# Patient Record
Sex: MALE | Race: WHITE | NOT HISPANIC OR LATINO | Employment: OTHER | URBAN - METROPOLITAN AREA
[De-identification: names, ages, dates, MRNs, and addresses within clinical notes are randomized per-mention and may not be internally consistent; named-entity substitution may affect disease eponyms.]

---

## 2017-02-06 ENCOUNTER — GENERIC CONVERSION - ENCOUNTER (OUTPATIENT)
Dept: OTHER | Facility: OTHER | Age: 77
End: 2017-02-06

## 2017-02-06 ENCOUNTER — ALLSCRIPTS OFFICE VISIT (OUTPATIENT)
Dept: OTHER | Facility: OTHER | Age: 77
End: 2017-02-06

## 2017-02-06 DIAGNOSIS — R11.2 NAUSEA WITH VOMITING: ICD-10-CM

## 2017-02-08 ENCOUNTER — APPOINTMENT (OUTPATIENT)
Dept: LAB | Facility: CLINIC | Age: 77
End: 2017-02-08
Payer: MEDICARE

## 2017-02-08 ENCOUNTER — TRANSCRIBE ORDERS (OUTPATIENT)
Dept: LAB | Facility: CLINIC | Age: 77
End: 2017-02-08

## 2017-02-08 DIAGNOSIS — R11.2 NAUSEA WITH VOMITING: ICD-10-CM

## 2017-02-08 PROCEDURE — 87045 FECES CULTURE AEROBIC BACT: CPT

## 2017-02-08 PROCEDURE — 87015 SPECIMEN INFECT AGNT CONCNTJ: CPT

## 2017-02-08 PROCEDURE — 87899 AGENT NOS ASSAY W/OPTIC: CPT

## 2017-02-08 PROCEDURE — 89055 LEUKOCYTE ASSESSMENT FECAL: CPT

## 2017-02-08 PROCEDURE — 87046 STOOL CULTR AEROBIC BACT EA: CPT

## 2017-02-08 PROCEDURE — 87493 C DIFF AMPLIFIED PROBE: CPT

## 2017-02-09 ENCOUNTER — GENERIC CONVERSION - ENCOUNTER (OUTPATIENT)
Dept: OTHER | Facility: OTHER | Age: 77
End: 2017-02-09

## 2017-02-09 LAB — C DIFF TOX GENS STL QL NAA+PROBE: NORMAL

## 2017-02-10 ENCOUNTER — GENERIC CONVERSION - ENCOUNTER (OUTPATIENT)
Dept: OTHER | Facility: OTHER | Age: 77
End: 2017-02-10

## 2017-02-10 LAB
BACTERIA STL CULT: NORMAL
BACTERIA STL CULT: NORMAL

## 2017-02-12 LAB — WBC SPEC QL GRAM STN: NORMAL

## 2017-02-14 ENCOUNTER — GENERIC CONVERSION - ENCOUNTER (OUTPATIENT)
Dept: OTHER | Facility: OTHER | Age: 77
End: 2017-02-14

## 2017-03-07 ENCOUNTER — GENERIC CONVERSION - ENCOUNTER (OUTPATIENT)
Dept: OTHER | Facility: OTHER | Age: 77
End: 2017-03-07

## 2017-03-10 ENCOUNTER — GENERIC CONVERSION - ENCOUNTER (OUTPATIENT)
Dept: OTHER | Facility: OTHER | Age: 77
End: 2017-03-10

## 2017-03-14 ENCOUNTER — ANESTHESIA EVENT (OUTPATIENT)
Dept: GASTROENTEROLOGY | Facility: HOSPITAL | Age: 77
End: 2017-03-14
Payer: MEDICARE

## 2017-03-15 ENCOUNTER — HOSPITAL ENCOUNTER (OUTPATIENT)
Facility: HOSPITAL | Age: 77
Setting detail: OUTPATIENT SURGERY
Discharge: HOME | End: 2017-03-15
Attending: INTERNAL MEDICINE | Admitting: INTERNAL MEDICINE
Payer: MEDICARE

## 2017-03-15 ENCOUNTER — ANESTHESIA (OUTPATIENT)
Dept: GASTROENTEROLOGY | Facility: HOSPITAL | Age: 77
End: 2017-03-15
Payer: MEDICARE

## 2017-03-15 ENCOUNTER — GENERIC CONVERSION - ENCOUNTER (OUTPATIENT)
Dept: OTHER | Facility: OTHER | Age: 77
End: 2017-03-15

## 2017-03-15 VITALS
HEIGHT: 77 IN | OXYGEN SATURATION: 95 % | BODY MASS INDEX: 25.98 KG/M2 | WEIGHT: 220 LBS | SYSTOLIC BLOOD PRESSURE: 137 MMHG | HEART RATE: 71 BPM | RESPIRATION RATE: 16 BRPM | TEMPERATURE: 97 F | DIASTOLIC BLOOD PRESSURE: 76 MMHG

## 2017-03-15 DIAGNOSIS — K21.9 GERD (GASTROESOPHAGEAL REFLUX DISEASE): ICD-10-CM

## 2017-03-15 DIAGNOSIS — R11.2 NAUSEA WITH VOMITING: ICD-10-CM

## 2017-03-15 PROCEDURE — 88305 TISSUE EXAM BY PATHOLOGIST: CPT | Performed by: INTERNAL MEDICINE

## 2017-03-15 PROCEDURE — 88342 IMHCHEM/IMCYTCHM 1ST ANTB: CPT | Performed by: INTERNAL MEDICINE

## 2017-03-15 RX ORDER — PROPOFOL 10 MG/ML
INJECTION, EMULSION INTRAVENOUS AS NEEDED
Status: DISCONTINUED | OUTPATIENT
Start: 2017-03-15 | End: 2017-03-15 | Stop reason: SURG

## 2017-03-15 RX ORDER — LIDOCAINE HYDROCHLORIDE 10 MG/ML
INJECTION, SOLUTION INFILTRATION; PERINEURAL AS NEEDED
Status: DISCONTINUED | OUTPATIENT
Start: 2017-03-15 | End: 2017-03-15 | Stop reason: SURG

## 2017-03-15 RX ORDER — SODIUM CHLORIDE, SODIUM LACTATE, POTASSIUM CHLORIDE, CALCIUM CHLORIDE 600; 310; 30; 20 MG/100ML; MG/100ML; MG/100ML; MG/100ML
100 INJECTION, SOLUTION INTRAVENOUS CONTINUOUS
Status: DISCONTINUED | OUTPATIENT
Start: 2017-03-15 | End: 2017-03-15

## 2017-03-15 RX ORDER — PROPOFOL 10 MG/ML
INJECTION, EMULSION INTRAVENOUS CONTINUOUS PRN
Status: DISCONTINUED | OUTPATIENT
Start: 2017-03-15 | End: 2017-03-15 | Stop reason: SURG

## 2017-03-15 RX ORDER — ALBUTEROL SULFATE 2.5 MG/3ML
2.5 SOLUTION RESPIRATORY (INHALATION) DAILY
COMMUNITY
End: 2017-04-09

## 2017-03-15 RX ADMIN — PROPOFOL 100 MCG/KG/MIN: 10 INJECTION, EMULSION INTRAVENOUS at 10:17

## 2017-03-15 RX ADMIN — SODIUM CHLORIDE, SODIUM LACTATE, POTASSIUM CHLORIDE, AND CALCIUM CHLORIDE 100 ML/HR: .6; .31; .03; .02 INJECTION, SOLUTION INTRAVENOUS at 09:43

## 2017-03-15 RX ADMIN — PROPOFOL 80 MG: 10 INJECTION, EMULSION INTRAVENOUS at 10:17

## 2017-03-15 RX ADMIN — LIDOCAINE HYDROCHLORIDE 100 MG: 10 INJECTION, SOLUTION INFILTRATION; PERINEURAL at 10:17

## 2017-03-19 ENCOUNTER — GENERIC CONVERSION - ENCOUNTER (OUTPATIENT)
Dept: OTHER | Facility: OTHER | Age: 77
End: 2017-03-19

## 2017-04-09 ENCOUNTER — APPOINTMENT (OUTPATIENT)
Dept: CT IMAGING | Facility: HOSPITAL | Age: 77
DRG: 683 | End: 2017-04-09
Payer: MEDICARE

## 2017-04-09 ENCOUNTER — APPOINTMENT (EMERGENCY)
Dept: RADIOLOGY | Facility: HOSPITAL | Age: 77
DRG: 683 | End: 2017-04-09
Payer: MEDICARE

## 2017-04-09 ENCOUNTER — APPOINTMENT (EMERGENCY)
Dept: CT IMAGING | Facility: HOSPITAL | Age: 77
DRG: 683 | End: 2017-04-09
Payer: MEDICARE

## 2017-04-09 ENCOUNTER — HOSPITAL ENCOUNTER (INPATIENT)
Facility: HOSPITAL | Age: 77
LOS: 1 days | Discharge: HOME | DRG: 683 | End: 2017-04-12
Attending: EMERGENCY MEDICINE | Admitting: FAMILY MEDICINE
Payer: MEDICARE

## 2017-04-09 DIAGNOSIS — N18.9 ACUTE-ON-CHRONIC KIDNEY INJURY (HCC): ICD-10-CM

## 2017-04-09 DIAGNOSIS — J40 BRONCHITIS: Primary | ICD-10-CM

## 2017-04-09 DIAGNOSIS — R53.1 LEFT-SIDED WEAKNESS: ICD-10-CM

## 2017-04-09 DIAGNOSIS — R09.02 HYPOXIA: ICD-10-CM

## 2017-04-09 DIAGNOSIS — N17.9 ACUTE-ON-CHRONIC KIDNEY INJURY (HCC): ICD-10-CM

## 2017-04-09 DIAGNOSIS — Z86.73 HISTORY OF CVA (CEREBROVASCULAR ACCIDENT): Chronic | ICD-10-CM

## 2017-04-09 PROBLEM — R06.02 SHORTNESS OF BREATH: Status: ACTIVE | Noted: 2017-04-09

## 2017-04-09 LAB
ALBUMIN SERPL BCP-MCNC: 4.2 G/DL (ref 3.5–5)
ALP SERPL-CCNC: 106 U/L (ref 46–116)
ALT SERPL W P-5'-P-CCNC: 27 U/L (ref 12–78)
ANION GAP SERPL CALCULATED.3IONS-SCNC: 10 MMOL/L (ref 4–13)
ANION GAP SERPL CALCULATED.3IONS-SCNC: 15 MMOL/L (ref 4–13)
APTT PPP: 34 SECONDS (ref 24–36)
AST SERPL W P-5'-P-CCNC: 15 U/L (ref 5–45)
BACTERIA UR QL AUTO: ABNORMAL /HPF
BASE EX.OXY STD BLDV CALC-SCNC: 86.6 % (ref 60–80)
BASE EXCESS BLDV CALC-SCNC: -3.3 MMOL/L
BASOPHILS # BLD AUTO: 0.04 THOUSANDS/ΜL (ref 0–0.1)
BASOPHILS NFR BLD AUTO: 0 % (ref 0–1)
BILIRUB SERPL-MCNC: 0.5 MG/DL (ref 0.2–1)
BILIRUB UR QL STRIP: NEGATIVE
BUN SERPL-MCNC: 23 MG/DL (ref 5–25)
BUN SERPL-MCNC: 23 MG/DL (ref 5–25)
CALCIUM SERPL-MCNC: 9.2 MG/DL (ref 8.3–10.1)
CALCIUM SERPL-MCNC: 9.3 MG/DL (ref 8.3–10.1)
CHLORIDE SERPL-SCNC: 102 MMOL/L (ref 100–108)
CHLORIDE SERPL-SCNC: 103 MMOL/L (ref 100–108)
CLARITY UR: ABNORMAL
CO2 SERPL-SCNC: 22 MMOL/L (ref 21–32)
CO2 SERPL-SCNC: 25 MMOL/L (ref 21–32)
COLOR UR: YELLOW
CREAT SERPL-MCNC: 1.7 MG/DL (ref 0.6–1.3)
CREAT SERPL-MCNC: 1.71 MG/DL (ref 0.6–1.3)
DEPRECATED D DIMER PPP: 302 NG/ML (FEU) (ref 0–424)
EOSINOPHIL # BLD AUTO: 0.36 THOUSAND/ΜL (ref 0–0.61)
EOSINOPHIL NFR BLD AUTO: 4 % (ref 0–6)
ERYTHROCYTE [DISTWIDTH] IN BLOOD BY AUTOMATED COUNT: 14 % (ref 11.6–15.1)
GFR SERPL CREATININE-BSD FRML MDRD: 39 ML/MIN/1.73SQ M
GFR SERPL CREATININE-BSD FRML MDRD: 39.3 ML/MIN/1.73SQ M
GLUCOSE SERPL-MCNC: 112 MG/DL (ref 65–140)
GLUCOSE SERPL-MCNC: 120 MG/DL (ref 65–140)
GLUCOSE UR STRIP-MCNC: NEGATIVE MG/DL
HCO3 BLDV-SCNC: 19 MMOL/L (ref 24–30)
HCT VFR BLD AUTO: 43.7 % (ref 36.5–49.3)
HGB BLD-MCNC: 14.5 G/DL (ref 12–17)
HGB UR QL STRIP.AUTO: ABNORMAL
INR PPP: 1.09 (ref 0.86–1.16)
KETONES UR STRIP-MCNC: NEGATIVE MG/DL
L PNEUMO1 AG UR QL IA.RAPID: NEGATIVE
LACTATE SERPL-SCNC: 1.5 MMOL/L (ref 0.5–2)
LEUKOCYTE ESTERASE UR QL STRIP: ABNORMAL
LYMPHOCYTES # BLD AUTO: 0.7 THOUSANDS/ΜL (ref 0.6–4.47)
LYMPHOCYTES NFR BLD AUTO: 7 % (ref 14–44)
MCH RBC QN AUTO: 29.5 PG (ref 26.8–34.3)
MCHC RBC AUTO-ENTMCNC: 33.2 G/DL (ref 31.4–37.4)
MCV RBC AUTO: 89 FL (ref 82–98)
MONOCYTES # BLD AUTO: 0.83 THOUSAND/ΜL (ref 0.17–1.22)
MONOCYTES NFR BLD AUTO: 8 % (ref 4–12)
NEUTROPHILS # BLD AUTO: 8.04 THOUSANDS/ΜL (ref 1.85–7.62)
NEUTS SEG NFR BLD AUTO: 81 % (ref 43–75)
NITRITE UR QL STRIP: POSITIVE
NON-SQ EPI CELLS URNS QL MICRO: ABNORMAL /HPF
NT-PROBNP SERPL-MCNC: 125 PG/ML
O2 CT BLDV-SCNC: 19.1 ML/DL
PCO2 BLDV: 27.7 MM HG (ref 42–50)
PH BLDV: 7.45 [PH] (ref 7.3–7.4)
PH UR STRIP.AUTO: 6 [PH] (ref 4.5–8)
PLATELET # BLD AUTO: 141 THOUSANDS/UL (ref 149–390)
PLATELET # BLD AUTO: 157 THOUSANDS/UL (ref 149–390)
PMV BLD AUTO: 11.5 FL (ref 8.9–12.7)
PMV BLD AUTO: 11.7 FL (ref 8.9–12.7)
PO2 BLDV: 52.1 MM HG (ref 35–45)
POTASSIUM SERPL-SCNC: 4.1 MMOL/L (ref 3.5–5.3)
POTASSIUM SERPL-SCNC: 4.4 MMOL/L (ref 3.5–5.3)
PROT SERPL-MCNC: 7.7 G/DL (ref 6.4–8.2)
PROT UR STRIP-MCNC: ABNORMAL MG/DL
PROTHROMBIN TIME: 13.9 SECONDS (ref 12–14.3)
RBC # BLD AUTO: 4.92 MILLION/UL (ref 3.88–5.62)
RBC #/AREA URNS AUTO: ABNORMAL /HPF
S PNEUM AG UR QL: NEGATIVE
SODIUM SERPL-SCNC: 138 MMOL/L (ref 136–145)
SODIUM SERPL-SCNC: 139 MMOL/L (ref 136–145)
SP GR UR STRIP.AUTO: 1.02 (ref 1–1.03)
TROPONIN I SERPL-MCNC: <0.02 NG/ML
UROBILINOGEN UR QL STRIP.AUTO: 0.2 E.U./DL
WBC # BLD AUTO: 9.97 THOUSAND/UL (ref 4.31–10.16)
WBC #/AREA URNS AUTO: ABNORMAL /HPF

## 2017-04-09 PROCEDURE — 80053 COMPREHEN METABOLIC PANEL: CPT | Performed by: EMERGENCY MEDICINE

## 2017-04-09 PROCEDURE — 87449 NOS EACH ORGANISM AG IA: CPT | Performed by: PHYSICIAN ASSISTANT

## 2017-04-09 PROCEDURE — 87798 DETECT AGENT NOS DNA AMP: CPT | Performed by: PHYSICIAN ASSISTANT

## 2017-04-09 PROCEDURE — 85610 PROTHROMBIN TIME: CPT | Performed by: EMERGENCY MEDICINE

## 2017-04-09 PROCEDURE — 82805 BLOOD GASES W/O2 SATURATION: CPT | Performed by: EMERGENCY MEDICINE

## 2017-04-09 PROCEDURE — 71250 CT THORAX DX C-: CPT

## 2017-04-09 PROCEDURE — 36415 COLL VENOUS BLD VENIPUNCTURE: CPT | Performed by: EMERGENCY MEDICINE

## 2017-04-09 PROCEDURE — 96365 THER/PROPH/DIAG IV INF INIT: CPT

## 2017-04-09 PROCEDURE — 70490 CT SOFT TISSUE NECK W/O DYE: CPT

## 2017-04-09 PROCEDURE — 93005 ELECTROCARDIOGRAM TRACING: CPT | Performed by: EMERGENCY MEDICINE

## 2017-04-09 PROCEDURE — 84484 ASSAY OF TROPONIN QUANT: CPT | Performed by: PHYSICIAN ASSISTANT

## 2017-04-09 PROCEDURE — 71020 HB CHEST X-RAY 2VW FRONTAL&LATL: CPT

## 2017-04-09 PROCEDURE — 81001 URINALYSIS AUTO W/SCOPE: CPT | Performed by: FAMILY MEDICINE

## 2017-04-09 PROCEDURE — 83605 ASSAY OF LACTIC ACID: CPT | Performed by: EMERGENCY MEDICINE

## 2017-04-09 PROCEDURE — 84484 ASSAY OF TROPONIN QUANT: CPT | Performed by: EMERGENCY MEDICINE

## 2017-04-09 PROCEDURE — 87040 BLOOD CULTURE FOR BACTERIA: CPT | Performed by: EMERGENCY MEDICINE

## 2017-04-09 PROCEDURE — 83880 ASSAY OF NATRIURETIC PEPTIDE: CPT | Performed by: EMERGENCY MEDICINE

## 2017-04-09 PROCEDURE — 70450 CT HEAD/BRAIN W/O DYE: CPT

## 2017-04-09 PROCEDURE — 85049 AUTOMATED PLATELET COUNT: CPT | Performed by: PHYSICIAN ASSISTANT

## 2017-04-09 PROCEDURE — 85379 FIBRIN DEGRADATION QUANT: CPT | Performed by: EMERGENCY MEDICINE

## 2017-04-09 PROCEDURE — 85025 COMPLETE CBC W/AUTO DIFF WBC: CPT | Performed by: EMERGENCY MEDICINE

## 2017-04-09 PROCEDURE — 85730 THROMBOPLASTIN TIME PARTIAL: CPT | Performed by: EMERGENCY MEDICINE

## 2017-04-09 PROCEDURE — 94664 DEMO&/EVAL PT USE INHALER: CPT

## 2017-04-09 PROCEDURE — 87086 URINE CULTURE/COLONY COUNT: CPT | Performed by: FAMILY MEDICINE

## 2017-04-09 PROCEDURE — 84484 ASSAY OF TROPONIN QUANT: CPT | Performed by: FAMILY MEDICINE

## 2017-04-09 PROCEDURE — 99285 EMERGENCY DEPT VISIT HI MDM: CPT

## 2017-04-09 PROCEDURE — 80048 BASIC METABOLIC PNL TOTAL CA: CPT | Performed by: PHYSICIAN ASSISTANT

## 2017-04-09 RX ORDER — PANTOPRAZOLE SODIUM 40 MG/1
40 TABLET, DELAYED RELEASE ORAL DAILY
Status: DISCONTINUED | OUTPATIENT
Start: 2017-04-09 | End: 2017-04-09 | Stop reason: SDUPTHER

## 2017-04-09 RX ORDER — ATORVASTATIN CALCIUM 10 MG/1
10 TABLET, FILM COATED ORAL DAILY
Status: DISCONTINUED | OUTPATIENT
Start: 2017-04-09 | End: 2017-04-12 | Stop reason: HOSPADM

## 2017-04-09 RX ORDER — HEPARIN SODIUM 5000 [USP'U]/ML
5000 INJECTION, SOLUTION INTRAVENOUS; SUBCUTANEOUS EVERY 8 HOURS SCHEDULED
Status: DISCONTINUED | OUTPATIENT
Start: 2017-04-09 | End: 2017-04-12 | Stop reason: HOSPADM

## 2017-04-09 RX ORDER — SUCRALFATE ORAL 1 G/10ML
1 SUSPENSION ORAL 4 TIMES DAILY
COMMUNITY
End: 2017-06-19 | Stop reason: HOSPADM

## 2017-04-09 RX ORDER — MAGNESIUM HYDROXIDE/ALUMINUM HYDROXICE/SIMETHICONE 120; 1200; 1200 MG/30ML; MG/30ML; MG/30ML
30 SUSPENSION ORAL EVERY 6 HOURS PRN
Status: DISCONTINUED | OUTPATIENT
Start: 2017-04-09 | End: 2017-04-12 | Stop reason: HOSPADM

## 2017-04-09 RX ORDER — LEVOCETIRIZINE DIHYDROCHLORIDE 5 MG/1
5 TABLET, FILM COATED ORAL EVERY EVENING
Status: ON HOLD | COMMUNITY
End: 2017-06-19

## 2017-04-09 RX ORDER — ACETAMINOPHEN 325 MG/1
650 TABLET ORAL EVERY 6 HOURS PRN
Status: DISCONTINUED | OUTPATIENT
Start: 2017-04-09 | End: 2017-04-12 | Stop reason: HOSPADM

## 2017-04-09 RX ORDER — VALSARTAN 160 MG/1
320 TABLET ORAL DAILY
Status: DISCONTINUED | OUTPATIENT
Start: 2017-04-09 | End: 2017-04-12 | Stop reason: HOSPADM

## 2017-04-09 RX ORDER — LEVOFLOXACIN 5 MG/ML
750 INJECTION, SOLUTION INTRAVENOUS
Status: DISCONTINUED | OUTPATIENT
Start: 2017-04-11 | End: 2017-04-12 | Stop reason: HOSPADM

## 2017-04-09 RX ORDER — LORAZEPAM 0.5 MG/1
0.25 TABLET ORAL EVERY 8 HOURS PRN
Status: DISCONTINUED | OUTPATIENT
Start: 2017-04-09 | End: 2017-04-12 | Stop reason: HOSPADM

## 2017-04-09 RX ORDER — ALBUTEROL SULFATE 2.5 MG/3ML
2.5 SOLUTION RESPIRATORY (INHALATION) EVERY 6 HOURS PRN
Status: DISCONTINUED | OUTPATIENT
Start: 2017-04-09 | End: 2017-04-12 | Stop reason: HOSPADM

## 2017-04-09 RX ORDER — AMLODIPINE BESYLATE 10 MG/1
10 TABLET ORAL DAILY
Status: DISCONTINUED | OUTPATIENT
Start: 2017-04-09 | End: 2017-04-12 | Stop reason: HOSPADM

## 2017-04-09 RX ORDER — AZELASTINE HYDROCHLORIDE 0.5 MG/ML
1 SOLUTION/ DROPS OPHTHALMIC 2 TIMES DAILY
COMMUNITY
End: 2017-08-21 | Stop reason: HOSPADM

## 2017-04-09 RX ORDER — LEVOFLOXACIN 5 MG/ML
750 INJECTION, SOLUTION INTRAVENOUS ONCE
Status: COMPLETED | OUTPATIENT
Start: 2017-04-09 | End: 2017-04-09

## 2017-04-09 RX ORDER — SODIUM CHLORIDE 9 MG/ML
75 INJECTION, SOLUTION INTRAVENOUS CONTINUOUS
Status: DISCONTINUED | OUTPATIENT
Start: 2017-04-09 | End: 2017-04-12 | Stop reason: HOSPADM

## 2017-04-09 RX ORDER — SODIUM CHLORIDE 9 MG/ML
125 INJECTION, SOLUTION INTRAVENOUS CONTINUOUS
Status: DISCONTINUED | OUTPATIENT
Start: 2017-04-09 | End: 2017-04-09

## 2017-04-09 RX ORDER — VANCOMYCIN HYDROCHLORIDE 1 G/200ML
1000 INJECTION, SOLUTION INTRAVENOUS ONCE
Status: COMPLETED | OUTPATIENT
Start: 2017-04-09 | End: 2017-04-09

## 2017-04-09 RX ORDER — FLUCONAZOLE 100 MG/1
100 TABLET ORAL DAILY
COMMUNITY
End: 2017-04-12 | Stop reason: HOSPADM

## 2017-04-09 RX ORDER — FLUCONAZOLE 100 MG/1
100 TABLET ORAL DAILY
Status: DISCONTINUED | OUTPATIENT
Start: 2017-04-09 | End: 2017-04-09

## 2017-04-09 RX ORDER — ESOMEPRAZOLE MAGNESIUM 40 MG/1
40 CAPSULE, DELAYED RELEASE ORAL
Status: ON HOLD | COMMUNITY
End: 2017-06-19

## 2017-04-09 RX ORDER — SUCRALFATE ORAL 1 G/10ML
1000 SUSPENSION ORAL 4 TIMES DAILY
Status: DISCONTINUED | OUTPATIENT
Start: 2017-04-09 | End: 2017-04-12 | Stop reason: HOSPADM

## 2017-04-09 RX ORDER — CLOPIDOGREL BISULFATE 75 MG/1
75 TABLET ORAL DAILY
Status: DISCONTINUED | OUTPATIENT
Start: 2017-04-09 | End: 2017-04-12 | Stop reason: HOSPADM

## 2017-04-09 RX ORDER — ONDANSETRON 2 MG/ML
4 INJECTION INTRAMUSCULAR; INTRAVENOUS EVERY 6 HOURS PRN
Status: DISCONTINUED | OUTPATIENT
Start: 2017-04-09 | End: 2017-04-12 | Stop reason: HOSPADM

## 2017-04-09 RX ORDER — CIPROFLOXACIN 250 MG/1
250 TABLET, FILM COATED ORAL 2 TIMES DAILY
COMMUNITY
End: 2017-04-12 | Stop reason: HOSPADM

## 2017-04-09 RX ORDER — CARVEDILOL PHOSPHATE 20 MG/1
20 CAPSULE, EXTENDED RELEASE ORAL DAILY
Status: DISCONTINUED | OUTPATIENT
Start: 2017-04-09 | End: 2017-04-09

## 2017-04-09 RX ORDER — PANTOPRAZOLE SODIUM 40 MG/1
40 TABLET, DELAYED RELEASE ORAL DAILY
COMMUNITY
End: 2017-04-12 | Stop reason: HOSPADM

## 2017-04-09 RX ORDER — CARVEDILOL 6.25 MG/1
6.25 TABLET ORAL 2 TIMES DAILY WITH MEALS
Status: DISCONTINUED | OUTPATIENT
Start: 2017-04-09 | End: 2017-04-12 | Stop reason: HOSPADM

## 2017-04-09 RX ORDER — LORATADINE 10 MG/1
10 TABLET ORAL DAILY
Status: DISCONTINUED | OUTPATIENT
Start: 2017-04-09 | End: 2017-04-12 | Stop reason: HOSPADM

## 2017-04-09 RX ORDER — PANTOPRAZOLE SODIUM 40 MG/1
40 TABLET, DELAYED RELEASE ORAL
Status: DISCONTINUED | OUTPATIENT
Start: 2017-04-09 | End: 2017-04-12 | Stop reason: HOSPADM

## 2017-04-09 RX ADMIN — SUCRALFATE 1000 MG: 1 SUSPENSION ORAL at 21:23

## 2017-04-09 RX ADMIN — TIOTROPIUM BROMIDE 18 MCG: 18 CAPSULE ORAL; RESPIRATORY (INHALATION) at 12:18

## 2017-04-09 RX ADMIN — ATORVASTATIN CALCIUM 10 MG: 10 TABLET, FILM COATED ORAL at 12:10

## 2017-04-09 RX ADMIN — VALSARTAN 320 MG: 160 TABLET, FILM COATED ORAL at 12:09

## 2017-04-09 RX ADMIN — CARVEDILOL 6.25 MG: 6.25 TABLET, FILM COATED ORAL at 12:10

## 2017-04-09 RX ADMIN — VANCOMYCIN HYDROCHLORIDE 1000 MG: 1 INJECTION, SOLUTION INTRAVENOUS at 05:07

## 2017-04-09 RX ADMIN — CARVEDILOL 6.25 MG: 6.25 TABLET, FILM COATED ORAL at 16:46

## 2017-04-09 RX ADMIN — SODIUM CHLORIDE 125 ML/HR: 0.9 INJECTION, SOLUTION INTRAVENOUS at 04:50

## 2017-04-09 RX ADMIN — LORATADINE 10 MG: 10 TABLET ORAL at 12:10

## 2017-04-09 RX ADMIN — SUCRALFATE 1000 MG: 1 SUSPENSION ORAL at 12:09

## 2017-04-09 RX ADMIN — SODIUM CHLORIDE 75 ML/HR: 0.9 INJECTION, SOLUTION INTRAVENOUS at 11:48

## 2017-04-09 RX ADMIN — HEPARIN SODIUM 5000 UNITS: 5000 INJECTION, SOLUTION INTRAVENOUS; SUBCUTANEOUS at 21:23

## 2017-04-09 RX ADMIN — HEPARIN SODIUM 5000 UNITS: 5000 INJECTION, SOLUTION INTRAVENOUS; SUBCUTANEOUS at 16:46

## 2017-04-09 RX ADMIN — SUCRALFATE 1000 MG: 1 SUSPENSION ORAL at 18:08

## 2017-04-09 RX ADMIN — AMLODIPINE BESYLATE 10 MG: 10 TABLET ORAL at 12:09

## 2017-04-09 RX ADMIN — HEPARIN SODIUM 5000 UNITS: 5000 INJECTION, SOLUTION INTRAVENOUS; SUBCUTANEOUS at 12:11

## 2017-04-09 RX ADMIN — SODIUM CHLORIDE 250 ML: 0.9 INJECTION, SOLUTION INTRAVENOUS at 04:57

## 2017-04-09 RX ADMIN — PANTOPRAZOLE SODIUM 40 MG: 40 TABLET, DELAYED RELEASE ORAL at 12:09

## 2017-04-09 RX ADMIN — LEVOFLOXACIN 750 MG: 5 INJECTION, SOLUTION INTRAVENOUS at 03:38

## 2017-04-09 RX ADMIN — CLOPIDOGREL BISULFATE 75 MG: 75 TABLET ORAL at 12:09

## 2017-04-10 LAB
ALBUMIN SERPL BCP-MCNC: 3.5 G/DL (ref 3.5–5)
ALP SERPL-CCNC: 81 U/L (ref 46–116)
ALT SERPL W P-5'-P-CCNC: 23 U/L (ref 12–78)
ANION GAP SERPL CALCULATED.3IONS-SCNC: 12 MMOL/L (ref 4–13)
AST SERPL W P-5'-P-CCNC: 20 U/L (ref 5–45)
ATRIAL RATE: 94 BPM
BACTERIA SPT RESP CULT: NORMAL
BASOPHILS # BLD AUTO: 0.02 THOUSANDS/ΜL (ref 0–0.1)
BASOPHILS NFR BLD AUTO: 0 % (ref 0–1)
BILIRUB SERPL-MCNC: 0.6 MG/DL (ref 0.2–1)
BUN SERPL-MCNC: 23 MG/DL (ref 5–25)
CALCIUM SERPL-MCNC: 8.9 MG/DL (ref 8.3–10.1)
CHLORIDE SERPL-SCNC: 106 MMOL/L (ref 100–108)
CHOLEST SERPL-MCNC: 101 MG/DL (ref 50–200)
CO2 SERPL-SCNC: 23 MMOL/L (ref 21–32)
CREAT SERPL-MCNC: 1.63 MG/DL (ref 0.6–1.3)
EOSINOPHIL # BLD AUTO: 0.24 THOUSAND/ΜL (ref 0–0.61)
EOSINOPHIL NFR BLD AUTO: 4 % (ref 0–6)
ERYTHROCYTE [DISTWIDTH] IN BLOOD BY AUTOMATED COUNT: 13.9 % (ref 11.6–15.1)
FLUAV AG SPEC QL: NORMAL
FLUBV AG SPEC QL: NORMAL
GFR SERPL CREATININE-BSD FRML MDRD: 41.2 ML/MIN/1.73SQ M
GLUCOSE P FAST SERPL-MCNC: 93 MG/DL (ref 65–99)
GLUCOSE SERPL-MCNC: 93 MG/DL (ref 65–140)
GRAM STN SPEC: NORMAL
HCT VFR BLD AUTO: 38.6 % (ref 36.5–49.3)
HDLC SERPL-MCNC: 42 MG/DL (ref 40–60)
HGB BLD-MCNC: 12.7 G/DL (ref 12–17)
LDLC SERPL CALC-MCNC: 41 MG/DL (ref 0–100)
LYMPHOCYTES # BLD AUTO: 1.36 THOUSANDS/ΜL (ref 0.6–4.47)
LYMPHOCYTES NFR BLD AUTO: 24 % (ref 14–44)
MCH RBC QN AUTO: 29.5 PG (ref 26.8–34.3)
MCHC RBC AUTO-ENTMCNC: 32.9 G/DL (ref 31.4–37.4)
MCV RBC AUTO: 90 FL (ref 82–98)
MONOCYTES # BLD AUTO: 0.94 THOUSAND/ΜL (ref 0.17–1.22)
MONOCYTES NFR BLD AUTO: 17 % (ref 4–12)
NEUTROPHILS # BLD AUTO: 3.15 THOUSANDS/ΜL (ref 1.85–7.62)
NEUTS SEG NFR BLD AUTO: 55 % (ref 43–75)
P AXIS: 83 DEGREES
PLATELET # BLD AUTO: 122 THOUSANDS/UL (ref 149–390)
PMV BLD AUTO: 11.4 FL (ref 8.9–12.7)
POTASSIUM SERPL-SCNC: 3.9 MMOL/L (ref 3.5–5.3)
PR INTERVAL: 198 MS
PROT SERPL-MCNC: 6.6 G/DL (ref 6.4–8.2)
QRS AXIS: 25 DEGREES
QRSD INTERVAL: 92 MS
QT INTERVAL: 358 MS
QTC INTERVAL: 447 MS
RBC # BLD AUTO: 4.31 MILLION/UL (ref 3.88–5.62)
RSV B RNA SPEC QL NAA+PROBE: NORMAL
SODIUM SERPL-SCNC: 141 MMOL/L (ref 136–145)
T WAVE AXIS: 80 DEGREES
TRIGL SERPL-MCNC: 89 MG/DL
VENTRICULAR RATE: 94 BPM
WBC # BLD AUTO: 5.71 THOUSAND/UL (ref 4.31–10.16)

## 2017-04-10 PROCEDURE — G8978 MOBILITY CURRENT STATUS: HCPCS

## 2017-04-10 PROCEDURE — 97110 THERAPEUTIC EXERCISES: CPT

## 2017-04-10 PROCEDURE — 97163 PT EVAL HIGH COMPLEX 45 MIN: CPT

## 2017-04-10 PROCEDURE — G8979 MOBILITY GOAL STATUS: HCPCS

## 2017-04-10 PROCEDURE — 87205 SMEAR GRAM STAIN: CPT | Performed by: FAMILY MEDICINE

## 2017-04-10 PROCEDURE — 80061 LIPID PANEL: CPT | Performed by: FAMILY MEDICINE

## 2017-04-10 PROCEDURE — 85025 COMPLETE CBC W/AUTO DIFF WBC: CPT | Performed by: FAMILY MEDICINE

## 2017-04-10 PROCEDURE — 80053 COMPREHEN METABOLIC PANEL: CPT | Performed by: FAMILY MEDICINE

## 2017-04-10 RX ADMIN — HEPARIN SODIUM 5000 UNITS: 5000 INJECTION, SOLUTION INTRAVENOUS; SUBCUTANEOUS at 05:25

## 2017-04-10 RX ADMIN — CLOPIDOGREL BISULFATE 75 MG: 75 TABLET ORAL at 08:30

## 2017-04-10 RX ADMIN — VALSARTAN 320 MG: 160 TABLET, FILM COATED ORAL at 08:30

## 2017-04-10 RX ADMIN — SODIUM CHLORIDE 75 ML/HR: 0.9 INJECTION, SOLUTION INTRAVENOUS at 05:27

## 2017-04-10 RX ADMIN — SODIUM CHLORIDE 75 ML/HR: 0.9 INJECTION, SOLUTION INTRAVENOUS at 18:15

## 2017-04-10 RX ADMIN — SUCRALFATE 1000 MG: 1 SUSPENSION ORAL at 22:29

## 2017-04-10 RX ADMIN — CARVEDILOL 6.25 MG: 6.25 TABLET, FILM COATED ORAL at 08:30

## 2017-04-10 RX ADMIN — HEPARIN SODIUM 5000 UNITS: 5000 INJECTION, SOLUTION INTRAVENOUS; SUBCUTANEOUS at 13:59

## 2017-04-10 RX ADMIN — PANTOPRAZOLE SODIUM 40 MG: 40 TABLET, DELAYED RELEASE ORAL at 05:25

## 2017-04-10 RX ADMIN — SUCRALFATE 1000 MG: 1 SUSPENSION ORAL at 08:30

## 2017-04-10 RX ADMIN — LORATADINE 10 MG: 10 TABLET ORAL at 08:30

## 2017-04-10 RX ADMIN — SUCRALFATE 1000 MG: 1 SUSPENSION ORAL at 18:13

## 2017-04-10 RX ADMIN — ATORVASTATIN CALCIUM 10 MG: 10 TABLET, FILM COATED ORAL at 08:30

## 2017-04-10 RX ADMIN — AMLODIPINE BESYLATE 10 MG: 10 TABLET ORAL at 08:30

## 2017-04-10 RX ADMIN — TIOTROPIUM BROMIDE 18 MCG: 18 CAPSULE ORAL; RESPIRATORY (INHALATION) at 08:32

## 2017-04-10 RX ADMIN — CARVEDILOL 6.25 MG: 6.25 TABLET, FILM COATED ORAL at 16:12

## 2017-04-10 RX ADMIN — SUCRALFATE 1000 MG: 1 SUSPENSION ORAL at 12:16

## 2017-04-10 RX ADMIN — HEPARIN SODIUM 5000 UNITS: 5000 INJECTION, SOLUTION INTRAVENOUS; SUBCUTANEOUS at 22:29

## 2017-04-11 ENCOUNTER — APPOINTMENT (OUTPATIENT)
Dept: PHYSICAL THERAPY | Facility: HOSPITAL | Age: 77
DRG: 683 | End: 2017-04-11
Payer: MEDICARE

## 2017-04-11 PROBLEM — R53.1 LEFT-SIDED WEAKNESS: Status: ACTIVE | Noted: 2017-04-11

## 2017-04-11 LAB — BACTERIA UR CULT: NORMAL

## 2017-04-11 PROCEDURE — 97530 THERAPEUTIC ACTIVITIES: CPT

## 2017-04-11 PROCEDURE — 87205 SMEAR GRAM STAIN: CPT | Performed by: FAMILY MEDICINE

## 2017-04-11 PROCEDURE — 87070 CULTURE OTHR SPECIMN AEROBIC: CPT | Performed by: FAMILY MEDICINE

## 2017-04-11 PROCEDURE — 97116 GAIT TRAINING THERAPY: CPT

## 2017-04-11 RX ADMIN — CARVEDILOL 6.25 MG: 6.25 TABLET, FILM COATED ORAL at 17:17

## 2017-04-11 RX ADMIN — ATORVASTATIN CALCIUM 10 MG: 10 TABLET, FILM COATED ORAL at 09:02

## 2017-04-11 RX ADMIN — CARVEDILOL 6.25 MG: 6.25 TABLET, FILM COATED ORAL at 09:02

## 2017-04-11 RX ADMIN — VALSARTAN 320 MG: 160 TABLET, FILM COATED ORAL at 09:02

## 2017-04-11 RX ADMIN — HEPARIN SODIUM 5000 UNITS: 5000 INJECTION, SOLUTION INTRAVENOUS; SUBCUTANEOUS at 13:59

## 2017-04-11 RX ADMIN — CLOPIDOGREL BISULFATE 75 MG: 75 TABLET ORAL at 09:02

## 2017-04-11 RX ADMIN — SUCRALFATE 1000 MG: 1 SUSPENSION ORAL at 12:25

## 2017-04-11 RX ADMIN — TIOTROPIUM BROMIDE 18 MCG: 18 CAPSULE ORAL; RESPIRATORY (INHALATION) at 09:02

## 2017-04-11 RX ADMIN — LEVOFLOXACIN 750 MG: 5 INJECTION, SOLUTION INTRAVENOUS at 03:50

## 2017-04-11 RX ADMIN — PANTOPRAZOLE SODIUM 40 MG: 40 TABLET, DELAYED RELEASE ORAL at 05:31

## 2017-04-11 RX ADMIN — SUCRALFATE 1000 MG: 1 SUSPENSION ORAL at 09:02

## 2017-04-11 RX ADMIN — SUCRALFATE 1000 MG: 1 SUSPENSION ORAL at 22:11

## 2017-04-11 RX ADMIN — SUCRALFATE 1000 MG: 1 SUSPENSION ORAL at 17:17

## 2017-04-11 RX ADMIN — HEPARIN SODIUM 5000 UNITS: 5000 INJECTION, SOLUTION INTRAVENOUS; SUBCUTANEOUS at 05:31

## 2017-04-11 RX ADMIN — SODIUM CHLORIDE 75 ML/HR: 0.9 INJECTION, SOLUTION INTRAVENOUS at 21:16

## 2017-04-11 RX ADMIN — LORATADINE 10 MG: 10 TABLET ORAL at 09:02

## 2017-04-11 RX ADMIN — HEPARIN SODIUM 5000 UNITS: 5000 INJECTION, SOLUTION INTRAVENOUS; SUBCUTANEOUS at 22:11

## 2017-04-11 RX ADMIN — AMLODIPINE BESYLATE 10 MG: 10 TABLET ORAL at 09:02

## 2017-04-12 VITALS
RESPIRATION RATE: 18 BRPM | BODY MASS INDEX: 27.57 KG/M2 | DIASTOLIC BLOOD PRESSURE: 68 MMHG | SYSTOLIC BLOOD PRESSURE: 122 MMHG | WEIGHT: 233.47 LBS | OXYGEN SATURATION: 97 % | HEIGHT: 77 IN | TEMPERATURE: 97.6 F | HEART RATE: 67 BPM

## 2017-04-12 PROBLEM — N18.30 CHRONIC KIDNEY DISEASE, STAGE 3 (HCC): Status: ACTIVE | Noted: 2017-04-12

## 2017-04-12 LAB
ANION GAP SERPL CALCULATED.3IONS-SCNC: 11 MMOL/L (ref 4–13)
BUN SERPL-MCNC: 19 MG/DL (ref 5–25)
CALCIUM SERPL-MCNC: 9.4 MG/DL (ref 8.3–10.1)
CHLORIDE SERPL-SCNC: 105 MMOL/L (ref 100–108)
CO2 SERPL-SCNC: 24 MMOL/L (ref 21–32)
CREAT SERPL-MCNC: 1.29 MG/DL (ref 0.6–1.3)
ERYTHROCYTE [DISTWIDTH] IN BLOOD BY AUTOMATED COUNT: 13.8 % (ref 11.6–15.1)
GFR SERPL CREATININE-BSD FRML MDRD: 54 ML/MIN/1.73SQ M
GLUCOSE SERPL-MCNC: 87 MG/DL (ref 65–140)
HCT VFR BLD AUTO: 40.9 % (ref 36.5–49.3)
HGB BLD-MCNC: 13.4 G/DL (ref 12–17)
MCH RBC QN AUTO: 28.8 PG (ref 26.8–34.3)
MCHC RBC AUTO-ENTMCNC: 32.8 G/DL (ref 31.4–37.4)
MCV RBC AUTO: 88 FL (ref 82–98)
PLATELET # BLD AUTO: 119 THOUSANDS/UL (ref 149–390)
PMV BLD AUTO: 11.5 FL (ref 8.9–12.7)
POTASSIUM SERPL-SCNC: 3.7 MMOL/L (ref 3.5–5.3)
RBC # BLD AUTO: 4.65 MILLION/UL (ref 3.88–5.62)
SODIUM SERPL-SCNC: 140 MMOL/L (ref 136–145)
WBC # BLD AUTO: 5.45 THOUSAND/UL (ref 4.31–10.16)

## 2017-04-12 PROCEDURE — G8978 MOBILITY CURRENT STATUS: HCPCS

## 2017-04-12 PROCEDURE — 97110 THERAPEUTIC EXERCISES: CPT

## 2017-04-12 PROCEDURE — 85027 COMPLETE CBC AUTOMATED: CPT | Performed by: FAMILY MEDICINE

## 2017-04-12 PROCEDURE — 97164 PT RE-EVAL EST PLAN CARE: CPT

## 2017-04-12 PROCEDURE — G8979 MOBILITY GOAL STATUS: HCPCS

## 2017-04-12 PROCEDURE — 80048 BASIC METABOLIC PNL TOTAL CA: CPT | Performed by: FAMILY MEDICINE

## 2017-04-12 RX ORDER — GUAIFENESIN/DEXTROMETHORPHAN 100-10MG/5
10 SYRUP ORAL EVERY 4 HOURS PRN
Qty: 118 ML | Refills: 0 | Status: SHIPPED | OUTPATIENT
Start: 2017-04-12 | End: 2017-05-12

## 2017-04-12 RX ORDER — LEVOFLOXACIN 750 MG/1
750 TABLET ORAL
Qty: 3 TABLET | Refills: 0 | Status: SHIPPED | OUTPATIENT
Start: 2017-04-12 | End: 2017-04-19

## 2017-04-12 RX ORDER — ALBUTEROL SULFATE 2.5 MG/3ML
2.5 SOLUTION RESPIRATORY (INHALATION) EVERY 6 HOURS PRN
Qty: 75 ML | Refills: 0 | Status: SHIPPED | OUTPATIENT
Start: 2017-04-12 | End: 2017-05-12

## 2017-04-12 RX ORDER — GUAIFENESIN/DEXTROMETHORPHAN 100-10MG/5
10 SYRUP ORAL EVERY 4 HOURS PRN
Status: DISCONTINUED | OUTPATIENT
Start: 2017-04-12 | End: 2017-04-12 | Stop reason: HOSPADM

## 2017-04-12 RX ORDER — CARVEDILOL 6.25 MG/1
6.25 TABLET ORAL 2 TIMES DAILY WITH MEALS
Qty: 60 TABLET | Refills: 0 | Status: ON HOLD | OUTPATIENT
Start: 2017-04-12 | End: 2017-06-19

## 2017-04-12 RX ADMIN — SUCRALFATE 1000 MG: 1 SUSPENSION ORAL at 08:18

## 2017-04-12 RX ADMIN — CARVEDILOL 6.25 MG: 6.25 TABLET, FILM COATED ORAL at 08:19

## 2017-04-12 RX ADMIN — LORATADINE 10 MG: 10 TABLET ORAL at 08:19

## 2017-04-12 RX ADMIN — PANTOPRAZOLE SODIUM 40 MG: 40 TABLET, DELAYED RELEASE ORAL at 05:24

## 2017-04-12 RX ADMIN — VALSARTAN 320 MG: 160 TABLET, FILM COATED ORAL at 08:18

## 2017-04-12 RX ADMIN — AMLODIPINE BESYLATE 10 MG: 10 TABLET ORAL at 08:18

## 2017-04-12 RX ADMIN — TIOTROPIUM BROMIDE 18 MCG: 18 CAPSULE ORAL; RESPIRATORY (INHALATION) at 08:19

## 2017-04-12 RX ADMIN — CLOPIDOGREL BISULFATE 75 MG: 75 TABLET ORAL at 08:19

## 2017-04-12 RX ADMIN — GUAIFENESIN AND DEXTROMETHORPHAN 10 ML: 100; 10 SYRUP ORAL at 08:18

## 2017-04-12 RX ADMIN — ATORVASTATIN CALCIUM 10 MG: 10 TABLET, FILM COATED ORAL at 08:19

## 2017-04-12 RX ADMIN — HEPARIN SODIUM 5000 UNITS: 5000 INJECTION, SOLUTION INTRAVENOUS; SUBCUTANEOUS at 05:24

## 2017-04-13 LAB
BACTERIA SPT RESP CULT: NORMAL
GRAM STN SPEC: NORMAL

## 2017-04-14 LAB
BACTERIA BLD CULT: NORMAL
BACTERIA BLD CULT: NORMAL

## 2017-05-31 ENCOUNTER — APPOINTMENT (EMERGENCY)
Dept: RADIOLOGY | Facility: HOSPITAL | Age: 77
DRG: 205 | End: 2017-05-31
Payer: MEDICARE

## 2017-05-31 ENCOUNTER — APPOINTMENT (OUTPATIENT)
Dept: RADIOLOGY | Facility: HOSPITAL | Age: 77
DRG: 205 | End: 2017-05-31
Payer: MEDICARE

## 2017-05-31 ENCOUNTER — HOSPITAL ENCOUNTER (INPATIENT)
Facility: HOSPITAL | Age: 77
LOS: 9 days | DRG: 205 | End: 2017-06-09
Attending: EMERGENCY MEDICINE | Admitting: INTERNAL MEDICINE
Payer: MEDICARE

## 2017-05-31 DIAGNOSIS — R77.8 ELEVATED TROPONIN: ICD-10-CM

## 2017-05-31 DIAGNOSIS — J40 BRONCHITIS: Primary | ICD-10-CM

## 2017-05-31 DIAGNOSIS — R05.9 COUGH: ICD-10-CM

## 2017-05-31 DIAGNOSIS — R06.02 SHORTNESS OF BREATH: ICD-10-CM

## 2017-05-31 DIAGNOSIS — R06.2 WHEEZING: ICD-10-CM

## 2017-05-31 DIAGNOSIS — R07.9 CHEST PAIN: ICD-10-CM

## 2017-05-31 PROBLEM — N17.9 AKI (ACUTE KIDNEY INJURY) (HCC): Status: ACTIVE | Noted: 2017-05-31

## 2017-05-31 LAB
ALBUMIN SERPL BCP-MCNC: 3.7 G/DL (ref 3.5–5)
ALP SERPL-CCNC: 91 U/L (ref 46–116)
ALT SERPL W P-5'-P-CCNC: 31 U/L (ref 12–78)
ANION GAP SERPL CALCULATED.3IONS-SCNC: 11 MMOL/L (ref 4–13)
ANION GAP SERPL CALCULATED.3IONS-SCNC: 8 MMOL/L (ref 4–13)
APTT PPP: 33 SECONDS (ref 23–35)
AST SERPL W P-5'-P-CCNC: 19 U/L (ref 5–45)
ATRIAL RATE: 79 BPM
BASE EX.OXY STD BLDV CALC-SCNC: 85.7 % (ref 60–80)
BASE EXCESS BLDV CALC-SCNC: -0.8 MMOL/L
BASOPHILS # BLD AUTO: 0.02 THOUSANDS/ΜL (ref 0–0.1)
BASOPHILS NFR BLD AUTO: 0 % (ref 0–1)
BILIRUB SERPL-MCNC: 0.4 MG/DL (ref 0.2–1)
BUN SERPL-MCNC: 21 MG/DL (ref 5–25)
BUN SERPL-MCNC: 21 MG/DL (ref 5–25)
CALCIUM SERPL-MCNC: 8.9 MG/DL (ref 8.3–10.1)
CALCIUM SERPL-MCNC: 9.1 MG/DL (ref 8.3–10.1)
CHLORIDE SERPL-SCNC: 104 MMOL/L (ref 100–108)
CHLORIDE SERPL-SCNC: 105 MMOL/L (ref 100–108)
CK MB SERPL-MCNC: 1.5 % (ref 0–2.5)
CK MB SERPL-MCNC: 2.6 NG/ML (ref 0–5)
CK SERPL-CCNC: 169 U/L (ref 39–308)
CO2 SERPL-SCNC: 24 MMOL/L (ref 21–32)
CO2 SERPL-SCNC: 26 MMOL/L (ref 21–32)
CREAT SERPL-MCNC: 1.33 MG/DL (ref 0.6–1.3)
CREAT SERPL-MCNC: 1.48 MG/DL (ref 0.6–1.3)
DEPRECATED D DIMER PPP: <270 NG/ML (FEU) (ref 0–424)
EOSINOPHIL # BLD AUTO: 0.47 THOUSAND/ΜL (ref 0–0.61)
EOSINOPHIL NFR BLD AUTO: 6 % (ref 0–6)
ERYTHROCYTE [DISTWIDTH] IN BLOOD BY AUTOMATED COUNT: 14.6 % (ref 11.6–15.1)
ERYTHROCYTE [DISTWIDTH] IN BLOOD BY AUTOMATED COUNT: 14.6 % (ref 11.6–15.1)
GFR SERPL CREATININE-BSD FRML MDRD: 46.1 ML/MIN/1.73SQ M
GFR SERPL CREATININE-BSD FRML MDRD: 52.1 ML/MIN/1.73SQ M
GLUCOSE P FAST SERPL-MCNC: 93 MG/DL (ref 65–99)
GLUCOSE SERPL-MCNC: 115 MG/DL (ref 65–140)
GLUCOSE SERPL-MCNC: 93 MG/DL (ref 65–140)
HCO3 BLDV-SCNC: 24.2 MMOL/L (ref 24–30)
HCT VFR BLD AUTO: 41.7 % (ref 36.5–49.3)
HCT VFR BLD AUTO: 43.9 % (ref 36.5–49.3)
HGB BLD-MCNC: 13.4 G/DL (ref 12–17)
HGB BLD-MCNC: 14.5 G/DL (ref 12–17)
INR PPP: 1.02 (ref 0.86–1.16)
LACTATE SERPL-SCNC: 1.7 MMOL/L (ref 0.5–2)
LYMPHOCYTES # BLD AUTO: 1.22 THOUSANDS/ΜL (ref 0.6–4.47)
LYMPHOCYTES NFR BLD AUTO: 16 % (ref 14–44)
MCH RBC QN AUTO: 29.6 PG (ref 26.8–34.3)
MCH RBC QN AUTO: 30 PG (ref 26.8–34.3)
MCHC RBC AUTO-ENTMCNC: 32.1 G/DL (ref 31.4–37.4)
MCHC RBC AUTO-ENTMCNC: 33 G/DL (ref 31.4–37.4)
MCV RBC AUTO: 91 FL (ref 82–98)
MCV RBC AUTO: 92 FL (ref 82–98)
MONOCYTES # BLD AUTO: 0.64 THOUSAND/ΜL (ref 0.17–1.22)
MONOCYTES NFR BLD AUTO: 8 % (ref 4–12)
NEUTROPHILS # BLD AUTO: 5.33 THOUSANDS/ΜL (ref 1.85–7.62)
NEUTS SEG NFR BLD AUTO: 70 % (ref 43–75)
NT-PROBNP SERPL-MCNC: 373 PG/ML
O2 CT BLDV-SCNC: 18.6 ML/DL
P AXIS: 95 DEGREES
PCO2 BLDV: 41.5 MM HG (ref 42–50)
PH BLDV: 7.38 [PH] (ref 7.3–7.4)
PLATELET # BLD AUTO: 119 THOUSANDS/UL (ref 149–390)
PLATELET # BLD AUTO: 142 THOUSANDS/UL (ref 149–390)
PMV BLD AUTO: 10.6 FL (ref 8.9–12.7)
PMV BLD AUTO: 11.1 FL (ref 8.9–12.7)
PO2 BLDV: 52 MM HG (ref 35–45)
POTASSIUM SERPL-SCNC: 4 MMOL/L (ref 3.5–5.3)
POTASSIUM SERPL-SCNC: 4.1 MMOL/L (ref 3.5–5.3)
PR INTERVAL: 232 MS
PROT SERPL-MCNC: 6.9 G/DL (ref 6.4–8.2)
PROTHROMBIN TIME: 13.7 SECONDS (ref 12.1–14.4)
QRS AXIS: -5 DEGREES
QRSD INTERVAL: 88 MS
QT INTERVAL: 384 MS
QTC INTERVAL: 440 MS
RBC # BLD AUTO: 4.52 MILLION/UL (ref 3.88–5.62)
RBC # BLD AUTO: 4.83 MILLION/UL (ref 3.88–5.62)
SODIUM SERPL-SCNC: 138 MMOL/L (ref 136–145)
SODIUM SERPL-SCNC: 140 MMOL/L (ref 136–145)
T WAVE AXIS: 69 DEGREES
TROPONIN I SERPL-MCNC: 0.15 NG/ML
TROPONIN I SERPL-MCNC: 1.12 NG/ML
TROPONIN I SERPL-MCNC: 1.15 NG/ML
VENTRICULAR RATE: 79 BPM
WBC # BLD AUTO: 7.62 THOUSAND/UL (ref 4.31–10.16)
WBC # BLD AUTO: 7.68 THOUSAND/UL (ref 4.31–10.16)

## 2017-05-31 PROCEDURE — 80048 BASIC METABOLIC PNL TOTAL CA: CPT | Performed by: INTERNAL MEDICINE

## 2017-05-31 PROCEDURE — 83880 ASSAY OF NATRIURETIC PEPTIDE: CPT | Performed by: EMERGENCY MEDICINE

## 2017-05-31 PROCEDURE — 83605 ASSAY OF LACTIC ACID: CPT | Performed by: EMERGENCY MEDICINE

## 2017-05-31 PROCEDURE — 85610 PROTHROMBIN TIME: CPT | Performed by: EMERGENCY MEDICINE

## 2017-05-31 PROCEDURE — 85730 THROMBOPLASTIN TIME PARTIAL: CPT | Performed by: EMERGENCY MEDICINE

## 2017-05-31 PROCEDURE — 71010 HB CHEST X-RAY 1 VIEW FRONTAL (PORTABLE): CPT

## 2017-05-31 PROCEDURE — 82553 CREATINE MB FRACTION: CPT | Performed by: EMERGENCY MEDICINE

## 2017-05-31 PROCEDURE — 99285 EMERGENCY DEPT VISIT HI MDM: CPT

## 2017-05-31 PROCEDURE — 36415 COLL VENOUS BLD VENIPUNCTURE: CPT | Performed by: EMERGENCY MEDICINE

## 2017-05-31 PROCEDURE — 96365 THER/PROPH/DIAG IV INF INIT: CPT

## 2017-05-31 PROCEDURE — 87040 BLOOD CULTURE FOR BACTERIA: CPT | Performed by: EMERGENCY MEDICINE

## 2017-05-31 PROCEDURE — 84484 ASSAY OF TROPONIN QUANT: CPT | Performed by: EMERGENCY MEDICINE

## 2017-05-31 PROCEDURE — 94640 AIRWAY INHALATION TREATMENT: CPT

## 2017-05-31 PROCEDURE — 94664 DEMO&/EVAL PT USE INHALER: CPT

## 2017-05-31 PROCEDURE — 82805 BLOOD GASES W/O2 SATURATION: CPT | Performed by: EMERGENCY MEDICINE

## 2017-05-31 PROCEDURE — 85027 COMPLETE CBC AUTOMATED: CPT | Performed by: INTERNAL MEDICINE

## 2017-05-31 PROCEDURE — 85379 FIBRIN DEGRADATION QUANT: CPT | Performed by: EMERGENCY MEDICINE

## 2017-05-31 PROCEDURE — 84484 ASSAY OF TROPONIN QUANT: CPT | Performed by: PHYSICIAN ASSISTANT

## 2017-05-31 PROCEDURE — 93005 ELECTROCARDIOGRAM TRACING: CPT | Performed by: EMERGENCY MEDICINE

## 2017-05-31 PROCEDURE — 82550 ASSAY OF CK (CPK): CPT | Performed by: EMERGENCY MEDICINE

## 2017-05-31 PROCEDURE — 80053 COMPREHEN METABOLIC PANEL: CPT | Performed by: EMERGENCY MEDICINE

## 2017-05-31 PROCEDURE — 94760 N-INVAS EAR/PLS OXIMETRY 1: CPT

## 2017-05-31 PROCEDURE — 85025 COMPLETE CBC W/AUTO DIFF WBC: CPT | Performed by: EMERGENCY MEDICINE

## 2017-05-31 RX ORDER — ASPIRIN 81 MG/1
162 TABLET, CHEWABLE ORAL ONCE
Status: COMPLETED | OUTPATIENT
Start: 2017-05-31 | End: 2017-05-31

## 2017-05-31 RX ORDER — SODIUM CHLORIDE 9 MG/ML
100 INJECTION, SOLUTION INTRAVENOUS CONTINUOUS
Status: DISCONTINUED | OUTPATIENT
Start: 2017-05-31 | End: 2017-05-31

## 2017-05-31 RX ORDER — LEVALBUTEROL 1.25 MG/.5ML
1.25 SOLUTION, CONCENTRATE RESPIRATORY (INHALATION)
Status: DISCONTINUED | OUTPATIENT
Start: 2017-05-31 | End: 2017-06-01

## 2017-05-31 RX ORDER — ONDANSETRON 2 MG/ML
4 INJECTION INTRAMUSCULAR; INTRAVENOUS EVERY 6 HOURS PRN
Status: DISCONTINUED | OUTPATIENT
Start: 2017-05-31 | End: 2017-06-09 | Stop reason: HOSPADM

## 2017-05-31 RX ORDER — ACETAMINOPHEN 325 MG/1
650 TABLET ORAL EVERY 6 HOURS PRN
Status: DISCONTINUED | OUTPATIENT
Start: 2017-05-31 | End: 2017-06-09 | Stop reason: HOSPADM

## 2017-05-31 RX ORDER — CARVEDILOL 6.25 MG/1
6.25 TABLET ORAL 2 TIMES DAILY WITH MEALS
Status: DISCONTINUED | OUTPATIENT
Start: 2017-05-31 | End: 2017-06-09 | Stop reason: HOSPADM

## 2017-05-31 RX ORDER — AMLODIPINE BESYLATE 10 MG/1
10 TABLET ORAL DAILY
Status: DISCONTINUED | OUTPATIENT
Start: 2017-05-31 | End: 2017-06-09 | Stop reason: HOSPADM

## 2017-05-31 RX ORDER — PAROXETINE HYDROCHLORIDE 20 MG/1
20 TABLET, FILM COATED ORAL EVERY MORNING
Status: DISCONTINUED | OUTPATIENT
Start: 2017-05-31 | End: 2017-06-09 | Stop reason: HOSPADM

## 2017-05-31 RX ORDER — ALBUTEROL SULFATE 2.5 MG/3ML
2.5 SOLUTION RESPIRATORY (INHALATION) EVERY 4 HOURS PRN
Status: DISCONTINUED | OUTPATIENT
Start: 2017-05-31 | End: 2017-06-09 | Stop reason: HOSPADM

## 2017-05-31 RX ORDER — LEVOFLOXACIN 5 MG/ML
500 INJECTION, SOLUTION INTRAVENOUS EVERY 24 HOURS
Status: DISCONTINUED | OUTPATIENT
Start: 2017-06-01 | End: 2017-06-07

## 2017-05-31 RX ORDER — TAMSULOSIN HYDROCHLORIDE 0.4 MG/1
0.4 CAPSULE ORAL
Status: DISCONTINUED | OUTPATIENT
Start: 2017-05-31 | End: 2017-06-09 | Stop reason: HOSPADM

## 2017-05-31 RX ORDER — ATORVASTATIN CALCIUM 10 MG/1
10 TABLET, FILM COATED ORAL
Status: DISCONTINUED | OUTPATIENT
Start: 2017-05-31 | End: 2017-06-09 | Stop reason: HOSPADM

## 2017-05-31 RX ORDER — LEVOFLOXACIN 5 MG/ML
750 INJECTION, SOLUTION INTRAVENOUS ONCE
Status: COMPLETED | OUTPATIENT
Start: 2017-05-31 | End: 2017-05-31

## 2017-05-31 RX ORDER — ALBUTEROL SULFATE 2.5 MG/3ML
10 SOLUTION RESPIRATORY (INHALATION) ONCE
Status: COMPLETED | OUTPATIENT
Start: 2017-05-31 | End: 2017-05-31

## 2017-05-31 RX ORDER — GUAIFENESIN 600 MG
600 TABLET, EXTENDED RELEASE 12 HR ORAL 2 TIMES DAILY
Status: DISCONTINUED | OUTPATIENT
Start: 2017-05-31 | End: 2017-05-31

## 2017-05-31 RX ORDER — PANTOPRAZOLE SODIUM 40 MG/1
40 TABLET, DELAYED RELEASE ORAL
Status: DISCONTINUED | OUTPATIENT
Start: 2017-05-31 | End: 2017-05-31 | Stop reason: SDUPTHER

## 2017-05-31 RX ORDER — MECLIZINE HYDROCHLORIDE 25 MG/1
25 TABLET ORAL 3 TIMES DAILY PRN
Status: DISCONTINUED | OUTPATIENT
Start: 2017-05-31 | End: 2017-06-09 | Stop reason: HOSPADM

## 2017-05-31 RX ORDER — OXYBUTYNIN CHLORIDE 5 MG/1
5 TABLET, EXTENDED RELEASE ORAL
Status: DISCONTINUED | OUTPATIENT
Start: 2017-05-31 | End: 2017-06-09 | Stop reason: HOSPADM

## 2017-05-31 RX ORDER — SODIUM CHLORIDE FOR INHALATION 0.9 %
3 VIAL, NEBULIZER (ML) INHALATION
Status: DISCONTINUED | OUTPATIENT
Start: 2017-05-31 | End: 2017-06-01

## 2017-05-31 RX ORDER — POTASSIUM CHLORIDE 20 MEQ/1
20 TABLET, EXTENDED RELEASE ORAL DAILY
Status: DISCONTINUED | OUTPATIENT
Start: 2017-05-31 | End: 2017-06-09 | Stop reason: HOSPADM

## 2017-05-31 RX ORDER — ASPIRIN 81 MG/1
81 TABLET, CHEWABLE ORAL DAILY
Status: DISCONTINUED | OUTPATIENT
Start: 2017-06-01 | End: 2017-06-09 | Stop reason: HOSPADM

## 2017-05-31 RX ORDER — MORPHINE SULFATE 2 MG/ML
1 INJECTION, SOLUTION INTRAMUSCULAR; INTRAVENOUS EVERY 4 HOURS PRN
Status: DISCONTINUED | OUTPATIENT
Start: 2017-05-31 | End: 2017-06-09 | Stop reason: HOSPADM

## 2017-05-31 RX ORDER — PANTOPRAZOLE SODIUM 40 MG/1
40 TABLET, DELAYED RELEASE ORAL
Status: DISCONTINUED | OUTPATIENT
Start: 2017-05-31 | End: 2017-06-09 | Stop reason: HOSPADM

## 2017-05-31 RX ORDER — LEVALBUTEROL 1.25 MG/.5ML
1.25 SOLUTION, CONCENTRATE RESPIRATORY (INHALATION)
Status: DISCONTINUED | OUTPATIENT
Start: 2017-05-31 | End: 2017-05-31

## 2017-05-31 RX ORDER — TAMSULOSIN HYDROCHLORIDE 0.4 MG/1
0.4 CAPSULE ORAL
Status: ON HOLD | COMMUNITY
End: 2017-06-19

## 2017-05-31 RX ORDER — GUAIFENESIN 600 MG
1200 TABLET, EXTENDED RELEASE 12 HR ORAL 2 TIMES DAILY
Status: DISCONTINUED | OUTPATIENT
Start: 2017-05-31 | End: 2017-06-09 | Stop reason: HOSPADM

## 2017-05-31 RX ORDER — ALBUTEROL SULFATE 2.5 MG/3ML
5 SOLUTION RESPIRATORY (INHALATION) ONCE
Status: COMPLETED | OUTPATIENT
Start: 2017-05-31 | End: 2017-05-31

## 2017-05-31 RX ORDER — METHYLPREDNISOLONE SODIUM SUCCINATE 125 MG/2ML
125 INJECTION, POWDER, LYOPHILIZED, FOR SOLUTION INTRAMUSCULAR; INTRAVENOUS ONCE
Status: COMPLETED | OUTPATIENT
Start: 2017-05-31 | End: 2017-05-31

## 2017-05-31 RX ORDER — METHYLPREDNISOLONE SODIUM SUCCINATE 40 MG/ML
40 INJECTION, POWDER, LYOPHILIZED, FOR SOLUTION INTRAMUSCULAR; INTRAVENOUS EVERY 12 HOURS SCHEDULED
Status: DISCONTINUED | OUTPATIENT
Start: 2017-06-01 | End: 2017-05-31

## 2017-05-31 RX ORDER — HEPARIN SODIUM 5000 [USP'U]/ML
5000 INJECTION, SOLUTION INTRAVENOUS; SUBCUTANEOUS EVERY 8 HOURS SCHEDULED
Status: DISCONTINUED | OUTPATIENT
Start: 2017-05-31 | End: 2017-06-03 | Stop reason: ALTCHOICE

## 2017-05-31 RX ORDER — NITROGLYCERIN 0.4 MG/1
0.4 TABLET SUBLINGUAL
Status: DISCONTINUED | OUTPATIENT
Start: 2017-05-31 | End: 2017-06-09 | Stop reason: HOSPADM

## 2017-05-31 RX ORDER — PAROXETINE HYDROCHLORIDE 20 MG/1
20 TABLET, FILM COATED ORAL EVERY MORNING
Status: ON HOLD | COMMUNITY
End: 2017-06-19

## 2017-05-31 RX ORDER — CLOPIDOGREL BISULFATE 75 MG/1
75 TABLET ORAL DAILY
Status: DISCONTINUED | OUTPATIENT
Start: 2017-05-31 | End: 2017-06-09 | Stop reason: HOSPADM

## 2017-05-31 RX ORDER — IPRATROPIUM BROMIDE AND ALBUTEROL SULFATE 2.5; .5 MG/3ML; MG/3ML
3 SOLUTION RESPIRATORY (INHALATION) EVERY 4 HOURS PRN
Status: DISCONTINUED | OUTPATIENT
Start: 2017-05-31 | End: 2017-05-31

## 2017-05-31 RX ADMIN — METHYLPREDNISOLONE SODIUM SUCCINATE 125 MG: 125 INJECTION, POWDER, FOR SOLUTION INTRAMUSCULAR; INTRAVENOUS at 14:04

## 2017-05-31 RX ADMIN — ASPIRIN 81 MG 162 MG: 81 TABLET ORAL at 03:17

## 2017-05-31 RX ADMIN — LEVOFLOXACIN 500 MG: 5 INJECTION, SOLUTION INTRAVENOUS at 23:09

## 2017-05-31 RX ADMIN — LEVOFLOXACIN 750 MG: 5 INJECTION, SOLUTION INTRAVENOUS at 02:40

## 2017-05-31 RX ADMIN — GUAIFENESIN 1200 MG: 600 TABLET, EXTENDED RELEASE ORAL at 17:23

## 2017-05-31 RX ADMIN — OXYBUTYNIN CHLORIDE 5 MG: 5 TABLET, EXTENDED RELEASE ORAL at 22:04

## 2017-05-31 RX ADMIN — HEPARIN SODIUM 5000 UNITS: 5000 INJECTION, SOLUTION INTRAVENOUS; SUBCUTANEOUS at 06:30

## 2017-05-31 RX ADMIN — HEPARIN SODIUM 5000 UNITS: 5000 INJECTION, SOLUTION INTRAVENOUS; SUBCUTANEOUS at 14:04

## 2017-05-31 RX ADMIN — LEVALBUTEROL HYDROCHLORIDE 1.25 MG: 1.25 SOLUTION, CONCENTRATE RESPIRATORY (INHALATION) at 11:15

## 2017-05-31 RX ADMIN — IPRATROPIUM BROMIDE 1 MG: 0.5 SOLUTION RESPIRATORY (INHALATION) at 12:39

## 2017-05-31 RX ADMIN — GUAIFENESIN 600 MG: 600 TABLET, EXTENDED RELEASE ORAL at 09:03

## 2017-05-31 RX ADMIN — ATORVASTATIN CALCIUM 10 MG: 10 TABLET, FILM COATED ORAL at 17:23

## 2017-05-31 RX ADMIN — PAROXETINE HYDROCHLORIDE 20 MG: 20 TABLET, FILM COATED ORAL at 09:03

## 2017-05-31 RX ADMIN — CLOPIDOGREL BISULFATE 75 MG: 75 TABLET, FILM COATED ORAL at 09:03

## 2017-05-31 RX ADMIN — AMLODIPINE BESYLATE 10 MG: 10 TABLET ORAL at 09:03

## 2017-05-31 RX ADMIN — ACETAMINOPHEN 650 MG: 325 TABLET, FILM COATED ORAL at 22:04

## 2017-05-31 RX ADMIN — LEVALBUTEROL HYDROCHLORIDE 1.25 MG: 1.25 SOLUTION, CONCENTRATE RESPIRATORY (INHALATION) at 21:25

## 2017-05-31 RX ADMIN — SODIUM CHLORIDE 100 ML/HR: 0.9 INJECTION, SOLUTION INTRAVENOUS at 06:30

## 2017-05-31 RX ADMIN — CARVEDILOL 6.25 MG: 6.25 TABLET, FILM COATED ORAL at 17:23

## 2017-05-31 RX ADMIN — CARVEDILOL 6.25 MG: 6.25 TABLET, FILM COATED ORAL at 09:03

## 2017-05-31 RX ADMIN — ALBUTEROL SULFATE 5 MG: 2.5 SOLUTION RESPIRATORY (INHALATION) at 02:26

## 2017-05-31 RX ADMIN — POTASSIUM CHLORIDE 20 MEQ: 1500 TABLET, EXTENDED RELEASE ORAL at 09:03

## 2017-05-31 RX ADMIN — ALBUTEROL SULFATE 10 MG: 2.5 SOLUTION RESPIRATORY (INHALATION) at 12:38

## 2017-05-31 RX ADMIN — TIOTROPIUM BROMIDE 18 MCG: 18 CAPSULE ORAL; RESPIRATORY (INHALATION) at 09:04

## 2017-05-31 RX ADMIN — TAMSULOSIN HYDROCHLORIDE 0.4 MG: 0.4 CAPSULE ORAL at 17:23

## 2017-05-31 RX ADMIN — ISODIUM CHLORIDE 3 ML: 0.03 SOLUTION RESPIRATORY (INHALATION) at 21:25

## 2017-05-31 RX ADMIN — HEPARIN SODIUM 5000 UNITS: 5000 INJECTION, SOLUTION INTRAVENOUS; SUBCUTANEOUS at 22:04

## 2017-05-31 RX ADMIN — PANTOPRAZOLE SODIUM 40 MG: 40 TABLET, DELAYED RELEASE ORAL at 06:30

## 2017-06-01 ENCOUNTER — APPOINTMENT (INPATIENT)
Dept: NON INVASIVE DIAGNOSTICS | Facility: HOSPITAL | Age: 77
DRG: 205 | End: 2017-06-01
Payer: MEDICARE

## 2017-06-01 ENCOUNTER — APPOINTMENT (INPATIENT)
Dept: RADIOLOGY | Facility: HOSPITAL | Age: 77
DRG: 205 | End: 2017-06-01
Payer: MEDICARE

## 2017-06-01 LAB
ANION GAP SERPL CALCULATED.3IONS-SCNC: 10 MMOL/L (ref 4–13)
ATRIAL RATE: 121 BPM
BUN SERPL-MCNC: 26 MG/DL (ref 5–25)
CALCIUM SERPL-MCNC: 9.1 MG/DL (ref 8.3–10.1)
CHLORIDE SERPL-SCNC: 103 MMOL/L (ref 100–108)
CO2 SERPL-SCNC: 24 MMOL/L (ref 21–32)
CREAT SERPL-MCNC: 1.34 MG/DL (ref 0.6–1.3)
ERYTHROCYTE [DISTWIDTH] IN BLOOD BY AUTOMATED COUNT: 14.6 % (ref 11.6–15.1)
GFR SERPL CREATININE-BSD FRML MDRD: 51.7 ML/MIN/1.73SQ M
GLUCOSE SERPL-MCNC: 122 MG/DL (ref 65–140)
GLUCOSE SERPL-MCNC: 145 MG/DL (ref 65–140)
HCT VFR BLD AUTO: 42.7 % (ref 36.5–49.3)
HGB BLD-MCNC: 14 G/DL (ref 12–17)
MCH RBC QN AUTO: 29.7 PG (ref 26.8–34.3)
MCHC RBC AUTO-ENTMCNC: 32.8 G/DL (ref 31.4–37.4)
MCV RBC AUTO: 91 FL (ref 82–98)
PLATELET # BLD AUTO: 143 THOUSANDS/UL (ref 149–390)
PMV BLD AUTO: 11.3 FL (ref 8.9–12.7)
POTASSIUM SERPL-SCNC: 4.5 MMOL/L (ref 3.5–5.3)
PR INTERVAL: 168 MS
QRS AXIS: 4 DEGREES
QRSD INTERVAL: 94 MS
QT INTERVAL: 320 MS
QTC INTERVAL: 454 MS
RBC # BLD AUTO: 4.71 MILLION/UL (ref 3.88–5.62)
SODIUM SERPL-SCNC: 137 MMOL/L (ref 136–145)
T WAVE AXIS: 84 DEGREES
VENTRICULAR RATE: 121 BPM
WBC # BLD AUTO: 9.37 THOUSAND/UL (ref 4.31–10.16)

## 2017-06-01 PROCEDURE — 87186 SC STD MICRODIL/AGAR DIL: CPT | Performed by: PHYSICIAN ASSISTANT

## 2017-06-01 PROCEDURE — 87070 CULTURE OTHR SPECIMN AEROBIC: CPT | Performed by: PHYSICIAN ASSISTANT

## 2017-06-01 PROCEDURE — 87801 DETECT AGNT MULT DNA AMPLI: CPT | Performed by: PHYSICIAN ASSISTANT

## 2017-06-01 PROCEDURE — 71010 HB CHEST X-RAY 1 VIEW FRONTAL (PORTABLE): CPT

## 2017-06-01 PROCEDURE — 93306 TTE W/DOPPLER COMPLETE: CPT

## 2017-06-01 PROCEDURE — 74230 X-RAY XM SWLNG FUNCJ C+: CPT

## 2017-06-01 PROCEDURE — 82948 REAGENT STRIP/BLOOD GLUCOSE: CPT

## 2017-06-01 PROCEDURE — 87205 SMEAR GRAM STAIN: CPT | Performed by: PHYSICIAN ASSISTANT

## 2017-06-01 PROCEDURE — 92610 EVALUATE SWALLOWING FUNCTION: CPT

## 2017-06-01 PROCEDURE — 92611 MOTION FLUOROSCOPY/SWALLOW: CPT

## 2017-06-01 PROCEDURE — 85027 COMPLETE CBC AUTOMATED: CPT | Performed by: INTERNAL MEDICINE

## 2017-06-01 PROCEDURE — 87077 CULTURE AEROBIC IDENTIFY: CPT | Performed by: PHYSICIAN ASSISTANT

## 2017-06-01 PROCEDURE — 94760 N-INVAS EAR/PLS OXIMETRY 1: CPT

## 2017-06-01 PROCEDURE — 94644 CONT INHLJ TX 1ST HOUR: CPT

## 2017-06-01 PROCEDURE — 87633 RESP VIRUS 12-25 TARGETS: CPT | Performed by: NURSE PRACTITIONER

## 2017-06-01 PROCEDURE — 94640 AIRWAY INHALATION TREATMENT: CPT

## 2017-06-01 PROCEDURE — 94669 MECHANICAL CHEST WALL OSCILL: CPT

## 2017-06-01 PROCEDURE — 80048 BASIC METABOLIC PNL TOTAL CA: CPT | Performed by: INTERNAL MEDICINE

## 2017-06-01 PROCEDURE — 93005 ELECTROCARDIOGRAM TRACING: CPT

## 2017-06-01 RX ORDER — ALBUTEROL SULFATE 2.5 MG/3ML
5 SOLUTION RESPIRATORY (INHALATION) ONCE
Status: COMPLETED | OUTPATIENT
Start: 2017-06-01 | End: 2017-06-01

## 2017-06-01 RX ORDER — GUAIFENESIN/DEXTROMETHORPHAN 100-10MG/5
10 SYRUP ORAL EVERY 4 HOURS PRN
Status: DISCONTINUED | OUTPATIENT
Start: 2017-06-01 | End: 2017-06-09 | Stop reason: HOSPADM

## 2017-06-01 RX ORDER — ALBUTEROL SULFATE 2.5 MG/3ML
2.5 SOLUTION RESPIRATORY (INHALATION) ONCE
Status: COMPLETED | OUTPATIENT
Start: 2017-06-01 | End: 2017-06-01

## 2017-06-01 RX ORDER — METHYLPREDNISOLONE SODIUM SUCCINATE 40 MG/ML
40 INJECTION, POWDER, LYOPHILIZED, FOR SOLUTION INTRAMUSCULAR; INTRAVENOUS EVERY 8 HOURS SCHEDULED
Status: DISCONTINUED | OUTPATIENT
Start: 2017-06-01 | End: 2017-06-02

## 2017-06-01 RX ORDER — ALBUTEROL SULFATE 2.5 MG/3ML
SOLUTION RESPIRATORY (INHALATION)
Status: DISPENSED
Start: 2017-06-01 | End: 2017-06-01

## 2017-06-01 RX ORDER — LEVALBUTEROL 1.25 MG/.5ML
1.25 SOLUTION, CONCENTRATE RESPIRATORY (INHALATION)
Status: DISCONTINUED | OUTPATIENT
Start: 2017-06-01 | End: 2017-06-09 | Stop reason: HOSPADM

## 2017-06-01 RX ADMIN — PANTOPRAZOLE SODIUM 40 MG: 40 TABLET, DELAYED RELEASE ORAL at 06:04

## 2017-06-01 RX ADMIN — METHYLPREDNISOLONE SODIUM SUCCINATE 40 MG: 40 INJECTION, POWDER, FOR SOLUTION INTRAMUSCULAR; INTRAVENOUS at 21:47

## 2017-06-01 RX ADMIN — CARVEDILOL 6.25 MG: 6.25 TABLET, FILM COATED ORAL at 17:38

## 2017-06-01 RX ADMIN — AMLODIPINE BESYLATE 10 MG: 10 TABLET ORAL at 08:36

## 2017-06-01 RX ADMIN — GUAIFENESIN 1200 MG: 600 TABLET, EXTENDED RELEASE ORAL at 17:38

## 2017-06-01 RX ADMIN — CARVEDILOL 6.25 MG: 6.25 TABLET, FILM COATED ORAL at 08:36

## 2017-06-01 RX ADMIN — CLOPIDOGREL BISULFATE 75 MG: 75 TABLET, FILM COATED ORAL at 08:36

## 2017-06-01 RX ADMIN — LEVALBUTEROL HYDROCHLORIDE 1.25 MG: 1.25 SOLUTION, CONCENTRATE RESPIRATORY (INHALATION) at 06:09

## 2017-06-01 RX ADMIN — HEPARIN SODIUM 5000 UNITS: 5000 INJECTION, SOLUTION INTRAVENOUS; SUBCUTANEOUS at 21:47

## 2017-06-01 RX ADMIN — POTASSIUM CHLORIDE 20 MEQ: 1500 TABLET, EXTENDED RELEASE ORAL at 08:36

## 2017-06-01 RX ADMIN — LEVOFLOXACIN 500 MG: 5 INJECTION, SOLUTION INTRAVENOUS at 23:01

## 2017-06-01 RX ADMIN — LEVALBUTEROL HYDROCHLORIDE 1.25 MG: 1.25 SOLUTION, CONCENTRATE RESPIRATORY (INHALATION) at 13:04

## 2017-06-01 RX ADMIN — HEPARIN SODIUM 5000 UNITS: 5000 INJECTION, SOLUTION INTRAVENOUS; SUBCUTANEOUS at 14:08

## 2017-06-01 RX ADMIN — GUAIFENESIN AND DEXTROMETHORPHAN 10 ML: 100; 10 SYRUP ORAL at 10:06

## 2017-06-01 RX ADMIN — ISODIUM CHLORIDE 3 ML: 0.03 SOLUTION RESPIRATORY (INHALATION) at 07:14

## 2017-06-01 RX ADMIN — METHYLPREDNISOLONE SODIUM SUCCINATE 40 MG: 40 INJECTION, POWDER, FOR SOLUTION INTRAMUSCULAR; INTRAVENOUS at 08:36

## 2017-06-01 RX ADMIN — METHYLPREDNISOLONE SODIUM SUCCINATE 40 MG: 40 INJECTION, POWDER, FOR SOLUTION INTRAMUSCULAR; INTRAVENOUS at 14:08

## 2017-06-01 RX ADMIN — PAROXETINE HYDROCHLORIDE 20 MG: 20 TABLET, FILM COATED ORAL at 08:36

## 2017-06-01 RX ADMIN — ASPIRIN 81 MG 81 MG: 81 TABLET ORAL at 08:36

## 2017-06-01 RX ADMIN — IPRATROPIUM BROMIDE 0.5 MG: 0.5 SOLUTION RESPIRATORY (INHALATION) at 13:04

## 2017-06-01 RX ADMIN — OXYBUTYNIN CHLORIDE 5 MG: 5 TABLET, EXTENDED RELEASE ORAL at 21:47

## 2017-06-01 RX ADMIN — HEPARIN SODIUM 5000 UNITS: 5000 INJECTION, SOLUTION INTRAVENOUS; SUBCUTANEOUS at 06:04

## 2017-06-01 RX ADMIN — IPRATROPIUM BROMIDE 0.5 MG: 0.5 SOLUTION RESPIRATORY (INHALATION) at 19:32

## 2017-06-01 RX ADMIN — LEVALBUTEROL HYDROCHLORIDE 1.25 MG: 1.25 SOLUTION, CONCENTRATE RESPIRATORY (INHALATION) at 06:10

## 2017-06-01 RX ADMIN — ALBUTEROL SULFATE 2.5 MG: 2.5 SOLUTION RESPIRATORY (INHALATION) at 09:29

## 2017-06-01 RX ADMIN — ATORVASTATIN CALCIUM 10 MG: 10 TABLET, FILM COATED ORAL at 17:38

## 2017-06-01 RX ADMIN — GUAIFENESIN 1200 MG: 600 TABLET, EXTENDED RELEASE ORAL at 08:36

## 2017-06-01 RX ADMIN — ALBUTEROL SULFATE 5 MG: 2.5 SOLUTION RESPIRATORY (INHALATION) at 07:32

## 2017-06-01 RX ADMIN — LEVALBUTEROL HYDROCHLORIDE 1.25 MG: 1.25 SOLUTION, CONCENTRATE RESPIRATORY (INHALATION) at 07:14

## 2017-06-01 RX ADMIN — LEVALBUTEROL HYDROCHLORIDE 1.25 MG: 1.25 SOLUTION, CONCENTRATE RESPIRATORY (INHALATION) at 19:32

## 2017-06-01 RX ADMIN — TAMSULOSIN HYDROCHLORIDE 0.4 MG: 0.4 CAPSULE ORAL at 17:38

## 2017-06-02 PROCEDURE — 94668 MNPJ CHEST WALL SBSQ: CPT

## 2017-06-02 PROCEDURE — 94669 MECHANICAL CHEST WALL OSCILL: CPT

## 2017-06-02 PROCEDURE — 92526 ORAL FUNCTION THERAPY: CPT

## 2017-06-02 PROCEDURE — 94640 AIRWAY INHALATION TREATMENT: CPT

## 2017-06-02 PROCEDURE — 93005 ELECTROCARDIOGRAM TRACING: CPT

## 2017-06-02 PROCEDURE — 94760 N-INVAS EAR/PLS OXIMETRY 1: CPT

## 2017-06-02 RX ORDER — LORAZEPAM 2 MG/ML
1 INJECTION INTRAMUSCULAR EVERY 6 HOURS PRN
Status: DISCONTINUED | OUTPATIENT
Start: 2017-06-02 | End: 2017-06-09 | Stop reason: HOSPADM

## 2017-06-02 RX ORDER — METHYLPREDNISOLONE SODIUM SUCCINATE 40 MG/ML
40 INJECTION, POWDER, LYOPHILIZED, FOR SOLUTION INTRAMUSCULAR; INTRAVENOUS EVERY 12 HOURS SCHEDULED
Status: DISCONTINUED | OUTPATIENT
Start: 2017-06-02 | End: 2017-06-04

## 2017-06-02 RX ADMIN — CARVEDILOL 6.25 MG: 6.25 TABLET, FILM COATED ORAL at 17:01

## 2017-06-02 RX ADMIN — TAMSULOSIN HYDROCHLORIDE 0.4 MG: 0.4 CAPSULE ORAL at 17:01

## 2017-06-02 RX ADMIN — GUAIFENESIN 1200 MG: 600 TABLET, EXTENDED RELEASE ORAL at 17:01

## 2017-06-02 RX ADMIN — IPRATROPIUM BROMIDE 0.5 MG: 0.5 SOLUTION RESPIRATORY (INHALATION) at 07:49

## 2017-06-02 RX ADMIN — LEVALBUTEROL HYDROCHLORIDE 1.25 MG: 1.25 SOLUTION, CONCENTRATE RESPIRATORY (INHALATION) at 01:15

## 2017-06-02 RX ADMIN — ASPIRIN 81 MG 81 MG: 81 TABLET ORAL at 08:33

## 2017-06-02 RX ADMIN — LORAZEPAM 1 MG: 2 INJECTION INTRAMUSCULAR; INTRAVENOUS at 01:37

## 2017-06-02 RX ADMIN — METHYLPREDNISOLONE SODIUM SUCCINATE 40 MG: 40 INJECTION, POWDER, FOR SOLUTION INTRAMUSCULAR; INTRAVENOUS at 21:45

## 2017-06-02 RX ADMIN — ATORVASTATIN CALCIUM 10 MG: 10 TABLET, FILM COATED ORAL at 17:01

## 2017-06-02 RX ADMIN — IPRATROPIUM BROMIDE 0.5 MG: 0.5 SOLUTION RESPIRATORY (INHALATION) at 01:15

## 2017-06-02 RX ADMIN — PAROXETINE HYDROCHLORIDE 20 MG: 20 TABLET, FILM COATED ORAL at 08:32

## 2017-06-02 RX ADMIN — HEPARIN SODIUM 5000 UNITS: 5000 INJECTION, SOLUTION INTRAVENOUS; SUBCUTANEOUS at 21:45

## 2017-06-02 RX ADMIN — POTASSIUM CHLORIDE 20 MEQ: 1500 TABLET, EXTENDED RELEASE ORAL at 08:32

## 2017-06-02 RX ADMIN — HEPARIN SODIUM 5000 UNITS: 5000 INJECTION, SOLUTION INTRAVENOUS; SUBCUTANEOUS at 05:26

## 2017-06-02 RX ADMIN — LEVOFLOXACIN 500 MG: 5 INJECTION, SOLUTION INTRAVENOUS at 23:43

## 2017-06-02 RX ADMIN — IPRATROPIUM BROMIDE 0.5 MG: 0.5 SOLUTION RESPIRATORY (INHALATION) at 13:43

## 2017-06-02 RX ADMIN — LEVALBUTEROL HYDROCHLORIDE 1.25 MG: 1.25 SOLUTION, CONCENTRATE RESPIRATORY (INHALATION) at 19:51

## 2017-06-02 RX ADMIN — AMLODIPINE BESYLATE 10 MG: 10 TABLET ORAL at 08:33

## 2017-06-02 RX ADMIN — GUAIFENESIN 1200 MG: 600 TABLET, EXTENDED RELEASE ORAL at 08:32

## 2017-06-02 RX ADMIN — PANTOPRAZOLE SODIUM 40 MG: 40 TABLET, DELAYED RELEASE ORAL at 05:26

## 2017-06-02 RX ADMIN — LEVALBUTEROL HYDROCHLORIDE 1.25 MG: 1.25 SOLUTION, CONCENTRATE RESPIRATORY (INHALATION) at 07:49

## 2017-06-02 RX ADMIN — OXYBUTYNIN CHLORIDE 5 MG: 5 TABLET, EXTENDED RELEASE ORAL at 21:45

## 2017-06-02 RX ADMIN — GUAIFENESIN AND DEXTROMETHORPHAN 10 ML: 100; 10 SYRUP ORAL at 21:44

## 2017-06-02 RX ADMIN — METHYLPREDNISOLONE SODIUM SUCCINATE 40 MG: 40 INJECTION, POWDER, FOR SOLUTION INTRAMUSCULAR; INTRAVENOUS at 05:26

## 2017-06-02 RX ADMIN — HEPARIN SODIUM 5000 UNITS: 5000 INJECTION, SOLUTION INTRAVENOUS; SUBCUTANEOUS at 13:46

## 2017-06-02 RX ADMIN — CLOPIDOGREL BISULFATE 75 MG: 75 TABLET, FILM COATED ORAL at 08:32

## 2017-06-02 RX ADMIN — IPRATROPIUM BROMIDE 0.5 MG: 0.5 SOLUTION RESPIRATORY (INHALATION) at 19:51

## 2017-06-02 RX ADMIN — LEVALBUTEROL HYDROCHLORIDE 1.25 MG: 1.25 SOLUTION, CONCENTRATE RESPIRATORY (INHALATION) at 13:43

## 2017-06-02 RX ADMIN — CARVEDILOL 6.25 MG: 6.25 TABLET, FILM COATED ORAL at 08:32

## 2017-06-03 LAB
APTT PPP: 50 SECONDS (ref 23–35)
APTT PPP: >210 SECONDS (ref 23–35)
ATRIAL RATE: 67 BPM
ERYTHROCYTE [DISTWIDTH] IN BLOOD BY AUTOMATED COUNT: 14.9 % (ref 11.6–15.1)
HCT VFR BLD AUTO: 43.5 % (ref 36.5–49.3)
HGB BLD-MCNC: 14.1 G/DL (ref 12–17)
INR PPP: 1.02 (ref 0.86–1.16)
INR PPP: 1.14 (ref 0.86–1.16)
MCH RBC QN AUTO: 29.7 PG (ref 26.8–34.3)
MCHC RBC AUTO-ENTMCNC: 32.4 G/DL (ref 31.4–37.4)
MCV RBC AUTO: 92 FL (ref 82–98)
PLATELET # BLD AUTO: 135 THOUSANDS/UL (ref 149–390)
PMV BLD AUTO: 10.9 FL (ref 8.9–12.7)
PROTHROMBIN TIME: 13.7 SECONDS (ref 12.1–14.4)
PROTHROMBIN TIME: 15 SECONDS (ref 12.1–14.4)
QRS AXIS: 1 DEGREES
QRSD INTERVAL: 94 MS
QT INTERVAL: 422 MS
QTC INTERVAL: 445 MS
RBC # BLD AUTO: 4.75 MILLION/UL (ref 3.88–5.62)
T WAVE AXIS: 83 DEGREES
VENTRICULAR RATE: 67 BPM
WBC # BLD AUTO: 11.58 THOUSAND/UL (ref 4.31–10.16)

## 2017-06-03 PROCEDURE — 85730 THROMBOPLASTIN TIME PARTIAL: CPT | Performed by: PHYSICIAN ASSISTANT

## 2017-06-03 PROCEDURE — 94760 N-INVAS EAR/PLS OXIMETRY 1: CPT

## 2017-06-03 PROCEDURE — 94640 AIRWAY INHALATION TREATMENT: CPT

## 2017-06-03 PROCEDURE — 85610 PROTHROMBIN TIME: CPT | Performed by: PHYSICIAN ASSISTANT

## 2017-06-03 PROCEDURE — 94668 MNPJ CHEST WALL SBSQ: CPT

## 2017-06-03 PROCEDURE — 85027 COMPLETE CBC AUTOMATED: CPT | Performed by: PHYSICIAN ASSISTANT

## 2017-06-03 PROCEDURE — 94669 MECHANICAL CHEST WALL OSCILL: CPT

## 2017-06-03 RX ORDER — HEPARIN SODIUM 1000 [USP'U]/ML
4000 INJECTION, SOLUTION INTRAVENOUS; SUBCUTANEOUS AS NEEDED
Status: DISCONTINUED | OUTPATIENT
Start: 2017-06-03 | End: 2017-06-03

## 2017-06-03 RX ORDER — HEPARIN SODIUM 10000 [USP'U]/100ML
3-20 INJECTION, SOLUTION INTRAVENOUS
Status: DISCONTINUED | OUTPATIENT
Start: 2017-06-03 | End: 2017-06-03

## 2017-06-03 RX ORDER — HEPARIN SODIUM 1000 [USP'U]/ML
4000 INJECTION, SOLUTION INTRAVENOUS; SUBCUTANEOUS ONCE
Status: COMPLETED | OUTPATIENT
Start: 2017-06-03 | End: 2017-06-03

## 2017-06-03 RX ORDER — HEPARIN SODIUM 5000 [USP'U]/ML
5000 INJECTION, SOLUTION INTRAVENOUS; SUBCUTANEOUS EVERY 8 HOURS SCHEDULED
Status: DISCONTINUED | OUTPATIENT
Start: 2017-06-03 | End: 2017-06-09 | Stop reason: HOSPADM

## 2017-06-03 RX ORDER — WARFARIN SODIUM 5 MG/1
5 TABLET ORAL
Status: DISCONTINUED | OUTPATIENT
Start: 2017-06-03 | End: 2017-06-03

## 2017-06-03 RX ORDER — HEPARIN SODIUM 1000 [USP'U]/ML
2000 INJECTION, SOLUTION INTRAVENOUS; SUBCUTANEOUS AS NEEDED
Status: DISCONTINUED | OUTPATIENT
Start: 2017-06-03 | End: 2017-06-03

## 2017-06-03 RX ADMIN — PANTOPRAZOLE SODIUM 40 MG: 40 TABLET, DELAYED RELEASE ORAL at 05:08

## 2017-06-03 RX ADMIN — LEVALBUTEROL HYDROCHLORIDE 1.25 MG: 1.25 SOLUTION, CONCENTRATE RESPIRATORY (INHALATION) at 01:32

## 2017-06-03 RX ADMIN — AMLODIPINE BESYLATE 10 MG: 10 TABLET ORAL at 08:45

## 2017-06-03 RX ADMIN — CARVEDILOL 6.25 MG: 6.25 TABLET, FILM COATED ORAL at 08:44

## 2017-06-03 RX ADMIN — PAROXETINE HYDROCHLORIDE 20 MG: 20 TABLET, FILM COATED ORAL at 08:44

## 2017-06-03 RX ADMIN — GUAIFENESIN 1200 MG: 600 TABLET, EXTENDED RELEASE ORAL at 18:46

## 2017-06-03 RX ADMIN — METHYLPREDNISOLONE SODIUM SUCCINATE 40 MG: 40 INJECTION, POWDER, FOR SOLUTION INTRAMUSCULAR; INTRAVENOUS at 08:44

## 2017-06-03 RX ADMIN — LEVALBUTEROL HYDROCHLORIDE 1.25 MG: 1.25 SOLUTION, CONCENTRATE RESPIRATORY (INHALATION) at 08:08

## 2017-06-03 RX ADMIN — POTASSIUM CHLORIDE 20 MEQ: 1500 TABLET, EXTENDED RELEASE ORAL at 08:44

## 2017-06-03 RX ADMIN — HEPARIN SODIUM AND DEXTROSE 11.1 UNITS/KG/HR: 10000; 5 INJECTION INTRAVENOUS at 00:44

## 2017-06-03 RX ADMIN — GUAIFENESIN AND DEXTROMETHORPHAN 10 ML: 100; 10 SYRUP ORAL at 12:53

## 2017-06-03 RX ADMIN — TAMSULOSIN HYDROCHLORIDE 0.4 MG: 0.4 CAPSULE ORAL at 18:45

## 2017-06-03 RX ADMIN — IPRATROPIUM BROMIDE 0.5 MG: 0.5 SOLUTION RESPIRATORY (INHALATION) at 19:28

## 2017-06-03 RX ADMIN — ASPIRIN 81 MG 81 MG: 81 TABLET ORAL at 08:44

## 2017-06-03 RX ADMIN — METHYLPREDNISOLONE SODIUM SUCCINATE 40 MG: 40 INJECTION, POWDER, FOR SOLUTION INTRAMUSCULAR; INTRAVENOUS at 21:26

## 2017-06-03 RX ADMIN — OXYBUTYNIN CHLORIDE 5 MG: 5 TABLET, EXTENDED RELEASE ORAL at 21:26

## 2017-06-03 RX ADMIN — HEPARIN SODIUM 5000 UNITS: 5000 INJECTION, SOLUTION INTRAVENOUS; SUBCUTANEOUS at 21:26

## 2017-06-03 RX ADMIN — GUAIFENESIN AND DEXTROMETHORPHAN 10 ML: 100; 10 SYRUP ORAL at 21:34

## 2017-06-03 RX ADMIN — HEPARIN SODIUM 4000 UNITS: 1000 INJECTION, SOLUTION INTRAVENOUS; SUBCUTANEOUS at 01:00

## 2017-06-03 RX ADMIN — LEVALBUTEROL HYDROCHLORIDE 1.25 MG: 1.25 SOLUTION, CONCENTRATE RESPIRATORY (INHALATION) at 19:28

## 2017-06-03 RX ADMIN — IPRATROPIUM BROMIDE 0.5 MG: 0.5 SOLUTION RESPIRATORY (INHALATION) at 08:08

## 2017-06-03 RX ADMIN — CLOPIDOGREL BISULFATE 75 MG: 75 TABLET, FILM COATED ORAL at 08:44

## 2017-06-03 RX ADMIN — CARVEDILOL 6.25 MG: 6.25 TABLET, FILM COATED ORAL at 18:46

## 2017-06-03 RX ADMIN — GUAIFENESIN 1200 MG: 600 TABLET, EXTENDED RELEASE ORAL at 08:44

## 2017-06-03 RX ADMIN — LEVALBUTEROL HYDROCHLORIDE 1.25 MG: 1.25 SOLUTION, CONCENTRATE RESPIRATORY (INHALATION) at 13:16

## 2017-06-03 RX ADMIN — IPRATROPIUM BROMIDE 0.5 MG: 0.5 SOLUTION RESPIRATORY (INHALATION) at 01:32

## 2017-06-03 RX ADMIN — GUAIFENESIN AND DEXTROMETHORPHAN 10 ML: 100; 10 SYRUP ORAL at 02:56

## 2017-06-03 RX ADMIN — ATORVASTATIN CALCIUM 10 MG: 10 TABLET, FILM COATED ORAL at 18:46

## 2017-06-03 RX ADMIN — IPRATROPIUM BROMIDE 0.5 MG: 0.5 SOLUTION RESPIRATORY (INHALATION) at 13:16

## 2017-06-04 LAB
ANION GAP SERPL CALCULATED.3IONS-SCNC: 9 MMOL/L (ref 4–13)
BACTERIA SPT RESP CULT: NORMAL
BACTERIA SPT RESP CULT: NORMAL
BUN SERPL-MCNC: 38 MG/DL (ref 5–25)
CALCIUM SERPL-MCNC: 8.9 MG/DL (ref 8.3–10.1)
CHLORIDE SERPL-SCNC: 104 MMOL/L (ref 100–108)
CO2 SERPL-SCNC: 25 MMOL/L (ref 21–32)
CREAT SERPL-MCNC: 1.26 MG/DL (ref 0.6–1.3)
GFR SERPL CREATININE-BSD FRML MDRD: 55.5 ML/MIN/1.73SQ M
GLUCOSE SERPL-MCNC: 120 MG/DL (ref 65–140)
GRAM STN SPEC: NORMAL
GRAM STN SPEC: NORMAL
POTASSIUM SERPL-SCNC: 4.5 MMOL/L (ref 3.5–5.3)
SODIUM SERPL-SCNC: 138 MMOL/L (ref 136–145)

## 2017-06-04 PROCEDURE — 94760 N-INVAS EAR/PLS OXIMETRY 1: CPT

## 2017-06-04 PROCEDURE — 94668 MNPJ CHEST WALL SBSQ: CPT

## 2017-06-04 PROCEDURE — 97163 PT EVAL HIGH COMPLEX 45 MIN: CPT

## 2017-06-04 PROCEDURE — G8979 MOBILITY GOAL STATUS: HCPCS

## 2017-06-04 PROCEDURE — G8978 MOBILITY CURRENT STATUS: HCPCS

## 2017-06-04 PROCEDURE — 94640 AIRWAY INHALATION TREATMENT: CPT

## 2017-06-04 PROCEDURE — 80048 BASIC METABOLIC PNL TOTAL CA: CPT | Performed by: INTERNAL MEDICINE

## 2017-06-04 RX ORDER — METHYLPREDNISOLONE SODIUM SUCCINATE 40 MG/ML
40 INJECTION, POWDER, LYOPHILIZED, FOR SOLUTION INTRAMUSCULAR; INTRAVENOUS DAILY
Status: DISCONTINUED | OUTPATIENT
Start: 2017-06-05 | End: 2017-06-05

## 2017-06-04 RX ADMIN — GUAIFENESIN 1200 MG: 600 TABLET, EXTENDED RELEASE ORAL at 18:35

## 2017-06-04 RX ADMIN — ASPIRIN 81 MG 81 MG: 81 TABLET ORAL at 09:31

## 2017-06-04 RX ADMIN — LEVALBUTEROL HYDROCHLORIDE 1.25 MG: 1.25 SOLUTION, CONCENTRATE RESPIRATORY (INHALATION) at 07:09

## 2017-06-04 RX ADMIN — METHYLPREDNISOLONE SODIUM SUCCINATE 40 MG: 40 INJECTION, POWDER, FOR SOLUTION INTRAMUSCULAR; INTRAVENOUS at 09:32

## 2017-06-04 RX ADMIN — CLOPIDOGREL BISULFATE 75 MG: 75 TABLET, FILM COATED ORAL at 09:31

## 2017-06-04 RX ADMIN — PANTOPRAZOLE SODIUM 40 MG: 40 TABLET, DELAYED RELEASE ORAL at 05:46

## 2017-06-04 RX ADMIN — IPRATROPIUM BROMIDE 0.5 MG: 0.5 SOLUTION RESPIRATORY (INHALATION) at 07:09

## 2017-06-04 RX ADMIN — ATORVASTATIN CALCIUM 10 MG: 10 TABLET, FILM COATED ORAL at 18:34

## 2017-06-04 RX ADMIN — LEVALBUTEROL HYDROCHLORIDE 1.25 MG: 1.25 SOLUTION, CONCENTRATE RESPIRATORY (INHALATION) at 13:57

## 2017-06-04 RX ADMIN — LEVOFLOXACIN 500 MG: 5 INJECTION, SOLUTION INTRAVENOUS at 00:03

## 2017-06-04 RX ADMIN — PAROXETINE HYDROCHLORIDE 20 MG: 20 TABLET, FILM COATED ORAL at 09:31

## 2017-06-04 RX ADMIN — HEPARIN SODIUM 5000 UNITS: 5000 INJECTION, SOLUTION INTRAVENOUS; SUBCUTANEOUS at 22:00

## 2017-06-04 RX ADMIN — IPRATROPIUM BROMIDE 0.5 MG: 0.5 SOLUTION RESPIRATORY (INHALATION) at 19:28

## 2017-06-04 RX ADMIN — CARVEDILOL 6.25 MG: 6.25 TABLET, FILM COATED ORAL at 18:35

## 2017-06-04 RX ADMIN — IPRATROPIUM BROMIDE 0.5 MG: 0.5 SOLUTION RESPIRATORY (INHALATION) at 13:57

## 2017-06-04 RX ADMIN — CARVEDILOL 6.25 MG: 6.25 TABLET, FILM COATED ORAL at 09:31

## 2017-06-04 RX ADMIN — HEPARIN SODIUM 5000 UNITS: 5000 INJECTION, SOLUTION INTRAVENOUS; SUBCUTANEOUS at 05:46

## 2017-06-04 RX ADMIN — GUAIFENESIN 1200 MG: 600 TABLET, EXTENDED RELEASE ORAL at 09:29

## 2017-06-04 RX ADMIN — LEVALBUTEROL HYDROCHLORIDE 1.25 MG: 1.25 SOLUTION, CONCENTRATE RESPIRATORY (INHALATION) at 19:28

## 2017-06-04 RX ADMIN — TAMSULOSIN HYDROCHLORIDE 0.4 MG: 0.4 CAPSULE ORAL at 18:35

## 2017-06-04 RX ADMIN — ALBUTEROL SULFATE 2.5 MG: 2.5 SOLUTION RESPIRATORY (INHALATION) at 23:47

## 2017-06-04 RX ADMIN — LEVALBUTEROL HYDROCHLORIDE 1.25 MG: 1.25 SOLUTION, CONCENTRATE RESPIRATORY (INHALATION) at 01:36

## 2017-06-04 RX ADMIN — IPRATROPIUM BROMIDE 0.5 MG: 0.5 SOLUTION RESPIRATORY (INHALATION) at 01:36

## 2017-06-04 RX ADMIN — POTASSIUM CHLORIDE 20 MEQ: 1500 TABLET, EXTENDED RELEASE ORAL at 09:31

## 2017-06-04 RX ADMIN — AMLODIPINE BESYLATE 10 MG: 10 TABLET ORAL at 09:31

## 2017-06-04 RX ADMIN — OXYBUTYNIN CHLORIDE 5 MG: 5 TABLET, EXTENDED RELEASE ORAL at 22:00

## 2017-06-05 LAB
BACTERIA BLD CULT: NORMAL
BACTERIA BLD CULT: NORMAL

## 2017-06-05 PROCEDURE — 92526 ORAL FUNCTION THERAPY: CPT

## 2017-06-05 PROCEDURE — 97530 THERAPEUTIC ACTIVITIES: CPT

## 2017-06-05 PROCEDURE — 94760 N-INVAS EAR/PLS OXIMETRY 1: CPT

## 2017-06-05 PROCEDURE — 94640 AIRWAY INHALATION TREATMENT: CPT

## 2017-06-05 PROCEDURE — 94668 MNPJ CHEST WALL SBSQ: CPT

## 2017-06-05 PROCEDURE — 97116 GAIT TRAINING THERAPY: CPT

## 2017-06-05 RX ORDER — PREDNISONE 20 MG/1
20 TABLET ORAL DAILY
Status: COMPLETED | OUTPATIENT
Start: 2017-06-08 | End: 2017-06-09

## 2017-06-05 RX ORDER — SODIUM CHLORIDE FOR INHALATION 0.9 %
3 VIAL, NEBULIZER (ML) INHALATION
Status: DISCONTINUED | OUTPATIENT
Start: 2017-06-05 | End: 2017-06-09 | Stop reason: HOSPADM

## 2017-06-05 RX ORDER — PREDNISONE 10 MG/1
10 TABLET ORAL DAILY
Status: DISCONTINUED | OUTPATIENT
Start: 2017-06-10 | End: 2017-06-09 | Stop reason: HOSPADM

## 2017-06-05 RX ADMIN — POTASSIUM CHLORIDE 20 MEQ: 1500 TABLET, EXTENDED RELEASE ORAL at 09:25

## 2017-06-05 RX ADMIN — GUAIFENESIN 1200 MG: 600 TABLET, EXTENDED RELEASE ORAL at 16:19

## 2017-06-05 RX ADMIN — ALBUTEROL SULFATE 2.5 MG: 2.5 SOLUTION RESPIRATORY (INHALATION) at 04:30

## 2017-06-05 RX ADMIN — AMLODIPINE BESYLATE 10 MG: 10 TABLET ORAL at 09:25

## 2017-06-05 RX ADMIN — LORAZEPAM 1 MG: 2 INJECTION INTRAMUSCULAR; INTRAVENOUS at 05:45

## 2017-06-05 RX ADMIN — HEPARIN SODIUM 5000 UNITS: 5000 INJECTION, SOLUTION INTRAVENOUS; SUBCUTANEOUS at 14:46

## 2017-06-05 RX ADMIN — CLOPIDOGREL BISULFATE 75 MG: 75 TABLET, FILM COATED ORAL at 09:25

## 2017-06-05 RX ADMIN — LEVALBUTEROL HYDROCHLORIDE 1.25 MG: 1.25 SOLUTION, CONCENTRATE RESPIRATORY (INHALATION) at 19:25

## 2017-06-05 RX ADMIN — HEPARIN SODIUM 5000 UNITS: 5000 INJECTION, SOLUTION INTRAVENOUS; SUBCUTANEOUS at 05:45

## 2017-06-05 RX ADMIN — CARVEDILOL 6.25 MG: 6.25 TABLET, FILM COATED ORAL at 09:25

## 2017-06-05 RX ADMIN — OXYBUTYNIN CHLORIDE 5 MG: 5 TABLET, EXTENDED RELEASE ORAL at 22:53

## 2017-06-05 RX ADMIN — GUAIFENESIN 1200 MG: 600 TABLET, EXTENDED RELEASE ORAL at 09:25

## 2017-06-05 RX ADMIN — PANTOPRAZOLE SODIUM 40 MG: 40 TABLET, DELAYED RELEASE ORAL at 05:46

## 2017-06-05 RX ADMIN — LEVOFLOXACIN 500 MG: 5 INJECTION, SOLUTION INTRAVENOUS at 00:48

## 2017-06-05 RX ADMIN — LEVALBUTEROL HYDROCHLORIDE 1.25 MG: 1.25 SOLUTION, CONCENTRATE RESPIRATORY (INHALATION) at 14:02

## 2017-06-05 RX ADMIN — ASPIRIN 81 MG 81 MG: 81 TABLET ORAL at 09:25

## 2017-06-05 RX ADMIN — ATORVASTATIN CALCIUM 10 MG: 10 TABLET, FILM COATED ORAL at 16:19

## 2017-06-05 RX ADMIN — CARVEDILOL 6.25 MG: 6.25 TABLET, FILM COATED ORAL at 16:19

## 2017-06-05 RX ADMIN — ISODIUM CHLORIDE 3 ML: 0.03 SOLUTION RESPIRATORY (INHALATION) at 14:02

## 2017-06-05 RX ADMIN — LEVALBUTEROL HYDROCHLORIDE 1.25 MG: 1.25 SOLUTION, CONCENTRATE RESPIRATORY (INHALATION) at 07:11

## 2017-06-05 RX ADMIN — TAMSULOSIN HYDROCHLORIDE 0.4 MG: 0.4 CAPSULE ORAL at 16:19

## 2017-06-05 RX ADMIN — METHYLPREDNISOLONE SODIUM SUCCINATE 40 MG: 40 INJECTION, POWDER, FOR SOLUTION INTRAMUSCULAR; INTRAVENOUS at 09:27

## 2017-06-05 RX ADMIN — PAROXETINE HYDROCHLORIDE 20 MG: 20 TABLET, FILM COATED ORAL at 09:25

## 2017-06-05 RX ADMIN — IPRATROPIUM BROMIDE 0.5 MG: 0.5 SOLUTION RESPIRATORY (INHALATION) at 07:11

## 2017-06-05 RX ADMIN — HEPARIN SODIUM 5000 UNITS: 5000 INJECTION, SOLUTION INTRAVENOUS; SUBCUTANEOUS at 22:53

## 2017-06-05 RX ADMIN — LEVOFLOXACIN 500 MG: 5 INJECTION, SOLUTION INTRAVENOUS at 23:12

## 2017-06-05 RX ADMIN — ISODIUM CHLORIDE 3 ML: 0.03 SOLUTION RESPIRATORY (INHALATION) at 19:25

## 2017-06-06 LAB
ADENOVIRUS: NOT DETECTED
B PARAPERT DNA SPEC QL NAA+PROBE: NOT DETECTED
B PERT DNA SPEC QL NAA+PROBE: NOT DETECTED
C PNEUM DNA SPEC QL NAA+PROBE: NOT DETECTED
FLUAV H1 RNA SPEC QL NAA+PROBE: NOT DETECTED
FLUAV H3 RNA SPEC QL NAA+PROBE: NOT DETECTED
FLUAV RNA SPEC QL NAA+PROBE: NOT DETECTED
FLUBV RNA SPEC QL NAA+PROBE: NOT DETECTED
HCOV 229E RNA SPEC QL NAA+PROBE: NOT DETECTED
HCOV HKU1 RNA SPEC QL NAA+PROBE: NOT DETECTED
HCOV NL63 RNA SPEC QL NAA+PROBE: NOT DETECTED
HCOV OC43 RNA SPEC QL NAA+PROBE: NOT DETECTED
HPIV1 RNA SPEC QL NAA+PROBE: NOT DETECTED
HPIV2 RNA SPEC QL NAA+PROBE: NOT DETECTED
HPIV3 RNA SPEC QL NAA+PROBE: NOT DETECTED
HPIV4 RNA SPEC QL NAA+PROBE: NOT DETECTED
M PNEUMO DNA SPEC QL NAA+PROBE: NOT DETECTED
METAPNEUMOVIRUS: NOT DETECTED
RHINOVIRUS RNA SPEC QL NAA+PROBE: NOT DETECTED
RSV A RNA SPEC QL NAA+PROBE: NOT DETECTED
RSV B RNA SPEC QL NAA+PROBE: NOT DETECTED

## 2017-06-06 PROCEDURE — 97530 THERAPEUTIC ACTIVITIES: CPT

## 2017-06-06 PROCEDURE — 94760 N-INVAS EAR/PLS OXIMETRY 1: CPT

## 2017-06-06 PROCEDURE — 94640 AIRWAY INHALATION TREATMENT: CPT

## 2017-06-06 PROCEDURE — 92526 ORAL FUNCTION THERAPY: CPT

## 2017-06-06 PROCEDURE — 97116 GAIT TRAINING THERAPY: CPT

## 2017-06-06 PROCEDURE — 94668 MNPJ CHEST WALL SBSQ: CPT

## 2017-06-06 RX ORDER — DOCUSATE SODIUM 100 MG/1
100 CAPSULE, LIQUID FILLED ORAL 2 TIMES DAILY
Status: DISCONTINUED | OUTPATIENT
Start: 2017-06-06 | End: 2017-06-09 | Stop reason: HOSPADM

## 2017-06-06 RX ORDER — SENNOSIDES 8.6 MG
1 TABLET ORAL
Status: DISCONTINUED | OUTPATIENT
Start: 2017-06-06 | End: 2017-06-09 | Stop reason: HOSPADM

## 2017-06-06 RX ADMIN — TAMSULOSIN HYDROCHLORIDE 0.4 MG: 0.4 CAPSULE ORAL at 17:17

## 2017-06-06 RX ADMIN — GUAIFENESIN 1200 MG: 600 TABLET, EXTENDED RELEASE ORAL at 09:37

## 2017-06-06 RX ADMIN — ISODIUM CHLORIDE 3 ML: 0.03 SOLUTION RESPIRATORY (INHALATION) at 01:22

## 2017-06-06 RX ADMIN — AMLODIPINE BESYLATE 10 MG: 10 TABLET ORAL at 09:37

## 2017-06-06 RX ADMIN — GUAIFENESIN AND DEXTROMETHORPHAN 10 ML: 100; 10 SYRUP ORAL at 03:37

## 2017-06-06 RX ADMIN — HEPARIN SODIUM 5000 UNITS: 5000 INJECTION, SOLUTION INTRAVENOUS; SUBCUTANEOUS at 21:30

## 2017-06-06 RX ADMIN — OXYBUTYNIN CHLORIDE 5 MG: 5 TABLET, EXTENDED RELEASE ORAL at 21:30

## 2017-06-06 RX ADMIN — PANTOPRAZOLE SODIUM 40 MG: 40 TABLET, DELAYED RELEASE ORAL at 05:21

## 2017-06-06 RX ADMIN — HEPARIN SODIUM 5000 UNITS: 5000 INJECTION, SOLUTION INTRAVENOUS; SUBCUTANEOUS at 14:50

## 2017-06-06 RX ADMIN — PAROXETINE HYDROCHLORIDE 20 MG: 20 TABLET, FILM COATED ORAL at 09:36

## 2017-06-06 RX ADMIN — ASPIRIN 81 MG 81 MG: 81 TABLET ORAL at 09:37

## 2017-06-06 RX ADMIN — LEVALBUTEROL HYDROCHLORIDE 1.25 MG: 1.25 SOLUTION, CONCENTRATE RESPIRATORY (INHALATION) at 07:08

## 2017-06-06 RX ADMIN — LEVALBUTEROL HYDROCHLORIDE 1.25 MG: 1.25 SOLUTION, CONCENTRATE RESPIRATORY (INHALATION) at 13:10

## 2017-06-06 RX ADMIN — POTASSIUM CHLORIDE 20 MEQ: 1500 TABLET, EXTENDED RELEASE ORAL at 09:37

## 2017-06-06 RX ADMIN — PREDNISONE 30 MG: 20 TABLET ORAL at 09:37

## 2017-06-06 RX ADMIN — LEVALBUTEROL HYDROCHLORIDE 1.25 MG: 1.25 SOLUTION, CONCENTRATE RESPIRATORY (INHALATION) at 19:45

## 2017-06-06 RX ADMIN — SENNOSIDES 8.6 MG: 8.6 TABLET, FILM COATED ORAL at 23:32

## 2017-06-06 RX ADMIN — CARVEDILOL 6.25 MG: 6.25 TABLET, FILM COATED ORAL at 17:17

## 2017-06-06 RX ADMIN — CARVEDILOL 6.25 MG: 6.25 TABLET, FILM COATED ORAL at 09:36

## 2017-06-06 RX ADMIN — LEVOFLOXACIN 500 MG: 5 INJECTION, SOLUTION INTRAVENOUS at 23:32

## 2017-06-06 RX ADMIN — ATORVASTATIN CALCIUM 10 MG: 10 TABLET, FILM COATED ORAL at 17:17

## 2017-06-06 RX ADMIN — ISODIUM CHLORIDE 3 ML: 0.03 SOLUTION RESPIRATORY (INHALATION) at 19:45

## 2017-06-06 RX ADMIN — LEVALBUTEROL HYDROCHLORIDE 1.25 MG: 1.25 SOLUTION, CONCENTRATE RESPIRATORY (INHALATION) at 01:22

## 2017-06-06 RX ADMIN — CLOPIDOGREL BISULFATE 75 MG: 75 TABLET, FILM COATED ORAL at 09:36

## 2017-06-06 RX ADMIN — ISODIUM CHLORIDE 3 ML: 0.03 SOLUTION RESPIRATORY (INHALATION) at 13:10

## 2017-06-06 RX ADMIN — DOCUSATE SODIUM 100 MG: 100 CAPSULE, LIQUID FILLED ORAL at 23:32

## 2017-06-06 RX ADMIN — ISODIUM CHLORIDE 3 ML: 0.03 SOLUTION RESPIRATORY (INHALATION) at 07:08

## 2017-06-06 RX ADMIN — GUAIFENESIN 1200 MG: 600 TABLET, EXTENDED RELEASE ORAL at 17:17

## 2017-06-06 RX ADMIN — HEPARIN SODIUM 5000 UNITS: 5000 INJECTION, SOLUTION INTRAVENOUS; SUBCUTANEOUS at 05:20

## 2017-06-07 ENCOUNTER — APPOINTMENT (INPATIENT)
Dept: RADIOLOGY | Facility: HOSPITAL | Age: 77
DRG: 205 | End: 2017-06-07
Payer: MEDICARE

## 2017-06-07 LAB
ANION GAP SERPL CALCULATED.3IONS-SCNC: 7 MMOL/L (ref 4–13)
BASOPHILS # BLD MANUAL: 0 THOUSAND/UL (ref 0–0.1)
BASOPHILS NFR MAR MANUAL: 0 % (ref 0–1)
BUN SERPL-MCNC: 40 MG/DL (ref 5–25)
CALCIUM SERPL-MCNC: 9.3 MG/DL (ref 8.3–10.1)
CHLORIDE SERPL-SCNC: 104 MMOL/L (ref 100–108)
CO2 SERPL-SCNC: 26 MMOL/L (ref 21–32)
CREAT SERPL-MCNC: 1.29 MG/DL (ref 0.6–1.3)
EOSINOPHIL # BLD MANUAL: 0.32 THOUSAND/UL (ref 0–0.4)
EOSINOPHIL NFR BLD MANUAL: 3 % (ref 0–6)
ERYTHROCYTE [DISTWIDTH] IN BLOOD BY AUTOMATED COUNT: 15.1 % (ref 11.6–15.1)
GFR SERPL CREATININE-BSD FRML MDRD: 54 ML/MIN/1.73SQ M
GLUCOSE SERPL-MCNC: 100 MG/DL (ref 65–140)
HCT VFR BLD AUTO: 46.1 % (ref 36.5–49.3)
HGB BLD-MCNC: 15.2 G/DL (ref 12–17)
LYMPHOCYTES # BLD AUTO: 1.4 THOUSAND/UL (ref 0.6–4.47)
LYMPHOCYTES # BLD AUTO: 13 % (ref 14–44)
MCH RBC QN AUTO: 29.9 PG (ref 26.8–34.3)
MCHC RBC AUTO-ENTMCNC: 33 G/DL (ref 31.4–37.4)
MCV RBC AUTO: 91 FL (ref 82–98)
MONOCYTES # BLD AUTO: 0.97 THOUSAND/UL (ref 0–1.22)
MONOCYTES NFR BLD: 9 % (ref 4–12)
NEUTROPHILS # BLD MANUAL: 7.96 THOUSAND/UL (ref 1.85–7.62)
NEUTS SEG NFR BLD AUTO: 74 % (ref 43–75)
PLATELET # BLD AUTO: 113 THOUSANDS/UL (ref 149–390)
PLATELET BLD QL SMEAR: ABNORMAL
PMV BLD AUTO: 12 FL (ref 8.9–12.7)
POTASSIUM SERPL-SCNC: 4.6 MMOL/L (ref 3.5–5.3)
RBC # BLD AUTO: 5.08 MILLION/UL (ref 3.88–5.62)
SODIUM SERPL-SCNC: 137 MMOL/L (ref 136–145)
TOTAL CELLS COUNTED SPEC: 100
VARIANT LYMPHS # BLD AUTO: 1 %
WBC # BLD AUTO: 10.76 THOUSAND/UL (ref 4.31–10.16)

## 2017-06-07 PROCEDURE — 94668 MNPJ CHEST WALL SBSQ: CPT

## 2017-06-07 PROCEDURE — 94760 N-INVAS EAR/PLS OXIMETRY 1: CPT

## 2017-06-07 PROCEDURE — 85027 COMPLETE CBC AUTOMATED: CPT | Performed by: FAMILY MEDICINE

## 2017-06-07 PROCEDURE — 94640 AIRWAY INHALATION TREATMENT: CPT

## 2017-06-07 PROCEDURE — 80048 BASIC METABOLIC PNL TOTAL CA: CPT | Performed by: FAMILY MEDICINE

## 2017-06-07 PROCEDURE — 71020 HB CHEST X-RAY 2VW FRONTAL&LATL: CPT

## 2017-06-07 PROCEDURE — 85007 BL SMEAR W/DIFF WBC COUNT: CPT | Performed by: FAMILY MEDICINE

## 2017-06-07 RX ADMIN — LEVALBUTEROL HYDROCHLORIDE 1.25 MG: 1.25 SOLUTION, CONCENTRATE RESPIRATORY (INHALATION) at 13:14

## 2017-06-07 RX ADMIN — OXYBUTYNIN CHLORIDE 5 MG: 5 TABLET, EXTENDED RELEASE ORAL at 22:30

## 2017-06-07 RX ADMIN — ASPIRIN 81 MG 81 MG: 81 TABLET ORAL at 07:45

## 2017-06-07 RX ADMIN — AMLODIPINE BESYLATE 10 MG: 10 TABLET ORAL at 07:44

## 2017-06-07 RX ADMIN — CARVEDILOL 6.25 MG: 6.25 TABLET, FILM COATED ORAL at 17:43

## 2017-06-07 RX ADMIN — LEVALBUTEROL HYDROCHLORIDE 1.25 MG: 1.25 SOLUTION, CONCENTRATE RESPIRATORY (INHALATION) at 01:16

## 2017-06-07 RX ADMIN — HEPARIN SODIUM 5000 UNITS: 5000 INJECTION, SOLUTION INTRAVENOUS; SUBCUTANEOUS at 05:54

## 2017-06-07 RX ADMIN — CLOPIDOGREL BISULFATE 75 MG: 75 TABLET, FILM COATED ORAL at 07:45

## 2017-06-07 RX ADMIN — LEVALBUTEROL HYDROCHLORIDE 1.25 MG: 1.25 SOLUTION, CONCENTRATE RESPIRATORY (INHALATION) at 07:37

## 2017-06-07 RX ADMIN — PAROXETINE HYDROCHLORIDE 20 MG: 20 TABLET, FILM COATED ORAL at 07:46

## 2017-06-07 RX ADMIN — PANTOPRAZOLE SODIUM 40 MG: 40 TABLET, DELAYED RELEASE ORAL at 05:54

## 2017-06-07 RX ADMIN — POTASSIUM CHLORIDE 20 MEQ: 1500 TABLET, EXTENDED RELEASE ORAL at 07:45

## 2017-06-07 RX ADMIN — HEPARIN SODIUM 5000 UNITS: 5000 INJECTION, SOLUTION INTRAVENOUS; SUBCUTANEOUS at 13:23

## 2017-06-07 RX ADMIN — SENNOSIDES 8.6 MG: 8.6 TABLET, FILM COATED ORAL at 22:29

## 2017-06-07 RX ADMIN — PREDNISONE 30 MG: 20 TABLET ORAL at 07:45

## 2017-06-07 RX ADMIN — ATORVASTATIN CALCIUM 10 MG: 10 TABLET, FILM COATED ORAL at 17:43

## 2017-06-07 RX ADMIN — ISODIUM CHLORIDE 3 ML: 0.03 SOLUTION RESPIRATORY (INHALATION) at 19:39

## 2017-06-07 RX ADMIN — TAMSULOSIN HYDROCHLORIDE 0.4 MG: 0.4 CAPSULE ORAL at 17:43

## 2017-06-07 RX ADMIN — LEVALBUTEROL HYDROCHLORIDE 1.25 MG: 1.25 SOLUTION, CONCENTRATE RESPIRATORY (INHALATION) at 19:39

## 2017-06-07 RX ADMIN — ISODIUM CHLORIDE 3 ML: 0.03 SOLUTION RESPIRATORY (INHALATION) at 13:14

## 2017-06-07 RX ADMIN — ISODIUM CHLORIDE 3 ML: 0.03 SOLUTION RESPIRATORY (INHALATION) at 07:37

## 2017-06-07 RX ADMIN — GUAIFENESIN 1200 MG: 600 TABLET, EXTENDED RELEASE ORAL at 07:46

## 2017-06-07 RX ADMIN — HEPARIN SODIUM 5000 UNITS: 5000 INJECTION, SOLUTION INTRAVENOUS; SUBCUTANEOUS at 22:29

## 2017-06-07 RX ADMIN — DOCUSATE SODIUM 100 MG: 100 CAPSULE, LIQUID FILLED ORAL at 17:43

## 2017-06-07 RX ADMIN — GUAIFENESIN 1200 MG: 600 TABLET, EXTENDED RELEASE ORAL at 17:43

## 2017-06-07 RX ADMIN — ISODIUM CHLORIDE 3 ML: 0.03 SOLUTION RESPIRATORY (INHALATION) at 01:16

## 2017-06-07 RX ADMIN — CARVEDILOL 6.25 MG: 6.25 TABLET, FILM COATED ORAL at 07:46

## 2017-06-08 ENCOUNTER — APPOINTMENT (INPATIENT)
Dept: PHYSICAL THERAPY | Facility: HOSPITAL | Age: 77
DRG: 205 | End: 2017-06-08
Payer: MEDICARE

## 2017-06-08 LAB — LABORATORY COMMENT REPORT: NORMAL

## 2017-06-08 PROCEDURE — 94760 N-INVAS EAR/PLS OXIMETRY 1: CPT

## 2017-06-08 PROCEDURE — 97530 THERAPEUTIC ACTIVITIES: CPT

## 2017-06-08 PROCEDURE — 94640 AIRWAY INHALATION TREATMENT: CPT

## 2017-06-08 PROCEDURE — 97116 GAIT TRAINING THERAPY: CPT

## 2017-06-08 PROCEDURE — 94668 MNPJ CHEST WALL SBSQ: CPT

## 2017-06-08 RX ADMIN — TAMSULOSIN HYDROCHLORIDE 0.4 MG: 0.4 CAPSULE ORAL at 17:14

## 2017-06-08 RX ADMIN — OXYBUTYNIN CHLORIDE 5 MG: 5 TABLET, EXTENDED RELEASE ORAL at 21:58

## 2017-06-08 RX ADMIN — POTASSIUM CHLORIDE 20 MEQ: 1500 TABLET, EXTENDED RELEASE ORAL at 08:29

## 2017-06-08 RX ADMIN — ISODIUM CHLORIDE 3 ML: 0.03 SOLUTION RESPIRATORY (INHALATION) at 03:07

## 2017-06-08 RX ADMIN — LEVALBUTEROL HYDROCHLORIDE 1.25 MG: 1.25 SOLUTION, CONCENTRATE RESPIRATORY (INHALATION) at 07:20

## 2017-06-08 RX ADMIN — AMLODIPINE BESYLATE 10 MG: 10 TABLET ORAL at 08:28

## 2017-06-08 RX ADMIN — ISODIUM CHLORIDE 3 ML: 0.03 SOLUTION RESPIRATORY (INHALATION) at 07:20

## 2017-06-08 RX ADMIN — HEPARIN SODIUM 5000 UNITS: 5000 INJECTION, SOLUTION INTRAVENOUS; SUBCUTANEOUS at 04:58

## 2017-06-08 RX ADMIN — PREDNISONE 20 MG: 20 TABLET ORAL at 08:29

## 2017-06-08 RX ADMIN — PANTOPRAZOLE SODIUM 40 MG: 40 TABLET, DELAYED RELEASE ORAL at 04:58

## 2017-06-08 RX ADMIN — ISODIUM CHLORIDE 3 ML: 0.03 SOLUTION RESPIRATORY (INHALATION) at 19:13

## 2017-06-08 RX ADMIN — ATORVASTATIN CALCIUM 10 MG: 10 TABLET, FILM COATED ORAL at 17:15

## 2017-06-08 RX ADMIN — HEPARIN SODIUM 5000 UNITS: 5000 INJECTION, SOLUTION INTRAVENOUS; SUBCUTANEOUS at 15:00

## 2017-06-08 RX ADMIN — LEVALBUTEROL HYDROCHLORIDE 1.25 MG: 1.25 SOLUTION, CONCENTRATE RESPIRATORY (INHALATION) at 19:13

## 2017-06-08 RX ADMIN — LEVALBUTEROL HYDROCHLORIDE 1.25 MG: 1.25 SOLUTION, CONCENTRATE RESPIRATORY (INHALATION) at 14:10

## 2017-06-08 RX ADMIN — ASPIRIN 81 MG 81 MG: 81 TABLET ORAL at 08:28

## 2017-06-08 RX ADMIN — DOCUSATE SODIUM 100 MG: 100 CAPSULE, LIQUID FILLED ORAL at 17:14

## 2017-06-08 RX ADMIN — GUAIFENESIN 1200 MG: 600 TABLET, EXTENDED RELEASE ORAL at 08:29

## 2017-06-08 RX ADMIN — CARVEDILOL 6.25 MG: 6.25 TABLET, FILM COATED ORAL at 17:14

## 2017-06-08 RX ADMIN — CARVEDILOL 6.25 MG: 6.25 TABLET, FILM COATED ORAL at 08:28

## 2017-06-08 RX ADMIN — PAROXETINE HYDROCHLORIDE 20 MG: 20 TABLET, FILM COATED ORAL at 08:29

## 2017-06-08 RX ADMIN — GUAIFENESIN 1200 MG: 600 TABLET, EXTENDED RELEASE ORAL at 17:14

## 2017-06-08 RX ADMIN — ISODIUM CHLORIDE 3 ML: 0.03 SOLUTION RESPIRATORY (INHALATION) at 14:10

## 2017-06-08 RX ADMIN — DOCUSATE SODIUM 100 MG: 100 CAPSULE, LIQUID FILLED ORAL at 08:29

## 2017-06-08 RX ADMIN — LEVALBUTEROL HYDROCHLORIDE 1.25 MG: 1.25 SOLUTION, CONCENTRATE RESPIRATORY (INHALATION) at 03:07

## 2017-06-08 RX ADMIN — CLOPIDOGREL BISULFATE 75 MG: 75 TABLET, FILM COATED ORAL at 08:28

## 2017-06-08 RX ADMIN — HEPARIN SODIUM 5000 UNITS: 5000 INJECTION, SOLUTION INTRAVENOUS; SUBCUTANEOUS at 21:58

## 2017-06-08 RX ADMIN — SENNOSIDES 8.6 MG: 8.6 TABLET, FILM COATED ORAL at 21:58

## 2017-06-09 ENCOUNTER — HOSPITAL ENCOUNTER (INPATIENT)
Facility: HOSPITAL | Age: 77
LOS: 10 days | Discharge: HOME | DRG: 202 | End: 2017-06-19
Attending: PHYSICAL MEDICINE & REHABILITATION | Admitting: PHYSICAL MEDICINE & REHABILITATION
Payer: MEDICARE

## 2017-06-09 ENCOUNTER — APPOINTMENT (INPATIENT)
Dept: OCCUPATIONAL THERAPY | Facility: HOSPITAL | Age: 77
DRG: 205 | End: 2017-06-09
Payer: MEDICARE

## 2017-06-09 VITALS
WEIGHT: 205.69 LBS | BODY MASS INDEX: 24.29 KG/M2 | TEMPERATURE: 97.3 F | HEIGHT: 77 IN | SYSTOLIC BLOOD PRESSURE: 115 MMHG | OXYGEN SATURATION: 94 % | RESPIRATION RATE: 18 BRPM | DIASTOLIC BLOOD PRESSURE: 73 MMHG | HEART RATE: 71 BPM

## 2017-06-09 DIAGNOSIS — Z86.73 HISTORY OF CVA (CEREBROVASCULAR ACCIDENT): Chronic | ICD-10-CM

## 2017-06-09 DIAGNOSIS — J40 BRONCHITIS: Primary | ICD-10-CM

## 2017-06-09 DIAGNOSIS — N17.9 AKI (ACUTE KIDNEY INJURY) (HCC): ICD-10-CM

## 2017-06-09 DIAGNOSIS — R53.1 LEFT-SIDED WEAKNESS: ICD-10-CM

## 2017-06-09 DIAGNOSIS — D69.6 THROMBOCYTOPENIA (HCC): ICD-10-CM

## 2017-06-09 DIAGNOSIS — R77.8 ELEVATED TROPONIN: ICD-10-CM

## 2017-06-09 PROBLEM — J98.01 BRONCHOSPASM: Status: ACTIVE | Noted: 2017-06-09

## 2017-06-09 PROCEDURE — 97167 OT EVAL HIGH COMPLEX 60 MIN: CPT

## 2017-06-09 PROCEDURE — 97530 THERAPEUTIC ACTIVITIES: CPT

## 2017-06-09 PROCEDURE — 97535 SELF CARE MNGMENT TRAINING: CPT

## 2017-06-09 PROCEDURE — 94640 AIRWAY INHALATION TREATMENT: CPT

## 2017-06-09 PROCEDURE — 94760 N-INVAS EAR/PLS OXIMETRY 1: CPT

## 2017-06-09 PROCEDURE — 97110 THERAPEUTIC EXERCISES: CPT

## 2017-06-09 PROCEDURE — 94668 MNPJ CHEST WALL SBSQ: CPT

## 2017-06-09 PROCEDURE — G8988 SELF CARE GOAL STATUS: HCPCS

## 2017-06-09 PROCEDURE — 97116 GAIT TRAINING THERAPY: CPT

## 2017-06-09 PROCEDURE — G8987 SELF CARE CURRENT STATUS: HCPCS

## 2017-06-09 RX ORDER — ALBUTEROL SULFATE 2.5 MG/3ML
2.5 SOLUTION RESPIRATORY (INHALATION) EVERY 4 HOURS PRN
Status: DISCONTINUED | OUTPATIENT
Start: 2017-06-09 | End: 2017-06-09

## 2017-06-09 RX ORDER — POTASSIUM CHLORIDE 20 MEQ/1
20 TABLET, EXTENDED RELEASE ORAL DAILY
Status: DISCONTINUED | OUTPATIENT
Start: 2017-06-10 | End: 2017-06-14

## 2017-06-09 RX ORDER — OXYBUTYNIN CHLORIDE 5 MG/1
5 TABLET, EXTENDED RELEASE ORAL
Status: DISCONTINUED | OUTPATIENT
Start: 2017-06-09 | End: 2017-06-19 | Stop reason: HOSPADM

## 2017-06-09 RX ORDER — PANTOPRAZOLE SODIUM 40 MG/1
40 TABLET, DELAYED RELEASE ORAL
Status: DISCONTINUED | OUTPATIENT
Start: 2017-06-10 | End: 2017-06-13

## 2017-06-09 RX ORDER — HEPARIN SODIUM 5000 [USP'U]/ML
5000 INJECTION, SOLUTION INTRAVENOUS; SUBCUTANEOUS EVERY 8 HOURS SCHEDULED
Status: CANCELLED | OUTPATIENT
Start: 2017-06-09

## 2017-06-09 RX ORDER — LEVALBUTEROL 1.25 MG/.5ML
1.25 SOLUTION, CONCENTRATE RESPIRATORY (INHALATION)
Status: CANCELLED | OUTPATIENT
Start: 2017-06-09

## 2017-06-09 RX ORDER — DOCUSATE SODIUM 100 MG/1
100 CAPSULE, LIQUID FILLED ORAL 2 TIMES DAILY
Status: DISCONTINUED | OUTPATIENT
Start: 2017-06-09 | End: 2017-06-19 | Stop reason: HOSPADM

## 2017-06-09 RX ORDER — MECLIZINE HYDROCHLORIDE 25 MG/1
25 TABLET ORAL 3 TIMES DAILY PRN
Status: CANCELLED | OUTPATIENT
Start: 2017-06-09

## 2017-06-09 RX ORDER — PANTOPRAZOLE SODIUM 40 MG/1
40 TABLET, DELAYED RELEASE ORAL
Status: CANCELLED | OUTPATIENT
Start: 2017-06-10

## 2017-06-09 RX ORDER — ACETAMINOPHEN 325 MG/1
650 TABLET ORAL EVERY 6 HOURS PRN
Status: DISCONTINUED | OUTPATIENT
Start: 2017-06-09 | End: 2017-06-19 | Stop reason: HOSPADM

## 2017-06-09 RX ORDER — HEPARIN SODIUM 5000 [USP'U]/ML
5000 INJECTION, SOLUTION INTRAVENOUS; SUBCUTANEOUS EVERY 8 HOURS SCHEDULED
Status: DISCONTINUED | OUTPATIENT
Start: 2017-06-09 | End: 2017-06-18

## 2017-06-09 RX ORDER — CLOPIDOGREL BISULFATE 75 MG/1
75 TABLET ORAL DAILY
Status: DISCONTINUED | OUTPATIENT
Start: 2017-06-10 | End: 2017-06-19 | Stop reason: HOSPADM

## 2017-06-09 RX ORDER — PREDNISONE 10 MG/1
10 TABLET ORAL DAILY
Status: CANCELLED | OUTPATIENT
Start: 2017-06-10 | End: 2017-06-12

## 2017-06-09 RX ORDER — CARVEDILOL 6.25 MG/1
6.25 TABLET ORAL 2 TIMES DAILY WITH MEALS
Status: CANCELLED | OUTPATIENT
Start: 2017-06-09

## 2017-06-09 RX ORDER — SENNOSIDES 8.6 MG
1 TABLET ORAL
Status: CANCELLED | OUTPATIENT
Start: 2017-06-09

## 2017-06-09 RX ORDER — GUAIFENESIN/DEXTROMETHORPHAN 100-10MG/5
10 SYRUP ORAL EVERY 4 HOURS PRN
Status: DISCONTINUED | OUTPATIENT
Start: 2017-06-09 | End: 2017-06-19 | Stop reason: HOSPADM

## 2017-06-09 RX ORDER — POTASSIUM CHLORIDE 20 MEQ/1
20 TABLET, EXTENDED RELEASE ORAL DAILY
Status: CANCELLED | OUTPATIENT
Start: 2017-06-10

## 2017-06-09 RX ORDER — CLOPIDOGREL BISULFATE 75 MG/1
75 TABLET ORAL DAILY
Status: CANCELLED | OUTPATIENT
Start: 2017-06-10

## 2017-06-09 RX ORDER — MECLIZINE HYDROCHLORIDE 25 MG/1
25 TABLET ORAL 3 TIMES DAILY PRN
Status: DISCONTINUED | OUTPATIENT
Start: 2017-06-09 | End: 2017-06-19 | Stop reason: HOSPADM

## 2017-06-09 RX ORDER — TAMSULOSIN HYDROCHLORIDE 0.4 MG/1
0.4 CAPSULE ORAL
Status: DISCONTINUED | OUTPATIENT
Start: 2017-06-09 | End: 2017-06-19 | Stop reason: HOSPADM

## 2017-06-09 RX ORDER — AMLODIPINE BESYLATE 10 MG/1
10 TABLET ORAL DAILY
Status: CANCELLED | OUTPATIENT
Start: 2017-06-10

## 2017-06-09 RX ORDER — ASPIRIN 81 MG/1
81 TABLET, CHEWABLE ORAL DAILY
Status: CANCELLED | OUTPATIENT
Start: 2017-06-10

## 2017-06-09 RX ORDER — PAROXETINE HYDROCHLORIDE 20 MG/1
20 TABLET, FILM COATED ORAL EVERY MORNING
Status: DISCONTINUED | OUTPATIENT
Start: 2017-06-10 | End: 2017-06-19 | Stop reason: HOSPADM

## 2017-06-09 RX ORDER — SODIUM CHLORIDE FOR INHALATION 0.9 %
3 VIAL, NEBULIZER (ML) INHALATION EVERY 6 HOURS PRN
Status: DISCONTINUED | OUTPATIENT
Start: 2017-06-09 | End: 2017-06-09

## 2017-06-09 RX ORDER — AMLODIPINE BESYLATE 10 MG/1
10 TABLET ORAL DAILY
Status: DISCONTINUED | OUTPATIENT
Start: 2017-06-10 | End: 2017-06-14

## 2017-06-09 RX ORDER — SENNOSIDES 8.6 MG
1 TABLET ORAL
Status: DISCONTINUED | OUTPATIENT
Start: 2017-06-09 | End: 2017-06-19 | Stop reason: HOSPADM

## 2017-06-09 RX ORDER — DOCUSATE SODIUM 100 MG/1
100 CAPSULE, LIQUID FILLED ORAL 2 TIMES DAILY
Status: DISCONTINUED | OUTPATIENT
Start: 2017-06-09 | End: 2017-06-09

## 2017-06-09 RX ORDER — BISACODYL 10 MG
10 SUPPOSITORY, RECTAL RECTAL DAILY PRN
Status: DISCONTINUED | OUTPATIENT
Start: 2017-06-09 | End: 2017-06-19 | Stop reason: HOSPADM

## 2017-06-09 RX ORDER — GUAIFENESIN 600 MG
1200 TABLET, EXTENDED RELEASE 12 HR ORAL EVERY 12 HOURS SCHEDULED
Status: DISCONTINUED | OUTPATIENT
Start: 2017-06-09 | End: 2017-06-19 | Stop reason: HOSPADM

## 2017-06-09 RX ORDER — GUAIFENESIN/DEXTROMETHORPHAN 100-10MG/5
10 SYRUP ORAL EVERY 4 HOURS PRN
Status: CANCELLED | OUTPATIENT
Start: 2017-06-09

## 2017-06-09 RX ORDER — MAGNESIUM HYDROXIDE/ALUMINUM HYDROXICE/SIMETHICONE 120; 1200; 1200 MG/30ML; MG/30ML; MG/30ML
30 SUSPENSION ORAL EVERY 4 HOURS PRN
Status: DISCONTINUED | OUTPATIENT
Start: 2017-06-09 | End: 2017-06-19 | Stop reason: HOSPADM

## 2017-06-09 RX ORDER — TAMSULOSIN HYDROCHLORIDE 0.4 MG/1
0.4 CAPSULE ORAL
Status: CANCELLED | OUTPATIENT
Start: 2017-06-09

## 2017-06-09 RX ORDER — SODIUM CHLORIDE FOR INHALATION 0.9 %
3 VIAL, NEBULIZER (ML) INHALATION
Status: CANCELLED | OUTPATIENT
Start: 2017-06-09

## 2017-06-09 RX ORDER — SENNOSIDES 8.6 MG
2 TABLET ORAL DAILY
Status: DISCONTINUED | OUTPATIENT
Start: 2017-06-09 | End: 2017-06-09

## 2017-06-09 RX ORDER — ATORVASTATIN CALCIUM 10 MG/1
10 TABLET, FILM COATED ORAL
Status: CANCELLED | OUTPATIENT
Start: 2017-06-09

## 2017-06-09 RX ORDER — ASPIRIN 81 MG/1
81 TABLET, CHEWABLE ORAL DAILY
Status: DISCONTINUED | OUTPATIENT
Start: 2017-06-10 | End: 2017-06-19 | Stop reason: HOSPADM

## 2017-06-09 RX ORDER — ALBUTEROL SULFATE 90 UG/1
2 AEROSOL, METERED RESPIRATORY (INHALATION) EVERY 4 HOURS PRN
Status: DISCONTINUED | OUTPATIENT
Start: 2017-06-09 | End: 2017-06-12

## 2017-06-09 RX ORDER — POLYETHYLENE GLYCOL 3350 17 G/17G
17 POWDER, FOR SOLUTION ORAL DAILY PRN
Status: DISCONTINUED | OUTPATIENT
Start: 2017-06-09 | End: 2017-06-19 | Stop reason: HOSPADM

## 2017-06-09 RX ORDER — ACETAMINOPHEN 325 MG/1
650 TABLET ORAL EVERY 6 HOURS PRN
Status: CANCELLED | OUTPATIENT
Start: 2017-06-09

## 2017-06-09 RX ORDER — ONDANSETRON 4 MG/1
4 TABLET, ORALLY DISINTEGRATING ORAL EVERY 6 HOURS PRN
Status: DISCONTINUED | OUTPATIENT
Start: 2017-06-09 | End: 2017-06-19 | Stop reason: HOSPADM

## 2017-06-09 RX ORDER — LEVALBUTEROL 1.25 MG/.5ML
1.25 SOLUTION, CONCENTRATE RESPIRATORY (INHALATION)
Status: DISCONTINUED | OUTPATIENT
Start: 2017-06-09 | End: 2017-06-09

## 2017-06-09 RX ORDER — PREDNISONE 10 MG/1
10 TABLET ORAL DAILY
Status: COMPLETED | OUTPATIENT
Start: 2017-06-10 | End: 2017-06-11

## 2017-06-09 RX ORDER — ALBUTEROL SULFATE 2.5 MG/3ML
2.5 SOLUTION RESPIRATORY (INHALATION) EVERY 4 HOURS PRN
Status: CANCELLED | OUTPATIENT
Start: 2017-06-09

## 2017-06-09 RX ORDER — GUAIFENESIN 600 MG
1200 TABLET, EXTENDED RELEASE 12 HR ORAL 2 TIMES DAILY
Status: CANCELLED | OUTPATIENT
Start: 2017-06-09

## 2017-06-09 RX ORDER — ATORVASTATIN CALCIUM 10 MG/1
10 TABLET, FILM COATED ORAL
Status: DISCONTINUED | OUTPATIENT
Start: 2017-06-09 | End: 2017-06-19 | Stop reason: HOSPADM

## 2017-06-09 RX ORDER — LEVALBUTEROL 1.25 MG/.5ML
1.25 SOLUTION, CONCENTRATE RESPIRATORY (INHALATION) EVERY 6 HOURS PRN
Status: DISCONTINUED | OUTPATIENT
Start: 2017-06-09 | End: 2017-06-09

## 2017-06-09 RX ORDER — SODIUM CHLORIDE FOR INHALATION 0.9 %
3 VIAL, NEBULIZER (ML) INHALATION
Status: DISCONTINUED | OUTPATIENT
Start: 2017-06-09 | End: 2017-06-09

## 2017-06-09 RX ORDER — DOCUSATE SODIUM 100 MG/1
100 CAPSULE, LIQUID FILLED ORAL 2 TIMES DAILY
Status: CANCELLED | OUTPATIENT
Start: 2017-06-09

## 2017-06-09 RX ORDER — CARVEDILOL 6.25 MG/1
6.25 TABLET ORAL 2 TIMES DAILY WITH MEALS
Status: DISCONTINUED | OUTPATIENT
Start: 2017-06-09 | End: 2017-06-19 | Stop reason: HOSPADM

## 2017-06-09 RX ORDER — OXYBUTYNIN CHLORIDE 5 MG/1
5 TABLET, EXTENDED RELEASE ORAL
Status: CANCELLED | OUTPATIENT
Start: 2017-06-09

## 2017-06-09 RX ORDER — PAROXETINE HYDROCHLORIDE 20 MG/1
20 TABLET, FILM COATED ORAL EVERY MORNING
Status: CANCELLED | OUTPATIENT
Start: 2017-06-10

## 2017-06-09 RX ADMIN — PANTOPRAZOLE SODIUM 40 MG: 40 TABLET, DELAYED RELEASE ORAL at 05:23

## 2017-06-09 RX ADMIN — HEPARIN SODIUM 5000 UNITS: 5000 INJECTION, SOLUTION INTRAVENOUS; SUBCUTANEOUS at 22:20

## 2017-06-09 RX ADMIN — CARVEDILOL 6.25 MG: 6.25 TABLET, FILM COATED ORAL at 08:32

## 2017-06-09 RX ADMIN — CLOPIDOGREL BISULFATE 75 MG: 75 TABLET, FILM COATED ORAL at 08:32

## 2017-06-09 RX ADMIN — PAROXETINE HYDROCHLORIDE 20 MG: 20 TABLET, FILM COATED ORAL at 08:32

## 2017-06-09 RX ADMIN — LEVALBUTEROL HYDROCHLORIDE 1.25 MG: 1.25 SOLUTION, CONCENTRATE RESPIRATORY (INHALATION) at 13:42

## 2017-06-09 RX ADMIN — ONDANSETRON 4 MG: 2 INJECTION INTRAMUSCULAR; INTRAVENOUS at 02:38

## 2017-06-09 RX ADMIN — HEPARIN SODIUM 5000 UNITS: 5000 INJECTION, SOLUTION INTRAVENOUS; SUBCUTANEOUS at 05:23

## 2017-06-09 RX ADMIN — ISODIUM CHLORIDE 3 ML: 0.03 SOLUTION RESPIRATORY (INHALATION) at 01:16

## 2017-06-09 RX ADMIN — ISODIUM CHLORIDE 3 ML: 0.03 SOLUTION RESPIRATORY (INHALATION) at 13:42

## 2017-06-09 RX ADMIN — POTASSIUM CHLORIDE 20 MEQ: 1500 TABLET, EXTENDED RELEASE ORAL at 08:32

## 2017-06-09 RX ADMIN — DOCUSATE SODIUM 100 MG: 100 CAPSULE, LIQUID FILLED ORAL at 08:32

## 2017-06-09 RX ADMIN — DOCUSATE SODIUM 100 MG: 100 CAPSULE, LIQUID FILLED ORAL at 17:10

## 2017-06-09 RX ADMIN — ASPIRIN 81 MG 81 MG: 81 TABLET ORAL at 08:32

## 2017-06-09 RX ADMIN — ISODIUM CHLORIDE 3 ML: 0.03 SOLUTION RESPIRATORY (INHALATION) at 07:38

## 2017-06-09 RX ADMIN — ONDANSETRON 4 MG: 2 INJECTION INTRAMUSCULAR; INTRAVENOUS at 09:50

## 2017-06-09 RX ADMIN — ATORVASTATIN CALCIUM 10 MG: 10 TABLET, FILM COATED ORAL at 16:58

## 2017-06-09 RX ADMIN — SENNOSIDES 8.6 MG: 8.6 TABLET, FILM COATED ORAL at 22:20

## 2017-06-09 RX ADMIN — TAMSULOSIN HYDROCHLORIDE 0.4 MG: 0.4 CAPSULE ORAL at 16:58

## 2017-06-09 RX ADMIN — GUAIFENESIN 1200 MG: 600 TABLET, EXTENDED RELEASE ORAL at 22:20

## 2017-06-09 RX ADMIN — LEVALBUTEROL HYDROCHLORIDE 1.25 MG: 1.25 SOLUTION, CONCENTRATE RESPIRATORY (INHALATION) at 01:16

## 2017-06-09 RX ADMIN — OXYBUTYNIN CHLORIDE 5 MG: 5 TABLET, EXTENDED RELEASE ORAL at 22:20

## 2017-06-09 RX ADMIN — AMLODIPINE BESYLATE 10 MG: 10 TABLET ORAL at 08:32

## 2017-06-09 RX ADMIN — TIOTROPIUM BROMIDE 18 MCG: 18 CAPSULE ORAL; RESPIRATORY (INHALATION) at 22:21

## 2017-06-09 RX ADMIN — LEVALBUTEROL HYDROCHLORIDE 1.25 MG: 1.25 SOLUTION, CONCENTRATE RESPIRATORY (INHALATION) at 07:38

## 2017-06-09 RX ADMIN — PREDNISONE 20 MG: 20 TABLET ORAL at 08:32

## 2017-06-09 RX ADMIN — CARVEDILOL 6.25 MG: 6.25 TABLET, FILM COATED ORAL at 16:58

## 2017-06-09 RX ADMIN — GUAIFENESIN 1200 MG: 600 TABLET, EXTENDED RELEASE ORAL at 08:32

## 2017-06-10 LAB
ANION GAP SERPL CALCULATED.3IONS-SCNC: 7 MMOL/L (ref 4–13)
BASOPHILS # BLD AUTO: 0.01 THOUSANDS/ΜL (ref 0–0.1)
BASOPHILS NFR BLD AUTO: 0 % (ref 0–1)
BUN SERPL-MCNC: 43 MG/DL (ref 5–25)
CALCIUM SERPL-MCNC: 9 MG/DL (ref 8.3–10.1)
CHLORIDE SERPL-SCNC: 103 MMOL/L (ref 100–108)
CO2 SERPL-SCNC: 26 MMOL/L (ref 21–32)
CREAT SERPL-MCNC: 1.21 MG/DL (ref 0.6–1.3)
EOSINOPHIL # BLD AUTO: 0.08 THOUSAND/ΜL (ref 0–0.61)
EOSINOPHIL NFR BLD AUTO: 1 % (ref 0–6)
ERYTHROCYTE [DISTWIDTH] IN BLOOD BY AUTOMATED COUNT: 15 % (ref 11.6–15.1)
GFR SERPL CREATININE-BSD FRML MDRD: 58.1 ML/MIN/1.73SQ M
GLUCOSE SERPL-MCNC: 98 MG/DL (ref 65–140)
HCT VFR BLD AUTO: 46 % (ref 36.5–49.3)
HGB BLD-MCNC: 15.4 G/DL (ref 12–17)
LYMPHOCYTES # BLD AUTO: 1.58 THOUSANDS/ΜL (ref 0.6–4.47)
LYMPHOCYTES NFR BLD AUTO: 12 % (ref 14–44)
MAGNESIUM SERPL-MCNC: 2.3 MG/DL (ref 1.6–2.6)
MCH RBC QN AUTO: 30.7 PG (ref 26.8–34.3)
MCHC RBC AUTO-ENTMCNC: 33.5 G/DL (ref 31.4–37.4)
MCV RBC AUTO: 92 FL (ref 82–98)
MONOCYTES # BLD AUTO: 1.35 THOUSAND/ΜL (ref 0.17–1.22)
MONOCYTES NFR BLD AUTO: 10 % (ref 4–12)
NEUTROPHILS # BLD AUTO: 10.48 THOUSANDS/ΜL (ref 1.85–7.62)
NEUTS SEG NFR BLD AUTO: 77 % (ref 43–75)
NRBC BLD AUTO-RTO: 0 /100 WBCS
PLATELET # BLD AUTO: 139 THOUSANDS/UL (ref 149–390)
PMV BLD AUTO: 12 FL (ref 8.9–12.7)
POTASSIUM SERPL-SCNC: 4.7 MMOL/L (ref 3.5–5.3)
RBC # BLD AUTO: 5.02 MILLION/UL (ref 3.88–5.62)
SODIUM SERPL-SCNC: 136 MMOL/L (ref 136–145)
WBC # BLD AUTO: 13.61 THOUSAND/UL (ref 4.31–10.16)

## 2017-06-10 PROCEDURE — 92610 EVALUATE SWALLOWING FUNCTION: CPT

## 2017-06-10 PROCEDURE — 97535 SELF CARE MNGMENT TRAINING: CPT

## 2017-06-10 PROCEDURE — 83735 ASSAY OF MAGNESIUM: CPT | Performed by: PHYSICAL MEDICINE & REHABILITATION

## 2017-06-10 PROCEDURE — 97163 PT EVAL HIGH COMPLEX 45 MIN: CPT | Performed by: PHYSICAL THERAPIST

## 2017-06-10 PROCEDURE — 97530 THERAPEUTIC ACTIVITIES: CPT

## 2017-06-10 PROCEDURE — 80048 BASIC METABOLIC PNL TOTAL CA: CPT | Performed by: PHYSICAL MEDICINE & REHABILITATION

## 2017-06-10 PROCEDURE — 97530 THERAPEUTIC ACTIVITIES: CPT | Performed by: PHYSICAL THERAPIST

## 2017-06-10 PROCEDURE — 97167 OT EVAL HIGH COMPLEX 60 MIN: CPT

## 2017-06-10 PROCEDURE — 85025 COMPLETE CBC W/AUTO DIFF WBC: CPT | Performed by: PHYSICAL MEDICINE & REHABILITATION

## 2017-06-10 RX ADMIN — TAMSULOSIN HYDROCHLORIDE 0.4 MG: 0.4 CAPSULE ORAL at 17:07

## 2017-06-10 RX ADMIN — GUAIFENESIN 1200 MG: 600 TABLET, EXTENDED RELEASE ORAL at 21:46

## 2017-06-10 RX ADMIN — HEPARIN SODIUM 5000 UNITS: 5000 INJECTION, SOLUTION INTRAVENOUS; SUBCUTANEOUS at 14:20

## 2017-06-10 RX ADMIN — POTASSIUM CHLORIDE 20 MEQ: 1500 TABLET, EXTENDED RELEASE ORAL at 09:38

## 2017-06-10 RX ADMIN — CARVEDILOL 6.25 MG: 6.25 TABLET, FILM COATED ORAL at 17:07

## 2017-06-10 RX ADMIN — PREDNISONE 10 MG: 10 TABLET ORAL at 09:39

## 2017-06-10 RX ADMIN — ASPIRIN 81 MG 81 MG: 81 TABLET ORAL at 09:39

## 2017-06-10 RX ADMIN — PANTOPRAZOLE SODIUM 40 MG: 40 TABLET, DELAYED RELEASE ORAL at 05:41

## 2017-06-10 RX ADMIN — CARVEDILOL 6.25 MG: 6.25 TABLET, FILM COATED ORAL at 07:26

## 2017-06-10 RX ADMIN — OXYBUTYNIN CHLORIDE 5 MG: 5 TABLET, EXTENDED RELEASE ORAL at 23:27

## 2017-06-10 RX ADMIN — ATORVASTATIN CALCIUM 10 MG: 10 TABLET, FILM COATED ORAL at 17:07

## 2017-06-10 RX ADMIN — HEPARIN SODIUM 5000 UNITS: 5000 INJECTION, SOLUTION INTRAVENOUS; SUBCUTANEOUS at 21:45

## 2017-06-10 RX ADMIN — DOCUSATE SODIUM 100 MG: 100 CAPSULE, LIQUID FILLED ORAL at 17:07

## 2017-06-10 RX ADMIN — PAROXETINE HYDROCHLORIDE 20 MG: 20 TABLET, FILM COATED ORAL at 09:39

## 2017-06-10 RX ADMIN — TIOTROPIUM BROMIDE 18 MCG: 18 CAPSULE ORAL; RESPIRATORY (INHALATION) at 10:14

## 2017-06-10 RX ADMIN — HEPARIN SODIUM 5000 UNITS: 5000 INJECTION, SOLUTION INTRAVENOUS; SUBCUTANEOUS at 05:41

## 2017-06-10 RX ADMIN — SENNOSIDES 8.6 MG: 8.6 TABLET, FILM COATED ORAL at 21:46

## 2017-06-10 RX ADMIN — CLOPIDOGREL BISULFATE 75 MG: 75 TABLET ORAL at 09:38

## 2017-06-10 RX ADMIN — DOCUSATE SODIUM 100 MG: 100 CAPSULE, LIQUID FILLED ORAL at 09:38

## 2017-06-10 RX ADMIN — GUAIFENESIN 1200 MG: 600 TABLET, EXTENDED RELEASE ORAL at 09:39

## 2017-06-10 RX ADMIN — AMLODIPINE BESYLATE 10 MG: 10 TABLET ORAL at 09:38

## 2017-06-11 ENCOUNTER — APPOINTMENT (INPATIENT)
Dept: RADIOLOGY | Facility: HOSPITAL | Age: 77
DRG: 202 | End: 2017-06-11
Payer: MEDICARE

## 2017-06-11 PROCEDURE — 97530 THERAPEUTIC ACTIVITIES: CPT

## 2017-06-11 PROCEDURE — 97530 THERAPEUTIC ACTIVITIES: CPT | Performed by: PHYSICAL THERAPIST

## 2017-06-11 PROCEDURE — 97535 SELF CARE MNGMENT TRAINING: CPT

## 2017-06-11 PROCEDURE — 97116 GAIT TRAINING THERAPY: CPT

## 2017-06-11 PROCEDURE — 71010 HB CHEST X-RAY 1 VIEW FRONTAL (PORTABLE): CPT

## 2017-06-11 PROCEDURE — 97116 GAIT TRAINING THERAPY: CPT | Performed by: PHYSICAL THERAPIST

## 2017-06-11 RX ADMIN — ALBUTEROL SULFATE 2 PUFF: 90 AEROSOL, METERED RESPIRATORY (INHALATION) at 09:13

## 2017-06-11 RX ADMIN — GUAIFENESIN 1200 MG: 600 TABLET, EXTENDED RELEASE ORAL at 20:35

## 2017-06-11 RX ADMIN — DOCUSATE SODIUM 100 MG: 100 CAPSULE, LIQUID FILLED ORAL at 16:49

## 2017-06-11 RX ADMIN — HEPARIN SODIUM 5000 UNITS: 5000 INJECTION, SOLUTION INTRAVENOUS; SUBCUTANEOUS at 15:49

## 2017-06-11 RX ADMIN — GUAIFENESIN 1200 MG: 600 TABLET, EXTENDED RELEASE ORAL at 08:12

## 2017-06-11 RX ADMIN — TAMSULOSIN HYDROCHLORIDE 0.4 MG: 0.4 CAPSULE ORAL at 16:49

## 2017-06-11 RX ADMIN — CLOPIDOGREL BISULFATE 75 MG: 75 TABLET ORAL at 08:13

## 2017-06-11 RX ADMIN — CARVEDILOL 6.25 MG: 6.25 TABLET, FILM COATED ORAL at 08:13

## 2017-06-11 RX ADMIN — OXYBUTYNIN CHLORIDE 5 MG: 5 TABLET, EXTENDED RELEASE ORAL at 22:32

## 2017-06-11 RX ADMIN — TIOTROPIUM BROMIDE 18 MCG: 18 CAPSULE ORAL; RESPIRATORY (INHALATION) at 08:16

## 2017-06-11 RX ADMIN — ASPIRIN 81 MG 81 MG: 81 TABLET ORAL at 08:13

## 2017-06-11 RX ADMIN — HEPARIN SODIUM 5000 UNITS: 5000 INJECTION, SOLUTION INTRAVENOUS; SUBCUTANEOUS at 22:31

## 2017-06-11 RX ADMIN — CARVEDILOL 6.25 MG: 6.25 TABLET, FILM COATED ORAL at 16:49

## 2017-06-11 RX ADMIN — PREDNISONE 10 MG: 10 TABLET ORAL at 08:13

## 2017-06-11 RX ADMIN — SENNOSIDES 8.6 MG: 8.6 TABLET, FILM COATED ORAL at 22:32

## 2017-06-11 RX ADMIN — PAROXETINE HYDROCHLORIDE 20 MG: 20 TABLET, FILM COATED ORAL at 08:13

## 2017-06-11 RX ADMIN — POTASSIUM CHLORIDE 20 MEQ: 1500 TABLET, EXTENDED RELEASE ORAL at 08:12

## 2017-06-11 RX ADMIN — AMLODIPINE BESYLATE 10 MG: 10 TABLET ORAL at 08:13

## 2017-06-11 RX ADMIN — POLYETHYLENE GLYCOL 3350 17 G: 17 POWDER, FOR SOLUTION ORAL at 11:09

## 2017-06-11 RX ADMIN — PANTOPRAZOLE SODIUM 40 MG: 40 TABLET, DELAYED RELEASE ORAL at 05:39

## 2017-06-11 RX ADMIN — ATORVASTATIN CALCIUM 10 MG: 10 TABLET, FILM COATED ORAL at 16:49

## 2017-06-11 RX ADMIN — GUAIFENESIN AND DEXTROMETHORPHAN 10 ML: 100; 10 SYRUP ORAL at 11:09

## 2017-06-11 RX ADMIN — HEPARIN SODIUM 5000 UNITS: 5000 INJECTION, SOLUTION INTRAVENOUS; SUBCUTANEOUS at 05:39

## 2017-06-11 RX ADMIN — DOCUSATE SODIUM 100 MG: 100 CAPSULE, LIQUID FILLED ORAL at 08:12

## 2017-06-12 LAB
ANION GAP SERPL CALCULATED.3IONS-SCNC: 5 MMOL/L (ref 4–13)
BASOPHILS # BLD AUTO: 0.01 THOUSANDS/ΜL (ref 0–0.1)
BASOPHILS NFR BLD AUTO: 0 % (ref 0–1)
BUN SERPL-MCNC: 39 MG/DL (ref 5–25)
CALCIUM SERPL-MCNC: 9 MG/DL (ref 8.3–10.1)
CHLORIDE SERPL-SCNC: 105 MMOL/L (ref 100–108)
CO2 SERPL-SCNC: 26 MMOL/L (ref 21–32)
CREAT SERPL-MCNC: 1.14 MG/DL (ref 0.6–1.3)
EOSINOPHIL # BLD AUTO: 0.1 THOUSAND/ΜL (ref 0–0.61)
EOSINOPHIL NFR BLD AUTO: 1 % (ref 0–6)
ERYTHROCYTE [DISTWIDTH] IN BLOOD BY AUTOMATED COUNT: 15.1 % (ref 11.6–15.1)
GFR SERPL CREATININE-BSD FRML MDRD: >60 ML/MIN/1.73SQ M
GLUCOSE SERPL-MCNC: 91 MG/DL (ref 65–140)
HCT VFR BLD AUTO: 42.6 % (ref 36.5–49.3)
HGB BLD-MCNC: 14.2 G/DL (ref 12–17)
LYMPHOCYTES # BLD AUTO: 1.66 THOUSANDS/ΜL (ref 0.6–4.47)
LYMPHOCYTES NFR BLD AUTO: 16 % (ref 14–44)
MCH RBC QN AUTO: 30.4 PG (ref 26.8–34.3)
MCHC RBC AUTO-ENTMCNC: 33.3 G/DL (ref 31.4–37.4)
MCV RBC AUTO: 91 FL (ref 82–98)
MONOCYTES # BLD AUTO: 0.79 THOUSAND/ΜL (ref 0.17–1.22)
MONOCYTES NFR BLD AUTO: 7 % (ref 4–12)
NEUTROPHILS # BLD AUTO: 8.11 THOUSANDS/ΜL (ref 1.85–7.62)
NEUTS SEG NFR BLD AUTO: 76 % (ref 43–75)
NRBC BLD AUTO-RTO: 0 /100 WBCS
PLATELET # BLD AUTO: 115 THOUSANDS/UL (ref 149–390)
PMV BLD AUTO: 12.3 FL (ref 8.9–12.7)
POTASSIUM SERPL-SCNC: 4.1 MMOL/L (ref 3.5–5.3)
RBC # BLD AUTO: 4.67 MILLION/UL (ref 3.88–5.62)
SODIUM SERPL-SCNC: 136 MMOL/L (ref 136–145)
WBC # BLD AUTO: 10.74 THOUSAND/UL (ref 4.31–10.16)

## 2017-06-12 PROCEDURE — 97535 SELF CARE MNGMENT TRAINING: CPT

## 2017-06-12 PROCEDURE — 97110 THERAPEUTIC EXERCISES: CPT

## 2017-06-12 PROCEDURE — 80048 BASIC METABOLIC PNL TOTAL CA: CPT | Performed by: PHYSICIAN ASSISTANT

## 2017-06-12 PROCEDURE — 97530 THERAPEUTIC ACTIVITIES: CPT

## 2017-06-12 PROCEDURE — 92526 ORAL FUNCTION THERAPY: CPT

## 2017-06-12 PROCEDURE — 85025 COMPLETE CBC W/AUTO DIFF WBC: CPT | Performed by: PHYSICIAN ASSISTANT

## 2017-06-12 RX ORDER — ALBUTEROL SULFATE 90 UG/1
2 AEROSOL, METERED RESPIRATORY (INHALATION) 4 TIMES DAILY
Status: DISCONTINUED | OUTPATIENT
Start: 2017-06-12 | End: 2017-06-19 | Stop reason: HOSPADM

## 2017-06-12 RX ORDER — GINSENG 100 MG
1 CAPSULE ORAL 2 TIMES DAILY
Status: DISCONTINUED | OUTPATIENT
Start: 2017-06-12 | End: 2017-06-15

## 2017-06-12 RX ORDER — LORATADINE 10 MG/1
10 TABLET ORAL DAILY
Status: DISCONTINUED | OUTPATIENT
Start: 2017-06-12 | End: 2017-06-19 | Stop reason: HOSPADM

## 2017-06-12 RX ADMIN — OXYBUTYNIN CHLORIDE 5 MG: 5 TABLET, EXTENDED RELEASE ORAL at 21:17

## 2017-06-12 RX ADMIN — CARVEDILOL 6.25 MG: 6.25 TABLET, FILM COATED ORAL at 17:06

## 2017-06-12 RX ADMIN — GUAIFENESIN 1200 MG: 600 TABLET, EXTENDED RELEASE ORAL at 08:38

## 2017-06-12 RX ADMIN — ALBUTEROL SULFATE 2 PUFF: 90 AEROSOL, METERED RESPIRATORY (INHALATION) at 21:16

## 2017-06-12 RX ADMIN — GUAIFENESIN AND DEXTROMETHORPHAN 10 ML: 100; 10 SYRUP ORAL at 21:16

## 2017-06-12 RX ADMIN — ALBUTEROL SULFATE 2 PUFF: 90 AEROSOL, METERED RESPIRATORY (INHALATION) at 11:54

## 2017-06-12 RX ADMIN — SENNOSIDES 8.6 MG: 8.6 TABLET, FILM COATED ORAL at 21:16

## 2017-06-12 RX ADMIN — HEPARIN SODIUM 5000 UNITS: 5000 INJECTION, SOLUTION INTRAVENOUS; SUBCUTANEOUS at 13:39

## 2017-06-12 RX ADMIN — GUAIFENESIN 1200 MG: 600 TABLET, EXTENDED RELEASE ORAL at 21:16

## 2017-06-12 RX ADMIN — ASPIRIN 81 MG 81 MG: 81 TABLET ORAL at 08:37

## 2017-06-12 RX ADMIN — LORATADINE 10 MG: 10 TABLET ORAL at 11:53

## 2017-06-12 RX ADMIN — DOCUSATE SODIUM 100 MG: 100 CAPSULE, LIQUID FILLED ORAL at 17:07

## 2017-06-12 RX ADMIN — BACITRACIN 1 SMALL APPLICATION: 500 OINTMENT TOPICAL at 11:54

## 2017-06-12 RX ADMIN — PAROXETINE HYDROCHLORIDE 20 MG: 20 TABLET, FILM COATED ORAL at 08:38

## 2017-06-12 RX ADMIN — BACITRACIN 1 SMALL APPLICATION: 500 OINTMENT TOPICAL at 17:15

## 2017-06-12 RX ADMIN — ALBUTEROL SULFATE 2 PUFF: 90 AEROSOL, METERED RESPIRATORY (INHALATION) at 17:12

## 2017-06-12 RX ADMIN — PANTOPRAZOLE SODIUM 40 MG: 40 TABLET, DELAYED RELEASE ORAL at 05:36

## 2017-06-12 RX ADMIN — CLOPIDOGREL BISULFATE 75 MG: 75 TABLET ORAL at 08:38

## 2017-06-12 RX ADMIN — TAMSULOSIN HYDROCHLORIDE 0.4 MG: 0.4 CAPSULE ORAL at 17:07

## 2017-06-12 RX ADMIN — ATORVASTATIN CALCIUM 10 MG: 10 TABLET, FILM COATED ORAL at 17:06

## 2017-06-12 RX ADMIN — HEPARIN SODIUM 5000 UNITS: 5000 INJECTION, SOLUTION INTRAVENOUS; SUBCUTANEOUS at 21:16

## 2017-06-12 RX ADMIN — DOCUSATE SODIUM 100 MG: 100 CAPSULE, LIQUID FILLED ORAL at 08:38

## 2017-06-12 RX ADMIN — POTASSIUM CHLORIDE 20 MEQ: 1500 TABLET, EXTENDED RELEASE ORAL at 08:37

## 2017-06-12 RX ADMIN — AMLODIPINE BESYLATE 10 MG: 10 TABLET ORAL at 08:38

## 2017-06-12 RX ADMIN — CARVEDILOL 6.25 MG: 6.25 TABLET, FILM COATED ORAL at 08:37

## 2017-06-12 RX ADMIN — TIOTROPIUM BROMIDE 18 MCG: 18 CAPSULE ORAL; RESPIRATORY (INHALATION) at 08:40

## 2017-06-12 RX ADMIN — HEPARIN SODIUM 5000 UNITS: 5000 INJECTION, SOLUTION INTRAVENOUS; SUBCUTANEOUS at 05:36

## 2017-06-13 PROCEDURE — 97530 THERAPEUTIC ACTIVITIES: CPT

## 2017-06-13 PROCEDURE — 97116 GAIT TRAINING THERAPY: CPT

## 2017-06-13 PROCEDURE — 92526 ORAL FUNCTION THERAPY: CPT

## 2017-06-13 PROCEDURE — 97110 THERAPEUTIC EXERCISES: CPT

## 2017-06-13 RX ORDER — PANTOPRAZOLE SODIUM 40 MG/1
40 TABLET, DELAYED RELEASE ORAL
Status: DISCONTINUED | OUTPATIENT
Start: 2017-06-13 | End: 2017-06-19 | Stop reason: HOSPADM

## 2017-06-13 RX ADMIN — HEPARIN SODIUM 5000 UNITS: 5000 INJECTION, SOLUTION INTRAVENOUS; SUBCUTANEOUS at 05:44

## 2017-06-13 RX ADMIN — ALBUTEROL SULFATE 2 PUFF: 90 AEROSOL, METERED RESPIRATORY (INHALATION) at 09:24

## 2017-06-13 RX ADMIN — DOCUSATE SODIUM 100 MG: 100 CAPSULE, LIQUID FILLED ORAL at 09:18

## 2017-06-13 RX ADMIN — HEPARIN SODIUM 5000 UNITS: 5000 INJECTION, SOLUTION INTRAVENOUS; SUBCUTANEOUS at 22:04

## 2017-06-13 RX ADMIN — ALBUTEROL SULFATE 2 PUFF: 90 AEROSOL, METERED RESPIRATORY (INHALATION) at 22:05

## 2017-06-13 RX ADMIN — DOCUSATE SODIUM 100 MG: 100 CAPSULE, LIQUID FILLED ORAL at 18:11

## 2017-06-13 RX ADMIN — ASPIRIN 81 MG 81 MG: 81 TABLET ORAL at 09:19

## 2017-06-13 RX ADMIN — GUAIFENESIN 1200 MG: 600 TABLET, EXTENDED RELEASE ORAL at 09:19

## 2017-06-13 RX ADMIN — BACITRACIN 1 SMALL APPLICATION: 500 OINTMENT TOPICAL at 18:12

## 2017-06-13 RX ADMIN — SENNOSIDES 8.6 MG: 8.6 TABLET, FILM COATED ORAL at 22:06

## 2017-06-13 RX ADMIN — ALBUTEROL SULFATE 2 PUFF: 90 AEROSOL, METERED RESPIRATORY (INHALATION) at 18:08

## 2017-06-13 RX ADMIN — PANTOPRAZOLE SODIUM 40 MG: 40 TABLET, DELAYED RELEASE ORAL at 05:44

## 2017-06-13 RX ADMIN — TAMSULOSIN HYDROCHLORIDE 0.4 MG: 0.4 CAPSULE ORAL at 18:11

## 2017-06-13 RX ADMIN — GUAIFENESIN AND DEXTROMETHORPHAN 10 ML: 100; 10 SYRUP ORAL at 18:18

## 2017-06-13 RX ADMIN — ALBUTEROL SULFATE 2 PUFF: 90 AEROSOL, METERED RESPIRATORY (INHALATION) at 11:35

## 2017-06-13 RX ADMIN — PAROXETINE HYDROCHLORIDE 20 MG: 20 TABLET, FILM COATED ORAL at 09:19

## 2017-06-13 RX ADMIN — CARVEDILOL 6.25 MG: 6.25 TABLET, FILM COATED ORAL at 18:09

## 2017-06-13 RX ADMIN — CARVEDILOL 6.25 MG: 6.25 TABLET, FILM COATED ORAL at 07:45

## 2017-06-13 RX ADMIN — POTASSIUM CHLORIDE 20 MEQ: 1500 TABLET, EXTENDED RELEASE ORAL at 09:18

## 2017-06-13 RX ADMIN — CLOPIDOGREL BISULFATE 75 MG: 75 TABLET ORAL at 09:18

## 2017-06-13 RX ADMIN — OXYBUTYNIN CHLORIDE 5 MG: 5 TABLET, EXTENDED RELEASE ORAL at 22:06

## 2017-06-13 RX ADMIN — PANTOPRAZOLE SODIUM 40 MG: 40 TABLET, DELAYED RELEASE ORAL at 18:11

## 2017-06-13 RX ADMIN — BACITRACIN 1 SMALL APPLICATION: 500 OINTMENT TOPICAL at 09:24

## 2017-06-13 RX ADMIN — TIOTROPIUM BROMIDE 18 MCG: 18 CAPSULE ORAL; RESPIRATORY (INHALATION) at 09:20

## 2017-06-13 RX ADMIN — GUAIFENESIN 1200 MG: 600 TABLET, EXTENDED RELEASE ORAL at 22:07

## 2017-06-13 RX ADMIN — LORATADINE 10 MG: 10 TABLET ORAL at 09:19

## 2017-06-13 RX ADMIN — HEPARIN SODIUM 5000 UNITS: 5000 INJECTION, SOLUTION INTRAVENOUS; SUBCUTANEOUS at 14:11

## 2017-06-13 RX ADMIN — ATORVASTATIN CALCIUM 10 MG: 10 TABLET, FILM COATED ORAL at 18:11

## 2017-06-14 PROCEDURE — 97530 THERAPEUTIC ACTIVITIES: CPT | Performed by: PHYSICAL THERAPIST

## 2017-06-14 PROCEDURE — 97116 GAIT TRAINING THERAPY: CPT | Performed by: PHYSICAL THERAPIST

## 2017-06-14 PROCEDURE — 97112 NEUROMUSCULAR REEDUCATION: CPT

## 2017-06-14 PROCEDURE — 97530 THERAPEUTIC ACTIVITIES: CPT

## 2017-06-14 PROCEDURE — 97110 THERAPEUTIC EXERCISES: CPT | Performed by: PHYSICAL THERAPIST

## 2017-06-14 PROCEDURE — 97535 SELF CARE MNGMENT TRAINING: CPT

## 2017-06-14 PROCEDURE — 92526 ORAL FUNCTION THERAPY: CPT

## 2017-06-14 RX ORDER — AMLODIPINE BESYLATE 2.5 MG/1
2.5 TABLET ORAL DAILY
Status: DISCONTINUED | OUTPATIENT
Start: 2017-06-15 | End: 2017-06-19 | Stop reason: HOSPADM

## 2017-06-14 RX ORDER — BENZONATATE 100 MG/1
100 CAPSULE ORAL 3 TIMES DAILY
Status: DISCONTINUED | OUTPATIENT
Start: 2017-06-14 | End: 2017-06-19 | Stop reason: HOSPADM

## 2017-06-14 RX ADMIN — ASPIRIN 81 MG 81 MG: 81 TABLET ORAL at 08:56

## 2017-06-14 RX ADMIN — PANTOPRAZOLE SODIUM 40 MG: 40 TABLET, DELAYED RELEASE ORAL at 05:38

## 2017-06-14 RX ADMIN — GUAIFENESIN AND DEXTROMETHORPHAN 10 ML: 100; 10 SYRUP ORAL at 09:03

## 2017-06-14 RX ADMIN — CARVEDILOL 6.25 MG: 6.25 TABLET, FILM COATED ORAL at 09:02

## 2017-06-14 RX ADMIN — ALBUTEROL SULFATE 2 PUFF: 90 AEROSOL, METERED RESPIRATORY (INHALATION) at 09:03

## 2017-06-14 RX ADMIN — BENZONATATE 100 MG: 100 CAPSULE ORAL at 21:28

## 2017-06-14 RX ADMIN — CLOPIDOGREL BISULFATE 75 MG: 75 TABLET ORAL at 08:56

## 2017-06-14 RX ADMIN — GUAIFENESIN 1200 MG: 600 TABLET, EXTENDED RELEASE ORAL at 21:28

## 2017-06-14 RX ADMIN — LORATADINE 10 MG: 10 TABLET ORAL at 08:58

## 2017-06-14 RX ADMIN — OXYBUTYNIN CHLORIDE 5 MG: 5 TABLET, EXTENDED RELEASE ORAL at 21:28

## 2017-06-14 RX ADMIN — HEPARIN SODIUM 5000 UNITS: 5000 INJECTION, SOLUTION INTRAVENOUS; SUBCUTANEOUS at 21:28

## 2017-06-14 RX ADMIN — HEPARIN SODIUM 5000 UNITS: 5000 INJECTION, SOLUTION INTRAVENOUS; SUBCUTANEOUS at 14:25

## 2017-06-14 RX ADMIN — SENNOSIDES 8.6 MG: 8.6 TABLET, FILM COATED ORAL at 21:28

## 2017-06-14 RX ADMIN — PAROXETINE HYDROCHLORIDE 20 MG: 20 TABLET, FILM COATED ORAL at 08:58

## 2017-06-14 RX ADMIN — ALBUTEROL SULFATE 2 PUFF: 90 AEROSOL, METERED RESPIRATORY (INHALATION) at 12:00

## 2017-06-14 RX ADMIN — BENZONATATE 100 MG: 100 CAPSULE ORAL at 16:42

## 2017-06-14 RX ADMIN — BACITRACIN 1 SMALL APPLICATION: 500 OINTMENT TOPICAL at 09:04

## 2017-06-14 RX ADMIN — ATORVASTATIN CALCIUM 10 MG: 10 TABLET, FILM COATED ORAL at 16:41

## 2017-06-14 RX ADMIN — ALBUTEROL SULFATE 2 PUFF: 90 AEROSOL, METERED RESPIRATORY (INHALATION) at 18:00

## 2017-06-14 RX ADMIN — DOCUSATE SODIUM 100 MG: 100 CAPSULE, LIQUID FILLED ORAL at 08:58

## 2017-06-14 RX ADMIN — TIOTROPIUM BROMIDE 18 MCG: 18 CAPSULE ORAL; RESPIRATORY (INHALATION) at 09:07

## 2017-06-14 RX ADMIN — BACITRACIN 1 SMALL APPLICATION: 500 OINTMENT TOPICAL at 17:58

## 2017-06-14 RX ADMIN — HEPARIN SODIUM 5000 UNITS: 5000 INJECTION, SOLUTION INTRAVENOUS; SUBCUTANEOUS at 05:38

## 2017-06-14 RX ADMIN — PANTOPRAZOLE SODIUM 40 MG: 40 TABLET, DELAYED RELEASE ORAL at 16:42

## 2017-06-14 RX ADMIN — DOCUSATE SODIUM 100 MG: 100 CAPSULE, LIQUID FILLED ORAL at 17:57

## 2017-06-14 RX ADMIN — TAMSULOSIN HYDROCHLORIDE 0.4 MG: 0.4 CAPSULE ORAL at 16:42

## 2017-06-14 RX ADMIN — GUAIFENESIN 1200 MG: 600 TABLET, EXTENDED RELEASE ORAL at 08:56

## 2017-06-14 RX ADMIN — POTASSIUM CHLORIDE 20 MEQ: 1500 TABLET, EXTENDED RELEASE ORAL at 08:56

## 2017-06-14 RX ADMIN — ALBUTEROL SULFATE 2 PUFF: 90 AEROSOL, METERED RESPIRATORY (INHALATION) at 21:29

## 2017-06-14 RX ADMIN — AMLODIPINE BESYLATE 10 MG: 10 TABLET ORAL at 08:58

## 2017-06-15 LAB
ANION GAP SERPL CALCULATED.3IONS-SCNC: 5 MMOL/L (ref 4–13)
BASOPHILS # BLD AUTO: 0.01 THOUSANDS/ΜL (ref 0–0.1)
BASOPHILS NFR BLD AUTO: 0 % (ref 0–1)
BUN SERPL-MCNC: 27 MG/DL (ref 5–25)
CALCIUM SERPL-MCNC: 8.7 MG/DL (ref 8.3–10.1)
CHLORIDE SERPL-SCNC: 103 MMOL/L (ref 100–108)
CO2 SERPL-SCNC: 28 MMOL/L (ref 21–32)
CREAT SERPL-MCNC: 1.09 MG/DL (ref 0.6–1.3)
EOSINOPHIL # BLD AUTO: 0.24 THOUSAND/ΜL (ref 0–0.61)
EOSINOPHIL NFR BLD AUTO: 3 % (ref 0–6)
ERYTHROCYTE [DISTWIDTH] IN BLOOD BY AUTOMATED COUNT: 15.1 % (ref 11.6–15.1)
GFR SERPL CREATININE-BSD FRML MDRD: >60 ML/MIN/1.73SQ M
GLUCOSE SERPL-MCNC: 82 MG/DL (ref 65–140)
HCT VFR BLD AUTO: 39.8 % (ref 36.5–49.3)
HGB BLD-MCNC: 13.1 G/DL (ref 12–17)
LYMPHOCYTES # BLD AUTO: 1.43 THOUSANDS/ΜL (ref 0.6–4.47)
LYMPHOCYTES NFR BLD AUTO: 19 % (ref 14–44)
MCH RBC QN AUTO: 30.3 PG (ref 26.8–34.3)
MCHC RBC AUTO-ENTMCNC: 32.9 G/DL (ref 31.4–37.4)
MCV RBC AUTO: 92 FL (ref 82–98)
MONOCYTES # BLD AUTO: 0.83 THOUSAND/ΜL (ref 0.17–1.22)
MONOCYTES NFR BLD AUTO: 11 % (ref 4–12)
NEUTROPHILS # BLD AUTO: 4.89 THOUSANDS/ΜL (ref 1.85–7.62)
NEUTS SEG NFR BLD AUTO: 67 % (ref 43–75)
NRBC BLD AUTO-RTO: 0 /100 WBCS
PLATELET # BLD AUTO: 94 THOUSANDS/UL (ref 149–390)
PMV BLD AUTO: 11.7 FL (ref 8.9–12.7)
POTASSIUM SERPL-SCNC: 4.4 MMOL/L (ref 3.5–5.3)
RBC # BLD AUTO: 4.33 MILLION/UL (ref 3.88–5.62)
SODIUM SERPL-SCNC: 136 MMOL/L (ref 136–145)
WBC # BLD AUTO: 7.44 THOUSAND/UL (ref 4.31–10.16)

## 2017-06-15 PROCEDURE — 97116 GAIT TRAINING THERAPY: CPT

## 2017-06-15 PROCEDURE — 80048 BASIC METABOLIC PNL TOTAL CA: CPT | Performed by: NURSE PRACTITIONER

## 2017-06-15 PROCEDURE — 85025 COMPLETE CBC W/AUTO DIFF WBC: CPT | Performed by: NURSE PRACTITIONER

## 2017-06-15 PROCEDURE — 97530 THERAPEUTIC ACTIVITIES: CPT

## 2017-06-15 PROCEDURE — 97535 SELF CARE MNGMENT TRAINING: CPT

## 2017-06-15 PROCEDURE — 97110 THERAPEUTIC EXERCISES: CPT

## 2017-06-15 PROCEDURE — 92526 ORAL FUNCTION THERAPY: CPT

## 2017-06-15 RX ADMIN — CLOPIDOGREL BISULFATE 75 MG: 75 TABLET ORAL at 09:26

## 2017-06-15 RX ADMIN — BACITRACIN 1 SMALL APPLICATION: 500 OINTMENT TOPICAL at 10:40

## 2017-06-15 RX ADMIN — ALBUTEROL SULFATE 2 PUFF: 90 AEROSOL, METERED RESPIRATORY (INHALATION) at 22:04

## 2017-06-15 RX ADMIN — GUAIFENESIN 1200 MG: 600 TABLET, EXTENDED RELEASE ORAL at 20:16

## 2017-06-15 RX ADMIN — LORATADINE 10 MG: 10 TABLET ORAL at 09:26

## 2017-06-15 RX ADMIN — PANTOPRAZOLE SODIUM 40 MG: 40 TABLET, DELAYED RELEASE ORAL at 05:32

## 2017-06-15 RX ADMIN — SILVER SULFADIAZINE: 10 CREAM TOPICAL at 20:13

## 2017-06-15 RX ADMIN — ALBUTEROL SULFATE 2 PUFF: 90 AEROSOL, METERED RESPIRATORY (INHALATION) at 17:35

## 2017-06-15 RX ADMIN — OXYBUTYNIN CHLORIDE 5 MG: 5 TABLET, EXTENDED RELEASE ORAL at 21:58

## 2017-06-15 RX ADMIN — TIOTROPIUM BROMIDE 18 MCG: 18 CAPSULE ORAL; RESPIRATORY (INHALATION) at 09:30

## 2017-06-15 RX ADMIN — PAROXETINE HYDROCHLORIDE 20 MG: 20 TABLET, FILM COATED ORAL at 09:26

## 2017-06-15 RX ADMIN — ALBUTEROL SULFATE 2 PUFF: 90 AEROSOL, METERED RESPIRATORY (INHALATION) at 10:00

## 2017-06-15 RX ADMIN — SENNOSIDES 8.6 MG: 8.6 TABLET, FILM COATED ORAL at 21:59

## 2017-06-15 RX ADMIN — BENZONATATE 100 MG: 100 CAPSULE ORAL at 09:25

## 2017-06-15 RX ADMIN — ATORVASTATIN CALCIUM 10 MG: 10 TABLET, FILM COATED ORAL at 17:31

## 2017-06-15 RX ADMIN — PANTOPRAZOLE SODIUM 40 MG: 40 TABLET, DELAYED RELEASE ORAL at 17:31

## 2017-06-15 RX ADMIN — BENZONATATE 100 MG: 100 CAPSULE ORAL at 21:58

## 2017-06-15 RX ADMIN — DOCUSATE SODIUM 100 MG: 100 CAPSULE, LIQUID FILLED ORAL at 17:31

## 2017-06-15 RX ADMIN — BIMATOPROST 1 DROP: 0.1 SOLUTION/ DROPS OPHTHALMIC at 22:00

## 2017-06-15 RX ADMIN — TAMSULOSIN HYDROCHLORIDE 0.4 MG: 0.4 CAPSULE ORAL at 17:31

## 2017-06-15 RX ADMIN — DOCUSATE SODIUM 100 MG: 100 CAPSULE, LIQUID FILLED ORAL at 09:25

## 2017-06-15 RX ADMIN — HEPARIN SODIUM 5000 UNITS: 5000 INJECTION, SOLUTION INTRAVENOUS; SUBCUTANEOUS at 05:32

## 2017-06-15 RX ADMIN — GUAIFENESIN 1200 MG: 600 TABLET, EXTENDED RELEASE ORAL at 09:26

## 2017-06-15 RX ADMIN — BENZONATATE 100 MG: 100 CAPSULE ORAL at 17:31

## 2017-06-15 RX ADMIN — ASPIRIN 81 MG 81 MG: 81 TABLET ORAL at 09:25

## 2017-06-15 RX ADMIN — CARVEDILOL 6.25 MG: 6.25 TABLET, FILM COATED ORAL at 17:32

## 2017-06-15 RX ADMIN — HEPARIN SODIUM 5000 UNITS: 5000 INJECTION, SOLUTION INTRAVENOUS; SUBCUTANEOUS at 21:59

## 2017-06-15 RX ADMIN — ALBUTEROL SULFATE 2 PUFF: 90 AEROSOL, METERED RESPIRATORY (INHALATION) at 14:12

## 2017-06-15 RX ADMIN — HEPARIN SODIUM 5000 UNITS: 5000 INJECTION, SOLUTION INTRAVENOUS; SUBCUTANEOUS at 14:13

## 2017-06-16 PROCEDURE — 97530 THERAPEUTIC ACTIVITIES: CPT

## 2017-06-16 PROCEDURE — 97535 SELF CARE MNGMENT TRAINING: CPT | Performed by: OCCUPATIONAL THERAPY ASSISTANT

## 2017-06-16 PROCEDURE — 97112 NEUROMUSCULAR REEDUCATION: CPT | Performed by: OCCUPATIONAL THERAPY ASSISTANT

## 2017-06-16 PROCEDURE — 92526 ORAL FUNCTION THERAPY: CPT

## 2017-06-16 RX ADMIN — ALBUTEROL SULFATE 2 PUFF: 90 AEROSOL, METERED RESPIRATORY (INHALATION) at 17:27

## 2017-06-16 RX ADMIN — AMLODIPINE BESYLATE 2.5 MG: 2.5 TABLET ORAL at 08:35

## 2017-06-16 RX ADMIN — ALBUTEROL SULFATE 2 PUFF: 90 AEROSOL, METERED RESPIRATORY (INHALATION) at 08:37

## 2017-06-16 RX ADMIN — BIMATOPROST 1 DROP: 0.1 SOLUTION/ DROPS OPHTHALMIC at 21:01

## 2017-06-16 RX ADMIN — BENZONATATE 100 MG: 100 CAPSULE ORAL at 08:35

## 2017-06-16 RX ADMIN — GUAIFENESIN 1200 MG: 600 TABLET, EXTENDED RELEASE ORAL at 21:01

## 2017-06-16 RX ADMIN — DOCUSATE SODIUM 100 MG: 100 CAPSULE, LIQUID FILLED ORAL at 08:36

## 2017-06-16 RX ADMIN — LORATADINE 10 MG: 10 TABLET ORAL at 08:35

## 2017-06-16 RX ADMIN — ASPIRIN 81 MG 81 MG: 81 TABLET ORAL at 08:36

## 2017-06-16 RX ADMIN — PANTOPRAZOLE SODIUM 40 MG: 40 TABLET, DELAYED RELEASE ORAL at 17:26

## 2017-06-16 RX ADMIN — CARVEDILOL 6.25 MG: 6.25 TABLET, FILM COATED ORAL at 08:35

## 2017-06-16 RX ADMIN — BENZONATATE 100 MG: 100 CAPSULE ORAL at 21:01

## 2017-06-16 RX ADMIN — SILVER SULFADIAZINE: 10 CREAM TOPICAL at 12:06

## 2017-06-16 RX ADMIN — ATORVASTATIN CALCIUM 10 MG: 10 TABLET, FILM COATED ORAL at 17:26

## 2017-06-16 RX ADMIN — POLYETHYLENE GLYCOL 3350 17 G: 17 POWDER, FOR SOLUTION ORAL at 21:17

## 2017-06-16 RX ADMIN — OXYBUTYNIN CHLORIDE 5 MG: 5 TABLET, EXTENDED RELEASE ORAL at 21:01

## 2017-06-16 RX ADMIN — SENNOSIDES 8.6 MG: 8.6 TABLET, FILM COATED ORAL at 21:01

## 2017-06-16 RX ADMIN — ALBUTEROL SULFATE 2 PUFF: 90 AEROSOL, METERED RESPIRATORY (INHALATION) at 12:06

## 2017-06-16 RX ADMIN — BENZONATATE 100 MG: 100 CAPSULE ORAL at 17:26

## 2017-06-16 RX ADMIN — PAROXETINE HYDROCHLORIDE 20 MG: 20 TABLET, FILM COATED ORAL at 08:35

## 2017-06-16 RX ADMIN — GUAIFENESIN 1200 MG: 600 TABLET, EXTENDED RELEASE ORAL at 08:34

## 2017-06-16 RX ADMIN — HEPARIN SODIUM 5000 UNITS: 5000 INJECTION, SOLUTION INTRAVENOUS; SUBCUTANEOUS at 21:01

## 2017-06-16 RX ADMIN — TIOTROPIUM BROMIDE 18 MCG: 18 CAPSULE ORAL; RESPIRATORY (INHALATION) at 08:37

## 2017-06-16 RX ADMIN — ALBUTEROL SULFATE 2 PUFF: 90 AEROSOL, METERED RESPIRATORY (INHALATION) at 21:02

## 2017-06-16 RX ADMIN — PANTOPRAZOLE SODIUM 40 MG: 40 TABLET, DELAYED RELEASE ORAL at 06:03

## 2017-06-16 RX ADMIN — DOCUSATE SODIUM 100 MG: 100 CAPSULE, LIQUID FILLED ORAL at 17:26

## 2017-06-16 RX ADMIN — HEPARIN SODIUM 5000 UNITS: 5000 INJECTION, SOLUTION INTRAVENOUS; SUBCUTANEOUS at 14:05

## 2017-06-16 RX ADMIN — CLOPIDOGREL BISULFATE 75 MG: 75 TABLET ORAL at 08:34

## 2017-06-16 RX ADMIN — CARVEDILOL 6.25 MG: 6.25 TABLET, FILM COATED ORAL at 17:26

## 2017-06-16 RX ADMIN — HEPARIN SODIUM 5000 UNITS: 5000 INJECTION, SOLUTION INTRAVENOUS; SUBCUTANEOUS at 05:58

## 2017-06-16 RX ADMIN — SILVER SULFADIAZINE: 10 CREAM TOPICAL at 17:32

## 2017-06-16 RX ADMIN — TAMSULOSIN HYDROCHLORIDE 0.4 MG: 0.4 CAPSULE ORAL at 17:26

## 2017-06-17 PROCEDURE — 97530 THERAPEUTIC ACTIVITIES: CPT | Performed by: OCCUPATIONAL THERAPY ASSISTANT

## 2017-06-17 PROCEDURE — 97116 GAIT TRAINING THERAPY: CPT

## 2017-06-17 PROCEDURE — 97112 NEUROMUSCULAR REEDUCATION: CPT

## 2017-06-17 PROCEDURE — 92526 ORAL FUNCTION THERAPY: CPT

## 2017-06-17 PROCEDURE — 97535 SELF CARE MNGMENT TRAINING: CPT | Performed by: OCCUPATIONAL THERAPY ASSISTANT

## 2017-06-17 PROCEDURE — 97530 THERAPEUTIC ACTIVITIES: CPT

## 2017-06-17 PROCEDURE — 97112 NEUROMUSCULAR REEDUCATION: CPT | Performed by: OCCUPATIONAL THERAPY ASSISTANT

## 2017-06-17 PROCEDURE — 97542 WHEELCHAIR MNGMENT TRAINING: CPT

## 2017-06-17 RX ADMIN — DOCUSATE SODIUM 100 MG: 100 CAPSULE, LIQUID FILLED ORAL at 07:54

## 2017-06-17 RX ADMIN — SENNOSIDES 8.6 MG: 8.6 TABLET, FILM COATED ORAL at 21:18

## 2017-06-17 RX ADMIN — ALBUTEROL SULFATE 2 PUFF: 90 AEROSOL, METERED RESPIRATORY (INHALATION) at 18:06

## 2017-06-17 RX ADMIN — OXYBUTYNIN CHLORIDE 5 MG: 5 TABLET, EXTENDED RELEASE ORAL at 21:19

## 2017-06-17 RX ADMIN — BENZONATATE 100 MG: 100 CAPSULE ORAL at 07:53

## 2017-06-17 RX ADMIN — GUAIFENESIN 1200 MG: 600 TABLET, EXTENDED RELEASE ORAL at 21:18

## 2017-06-17 RX ADMIN — ALBUTEROL SULFATE 2 PUFF: 90 AEROSOL, METERED RESPIRATORY (INHALATION) at 12:24

## 2017-06-17 RX ADMIN — SILVER SULFADIAZINE: 10 CREAM TOPICAL at 18:06

## 2017-06-17 RX ADMIN — BENZONATATE 100 MG: 100 CAPSULE ORAL at 16:47

## 2017-06-17 RX ADMIN — PANTOPRAZOLE SODIUM 40 MG: 40 TABLET, DELAYED RELEASE ORAL at 16:47

## 2017-06-17 RX ADMIN — CARVEDILOL 6.25 MG: 6.25 TABLET, FILM COATED ORAL at 16:47

## 2017-06-17 RX ADMIN — HEPARIN SODIUM 5000 UNITS: 5000 INJECTION, SOLUTION INTRAVENOUS; SUBCUTANEOUS at 14:04

## 2017-06-17 RX ADMIN — TIOTROPIUM BROMIDE 18 MCG: 18 CAPSULE ORAL; RESPIRATORY (INHALATION) at 07:56

## 2017-06-17 RX ADMIN — PANTOPRAZOLE SODIUM 40 MG: 40 TABLET, DELAYED RELEASE ORAL at 06:18

## 2017-06-17 RX ADMIN — ATORVASTATIN CALCIUM 10 MG: 10 TABLET, FILM COATED ORAL at 16:47

## 2017-06-17 RX ADMIN — LORATADINE 10 MG: 10 TABLET ORAL at 07:53

## 2017-06-17 RX ADMIN — HEPARIN SODIUM 5000 UNITS: 5000 INJECTION, SOLUTION INTRAVENOUS; SUBCUTANEOUS at 06:18

## 2017-06-17 RX ADMIN — PAROXETINE HYDROCHLORIDE 20 MG: 20 TABLET, FILM COATED ORAL at 07:53

## 2017-06-17 RX ADMIN — SILVER SULFADIAZINE: 10 CREAM TOPICAL at 07:55

## 2017-06-17 RX ADMIN — AMLODIPINE BESYLATE 2.5 MG: 2.5 TABLET ORAL at 07:54

## 2017-06-17 RX ADMIN — GUAIFENESIN 1200 MG: 600 TABLET, EXTENDED RELEASE ORAL at 07:49

## 2017-06-17 RX ADMIN — HEPARIN SODIUM 5000 UNITS: 5000 INJECTION, SOLUTION INTRAVENOUS; SUBCUTANEOUS at 21:18

## 2017-06-17 RX ADMIN — TAMSULOSIN HYDROCHLORIDE 0.4 MG: 0.4 CAPSULE ORAL at 16:47

## 2017-06-17 RX ADMIN — POLYETHYLENE GLYCOL 3350 17 G: 17 POWDER, FOR SOLUTION ORAL at 14:43

## 2017-06-17 RX ADMIN — DOCUSATE SODIUM 100 MG: 100 CAPSULE, LIQUID FILLED ORAL at 18:06

## 2017-06-17 RX ADMIN — BIMATOPROST 1 DROP: 0.1 SOLUTION/ DROPS OPHTHALMIC at 21:18

## 2017-06-17 RX ADMIN — CLOPIDOGREL BISULFATE 75 MG: 75 TABLET ORAL at 07:52

## 2017-06-17 RX ADMIN — ALBUTEROL SULFATE 2 PUFF: 90 AEROSOL, METERED RESPIRATORY (INHALATION) at 07:56

## 2017-06-17 RX ADMIN — CARVEDILOL 6.25 MG: 6.25 TABLET, FILM COATED ORAL at 07:50

## 2017-06-17 RX ADMIN — ASPIRIN 81 MG 81 MG: 81 TABLET ORAL at 07:52

## 2017-06-17 RX ADMIN — BENZONATATE 100 MG: 100 CAPSULE ORAL at 21:18

## 2017-06-18 LAB
ANION GAP SERPL CALCULATED.3IONS-SCNC: 7 MMOL/L (ref 4–13)
BASOPHILS # BLD AUTO: 0.02 THOUSANDS/ΜL (ref 0–0.1)
BASOPHILS NFR BLD AUTO: 0 % (ref 0–1)
BUN SERPL-MCNC: 23 MG/DL (ref 5–25)
CALCIUM SERPL-MCNC: 8.9 MG/DL (ref 8.3–10.1)
CHLORIDE SERPL-SCNC: 103 MMOL/L (ref 100–108)
CO2 SERPL-SCNC: 30 MMOL/L (ref 21–32)
CREAT SERPL-MCNC: 1.13 MG/DL (ref 0.6–1.3)
EOSINOPHIL # BLD AUTO: 0.26 THOUSAND/ΜL (ref 0–0.61)
EOSINOPHIL NFR BLD AUTO: 5 % (ref 0–6)
ERYTHROCYTE [DISTWIDTH] IN BLOOD BY AUTOMATED COUNT: 15.2 % (ref 11.6–15.1)
GFR SERPL CREATININE-BSD FRML MDRD: >60 ML/MIN/1.73SQ M
GLUCOSE SERPL-MCNC: 90 MG/DL (ref 65–140)
HCT VFR BLD AUTO: 38.2 % (ref 36.5–49.3)
HGB BLD-MCNC: 12.4 G/DL (ref 12–17)
LYMPHOCYTES # BLD AUTO: 1.27 THOUSANDS/ΜL (ref 0.6–4.47)
LYMPHOCYTES NFR BLD AUTO: 25 % (ref 14–44)
MCH RBC QN AUTO: 30.2 PG (ref 26.8–34.3)
MCHC RBC AUTO-ENTMCNC: 32.5 G/DL (ref 31.4–37.4)
MCV RBC AUTO: 93 FL (ref 82–98)
MONOCYTES # BLD AUTO: 0.61 THOUSAND/ΜL (ref 0.17–1.22)
MONOCYTES NFR BLD AUTO: 12 % (ref 4–12)
NEUTROPHILS # BLD AUTO: 2.98 THOUSANDS/ΜL (ref 1.85–7.62)
NEUTS SEG NFR BLD AUTO: 58 % (ref 43–75)
NRBC BLD AUTO-RTO: 0 /100 WBCS
PLATELET # BLD AUTO: 90 THOUSANDS/UL (ref 149–390)
PMV BLD AUTO: 11.8 FL (ref 8.9–12.7)
POTASSIUM SERPL-SCNC: 4.3 MMOL/L (ref 3.5–5.3)
RBC # BLD AUTO: 4.1 MILLION/UL (ref 3.88–5.62)
SODIUM SERPL-SCNC: 140 MMOL/L (ref 136–145)
WBC # BLD AUTO: 5.16 THOUSAND/UL (ref 4.31–10.16)

## 2017-06-18 PROCEDURE — 80048 BASIC METABOLIC PNL TOTAL CA: CPT | Performed by: PHYSICIAN ASSISTANT

## 2017-06-18 PROCEDURE — 97112 NEUROMUSCULAR REEDUCATION: CPT

## 2017-06-18 PROCEDURE — 97116 GAIT TRAINING THERAPY: CPT

## 2017-06-18 PROCEDURE — 97112 NEUROMUSCULAR REEDUCATION: CPT | Performed by: OCCUPATIONAL THERAPY ASSISTANT

## 2017-06-18 PROCEDURE — 97110 THERAPEUTIC EXERCISES: CPT

## 2017-06-18 PROCEDURE — 85025 COMPLETE CBC W/AUTO DIFF WBC: CPT | Performed by: PHYSICIAN ASSISTANT

## 2017-06-18 PROCEDURE — 97535 SELF CARE MNGMENT TRAINING: CPT | Performed by: OCCUPATIONAL THERAPY ASSISTANT

## 2017-06-18 PROCEDURE — 97542 WHEELCHAIR MNGMENT TRAINING: CPT

## 2017-06-18 RX ADMIN — LORATADINE 10 MG: 10 TABLET ORAL at 07:49

## 2017-06-18 RX ADMIN — HEPARIN SODIUM 5000 UNITS: 5000 INJECTION, SOLUTION INTRAVENOUS; SUBCUTANEOUS at 06:09

## 2017-06-18 RX ADMIN — CLOPIDOGREL BISULFATE 75 MG: 75 TABLET ORAL at 07:48

## 2017-06-18 RX ADMIN — GUAIFENESIN 1200 MG: 600 TABLET, EXTENDED RELEASE ORAL at 07:49

## 2017-06-18 RX ADMIN — ALBUTEROL SULFATE 2 PUFF: 90 AEROSOL, METERED RESPIRATORY (INHALATION) at 12:22

## 2017-06-18 RX ADMIN — CARVEDILOL 6.25 MG: 6.25 TABLET, FILM COATED ORAL at 16:02

## 2017-06-18 RX ADMIN — OXYBUTYNIN CHLORIDE 5 MG: 5 TABLET, EXTENDED RELEASE ORAL at 22:01

## 2017-06-18 RX ADMIN — ALBUTEROL SULFATE 2 PUFF: 90 AEROSOL, METERED RESPIRATORY (INHALATION) at 19:45

## 2017-06-18 RX ADMIN — DOCUSATE SODIUM 100 MG: 100 CAPSULE, LIQUID FILLED ORAL at 16:02

## 2017-06-18 RX ADMIN — TIOTROPIUM BROMIDE 18 MCG: 18 CAPSULE ORAL; RESPIRATORY (INHALATION) at 07:51

## 2017-06-18 RX ADMIN — BIMATOPROST 1 DROP: 0.1 SOLUTION/ DROPS OPHTHALMIC at 22:01

## 2017-06-18 RX ADMIN — ALBUTEROL SULFATE 2 PUFF: 90 AEROSOL, METERED RESPIRATORY (INHALATION) at 08:00

## 2017-06-18 RX ADMIN — TAMSULOSIN HYDROCHLORIDE 0.4 MG: 0.4 CAPSULE ORAL at 16:02

## 2017-06-18 RX ADMIN — ALBUTEROL SULFATE 2 PUFF: 90 AEROSOL, METERED RESPIRATORY (INHALATION) at 22:01

## 2017-06-18 RX ADMIN — PANTOPRAZOLE SODIUM 40 MG: 40 TABLET, DELAYED RELEASE ORAL at 06:09

## 2017-06-18 RX ADMIN — BENZONATATE 100 MG: 100 CAPSULE ORAL at 16:02

## 2017-06-18 RX ADMIN — GUAIFENESIN 1200 MG: 600 TABLET, EXTENDED RELEASE ORAL at 22:00

## 2017-06-18 RX ADMIN — BENZONATATE 100 MG: 100 CAPSULE ORAL at 22:00

## 2017-06-18 RX ADMIN — BENZONATATE 100 MG: 100 CAPSULE ORAL at 07:58

## 2017-06-18 RX ADMIN — PAROXETINE HYDROCHLORIDE 20 MG: 20 TABLET, FILM COATED ORAL at 07:49

## 2017-06-18 RX ADMIN — AMLODIPINE BESYLATE 2.5 MG: 2.5 TABLET ORAL at 07:49

## 2017-06-18 RX ADMIN — DOCUSATE SODIUM 100 MG: 100 CAPSULE, LIQUID FILLED ORAL at 07:49

## 2017-06-18 RX ADMIN — POLYETHYLENE GLYCOL 3350 17 G: 17 POWDER, FOR SOLUTION ORAL at 07:49

## 2017-06-18 RX ADMIN — CARVEDILOL 6.25 MG: 6.25 TABLET, FILM COATED ORAL at 07:48

## 2017-06-18 RX ADMIN — SENNOSIDES 8.6 MG: 8.6 TABLET, FILM COATED ORAL at 22:00

## 2017-06-18 RX ADMIN — SILVER SULFADIAZINE: 10 CREAM TOPICAL at 07:51

## 2017-06-18 RX ADMIN — ASPIRIN 81 MG 81 MG: 81 TABLET ORAL at 07:48

## 2017-06-18 RX ADMIN — PANTOPRAZOLE SODIUM 40 MG: 40 TABLET, DELAYED RELEASE ORAL at 16:02

## 2017-06-18 RX ADMIN — ATORVASTATIN CALCIUM 10 MG: 10 TABLET, FILM COATED ORAL at 16:02

## 2017-06-18 RX ADMIN — SILVER SULFADIAZINE: 10 CREAM TOPICAL at 19:44

## 2017-06-19 VITALS
DIASTOLIC BLOOD PRESSURE: 85 MMHG | OXYGEN SATURATION: 94 % | WEIGHT: 207.45 LBS | SYSTOLIC BLOOD PRESSURE: 118 MMHG | RESPIRATION RATE: 20 BRPM | TEMPERATURE: 97.6 F | BODY MASS INDEX: 24.5 KG/M2 | HEART RATE: 63 BPM | HEIGHT: 77 IN

## 2017-06-19 PROBLEM — R06.02 SHORTNESS OF BREATH: Status: RESOLVED | Noted: 2017-04-09 | Resolved: 2017-06-19

## 2017-06-19 PROBLEM — R77.8 ELEVATED TROPONIN: Status: RESOLVED | Noted: 2017-05-31 | Resolved: 2017-06-19

## 2017-06-19 LAB
BASOPHILS # BLD AUTO: 0.01 THOUSANDS/ΜL (ref 0–0.1)
BASOPHILS NFR BLD AUTO: 0 % (ref 0–1)
EOSINOPHIL # BLD AUTO: 0.23 THOUSAND/ΜL (ref 0–0.61)
EOSINOPHIL NFR BLD AUTO: 5 % (ref 0–6)
ERYTHROCYTE [DISTWIDTH] IN BLOOD BY AUTOMATED COUNT: 15 % (ref 11.6–15.1)
HCT VFR BLD AUTO: 36.7 % (ref 36.5–49.3)
HGB BLD-MCNC: 12 G/DL (ref 12–17)
LYMPHOCYTES # BLD AUTO: 1.17 THOUSANDS/ΜL (ref 0.6–4.47)
LYMPHOCYTES NFR BLD AUTO: 27 % (ref 14–44)
MCH RBC QN AUTO: 30.3 PG (ref 26.8–34.3)
MCHC RBC AUTO-ENTMCNC: 32.7 G/DL (ref 31.4–37.4)
MCV RBC AUTO: 93 FL (ref 82–98)
MONOCYTES # BLD AUTO: 0.43 THOUSAND/ΜL (ref 0.17–1.22)
MONOCYTES NFR BLD AUTO: 10 % (ref 4–12)
NEUTROPHILS # BLD AUTO: 2.53 THOUSANDS/ΜL (ref 1.85–7.62)
NEUTS SEG NFR BLD AUTO: 58 % (ref 43–75)
NRBC BLD AUTO-RTO: 0 /100 WBCS
PLATELET # BLD AUTO: 84 THOUSANDS/UL (ref 149–390)
PMV BLD AUTO: 11.4 FL (ref 8.9–12.7)
RBC # BLD AUTO: 3.96 MILLION/UL (ref 3.88–5.62)
WBC # BLD AUTO: 4.38 THOUSAND/UL (ref 4.31–10.16)

## 2017-06-19 PROCEDURE — 85025 COMPLETE CBC W/AUTO DIFF WBC: CPT | Performed by: PHYSICIAN ASSISTANT

## 2017-06-19 RX ORDER — CLOPIDOGREL BISULFATE 75 MG/1
75 TABLET ORAL DAILY
Qty: 30 TABLET | Refills: 0 | Status: SHIPPED | OUTPATIENT
Start: 2017-06-19

## 2017-06-19 RX ORDER — ACETAMINOPHEN 325 MG/1
650 TABLET ORAL EVERY 6 HOURS PRN
Qty: 30 TABLET | Refills: 0 | Status: SHIPPED | OUTPATIENT
Start: 2017-06-19

## 2017-06-19 RX ORDER — ALBUTEROL SULFATE 90 UG/1
2 AEROSOL, METERED RESPIRATORY (INHALATION) 4 TIMES DAILY
Qty: 2 INHALER | Refills: 0 | Status: SHIPPED | OUTPATIENT
Start: 2017-06-19

## 2017-06-19 RX ORDER — ASPIRIN 81 MG/1
81 TABLET, CHEWABLE ORAL DAILY
Qty: 30 TABLET | Refills: 0 | Status: SHIPPED | OUTPATIENT
Start: 2017-06-19 | End: 2018-02-02

## 2017-06-19 RX ORDER — TAMSULOSIN HYDROCHLORIDE 0.4 MG/1
0.4 CAPSULE ORAL
Qty: 30 CAPSULE | Refills: 0 | Status: SHIPPED | OUTPATIENT
Start: 2017-06-19 | End: 2018-02-18 | Stop reason: HOSPADM

## 2017-06-19 RX ORDER — LEVOCETIRIZINE DIHYDROCHLORIDE 5 MG/1
5 TABLET, FILM COATED ORAL EVERY EVENING
Qty: 30 TABLET | Refills: 0 | Status: ON HOLD | OUTPATIENT
Start: 2017-06-19 | End: 2019-11-14 | Stop reason: ALTCHOICE

## 2017-06-19 RX ORDER — CARVEDILOL 6.25 MG/1
6.25 TABLET ORAL 2 TIMES DAILY WITH MEALS
Qty: 60 TABLET | Refills: 0 | Status: ON HOLD | OUTPATIENT
Start: 2017-06-19 | End: 2017-08-21

## 2017-06-19 RX ORDER — MECLIZINE HYDROCHLORIDE 25 MG/1
25 TABLET ORAL 3 TIMES DAILY PRN
Qty: 30 TABLET | Refills: 0 | Status: ON HOLD | OUTPATIENT
Start: 2017-06-19 | End: 2019-01-29 | Stop reason: ALTCHOICE

## 2017-06-19 RX ORDER — GUAIFENESIN 1200 MG/1
1200 TABLET, EXTENDED RELEASE ORAL EVERY 12 HOURS SCHEDULED
Qty: 14 TABLET | Refills: 0 | Status: SHIPPED | OUTPATIENT
Start: 2017-06-19 | End: 2017-08-21 | Stop reason: HOSPADM

## 2017-06-19 RX ORDER — ATORVASTATIN CALCIUM 10 MG/1
10 TABLET, FILM COATED ORAL DAILY
Qty: 30 TABLET | Refills: 0 | Status: SHIPPED | OUTPATIENT
Start: 2017-06-19 | End: 2018-02-10

## 2017-06-19 RX ORDER — ESOMEPRAZOLE MAGNESIUM 40 MG/1
40 CAPSULE, DELAYED RELEASE ORAL EVERY MORNING
Qty: 30 CAPSULE | Refills: 0 | Status: SHIPPED | OUTPATIENT
Start: 2017-06-19 | End: 2018-02-01 | Stop reason: CLARIF

## 2017-06-19 RX ORDER — PAROXETINE HYDROCHLORIDE 20 MG/1
20 TABLET, FILM COATED ORAL EVERY MORNING
Qty: 30 TABLET | Refills: 0 | Status: SHIPPED | OUTPATIENT
Start: 2017-06-19 | End: 2018-02-02

## 2017-06-19 RX ORDER — AMLODIPINE BESYLATE 2.5 MG/1
2.5 TABLET ORAL DAILY
Qty: 30 TABLET | Refills: 0 | Status: ON HOLD | OUTPATIENT
Start: 2017-06-19 | End: 2017-08-21

## 2017-06-19 RX ORDER — BENZONATATE 100 MG/1
100 CAPSULE ORAL 3 TIMES DAILY PRN
Qty: 20 CAPSULE | Refills: 0 | Status: ON HOLD | OUTPATIENT
Start: 2017-06-19 | End: 2017-08-21

## 2017-06-19 RX ADMIN — ASPIRIN 81 MG 81 MG: 81 TABLET ORAL at 08:51

## 2017-06-19 RX ADMIN — PAROXETINE HYDROCHLORIDE 20 MG: 20 TABLET, FILM COATED ORAL at 08:51

## 2017-06-19 RX ADMIN — BENZONATATE 100 MG: 100 CAPSULE ORAL at 08:51

## 2017-06-19 RX ADMIN — SILVER SULFADIAZINE: 10 CREAM TOPICAL at 08:50

## 2017-06-19 RX ADMIN — GUAIFENESIN 1200 MG: 600 TABLET, EXTENDED RELEASE ORAL at 08:51

## 2017-06-19 RX ADMIN — CARVEDILOL 6.25 MG: 6.25 TABLET, FILM COATED ORAL at 08:52

## 2017-06-19 RX ADMIN — DOCUSATE SODIUM 100 MG: 100 CAPSULE, LIQUID FILLED ORAL at 08:51

## 2017-06-19 RX ADMIN — TIOTROPIUM BROMIDE 18 MCG: 18 CAPSULE ORAL; RESPIRATORY (INHALATION) at 08:50

## 2017-06-19 RX ADMIN — LORATADINE 10 MG: 10 TABLET ORAL at 08:52

## 2017-06-19 RX ADMIN — AMLODIPINE BESYLATE 2.5 MG: 2.5 TABLET ORAL at 08:51

## 2017-06-19 RX ADMIN — ALBUTEROL SULFATE 2 PUFF: 90 AEROSOL, METERED RESPIRATORY (INHALATION) at 08:50

## 2017-06-19 RX ADMIN — CLOPIDOGREL BISULFATE 75 MG: 75 TABLET ORAL at 08:51

## 2017-06-19 RX ADMIN — PANTOPRAZOLE SODIUM 40 MG: 40 TABLET, DELAYED RELEASE ORAL at 06:27

## 2017-07-22 ENCOUNTER — HOSPITAL ENCOUNTER (EMERGENCY)
Facility: HOSPITAL | Age: 77
Discharge: HOME | End: 2017-07-22
Admitting: EMERGENCY MEDICINE
Payer: MEDICARE

## 2017-07-22 ENCOUNTER — APPOINTMENT (EMERGENCY)
Dept: RADIOLOGY | Facility: HOSPITAL | Age: 77
End: 2017-07-22
Payer: MEDICARE

## 2017-07-22 VITALS
HEART RATE: 71 BPM | SYSTOLIC BLOOD PRESSURE: 150 MMHG | OXYGEN SATURATION: 94 % | RESPIRATION RATE: 22 BRPM | HEIGHT: 77 IN | DIASTOLIC BLOOD PRESSURE: 88 MMHG | BODY MASS INDEX: 26 KG/M2 | WEIGHT: 220.24 LBS | TEMPERATURE: 99.1 F

## 2017-07-22 DIAGNOSIS — R06.02 SOB (SHORTNESS OF BREATH): Primary | ICD-10-CM

## 2017-07-22 DIAGNOSIS — Z87.09 HX OF BRONCHITIS: ICD-10-CM

## 2017-07-22 LAB
ANION GAP SERPL CALCULATED.3IONS-SCNC: 7 MMOL/L (ref 4–13)
BASOPHILS # BLD AUTO: 0.03 THOUSANDS/ΜL (ref 0–0.1)
BASOPHILS NFR BLD AUTO: 0 % (ref 0–1)
BUN SERPL-MCNC: 16 MG/DL (ref 5–25)
CALCIUM SERPL-MCNC: 9.2 MG/DL (ref 8.3–10.1)
CHLORIDE SERPL-SCNC: 105 MMOL/L (ref 100–108)
CO2 SERPL-SCNC: 27 MMOL/L (ref 21–32)
CREAT SERPL-MCNC: 1.2 MG/DL (ref 0.6–1.3)
EOSINOPHIL # BLD AUTO: 0.46 THOUSAND/ΜL (ref 0–0.61)
EOSINOPHIL NFR BLD AUTO: 7 % (ref 0–6)
ERYTHROCYTE [DISTWIDTH] IN BLOOD BY AUTOMATED COUNT: 14 % (ref 11.6–15.1)
GFR SERPL CREATININE-BSD FRML MDRD: 58.7 ML/MIN/1.73SQ M
GLUCOSE SERPL-MCNC: 102 MG/DL (ref 65–140)
HCT VFR BLD AUTO: 43.7 % (ref 36.5–49.3)
HGB BLD-MCNC: 14.4 G/DL (ref 12–17)
LYMPHOCYTES # BLD AUTO: 0.95 THOUSANDS/ΜL (ref 0.6–4.47)
LYMPHOCYTES NFR BLD AUTO: 14 % (ref 14–44)
MCH RBC QN AUTO: 29.5 PG (ref 26.8–34.3)
MCHC RBC AUTO-ENTMCNC: 33 G/DL (ref 31.4–37.4)
MCV RBC AUTO: 90 FL (ref 82–98)
MONOCYTES # BLD AUTO: 0.57 THOUSAND/ΜL (ref 0.17–1.22)
MONOCYTES NFR BLD AUTO: 8 % (ref 4–12)
NEUTROPHILS # BLD AUTO: 5.03 THOUSANDS/ΜL (ref 1.85–7.62)
NEUTS SEG NFR BLD AUTO: 71 % (ref 43–75)
NT-PROBNP SERPL-MCNC: 614 PG/ML
PLATELET # BLD AUTO: 127 THOUSANDS/UL (ref 149–390)
PMV BLD AUTO: 11.6 FL (ref 8.9–12.7)
POTASSIUM SERPL-SCNC: 3.7 MMOL/L (ref 3.5–5.3)
RBC # BLD AUTO: 4.88 MILLION/UL (ref 3.88–5.62)
SODIUM SERPL-SCNC: 139 MMOL/L (ref 136–145)
TROPONIN I SERPL-MCNC: 0.02 NG/ML
WBC # BLD AUTO: 7.04 THOUSAND/UL (ref 4.31–10.16)

## 2017-07-22 PROCEDURE — 83880 ASSAY OF NATRIURETIC PEPTIDE: CPT | Performed by: PHYSICIAN ASSISTANT

## 2017-07-22 PROCEDURE — 71020 HB CHEST X-RAY 2VW FRONTAL&LATL: CPT

## 2017-07-22 PROCEDURE — 99285 EMERGENCY DEPT VISIT HI MDM: CPT

## 2017-07-22 PROCEDURE — 36415 COLL VENOUS BLD VENIPUNCTURE: CPT | Performed by: PHYSICIAN ASSISTANT

## 2017-07-22 PROCEDURE — 93005 ELECTROCARDIOGRAM TRACING: CPT

## 2017-07-22 PROCEDURE — 80048 BASIC METABOLIC PNL TOTAL CA: CPT | Performed by: PHYSICIAN ASSISTANT

## 2017-07-22 PROCEDURE — 85025 COMPLETE CBC W/AUTO DIFF WBC: CPT | Performed by: PHYSICIAN ASSISTANT

## 2017-07-22 PROCEDURE — 84484 ASSAY OF TROPONIN QUANT: CPT | Performed by: PHYSICIAN ASSISTANT

## 2017-07-22 RX ORDER — ALBUTEROL SULFATE 2.5 MG/3ML
SOLUTION RESPIRATORY (INHALATION)
Status: COMPLETED
Start: 2017-07-22 | End: 2017-07-22

## 2017-07-22 RX ORDER — POTASSIUM CHLORIDE 20 MEQ/1
20 TABLET, EXTENDED RELEASE ORAL 2 TIMES DAILY
COMMUNITY
End: 2017-08-21 | Stop reason: HOSPADM

## 2017-07-24 LAB
ATRIAL RATE: 74 BPM
P AXIS: 62 DEGREES
PR INTERVAL: 216 MS
QRS AXIS: 18 DEGREES
QRSD INTERVAL: 96 MS
QT INTERVAL: 396 MS
QTC INTERVAL: 439 MS
T WAVE AXIS: 76 DEGREES
VENTRICULAR RATE: 74 BPM

## 2017-07-26 ENCOUNTER — APPOINTMENT (EMERGENCY)
Dept: RADIOLOGY | Facility: HOSPITAL | Age: 77
End: 2017-07-26
Payer: MEDICARE

## 2017-07-26 ENCOUNTER — HOSPITAL ENCOUNTER (EMERGENCY)
Facility: HOSPITAL | Age: 77
Discharge: HOME | End: 2017-07-26
Attending: EMERGENCY MEDICINE | Admitting: EMERGENCY MEDICINE
Payer: MEDICARE

## 2017-07-26 ENCOUNTER — HOSPITAL ENCOUNTER (INPATIENT)
Facility: HOSPITAL | Age: 77
LOS: 8 days | DRG: 190 | End: 2017-08-03
Attending: EMERGENCY MEDICINE | Admitting: INTERNAL MEDICINE
Payer: MEDICARE

## 2017-07-26 VITALS
HEART RATE: 85 BPM | OXYGEN SATURATION: 94 % | SYSTOLIC BLOOD PRESSURE: 176 MMHG | RESPIRATION RATE: 20 BRPM | DIASTOLIC BLOOD PRESSURE: 85 MMHG | TEMPERATURE: 97.5 F | WEIGHT: 212.74 LBS | BODY MASS INDEX: 25.23 KG/M2

## 2017-07-26 DIAGNOSIS — J40 BRONCHITIS: ICD-10-CM

## 2017-07-26 DIAGNOSIS — R06.00 DYSPNEA: Primary | ICD-10-CM

## 2017-07-26 DIAGNOSIS — J98.01 BRONCHOSPASM: ICD-10-CM

## 2017-07-26 DIAGNOSIS — J44.1 COPD EXACERBATION (HCC): Primary | ICD-10-CM

## 2017-07-26 LAB
ANION GAP SERPL CALCULATED.3IONS-SCNC: 9 MMOL/L (ref 4–13)
ATRIAL RATE: 87 BPM
BASOPHILS # BLD MANUAL: 0 THOUSAND/UL (ref 0–0.1)
BASOPHILS NFR MAR MANUAL: 0 % (ref 0–1)
BUN SERPL-MCNC: 15 MG/DL (ref 5–25)
BURR CELLS BLD QL SMEAR: PRESENT
CALCIUM SERPL-MCNC: 9.4 MG/DL (ref 8.3–10.1)
CHLORIDE SERPL-SCNC: 103 MMOL/L (ref 100–108)
CO2 SERPL-SCNC: 25 MMOL/L (ref 21–32)
CREAT SERPL-MCNC: 1.12 MG/DL (ref 0.6–1.3)
DEPRECATED D DIMER PPP: 419 NG/ML (FEU) (ref 0–424)
EOSINOPHIL # BLD MANUAL: 0 THOUSAND/UL (ref 0–0.4)
EOSINOPHIL NFR BLD MANUAL: 0 % (ref 0–6)
ERYTHROCYTE [DISTWIDTH] IN BLOOD BY AUTOMATED COUNT: 13.9 % (ref 11.6–15.1)
GFR SERPL CREATININE-BSD FRML MDRD: 63 ML/MIN/1.73SQ M
GLUCOSE P FAST SERPL-MCNC: 121 MG/DL (ref 65–99)
GLUCOSE SERPL-MCNC: 121 MG/DL (ref 65–140)
HCT VFR BLD AUTO: 45.5 % (ref 36.5–49.3)
HGB BLD-MCNC: 15.3 G/DL (ref 12–17)
LYMPHOCYTES # BLD AUTO: 0.23 THOUSAND/UL (ref 0.6–4.47)
LYMPHOCYTES # BLD AUTO: 3 % (ref 14–44)
MCH RBC QN AUTO: 30.4 PG (ref 26.8–34.3)
MCHC RBC AUTO-ENTMCNC: 33.6 G/DL (ref 31.4–37.4)
MCV RBC AUTO: 90 FL (ref 82–98)
MONOCYTES # BLD AUTO: 0.08 THOUSAND/UL (ref 0–1.22)
MONOCYTES NFR BLD: 1 % (ref 4–12)
NEUTROPHILS # BLD MANUAL: 7.33 THOUSAND/UL (ref 1.85–7.62)
NEUTS SEG NFR BLD AUTO: 96 % (ref 43–75)
NRBC BLD AUTO-RTO: 0 /100 WBCS
NT-PROBNP SERPL-MCNC: 2386 PG/ML
P AXIS: 62 DEGREES
PLATELET # BLD AUTO: 139 THOUSANDS/UL (ref 149–390)
PLATELET # BLD AUTO: 140 THOUSANDS/UL (ref 149–390)
PLATELET BLD QL SMEAR: ABNORMAL
PMV BLD AUTO: 11.5 FL (ref 8.9–12.7)
PMV BLD AUTO: 11.8 FL (ref 8.9–12.7)
POIKILOCYTOSIS BLD QL SMEAR: PRESENT
POTASSIUM SERPL-SCNC: 3.9 MMOL/L (ref 3.5–5.3)
PR INTERVAL: 216 MS
QRS AXIS: 36 DEGREES
QRSD INTERVAL: 70 MS
QT INTERVAL: 354 MS
QTC INTERVAL: 425 MS
RBC # BLD AUTO: 5.04 MILLION/UL (ref 3.88–5.62)
RBC MORPH BLD: PRESENT
SODIUM SERPL-SCNC: 137 MMOL/L (ref 136–145)
T WAVE AXIS: 86 DEGREES
TROPONIN I SERPL-MCNC: 0.05 NG/ML
TROPONIN I SERPL-MCNC: 0.07 NG/ML
VENTRICULAR RATE: 87 BPM
WBC # BLD AUTO: 7.64 THOUSAND/UL (ref 4.31–10.16)

## 2017-07-26 PROCEDURE — 93005 ELECTROCARDIOGRAM TRACING: CPT | Performed by: EMERGENCY MEDICINE

## 2017-07-26 PROCEDURE — 93005 ELECTROCARDIOGRAM TRACING: CPT

## 2017-07-26 PROCEDURE — 94640 AIRWAY INHALATION TREATMENT: CPT

## 2017-07-26 PROCEDURE — 94760 N-INVAS EAR/PLS OXIMETRY 1: CPT

## 2017-07-26 PROCEDURE — 85379 FIBRIN DEGRADATION QUANT: CPT | Performed by: PHYSICIAN ASSISTANT

## 2017-07-26 PROCEDURE — 94668 MNPJ CHEST WALL SBSQ: CPT

## 2017-07-26 PROCEDURE — 99285 EMERGENCY DEPT VISIT HI MDM: CPT

## 2017-07-26 PROCEDURE — 94762 N-INVAS EAR/PLS OXIMTRY CONT: CPT

## 2017-07-26 PROCEDURE — 84484 ASSAY OF TROPONIN QUANT: CPT | Performed by: PHYSICIAN ASSISTANT

## 2017-07-26 PROCEDURE — 94644 CONT INHLJ TX 1ST HOUR: CPT

## 2017-07-26 PROCEDURE — 85049 AUTOMATED PLATELET COUNT: CPT | Performed by: PHYSICIAN ASSISTANT

## 2017-07-26 PROCEDURE — 36415 COLL VENOUS BLD VENIPUNCTURE: CPT | Performed by: PHYSICIAN ASSISTANT

## 2017-07-26 PROCEDURE — 85007 BL SMEAR W/DIFF WBC COUNT: CPT | Performed by: PHYSICIAN ASSISTANT

## 2017-07-26 PROCEDURE — 83880 ASSAY OF NATRIURETIC PEPTIDE: CPT | Performed by: PHYSICIAN ASSISTANT

## 2017-07-26 PROCEDURE — 71020 HB CHEST X-RAY 2VW FRONTAL&LATL: CPT

## 2017-07-26 PROCEDURE — 85027 COMPLETE CBC AUTOMATED: CPT | Performed by: PHYSICIAN ASSISTANT

## 2017-07-26 PROCEDURE — 80048 BASIC METABOLIC PNL TOTAL CA: CPT | Performed by: PHYSICIAN ASSISTANT

## 2017-07-26 RX ORDER — ONDANSETRON 2 MG/ML
4 INJECTION INTRAMUSCULAR; INTRAVENOUS EVERY 6 HOURS PRN
Status: DISCONTINUED | OUTPATIENT
Start: 2017-07-26 | End: 2017-08-03 | Stop reason: HOSPADM

## 2017-07-26 RX ORDER — DOXYCYCLINE HYCLATE 100 MG/1
100 CAPSULE ORAL 2 TIMES DAILY
Qty: 13 CAPSULE | Refills: 0 | Status: SHIPPED | OUTPATIENT
Start: 2017-07-26 | End: 2017-08-21 | Stop reason: HOSPADM

## 2017-07-26 RX ORDER — LORATADINE 10 MG/1
10 TABLET ORAL DAILY
Status: DISCONTINUED | OUTPATIENT
Start: 2017-07-27 | End: 2017-08-03 | Stop reason: HOSPADM

## 2017-07-26 RX ORDER — ONDANSETRON 2 MG/ML
INJECTION INTRAMUSCULAR; INTRAVENOUS
Status: COMPLETED
Start: 2017-07-26 | End: 2017-07-26

## 2017-07-26 RX ORDER — SODIUM CHLORIDE FOR INHALATION 0.9 %
3 VIAL, NEBULIZER (ML) INHALATION ONCE
Status: COMPLETED | OUTPATIENT
Start: 2017-07-26 | End: 2017-07-26

## 2017-07-26 RX ORDER — MECLIZINE HCL 12.5 MG/1
25 TABLET ORAL 3 TIMES DAILY PRN
Status: DISCONTINUED | OUTPATIENT
Start: 2017-07-26 | End: 2017-08-03 | Stop reason: HOSPADM

## 2017-07-26 RX ORDER — GUAIFENESIN 600 MG
600 TABLET, EXTENDED RELEASE 12 HR ORAL EVERY 12 HOURS SCHEDULED
Status: DISCONTINUED | OUTPATIENT
Start: 2017-07-26 | End: 2017-07-28

## 2017-07-26 RX ORDER — ACETAMINOPHEN 325 MG/1
650 TABLET ORAL EVERY 6 HOURS PRN
Status: DISCONTINUED | OUTPATIENT
Start: 2017-07-26 | End: 2017-08-03 | Stop reason: HOSPADM

## 2017-07-26 RX ORDER — FUROSEMIDE 10 MG/ML
40 INJECTION INTRAMUSCULAR; INTRAVENOUS
Status: DISCONTINUED | OUTPATIENT
Start: 2017-07-27 | End: 2017-07-27

## 2017-07-26 RX ORDER — LEVALBUTEROL 1.25 MG/.5ML
1.25 SOLUTION, CONCENTRATE RESPIRATORY (INHALATION) EVERY 6 HOURS PRN
Status: DISCONTINUED | OUTPATIENT
Start: 2017-07-26 | End: 2017-08-03 | Stop reason: HOSPADM

## 2017-07-26 RX ORDER — DOXYCYCLINE HYCLATE 100 MG/1
100 CAPSULE ORAL ONCE
Status: COMPLETED | OUTPATIENT
Start: 2017-07-26 | End: 2017-07-26

## 2017-07-26 RX ORDER — ATORVASTATIN CALCIUM 10 MG/1
10 TABLET, FILM COATED ORAL DAILY
Status: DISCONTINUED | OUTPATIENT
Start: 2017-07-27 | End: 2017-08-03 | Stop reason: HOSPADM

## 2017-07-26 RX ORDER — ASPIRIN 81 MG/1
81 TABLET, CHEWABLE ORAL DAILY
Status: DISCONTINUED | OUTPATIENT
Start: 2017-07-27 | End: 2017-08-03 | Stop reason: HOSPADM

## 2017-07-26 RX ORDER — SODIUM CHLORIDE FOR INHALATION 0.9 %
3 VIAL, NEBULIZER (ML) INHALATION
Status: DISCONTINUED | OUTPATIENT
Start: 2017-07-27 | End: 2017-08-03 | Stop reason: HOSPADM

## 2017-07-26 RX ORDER — CLOPIDOGREL BISULFATE 75 MG/1
75 TABLET ORAL DAILY
Status: DISCONTINUED | OUTPATIENT
Start: 2017-07-27 | End: 2017-08-03 | Stop reason: HOSPADM

## 2017-07-26 RX ORDER — ALBUTEROL SULFATE 90 UG/1
2 AEROSOL, METERED RESPIRATORY (INHALATION) EVERY 4 HOURS PRN
Status: DISCONTINUED | OUTPATIENT
Start: 2017-07-26 | End: 2017-08-03 | Stop reason: HOSPADM

## 2017-07-26 RX ORDER — LEVALBUTEROL INHALATION SOLUTION 0.63 MG/3ML
0.63 SOLUTION RESPIRATORY (INHALATION) EVERY 8 HOURS PRN
Status: DISCONTINUED | OUTPATIENT
Start: 2017-07-26 | End: 2017-07-26

## 2017-07-26 RX ORDER — PAROXETINE HYDROCHLORIDE 20 MG/1
20 TABLET, FILM COATED ORAL EVERY MORNING
Status: DISCONTINUED | OUTPATIENT
Start: 2017-07-27 | End: 2017-08-03 | Stop reason: HOSPADM

## 2017-07-26 RX ORDER — LEVALBUTEROL 1.25 MG/.5ML
1.25 SOLUTION, CONCENTRATE RESPIRATORY (INHALATION)
Status: DISCONTINUED | OUTPATIENT
Start: 2017-07-27 | End: 2017-08-03 | Stop reason: HOSPADM

## 2017-07-26 RX ORDER — SODIUM CHLORIDE FOR INHALATION 0.9 %
3 VIAL, NEBULIZER (ML) INHALATION EVERY 6 HOURS PRN
Status: DISCONTINUED | OUTPATIENT
Start: 2017-07-26 | End: 2017-08-03 | Stop reason: HOSPADM

## 2017-07-26 RX ORDER — HYDRALAZINE HYDROCHLORIDE 20 MG/ML
5 INJECTION INTRAMUSCULAR; INTRAVENOUS EVERY 4 HOURS PRN
Status: DISCONTINUED | OUTPATIENT
Start: 2017-07-26 | End: 2017-07-26

## 2017-07-26 RX ORDER — OXYBUTYNIN CHLORIDE 5 MG/1
5 TABLET, EXTENDED RELEASE ORAL
Status: DISCONTINUED | OUTPATIENT
Start: 2017-07-26 | End: 2017-08-03 | Stop reason: HOSPADM

## 2017-07-26 RX ORDER — HYDRALAZINE HYDROCHLORIDE 20 MG/ML
10 INJECTION INTRAMUSCULAR; INTRAVENOUS EVERY 4 HOURS PRN
Status: DISCONTINUED | OUTPATIENT
Start: 2017-07-26 | End: 2017-08-03 | Stop reason: HOSPADM

## 2017-07-26 RX ORDER — POTASSIUM CHLORIDE 20 MEQ/1
20 TABLET, EXTENDED RELEASE ORAL 2 TIMES DAILY
Status: DISCONTINUED | OUTPATIENT
Start: 2017-07-26 | End: 2017-08-03 | Stop reason: HOSPADM

## 2017-07-26 RX ORDER — FUROSEMIDE 10 MG/ML
40 INJECTION INTRAMUSCULAR; INTRAVENOUS ONCE
Status: COMPLETED | OUTPATIENT
Start: 2017-07-26 | End: 2017-07-26

## 2017-07-26 RX ORDER — CARVEDILOL 6.25 MG/1
6.25 TABLET ORAL 2 TIMES DAILY WITH MEALS
Status: DISCONTINUED | OUTPATIENT
Start: 2017-07-27 | End: 2017-08-03 | Stop reason: HOSPADM

## 2017-07-26 RX ORDER — METHYLPREDNISOLONE SODIUM SUCCINATE 40 MG/ML
40 INJECTION, POWDER, LYOPHILIZED, FOR SOLUTION INTRAMUSCULAR; INTRAVENOUS EVERY 12 HOURS SCHEDULED
Status: COMPLETED | OUTPATIENT
Start: 2017-07-26 | End: 2017-07-29

## 2017-07-26 RX ORDER — TAMSULOSIN HYDROCHLORIDE 0.4 MG/1
0.4 CAPSULE ORAL
Status: DISCONTINUED | OUTPATIENT
Start: 2017-07-26 | End: 2017-08-03 | Stop reason: HOSPADM

## 2017-07-26 RX ORDER — ALBUTEROL SULFATE 2.5 MG/3ML
10 SOLUTION RESPIRATORY (INHALATION) ONCE
Status: COMPLETED | OUTPATIENT
Start: 2017-07-26 | End: 2017-07-26

## 2017-07-26 RX ORDER — ALBUTEROL SULFATE 2.5 MG/3ML
5 SOLUTION RESPIRATORY (INHALATION) ONCE
Status: COMPLETED | OUTPATIENT
Start: 2017-07-26 | End: 2017-07-26

## 2017-07-26 RX ORDER — AMLODIPINE BESYLATE 2.5 MG/1
2.5 TABLET ORAL DAILY
Status: DISCONTINUED | OUTPATIENT
Start: 2017-07-27 | End: 2017-07-26

## 2017-07-26 RX ORDER — AMLODIPINE BESYLATE 5 MG/1
5 TABLET ORAL DAILY
Status: DISCONTINUED | OUTPATIENT
Start: 2017-07-27 | End: 2017-07-27

## 2017-07-26 RX ORDER — PANTOPRAZOLE SODIUM 40 MG/1
40 TABLET, DELAYED RELEASE ORAL
Status: DISCONTINUED | OUTPATIENT
Start: 2017-07-27 | End: 2017-08-03 | Stop reason: HOSPADM

## 2017-07-26 RX ORDER — DOXYCYCLINE HYCLATE 100 MG/1
100 CAPSULE ORAL 2 TIMES DAILY
Status: COMPLETED | OUTPATIENT
Start: 2017-07-26 | End: 2017-08-02

## 2017-07-26 RX ORDER — PREDNISONE 20 MG/1
40 TABLET ORAL ONCE
Status: COMPLETED | OUTPATIENT
Start: 2017-07-26 | End: 2017-07-26

## 2017-07-26 RX ORDER — HYDRALAZINE HYDROCHLORIDE 20 MG/ML
5 INJECTION INTRAMUSCULAR; INTRAVENOUS ONCE
Status: COMPLETED | OUTPATIENT
Start: 2017-07-26 | End: 2017-07-26

## 2017-07-26 RX ORDER — LORAZEPAM 0.5 MG/1
0.5 TABLET ORAL ONCE
Status: COMPLETED | OUTPATIENT
Start: 2017-07-26 | End: 2017-07-26

## 2017-07-26 RX ORDER — LORATADINE 10 MG/1
5 TABLET ORAL DAILY
Status: DISCONTINUED | OUTPATIENT
Start: 2017-07-27 | End: 2017-07-26

## 2017-07-26 RX ADMIN — PREDNISONE 40 MG: 20 TABLET ORAL at 13:49

## 2017-07-26 RX ADMIN — LEVALBUTEROL HYDROCHLORIDE 1.25 MG: 1.25 SOLUTION, CONCENTRATE RESPIRATORY (INHALATION) at 23:04

## 2017-07-26 RX ADMIN — ALBUTEROL SULFATE 5 MG: 2.5 SOLUTION RESPIRATORY (INHALATION) at 13:49

## 2017-07-26 RX ADMIN — DOXYCYCLINE 100 MG: 100 CAPSULE ORAL at 10:14

## 2017-07-26 RX ADMIN — ALBUTEROL SULFATE 10 MG: 2.5 SOLUTION RESPIRATORY (INHALATION) at 15:09

## 2017-07-26 RX ADMIN — OXYBUTYNIN CHLORIDE 5 MG: 5 TABLET, EXTENDED RELEASE ORAL at 21:23

## 2017-07-26 RX ADMIN — BIMATOPROST 1 DROP: 0.1 SOLUTION/ DROPS OPHTHALMIC at 21:24

## 2017-07-26 RX ADMIN — FUROSEMIDE 40 MG: 10 INJECTION, SOLUTION INTRAMUSCULAR; INTRAVENOUS at 18:17

## 2017-07-26 RX ADMIN — METHYLPREDNISOLONE SODIUM SUCCINATE 40 MG: 40 INJECTION, POWDER, FOR SOLUTION INTRAMUSCULAR; INTRAVENOUS at 21:23

## 2017-07-26 RX ADMIN — ONDANSETRON 4 MG: 2 INJECTION INTRAMUSCULAR; INTRAVENOUS at 23:21

## 2017-07-26 RX ADMIN — LORAZEPAM 0.5 MG: 0.5 TABLET ORAL at 23:13

## 2017-07-26 RX ADMIN — HYDRALAZINE HYDROCHLORIDE 5 MG: 20 INJECTION INTRAMUSCULAR; INTRAVENOUS at 21:56

## 2017-07-26 RX ADMIN — ISODIUM CHLORIDE 3 ML: 0.03 SOLUTION RESPIRATORY (INHALATION) at 22:54

## 2017-07-26 RX ADMIN — LEVALBUTEROL HYDROCHLORIDE 1.25 MG: 1.25 SOLUTION, CONCENTRATE RESPIRATORY (INHALATION) at 22:54

## 2017-07-26 RX ADMIN — DOXYCYCLINE 100 MG: 100 CAPSULE ORAL at 21:24

## 2017-07-26 RX ADMIN — LEVALBUTEROL HYDROCHLORIDE 0.63 MG: 0.63 SOLUTION RESPIRATORY (INHALATION) at 19:42

## 2017-07-26 RX ADMIN — IPRATROPIUM BROMIDE 0.5 MG: 0.5 SOLUTION RESPIRATORY (INHALATION) at 13:49

## 2017-07-26 RX ADMIN — IPRATROPIUM BROMIDE 1 MG: 0.5 SOLUTION RESPIRATORY (INHALATION) at 15:09

## 2017-07-26 RX ADMIN — POTASSIUM CHLORIDE 20 MEQ: 1500 TABLET, EXTENDED RELEASE ORAL at 21:23

## 2017-07-26 RX ADMIN — ISODIUM CHLORIDE 3 ML: 0.03 SOLUTION RESPIRATORY (INHALATION) at 15:09

## 2017-07-26 RX ADMIN — HYDRALAZINE HYDROCHLORIDE 5 MG: 20 INJECTION INTRAMUSCULAR; INTRAVENOUS at 22:54

## 2017-07-26 RX ADMIN — FLUTICASONE PROPIONATE AND SALMETEROL 1 PUFF: 50; 250 POWDER RESPIRATORY (INHALATION) at 21:24

## 2017-07-26 RX ADMIN — TAMSULOSIN HYDROCHLORIDE 0.4 MG: 0.4 CAPSULE ORAL at 21:23

## 2017-07-26 RX ADMIN — DEXAMETHASONE SODIUM PHOSPHATE 10 MG: 10 INJECTION, SOLUTION INTRAMUSCULAR; INTRAVENOUS at 10:14

## 2017-07-27 ENCOUNTER — APPOINTMENT (INPATIENT)
Dept: NON INVASIVE DIAGNOSTICS | Facility: HOSPITAL | Age: 77
DRG: 190 | End: 2017-07-27
Payer: MEDICARE

## 2017-07-27 PROBLEM — N17.9 AKI (ACUTE KIDNEY INJURY) (HCC): Status: ACTIVE | Noted: 2017-07-27

## 2017-07-27 LAB
ANION GAP SERPL CALCULATED.3IONS-SCNC: 11 MMOL/L (ref 4–13)
ATRIAL RATE: 109 BPM
ATRIAL RATE: 83 BPM
BUN SERPL-MCNC: 20 MG/DL (ref 5–25)
CALCIUM SERPL-MCNC: 9.7 MG/DL (ref 8.3–10.1)
CHLORIDE SERPL-SCNC: 102 MMOL/L (ref 100–108)
CO2 SERPL-SCNC: 25 MMOL/L (ref 21–32)
CREAT SERPL-MCNC: 1.37 MG/DL (ref 0.6–1.3)
ERYTHROCYTE [DISTWIDTH] IN BLOOD BY AUTOMATED COUNT: 14.2 % (ref 11.6–15.1)
GFR SERPL CREATININE-BSD FRML MDRD: 49 ML/MIN/1.73SQ M
GLUCOSE SERPL-MCNC: 137 MG/DL (ref 65–140)
HCT VFR BLD AUTO: 43.5 % (ref 36.5–49.3)
HGB BLD-MCNC: 14.7 G/DL (ref 12–17)
MAGNESIUM SERPL-MCNC: 2 MG/DL (ref 1.6–2.6)
MCH RBC QN AUTO: 30.1 PG (ref 26.8–34.3)
MCHC RBC AUTO-ENTMCNC: 33.8 G/DL (ref 31.4–37.4)
MCV RBC AUTO: 89 FL (ref 82–98)
P AXIS: 86 DEGREES
PHOSPHATE SERPL-MCNC: 3.4 MG/DL (ref 2.3–4.1)
PLATELET # BLD AUTO: 154 THOUSANDS/UL (ref 149–390)
PMV BLD AUTO: 11.4 FL (ref 8.9–12.7)
POTASSIUM SERPL-SCNC: 3.9 MMOL/L (ref 3.5–5.3)
PR INTERVAL: 186 MS
QRS AXIS: 12 DEGREES
QRS AXIS: 31 DEGREES
QRSD INTERVAL: 54 MS
QRSD INTERVAL: 98 MS
QT INTERVAL: 306 MS
QT INTERVAL: 342 MS
QTC INTERVAL: 402 MS
QTC INTERVAL: 495 MS
RBC # BLD AUTO: 4.88 MILLION/UL (ref 3.88–5.62)
SODIUM SERPL-SCNC: 138 MMOL/L (ref 136–145)
T WAVE AXIS: 81 DEGREES
T WAVE AXIS: 94 DEGREES
TROPONIN I SERPL-MCNC: 0.05 NG/ML
TROPONIN I SERPL-MCNC: 0.06 NG/ML
VENTRICULAR RATE: 104 BPM
VENTRICULAR RATE: 126 BPM
WBC # BLD AUTO: 9.28 THOUSAND/UL (ref 4.31–10.16)

## 2017-07-27 PROCEDURE — 94668 MNPJ CHEST WALL SBSQ: CPT

## 2017-07-27 PROCEDURE — 84484 ASSAY OF TROPONIN QUANT: CPT | Performed by: PHYSICIAN ASSISTANT

## 2017-07-27 PROCEDURE — 80048 BASIC METABOLIC PNL TOTAL CA: CPT | Performed by: INTERNAL MEDICINE

## 2017-07-27 PROCEDURE — 94760 N-INVAS EAR/PLS OXIMETRY 1: CPT

## 2017-07-27 PROCEDURE — 93306 TTE W/DOPPLER COMPLETE: CPT

## 2017-07-27 PROCEDURE — 94640 AIRWAY INHALATION TREATMENT: CPT

## 2017-07-27 PROCEDURE — 92610 EVALUATE SWALLOWING FUNCTION: CPT

## 2017-07-27 PROCEDURE — 84100 ASSAY OF PHOSPHORUS: CPT | Performed by: INTERNAL MEDICINE

## 2017-07-27 PROCEDURE — 85027 COMPLETE CBC AUTOMATED: CPT | Performed by: PHYSICIAN ASSISTANT

## 2017-07-27 PROCEDURE — 83735 ASSAY OF MAGNESIUM: CPT | Performed by: INTERNAL MEDICINE

## 2017-07-27 RX ORDER — AMLODIPINE BESYLATE 5 MG/1
5 TABLET ORAL ONCE
Status: COMPLETED | OUTPATIENT
Start: 2017-07-27 | End: 2017-07-27

## 2017-07-27 RX ORDER — FUROSEMIDE 10 MG/ML
20 INJECTION INTRAMUSCULAR; INTRAVENOUS DAILY
Status: DISCONTINUED | OUTPATIENT
Start: 2017-07-28 | End: 2017-07-28

## 2017-07-27 RX ORDER — AMLODIPINE BESYLATE 10 MG/1
10 TABLET ORAL DAILY
Status: DISCONTINUED | OUTPATIENT
Start: 2017-07-28 | End: 2017-08-03 | Stop reason: HOSPADM

## 2017-07-27 RX ADMIN — CLOPIDOGREL BISULFATE 75 MG: 75 TABLET ORAL at 08:42

## 2017-07-27 RX ADMIN — PANTOPRAZOLE SODIUM 40 MG: 40 TABLET, DELAYED RELEASE ORAL at 05:45

## 2017-07-27 RX ADMIN — GUAIFENESIN 600 MG: 600 TABLET, EXTENDED RELEASE ORAL at 08:42

## 2017-07-27 RX ADMIN — ISODIUM CHLORIDE 3 ML: 0.03 SOLUTION RESPIRATORY (INHALATION) at 14:16

## 2017-07-27 RX ADMIN — OXYBUTYNIN CHLORIDE 5 MG: 5 TABLET, EXTENDED RELEASE ORAL at 21:16

## 2017-07-27 RX ADMIN — ASPIRIN 81 MG 81 MG: 81 TABLET ORAL at 08:42

## 2017-07-27 RX ADMIN — AMLODIPINE BESYLATE 5 MG: 5 TABLET ORAL at 08:42

## 2017-07-27 RX ADMIN — ISODIUM CHLORIDE 3 ML: 0.03 SOLUTION RESPIRATORY (INHALATION) at 07:41

## 2017-07-27 RX ADMIN — CARVEDILOL 6.25 MG: 6.25 TABLET, FILM COATED ORAL at 17:15

## 2017-07-27 RX ADMIN — LORATADINE 10 MG: 10 TABLET ORAL at 08:42

## 2017-07-27 RX ADMIN — TIOTROPIUM BROMIDE 18 MCG: 18 CAPSULE ORAL; RESPIRATORY (INHALATION) at 08:43

## 2017-07-27 RX ADMIN — ISODIUM CHLORIDE 3 ML: 0.03 SOLUTION RESPIRATORY (INHALATION) at 19:09

## 2017-07-27 RX ADMIN — PERFLUTREN 1.6 ML/MIN: 6.52 INJECTION, SUSPENSION INTRAVENOUS at 17:52

## 2017-07-27 RX ADMIN — TAMSULOSIN HYDROCHLORIDE 0.4 MG: 0.4 CAPSULE ORAL at 17:16

## 2017-07-27 RX ADMIN — LEVALBUTEROL HYDROCHLORIDE 1.25 MG: 1.25 SOLUTION, CONCENTRATE RESPIRATORY (INHALATION) at 14:16

## 2017-07-27 RX ADMIN — FLUTICASONE PROPIONATE AND SALMETEROL 1 PUFF: 50; 250 POWDER RESPIRATORY (INHALATION) at 08:43

## 2017-07-27 RX ADMIN — LEVALBUTEROL HYDROCHLORIDE 1.25 MG: 1.25 SOLUTION, CONCENTRATE RESPIRATORY (INHALATION) at 19:09

## 2017-07-27 RX ADMIN — FLUTICASONE PROPIONATE AND SALMETEROL 1 PUFF: 50; 250 POWDER RESPIRATORY (INHALATION) at 21:15

## 2017-07-27 RX ADMIN — FUROSEMIDE 40 MG: 10 INJECTION, SOLUTION INTRAMUSCULAR; INTRAVENOUS at 08:42

## 2017-07-27 RX ADMIN — CARVEDILOL 6.25 MG: 6.25 TABLET, FILM COATED ORAL at 08:42

## 2017-07-27 RX ADMIN — GUAIFENESIN 600 MG: 600 TABLET, EXTENDED RELEASE ORAL at 21:16

## 2017-07-27 RX ADMIN — POTASSIUM CHLORIDE 20 MEQ: 1500 TABLET, EXTENDED RELEASE ORAL at 08:42

## 2017-07-27 RX ADMIN — POTASSIUM CHLORIDE 20 MEQ: 1500 TABLET, EXTENDED RELEASE ORAL at 17:16

## 2017-07-27 RX ADMIN — DOXYCYCLINE 100 MG: 100 CAPSULE ORAL at 17:16

## 2017-07-27 RX ADMIN — ENOXAPARIN SODIUM 40 MG: 40 INJECTION SUBCUTANEOUS at 08:41

## 2017-07-27 RX ADMIN — METHYLPREDNISOLONE SODIUM SUCCINATE 40 MG: 40 INJECTION, POWDER, FOR SOLUTION INTRAMUSCULAR; INTRAVENOUS at 21:15

## 2017-07-27 RX ADMIN — ATORVASTATIN CALCIUM 10 MG: 10 TABLET, FILM COATED ORAL at 08:42

## 2017-07-27 RX ADMIN — LEVALBUTEROL HYDROCHLORIDE 1.25 MG: 1.25 SOLUTION, CONCENTRATE RESPIRATORY (INHALATION) at 07:41

## 2017-07-27 RX ADMIN — METHYLPREDNISOLONE SODIUM SUCCINATE 40 MG: 40 INJECTION, POWDER, FOR SOLUTION INTRAMUSCULAR; INTRAVENOUS at 08:42

## 2017-07-27 RX ADMIN — BIMATOPROST 1 DROP: 0.1 SOLUTION/ DROPS OPHTHALMIC at 21:15

## 2017-07-27 RX ADMIN — PAROXETINE HYDROCHLORIDE 20 MG: 20 TABLET, FILM COATED ORAL at 08:43

## 2017-07-27 RX ADMIN — AMLODIPINE BESYLATE 5 MG: 5 TABLET ORAL at 17:16

## 2017-07-27 RX ADMIN — DOXYCYCLINE 100 MG: 100 CAPSULE ORAL at 08:42

## 2017-07-28 LAB
ANION GAP SERPL CALCULATED.3IONS-SCNC: 9 MMOL/L (ref 4–13)
BUN SERPL-MCNC: 40 MG/DL (ref 5–25)
CALCIUM SERPL-MCNC: 9.6 MG/DL (ref 8.3–10.1)
CHLORIDE SERPL-SCNC: 103 MMOL/L (ref 100–108)
CO2 SERPL-SCNC: 25 MMOL/L (ref 21–32)
CREAT SERPL-MCNC: 1.4 MG/DL (ref 0.6–1.3)
GFR SERPL CREATININE-BSD FRML MDRD: 48 ML/MIN/1.73SQ M
GLUCOSE SERPL-MCNC: 114 MG/DL (ref 65–140)
POTASSIUM SERPL-SCNC: 4 MMOL/L (ref 3.5–5.3)
SODIUM SERPL-SCNC: 137 MMOL/L (ref 136–145)

## 2017-07-28 PROCEDURE — 94760 N-INVAS EAR/PLS OXIMETRY 1: CPT

## 2017-07-28 PROCEDURE — 94660 CPAP INITIATION&MGMT: CPT

## 2017-07-28 PROCEDURE — 80048 BASIC METABOLIC PNL TOTAL CA: CPT | Performed by: NURSE PRACTITIONER

## 2017-07-28 PROCEDURE — 94668 MNPJ CHEST WALL SBSQ: CPT

## 2017-07-28 PROCEDURE — 94640 AIRWAY INHALATION TREATMENT: CPT

## 2017-07-28 PROCEDURE — 92526 ORAL FUNCTION THERAPY: CPT

## 2017-07-28 PROCEDURE — 94762 N-INVAS EAR/PLS OXIMTRY CONT: CPT

## 2017-07-28 RX ORDER — BENZONATATE 100 MG/1
100 CAPSULE ORAL 3 TIMES DAILY
Status: DISCONTINUED | OUTPATIENT
Start: 2017-07-28 | End: 2017-08-03 | Stop reason: HOSPADM

## 2017-07-28 RX ADMIN — CARVEDILOL 6.25 MG: 6.25 TABLET, FILM COATED ORAL at 11:13

## 2017-07-28 RX ADMIN — LEVALBUTEROL HYDROCHLORIDE 1.25 MG: 1.25 SOLUTION, CONCENTRATE RESPIRATORY (INHALATION) at 07:48

## 2017-07-28 RX ADMIN — PANTOPRAZOLE SODIUM 40 MG: 40 TABLET, DELAYED RELEASE ORAL at 05:12

## 2017-07-28 RX ADMIN — METHYLPREDNISOLONE SODIUM SUCCINATE 40 MG: 40 INJECTION, POWDER, FOR SOLUTION INTRAMUSCULAR; INTRAVENOUS at 11:13

## 2017-07-28 RX ADMIN — LEVALBUTEROL HYDROCHLORIDE 1.25 MG: 1.25 SOLUTION, CONCENTRATE RESPIRATORY (INHALATION) at 19:39

## 2017-07-28 RX ADMIN — POTASSIUM CHLORIDE 20 MEQ: 1500 TABLET, EXTENDED RELEASE ORAL at 17:14

## 2017-07-28 RX ADMIN — POTASSIUM CHLORIDE 20 MEQ: 1500 TABLET, EXTENDED RELEASE ORAL at 11:13

## 2017-07-28 RX ADMIN — TAMSULOSIN HYDROCHLORIDE 0.4 MG: 0.4 CAPSULE ORAL at 17:13

## 2017-07-28 RX ADMIN — METHYLPREDNISOLONE SODIUM SUCCINATE 40 MG: 40 INJECTION, POWDER, FOR SOLUTION INTRAMUSCULAR; INTRAVENOUS at 20:09

## 2017-07-28 RX ADMIN — ISODIUM CHLORIDE 3 ML: 0.03 SOLUTION RESPIRATORY (INHALATION) at 19:40

## 2017-07-28 RX ADMIN — TIOTROPIUM BROMIDE 18 MCG: 18 CAPSULE ORAL; RESPIRATORY (INHALATION) at 11:14

## 2017-07-28 RX ADMIN — FLUTICASONE PROPIONATE AND SALMETEROL 1 PUFF: 50; 250 POWDER RESPIRATORY (INHALATION) at 20:09

## 2017-07-28 RX ADMIN — ISODIUM CHLORIDE 3 ML: 0.03 SOLUTION RESPIRATORY (INHALATION) at 13:14

## 2017-07-28 RX ADMIN — BENZONATATE 100 MG: 100 CAPSULE ORAL at 20:09

## 2017-07-28 RX ADMIN — ATORVASTATIN CALCIUM 10 MG: 10 TABLET, FILM COATED ORAL at 11:13

## 2017-07-28 RX ADMIN — ISODIUM CHLORIDE 3 ML: 0.03 SOLUTION RESPIRATORY (INHALATION) at 07:49

## 2017-07-28 RX ADMIN — ASPIRIN 81 MG 81 MG: 81 TABLET ORAL at 11:13

## 2017-07-28 RX ADMIN — OXYBUTYNIN CHLORIDE 5 MG: 5 TABLET, EXTENDED RELEASE ORAL at 22:20

## 2017-07-28 RX ADMIN — LEVALBUTEROL HYDROCHLORIDE 1.25 MG: 1.25 SOLUTION, CONCENTRATE RESPIRATORY (INHALATION) at 13:13

## 2017-07-28 RX ADMIN — ALBUTEROL SULFATE 2 PUFF: 90 AEROSOL, METERED RESPIRATORY (INHALATION) at 11:20

## 2017-07-28 RX ADMIN — ENOXAPARIN SODIUM 40 MG: 40 INJECTION SUBCUTANEOUS at 11:13

## 2017-07-28 RX ADMIN — FLUTICASONE PROPIONATE AND SALMETEROL 1 PUFF: 50; 250 POWDER RESPIRATORY (INHALATION) at 11:14

## 2017-07-28 RX ADMIN — DOXYCYCLINE 100 MG: 100 CAPSULE ORAL at 17:13

## 2017-07-28 RX ADMIN — BENZONATATE 100 MG: 100 CAPSULE ORAL at 14:24

## 2017-07-28 RX ADMIN — LORATADINE 10 MG: 10 TABLET ORAL at 11:13

## 2017-07-28 RX ADMIN — AMLODIPINE BESYLATE 10 MG: 10 TABLET ORAL at 11:13

## 2017-07-28 RX ADMIN — CLOPIDOGREL BISULFATE 75 MG: 75 TABLET ORAL at 11:13

## 2017-07-28 RX ADMIN — PAROXETINE HYDROCHLORIDE 20 MG: 20 TABLET, FILM COATED ORAL at 11:14

## 2017-07-28 RX ADMIN — CARVEDILOL 6.25 MG: 6.25 TABLET, FILM COATED ORAL at 17:13

## 2017-07-28 RX ADMIN — DOXYCYCLINE 100 MG: 100 CAPSULE ORAL at 11:13

## 2017-07-28 RX ADMIN — BIMATOPROST 1 DROP: 0.1 SOLUTION/ DROPS OPHTHALMIC at 22:20

## 2017-07-29 LAB
ANION GAP SERPL CALCULATED.3IONS-SCNC: 7 MMOL/L (ref 4–13)
BUN SERPL-MCNC: 40 MG/DL (ref 5–25)
CALCIUM SERPL-MCNC: 9.4 MG/DL (ref 8.3–10.1)
CHLORIDE SERPL-SCNC: 106 MMOL/L (ref 100–108)
CO2 SERPL-SCNC: 26 MMOL/L (ref 21–32)
CREAT SERPL-MCNC: 1.23 MG/DL (ref 0.6–1.3)
GFR SERPL CREATININE-BSD FRML MDRD: 56 ML/MIN/1.73SQ M
GLUCOSE SERPL-MCNC: 108 MG/DL (ref 65–140)
POTASSIUM SERPL-SCNC: 4.1 MMOL/L (ref 3.5–5.3)
SODIUM SERPL-SCNC: 139 MMOL/L (ref 136–145)

## 2017-07-29 PROCEDURE — 94640 AIRWAY INHALATION TREATMENT: CPT

## 2017-07-29 PROCEDURE — 94760 N-INVAS EAR/PLS OXIMETRY 1: CPT

## 2017-07-29 PROCEDURE — 94668 MNPJ CHEST WALL SBSQ: CPT

## 2017-07-29 PROCEDURE — 80048 BASIC METABOLIC PNL TOTAL CA: CPT | Performed by: NURSE PRACTITIONER

## 2017-07-29 RX ORDER — GUAIFENESIN/DEXTROMETHORPHAN 100-10MG/5
10 SYRUP ORAL EVERY 4 HOURS PRN
Status: DISCONTINUED | OUTPATIENT
Start: 2017-07-29 | End: 2017-07-30

## 2017-07-29 RX ORDER — METHYLPREDNISOLONE SODIUM SUCCINATE 40 MG/ML
40 INJECTION, POWDER, LYOPHILIZED, FOR SOLUTION INTRAMUSCULAR; INTRAVENOUS DAILY
Status: DISCONTINUED | OUTPATIENT
Start: 2017-07-30 | End: 2017-07-31

## 2017-07-29 RX ADMIN — TIOTROPIUM BROMIDE 18 MCG: 18 CAPSULE ORAL; RESPIRATORY (INHALATION) at 09:23

## 2017-07-29 RX ADMIN — BENZONATATE 100 MG: 100 CAPSULE ORAL at 17:38

## 2017-07-29 RX ADMIN — AMLODIPINE BESYLATE 10 MG: 10 TABLET ORAL at 09:21

## 2017-07-29 RX ADMIN — OXYBUTYNIN CHLORIDE 5 MG: 5 TABLET, EXTENDED RELEASE ORAL at 20:50

## 2017-07-29 RX ADMIN — LEVALBUTEROL HYDROCHLORIDE 1.25 MG: 1.25 SOLUTION, CONCENTRATE RESPIRATORY (INHALATION) at 13:12

## 2017-07-29 RX ADMIN — LEVALBUTEROL HYDROCHLORIDE 1.25 MG: 1.25 SOLUTION, CONCENTRATE RESPIRATORY (INHALATION) at 19:14

## 2017-07-29 RX ADMIN — FLUTICASONE PROPIONATE AND SALMETEROL 1 PUFF: 50; 250 POWDER RESPIRATORY (INHALATION) at 20:50

## 2017-07-29 RX ADMIN — DOXYCYCLINE 100 MG: 100 CAPSULE ORAL at 09:24

## 2017-07-29 RX ADMIN — ISODIUM CHLORIDE 3 ML: 0.03 SOLUTION RESPIRATORY (INHALATION) at 19:13

## 2017-07-29 RX ADMIN — CLOPIDOGREL BISULFATE 75 MG: 75 TABLET ORAL at 09:22

## 2017-07-29 RX ADMIN — PAROXETINE HYDROCHLORIDE 20 MG: 20 TABLET, FILM COATED ORAL at 09:23

## 2017-07-29 RX ADMIN — BIMATOPROST 1 DROP: 0.1 SOLUTION/ DROPS OPHTHALMIC at 20:51

## 2017-07-29 RX ADMIN — POTASSIUM CHLORIDE 20 MEQ: 1500 TABLET, EXTENDED RELEASE ORAL at 09:22

## 2017-07-29 RX ADMIN — ISODIUM CHLORIDE 3 ML: 0.03 SOLUTION RESPIRATORY (INHALATION) at 13:13

## 2017-07-29 RX ADMIN — PANTOPRAZOLE SODIUM 40 MG: 40 TABLET, DELAYED RELEASE ORAL at 06:45

## 2017-07-29 RX ADMIN — CARVEDILOL 6.25 MG: 6.25 TABLET, FILM COATED ORAL at 17:38

## 2017-07-29 RX ADMIN — METHYLPREDNISOLONE SODIUM SUCCINATE 40 MG: 40 INJECTION, POWDER, FOR SOLUTION INTRAMUSCULAR; INTRAVENOUS at 09:22

## 2017-07-29 RX ADMIN — TAMSULOSIN HYDROCHLORIDE 0.4 MG: 0.4 CAPSULE ORAL at 17:38

## 2017-07-29 RX ADMIN — LEVALBUTEROL HYDROCHLORIDE 1.25 MG: 1.25 SOLUTION, CONCENTRATE RESPIRATORY (INHALATION) at 08:00

## 2017-07-29 RX ADMIN — ENOXAPARIN SODIUM 40 MG: 40 INJECTION SUBCUTANEOUS at 09:22

## 2017-07-29 RX ADMIN — BENZONATATE 100 MG: 100 CAPSULE ORAL at 20:51

## 2017-07-29 RX ADMIN — DOXYCYCLINE 100 MG: 100 CAPSULE ORAL at 17:38

## 2017-07-29 RX ADMIN — CARVEDILOL 6.25 MG: 6.25 TABLET, FILM COATED ORAL at 09:22

## 2017-07-29 RX ADMIN — ASPIRIN 81 MG 81 MG: 81 TABLET ORAL at 09:23

## 2017-07-29 RX ADMIN — ATORVASTATIN CALCIUM 10 MG: 10 TABLET, FILM COATED ORAL at 09:22

## 2017-07-29 RX ADMIN — FLUTICASONE PROPIONATE AND SALMETEROL 1 PUFF: 50; 250 POWDER RESPIRATORY (INHALATION) at 09:23

## 2017-07-29 RX ADMIN — POTASSIUM CHLORIDE 20 MEQ: 1500 TABLET, EXTENDED RELEASE ORAL at 17:38

## 2017-07-29 RX ADMIN — ISODIUM CHLORIDE 3 ML: 0.03 SOLUTION RESPIRATORY (INHALATION) at 08:00

## 2017-07-29 RX ADMIN — LORATADINE 10 MG: 10 TABLET ORAL at 09:22

## 2017-07-29 RX ADMIN — METHYLPREDNISOLONE SODIUM SUCCINATE 40 MG: 40 INJECTION, POWDER, FOR SOLUTION INTRAMUSCULAR; INTRAVENOUS at 20:52

## 2017-07-29 RX ADMIN — BENZONATATE 100 MG: 100 CAPSULE ORAL at 09:22

## 2017-07-29 RX ADMIN — GUAIFENESIN AND DEXTROMETHORPHAN 10 ML: 100; 10 SYRUP ORAL at 12:15

## 2017-07-30 ENCOUNTER — APPOINTMENT (INPATIENT)
Dept: RADIOLOGY | Facility: HOSPITAL | Age: 77
DRG: 190 | End: 2017-07-30
Payer: MEDICARE

## 2017-07-30 LAB
ANION GAP SERPL CALCULATED.3IONS-SCNC: 7 MMOL/L (ref 4–13)
ANION GAP SERPL CALCULATED.3IONS-SCNC: 7 MMOL/L (ref 4–13)
ARTERIAL PATENCY WRIST A: YES
BASE EXCESS BLDA CALC-SCNC: -0.8 MMOL/L
BASOPHILS # BLD MANUAL: 0 THOUSAND/UL (ref 0–0.1)
BASOPHILS NFR MAR MANUAL: 0 % (ref 0–1)
BUN SERPL-MCNC: 40 MG/DL (ref 5–25)
BUN SERPL-MCNC: 40 MG/DL (ref 5–25)
CALCIUM SERPL-MCNC: 9.3 MG/DL (ref 8.3–10.1)
CALCIUM SERPL-MCNC: 9.9 MG/DL (ref 8.3–10.1)
CHLORIDE SERPL-SCNC: 105 MMOL/L (ref 100–108)
CHLORIDE SERPL-SCNC: 105 MMOL/L (ref 100–108)
CO2 SERPL-SCNC: 25 MMOL/L (ref 21–32)
CO2 SERPL-SCNC: 26 MMOL/L (ref 21–32)
CREAT SERPL-MCNC: 1.13 MG/DL (ref 0.6–1.3)
CREAT SERPL-MCNC: 1.19 MG/DL (ref 0.6–1.3)
EOSINOPHIL # BLD MANUAL: 0 THOUSAND/UL (ref 0–0.4)
EOSINOPHIL NFR BLD MANUAL: 0 % (ref 0–6)
ERYTHROCYTE [DISTWIDTH] IN BLOOD BY AUTOMATED COUNT: 14.1 % (ref 11.6–15.1)
GFR SERPL CREATININE-BSD FRML MDRD: 59 ML/MIN/1.73SQ M
GFR SERPL CREATININE-BSD FRML MDRD: 62 ML/MIN/1.73SQ M
GLUCOSE SERPL-MCNC: 117 MG/DL (ref 65–140)
GLUCOSE SERPL-MCNC: 118 MG/DL (ref 65–140)
HCO3 BLDA-SCNC: 19.8 MMOL/L (ref 22–28)
HCT VFR BLD AUTO: 44.2 % (ref 36.5–49.3)
HGB BLD-MCNC: 14.9 G/DL (ref 12–17)
LYMPHOCYTES # BLD AUTO: 0.27 THOUSAND/UL (ref 0.6–4.47)
LYMPHOCYTES # BLD AUTO: 2 % (ref 14–44)
MCH RBC QN AUTO: 30.2 PG (ref 26.8–34.3)
MCHC RBC AUTO-ENTMCNC: 33.7 G/DL (ref 31.4–37.4)
MCV RBC AUTO: 90 FL (ref 82–98)
MONOCYTES # BLD AUTO: 1.23 THOUSAND/UL (ref 0–1.22)
MONOCYTES NFR BLD: 9 % (ref 4–12)
NASAL CANNULA: 2
NEUTROPHILS # BLD MANUAL: 12.18 THOUSAND/UL (ref 1.85–7.62)
NEUTS SEG NFR BLD AUTO: 89 % (ref 43–75)
NRBC BLD AUTO-RTO: 0 /100 WBCS
O2 CT BLDA-SCNC: 22.4 ML/DL (ref 16–23)
OXYHGB MFR BLDA: 93.4 % (ref 94–97)
PCO2 BLDA: 24.6 MM HG (ref 36–44)
PH BLDA: 7.52 [PH] (ref 7.35–7.45)
PLATELET # BLD AUTO: 161 THOUSANDS/UL (ref 149–390)
PLATELET BLD QL SMEAR: ADEQUATE
PMV BLD AUTO: 11.3 FL (ref 8.9–12.7)
PO2 BLDA: 65.8 MM HG (ref 75–129)
POTASSIUM SERPL-SCNC: 4.4 MMOL/L (ref 3.5–5.3)
POTASSIUM SERPL-SCNC: 4.5 MMOL/L (ref 3.5–5.3)
RBC # BLD AUTO: 4.93 MILLION/UL (ref 3.88–5.62)
RBC MORPH BLD: NORMAL
SODIUM SERPL-SCNC: 137 MMOL/L (ref 136–145)
SODIUM SERPL-SCNC: 138 MMOL/L (ref 136–145)
SPECIMEN SOURCE: ABNORMAL
WBC # BLD AUTO: 13.68 THOUSAND/UL (ref 4.31–10.16)

## 2017-07-30 PROCEDURE — 85027 COMPLETE CBC AUTOMATED: CPT | Performed by: INTERNAL MEDICINE

## 2017-07-30 PROCEDURE — 94640 AIRWAY INHALATION TREATMENT: CPT | Performed by: SOCIAL WORKER

## 2017-07-30 PROCEDURE — 36600 WITHDRAWAL OF ARTERIAL BLOOD: CPT | Performed by: SOCIAL WORKER

## 2017-07-30 PROCEDURE — 94640 AIRWAY INHALATION TREATMENT: CPT

## 2017-07-30 PROCEDURE — 94760 N-INVAS EAR/PLS OXIMETRY 1: CPT

## 2017-07-30 PROCEDURE — 71010 HB CHEST X-RAY 1 VIEW FRONTAL (PORTABLE): CPT

## 2017-07-30 PROCEDURE — 94762 N-INVAS EAR/PLS OXIMTRY CONT: CPT | Performed by: SOCIAL WORKER

## 2017-07-30 PROCEDURE — 85007 BL SMEAR W/DIFF WBC COUNT: CPT | Performed by: INTERNAL MEDICINE

## 2017-07-30 PROCEDURE — 94669 MECHANICAL CHEST WALL OSCILL: CPT | Performed by: SOCIAL WORKER

## 2017-07-30 PROCEDURE — 94668 MNPJ CHEST WALL SBSQ: CPT | Performed by: SOCIAL WORKER

## 2017-07-30 PROCEDURE — 80048 BASIC METABOLIC PNL TOTAL CA: CPT | Performed by: NURSE PRACTITIONER

## 2017-07-30 PROCEDURE — 82805 BLOOD GASES W/O2 SATURATION: CPT | Performed by: INTERNAL MEDICINE

## 2017-07-30 RX ORDER — GUAIFENESIN/DEXTROMETHORPHAN 100-10MG/5
10 SYRUP ORAL EVERY 6 HOURS
Status: DISCONTINUED | OUTPATIENT
Start: 2017-07-30 | End: 2017-08-03 | Stop reason: HOSPADM

## 2017-07-30 RX ORDER — LORAZEPAM 0.5 MG/1
0.25 TABLET ORAL DAILY
Status: DISCONTINUED | OUTPATIENT
Start: 2017-07-31 | End: 2017-08-03 | Stop reason: HOSPADM

## 2017-07-30 RX ORDER — LORAZEPAM 0.5 MG/1
0.25 TABLET ORAL ONCE
Status: COMPLETED | OUTPATIENT
Start: 2017-07-30 | End: 2017-07-30

## 2017-07-30 RX ADMIN — LEVALBUTEROL HYDROCHLORIDE 1.25 MG: 1.25 SOLUTION, CONCENTRATE RESPIRATORY (INHALATION) at 07:44

## 2017-07-30 RX ADMIN — ISODIUM CHLORIDE 3 ML: 0.03 SOLUTION RESPIRATORY (INHALATION) at 12:36

## 2017-07-30 RX ADMIN — ISODIUM CHLORIDE 3 ML: 0.03 SOLUTION RESPIRATORY (INHALATION) at 19:58

## 2017-07-30 RX ADMIN — CARVEDILOL 6.25 MG: 6.25 TABLET, FILM COATED ORAL at 17:22

## 2017-07-30 RX ADMIN — TAMSULOSIN HYDROCHLORIDE 0.4 MG: 0.4 CAPSULE ORAL at 17:22

## 2017-07-30 RX ADMIN — FLUTICASONE PROPIONATE AND SALMETEROL 1 PUFF: 50; 250 POWDER RESPIRATORY (INHALATION) at 10:12

## 2017-07-30 RX ADMIN — DOXYCYCLINE 100 MG: 100 CAPSULE ORAL at 10:09

## 2017-07-30 RX ADMIN — FLUTICASONE PROPIONATE AND SALMETEROL 1 PUFF: 50; 250 POWDER RESPIRATORY (INHALATION) at 20:59

## 2017-07-30 RX ADMIN — LORATADINE 10 MG: 10 TABLET ORAL at 10:10

## 2017-07-30 RX ADMIN — GUAIFENESIN AND DEXTROMETHORPHAN 10 ML: 100; 10 SYRUP ORAL at 17:21

## 2017-07-30 RX ADMIN — BENZONATATE 100 MG: 100 CAPSULE ORAL at 10:10

## 2017-07-30 RX ADMIN — LEVALBUTEROL HYDROCHLORIDE 1.25 MG: 1.25 SOLUTION, CONCENTRATE RESPIRATORY (INHALATION) at 19:58

## 2017-07-30 RX ADMIN — LEVALBUTEROL HYDROCHLORIDE 1.25 MG: 1.25 SOLUTION, CONCENTRATE RESPIRATORY (INHALATION) at 12:36

## 2017-07-30 RX ADMIN — BIMATOPROST 1 DROP: 0.1 SOLUTION/ DROPS OPHTHALMIC at 22:50

## 2017-07-30 RX ADMIN — POTASSIUM CHLORIDE 20 MEQ: 1500 TABLET, EXTENDED RELEASE ORAL at 17:23

## 2017-07-30 RX ADMIN — CLOPIDOGREL BISULFATE 75 MG: 75 TABLET ORAL at 10:04

## 2017-07-30 RX ADMIN — GUAIFENESIN AND DEXTROMETHORPHAN 10 ML: 100; 10 SYRUP ORAL at 10:10

## 2017-07-30 RX ADMIN — DOXYCYCLINE 100 MG: 100 CAPSULE ORAL at 17:22

## 2017-07-30 RX ADMIN — AMLODIPINE BESYLATE 10 MG: 10 TABLET ORAL at 10:10

## 2017-07-30 RX ADMIN — ASPIRIN 81 MG 81 MG: 81 TABLET ORAL at 10:04

## 2017-07-30 RX ADMIN — PANTOPRAZOLE SODIUM 40 MG: 40 TABLET, DELAYED RELEASE ORAL at 05:58

## 2017-07-30 RX ADMIN — POTASSIUM CHLORIDE 20 MEQ: 1500 TABLET, EXTENDED RELEASE ORAL at 10:04

## 2017-07-30 RX ADMIN — ATORVASTATIN CALCIUM 10 MG: 10 TABLET, FILM COATED ORAL at 10:10

## 2017-07-30 RX ADMIN — BENZONATATE 100 MG: 100 CAPSULE ORAL at 17:22

## 2017-07-30 RX ADMIN — ENOXAPARIN SODIUM 40 MG: 40 INJECTION SUBCUTANEOUS at 10:11

## 2017-07-30 RX ADMIN — LORAZEPAM 0.25 MG: 0.5 TABLET ORAL at 20:57

## 2017-07-30 RX ADMIN — OXYBUTYNIN CHLORIDE 5 MG: 5 TABLET, EXTENDED RELEASE ORAL at 22:59

## 2017-07-30 RX ADMIN — PAROXETINE HYDROCHLORIDE 20 MG: 20 TABLET, FILM COATED ORAL at 10:12

## 2017-07-30 RX ADMIN — HYDRALAZINE HYDROCHLORIDE 10 MG: 20 INJECTION INTRAMUSCULAR; INTRAVENOUS at 10:05

## 2017-07-30 RX ADMIN — BENZONATATE 100 MG: 100 CAPSULE ORAL at 20:57

## 2017-07-30 RX ADMIN — TIOTROPIUM BROMIDE 18 MCG: 18 CAPSULE ORAL; RESPIRATORY (INHALATION) at 10:12

## 2017-07-30 RX ADMIN — ALBUTEROL SULFATE 2 PUFF: 90 AEROSOL, METERED RESPIRATORY (INHALATION) at 14:25

## 2017-07-30 RX ADMIN — METHYLPREDNISOLONE SODIUM SUCCINATE 40 MG: 40 INJECTION, POWDER, FOR SOLUTION INTRAMUSCULAR; INTRAVENOUS at 10:05

## 2017-07-30 RX ADMIN — CARVEDILOL 6.25 MG: 6.25 TABLET, FILM COATED ORAL at 10:10

## 2017-07-30 RX ADMIN — GUAIFENESIN AND DEXTROMETHORPHAN 10 ML: 100; 10 SYRUP ORAL at 22:59

## 2017-07-30 RX ADMIN — ISODIUM CHLORIDE 3 ML: 0.03 SOLUTION RESPIRATORY (INHALATION) at 07:44

## 2017-07-31 LAB
ANION GAP SERPL CALCULATED.3IONS-SCNC: 7 MMOL/L (ref 4–13)
BASOPHILS # BLD AUTO: 0.01 THOUSANDS/ΜL (ref 0–0.1)
BASOPHILS NFR BLD AUTO: 0 % (ref 0–1)
BUN SERPL-MCNC: 41 MG/DL (ref 5–25)
CALCIUM SERPL-MCNC: 9.1 MG/DL (ref 8.3–10.1)
CHLORIDE SERPL-SCNC: 106 MMOL/L (ref 100–108)
CO2 SERPL-SCNC: 26 MMOL/L (ref 21–32)
CREAT SERPL-MCNC: 1.22 MG/DL (ref 0.6–1.3)
EOSINOPHIL # BLD AUTO: 0.03 THOUSAND/ΜL (ref 0–0.61)
EOSINOPHIL NFR BLD AUTO: 0 % (ref 0–6)
ERYTHROCYTE [DISTWIDTH] IN BLOOD BY AUTOMATED COUNT: 14.1 % (ref 11.6–15.1)
GFR SERPL CREATININE-BSD FRML MDRD: 57 ML/MIN/1.73SQ M
GLUCOSE SERPL-MCNC: 90 MG/DL (ref 65–140)
HCT VFR BLD AUTO: 43.5 % (ref 36.5–49.3)
HGB BLD-MCNC: 14.6 G/DL (ref 12–17)
LYMPHOCYTES # BLD AUTO: 0.8 THOUSANDS/ΜL (ref 0.6–4.47)
LYMPHOCYTES NFR BLD AUTO: 5 % (ref 14–44)
MCH RBC QN AUTO: 30.5 PG (ref 26.8–34.3)
MCHC RBC AUTO-ENTMCNC: 33.6 G/DL (ref 31.4–37.4)
MCV RBC AUTO: 91 FL (ref 82–98)
MONOCYTES # BLD AUTO: 1.84 THOUSAND/ΜL (ref 0.17–1.22)
MONOCYTES NFR BLD AUTO: 11 % (ref 4–12)
NEUTROPHILS # BLD AUTO: 13.47 THOUSANDS/ΜL (ref 1.85–7.62)
NEUTS SEG NFR BLD AUTO: 84 % (ref 43–75)
NRBC BLD AUTO-RTO: 0 /100 WBCS
PLATELET # BLD AUTO: 116 THOUSANDS/UL (ref 149–390)
PMV BLD AUTO: 11.3 FL (ref 8.9–12.7)
POTASSIUM SERPL-SCNC: 4 MMOL/L (ref 3.5–5.3)
RBC # BLD AUTO: 4.78 MILLION/UL (ref 3.88–5.62)
SODIUM SERPL-SCNC: 139 MMOL/L (ref 136–145)
WBC # BLD AUTO: 16.2 THOUSAND/UL (ref 4.31–10.16)

## 2017-07-31 PROCEDURE — G8978 MOBILITY CURRENT STATUS: HCPCS

## 2017-07-31 PROCEDURE — 94640 AIRWAY INHALATION TREATMENT: CPT

## 2017-07-31 PROCEDURE — 94669 MECHANICAL CHEST WALL OSCILL: CPT

## 2017-07-31 PROCEDURE — 97163 PT EVAL HIGH COMPLEX 45 MIN: CPT

## 2017-07-31 PROCEDURE — 80048 BASIC METABOLIC PNL TOTAL CA: CPT | Performed by: INTERNAL MEDICINE

## 2017-07-31 PROCEDURE — 94760 N-INVAS EAR/PLS OXIMETRY 1: CPT

## 2017-07-31 PROCEDURE — G8979 MOBILITY GOAL STATUS: HCPCS

## 2017-07-31 PROCEDURE — 94668 MNPJ CHEST WALL SBSQ: CPT

## 2017-07-31 PROCEDURE — 85025 COMPLETE CBC W/AUTO DIFF WBC: CPT | Performed by: INTERNAL MEDICINE

## 2017-07-31 RX ORDER — PREDNISONE 20 MG/1
40 TABLET ORAL DAILY
Status: DISCONTINUED | OUTPATIENT
Start: 2017-08-01 | End: 2017-08-01

## 2017-07-31 RX ADMIN — ISODIUM CHLORIDE 3 ML: 0.03 SOLUTION RESPIRATORY (INHALATION) at 13:41

## 2017-07-31 RX ADMIN — BENZONATATE 100 MG: 100 CAPSULE ORAL at 21:49

## 2017-07-31 RX ADMIN — PAROXETINE HYDROCHLORIDE 20 MG: 20 TABLET, FILM COATED ORAL at 09:39

## 2017-07-31 RX ADMIN — POTASSIUM CHLORIDE 20 MEQ: 1500 TABLET, EXTENDED RELEASE ORAL at 09:39

## 2017-07-31 RX ADMIN — FLUTICASONE PROPIONATE AND SALMETEROL 1 PUFF: 50; 250 POWDER RESPIRATORY (INHALATION) at 21:44

## 2017-07-31 RX ADMIN — GUAIFENESIN AND DEXTROMETHORPHAN 10 ML: 100; 10 SYRUP ORAL at 21:49

## 2017-07-31 RX ADMIN — LORATADINE 10 MG: 10 TABLET ORAL at 09:39

## 2017-07-31 RX ADMIN — CLOPIDOGREL BISULFATE 75 MG: 75 TABLET ORAL at 09:35

## 2017-07-31 RX ADMIN — GUAIFENESIN AND DEXTROMETHORPHAN 10 ML: 100; 10 SYRUP ORAL at 05:42

## 2017-07-31 RX ADMIN — CARVEDILOL 6.25 MG: 6.25 TABLET, FILM COATED ORAL at 18:32

## 2017-07-31 RX ADMIN — LORAZEPAM 0.25 MG: 0.5 TABLET ORAL at 09:39

## 2017-07-31 RX ADMIN — TAMSULOSIN HYDROCHLORIDE 0.4 MG: 0.4 CAPSULE ORAL at 18:31

## 2017-07-31 RX ADMIN — ATORVASTATIN CALCIUM 10 MG: 10 TABLET, FILM COATED ORAL at 09:38

## 2017-07-31 RX ADMIN — BENZONATATE 100 MG: 100 CAPSULE ORAL at 09:35

## 2017-07-31 RX ADMIN — DOXYCYCLINE 100 MG: 100 CAPSULE ORAL at 09:35

## 2017-07-31 RX ADMIN — TIOTROPIUM BROMIDE 18 MCG: 18 CAPSULE ORAL; RESPIRATORY (INHALATION) at 09:37

## 2017-07-31 RX ADMIN — BENZONATATE 100 MG: 100 CAPSULE ORAL at 18:31

## 2017-07-31 RX ADMIN — LEVALBUTEROL HYDROCHLORIDE 1.25 MG: 1.25 SOLUTION, CONCENTRATE RESPIRATORY (INHALATION) at 13:40

## 2017-07-31 RX ADMIN — GUAIFENESIN AND DEXTROMETHORPHAN 10 ML: 100; 10 SYRUP ORAL at 18:32

## 2017-07-31 RX ADMIN — ISODIUM CHLORIDE 3 ML: 0.03 SOLUTION RESPIRATORY (INHALATION) at 07:30

## 2017-07-31 RX ADMIN — CARVEDILOL 6.25 MG: 6.25 TABLET, FILM COATED ORAL at 09:35

## 2017-07-31 RX ADMIN — GUAIFENESIN AND DEXTROMETHORPHAN 10 ML: 100; 10 SYRUP ORAL at 09:35

## 2017-07-31 RX ADMIN — POTASSIUM CHLORIDE 20 MEQ: 1500 TABLET, EXTENDED RELEASE ORAL at 18:30

## 2017-07-31 RX ADMIN — OXYBUTYNIN CHLORIDE 5 MG: 5 TABLET, EXTENDED RELEASE ORAL at 21:49

## 2017-07-31 RX ADMIN — AMLODIPINE BESYLATE 10 MG: 10 TABLET ORAL at 09:38

## 2017-07-31 RX ADMIN — ISODIUM CHLORIDE 3 ML: 0.03 SOLUTION RESPIRATORY (INHALATION) at 19:48

## 2017-07-31 RX ADMIN — ENOXAPARIN SODIUM 40 MG: 40 INJECTION SUBCUTANEOUS at 09:36

## 2017-07-31 RX ADMIN — METHYLPREDNISOLONE SODIUM SUCCINATE 40 MG: 40 INJECTION, POWDER, FOR SOLUTION INTRAMUSCULAR; INTRAVENOUS at 09:35

## 2017-07-31 RX ADMIN — LEVALBUTEROL HYDROCHLORIDE 1.25 MG: 1.25 SOLUTION, CONCENTRATE RESPIRATORY (INHALATION) at 07:30

## 2017-07-31 RX ADMIN — BIMATOPROST 1 DROP: 0.1 SOLUTION/ DROPS OPHTHALMIC at 21:44

## 2017-07-31 RX ADMIN — DOXYCYCLINE 100 MG: 100 CAPSULE ORAL at 18:30

## 2017-07-31 RX ADMIN — LEVALBUTEROL HYDROCHLORIDE 1.25 MG: 1.25 SOLUTION, CONCENTRATE RESPIRATORY (INHALATION) at 19:48

## 2017-07-31 RX ADMIN — ASPIRIN 81 MG 81 MG: 81 TABLET ORAL at 09:38

## 2017-07-31 RX ADMIN — FLUTICASONE PROPIONATE AND SALMETEROL 1 PUFF: 50; 250 POWDER RESPIRATORY (INHALATION) at 09:37

## 2017-07-31 RX ADMIN — PANTOPRAZOLE SODIUM 40 MG: 40 TABLET, DELAYED RELEASE ORAL at 05:42

## 2017-08-01 LAB
ANION GAP SERPL CALCULATED.3IONS-SCNC: 7 MMOL/L (ref 4–13)
BUN SERPL-MCNC: 39 MG/DL (ref 5–25)
CALCIUM SERPL-MCNC: 9.3 MG/DL (ref 8.3–10.1)
CHLORIDE SERPL-SCNC: 106 MMOL/L (ref 100–108)
CO2 SERPL-SCNC: 26 MMOL/L (ref 21–32)
CREAT SERPL-MCNC: 1.09 MG/DL (ref 0.6–1.3)
GFR SERPL CREATININE-BSD FRML MDRD: 65 ML/MIN/1.73SQ M
GLUCOSE SERPL-MCNC: 103 MG/DL (ref 65–140)
POTASSIUM SERPL-SCNC: 4.2 MMOL/L (ref 3.5–5.3)
SODIUM SERPL-SCNC: 139 MMOL/L (ref 136–145)

## 2017-08-01 PROCEDURE — 92526 ORAL FUNCTION THERAPY: CPT

## 2017-08-01 PROCEDURE — 94669 MECHANICAL CHEST WALL OSCILL: CPT

## 2017-08-01 PROCEDURE — G8987 SELF CARE CURRENT STATUS: HCPCS

## 2017-08-01 PROCEDURE — 94760 N-INVAS EAR/PLS OXIMETRY 1: CPT

## 2017-08-01 PROCEDURE — 80048 BASIC METABOLIC PNL TOTAL CA: CPT | Performed by: INTERNAL MEDICINE

## 2017-08-01 PROCEDURE — G8988 SELF CARE GOAL STATUS: HCPCS

## 2017-08-01 PROCEDURE — 97167 OT EVAL HIGH COMPLEX 60 MIN: CPT

## 2017-08-01 PROCEDURE — 94640 AIRWAY INHALATION TREATMENT: CPT

## 2017-08-01 RX ADMIN — AMLODIPINE BESYLATE 10 MG: 10 TABLET ORAL at 08:11

## 2017-08-01 RX ADMIN — LEVALBUTEROL HYDROCHLORIDE 1.25 MG: 1.25 SOLUTION, CONCENTRATE RESPIRATORY (INHALATION) at 20:14

## 2017-08-01 RX ADMIN — ENOXAPARIN SODIUM 40 MG: 40 INJECTION SUBCUTANEOUS at 08:11

## 2017-08-01 RX ADMIN — POTASSIUM CHLORIDE 20 MEQ: 1500 TABLET, EXTENDED RELEASE ORAL at 17:28

## 2017-08-01 RX ADMIN — GUAIFENESIN AND DEXTROMETHORPHAN 10 ML: 100; 10 SYRUP ORAL at 23:28

## 2017-08-01 RX ADMIN — LEVALBUTEROL HYDROCHLORIDE 1.25 MG: 1.25 SOLUTION, CONCENTRATE RESPIRATORY (INHALATION) at 13:43

## 2017-08-01 RX ADMIN — PREDNISONE 40 MG: 20 TABLET ORAL at 08:12

## 2017-08-01 RX ADMIN — ASPIRIN 81 MG 81 MG: 81 TABLET ORAL at 08:10

## 2017-08-01 RX ADMIN — GUAIFENESIN AND DEXTROMETHORPHAN 10 ML: 100; 10 SYRUP ORAL at 17:31

## 2017-08-01 RX ADMIN — FLUTICASONE PROPIONATE AND SALMETEROL 1 PUFF: 50; 250 POWDER RESPIRATORY (INHALATION) at 23:27

## 2017-08-01 RX ADMIN — LORAZEPAM 0.25 MG: 0.5 TABLET ORAL at 08:12

## 2017-08-01 RX ADMIN — CARVEDILOL 6.25 MG: 6.25 TABLET, FILM COATED ORAL at 08:11

## 2017-08-01 RX ADMIN — TAMSULOSIN HYDROCHLORIDE 0.4 MG: 0.4 CAPSULE ORAL at 17:28

## 2017-08-01 RX ADMIN — CLOPIDOGREL BISULFATE 75 MG: 75 TABLET ORAL at 08:11

## 2017-08-01 RX ADMIN — GUAIFENESIN AND DEXTROMETHORPHAN 10 ML: 100; 10 SYRUP ORAL at 10:41

## 2017-08-01 RX ADMIN — BIMATOPROST 1 DROP: 0.1 SOLUTION/ DROPS OPHTHALMIC at 23:27

## 2017-08-01 RX ADMIN — BENZONATATE 100 MG: 100 CAPSULE ORAL at 08:11

## 2017-08-01 RX ADMIN — ISODIUM CHLORIDE 3 ML: 0.03 SOLUTION RESPIRATORY (INHALATION) at 07:34

## 2017-08-01 RX ADMIN — DOXYCYCLINE 100 MG: 100 CAPSULE ORAL at 08:11

## 2017-08-01 RX ADMIN — CARVEDILOL 6.25 MG: 6.25 TABLET, FILM COATED ORAL at 17:28

## 2017-08-01 RX ADMIN — TIOTROPIUM BROMIDE 18 MCG: 18 CAPSULE ORAL; RESPIRATORY (INHALATION) at 10:40

## 2017-08-01 RX ADMIN — DOXYCYCLINE 100 MG: 100 CAPSULE ORAL at 17:28

## 2017-08-01 RX ADMIN — ATORVASTATIN CALCIUM 10 MG: 10 TABLET, FILM COATED ORAL at 08:10

## 2017-08-01 RX ADMIN — PAROXETINE HYDROCHLORIDE 20 MG: 20 TABLET, FILM COATED ORAL at 08:14

## 2017-08-01 RX ADMIN — POTASSIUM CHLORIDE 20 MEQ: 1500 TABLET, EXTENDED RELEASE ORAL at 08:10

## 2017-08-01 RX ADMIN — LEVALBUTEROL HYDROCHLORIDE 1.25 MG: 1.25 SOLUTION, CONCENTRATE RESPIRATORY (INHALATION) at 07:34

## 2017-08-01 RX ADMIN — PANTOPRAZOLE SODIUM 40 MG: 40 TABLET, DELAYED RELEASE ORAL at 05:02

## 2017-08-01 RX ADMIN — ISODIUM CHLORIDE 3 ML: 0.03 SOLUTION RESPIRATORY (INHALATION) at 13:43

## 2017-08-01 RX ADMIN — ISODIUM CHLORIDE 3 ML: 0.03 SOLUTION RESPIRATORY (INHALATION) at 20:14

## 2017-08-01 RX ADMIN — LORATADINE 10 MG: 10 TABLET ORAL at 08:10

## 2017-08-01 RX ADMIN — BENZONATATE 100 MG: 100 CAPSULE ORAL at 17:28

## 2017-08-01 RX ADMIN — GUAIFENESIN AND DEXTROMETHORPHAN 10 ML: 100; 10 SYRUP ORAL at 05:02

## 2017-08-01 RX ADMIN — FLUTICASONE PROPIONATE AND SALMETEROL 1 PUFF: 50; 250 POWDER RESPIRATORY (INHALATION) at 08:13

## 2017-08-01 RX ADMIN — BENZONATATE 100 MG: 100 CAPSULE ORAL at 23:27

## 2017-08-01 RX ADMIN — OXYBUTYNIN CHLORIDE 5 MG: 5 TABLET, EXTENDED RELEASE ORAL at 23:27

## 2017-08-02 LAB
ANION GAP SERPL CALCULATED.3IONS-SCNC: 7 MMOL/L (ref 4–13)
BUN SERPL-MCNC: 39 MG/DL (ref 5–25)
CALCIUM SERPL-MCNC: 9.3 MG/DL (ref 8.3–10.1)
CHLORIDE SERPL-SCNC: 107 MMOL/L (ref 100–108)
CO2 SERPL-SCNC: 26 MMOL/L (ref 21–32)
CREAT SERPL-MCNC: 1.02 MG/DL (ref 0.6–1.3)
GFR SERPL CREATININE-BSD FRML MDRD: 71 ML/MIN/1.73SQ M
GLUCOSE SERPL-MCNC: 94 MG/DL (ref 65–140)
POTASSIUM SERPL-SCNC: 3.9 MMOL/L (ref 3.5–5.3)
SODIUM SERPL-SCNC: 140 MMOL/L (ref 136–145)

## 2017-08-02 PROCEDURE — 94640 AIRWAY INHALATION TREATMENT: CPT

## 2017-08-02 PROCEDURE — 80048 BASIC METABOLIC PNL TOTAL CA: CPT | Performed by: HOSPITALIST

## 2017-08-02 PROCEDURE — 94760 N-INVAS EAR/PLS OXIMETRY 1: CPT

## 2017-08-02 PROCEDURE — 97116 GAIT TRAINING THERAPY: CPT

## 2017-08-02 PROCEDURE — 97110 THERAPEUTIC EXERCISES: CPT

## 2017-08-02 PROCEDURE — 97535 SELF CARE MNGMENT TRAINING: CPT

## 2017-08-02 PROCEDURE — 94668 MNPJ CHEST WALL SBSQ: CPT

## 2017-08-02 RX ORDER — PREDNISONE 20 MG/1
20 TABLET ORAL DAILY
Status: DISCONTINUED | OUTPATIENT
Start: 2017-08-03 | End: 2017-08-03 | Stop reason: HOSPADM

## 2017-08-02 RX ORDER — POLYETHYLENE GLYCOL 3350 17 G/17G
17 POWDER, FOR SOLUTION ORAL DAILY
Status: DISCONTINUED | OUTPATIENT
Start: 2017-08-02 | End: 2017-08-03 | Stop reason: HOSPADM

## 2017-08-02 RX ORDER — BISACODYL 10 MG
10 SUPPOSITORY, RECTAL RECTAL DAILY PRN
Status: DISCONTINUED | OUTPATIENT
Start: 2017-08-02 | End: 2017-08-03 | Stop reason: HOSPADM

## 2017-08-02 RX ADMIN — LORAZEPAM 0.25 MG: 0.5 TABLET ORAL at 09:33

## 2017-08-02 RX ADMIN — BENZONATATE 100 MG: 100 CAPSULE ORAL at 21:03

## 2017-08-02 RX ADMIN — GUAIFENESIN AND DEXTROMETHORPHAN 10 ML: 100; 10 SYRUP ORAL at 21:02

## 2017-08-02 RX ADMIN — POLYETHYLENE GLYCOL 3350 17 G: 17 POWDER, FOR SOLUTION ORAL at 16:21

## 2017-08-02 RX ADMIN — FLUTICASONE PROPIONATE AND SALMETEROL 1 PUFF: 50; 250 POWDER RESPIRATORY (INHALATION) at 09:33

## 2017-08-02 RX ADMIN — ISODIUM CHLORIDE 3 ML: 0.03 SOLUTION RESPIRATORY (INHALATION) at 19:28

## 2017-08-02 RX ADMIN — GUAIFENESIN AND DEXTROMETHORPHAN 10 ML: 100; 10 SYRUP ORAL at 16:24

## 2017-08-02 RX ADMIN — ISODIUM CHLORIDE 3 ML: 0.03 SOLUTION RESPIRATORY (INHALATION) at 13:49

## 2017-08-02 RX ADMIN — LORATADINE 10 MG: 10 TABLET ORAL at 09:32

## 2017-08-02 RX ADMIN — ATORVASTATIN CALCIUM 10 MG: 10 TABLET, FILM COATED ORAL at 09:32

## 2017-08-02 RX ADMIN — TIOTROPIUM BROMIDE 18 MCG: 18 CAPSULE ORAL; RESPIRATORY (INHALATION) at 09:33

## 2017-08-02 RX ADMIN — POTASSIUM CHLORIDE 20 MEQ: 1500 TABLET, EXTENDED RELEASE ORAL at 09:32

## 2017-08-02 RX ADMIN — GUAIFENESIN AND DEXTROMETHORPHAN 10 ML: 100; 10 SYRUP ORAL at 05:22

## 2017-08-02 RX ADMIN — AMLODIPINE BESYLATE 10 MG: 10 TABLET ORAL at 09:32

## 2017-08-02 RX ADMIN — OXYBUTYNIN CHLORIDE 5 MG: 5 TABLET, EXTENDED RELEASE ORAL at 21:02

## 2017-08-02 RX ADMIN — BIMATOPROST 1 DROP: 0.1 SOLUTION/ DROPS OPHTHALMIC at 21:03

## 2017-08-02 RX ADMIN — ENOXAPARIN SODIUM 40 MG: 40 INJECTION SUBCUTANEOUS at 09:33

## 2017-08-02 RX ADMIN — LEVALBUTEROL HYDROCHLORIDE 1.25 MG: 1.25 SOLUTION, CONCENTRATE RESPIRATORY (INHALATION) at 19:28

## 2017-08-02 RX ADMIN — ASPIRIN 81 MG 81 MG: 81 TABLET ORAL at 09:34

## 2017-08-02 RX ADMIN — DOXYCYCLINE 100 MG: 100 CAPSULE ORAL at 09:35

## 2017-08-02 RX ADMIN — ISODIUM CHLORIDE 3 ML: 0.03 SOLUTION RESPIRATORY (INHALATION) at 07:29

## 2017-08-02 RX ADMIN — PAROXETINE HYDROCHLORIDE 20 MG: 20 TABLET, FILM COATED ORAL at 09:32

## 2017-08-02 RX ADMIN — BENZONATATE 100 MG: 100 CAPSULE ORAL at 09:32

## 2017-08-02 RX ADMIN — POTASSIUM CHLORIDE 20 MEQ: 1500 TABLET, EXTENDED RELEASE ORAL at 17:04

## 2017-08-02 RX ADMIN — PANTOPRAZOLE SODIUM 40 MG: 40 TABLET, DELAYED RELEASE ORAL at 05:22

## 2017-08-02 RX ADMIN — LEVALBUTEROL HYDROCHLORIDE 1.25 MG: 1.25 SOLUTION, CONCENTRATE RESPIRATORY (INHALATION) at 13:49

## 2017-08-02 RX ADMIN — PREDNISONE 30 MG: 20 TABLET ORAL at 09:32

## 2017-08-02 RX ADMIN — GUAIFENESIN AND DEXTROMETHORPHAN 10 ML: 100; 10 SYRUP ORAL at 09:32

## 2017-08-02 RX ADMIN — FLUTICASONE PROPIONATE AND SALMETEROL 1 PUFF: 50; 250 POWDER RESPIRATORY (INHALATION) at 21:03

## 2017-08-02 RX ADMIN — TAMSULOSIN HYDROCHLORIDE 0.4 MG: 0.4 CAPSULE ORAL at 16:24

## 2017-08-02 RX ADMIN — LEVALBUTEROL HYDROCHLORIDE 1.25 MG: 1.25 SOLUTION, CONCENTRATE RESPIRATORY (INHALATION) at 07:29

## 2017-08-02 RX ADMIN — CLOPIDOGREL BISULFATE 75 MG: 75 TABLET ORAL at 09:32

## 2017-08-02 RX ADMIN — BENZONATATE 100 MG: 100 CAPSULE ORAL at 16:24

## 2017-08-03 ENCOUNTER — HOSPITAL ENCOUNTER (INPATIENT)
Facility: HOSPITAL | Age: 77
LOS: 18 days | Discharge: HOME | DRG: 177 | End: 2017-08-21
Attending: PHYSICAL MEDICINE & REHABILITATION | Admitting: PHYSICAL MEDICINE & REHABILITATION
Payer: MEDICARE

## 2017-08-03 VITALS
HEART RATE: 72 BPM | SYSTOLIC BLOOD PRESSURE: 118 MMHG | DIASTOLIC BLOOD PRESSURE: 62 MMHG | WEIGHT: 208 LBS | RESPIRATION RATE: 20 BRPM | HEIGHT: 77 IN | BODY MASS INDEX: 24.56 KG/M2 | TEMPERATURE: 97.7 F | OXYGEN SATURATION: 96 %

## 2017-08-03 DIAGNOSIS — R53.81 DEBILITY: ICD-10-CM

## 2017-08-03 DIAGNOSIS — L53.9 FOOT ERYTHEMA: ICD-10-CM

## 2017-08-03 DIAGNOSIS — I10 HYPERTENSION: Primary | ICD-10-CM

## 2017-08-03 DIAGNOSIS — R06.02 SOB (SHORTNESS OF BREATH): ICD-10-CM

## 2017-08-03 LAB
ANION GAP SERPL CALCULATED.3IONS-SCNC: 6 MMOL/L (ref 4–13)
BUN SERPL-MCNC: 34 MG/DL (ref 5–25)
CALCIUM SERPL-MCNC: 9.2 MG/DL (ref 8.3–10.1)
CHLORIDE SERPL-SCNC: 107 MMOL/L (ref 100–108)
CO2 SERPL-SCNC: 27 MMOL/L (ref 21–32)
CREAT SERPL-MCNC: 1.04 MG/DL (ref 0.6–1.3)
GFR SERPL CREATININE-BSD FRML MDRD: 69 ML/MIN/1.73SQ M
GLUCOSE SERPL-MCNC: 87 MG/DL (ref 65–140)
POTASSIUM SERPL-SCNC: 4.3 MMOL/L (ref 3.5–5.3)
SODIUM SERPL-SCNC: 140 MMOL/L (ref 136–145)

## 2017-08-03 PROCEDURE — 94760 N-INVAS EAR/PLS OXIMETRY 1: CPT

## 2017-08-03 PROCEDURE — 80048 BASIC METABOLIC PNL TOTAL CA: CPT | Performed by: HOSPITALIST

## 2017-08-03 PROCEDURE — 97535 SELF CARE MNGMENT TRAINING: CPT

## 2017-08-03 PROCEDURE — 94640 AIRWAY INHALATION TREATMENT: CPT

## 2017-08-03 PROCEDURE — 94668 MNPJ CHEST WALL SBSQ: CPT

## 2017-08-03 RX ORDER — ALBUTEROL SULFATE 90 UG/1
2 AEROSOL, METERED RESPIRATORY (INHALATION) EVERY 4 HOURS PRN
Status: CANCELLED | OUTPATIENT
Start: 2017-08-03

## 2017-08-03 RX ORDER — GUAIFENESIN/DEXTROMETHORPHAN 100-10MG/5
10 SYRUP ORAL EVERY 6 HOURS
Status: CANCELLED | OUTPATIENT
Start: 2017-08-03

## 2017-08-03 RX ORDER — PREDNISONE 10 MG/1
10 TABLET ORAL DAILY
Status: CANCELLED | OUTPATIENT
Start: 2017-08-04 | End: 2017-08-05

## 2017-08-03 RX ORDER — ONDANSETRON 4 MG/1
4 TABLET, ORALLY DISINTEGRATING ORAL EVERY 6 HOURS PRN
Status: DISCONTINUED | OUTPATIENT
Start: 2017-08-03 | End: 2017-08-21 | Stop reason: HOSPADM

## 2017-08-03 RX ORDER — LORAZEPAM 0.5 MG/1
0.25 TABLET ORAL DAILY
Status: CANCELLED | OUTPATIENT
Start: 2017-08-04

## 2017-08-03 RX ORDER — ATORVASTATIN CALCIUM 10 MG/1
10 TABLET, FILM COATED ORAL DAILY
Status: CANCELLED | OUTPATIENT
Start: 2017-08-04

## 2017-08-03 RX ORDER — ASPIRIN 81 MG/1
81 TABLET, CHEWABLE ORAL DAILY
Status: CANCELLED | OUTPATIENT
Start: 2017-08-04

## 2017-08-03 RX ORDER — LEVALBUTEROL 1.25 MG/.5ML
1.25 SOLUTION, CONCENTRATE RESPIRATORY (INHALATION)
Status: DISCONTINUED | OUTPATIENT
Start: 2017-08-03 | End: 2017-08-09

## 2017-08-03 RX ORDER — GUAIFENESIN/DEXTROMETHORPHAN 100-10MG/5
10 SYRUP ORAL EVERY 6 HOURS
Status: DISCONTINUED | OUTPATIENT
Start: 2017-08-03 | End: 2017-08-21 | Stop reason: HOSPADM

## 2017-08-03 RX ORDER — LORAZEPAM 0.5 MG/1
0.25 TABLET ORAL DAILY
Status: DISCONTINUED | OUTPATIENT
Start: 2017-08-04 | End: 2017-08-21 | Stop reason: HOSPADM

## 2017-08-03 RX ORDER — AMLODIPINE BESYLATE 10 MG/1
10 TABLET ORAL DAILY
Status: CANCELLED | OUTPATIENT
Start: 2017-08-04

## 2017-08-03 RX ORDER — SODIUM CHLORIDE FOR INHALATION 0.9 %
3 VIAL, NEBULIZER (ML) INHALATION EVERY 6 HOURS PRN
Status: DISCONTINUED | OUTPATIENT
Start: 2017-08-03 | End: 2017-08-09

## 2017-08-03 RX ORDER — ACETAMINOPHEN 325 MG/1
650 TABLET ORAL EVERY 6 HOURS PRN
Status: DISCONTINUED | OUTPATIENT
Start: 2017-08-03 | End: 2017-08-21 | Stop reason: HOSPADM

## 2017-08-03 RX ORDER — POTASSIUM CHLORIDE 20 MEQ/1
20 TABLET, EXTENDED RELEASE ORAL 2 TIMES DAILY
Status: DISCONTINUED | OUTPATIENT
Start: 2017-08-04 | End: 2017-08-07

## 2017-08-03 RX ORDER — MECLIZINE HCL 12.5 MG/1
25 TABLET ORAL 3 TIMES DAILY PRN
Status: CANCELLED | OUTPATIENT
Start: 2017-08-03

## 2017-08-03 RX ORDER — ATORVASTATIN CALCIUM 10 MG/1
10 TABLET, FILM COATED ORAL DAILY
Status: DISCONTINUED | OUTPATIENT
Start: 2017-08-04 | End: 2017-08-21 | Stop reason: HOSPADM

## 2017-08-03 RX ORDER — CARVEDILOL 6.25 MG/1
6.25 TABLET ORAL 2 TIMES DAILY WITH MEALS
Status: CANCELLED | OUTPATIENT
Start: 2017-08-03

## 2017-08-03 RX ORDER — BISACODYL 10 MG
10 SUPPOSITORY, RECTAL RECTAL DAILY PRN
Status: DISCONTINUED | OUTPATIENT
Start: 2017-08-03 | End: 2017-08-21 | Stop reason: HOSPADM

## 2017-08-03 RX ORDER — LEVALBUTEROL 1.25 MG/.5ML
1.25 SOLUTION, CONCENTRATE RESPIRATORY (INHALATION) EVERY 6 HOURS PRN
Status: DISCONTINUED | OUTPATIENT
Start: 2017-08-03 | End: 2017-08-09

## 2017-08-03 RX ORDER — SODIUM CHLORIDE FOR INHALATION 0.9 %
3 VIAL, NEBULIZER (ML) INHALATION
Status: DISCONTINUED | OUTPATIENT
Start: 2017-08-03 | End: 2017-08-09

## 2017-08-03 RX ORDER — BENZONATATE 100 MG/1
100 CAPSULE ORAL 3 TIMES DAILY
Status: CANCELLED | OUTPATIENT
Start: 2017-08-03

## 2017-08-03 RX ORDER — PANTOPRAZOLE SODIUM 40 MG/1
40 TABLET, DELAYED RELEASE ORAL
Status: DISCONTINUED | OUTPATIENT
Start: 2017-08-04 | End: 2017-08-21 | Stop reason: HOSPADM

## 2017-08-03 RX ORDER — BENZONATATE 100 MG/1
100 CAPSULE ORAL 3 TIMES DAILY
Status: DISCONTINUED | OUTPATIENT
Start: 2017-08-03 | End: 2017-08-07

## 2017-08-03 RX ORDER — TAMSULOSIN HYDROCHLORIDE 0.4 MG/1
0.4 CAPSULE ORAL
Status: CANCELLED | OUTPATIENT
Start: 2017-08-03

## 2017-08-03 RX ORDER — CARVEDILOL 6.25 MG/1
6.25 TABLET ORAL 2 TIMES DAILY WITH MEALS
Status: DISCONTINUED | OUTPATIENT
Start: 2017-08-04 | End: 2017-08-16

## 2017-08-03 RX ORDER — PAROXETINE HYDROCHLORIDE 20 MG/1
20 TABLET, FILM COATED ORAL EVERY MORNING
Status: DISCONTINUED | OUTPATIENT
Start: 2017-08-04 | End: 2017-08-21 | Stop reason: HOSPADM

## 2017-08-03 RX ORDER — POLYETHYLENE GLYCOL 3350 17 G/17G
17 POWDER, FOR SOLUTION ORAL DAILY
Status: DISCONTINUED | OUTPATIENT
Start: 2017-08-04 | End: 2017-08-10

## 2017-08-03 RX ORDER — PREDNISONE 10 MG/1
10 TABLET ORAL DAILY
Status: COMPLETED | OUTPATIENT
Start: 2017-08-04 | End: 2017-08-04

## 2017-08-03 RX ORDER — PANTOPRAZOLE SODIUM 40 MG/1
40 TABLET, DELAYED RELEASE ORAL
Status: CANCELLED | OUTPATIENT
Start: 2017-08-04

## 2017-08-03 RX ORDER — POTASSIUM CHLORIDE 20 MEQ/1
20 TABLET, EXTENDED RELEASE ORAL 2 TIMES DAILY
Status: CANCELLED | OUTPATIENT
Start: 2017-08-03

## 2017-08-03 RX ORDER — HYDRALAZINE HYDROCHLORIDE 20 MG/ML
10 INJECTION INTRAMUSCULAR; INTRAVENOUS EVERY 4 HOURS PRN
Status: CANCELLED | OUTPATIENT
Start: 2017-08-03

## 2017-08-03 RX ORDER — CLOPIDOGREL BISULFATE 75 MG/1
75 TABLET ORAL DAILY
Status: CANCELLED | OUTPATIENT
Start: 2017-08-04

## 2017-08-03 RX ORDER — ASPIRIN 81 MG/1
81 TABLET, CHEWABLE ORAL DAILY
Status: DISCONTINUED | OUTPATIENT
Start: 2017-08-04 | End: 2017-08-21 | Stop reason: HOSPADM

## 2017-08-03 RX ORDER — POLYETHYLENE GLYCOL 3350 17 G/17G
17 POWDER, FOR SOLUTION ORAL DAILY
Status: CANCELLED | OUTPATIENT
Start: 2017-08-04

## 2017-08-03 RX ORDER — LORATADINE 10 MG/1
10 TABLET ORAL DAILY
Status: CANCELLED | OUTPATIENT
Start: 2017-08-04

## 2017-08-03 RX ORDER — ACETAMINOPHEN 325 MG/1
650 TABLET ORAL EVERY 6 HOURS PRN
Status: CANCELLED | OUTPATIENT
Start: 2017-08-03

## 2017-08-03 RX ORDER — SODIUM CHLORIDE FOR INHALATION 0.9 %
3 VIAL, NEBULIZER (ML) INHALATION
Status: CANCELLED | OUTPATIENT
Start: 2017-08-03

## 2017-08-03 RX ORDER — BISACODYL 10 MG
10 SUPPOSITORY, RECTAL RECTAL DAILY PRN
Status: CANCELLED | OUTPATIENT
Start: 2017-08-03

## 2017-08-03 RX ORDER — OXYBUTYNIN CHLORIDE 5 MG/1
5 TABLET, EXTENDED RELEASE ORAL
Status: DISCONTINUED | OUTPATIENT
Start: 2017-08-03 | End: 2017-08-21 | Stop reason: HOSPADM

## 2017-08-03 RX ORDER — MECLIZINE HYDROCHLORIDE 25 MG/1
25 TABLET ORAL 3 TIMES DAILY PRN
Status: DISCONTINUED | OUTPATIENT
Start: 2017-08-03 | End: 2017-08-21 | Stop reason: HOSPADM

## 2017-08-03 RX ORDER — TAMSULOSIN HYDROCHLORIDE 0.4 MG/1
0.4 CAPSULE ORAL
Status: DISCONTINUED | OUTPATIENT
Start: 2017-08-04 | End: 2017-08-21 | Stop reason: HOSPADM

## 2017-08-03 RX ORDER — ONDANSETRON 2 MG/ML
4 INJECTION INTRAMUSCULAR; INTRAVENOUS EVERY 6 HOURS PRN
Status: CANCELLED | OUTPATIENT
Start: 2017-08-03

## 2017-08-03 RX ORDER — CLOPIDOGREL BISULFATE 75 MG/1
75 TABLET ORAL DAILY
Status: DISCONTINUED | OUTPATIENT
Start: 2017-08-04 | End: 2017-08-21 | Stop reason: HOSPADM

## 2017-08-03 RX ORDER — AMLODIPINE BESYLATE 10 MG/1
10 TABLET ORAL DAILY
Status: DISCONTINUED | OUTPATIENT
Start: 2017-08-04 | End: 2017-08-21 | Stop reason: HOSPADM

## 2017-08-03 RX ORDER — PAROXETINE HYDROCHLORIDE 20 MG/1
20 TABLET, FILM COATED ORAL EVERY MORNING
Status: CANCELLED | OUTPATIENT
Start: 2017-08-04

## 2017-08-03 RX ORDER — LORATADINE 10 MG/1
10 TABLET ORAL DAILY
Status: DISCONTINUED | OUTPATIENT
Start: 2017-08-04 | End: 2017-08-21 | Stop reason: HOSPADM

## 2017-08-03 RX ORDER — SODIUM CHLORIDE FOR INHALATION 0.9 %
3 VIAL, NEBULIZER (ML) INHALATION EVERY 6 HOURS PRN
Status: CANCELLED | OUTPATIENT
Start: 2017-08-03

## 2017-08-03 RX ORDER — ALBUTEROL SULFATE 90 UG/1
2 AEROSOL, METERED RESPIRATORY (INHALATION) EVERY 4 HOURS PRN
Status: DISCONTINUED | OUTPATIENT
Start: 2017-08-03 | End: 2017-08-21 | Stop reason: HOSPADM

## 2017-08-03 RX ORDER — LEVALBUTEROL 1.25 MG/.5ML
1.25 SOLUTION, CONCENTRATE RESPIRATORY (INHALATION) EVERY 6 HOURS PRN
Status: CANCELLED | OUTPATIENT
Start: 2017-08-03

## 2017-08-03 RX ORDER — OXYBUTYNIN CHLORIDE 5 MG/1
5 TABLET, EXTENDED RELEASE ORAL
Status: CANCELLED | OUTPATIENT
Start: 2017-08-03

## 2017-08-03 RX ORDER — LEVALBUTEROL 1.25 MG/.5ML
1.25 SOLUTION, CONCENTRATE RESPIRATORY (INHALATION)
Status: CANCELLED | OUTPATIENT
Start: 2017-08-03

## 2017-08-03 RX ADMIN — GUAIFENESIN AND DEXTROMETHORPHAN 10 ML: 100; 10 SYRUP ORAL at 09:24

## 2017-08-03 RX ADMIN — ATORVASTATIN CALCIUM 10 MG: 10 TABLET, FILM COATED ORAL at 09:24

## 2017-08-03 RX ADMIN — LEVALBUTEROL HYDROCHLORIDE 1.25 MG: 1.25 SOLUTION, CONCENTRATE RESPIRATORY (INHALATION) at 07:57

## 2017-08-03 RX ADMIN — TIOTROPIUM BROMIDE 18 MCG: 18 CAPSULE ORAL; RESPIRATORY (INHALATION) at 09:27

## 2017-08-03 RX ADMIN — LORATADINE 10 MG: 10 TABLET ORAL at 09:25

## 2017-08-03 RX ADMIN — GUAIFENESIN AND DEXTROMETHORPHAN 10 ML: 100; 10 SYRUP ORAL at 15:35

## 2017-08-03 RX ADMIN — AMLODIPINE BESYLATE 10 MG: 10 TABLET ORAL at 09:24

## 2017-08-03 RX ADMIN — GUAIFENESIN AND DEXTROMETHORPHAN 10 ML: 100; 10 SYRUP ORAL at 05:00

## 2017-08-03 RX ADMIN — OXYBUTYNIN CHLORIDE 5 MG: 5 TABLET, EXTENDED RELEASE ORAL at 21:40

## 2017-08-03 RX ADMIN — GUAIFENESIN AND DEXTROMETHORPHAN 10 ML: 100; 10 SYRUP ORAL at 21:46

## 2017-08-03 RX ADMIN — ISODIUM CHLORIDE 3 ML: 0.03 SOLUTION RESPIRATORY (INHALATION) at 22:34

## 2017-08-03 RX ADMIN — ISODIUM CHLORIDE 3 ML: 0.03 SOLUTION RESPIRATORY (INHALATION) at 13:54

## 2017-08-03 RX ADMIN — POLYETHYLENE GLYCOL 3350 17 G: 17 POWDER, FOR SOLUTION ORAL at 09:23

## 2017-08-03 RX ADMIN — LEVALBUTEROL HYDROCHLORIDE 1.25 MG: 1.25 SOLUTION, CONCENTRATE RESPIRATORY (INHALATION) at 13:54

## 2017-08-03 RX ADMIN — BENZONATATE 100 MG: 100 CAPSULE ORAL at 15:35

## 2017-08-03 RX ADMIN — ENOXAPARIN SODIUM 40 MG: 40 INJECTION SUBCUTANEOUS at 09:25

## 2017-08-03 RX ADMIN — ISODIUM CHLORIDE 3 ML: 0.03 SOLUTION RESPIRATORY (INHALATION) at 07:57

## 2017-08-03 RX ADMIN — PAROXETINE HYDROCHLORIDE 20 MG: 20 TABLET, FILM COATED ORAL at 09:27

## 2017-08-03 RX ADMIN — PREDNISONE 20 MG: 20 TABLET ORAL at 09:25

## 2017-08-03 RX ADMIN — CARVEDILOL 6.25 MG: 6.25 TABLET, FILM COATED ORAL at 15:35

## 2017-08-03 RX ADMIN — BENZONATATE 100 MG: 100 CAPSULE ORAL at 21:38

## 2017-08-03 RX ADMIN — ASPIRIN 81 MG 81 MG: 81 TABLET ORAL at 09:24

## 2017-08-03 RX ADMIN — FLUTICASONE PROPIONATE AND SALMETEROL 1 PUFF: 50; 250 POWDER RESPIRATORY (INHALATION) at 09:27

## 2017-08-03 RX ADMIN — FLUTICASONE PROPIONATE AND SALMETEROL 1 PUFF: 50; 250 POWDER RESPIRATORY (INHALATION) at 21:40

## 2017-08-03 RX ADMIN — TAMSULOSIN HYDROCHLORIDE 0.4 MG: 0.4 CAPSULE ORAL at 15:35

## 2017-08-03 RX ADMIN — LORAZEPAM 0.25 MG: 0.5 TABLET ORAL at 09:26

## 2017-08-03 RX ADMIN — BENZONATATE 100 MG: 100 CAPSULE ORAL at 09:24

## 2017-08-03 RX ADMIN — POTASSIUM CHLORIDE 20 MEQ: 1500 TABLET, EXTENDED RELEASE ORAL at 09:24

## 2017-08-03 RX ADMIN — PANTOPRAZOLE SODIUM 40 MG: 40 TABLET, DELAYED RELEASE ORAL at 05:38

## 2017-08-03 RX ADMIN — BIMATOPROST 1 DROP: 0.1 SOLUTION/ DROPS OPHTHALMIC at 21:40

## 2017-08-03 RX ADMIN — LEVALBUTEROL HYDROCHLORIDE 1.25 MG: 1.25 SOLUTION, CONCENTRATE RESPIRATORY (INHALATION) at 22:33

## 2017-08-03 RX ADMIN — CARVEDILOL 6.25 MG: 6.25 TABLET, FILM COATED ORAL at 09:24

## 2017-08-03 RX ADMIN — CLOPIDOGREL BISULFATE 75 MG: 75 TABLET ORAL at 09:24

## 2017-08-04 PROBLEM — T17.908A ASPIRATION INTO RESPIRATORY TRACT: Status: ACTIVE | Noted: 2017-08-04

## 2017-08-04 PROBLEM — R53.81 DEBILITY: Status: ACTIVE | Noted: 2017-08-04

## 2017-08-04 LAB
ANION GAP SERPL CALCULATED.3IONS-SCNC: 5 MMOL/L (ref 4–13)
BASOPHILS # BLD AUTO: 0 THOUSANDS/ΜL (ref 0–0.1)
BASOPHILS NFR BLD AUTO: 0 % (ref 0–1)
BUN SERPL-MCNC: 37 MG/DL (ref 5–25)
CALCIUM SERPL-MCNC: 9.1 MG/DL (ref 8.3–10.1)
CHLORIDE SERPL-SCNC: 106 MMOL/L (ref 100–108)
CO2 SERPL-SCNC: 28 MMOL/L (ref 21–32)
CREAT SERPL-MCNC: 1.06 MG/DL (ref 0.6–1.3)
EOSINOPHIL # BLD AUTO: 0.18 THOUSAND/ΜL (ref 0–0.61)
EOSINOPHIL NFR BLD AUTO: 2 % (ref 0–6)
ERYTHROCYTE [DISTWIDTH] IN BLOOD BY AUTOMATED COUNT: 13.6 % (ref 11.6–15.1)
GFR SERPL CREATININE-BSD FRML MDRD: 67 ML/MIN/1.73SQ M
GLUCOSE SERPL-MCNC: 98 MG/DL (ref 65–140)
HCT VFR BLD AUTO: 43.1 % (ref 36.5–49.3)
HGB BLD-MCNC: 14.2 G/DL (ref 12–17)
LYMPHOCYTES # BLD AUTO: 1.09 THOUSANDS/ΜL (ref 0.6–4.47)
LYMPHOCYTES NFR BLD AUTO: 11 % (ref 14–44)
MAGNESIUM SERPL-MCNC: 2.2 MG/DL (ref 1.6–2.6)
MCH RBC QN AUTO: 30 PG (ref 26.8–34.3)
MCHC RBC AUTO-ENTMCNC: 32.9 G/DL (ref 31.4–37.4)
MCV RBC AUTO: 91 FL (ref 82–98)
MONOCYTES # BLD AUTO: 1.04 THOUSAND/ΜL (ref 0.17–1.22)
MONOCYTES NFR BLD AUTO: 11 % (ref 4–12)
NEUTROPHILS # BLD AUTO: 7.51 THOUSANDS/ΜL (ref 1.85–7.62)
NEUTS SEG NFR BLD AUTO: 76 % (ref 43–75)
NRBC BLD AUTO-RTO: 0 /100 WBCS
PLATELET # BLD AUTO: 157 THOUSANDS/UL (ref 149–390)
PMV BLD AUTO: 11.5 FL (ref 8.9–12.7)
POTASSIUM SERPL-SCNC: 3.9 MMOL/L (ref 3.5–5.3)
RBC # BLD AUTO: 4.73 MILLION/UL (ref 3.88–5.62)
SODIUM SERPL-SCNC: 139 MMOL/L (ref 136–145)
WBC # BLD AUTO: 9.85 THOUSAND/UL (ref 4.31–10.16)

## 2017-08-04 PROCEDURE — 83735 ASSAY OF MAGNESIUM: CPT | Performed by: PHYSICAL MEDICINE & REHABILITATION

## 2017-08-04 PROCEDURE — 94640 AIRWAY INHALATION TREATMENT: CPT

## 2017-08-04 PROCEDURE — 85025 COMPLETE CBC W/AUTO DIFF WBC: CPT | Performed by: PHYSICAL MEDICINE & REHABILITATION

## 2017-08-04 PROCEDURE — 97163 PT EVAL HIGH COMPLEX 45 MIN: CPT | Performed by: PHYSICAL THERAPIST

## 2017-08-04 PROCEDURE — 97110 THERAPEUTIC EXERCISES: CPT | Performed by: PHYSICAL THERAPIST

## 2017-08-04 PROCEDURE — 97110 THERAPEUTIC EXERCISES: CPT

## 2017-08-04 PROCEDURE — 97535 SELF CARE MNGMENT TRAINING: CPT

## 2017-08-04 PROCEDURE — 94760 N-INVAS EAR/PLS OXIMETRY 1: CPT

## 2017-08-04 PROCEDURE — 80048 BASIC METABOLIC PNL TOTAL CA: CPT | Performed by: PHYSICAL MEDICINE & REHABILITATION

## 2017-08-04 PROCEDURE — 97166 OT EVAL MOD COMPLEX 45 MIN: CPT

## 2017-08-04 PROCEDURE — 92610 EVALUATE SWALLOWING FUNCTION: CPT

## 2017-08-04 PROCEDURE — 97530 THERAPEUTIC ACTIVITIES: CPT | Performed by: PHYSICAL THERAPIST

## 2017-08-04 RX ADMIN — BIMATOPROST 1 DROP: 0.1 SOLUTION/ DROPS OPHTHALMIC at 21:25

## 2017-08-04 RX ADMIN — TAMSULOSIN HYDROCHLORIDE 0.4 MG: 0.4 CAPSULE ORAL at 17:22

## 2017-08-04 RX ADMIN — CARVEDILOL 6.25 MG: 6.25 TABLET, FILM COATED ORAL at 08:45

## 2017-08-04 RX ADMIN — BENZONATATE 100 MG: 100 CAPSULE ORAL at 08:44

## 2017-08-04 RX ADMIN — PANTOPRAZOLE SODIUM 40 MG: 40 TABLET, DELAYED RELEASE ORAL at 05:26

## 2017-08-04 RX ADMIN — GUAIFENESIN AND DEXTROMETHORPHAN 10 ML: 100; 10 SYRUP ORAL at 09:59

## 2017-08-04 RX ADMIN — ISODIUM CHLORIDE 3 ML: 0.03 SOLUTION RESPIRATORY (INHALATION) at 19:08

## 2017-08-04 RX ADMIN — AMLODIPINE BESYLATE 10 MG: 10 TABLET ORAL at 08:44

## 2017-08-04 RX ADMIN — TIOTROPIUM BROMIDE 18 MCG: 18 CAPSULE ORAL; RESPIRATORY (INHALATION) at 08:52

## 2017-08-04 RX ADMIN — LEVALBUTEROL HYDROCHLORIDE 1.25 MG: 1.25 SOLUTION, CONCENTRATE RESPIRATORY (INHALATION) at 06:50

## 2017-08-04 RX ADMIN — CARVEDILOL 6.25 MG: 6.25 TABLET, FILM COATED ORAL at 17:22

## 2017-08-04 RX ADMIN — BENZONATATE 100 MG: 100 CAPSULE ORAL at 17:22

## 2017-08-04 RX ADMIN — POLYETHYLENE GLYCOL 3350 17 G: 17 POWDER, FOR SOLUTION ORAL at 08:44

## 2017-08-04 RX ADMIN — CLOPIDOGREL BISULFATE 75 MG: 75 TABLET ORAL at 08:45

## 2017-08-04 RX ADMIN — BENZONATATE 100 MG: 100 CAPSULE ORAL at 21:24

## 2017-08-04 RX ADMIN — FLUTICASONE PROPIONATE AND SALMETEROL 1 PUFF: 50; 250 POWDER RESPIRATORY (INHALATION) at 08:52

## 2017-08-04 RX ADMIN — LEVALBUTEROL HYDROCHLORIDE 1.25 MG: 1.25 SOLUTION, CONCENTRATE RESPIRATORY (INHALATION) at 13:37

## 2017-08-04 RX ADMIN — ATORVASTATIN CALCIUM 10 MG: 10 TABLET, FILM COATED ORAL at 08:45

## 2017-08-04 RX ADMIN — POTASSIUM CHLORIDE 20 MEQ: 1500 TABLET, EXTENDED RELEASE ORAL at 17:23

## 2017-08-04 RX ADMIN — LORATADINE 10 MG: 10 TABLET ORAL at 08:45

## 2017-08-04 RX ADMIN — ENOXAPARIN SODIUM 40 MG: 40 INJECTION SUBCUTANEOUS at 08:44

## 2017-08-04 RX ADMIN — LORAZEPAM 0.25 MG: 0.5 TABLET ORAL at 08:45

## 2017-08-04 RX ADMIN — POTASSIUM CHLORIDE 20 MEQ: 1500 TABLET, EXTENDED RELEASE ORAL at 08:45

## 2017-08-04 RX ADMIN — LEVALBUTEROL HYDROCHLORIDE 1.25 MG: 1.25 SOLUTION, CONCENTRATE RESPIRATORY (INHALATION) at 19:08

## 2017-08-04 RX ADMIN — PAROXETINE HYDROCHLORIDE 20 MG: 20 TABLET, FILM COATED ORAL at 08:44

## 2017-08-04 RX ADMIN — OXYBUTYNIN CHLORIDE 5 MG: 5 TABLET, EXTENDED RELEASE ORAL at 21:26

## 2017-08-04 RX ADMIN — ISODIUM CHLORIDE 3 ML: 0.03 SOLUTION RESPIRATORY (INHALATION) at 13:37

## 2017-08-04 RX ADMIN — ASPIRIN 81 MG 81 MG: 81 TABLET ORAL at 08:45

## 2017-08-04 RX ADMIN — PREDNISONE 10 MG: 10 TABLET ORAL at 08:44

## 2017-08-04 RX ADMIN — FLUTICASONE PROPIONATE AND SALMETEROL 1 PUFF: 50; 250 POWDER RESPIRATORY (INHALATION) at 21:25

## 2017-08-04 RX ADMIN — ISODIUM CHLORIDE 3 ML: 0.03 SOLUTION RESPIRATORY (INHALATION) at 06:51

## 2017-08-04 RX ADMIN — GUAIFENESIN AND DEXTROMETHORPHAN 10 ML: 100; 10 SYRUP ORAL at 04:44

## 2017-08-04 RX ADMIN — GUAIFENESIN AND DEXTROMETHORPHAN 10 ML: 100; 10 SYRUP ORAL at 21:24

## 2017-08-04 RX ADMIN — GUAIFENESIN AND DEXTROMETHORPHAN 10 ML: 100; 10 SYRUP ORAL at 17:23

## 2017-08-05 PROCEDURE — 97530 THERAPEUTIC ACTIVITIES: CPT

## 2017-08-05 PROCEDURE — 97110 THERAPEUTIC EXERCISES: CPT

## 2017-08-05 PROCEDURE — 94640 AIRWAY INHALATION TREATMENT: CPT

## 2017-08-05 PROCEDURE — 94760 N-INVAS EAR/PLS OXIMETRY 1: CPT

## 2017-08-05 PROCEDURE — 94644 CONT INHLJ TX 1ST HOUR: CPT

## 2017-08-05 PROCEDURE — 97530 THERAPEUTIC ACTIVITIES: CPT | Performed by: STUDENT IN AN ORGANIZED HEALTH CARE EDUCATION/TRAINING PROGRAM

## 2017-08-05 PROCEDURE — 97542 WHEELCHAIR MNGMENT TRAINING: CPT

## 2017-08-05 PROCEDURE — 97116 GAIT TRAINING THERAPY: CPT

## 2017-08-05 PROCEDURE — 94668 MNPJ CHEST WALL SBSQ: CPT

## 2017-08-05 RX ADMIN — PAROXETINE HYDROCHLORIDE 20 MG: 20 TABLET, FILM COATED ORAL at 09:40

## 2017-08-05 RX ADMIN — BENZONATATE 100 MG: 100 CAPSULE ORAL at 09:41

## 2017-08-05 RX ADMIN — POTASSIUM CHLORIDE 20 MEQ: 1500 TABLET, EXTENDED RELEASE ORAL at 09:40

## 2017-08-05 RX ADMIN — ENOXAPARIN SODIUM 40 MG: 40 INJECTION SUBCUTANEOUS at 09:44

## 2017-08-05 RX ADMIN — ASPIRIN 81 MG 81 MG: 81 TABLET ORAL at 09:40

## 2017-08-05 RX ADMIN — LEVALBUTEROL HYDROCHLORIDE 1.25 MG: 1.25 SOLUTION, CONCENTRATE RESPIRATORY (INHALATION) at 07:15

## 2017-08-05 RX ADMIN — ISODIUM CHLORIDE 3 ML: 0.03 SOLUTION RESPIRATORY (INHALATION) at 13:31

## 2017-08-05 RX ADMIN — ISODIUM CHLORIDE 3 ML: 0.03 SOLUTION RESPIRATORY (INHALATION) at 07:15

## 2017-08-05 RX ADMIN — LORATADINE 10 MG: 10 TABLET ORAL at 09:41

## 2017-08-05 RX ADMIN — GUAIFENESIN AND DEXTROMETHORPHAN 10 ML: 100; 10 SYRUP ORAL at 23:47

## 2017-08-05 RX ADMIN — AMLODIPINE BESYLATE 10 MG: 10 TABLET ORAL at 09:43

## 2017-08-05 RX ADMIN — BENZONATATE 100 MG: 100 CAPSULE ORAL at 21:46

## 2017-08-05 RX ADMIN — POTASSIUM CHLORIDE 20 MEQ: 1500 TABLET, EXTENDED RELEASE ORAL at 17:43

## 2017-08-05 RX ADMIN — ISODIUM CHLORIDE 3 ML: 0.03 SOLUTION RESPIRATORY (INHALATION) at 19:30

## 2017-08-05 RX ADMIN — FLUTICASONE PROPIONATE AND SALMETEROL 1 PUFF: 50; 250 POWDER RESPIRATORY (INHALATION) at 21:46

## 2017-08-05 RX ADMIN — GUAIFENESIN AND DEXTROMETHORPHAN 10 ML: 100; 10 SYRUP ORAL at 12:01

## 2017-08-05 RX ADMIN — TAMSULOSIN HYDROCHLORIDE 0.4 MG: 0.4 CAPSULE ORAL at 17:43

## 2017-08-05 RX ADMIN — BENZONATATE 100 MG: 100 CAPSULE ORAL at 17:43

## 2017-08-05 RX ADMIN — POLYETHYLENE GLYCOL 3350 17 G: 17 POWDER, FOR SOLUTION ORAL at 09:45

## 2017-08-05 RX ADMIN — TIOTROPIUM BROMIDE 18 MCG: 18 CAPSULE ORAL; RESPIRATORY (INHALATION) at 09:46

## 2017-08-05 RX ADMIN — PANTOPRAZOLE SODIUM 40 MG: 40 TABLET, DELAYED RELEASE ORAL at 05:29

## 2017-08-05 RX ADMIN — LORAZEPAM 0.25 MG: 0.5 TABLET ORAL at 09:42

## 2017-08-05 RX ADMIN — FLUTICASONE PROPIONATE AND SALMETEROL 1 PUFF: 50; 250 POWDER RESPIRATORY (INHALATION) at 09:45

## 2017-08-05 RX ADMIN — OXYBUTYNIN CHLORIDE 5 MG: 5 TABLET, EXTENDED RELEASE ORAL at 21:47

## 2017-08-05 RX ADMIN — CLOPIDOGREL BISULFATE 75 MG: 75 TABLET ORAL at 09:41

## 2017-08-05 RX ADMIN — BIMATOPROST 1 DROP: 0.1 SOLUTION/ DROPS OPHTHALMIC at 21:46

## 2017-08-05 RX ADMIN — LEVALBUTEROL HYDROCHLORIDE 1.25 MG: 1.25 SOLUTION, CONCENTRATE RESPIRATORY (INHALATION) at 13:31

## 2017-08-05 RX ADMIN — ATORVASTATIN CALCIUM 10 MG: 10 TABLET, FILM COATED ORAL at 09:40

## 2017-08-05 RX ADMIN — CARVEDILOL 6.25 MG: 6.25 TABLET, FILM COATED ORAL at 09:40

## 2017-08-05 RX ADMIN — CARVEDILOL 6.25 MG: 6.25 TABLET, FILM COATED ORAL at 17:43

## 2017-08-05 RX ADMIN — LEVALBUTEROL HYDROCHLORIDE 1.25 MG: 1.25 SOLUTION, CONCENTRATE RESPIRATORY (INHALATION) at 19:30

## 2017-08-05 RX ADMIN — GUAIFENESIN AND DEXTROMETHORPHAN 10 ML: 100; 10 SYRUP ORAL at 05:29

## 2017-08-05 RX ADMIN — GUAIFENESIN AND DEXTROMETHORPHAN 10 ML: 100; 10 SYRUP ORAL at 17:43

## 2017-08-06 PROCEDURE — 94640 AIRWAY INHALATION TREATMENT: CPT

## 2017-08-06 PROCEDURE — 97530 THERAPEUTIC ACTIVITIES: CPT | Performed by: STUDENT IN AN ORGANIZED HEALTH CARE EDUCATION/TRAINING PROGRAM

## 2017-08-06 PROCEDURE — 94760 N-INVAS EAR/PLS OXIMETRY 1: CPT

## 2017-08-06 PROCEDURE — 97116 GAIT TRAINING THERAPY: CPT

## 2017-08-06 PROCEDURE — 94668 MNPJ CHEST WALL SBSQ: CPT

## 2017-08-06 PROCEDURE — 97110 THERAPEUTIC EXERCISES: CPT

## 2017-08-06 RX ORDER — SODIUM PHOSPHATE, DIBASIC AND SODIUM PHOSPHATE, MONOBASIC 7; 19 G/133ML; G/133ML
1 ENEMA RECTAL DAILY PRN
Status: DISCONTINUED | OUTPATIENT
Start: 2017-08-06 | End: 2017-08-21 | Stop reason: HOSPADM

## 2017-08-06 RX ADMIN — BENZONATATE 100 MG: 100 CAPSULE ORAL at 17:04

## 2017-08-06 RX ADMIN — ATORVASTATIN CALCIUM 10 MG: 10 TABLET, FILM COATED ORAL at 08:10

## 2017-08-06 RX ADMIN — TIOTROPIUM BROMIDE 18 MCG: 18 CAPSULE ORAL; RESPIRATORY (INHALATION) at 11:00

## 2017-08-06 RX ADMIN — POLYETHYLENE GLYCOL 3350 17 G: 17 POWDER, FOR SOLUTION ORAL at 08:15

## 2017-08-06 RX ADMIN — ISODIUM CHLORIDE 3 ML: 0.03 SOLUTION RESPIRATORY (INHALATION) at 19:23

## 2017-08-06 RX ADMIN — LEVALBUTEROL HYDROCHLORIDE 1.25 MG: 1.25 SOLUTION, CONCENTRATE RESPIRATORY (INHALATION) at 19:23

## 2017-08-06 RX ADMIN — CARVEDILOL 6.25 MG: 6.25 TABLET, FILM COATED ORAL at 17:04

## 2017-08-06 RX ADMIN — LEVALBUTEROL HYDROCHLORIDE 1.25 MG: 1.25 SOLUTION, CONCENTRATE RESPIRATORY (INHALATION) at 07:14

## 2017-08-06 RX ADMIN — ASPIRIN 81 MG 81 MG: 81 TABLET ORAL at 08:11

## 2017-08-06 RX ADMIN — ENOXAPARIN SODIUM 40 MG: 40 INJECTION SUBCUTANEOUS at 08:21

## 2017-08-06 RX ADMIN — GUAIFENESIN AND DEXTROMETHORPHAN 10 ML: 100; 10 SYRUP ORAL at 17:07

## 2017-08-06 RX ADMIN — BISACODYL 10 MG: 10 SUPPOSITORY RECTAL at 10:30

## 2017-08-06 RX ADMIN — LEVALBUTEROL HYDROCHLORIDE 1.25 MG: 1.25 SOLUTION, CONCENTRATE RESPIRATORY (INHALATION) at 14:30

## 2017-08-06 RX ADMIN — ISODIUM CHLORIDE 3 ML: 0.03 SOLUTION RESPIRATORY (INHALATION) at 14:31

## 2017-08-06 RX ADMIN — OXYBUTYNIN CHLORIDE 5 MG: 5 TABLET, EXTENDED RELEASE ORAL at 21:50

## 2017-08-06 RX ADMIN — FLUTICASONE PROPIONATE AND SALMETEROL 1 PUFF: 50; 250 POWDER RESPIRATORY (INHALATION) at 11:00

## 2017-08-06 RX ADMIN — POTASSIUM CHLORIDE 20 MEQ: 1500 TABLET, EXTENDED RELEASE ORAL at 08:11

## 2017-08-06 RX ADMIN — BENZONATATE 100 MG: 100 CAPSULE ORAL at 08:11

## 2017-08-06 RX ADMIN — BENZONATATE 100 MG: 100 CAPSULE ORAL at 20:39

## 2017-08-06 RX ADMIN — GUAIFENESIN AND DEXTROMETHORPHAN 10 ML: 100; 10 SYRUP ORAL at 05:34

## 2017-08-06 RX ADMIN — TAMSULOSIN HYDROCHLORIDE 0.4 MG: 0.4 CAPSULE ORAL at 17:04

## 2017-08-06 RX ADMIN — AMLODIPINE BESYLATE 10 MG: 10 TABLET ORAL at 08:11

## 2017-08-06 RX ADMIN — BIMATOPROST 1 DROP: 0.1 SOLUTION/ DROPS OPHTHALMIC at 21:51

## 2017-08-06 RX ADMIN — PANTOPRAZOLE SODIUM 40 MG: 40 TABLET, DELAYED RELEASE ORAL at 05:34

## 2017-08-06 RX ADMIN — LORATADINE 10 MG: 10 TABLET ORAL at 08:10

## 2017-08-06 RX ADMIN — GUAIFENESIN AND DEXTROMETHORPHAN 10 ML: 100; 10 SYRUP ORAL at 23:46

## 2017-08-06 RX ADMIN — GUAIFENESIN AND DEXTROMETHORPHAN 10 ML: 100; 10 SYRUP ORAL at 11:56

## 2017-08-06 RX ADMIN — LORAZEPAM 0.25 MG: 0.5 TABLET ORAL at 08:11

## 2017-08-06 RX ADMIN — POTASSIUM CHLORIDE 20 MEQ: 1500 TABLET, EXTENDED RELEASE ORAL at 17:04

## 2017-08-06 RX ADMIN — FLUTICASONE PROPIONATE AND SALMETEROL 1 PUFF: 50; 250 POWDER RESPIRATORY (INHALATION) at 20:39

## 2017-08-06 RX ADMIN — PAROXETINE HYDROCHLORIDE 20 MG: 20 TABLET, FILM COATED ORAL at 08:10

## 2017-08-06 RX ADMIN — ISODIUM CHLORIDE 3 ML: 0.03 SOLUTION RESPIRATORY (INHALATION) at 07:14

## 2017-08-06 RX ADMIN — CARVEDILOL 6.25 MG: 6.25 TABLET, FILM COATED ORAL at 08:10

## 2017-08-06 RX ADMIN — CLOPIDOGREL BISULFATE 75 MG: 75 TABLET ORAL at 08:11

## 2017-08-07 LAB
ANION GAP SERPL CALCULATED.3IONS-SCNC: 6 MMOL/L (ref 4–13)
BASOPHILS # BLD AUTO: 0.01 THOUSANDS/ΜL (ref 0–0.1)
BASOPHILS NFR BLD AUTO: 0 % (ref 0–1)
BUN SERPL-MCNC: 32 MG/DL (ref 5–25)
CALCIUM SERPL-MCNC: 8.9 MG/DL (ref 8.3–10.1)
CHLORIDE SERPL-SCNC: 105 MMOL/L (ref 100–108)
CO2 SERPL-SCNC: 28 MMOL/L (ref 21–32)
CREAT SERPL-MCNC: 1.1 MG/DL (ref 0.6–1.3)
EOSINOPHIL # BLD AUTO: 0.45 THOUSAND/ΜL (ref 0–0.61)
EOSINOPHIL NFR BLD AUTO: 4 % (ref 0–6)
ERYTHROCYTE [DISTWIDTH] IN BLOOD BY AUTOMATED COUNT: 13.9 % (ref 11.6–15.1)
GFR SERPL CREATININE-BSD FRML MDRD: 64 ML/MIN/1.73SQ M
GLUCOSE P FAST SERPL-MCNC: 81 MG/DL (ref 65–99)
GLUCOSE SERPL-MCNC: 81 MG/DL (ref 65–140)
HCT VFR BLD AUTO: 41.4 % (ref 36.5–49.3)
HGB BLD-MCNC: 13.4 G/DL (ref 12–17)
LYMPHOCYTES # BLD AUTO: 1.4 THOUSANDS/ΜL (ref 0.6–4.47)
LYMPHOCYTES NFR BLD AUTO: 13 % (ref 14–44)
MCH RBC QN AUTO: 29.6 PG (ref 26.8–34.3)
MCHC RBC AUTO-ENTMCNC: 32.4 G/DL (ref 31.4–37.4)
MCV RBC AUTO: 91 FL (ref 82–98)
MONOCYTES # BLD AUTO: 0.9 THOUSAND/ΜL (ref 0.17–1.22)
MONOCYTES NFR BLD AUTO: 8 % (ref 4–12)
NEUTROPHILS # BLD AUTO: 7.87 THOUSANDS/ΜL (ref 1.85–7.62)
NEUTS SEG NFR BLD AUTO: 75 % (ref 43–75)
NRBC BLD AUTO-RTO: 0 /100 WBCS
PLATELET # BLD AUTO: 155 THOUSANDS/UL (ref 149–390)
PMV BLD AUTO: 12 FL (ref 8.9–12.7)
POTASSIUM SERPL-SCNC: 4.2 MMOL/L (ref 3.5–5.3)
RBC # BLD AUTO: 4.53 MILLION/UL (ref 3.88–5.62)
SODIUM SERPL-SCNC: 139 MMOL/L (ref 136–145)
WBC # BLD AUTO: 10.67 THOUSAND/UL (ref 4.31–10.16)

## 2017-08-07 PROCEDURE — 94668 MNPJ CHEST WALL SBSQ: CPT

## 2017-08-07 PROCEDURE — 85025 COMPLETE CBC W/AUTO DIFF WBC: CPT | Performed by: PHYSICIAN ASSISTANT

## 2017-08-07 PROCEDURE — 97110 THERAPEUTIC EXERCISES: CPT | Performed by: PHYSICAL THERAPIST

## 2017-08-07 PROCEDURE — 97530 THERAPEUTIC ACTIVITIES: CPT | Performed by: PHYSICAL THERAPIST

## 2017-08-07 PROCEDURE — 97110 THERAPEUTIC EXERCISES: CPT

## 2017-08-07 PROCEDURE — 94760 N-INVAS EAR/PLS OXIMETRY 1: CPT

## 2017-08-07 PROCEDURE — 80048 BASIC METABOLIC PNL TOTAL CA: CPT | Performed by: PHYSICIAN ASSISTANT

## 2017-08-07 PROCEDURE — 97535 SELF CARE MNGMENT TRAINING: CPT

## 2017-08-07 PROCEDURE — 94640 AIRWAY INHALATION TREATMENT: CPT

## 2017-08-07 PROCEDURE — 97116 GAIT TRAINING THERAPY: CPT | Performed by: PHYSICAL THERAPIST

## 2017-08-07 RX ORDER — BENZONATATE 100 MG/1
100 CAPSULE ORAL 3 TIMES DAILY PRN
Status: DISCONTINUED | OUTPATIENT
Start: 2017-08-07 | End: 2017-08-09

## 2017-08-07 RX ORDER — POTASSIUM CHLORIDE 750 MG/1
10 TABLET, EXTENDED RELEASE ORAL DAILY
Status: DISCONTINUED | OUTPATIENT
Start: 2017-08-08 | End: 2017-08-18

## 2017-08-07 RX ADMIN — GUAIFENESIN AND DEXTROMETHORPHAN 10 ML: 100; 10 SYRUP ORAL at 17:00

## 2017-08-07 RX ADMIN — ATORVASTATIN CALCIUM 10 MG: 10 TABLET, FILM COATED ORAL at 08:48

## 2017-08-07 RX ADMIN — GUAIFENESIN AND DEXTROMETHORPHAN 10 ML: 100; 10 SYRUP ORAL at 23:38

## 2017-08-07 RX ADMIN — ISODIUM CHLORIDE 3 ML: 0.03 SOLUTION RESPIRATORY (INHALATION) at 07:10

## 2017-08-07 RX ADMIN — TIOTROPIUM BROMIDE 18 MCG: 18 CAPSULE ORAL; RESPIRATORY (INHALATION) at 09:00

## 2017-08-07 RX ADMIN — CARVEDILOL 6.25 MG: 6.25 TABLET, FILM COATED ORAL at 08:30

## 2017-08-07 RX ADMIN — ISODIUM CHLORIDE 3 ML: 0.03 SOLUTION RESPIRATORY (INHALATION) at 13:34

## 2017-08-07 RX ADMIN — CLOPIDOGREL BISULFATE 75 MG: 75 TABLET ORAL at 08:49

## 2017-08-07 RX ADMIN — TAMSULOSIN HYDROCHLORIDE 0.4 MG: 0.4 CAPSULE ORAL at 16:58

## 2017-08-07 RX ADMIN — LORATADINE 10 MG: 10 TABLET ORAL at 08:49

## 2017-08-07 RX ADMIN — LEVALBUTEROL HYDROCHLORIDE 1.25 MG: 1.25 SOLUTION, CONCENTRATE RESPIRATORY (INHALATION) at 07:09

## 2017-08-07 RX ADMIN — PAROXETINE HYDROCHLORIDE 20 MG: 20 TABLET, FILM COATED ORAL at 08:48

## 2017-08-07 RX ADMIN — BIMATOPROST 1 DROP: 0.1 SOLUTION/ DROPS OPHTHALMIC at 21:26

## 2017-08-07 RX ADMIN — GUAIFENESIN AND DEXTROMETHORPHAN 10 ML: 100; 10 SYRUP ORAL at 05:22

## 2017-08-07 RX ADMIN — BENZONATATE 100 MG: 100 CAPSULE ORAL at 08:49

## 2017-08-07 RX ADMIN — ASPIRIN 81 MG 81 MG: 81 TABLET ORAL at 08:49

## 2017-08-07 RX ADMIN — CARVEDILOL 6.25 MG: 6.25 TABLET, FILM COATED ORAL at 16:58

## 2017-08-07 RX ADMIN — FLUTICASONE PROPIONATE AND SALMETEROL 1 PUFF: 50; 250 POWDER RESPIRATORY (INHALATION) at 21:26

## 2017-08-07 RX ADMIN — AMLODIPINE BESYLATE 10 MG: 10 TABLET ORAL at 08:49

## 2017-08-07 RX ADMIN — POTASSIUM CHLORIDE 20 MEQ: 1500 TABLET, EXTENDED RELEASE ORAL at 08:48

## 2017-08-07 RX ADMIN — LORAZEPAM 0.25 MG: 0.5 TABLET ORAL at 08:49

## 2017-08-07 RX ADMIN — OXYBUTYNIN CHLORIDE 5 MG: 5 TABLET, EXTENDED RELEASE ORAL at 21:25

## 2017-08-07 RX ADMIN — FLUTICASONE PROPIONATE AND SALMETEROL 1 PUFF: 50; 250 POWDER RESPIRATORY (INHALATION) at 09:00

## 2017-08-07 RX ADMIN — LEVALBUTEROL HYDROCHLORIDE 1.25 MG: 1.25 SOLUTION, CONCENTRATE RESPIRATORY (INHALATION) at 13:34

## 2017-08-07 RX ADMIN — LEVALBUTEROL HYDROCHLORIDE 1.25 MG: 1.25 SOLUTION, CONCENTRATE RESPIRATORY (INHALATION) at 19:20

## 2017-08-07 RX ADMIN — GUAIFENESIN AND DEXTROMETHORPHAN 10 ML: 100; 10 SYRUP ORAL at 11:35

## 2017-08-07 RX ADMIN — PANTOPRAZOLE SODIUM 40 MG: 40 TABLET, DELAYED RELEASE ORAL at 05:22

## 2017-08-07 RX ADMIN — ISODIUM CHLORIDE 3 ML: 0.03 SOLUTION RESPIRATORY (INHALATION) at 19:20

## 2017-08-07 RX ADMIN — POLYETHYLENE GLYCOL 3350 17 G: 17 POWDER, FOR SOLUTION ORAL at 08:49

## 2017-08-07 RX ADMIN — ENOXAPARIN SODIUM 40 MG: 40 INJECTION SUBCUTANEOUS at 08:49

## 2017-08-08 PROCEDURE — 97110 THERAPEUTIC EXERCISES: CPT

## 2017-08-08 PROCEDURE — 97530 THERAPEUTIC ACTIVITIES: CPT

## 2017-08-08 PROCEDURE — 94640 AIRWAY INHALATION TREATMENT: CPT

## 2017-08-08 PROCEDURE — 94760 N-INVAS EAR/PLS OXIMETRY 1: CPT

## 2017-08-08 PROCEDURE — 94668 MNPJ CHEST WALL SBSQ: CPT

## 2017-08-08 PROCEDURE — 97116 GAIT TRAINING THERAPY: CPT

## 2017-08-08 RX ADMIN — TAMSULOSIN HYDROCHLORIDE 0.4 MG: 0.4 CAPSULE ORAL at 16:42

## 2017-08-08 RX ADMIN — LORAZEPAM 0.25 MG: 0.5 TABLET ORAL at 08:15

## 2017-08-08 RX ADMIN — TIOTROPIUM BROMIDE 18 MCG: 18 CAPSULE ORAL; RESPIRATORY (INHALATION) at 08:15

## 2017-08-08 RX ADMIN — ASPIRIN 81 MG 81 MG: 81 TABLET ORAL at 08:14

## 2017-08-08 RX ADMIN — LEVALBUTEROL HYDROCHLORIDE 1.25 MG: 1.25 SOLUTION, CONCENTRATE RESPIRATORY (INHALATION) at 20:08

## 2017-08-08 RX ADMIN — AMLODIPINE BESYLATE 10 MG: 10 TABLET ORAL at 08:14

## 2017-08-08 RX ADMIN — LEVALBUTEROL HYDROCHLORIDE 1.25 MG: 1.25 SOLUTION, CONCENTRATE RESPIRATORY (INHALATION) at 07:10

## 2017-08-08 RX ADMIN — FLUTICASONE PROPIONATE AND SALMETEROL 1 PUFF: 50; 250 POWDER RESPIRATORY (INHALATION) at 08:15

## 2017-08-08 RX ADMIN — POLYETHYLENE GLYCOL 3350 17 G: 17 POWDER, FOR SOLUTION ORAL at 08:14

## 2017-08-08 RX ADMIN — GUAIFENESIN AND DEXTROMETHORPHAN 10 ML: 100; 10 SYRUP ORAL at 06:16

## 2017-08-08 RX ADMIN — POTASSIUM CHLORIDE 10 MEQ: 750 TABLET, EXTENDED RELEASE ORAL at 08:14

## 2017-08-08 RX ADMIN — LEVALBUTEROL HYDROCHLORIDE 1.25 MG: 1.25 SOLUTION, CONCENTRATE RESPIRATORY (INHALATION) at 13:48

## 2017-08-08 RX ADMIN — PANTOPRAZOLE SODIUM 40 MG: 40 TABLET, DELAYED RELEASE ORAL at 06:16

## 2017-08-08 RX ADMIN — GUAIFENESIN AND DEXTROMETHORPHAN 10 ML: 100; 10 SYRUP ORAL at 11:37

## 2017-08-08 RX ADMIN — CARVEDILOL 6.25 MG: 6.25 TABLET, FILM COATED ORAL at 08:14

## 2017-08-08 RX ADMIN — LORATADINE 10 MG: 10 TABLET ORAL at 08:14

## 2017-08-08 RX ADMIN — PAROXETINE HYDROCHLORIDE 20 MG: 20 TABLET, FILM COATED ORAL at 08:14

## 2017-08-08 RX ADMIN — ENOXAPARIN SODIUM 40 MG: 40 INJECTION SUBCUTANEOUS at 08:14

## 2017-08-08 RX ADMIN — CLOPIDOGREL BISULFATE 75 MG: 75 TABLET ORAL at 08:14

## 2017-08-08 RX ADMIN — FLUTICASONE PROPIONATE AND SALMETEROL 1 PUFF: 50; 250 POWDER RESPIRATORY (INHALATION) at 21:29

## 2017-08-08 RX ADMIN — ISODIUM CHLORIDE 3 ML: 0.03 SOLUTION RESPIRATORY (INHALATION) at 20:08

## 2017-08-08 RX ADMIN — OXYBUTYNIN CHLORIDE 5 MG: 5 TABLET, EXTENDED RELEASE ORAL at 21:29

## 2017-08-08 RX ADMIN — BIMATOPROST 1 DROP: 0.1 SOLUTION/ DROPS OPHTHALMIC at 21:29

## 2017-08-08 RX ADMIN — GUAIFENESIN AND DEXTROMETHORPHAN 10 ML: 100; 10 SYRUP ORAL at 19:11

## 2017-08-08 RX ADMIN — SODIUM PHOSPHATE 1 ENEMA: 7; 19 ENEMA RECTAL at 21:31

## 2017-08-08 RX ADMIN — CARVEDILOL 6.25 MG: 6.25 TABLET, FILM COATED ORAL at 16:42

## 2017-08-08 RX ADMIN — ATORVASTATIN CALCIUM 10 MG: 10 TABLET, FILM COATED ORAL at 08:14

## 2017-08-08 RX ADMIN — ISODIUM CHLORIDE 3 ML: 0.03 SOLUTION RESPIRATORY (INHALATION) at 13:48

## 2017-08-08 RX ADMIN — ISODIUM CHLORIDE 3 ML: 0.03 SOLUTION RESPIRATORY (INHALATION) at 07:10

## 2017-08-09 PROCEDURE — 94668 MNPJ CHEST WALL SBSQ: CPT

## 2017-08-09 PROCEDURE — 97535 SELF CARE MNGMENT TRAINING: CPT

## 2017-08-09 PROCEDURE — 97530 THERAPEUTIC ACTIVITIES: CPT | Performed by: STUDENT IN AN ORGANIZED HEALTH CARE EDUCATION/TRAINING PROGRAM

## 2017-08-09 PROCEDURE — 97116 GAIT TRAINING THERAPY: CPT

## 2017-08-09 PROCEDURE — 97110 THERAPEUTIC EXERCISES: CPT

## 2017-08-09 PROCEDURE — 97112 NEUROMUSCULAR REEDUCATION: CPT

## 2017-08-09 RX ORDER — BENZONATATE 100 MG/1
100 CAPSULE ORAL 3 TIMES DAILY
Status: DISCONTINUED | OUTPATIENT
Start: 2017-08-09 | End: 2017-08-21 | Stop reason: HOSPADM

## 2017-08-09 RX ADMIN — TAMSULOSIN HYDROCHLORIDE 0.4 MG: 0.4 CAPSULE ORAL at 16:27

## 2017-08-09 RX ADMIN — OXYBUTYNIN CHLORIDE 5 MG: 5 TABLET, EXTENDED RELEASE ORAL at 21:47

## 2017-08-09 RX ADMIN — FLUTICASONE PROPIONATE AND SALMETEROL 1 PUFF: 50; 250 POWDER RESPIRATORY (INHALATION) at 09:33

## 2017-08-09 RX ADMIN — CARVEDILOL 6.25 MG: 6.25 TABLET, FILM COATED ORAL at 16:28

## 2017-08-09 RX ADMIN — PAROXETINE HYDROCHLORIDE 20 MG: 20 TABLET, FILM COATED ORAL at 09:40

## 2017-08-09 RX ADMIN — FLUTICASONE PROPIONATE AND SALMETEROL 1 PUFF: 50; 250 POWDER RESPIRATORY (INHALATION) at 21:47

## 2017-08-09 RX ADMIN — AMLODIPINE BESYLATE 10 MG: 10 TABLET ORAL at 09:35

## 2017-08-09 RX ADMIN — CLOPIDOGREL BISULFATE 75 MG: 75 TABLET ORAL at 09:35

## 2017-08-09 RX ADMIN — BENZONATATE 100 MG: 100 CAPSULE ORAL at 21:46

## 2017-08-09 RX ADMIN — CARVEDILOL 6.25 MG: 6.25 TABLET, FILM COATED ORAL at 07:45

## 2017-08-09 RX ADMIN — BENZONATATE 100 MG: 100 CAPSULE ORAL at 16:27

## 2017-08-09 RX ADMIN — GUAIFENESIN AND DEXTROMETHORPHAN 10 ML: 100; 10 SYRUP ORAL at 05:25

## 2017-08-09 RX ADMIN — TIOTROPIUM BROMIDE 18 MCG: 18 CAPSULE ORAL; RESPIRATORY (INHALATION) at 09:33

## 2017-08-09 RX ADMIN — GUAIFENESIN AND DEXTROMETHORPHAN 10 ML: 100; 10 SYRUP ORAL at 23:41

## 2017-08-09 RX ADMIN — GUAIFENESIN AND DEXTROMETHORPHAN 10 ML: 100; 10 SYRUP ORAL at 11:33

## 2017-08-09 RX ADMIN — BENZONATATE 100 MG: 100 CAPSULE ORAL at 11:33

## 2017-08-09 RX ADMIN — GUAIFENESIN AND DEXTROMETHORPHAN 10 ML: 100; 10 SYRUP ORAL at 00:30

## 2017-08-09 RX ADMIN — POTASSIUM CHLORIDE 10 MEQ: 750 TABLET, EXTENDED RELEASE ORAL at 09:35

## 2017-08-09 RX ADMIN — LORATADINE 10 MG: 10 TABLET ORAL at 09:35

## 2017-08-09 RX ADMIN — ASPIRIN 81 MG 81 MG: 81 TABLET ORAL at 09:36

## 2017-08-09 RX ADMIN — GUAIFENESIN AND DEXTROMETHORPHAN 10 ML: 100; 10 SYRUP ORAL at 18:15

## 2017-08-09 RX ADMIN — ATORVASTATIN CALCIUM 10 MG: 10 TABLET, FILM COATED ORAL at 09:35

## 2017-08-09 RX ADMIN — BIMATOPROST 1 DROP: 0.1 SOLUTION/ DROPS OPHTHALMIC at 21:47

## 2017-08-09 RX ADMIN — ENOXAPARIN SODIUM 40 MG: 40 INJECTION SUBCUTANEOUS at 09:35

## 2017-08-09 RX ADMIN — LORAZEPAM 0.25 MG: 0.5 TABLET ORAL at 09:35

## 2017-08-09 RX ADMIN — PANTOPRAZOLE SODIUM 40 MG: 40 TABLET, DELAYED RELEASE ORAL at 05:25

## 2017-08-10 LAB
ANION GAP SERPL CALCULATED.3IONS-SCNC: 6 MMOL/L (ref 4–13)
BASOPHILS # BLD AUTO: 0.01 THOUSANDS/ΜL (ref 0–0.1)
BASOPHILS NFR BLD AUTO: 0 % (ref 0–1)
BUN SERPL-MCNC: 26 MG/DL (ref 5–25)
CALCIUM SERPL-MCNC: 8.8 MG/DL (ref 8.3–10.1)
CHLORIDE SERPL-SCNC: 104 MMOL/L (ref 100–108)
CO2 SERPL-SCNC: 28 MMOL/L (ref 21–32)
CREAT SERPL-MCNC: 1.08 MG/DL (ref 0.6–1.3)
EOSINOPHIL # BLD AUTO: 0.25 THOUSAND/ΜL (ref 0–0.61)
EOSINOPHIL NFR BLD AUTO: 4 % (ref 0–6)
ERYTHROCYTE [DISTWIDTH] IN BLOOD BY AUTOMATED COUNT: 13.8 % (ref 11.6–15.1)
GFR SERPL CREATININE-BSD FRML MDRD: 66 ML/MIN/1.73SQ M
GLUCOSE SERPL-MCNC: 94 MG/DL (ref 65–140)
HCT VFR BLD AUTO: 37.7 % (ref 36.5–49.3)
HGB BLD-MCNC: 12.3 G/DL (ref 12–17)
LYMPHOCYTES # BLD AUTO: 1.33 THOUSANDS/ΜL (ref 0.6–4.47)
LYMPHOCYTES NFR BLD AUTO: 18 % (ref 14–44)
MCH RBC QN AUTO: 29.6 PG (ref 26.8–34.3)
MCHC RBC AUTO-ENTMCNC: 32.6 G/DL (ref 31.4–37.4)
MCV RBC AUTO: 91 FL (ref 82–98)
MONOCYTES # BLD AUTO: 0.6 THOUSAND/ΜL (ref 0.17–1.22)
MONOCYTES NFR BLD AUTO: 8 % (ref 4–12)
NEUTROPHILS # BLD AUTO: 5.02 THOUSANDS/ΜL (ref 1.85–7.62)
NEUTS SEG NFR BLD AUTO: 70 % (ref 43–75)
NRBC BLD AUTO-RTO: 0 /100 WBCS
PLATELET # BLD AUTO: 146 THOUSANDS/UL (ref 149–390)
PMV BLD AUTO: 11.7 FL (ref 8.9–12.7)
POTASSIUM SERPL-SCNC: 3.9 MMOL/L (ref 3.5–5.3)
RBC # BLD AUTO: 4.16 MILLION/UL (ref 3.88–5.62)
SODIUM SERPL-SCNC: 138 MMOL/L (ref 136–145)
WBC # BLD AUTO: 7.24 THOUSAND/UL (ref 4.31–10.16)

## 2017-08-10 PROCEDURE — 97110 THERAPEUTIC EXERCISES: CPT

## 2017-08-10 PROCEDURE — 97530 THERAPEUTIC ACTIVITIES: CPT | Performed by: PHYSICAL THERAPIST

## 2017-08-10 PROCEDURE — 97110 THERAPEUTIC EXERCISES: CPT | Performed by: PHYSICAL THERAPIST

## 2017-08-10 PROCEDURE — 94668 MNPJ CHEST WALL SBSQ: CPT

## 2017-08-10 PROCEDURE — 97535 SELF CARE MNGMENT TRAINING: CPT

## 2017-08-10 PROCEDURE — 85025 COMPLETE CBC W/AUTO DIFF WBC: CPT | Performed by: NURSE PRACTITIONER

## 2017-08-10 PROCEDURE — 80048 BASIC METABOLIC PNL TOTAL CA: CPT | Performed by: NURSE PRACTITIONER

## 2017-08-10 RX ORDER — SENNOSIDES 8.6 MG
1 TABLET ORAL
Status: DISCONTINUED | OUTPATIENT
Start: 2017-08-10 | End: 2017-08-21 | Stop reason: HOSPADM

## 2017-08-10 RX ORDER — DOCUSATE SODIUM 100 MG/1
100 CAPSULE, LIQUID FILLED ORAL 2 TIMES DAILY
Status: DISCONTINUED | OUTPATIENT
Start: 2017-08-10 | End: 2017-08-21 | Stop reason: HOSPADM

## 2017-08-10 RX ADMIN — OXYBUTYNIN CHLORIDE 5 MG: 5 TABLET, EXTENDED RELEASE ORAL at 21:47

## 2017-08-10 RX ADMIN — ACETAMINOPHEN 650 MG: 325 TABLET, FILM COATED ORAL at 01:24

## 2017-08-10 RX ADMIN — BIMATOPROST 1 DROP: 0.1 SOLUTION/ DROPS OPHTHALMIC at 21:47

## 2017-08-10 RX ADMIN — CARVEDILOL 6.25 MG: 6.25 TABLET, FILM COATED ORAL at 16:33

## 2017-08-10 RX ADMIN — GUAIFENESIN AND DEXTROMETHORPHAN 10 ML: 100; 10 SYRUP ORAL at 05:17

## 2017-08-10 RX ADMIN — ENOXAPARIN SODIUM 40 MG: 40 INJECTION SUBCUTANEOUS at 09:15

## 2017-08-10 RX ADMIN — BENZONATATE 100 MG: 100 CAPSULE ORAL at 21:47

## 2017-08-10 RX ADMIN — PANTOPRAZOLE SODIUM 40 MG: 40 TABLET, DELAYED RELEASE ORAL at 05:17

## 2017-08-10 RX ADMIN — FLUTICASONE PROPIONATE AND SALMETEROL 1 PUFF: 50; 250 POWDER RESPIRATORY (INHALATION) at 09:12

## 2017-08-10 RX ADMIN — TAMSULOSIN HYDROCHLORIDE 0.4 MG: 0.4 CAPSULE ORAL at 16:33

## 2017-08-10 RX ADMIN — TIOTROPIUM BROMIDE 18 MCG: 18 CAPSULE ORAL; RESPIRATORY (INHALATION) at 09:12

## 2017-08-10 RX ADMIN — FLUTICASONE PROPIONATE AND SALMETEROL 1 PUFF: 50; 250 POWDER RESPIRATORY (INHALATION) at 21:47

## 2017-08-10 RX ADMIN — LORATADINE 10 MG: 10 TABLET ORAL at 09:14

## 2017-08-10 RX ADMIN — GUAIFENESIN AND DEXTROMETHORPHAN 10 ML: 100; 10 SYRUP ORAL at 18:55

## 2017-08-10 RX ADMIN — GUAIFENESIN AND DEXTROMETHORPHAN 10 ML: 100; 10 SYRUP ORAL at 23:52

## 2017-08-10 RX ADMIN — CLOPIDOGREL BISULFATE 75 MG: 75 TABLET ORAL at 09:13

## 2017-08-10 RX ADMIN — BENZONATATE 100 MG: 100 CAPSULE ORAL at 16:33

## 2017-08-10 RX ADMIN — ASPIRIN 81 MG 81 MG: 81 TABLET ORAL at 09:14

## 2017-08-10 RX ADMIN — LORAZEPAM 0.25 MG: 0.5 TABLET ORAL at 09:14

## 2017-08-10 RX ADMIN — DOCUSATE SODIUM 100 MG: 100 CAPSULE, LIQUID FILLED ORAL at 18:55

## 2017-08-10 RX ADMIN — AMLODIPINE BESYLATE 10 MG: 10 TABLET ORAL at 09:14

## 2017-08-10 RX ADMIN — PAROXETINE HYDROCHLORIDE 20 MG: 20 TABLET, FILM COATED ORAL at 09:14

## 2017-08-10 RX ADMIN — DOCUSATE SODIUM 100 MG: 100 CAPSULE, LIQUID FILLED ORAL at 09:48

## 2017-08-10 RX ADMIN — ATORVASTATIN CALCIUM 10 MG: 10 TABLET, FILM COATED ORAL at 09:19

## 2017-08-10 RX ADMIN — GUAIFENESIN AND DEXTROMETHORPHAN 10 ML: 100; 10 SYRUP ORAL at 12:38

## 2017-08-10 RX ADMIN — CARVEDILOL 6.25 MG: 6.25 TABLET, FILM COATED ORAL at 09:14

## 2017-08-10 RX ADMIN — POTASSIUM CHLORIDE 10 MEQ: 750 TABLET, EXTENDED RELEASE ORAL at 09:15

## 2017-08-10 RX ADMIN — BENZONATATE 100 MG: 100 CAPSULE ORAL at 09:14

## 2017-08-11 PROCEDURE — 97530 THERAPEUTIC ACTIVITIES: CPT | Performed by: PHYSICAL THERAPIST

## 2017-08-11 PROCEDURE — 97530 THERAPEUTIC ACTIVITIES: CPT

## 2017-08-11 PROCEDURE — 97110 THERAPEUTIC EXERCISES: CPT

## 2017-08-11 PROCEDURE — 94760 N-INVAS EAR/PLS OXIMETRY 1: CPT

## 2017-08-11 PROCEDURE — 94668 MNPJ CHEST WALL SBSQ: CPT

## 2017-08-11 PROCEDURE — 97116 GAIT TRAINING THERAPY: CPT

## 2017-08-11 RX ADMIN — TIOTROPIUM BROMIDE 18 MCG: 18 CAPSULE ORAL; RESPIRATORY (INHALATION) at 09:02

## 2017-08-11 RX ADMIN — CARVEDILOL 6.25 MG: 6.25 TABLET, FILM COATED ORAL at 17:56

## 2017-08-11 RX ADMIN — BENZONATATE 100 MG: 100 CAPSULE ORAL at 21:12

## 2017-08-11 RX ADMIN — DOCUSATE SODIUM 100 MG: 100 CAPSULE, LIQUID FILLED ORAL at 17:56

## 2017-08-11 RX ADMIN — AMLODIPINE BESYLATE 10 MG: 10 TABLET ORAL at 09:06

## 2017-08-11 RX ADMIN — DOCUSATE SODIUM 100 MG: 100 CAPSULE, LIQUID FILLED ORAL at 09:05

## 2017-08-11 RX ADMIN — FLUTICASONE PROPIONATE AND SALMETEROL 1 PUFF: 50; 250 POWDER RESPIRATORY (INHALATION) at 21:11

## 2017-08-11 RX ADMIN — GUAIFENESIN AND DEXTROMETHORPHAN 10 ML: 100; 10 SYRUP ORAL at 17:55

## 2017-08-11 RX ADMIN — LORAZEPAM 0.25 MG: 0.5 TABLET ORAL at 09:06

## 2017-08-11 RX ADMIN — CLOPIDOGREL BISULFATE 75 MG: 75 TABLET ORAL at 09:05

## 2017-08-11 RX ADMIN — GUAIFENESIN AND DEXTROMETHORPHAN 10 ML: 100; 10 SYRUP ORAL at 06:07

## 2017-08-11 RX ADMIN — GUAIFENESIN AND DEXTROMETHORPHAN 10 ML: 100; 10 SYRUP ORAL at 23:29

## 2017-08-11 RX ADMIN — FLUTICASONE PROPIONATE AND SALMETEROL 1 PUFF: 50; 250 POWDER RESPIRATORY (INHALATION) at 09:02

## 2017-08-11 RX ADMIN — ASPIRIN 81 MG 81 MG: 81 TABLET ORAL at 09:05

## 2017-08-11 RX ADMIN — ATORVASTATIN CALCIUM 10 MG: 10 TABLET, FILM COATED ORAL at 09:05

## 2017-08-11 RX ADMIN — GUAIFENESIN AND DEXTROMETHORPHAN 10 ML: 100; 10 SYRUP ORAL at 11:27

## 2017-08-11 RX ADMIN — BIMATOPROST 1 DROP: 0.1 SOLUTION/ DROPS OPHTHALMIC at 21:12

## 2017-08-11 RX ADMIN — PAROXETINE HYDROCHLORIDE 20 MG: 20 TABLET, FILM COATED ORAL at 09:05

## 2017-08-11 RX ADMIN — ENOXAPARIN SODIUM 40 MG: 40 INJECTION SUBCUTANEOUS at 09:01

## 2017-08-11 RX ADMIN — PANTOPRAZOLE SODIUM 40 MG: 40 TABLET, DELAYED RELEASE ORAL at 06:07

## 2017-08-11 RX ADMIN — POTASSIUM CHLORIDE 10 MEQ: 750 TABLET, EXTENDED RELEASE ORAL at 09:06

## 2017-08-11 RX ADMIN — BENZONATATE 100 MG: 100 CAPSULE ORAL at 09:05

## 2017-08-11 RX ADMIN — OXYBUTYNIN CHLORIDE 5 MG: 5 TABLET, EXTENDED RELEASE ORAL at 21:12

## 2017-08-11 RX ADMIN — TAMSULOSIN HYDROCHLORIDE 0.4 MG: 0.4 CAPSULE ORAL at 17:56

## 2017-08-11 RX ADMIN — CARVEDILOL 6.25 MG: 6.25 TABLET, FILM COATED ORAL at 09:05

## 2017-08-11 RX ADMIN — BENZONATATE 100 MG: 100 CAPSULE ORAL at 17:56

## 2017-08-11 RX ADMIN — LORATADINE 10 MG: 10 TABLET ORAL at 09:05

## 2017-08-12 PROCEDURE — 97530 THERAPEUTIC ACTIVITIES: CPT

## 2017-08-12 RX ADMIN — TIOTROPIUM BROMIDE 18 MCG: 18 CAPSULE ORAL; RESPIRATORY (INHALATION) at 09:21

## 2017-08-12 RX ADMIN — BIMATOPROST 1 DROP: 0.1 SOLUTION/ DROPS OPHTHALMIC at 22:09

## 2017-08-12 RX ADMIN — CARVEDILOL 6.25 MG: 6.25 TABLET, FILM COATED ORAL at 16:01

## 2017-08-12 RX ADMIN — CLOPIDOGREL BISULFATE 75 MG: 75 TABLET ORAL at 09:32

## 2017-08-12 RX ADMIN — BENZONATATE 100 MG: 100 CAPSULE ORAL at 09:32

## 2017-08-12 RX ADMIN — PANTOPRAZOLE SODIUM 40 MG: 40 TABLET, DELAYED RELEASE ORAL at 05:09

## 2017-08-12 RX ADMIN — BENZONATATE 100 MG: 100 CAPSULE ORAL at 22:09

## 2017-08-12 RX ADMIN — BENZONATATE 100 MG: 100 CAPSULE ORAL at 16:01

## 2017-08-12 RX ADMIN — TAMSULOSIN HYDROCHLORIDE 0.4 MG: 0.4 CAPSULE ORAL at 16:01

## 2017-08-12 RX ADMIN — OXYBUTYNIN CHLORIDE 5 MG: 5 TABLET, EXTENDED RELEASE ORAL at 22:09

## 2017-08-12 RX ADMIN — PAROXETINE HYDROCHLORIDE 20 MG: 20 TABLET, FILM COATED ORAL at 09:24

## 2017-08-12 RX ADMIN — GUAIFENESIN AND DEXTROMETHORPHAN 10 ML: 100; 10 SYRUP ORAL at 05:09

## 2017-08-12 RX ADMIN — LORAZEPAM 0.25 MG: 0.5 TABLET ORAL at 09:15

## 2017-08-12 RX ADMIN — FLUTICASONE PROPIONATE AND SALMETEROL 1 PUFF: 50; 250 POWDER RESPIRATORY (INHALATION) at 22:09

## 2017-08-12 RX ADMIN — ASPIRIN 81 MG 81 MG: 81 TABLET ORAL at 09:25

## 2017-08-12 RX ADMIN — GUAIFENESIN AND DEXTROMETHORPHAN 10 ML: 100; 10 SYRUP ORAL at 18:06

## 2017-08-12 RX ADMIN — POTASSIUM CHLORIDE 10 MEQ: 750 TABLET, EXTENDED RELEASE ORAL at 09:23

## 2017-08-12 RX ADMIN — GUAIFENESIN AND DEXTROMETHORPHAN 10 ML: 100; 10 SYRUP ORAL at 11:44

## 2017-08-12 RX ADMIN — ATORVASTATIN CALCIUM 10 MG: 10 TABLET, FILM COATED ORAL at 09:28

## 2017-08-12 RX ADMIN — CARVEDILOL 6.25 MG: 6.25 TABLET, FILM COATED ORAL at 09:26

## 2017-08-12 RX ADMIN — LORATADINE 10 MG: 10 TABLET ORAL at 09:26

## 2017-08-12 RX ADMIN — ENOXAPARIN SODIUM 40 MG: 40 INJECTION SUBCUTANEOUS at 09:29

## 2017-08-12 RX ADMIN — DOCUSATE SODIUM 100 MG: 100 CAPSULE, LIQUID FILLED ORAL at 18:06

## 2017-08-12 RX ADMIN — FLUTICASONE PROPIONATE AND SALMETEROL 1 PUFF: 50; 250 POWDER RESPIRATORY (INHALATION) at 09:21

## 2017-08-12 RX ADMIN — AMLODIPINE BESYLATE 10 MG: 10 TABLET ORAL at 09:30

## 2017-08-12 RX ADMIN — DOCUSATE SODIUM 100 MG: 100 CAPSULE, LIQUID FILLED ORAL at 09:27

## 2017-08-13 PROCEDURE — 97530 THERAPEUTIC ACTIVITIES: CPT

## 2017-08-13 PROCEDURE — 97116 GAIT TRAINING THERAPY: CPT

## 2017-08-13 RX ADMIN — PANTOPRAZOLE SODIUM 40 MG: 40 TABLET, DELAYED RELEASE ORAL at 06:53

## 2017-08-13 RX ADMIN — ENOXAPARIN SODIUM 40 MG: 40 INJECTION SUBCUTANEOUS at 09:00

## 2017-08-13 RX ADMIN — LORATADINE 10 MG: 10 TABLET ORAL at 08:59

## 2017-08-13 RX ADMIN — CARVEDILOL 6.25 MG: 6.25 TABLET, FILM COATED ORAL at 17:59

## 2017-08-13 RX ADMIN — OXYBUTYNIN CHLORIDE 5 MG: 5 TABLET, EXTENDED RELEASE ORAL at 21:07

## 2017-08-13 RX ADMIN — CARVEDILOL 6.25 MG: 6.25 TABLET, FILM COATED ORAL at 08:59

## 2017-08-13 RX ADMIN — FLUTICASONE PROPIONATE AND SALMETEROL 1 PUFF: 50; 250 POWDER RESPIRATORY (INHALATION) at 09:11

## 2017-08-13 RX ADMIN — DOCUSATE SODIUM 100 MG: 100 CAPSULE, LIQUID FILLED ORAL at 09:00

## 2017-08-13 RX ADMIN — POTASSIUM CHLORIDE 10 MEQ: 750 TABLET, EXTENDED RELEASE ORAL at 08:58

## 2017-08-13 RX ADMIN — FLUTICASONE PROPIONATE AND SALMETEROL 1 PUFF: 50; 250 POWDER RESPIRATORY (INHALATION) at 21:07

## 2017-08-13 RX ADMIN — GUAIFENESIN AND DEXTROMETHORPHAN 10 ML: 100; 10 SYRUP ORAL at 00:43

## 2017-08-13 RX ADMIN — BENZONATATE 100 MG: 100 CAPSULE ORAL at 21:06

## 2017-08-13 RX ADMIN — GUAIFENESIN AND DEXTROMETHORPHAN 10 ML: 100; 10 SYRUP ORAL at 18:00

## 2017-08-13 RX ADMIN — BIMATOPROST 1 DROP: 0.1 SOLUTION/ DROPS OPHTHALMIC at 21:07

## 2017-08-13 RX ADMIN — PAROXETINE HYDROCHLORIDE 20 MG: 20 TABLET, FILM COATED ORAL at 08:58

## 2017-08-13 RX ADMIN — BENZONATATE 100 MG: 100 CAPSULE ORAL at 08:59

## 2017-08-13 RX ADMIN — TIOTROPIUM BROMIDE 18 MCG: 18 CAPSULE ORAL; RESPIRATORY (INHALATION) at 12:51

## 2017-08-13 RX ADMIN — ASPIRIN 81 MG 81 MG: 81 TABLET ORAL at 08:59

## 2017-08-13 RX ADMIN — TAMSULOSIN HYDROCHLORIDE 0.4 MG: 0.4 CAPSULE ORAL at 18:00

## 2017-08-13 RX ADMIN — AMLODIPINE BESYLATE 10 MG: 10 TABLET ORAL at 08:59

## 2017-08-13 RX ADMIN — BENZONATATE 100 MG: 100 CAPSULE ORAL at 17:59

## 2017-08-13 RX ADMIN — GUAIFENESIN AND DEXTROMETHORPHAN 10 ML: 100; 10 SYRUP ORAL at 06:53

## 2017-08-13 RX ADMIN — LORAZEPAM 0.25 MG: 0.5 TABLET ORAL at 09:10

## 2017-08-13 RX ADMIN — DOCUSATE SODIUM 100 MG: 100 CAPSULE, LIQUID FILLED ORAL at 17:59

## 2017-08-13 RX ADMIN — GUAIFENESIN AND DEXTROMETHORPHAN 10 ML: 100; 10 SYRUP ORAL at 23:42

## 2017-08-13 RX ADMIN — ATORVASTATIN CALCIUM 10 MG: 10 TABLET, FILM COATED ORAL at 08:58

## 2017-08-13 RX ADMIN — CLOPIDOGREL BISULFATE 75 MG: 75 TABLET ORAL at 08:58

## 2017-08-13 RX ADMIN — GUAIFENESIN AND DEXTROMETHORPHAN 10 ML: 100; 10 SYRUP ORAL at 12:52

## 2017-08-14 LAB
ANION GAP SERPL CALCULATED.3IONS-SCNC: 7 MMOL/L (ref 4–13)
BASOPHILS # BLD AUTO: 0.02 THOUSANDS/ΜL (ref 0–0.1)
BASOPHILS NFR BLD AUTO: 0 % (ref 0–1)
BUN SERPL-MCNC: 25 MG/DL (ref 5–25)
CALCIUM SERPL-MCNC: 8.5 MG/DL (ref 8.3–10.1)
CHLORIDE SERPL-SCNC: 107 MMOL/L (ref 100–108)
CO2 SERPL-SCNC: 26 MMOL/L (ref 21–32)
CREAT SERPL-MCNC: 1.11 MG/DL (ref 0.6–1.3)
EOSINOPHIL # BLD AUTO: 0.29 THOUSAND/ΜL (ref 0–0.61)
EOSINOPHIL NFR BLD AUTO: 5 % (ref 0–6)
ERYTHROCYTE [DISTWIDTH] IN BLOOD BY AUTOMATED COUNT: 13.8 % (ref 11.6–15.1)
GFR SERPL CREATININE-BSD FRML MDRD: 64 ML/MIN/1.73SQ M
GLUCOSE P FAST SERPL-MCNC: 88 MG/DL (ref 65–99)
GLUCOSE SERPL-MCNC: 88 MG/DL (ref 65–140)
HCT VFR BLD AUTO: 36 % (ref 36.5–49.3)
HGB BLD-MCNC: 11.9 G/DL (ref 12–17)
LYMPHOCYTES # BLD AUTO: 1.1 THOUSANDS/ΜL (ref 0.6–4.47)
LYMPHOCYTES NFR BLD AUTO: 19 % (ref 14–44)
MCH RBC QN AUTO: 30.3 PG (ref 26.8–34.3)
MCHC RBC AUTO-ENTMCNC: 33.1 G/DL (ref 31.4–37.4)
MCV RBC AUTO: 92 FL (ref 82–98)
MONOCYTES # BLD AUTO: 0.48 THOUSAND/ΜL (ref 0.17–1.22)
MONOCYTES NFR BLD AUTO: 8 % (ref 4–12)
NEUTROPHILS # BLD AUTO: 3.96 THOUSANDS/ΜL (ref 1.85–7.62)
NEUTS SEG NFR BLD AUTO: 68 % (ref 43–75)
NRBC BLD AUTO-RTO: 0 /100 WBCS
PLATELET # BLD AUTO: 105 THOUSANDS/UL (ref 149–390)
PMV BLD AUTO: 11.3 FL (ref 8.9–12.7)
POTASSIUM SERPL-SCNC: 3.9 MMOL/L (ref 3.5–5.3)
RBC # BLD AUTO: 3.93 MILLION/UL (ref 3.88–5.62)
SODIUM SERPL-SCNC: 140 MMOL/L (ref 136–145)
WBC # BLD AUTO: 5.86 THOUSAND/UL (ref 4.31–10.16)

## 2017-08-14 PROCEDURE — 80048 BASIC METABOLIC PNL TOTAL CA: CPT | Performed by: PHYSICIAN ASSISTANT

## 2017-08-14 PROCEDURE — 97530 THERAPEUTIC ACTIVITIES: CPT | Performed by: PHYSICAL THERAPIST

## 2017-08-14 PROCEDURE — 97110 THERAPEUTIC EXERCISES: CPT | Performed by: PHYSICAL THERAPIST

## 2017-08-14 PROCEDURE — 85025 COMPLETE CBC W/AUTO DIFF WBC: CPT | Performed by: PHYSICIAN ASSISTANT

## 2017-08-14 PROCEDURE — 97535 SELF CARE MNGMENT TRAINING: CPT

## 2017-08-14 RX ADMIN — GUAIFENESIN AND DEXTROMETHORPHAN 10 ML: 100; 10 SYRUP ORAL at 11:35

## 2017-08-14 RX ADMIN — BENZONATATE 100 MG: 100 CAPSULE ORAL at 21:15

## 2017-08-14 RX ADMIN — LORAZEPAM 0.25 MG: 0.5 TABLET ORAL at 09:02

## 2017-08-14 RX ADMIN — CLOPIDOGREL BISULFATE 75 MG: 75 TABLET ORAL at 09:02

## 2017-08-14 RX ADMIN — GUAIFENESIN AND DEXTROMETHORPHAN 10 ML: 100; 10 SYRUP ORAL at 06:08

## 2017-08-14 RX ADMIN — GUAIFENESIN AND DEXTROMETHORPHAN 10 ML: 100; 10 SYRUP ORAL at 18:15

## 2017-08-14 RX ADMIN — PANTOPRAZOLE SODIUM 40 MG: 40 TABLET, DELAYED RELEASE ORAL at 06:08

## 2017-08-14 RX ADMIN — ASPIRIN 81 MG 81 MG: 81 TABLET ORAL at 09:02

## 2017-08-14 RX ADMIN — DOCUSATE SODIUM 100 MG: 100 CAPSULE, LIQUID FILLED ORAL at 09:02

## 2017-08-14 RX ADMIN — FLUTICASONE PROPIONATE AND SALMETEROL 1 PUFF: 50; 250 POWDER RESPIRATORY (INHALATION) at 21:16

## 2017-08-14 RX ADMIN — TIOTROPIUM BROMIDE 18 MCG: 18 CAPSULE ORAL; RESPIRATORY (INHALATION) at 09:03

## 2017-08-14 RX ADMIN — FLUTICASONE PROPIONATE AND SALMETEROL 1 PUFF: 50; 250 POWDER RESPIRATORY (INHALATION) at 09:03

## 2017-08-14 RX ADMIN — CARVEDILOL 6.25 MG: 6.25 TABLET, FILM COATED ORAL at 18:15

## 2017-08-14 RX ADMIN — OXYBUTYNIN CHLORIDE 5 MG: 5 TABLET, EXTENDED RELEASE ORAL at 21:16

## 2017-08-14 RX ADMIN — TAMSULOSIN HYDROCHLORIDE 0.4 MG: 0.4 CAPSULE ORAL at 19:49

## 2017-08-14 RX ADMIN — ATORVASTATIN CALCIUM 10 MG: 10 TABLET, FILM COATED ORAL at 09:02

## 2017-08-14 RX ADMIN — BIMATOPROST 1 DROP: 0.1 SOLUTION/ DROPS OPHTHALMIC at 21:16

## 2017-08-14 RX ADMIN — LORATADINE 10 MG: 10 TABLET ORAL at 09:02

## 2017-08-14 RX ADMIN — BENZONATATE 100 MG: 100 CAPSULE ORAL at 18:15

## 2017-08-14 RX ADMIN — ENOXAPARIN SODIUM 40 MG: 40 INJECTION SUBCUTANEOUS at 09:01

## 2017-08-14 RX ADMIN — POTASSIUM CHLORIDE 10 MEQ: 750 TABLET, EXTENDED RELEASE ORAL at 09:02

## 2017-08-14 RX ADMIN — GUAIFENESIN AND DEXTROMETHORPHAN 10 ML: 100; 10 SYRUP ORAL at 23:55

## 2017-08-14 RX ADMIN — PAROXETINE HYDROCHLORIDE 20 MG: 20 TABLET, FILM COATED ORAL at 09:02

## 2017-08-14 RX ADMIN — CARVEDILOL 6.25 MG: 6.25 TABLET, FILM COATED ORAL at 09:02

## 2017-08-14 RX ADMIN — AMLODIPINE BESYLATE 10 MG: 10 TABLET ORAL at 09:02

## 2017-08-14 RX ADMIN — BENZONATATE 100 MG: 100 CAPSULE ORAL at 09:01

## 2017-08-14 RX ADMIN — DOCUSATE SODIUM 100 MG: 100 CAPSULE, LIQUID FILLED ORAL at 18:15

## 2017-08-15 PROCEDURE — 97116 GAIT TRAINING THERAPY: CPT

## 2017-08-15 PROCEDURE — 97530 THERAPEUTIC ACTIVITIES: CPT | Performed by: STUDENT IN AN ORGANIZED HEALTH CARE EDUCATION/TRAINING PROGRAM

## 2017-08-15 PROCEDURE — 97116 GAIT TRAINING THERAPY: CPT | Performed by: PHYSICAL THERAPIST

## 2017-08-15 PROCEDURE — 94760 N-INVAS EAR/PLS OXIMETRY 1: CPT

## 2017-08-15 PROCEDURE — 97110 THERAPEUTIC EXERCISES: CPT

## 2017-08-15 PROCEDURE — 97110 THERAPEUTIC EXERCISES: CPT | Performed by: PHYSICAL THERAPIST

## 2017-08-15 PROCEDURE — 97530 THERAPEUTIC ACTIVITIES: CPT | Performed by: PHYSICAL THERAPIST

## 2017-08-15 PROCEDURE — 97110 THERAPEUTIC EXERCISES: CPT | Performed by: STUDENT IN AN ORGANIZED HEALTH CARE EDUCATION/TRAINING PROGRAM

## 2017-08-15 RX ADMIN — BIMATOPROST 1 DROP: 0.1 SOLUTION/ DROPS OPHTHALMIC at 21:04

## 2017-08-15 RX ADMIN — LORATADINE 10 MG: 10 TABLET ORAL at 08:03

## 2017-08-15 RX ADMIN — CARVEDILOL 6.25 MG: 6.25 TABLET, FILM COATED ORAL at 08:03

## 2017-08-15 RX ADMIN — FLUTICASONE PROPIONATE AND SALMETEROL 1 PUFF: 50; 250 POWDER RESPIRATORY (INHALATION) at 08:09

## 2017-08-15 RX ADMIN — ATORVASTATIN CALCIUM 10 MG: 10 TABLET, FILM COATED ORAL at 08:02

## 2017-08-15 RX ADMIN — FLUTICASONE PROPIONATE AND SALMETEROL 1 PUFF: 50; 250 POWDER RESPIRATORY (INHALATION) at 20:54

## 2017-08-15 RX ADMIN — AMLODIPINE BESYLATE 10 MG: 10 TABLET ORAL at 08:03

## 2017-08-15 RX ADMIN — PANTOPRAZOLE SODIUM 40 MG: 40 TABLET, DELAYED RELEASE ORAL at 05:48

## 2017-08-15 RX ADMIN — TIOTROPIUM BROMIDE 18 MCG: 18 CAPSULE ORAL; RESPIRATORY (INHALATION) at 08:09

## 2017-08-15 RX ADMIN — LORAZEPAM 0.25 MG: 0.5 TABLET ORAL at 08:02

## 2017-08-15 RX ADMIN — ASPIRIN 81 MG 81 MG: 81 TABLET ORAL at 08:03

## 2017-08-15 RX ADMIN — BENZONATATE 100 MG: 100 CAPSULE ORAL at 20:54

## 2017-08-15 RX ADMIN — ENOXAPARIN SODIUM 40 MG: 40 INJECTION SUBCUTANEOUS at 08:03

## 2017-08-15 RX ADMIN — DOCUSATE SODIUM 100 MG: 100 CAPSULE, LIQUID FILLED ORAL at 08:02

## 2017-08-15 RX ADMIN — OXYBUTYNIN CHLORIDE 5 MG: 5 TABLET, EXTENDED RELEASE ORAL at 21:04

## 2017-08-15 RX ADMIN — BENZONATATE 100 MG: 100 CAPSULE ORAL at 08:02

## 2017-08-15 RX ADMIN — CARVEDILOL 6.25 MG: 6.25 TABLET, FILM COATED ORAL at 17:24

## 2017-08-15 RX ADMIN — POTASSIUM CHLORIDE 10 MEQ: 750 TABLET, EXTENDED RELEASE ORAL at 08:02

## 2017-08-15 RX ADMIN — CLOPIDOGREL BISULFATE 75 MG: 75 TABLET ORAL at 08:03

## 2017-08-15 RX ADMIN — GUAIFENESIN AND DEXTROMETHORPHAN 10 ML: 100; 10 SYRUP ORAL at 05:48

## 2017-08-15 RX ADMIN — GUAIFENESIN AND DEXTROMETHORPHAN 10 ML: 100; 10 SYRUP ORAL at 17:25

## 2017-08-15 RX ADMIN — TAMSULOSIN HYDROCHLORIDE 0.4 MG: 0.4 CAPSULE ORAL at 17:24

## 2017-08-15 RX ADMIN — DOCUSATE SODIUM 100 MG: 100 CAPSULE, LIQUID FILLED ORAL at 17:24

## 2017-08-15 RX ADMIN — PAROXETINE HYDROCHLORIDE 20 MG: 20 TABLET, FILM COATED ORAL at 08:02

## 2017-08-15 RX ADMIN — BENZONATATE 100 MG: 100 CAPSULE ORAL at 17:24

## 2017-08-15 RX ADMIN — GUAIFENESIN AND DEXTROMETHORPHAN 10 ML: 100; 10 SYRUP ORAL at 11:46

## 2017-08-16 PROCEDURE — 97116 GAIT TRAINING THERAPY: CPT | Performed by: PHYSICAL THERAPIST

## 2017-08-16 PROCEDURE — 97530 THERAPEUTIC ACTIVITIES: CPT | Performed by: STUDENT IN AN ORGANIZED HEALTH CARE EDUCATION/TRAINING PROGRAM

## 2017-08-16 PROCEDURE — 97530 THERAPEUTIC ACTIVITIES: CPT | Performed by: PHYSICAL THERAPIST

## 2017-08-16 PROCEDURE — 97110 THERAPEUTIC EXERCISES: CPT

## 2017-08-16 PROCEDURE — 97116 GAIT TRAINING THERAPY: CPT

## 2017-08-16 PROCEDURE — 97110 THERAPEUTIC EXERCISES: CPT | Performed by: PHYSICAL THERAPIST

## 2017-08-16 RX ORDER — CARVEDILOL 12.5 MG/1
12.5 TABLET ORAL 2 TIMES DAILY WITH MEALS
Status: DISCONTINUED | OUTPATIENT
Start: 2017-08-16 | End: 2017-08-21 | Stop reason: HOSPADM

## 2017-08-16 RX ADMIN — CLOPIDOGREL BISULFATE 75 MG: 75 TABLET ORAL at 09:29

## 2017-08-16 RX ADMIN — POTASSIUM CHLORIDE 10 MEQ: 750 TABLET, EXTENDED RELEASE ORAL at 09:30

## 2017-08-16 RX ADMIN — AMLODIPINE BESYLATE 10 MG: 10 TABLET ORAL at 09:29

## 2017-08-16 RX ADMIN — TAMSULOSIN HYDROCHLORIDE 0.4 MG: 0.4 CAPSULE ORAL at 16:40

## 2017-08-16 RX ADMIN — TIOTROPIUM BROMIDE 18 MCG: 18 CAPSULE ORAL; RESPIRATORY (INHALATION) at 09:28

## 2017-08-16 RX ADMIN — GUAIFENESIN AND DEXTROMETHORPHAN 10 ML: 100; 10 SYRUP ORAL at 11:40

## 2017-08-16 RX ADMIN — BIMATOPROST 1 DROP: 0.1 SOLUTION/ DROPS OPHTHALMIC at 21:20

## 2017-08-16 RX ADMIN — CARVEDILOL 12.5 MG: 12.5 TABLET, FILM COATED ORAL at 16:42

## 2017-08-16 RX ADMIN — BENZONATATE 100 MG: 100 CAPSULE ORAL at 09:29

## 2017-08-16 RX ADMIN — GUAIFENESIN AND DEXTROMETHORPHAN 10 ML: 100; 10 SYRUP ORAL at 17:49

## 2017-08-16 RX ADMIN — GUAIFENESIN AND DEXTROMETHORPHAN 10 ML: 100; 10 SYRUP ORAL at 05:17

## 2017-08-16 RX ADMIN — PANTOPRAZOLE SODIUM 40 MG: 40 TABLET, DELAYED RELEASE ORAL at 05:18

## 2017-08-16 RX ADMIN — ATORVASTATIN CALCIUM 10 MG: 10 TABLET, FILM COATED ORAL at 09:29

## 2017-08-16 RX ADMIN — GUAIFENESIN AND DEXTROMETHORPHAN 10 ML: 100; 10 SYRUP ORAL at 00:19

## 2017-08-16 RX ADMIN — GUAIFENESIN AND DEXTROMETHORPHAN 10 ML: 100; 10 SYRUP ORAL at 23:44

## 2017-08-16 RX ADMIN — LORATADINE 10 MG: 10 TABLET ORAL at 09:30

## 2017-08-16 RX ADMIN — OXYBUTYNIN CHLORIDE 5 MG: 5 TABLET, EXTENDED RELEASE ORAL at 21:20

## 2017-08-16 RX ADMIN — BENZONATATE 100 MG: 100 CAPSULE ORAL at 16:40

## 2017-08-16 RX ADMIN — LORAZEPAM 0.25 MG: 0.5 TABLET ORAL at 09:30

## 2017-08-16 RX ADMIN — DOCUSATE SODIUM 100 MG: 100 CAPSULE, LIQUID FILLED ORAL at 17:49

## 2017-08-16 RX ADMIN — ENOXAPARIN SODIUM 40 MG: 40 INJECTION SUBCUTANEOUS at 09:29

## 2017-08-16 RX ADMIN — FLUTICASONE PROPIONATE AND SALMETEROL 1 PUFF: 50; 250 POWDER RESPIRATORY (INHALATION) at 21:20

## 2017-08-16 RX ADMIN — FLUTICASONE PROPIONATE AND SALMETEROL 1 PUFF: 50; 250 POWDER RESPIRATORY (INHALATION) at 09:28

## 2017-08-16 RX ADMIN — BENZONATATE 100 MG: 100 CAPSULE ORAL at 21:20

## 2017-08-16 RX ADMIN — ASPIRIN 81 MG 81 MG: 81 TABLET ORAL at 09:30

## 2017-08-16 RX ADMIN — PAROXETINE HYDROCHLORIDE 20 MG: 20 TABLET, FILM COATED ORAL at 09:29

## 2017-08-17 LAB
ANION GAP SERPL CALCULATED.3IONS-SCNC: 6 MMOL/L (ref 4–13)
BASOPHILS # BLD AUTO: 0.01 THOUSANDS/ΜL (ref 0–0.1)
BASOPHILS NFR BLD AUTO: 0 % (ref 0–1)
BUN SERPL-MCNC: 25 MG/DL (ref 5–25)
CALCIUM SERPL-MCNC: 8.4 MG/DL (ref 8.3–10.1)
CHLORIDE SERPL-SCNC: 108 MMOL/L (ref 100–108)
CO2 SERPL-SCNC: 28 MMOL/L (ref 21–32)
CREAT SERPL-MCNC: 1.01 MG/DL (ref 0.6–1.3)
EOSINOPHIL # BLD AUTO: 0.22 THOUSAND/ΜL (ref 0–0.61)
EOSINOPHIL NFR BLD AUTO: 5 % (ref 0–6)
ERYTHROCYTE [DISTWIDTH] IN BLOOD BY AUTOMATED COUNT: 14.3 % (ref 11.6–15.1)
GFR SERPL CREATININE-BSD FRML MDRD: 71 ML/MIN/1.73SQ M
GLUCOSE SERPL-MCNC: 80 MG/DL (ref 65–140)
HCT VFR BLD AUTO: 34.9 % (ref 36.5–49.3)
HGB BLD-MCNC: 11.2 G/DL (ref 12–17)
LYMPHOCYTES # BLD AUTO: 1.05 THOUSANDS/ΜL (ref 0.6–4.47)
LYMPHOCYTES NFR BLD AUTO: 21 % (ref 14–44)
MCH RBC QN AUTO: 29.5 PG (ref 26.8–34.3)
MCHC RBC AUTO-ENTMCNC: 32.1 G/DL (ref 31.4–37.4)
MCV RBC AUTO: 92 FL (ref 82–98)
MONOCYTES # BLD AUTO: 0.51 THOUSAND/ΜL (ref 0.17–1.22)
MONOCYTES NFR BLD AUTO: 10 % (ref 4–12)
NEUTROPHILS # BLD AUTO: 3.11 THOUSANDS/ΜL (ref 1.85–7.62)
NEUTS SEG NFR BLD AUTO: 64 % (ref 43–75)
NRBC BLD AUTO-RTO: 0 /100 WBCS
PLATELET # BLD AUTO: 105 THOUSANDS/UL (ref 149–390)
PMV BLD AUTO: 11.4 FL (ref 8.9–12.7)
POTASSIUM SERPL-SCNC: 3.8 MMOL/L (ref 3.5–5.3)
RBC # BLD AUTO: 3.8 MILLION/UL (ref 3.88–5.62)
SODIUM SERPL-SCNC: 142 MMOL/L (ref 136–145)
WBC # BLD AUTO: 4.91 THOUSAND/UL (ref 4.31–10.16)

## 2017-08-17 PROCEDURE — 97110 THERAPEUTIC EXERCISES: CPT

## 2017-08-17 PROCEDURE — 97530 THERAPEUTIC ACTIVITIES: CPT | Performed by: STUDENT IN AN ORGANIZED HEALTH CARE EDUCATION/TRAINING PROGRAM

## 2017-08-17 PROCEDURE — 97530 THERAPEUTIC ACTIVITIES: CPT

## 2017-08-17 PROCEDURE — 97535 SELF CARE MNGMENT TRAINING: CPT | Performed by: STUDENT IN AN ORGANIZED HEALTH CARE EDUCATION/TRAINING PROGRAM

## 2017-08-17 PROCEDURE — 80048 BASIC METABOLIC PNL TOTAL CA: CPT | Performed by: PHYSICIAN ASSISTANT

## 2017-08-17 PROCEDURE — 85025 COMPLETE CBC W/AUTO DIFF WBC: CPT | Performed by: PHYSICIAN ASSISTANT

## 2017-08-17 PROCEDURE — 97116 GAIT TRAINING THERAPY: CPT

## 2017-08-17 RX ADMIN — GUAIFENESIN AND DEXTROMETHORPHAN 10 ML: 100; 10 SYRUP ORAL at 17:36

## 2017-08-17 RX ADMIN — BENZONATATE 100 MG: 100 CAPSULE ORAL at 21:31

## 2017-08-17 RX ADMIN — CARVEDILOL 12.5 MG: 12.5 TABLET, FILM COATED ORAL at 09:55

## 2017-08-17 RX ADMIN — GUAIFENESIN AND DEXTROMETHORPHAN 10 ML: 100; 10 SYRUP ORAL at 12:13

## 2017-08-17 RX ADMIN — AMLODIPINE BESYLATE 10 MG: 10 TABLET ORAL at 09:56

## 2017-08-17 RX ADMIN — CARVEDILOL 12.5 MG: 12.5 TABLET, FILM COATED ORAL at 16:11

## 2017-08-17 RX ADMIN — PANTOPRAZOLE SODIUM 40 MG: 40 TABLET, DELAYED RELEASE ORAL at 05:02

## 2017-08-17 RX ADMIN — GUAIFENESIN AND DEXTROMETHORPHAN 10 ML: 100; 10 SYRUP ORAL at 05:02

## 2017-08-17 RX ADMIN — TIOTROPIUM BROMIDE 18 MCG: 18 CAPSULE ORAL; RESPIRATORY (INHALATION) at 09:55

## 2017-08-17 RX ADMIN — BENZONATATE 100 MG: 100 CAPSULE ORAL at 09:55

## 2017-08-17 RX ADMIN — BENZONATATE 100 MG: 100 CAPSULE ORAL at 16:11

## 2017-08-17 RX ADMIN — ATORVASTATIN CALCIUM 10 MG: 10 TABLET, FILM COATED ORAL at 09:55

## 2017-08-17 RX ADMIN — DOCUSATE SODIUM 100 MG: 100 CAPSULE, LIQUID FILLED ORAL at 09:55

## 2017-08-17 RX ADMIN — DOCUSATE SODIUM 100 MG: 100 CAPSULE, LIQUID FILLED ORAL at 17:36

## 2017-08-17 RX ADMIN — OXYBUTYNIN CHLORIDE 5 MG: 5 TABLET, EXTENDED RELEASE ORAL at 21:31

## 2017-08-17 RX ADMIN — POTASSIUM CHLORIDE 10 MEQ: 750 TABLET, EXTENDED RELEASE ORAL at 09:55

## 2017-08-17 RX ADMIN — PAROXETINE HYDROCHLORIDE 20 MG: 20 TABLET, FILM COATED ORAL at 09:55

## 2017-08-17 RX ADMIN — GUAIFENESIN AND DEXTROMETHORPHAN 10 ML: 100; 10 SYRUP ORAL at 23:30

## 2017-08-17 RX ADMIN — ASPIRIN 81 MG 81 MG: 81 TABLET ORAL at 09:54

## 2017-08-17 RX ADMIN — BIMATOPROST 1 DROP: 0.1 SOLUTION/ DROPS OPHTHALMIC at 21:31

## 2017-08-17 RX ADMIN — LORAZEPAM 0.25 MG: 0.5 TABLET ORAL at 09:55

## 2017-08-17 RX ADMIN — ENOXAPARIN SODIUM 40 MG: 40 INJECTION SUBCUTANEOUS at 09:55

## 2017-08-17 RX ADMIN — TAMSULOSIN HYDROCHLORIDE 0.4 MG: 0.4 CAPSULE ORAL at 16:11

## 2017-08-17 RX ADMIN — LORATADINE 10 MG: 10 TABLET ORAL at 09:55

## 2017-08-17 RX ADMIN — FLUTICASONE PROPIONATE AND SALMETEROL 1 PUFF: 50; 250 POWDER RESPIRATORY (INHALATION) at 09:55

## 2017-08-17 RX ADMIN — FLUTICASONE PROPIONATE AND SALMETEROL 1 PUFF: 50; 250 POWDER RESPIRATORY (INHALATION) at 22:15

## 2017-08-17 RX ADMIN — CLOPIDOGREL BISULFATE 75 MG: 75 TABLET ORAL at 09:55

## 2017-08-18 LAB — PREALB SERPL-MCNC: 33.2 MG/DL (ref 18–40)

## 2017-08-18 PROCEDURE — 97530 THERAPEUTIC ACTIVITIES: CPT

## 2017-08-18 PROCEDURE — 97542 WHEELCHAIR MNGMENT TRAINING: CPT

## 2017-08-18 PROCEDURE — 84134 ASSAY OF PREALBUMIN: CPT | Performed by: PODIATRIST

## 2017-08-18 PROCEDURE — 97535 SELF CARE MNGMENT TRAINING: CPT

## 2017-08-18 PROCEDURE — 97110 THERAPEUTIC EXERCISES: CPT

## 2017-08-18 PROCEDURE — 97116 GAIT TRAINING THERAPY: CPT

## 2017-08-18 PROCEDURE — 97530 THERAPEUTIC ACTIVITIES: CPT | Performed by: PHYSICAL THERAPIST

## 2017-08-18 PROCEDURE — 97116 GAIT TRAINING THERAPY: CPT | Performed by: PHYSICAL THERAPIST

## 2017-08-18 RX ORDER — SPIRONOLACTONE 25 MG/1
25 TABLET ORAL DAILY
Status: DISCONTINUED | OUTPATIENT
Start: 2017-08-18 | End: 2017-08-21 | Stop reason: HOSPADM

## 2017-08-18 RX ADMIN — DOCUSATE SODIUM 100 MG: 100 CAPSULE, LIQUID FILLED ORAL at 17:08

## 2017-08-18 RX ADMIN — FLUTICASONE PROPIONATE AND SALMETEROL 1 PUFF: 50; 250 POWDER RESPIRATORY (INHALATION) at 09:31

## 2017-08-18 RX ADMIN — LORAZEPAM 0.25 MG: 0.5 TABLET ORAL at 09:29

## 2017-08-18 RX ADMIN — CARVEDILOL 12.5 MG: 12.5 TABLET, FILM COATED ORAL at 09:28

## 2017-08-18 RX ADMIN — TIOTROPIUM BROMIDE 18 MCG: 18 CAPSULE ORAL; RESPIRATORY (INHALATION) at 09:31

## 2017-08-18 RX ADMIN — TAMSULOSIN HYDROCHLORIDE 0.4 MG: 0.4 CAPSULE ORAL at 17:08

## 2017-08-18 RX ADMIN — BENZONATATE 100 MG: 100 CAPSULE ORAL at 22:06

## 2017-08-18 RX ADMIN — GUAIFENESIN AND DEXTROMETHORPHAN 10 ML: 100; 10 SYRUP ORAL at 17:08

## 2017-08-18 RX ADMIN — PAROXETINE HYDROCHLORIDE 20 MG: 20 TABLET, FILM COATED ORAL at 09:29

## 2017-08-18 RX ADMIN — LORATADINE 10 MG: 10 TABLET ORAL at 09:29

## 2017-08-18 RX ADMIN — SPIRONOLACTONE 25 MG: 25 TABLET, FILM COATED ORAL at 11:51

## 2017-08-18 RX ADMIN — ASPIRIN 81 MG 81 MG: 81 TABLET ORAL at 09:28

## 2017-08-18 RX ADMIN — ENOXAPARIN SODIUM 40 MG: 40 INJECTION SUBCUTANEOUS at 09:28

## 2017-08-18 RX ADMIN — GUAIFENESIN AND DEXTROMETHORPHAN 10 ML: 100; 10 SYRUP ORAL at 11:51

## 2017-08-18 RX ADMIN — CARVEDILOL 12.5 MG: 12.5 TABLET, FILM COATED ORAL at 17:08

## 2017-08-18 RX ADMIN — CLOPIDOGREL BISULFATE 75 MG: 75 TABLET ORAL at 09:29

## 2017-08-18 RX ADMIN — ATORVASTATIN CALCIUM 10 MG: 10 TABLET, FILM COATED ORAL at 09:29

## 2017-08-18 RX ADMIN — GUAIFENESIN AND DEXTROMETHORPHAN 10 ML: 100; 10 SYRUP ORAL at 05:50

## 2017-08-18 RX ADMIN — PANTOPRAZOLE SODIUM 40 MG: 40 TABLET, DELAYED RELEASE ORAL at 05:50

## 2017-08-18 RX ADMIN — BIMATOPROST 1 DROP: 0.1 SOLUTION/ DROPS OPHTHALMIC at 22:06

## 2017-08-18 RX ADMIN — AMLODIPINE BESYLATE 10 MG: 10 TABLET ORAL at 09:29

## 2017-08-18 RX ADMIN — FLUTICASONE PROPIONATE AND SALMETEROL 1 PUFF: 50; 250 POWDER RESPIRATORY (INHALATION) at 22:06

## 2017-08-18 RX ADMIN — BENZONATATE 100 MG: 100 CAPSULE ORAL at 17:10

## 2017-08-18 RX ADMIN — OXYBUTYNIN CHLORIDE 5 MG: 5 TABLET, EXTENDED RELEASE ORAL at 22:06

## 2017-08-18 RX ADMIN — BENZONATATE 100 MG: 100 CAPSULE ORAL at 09:28

## 2017-08-19 PROCEDURE — 97530 THERAPEUTIC ACTIVITIES: CPT

## 2017-08-19 PROCEDURE — 97110 THERAPEUTIC EXERCISES: CPT

## 2017-08-19 PROCEDURE — 97116 GAIT TRAINING THERAPY: CPT

## 2017-08-19 RX ADMIN — CARVEDILOL 12.5 MG: 12.5 TABLET, FILM COATED ORAL at 09:36

## 2017-08-19 RX ADMIN — GUAIFENESIN AND DEXTROMETHORPHAN 10 ML: 100; 10 SYRUP ORAL at 00:01

## 2017-08-19 RX ADMIN — FLUTICASONE PROPIONATE AND SALMETEROL 1 PUFF: 50; 250 POWDER RESPIRATORY (INHALATION) at 20:53

## 2017-08-19 RX ADMIN — AMLODIPINE BESYLATE 10 MG: 10 TABLET ORAL at 09:36

## 2017-08-19 RX ADMIN — SPIRONOLACTONE 25 MG: 25 TABLET, FILM COATED ORAL at 09:36

## 2017-08-19 RX ADMIN — GUAIFENESIN AND DEXTROMETHORPHAN 10 ML: 100; 10 SYRUP ORAL at 06:06

## 2017-08-19 RX ADMIN — ATORVASTATIN CALCIUM 10 MG: 10 TABLET, FILM COATED ORAL at 09:37

## 2017-08-19 RX ADMIN — CLOPIDOGREL BISULFATE 75 MG: 75 TABLET ORAL at 09:37

## 2017-08-19 RX ADMIN — FLUTICASONE PROPIONATE AND SALMETEROL 1 PUFF: 50; 250 POWDER RESPIRATORY (INHALATION) at 09:33

## 2017-08-19 RX ADMIN — BENZONATATE 100 MG: 100 CAPSULE ORAL at 20:55

## 2017-08-19 RX ADMIN — OXYBUTYNIN CHLORIDE 5 MG: 5 TABLET, EXTENDED RELEASE ORAL at 21:11

## 2017-08-19 RX ADMIN — BENZONATATE 100 MG: 100 CAPSULE ORAL at 17:45

## 2017-08-19 RX ADMIN — PAROXETINE HYDROCHLORIDE 20 MG: 20 TABLET, FILM COATED ORAL at 09:37

## 2017-08-19 RX ADMIN — BIMATOPROST 1 DROP: 0.1 SOLUTION/ DROPS OPHTHALMIC at 21:11

## 2017-08-19 RX ADMIN — CARVEDILOL 12.5 MG: 12.5 TABLET, FILM COATED ORAL at 17:44

## 2017-08-19 RX ADMIN — GUAIFENESIN AND DEXTROMETHORPHAN 10 ML: 100; 10 SYRUP ORAL at 17:45

## 2017-08-19 RX ADMIN — LORAZEPAM 0.25 MG: 0.5 TABLET ORAL at 09:37

## 2017-08-19 RX ADMIN — LORATADINE 10 MG: 10 TABLET ORAL at 09:38

## 2017-08-19 RX ADMIN — TAMSULOSIN HYDROCHLORIDE 0.4 MG: 0.4 CAPSULE ORAL at 17:44

## 2017-08-19 RX ADMIN — BENZONATATE 100 MG: 100 CAPSULE ORAL at 09:37

## 2017-08-19 RX ADMIN — GUAIFENESIN AND DEXTROMETHORPHAN 10 ML: 100; 10 SYRUP ORAL at 11:49

## 2017-08-19 RX ADMIN — ENOXAPARIN SODIUM 40 MG: 40 INJECTION SUBCUTANEOUS at 09:38

## 2017-08-19 RX ADMIN — PANTOPRAZOLE SODIUM 40 MG: 40 TABLET, DELAYED RELEASE ORAL at 06:06

## 2017-08-19 RX ADMIN — ASPIRIN 81 MG 81 MG: 81 TABLET ORAL at 09:37

## 2017-08-19 RX ADMIN — TIOTROPIUM BROMIDE 18 MCG: 18 CAPSULE ORAL; RESPIRATORY (INHALATION) at 09:33

## 2017-08-20 PROCEDURE — 97110 THERAPEUTIC EXERCISES: CPT

## 2017-08-20 PROCEDURE — 97116 GAIT TRAINING THERAPY: CPT

## 2017-08-20 PROCEDURE — 97530 THERAPEUTIC ACTIVITIES: CPT

## 2017-08-20 PROCEDURE — 97535 SELF CARE MNGMENT TRAINING: CPT

## 2017-08-20 RX ADMIN — ENOXAPARIN SODIUM 40 MG: 40 INJECTION SUBCUTANEOUS at 10:11

## 2017-08-20 RX ADMIN — BENZONATATE 100 MG: 100 CAPSULE ORAL at 22:25

## 2017-08-20 RX ADMIN — GUAIFENESIN AND DEXTROMETHORPHAN 10 ML: 100; 10 SYRUP ORAL at 19:34

## 2017-08-20 RX ADMIN — FLUTICASONE PROPIONATE AND SALMETEROL 1 PUFF: 50; 250 POWDER RESPIRATORY (INHALATION) at 10:18

## 2017-08-20 RX ADMIN — CARVEDILOL 12.5 MG: 12.5 TABLET, FILM COATED ORAL at 10:08

## 2017-08-20 RX ADMIN — BIMATOPROST 1 DROP: 0.1 SOLUTION/ DROPS OPHTHALMIC at 22:24

## 2017-08-20 RX ADMIN — BENZONATATE 100 MG: 100 CAPSULE ORAL at 10:08

## 2017-08-20 RX ADMIN — LORAZEPAM 0.25 MG: 0.5 TABLET ORAL at 10:22

## 2017-08-20 RX ADMIN — OXYBUTYNIN CHLORIDE 5 MG: 5 TABLET, EXTENDED RELEASE ORAL at 22:25

## 2017-08-20 RX ADMIN — BENZONATATE 100 MG: 100 CAPSULE ORAL at 16:45

## 2017-08-20 RX ADMIN — CLOPIDOGREL BISULFATE 75 MG: 75 TABLET ORAL at 10:09

## 2017-08-20 RX ADMIN — GUAIFENESIN AND DEXTROMETHORPHAN 10 ML: 100; 10 SYRUP ORAL at 00:14

## 2017-08-20 RX ADMIN — CARVEDILOL 12.5 MG: 12.5 TABLET, FILM COATED ORAL at 16:45

## 2017-08-20 RX ADMIN — LORATADINE 10 MG: 10 TABLET ORAL at 10:10

## 2017-08-20 RX ADMIN — DOCUSATE SODIUM 100 MG: 100 CAPSULE, LIQUID FILLED ORAL at 16:45

## 2017-08-20 RX ADMIN — PAROXETINE HYDROCHLORIDE 20 MG: 20 TABLET, FILM COATED ORAL at 10:08

## 2017-08-20 RX ADMIN — ATORVASTATIN CALCIUM 10 MG: 10 TABLET, FILM COATED ORAL at 10:07

## 2017-08-20 RX ADMIN — GUAIFENESIN AND DEXTROMETHORPHAN 10 ML: 100; 10 SYRUP ORAL at 05:32

## 2017-08-20 RX ADMIN — TIOTROPIUM BROMIDE 18 MCG: 18 CAPSULE ORAL; RESPIRATORY (INHALATION) at 10:12

## 2017-08-20 RX ADMIN — DOCUSATE SODIUM 100 MG: 100 CAPSULE, LIQUID FILLED ORAL at 10:08

## 2017-08-20 RX ADMIN — SPIRONOLACTONE 25 MG: 25 TABLET, FILM COATED ORAL at 10:11

## 2017-08-20 RX ADMIN — FLUTICASONE PROPIONATE AND SALMETEROL 1 PUFF: 50; 250 POWDER RESPIRATORY (INHALATION) at 22:23

## 2017-08-20 RX ADMIN — AMLODIPINE BESYLATE 10 MG: 10 TABLET ORAL at 10:08

## 2017-08-20 RX ADMIN — GUAIFENESIN AND DEXTROMETHORPHAN 10 ML: 100; 10 SYRUP ORAL at 23:46

## 2017-08-20 RX ADMIN — TAMSULOSIN HYDROCHLORIDE 0.4 MG: 0.4 CAPSULE ORAL at 16:45

## 2017-08-20 RX ADMIN — GUAIFENESIN AND DEXTROMETHORPHAN 10 ML: 100; 10 SYRUP ORAL at 14:43

## 2017-08-20 RX ADMIN — ASPIRIN 81 MG 81 MG: 81 TABLET ORAL at 10:08

## 2017-08-20 RX ADMIN — PANTOPRAZOLE SODIUM 40 MG: 40 TABLET, DELAYED RELEASE ORAL at 05:32

## 2017-08-21 VITALS
SYSTOLIC BLOOD PRESSURE: 125 MMHG | BODY MASS INDEX: 25.56 KG/M2 | OXYGEN SATURATION: 99 % | TEMPERATURE: 97.6 F | WEIGHT: 216.49 LBS | RESPIRATION RATE: 20 BRPM | DIASTOLIC BLOOD PRESSURE: 67 MMHG | HEART RATE: 67 BPM | HEIGHT: 77 IN

## 2017-08-21 LAB
ANION GAP SERPL CALCULATED.3IONS-SCNC: 7 MMOL/L (ref 4–13)
BASOPHILS # BLD AUTO: 0.01 THOUSANDS/ΜL (ref 0–0.1)
BASOPHILS NFR BLD AUTO: 0 % (ref 0–1)
BUN SERPL-MCNC: 18 MG/DL (ref 5–25)
CALCIUM SERPL-MCNC: 8.9 MG/DL (ref 8.3–10.1)
CHLORIDE SERPL-SCNC: 105 MMOL/L (ref 100–108)
CO2 SERPL-SCNC: 28 MMOL/L (ref 21–32)
CREAT SERPL-MCNC: 1.1 MG/DL (ref 0.6–1.3)
EOSINOPHIL # BLD AUTO: 0.3 THOUSAND/ΜL (ref 0–0.61)
EOSINOPHIL NFR BLD AUTO: 6 % (ref 0–6)
ERYTHROCYTE [DISTWIDTH] IN BLOOD BY AUTOMATED COUNT: 14.6 % (ref 11.6–15.1)
GFR SERPL CREATININE-BSD FRML MDRD: 64 ML/MIN/1.73SQ M
GLUCOSE P FAST SERPL-MCNC: 81 MG/DL (ref 65–99)
GLUCOSE SERPL-MCNC: 81 MG/DL (ref 65–140)
HCT VFR BLD AUTO: 35.5 % (ref 36.5–49.3)
HGB BLD-MCNC: 11.6 G/DL (ref 12–17)
LYMPHOCYTES # BLD AUTO: 1.27 THOUSANDS/ΜL (ref 0.6–4.47)
LYMPHOCYTES NFR BLD AUTO: 27 % (ref 14–44)
MCH RBC QN AUTO: 29.9 PG (ref 26.8–34.3)
MCHC RBC AUTO-ENTMCNC: 32.7 G/DL (ref 31.4–37.4)
MCV RBC AUTO: 92 FL (ref 82–98)
MONOCYTES # BLD AUTO: 0.49 THOUSAND/ΜL (ref 0.17–1.22)
MONOCYTES NFR BLD AUTO: 10 % (ref 4–12)
NEUTROPHILS # BLD AUTO: 2.71 THOUSANDS/ΜL (ref 1.85–7.62)
NEUTS SEG NFR BLD AUTO: 57 % (ref 43–75)
NRBC BLD AUTO-RTO: 0 /100 WBCS
PLATELET # BLD AUTO: 112 THOUSANDS/UL (ref 149–390)
PMV BLD AUTO: 11.2 FL (ref 8.9–12.7)
POTASSIUM SERPL-SCNC: 3.9 MMOL/L (ref 3.5–5.3)
RBC # BLD AUTO: 3.88 MILLION/UL (ref 3.88–5.62)
SODIUM SERPL-SCNC: 140 MMOL/L (ref 136–145)
WBC # BLD AUTO: 4.79 THOUSAND/UL (ref 4.31–10.16)

## 2017-08-21 PROCEDURE — 85025 COMPLETE CBC W/AUTO DIFF WBC: CPT | Performed by: PHYSICIAN ASSISTANT

## 2017-08-21 PROCEDURE — 80048 BASIC METABOLIC PNL TOTAL CA: CPT | Performed by: PHYSICIAN ASSISTANT

## 2017-08-21 RX ORDER — GUAIFENESIN/DEXTROMETHORPHAN 100-10MG/5
10 SYRUP ORAL EVERY 6 HOURS
Qty: 236 ML | Refills: 0 | Status: SHIPPED | OUTPATIENT
Start: 2017-08-21 | End: 2018-02-01 | Stop reason: ALTCHOICE

## 2017-08-21 RX ORDER — CARVEDILOL 12.5 MG/1
12.5 TABLET ORAL 2 TIMES DAILY WITH MEALS
Qty: 60 TABLET | Refills: 0 | Status: SHIPPED | OUTPATIENT
Start: 2017-08-21

## 2017-08-21 RX ORDER — BENZONATATE 100 MG/1
100 CAPSULE ORAL 3 TIMES DAILY PRN
Qty: 90 CAPSULE | Refills: 0 | Status: SHIPPED | OUTPATIENT
Start: 2017-08-21 | End: 2018-10-29

## 2017-08-21 RX ORDER — AMLODIPINE BESYLATE 10 MG/1
10 TABLET ORAL DAILY
Qty: 30 TABLET | Refills: 0 | Status: SHIPPED | OUTPATIENT
Start: 2017-08-21 | End: 2018-02-10

## 2017-08-21 RX ORDER — LORAZEPAM 0.5 MG/1
0.25 TABLET ORAL DAILY
Qty: 30 TABLET | Refills: 0 | Status: SHIPPED | OUTPATIENT
Start: 2017-08-21 | End: 2018-02-18 | Stop reason: HOSPADM

## 2017-08-21 RX ORDER — SPIRONOLACTONE 25 MG/1
25 TABLET ORAL DAILY
Qty: 30 TABLET | Refills: 0 | Status: SHIPPED | OUTPATIENT
Start: 2017-08-21 | End: 2018-02-02

## 2017-08-21 RX ADMIN — ENOXAPARIN SODIUM 40 MG: 40 INJECTION SUBCUTANEOUS at 09:13

## 2017-08-21 RX ADMIN — CLOPIDOGREL BISULFATE 75 MG: 75 TABLET ORAL at 09:13

## 2017-08-21 RX ADMIN — GUAIFENESIN AND DEXTROMETHORPHAN 10 ML: 100; 10 SYRUP ORAL at 05:15

## 2017-08-21 RX ADMIN — TIOTROPIUM BROMIDE 18 MCG: 18 CAPSULE ORAL; RESPIRATORY (INHALATION) at 09:12

## 2017-08-21 RX ADMIN — LORAZEPAM 0.25 MG: 0.5 TABLET ORAL at 09:13

## 2017-08-21 RX ADMIN — ASPIRIN 81 MG 81 MG: 81 TABLET ORAL at 09:13

## 2017-08-21 RX ADMIN — AMLODIPINE BESYLATE 10 MG: 10 TABLET ORAL at 09:13

## 2017-08-21 RX ADMIN — PANTOPRAZOLE SODIUM 40 MG: 40 TABLET, DELAYED RELEASE ORAL at 05:15

## 2017-08-21 RX ADMIN — ATORVASTATIN CALCIUM 10 MG: 10 TABLET, FILM COATED ORAL at 09:15

## 2017-08-21 RX ADMIN — SPIRONOLACTONE 25 MG: 25 TABLET, FILM COATED ORAL at 09:15

## 2017-08-21 RX ADMIN — GUAIFENESIN AND DEXTROMETHORPHAN 10 ML: 100; 10 SYRUP ORAL at 17:45

## 2017-08-21 RX ADMIN — FLUTICASONE PROPIONATE AND SALMETEROL 1 PUFF: 50; 250 POWDER RESPIRATORY (INHALATION) at 09:12

## 2017-08-21 RX ADMIN — PAROXETINE HYDROCHLORIDE 20 MG: 20 TABLET, FILM COATED ORAL at 09:13

## 2017-08-21 RX ADMIN — DOCUSATE SODIUM 100 MG: 100 CAPSULE, LIQUID FILLED ORAL at 17:45

## 2017-08-21 RX ADMIN — LORATADINE 10 MG: 10 TABLET ORAL at 09:13

## 2017-08-21 RX ADMIN — CARVEDILOL 12.5 MG: 12.5 TABLET, FILM COATED ORAL at 16:00

## 2017-08-21 RX ADMIN — GUAIFENESIN AND DEXTROMETHORPHAN 10 ML: 100; 10 SYRUP ORAL at 11:25

## 2017-08-21 RX ADMIN — TAMSULOSIN HYDROCHLORIDE 0.4 MG: 0.4 CAPSULE ORAL at 15:57

## 2017-08-21 RX ADMIN — BENZONATATE 100 MG: 100 CAPSULE ORAL at 15:57

## 2017-08-21 RX ADMIN — BENZONATATE 100 MG: 100 CAPSULE ORAL at 09:13

## 2017-08-21 RX ADMIN — CARVEDILOL 12.5 MG: 12.5 TABLET, FILM COATED ORAL at 09:13

## 2017-08-23 ENCOUNTER — APPOINTMENT (EMERGENCY)
Dept: RADIOLOGY | Facility: HOSPITAL | Age: 77
End: 2017-08-23
Payer: MEDICARE

## 2017-08-23 ENCOUNTER — HOSPITAL ENCOUNTER (EMERGENCY)
Facility: HOSPITAL | Age: 77
Discharge: HOME | End: 2017-08-23
Attending: EMERGENCY MEDICINE | Admitting: EMERGENCY MEDICINE
Payer: MEDICARE

## 2017-08-23 VITALS
DIASTOLIC BLOOD PRESSURE: 70 MMHG | TEMPERATURE: 97.6 F | HEART RATE: 84 BPM | OXYGEN SATURATION: 96 % | RESPIRATION RATE: 18 BRPM | SYSTOLIC BLOOD PRESSURE: 130 MMHG | WEIGHT: 200 LBS | HEIGHT: 77 IN | BODY MASS INDEX: 23.62 KG/M2
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
## 2017-08-23 DIAGNOSIS — S00.03XA CONTUSION OF OCCIPITAL REGION OF SCALP, INITIAL ENCOUNTER: Primary | ICD-10-CM

## 2017-08-23 LAB
ATRIAL RATE: 416 BPM
QRS AXIS: 40 DEGREES
QRSD INTERVAL: 94 MS
QT INTERVAL: 360 MS
QTC INTERVAL: 405 MS
T WAVE AXIS: 77 DEGREES
VENTRICULAR RATE: 76 BPM

## 2017-08-23 PROCEDURE — 99284 EMERGENCY DEPT VISIT MOD MDM: CPT

## 2017-08-23 PROCEDURE — 71020 HB CHEST X-RAY 2VW FRONTAL&LATL: CPT

## 2017-08-23 PROCEDURE — 93005 ELECTROCARDIOGRAM TRACING: CPT | Performed by: EMERGENCY MEDICINE

## 2017-08-23 PROCEDURE — 72125 CT NECK SPINE W/O DYE: CPT

## 2017-08-23 PROCEDURE — 70450 CT HEAD/BRAIN W/O DYE: CPT

## 2017-08-30 ENCOUNTER — ALLSCRIPTS OFFICE VISIT (OUTPATIENT)
Dept: OTHER | Facility: OTHER | Age: 77
End: 2017-08-30

## 2017-10-10 ENCOUNTER — ALLSCRIPTS OFFICE VISIT (OUTPATIENT)
Dept: OTHER | Facility: OTHER | Age: 77
End: 2017-10-10

## 2017-10-25 NOTE — PROGRESS NOTES
Assessment  1  Dysphagia (787 20) (R13 10)   2  History of lung cancer (V10 11) (Z85 118)   3  Cough (786 2) (R05)    Plan  Cough    · Flonase Allergy Relief 50 MCG/ACT Nasal Suspension (Fluticasone Propionate);  USE 1 TO 2 SPRAYS IN EACH NOSTRIL ONCE DAILY   Rx By: Mikel Worthington; Dispense: 30 Days ; #:1 X 15 8 ML Bottle (3 Bottles); Refill: 6;For: Cough; LIZZY = N; Verified Transmission to [address] [place] [address]; Last Updated By: System, SureScripts; 8/30/2017 2:41:03 PM   · Omeprazole 40 MG Oral Capsule Delayed Release; take one capsule once daily   Rx By: Mikel Worthington; Dispense: 30 Days ; #:30 Capsule Delayed Release; Refill: 3;For: Cough; LIZZY = N; Verified Transmission to [address] [place] [address]; Last Updated By: System, SureScripts; 8/30/2017 2:41:03 PM    Discussion/Summary  Discussion Summary:   Mr Petra Aragon presents to the office today for hospital follow up  He is doing quite well in regards to his pulmonary status  He is on RA and tolerating it well  He is using Advair BID and Spiriva QD at [facility] the assisted living community he resides  He alters his diet per speech therapies recommendations and practices their swallowing maneuvers / exercises  He has not had any wheezing or SOB since he left the hospital  His main complaint today is his cough which has been ongoing for a few weeks now  I will start him on Flonase and have him stop his zantac and start Omeprazole 40 mg PO QD 30 minutes prior to breakfast  Mr Petra Aragon does also carry a diagnosis of COPD however had to PFTS performed, I did consider having him obtain PFTS however given his stroke history and left sided weakness I am unsure if he will be able to achieve a good seal with his mouth in order for the test to be accurate  He does have follow up with Dr Nick Goff in the office in october at which time PFTS can be discussed  The patient is in the office today with his brother  All their questions were answered   They were updated on the plan and both were in agreement  He knows to call with any questions or concerns and present to the hospital should his symptoms worsen or change  Counseling Documentation With Imm: The patient, patient's family was counseled regarding diagnostic results,-- instructions for management  Patient's Capacity to Self-Care: Patient is able to Self-Care  Medication SE Review and Pt Understands Tx: Possible side effects of new medications were reviewed with the patient/guardian today  Active Problems  1  Depression (311) (F32 9)   2  Diarrhea (787 91) (R19 7)   3  Dysphagia (787 20) (R13 10)   4  Essential hypertension (401 9) (I10)   5  Fever (780 60) (R50 9)   6  GERD (gastroesophageal reflux disease) (530 81) (K21 9)   7  High cholesterol (272 0) (E78 00)   8  Nausea and/or vomiting (787 01) (R11 2)   9  Stomach ulcer (531 90) (K25 9)   10  Stroke (434 91) (I63 9)    Chief Complaint  Chief Complaint Free Text Note Form: Hospital Follow up      History of Present Illness  HPI: Mr Lisa Park presents to the office today for hospital follow up after his recent hospital admission to [facility] from 5/31/17 - 6/9/17 for an aspiration tracheobronchitis and Acute Hypoxic Respiratory failure  He was treated with Oxygen, Doxycycline, and nebulizers  He does have a PMH of Lung cancer and underwent a RLL Resection in 2005  He also has a history of smoking and possible COPD  He is doing quite well since hospital discharge  He is on RA and tolerating it well  He is using advair BID and Spiriva QD as an outpatient  He offers no Complaints today in regards to his Pulmonary Status  He does occasionally have a cough which is non-productive and has been ongoing for the last few weeks  He does admit to occasional post nasal drip and reflux symptoms for which he takes Zantac for  He also has a 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   He has previously had issues with aspiration in the past and states on one admission he had to have a bronchoscopy and three pieces of food were removed  During his hospital stay he was seen by Speech therapy and was placed on a mechanical soft diet with thin liquids  He denies any fevers, chills, CP, wheezing, SOB, chest tightness, pleurisy, diaphoresis, N/V/D/C, or lower extremity edema  Review of Systems  Complete-Male Pulm:   Constitutional: No fever or chills, feels well, no tiredness, no recent weight gain or weight loss  Eyes: no complaints of vision problems  ENT: no rhinitis, no PND, no epistaxis  Cardiovascular: no palpitations, no chest pain  Respiratory: as noted in HPI  Gastrointestinal: no complaints of esophageal reflux, no abdominal pain  Genitourinary: no urinary retention  Musculoskeletal: no arthralgias, no joint swelling, no myalgias  Integumentary: no rash, no lesions  Neurological: no headache, no fainting, no weakness  Endocrine: Negative  Hematologic/Lymphatic: no complaints of swollen glands  Surgical History  1  History of Complete Colonoscopy   2  History of Diagnostic Esophagogastroduodenoscopy   3  History of Lung Surgery    Family History  Mother    1  Denied: Family history of Colon cancer   2  Denied: Family history of colonic polyps   3  Denied: Family history of Crohn's disease   4  Denied: Family history of liver disease  Father    11  Denied: Family history of Colon cancer   6  Denied: Family history of colonic polyps   7  Denied: Family history of Crohn's disease   8  Denied: Family history of liver disease    Social History   · Never a smoker   · No alcohol use    Current Meds   1  Carafate 1 GM/10ML Oral Suspension; TAKE 10 ML 4 TIMES DAILY; Therapy: 54HGC7981 to (Evaluate:28Qwz6263)  Requested for: 28Mar2017; Last   Rx:28Mar2017 Ordered   2  Coreg CR 20 MG Oral Capsule Extended Release 24 Hour; TAKE 1 CAPSULE EVERY   MORNING WITH FOOD; Therapy: (Recorded:67Let7658) to Recorded   3   Exforge HCT 5-160-12 5 MG Oral Tablet; TAKE 1 TABLET DAILY AS DIRECTED; Therapy: (Recorded:06Feb2017) to Recorded   4  Flomax 0 4 MG Oral Capsule; take 1 capsule daily; Therapy: (Recorded:06Feb2017) to Recorded   5  Levocetirizine Dihydrochloride 5 MG Oral Tablet; TAKE 1 TABLET DAILY; Therapy: (Recorded:06Feb2017) to Recorded   6  Lipitor 10 MG Oral Tablet; TAKE 1 TABLET AT BEDTIME; Therapy: (Recorded:06Feb2017) to Recorded   7  Lumigan SOLN; APPLY 1 DROP  IN THE EVENING; Therapy: (Recorded:06Feb2017) to Recorded   8  Pantoprazole Sodium 40 MG Oral Tablet Delayed Release; TAKE 1 TABLET EVERY   MORNING; Therapy: 91MTW5912 to (Last Rx:06Feb2017)  Requested for: 12HXH5777 Ordered   9  PARoxetine HCl - 20 MG Oral Tablet; TAKE 1 TABLET DAILY; Therapy: (Recorded:06Feb2017) to Recorded   10  Plavix 75 MG Oral Tablet; TAKE 1 TABLET DAILY; Therapy: (Recorded:06Feb2017) to Recorded   11  Potassium TABS; TAKE 1 TABLET DAILY; Therapy: (Recorded:06Feb2017) to Recorded   12  Spiriva HandiHaler 18 MCG Inhalation Capsule; INHALE CONTENTS OF CAPSULE    ONCE DAY; Therapy: (Recorded:06Feb2017) to Recorded   13  Spironolactone 25 MG Oral Tablet; TAKE 1 TABLET DAILY; Therapy: (Recorded:06Feb2017) to Recorded   14  Toviaz 8 MG Oral Tablet Extended Release 24 Hour; Take 1 tablet daily; Therapy: (Recorded:06Feb2017) to Recorded    Allergies  1  Penicillins    Vitals  Vital Signs    Recorded: 30Aug2017 01:36PM   Temperature 97 6 F   Heart Rate 68   Respiration 16   Systolic 376   Diastolic 72   Height 6 ft 5 in   Weight 210 lb    BMI Calculated 24 9   BSA Calculated 2 28   O2 Saturation 96, RA     Physical Exam    Constitutional   General appearance: Abnormal  -- Elderly appearing male with slight left sided facial droop in a wheel chair, appears older then stated age  Eyes   Examination of pupil and irises: Anicteric, pupils reactive     Ears, Nose, Mouth, and Throat   External inspection of ears and nose: Normal     Nasal mucosa, septum, and turbinates: Normal without edema or erythema  Lips, teeth, and gums: Normal, good dentition  Oropharynx: Normal with no erythema, edema, exudate or lesions  Neck   Neck: Supple, symmetric, trachea midline, no masses  Jugular veins: Normal     Pulmonary   Chest: Normal     Respiratory effort: No increased work of breathing or signs of respiratory distress  Auscultation of lungs: Clear to auscultation, no rales, no crackles, no wheezing  Cardiovascular   Auscultation of heart: Normal rate and rhythm, normal S1 and S2, no murmurs  Examination of extremities for edema and/or varicosities: Normal     Abdomen   Abdomen: Soft, non-tender  Lymphatic   Palpation of lymph nodes in neck: No lymphadenopathy  Musculoskeletal   Gait and station: Normal     Neurologic   Mental Status: Normal  Not confused, no evidence of dementia, good comprehension, good concentration  Skin   Skin and subcutaneous tissue: Limited exam shows no rash  Psychiatric   Orientation to person, place and time: Normal        Health Management  GERD (gastroesophageal reflux disease)   EGD; every 1 year; Last 62SVG5638; Next Due: 69SZT6215; Active  Nausea and/or vomiting   EGD; every 1 year; Last 07WPW7473; Next Due: 65BMM7466; Active    Future Appointments    Date/Time Provider Specialty Site   10/10/2017 02:00 PM Delaney Velásquez DO Pulmonary Medicine [facility] Jessica Barnett   Electronically signed by : Pastora Norman, [facility];  Aug 30 2017  4:55PM EST                       (Author)    Electronically signed by : Nesha Russell DO; Aug 31 2017 10:30AM EST                       (Author)    Electronically signed by : Nesha Russell DO; Aug 31 2017 10:30AM EST                       (Author)

## 2017-10-29 NOTE — PROGRESS NOTES
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)   5  Dysphagia (787 20) (R13 10)    Plan  COPD (chronic obstructive pulmonary disease)    · Fluzone High-Dose 0 5 ML Intramuscular Suspension Prefilled Syringe;administer 1 standard dose per protocol; To Be Done: 92JQD5507   For: COPD (chronic obstructive pulmonary disease); Ordered By:Berna Allred; Effective Date:10Oct2017; Administered by: Sharon Coombs: 10/10/2017 3:25:00 PM; Last Updated By: Sharon Coombs; 10/10/2017 3:26:18 PM  Cough    · Benzonatate 100 MG Oral Capsule; TAKE 1 CAPSULE 3 TIMES DAILY AS NEEDED   Rx By: Lambert Zaragoza; Dispense: 30 Days ; #:90 Capsule; Refill: 1;Cough; LIZZY = N; Verified Transmission to [address] [place] [address]; Last Updated By: System, BABADU; 10/10/2017 2:22:45 PM   · Follow-up visit in 4 Months Evaluation and Treatment  Follow-up  Status: Hold For -Scheduling  Requested for: 32LEX2666   Ordered;Cough; Ordered By: Lambert Zaragoza Performed:  Due: 32YOO5771    Results/Data  Results Free Text Form Thomas Menchaca:   Results  Other 8/21/17 Hgb 11 6, Bicarb 28  SCr 1 10  Chest X-ray 8/23/17 - CXR - images personally reviewed - noted right sided post-operative changes and mild volume loss, no acute infiltrates, no masses, no lesions appreciated  CT Scan 4/2017 - CT chest images reviewed with 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  Cardiac Testing 7/2017 TTE EF 55%, normal RV  Discussion/Summary  Discussion Summary: It is a pleasure to see Mr Byrd again today  I suspect his cough is related to recurrent microaspiration events with possible component of COPD  This is exacerbated by his tracheomalacia and sensation of dyspnea during coughing events  He has overall improved   I will plan to continue his advair and spiriva with as needed nebulizer or HFA bronchodilators  He will stop his cough syrup now and start trial of as needed benzonatate pearles  I stressed the importance of following dysphagia and aspiration precautions  He demonstrated understanding  We will not pursue PFT testing at this time given difficulty with mouth closure for maneuvers  I discussed not pursuing invasive treatment options for his tracheomalacia given current clinical improvement and his comorbid conditions  Both he and his daughter agreed  High dose flu vaccine given today  I will see him back in 4 months or sooner as needed  Counseling Documentation With Imm: The patient, patient's family was counseled regarding diagnostic results,-- instructions for management,-- risk factor reductions,-- patient and family education,-- impressions,-- risks and benefits of treatment options,-- importance of compliance with treatment  Goals and Barriers: The patient has the current Goals: Maintain health and improve cough  The patent has the current Barriers: Dysphagia and prior stroke  Patient's Capacity to Self-Care: Patient is unable to Self-Care: In assisted living center  Medication SE Review and Pt Understands Tx: Possible side effects of new medications were reviewed with the patient/guardian today  The treatment plan was reviewed with the patient/guardian  The patient/guardian understands and agrees with the treatment plan      Active Problems    1  COPD (chronic obstructive pulmonary disease) (496) (J44 9)   2  Cough (786 2) (R05)   3  Depression (311) (F32 9)   4  Dysphagia (787 20) (R13 10)   5  Essential hypertension (401 9) (I10)   6  Fever (780 60) (R50 9)   7  GERD (gastroesophageal reflux disease) (530 81) (K21 9)   8  High cholesterol (272 0) (E78 00)   9  History of lung cancer (V10 11) (Z85 118)   10  Nausea and/or vomiting (787 01) (R11 2)   11  Stomach ulcer (531 90) (K25 9)   12   Stroke (434 91) (I63 9)    Chief Complaint  Chief Complaint Free Text Note Form: COPD and cough follow up      History of Present Illness  HPI: Mr Dedra Erickson is a 67year old man with history of prior CVA with resultant left hemiparesis, lung cancer post RLL lobectomy, possible COPD, likely tracheomalacia, and chronic cough  He has had multiple admissions for hypoxic respiratory failure an bronchospasm felt secondary to aspiration tracheobronchitis  He was last admitted 7/26-8/3 2017 then completed [facility] rehab until 8/21/17  He has been maintained on Spiriva Handihaler and Advair  he presents today for follow up with his daughter  reports overall feeling improved since hospitalization  He does report continued intermittent coughing spells  He denied purulent sputum production  He does report to having some coughing with drinking water and does not use thickener  He will use thickener with other liquids and denied coughing with solid foods  He reports currently no rescue inhaler use or nebulizers  He has been using Robitussin DM without changes in his cough  When asked about Koko Abad - his daughter reported this has improved cough in the past  He denied weight changes, no hemoptysis, no fevers, no abdominal pain, no GERD symptoms  PFTs have not been obtained due to difficulty with mouth seal given prior CVA  Review of Systems  Complete-Male Pulm:  Constitutional: no fever,-- not feeling poorly,-- no recent weight gain,-- no chills,-- not feeling tired-- and-- no recent weight loss  Eyes: no eye pain  ENT: no nosebleeds,-- no sore throat,-- no nasal discharge-- and-- no hoarseness  Cardiovascular: no chest pain,-- no palpitations-- and-- no extremity edema  Respiratory: as noted in HPI  Gastrointestinal: no abdominal pain,-- no nausea-- and-- no vomiting  Musculoskeletal: arthralgias, but-- no myalgias  Integumentary: no rashes  Neurological: difficulty walking, but-- no headache,-- no dizziness-- and-- no fainting  Psychiatric: no sleep disturbances    Hematologic/Lymphatic: no swollen glands-- and-- no swollen glands in the neck  Surgical History  1  History of Complete Colonoscopy   2  History of Diagnostic Esophagogastroduodenoscopy   3  History of Lung Surgery    Family History  Mother    1  Denied: Family history of Colon cancer   2  Denied: Family history of colonic polyps   3  Denied: Family history of Crohn's disease   4  Denied: Family history of liver disease  Father    11  Denied: Family history of Colon cancer   6  Denied: Family history of colonic polyps   7  Denied: Family history of Crohn's disease   8  Denied: Family history of liver disease    Social History     · Former smoker (V15 82) (Y43 440)   · 1ppd x 22 years, quit 1977   · No alcohol use  Social History Reviewed: The social history was reviewed and updated today  Current Meds   1  Carafate 1 GM/10ML Oral Suspension; TAKE 10 ML 4 TIMES DAILY; Therapy: 64EGW1422 to (Evaluate:91Rxu1451)  Requested for: 28Mar2017; Last Rx:28Mar2017 Ordered   2  Coreg CR 20 MG Oral Capsule Extended Release 24 Hour; TAKE 1 CAPSULE EVERY MORNING WITH FOOD; Therapy: (Recorded:06Feb2017) to Recorded   3  Exforge HCT 5-160-12 5 MG Oral Tablet; TAKE 1 TABLET DAILY AS DIRECTED; Therapy: (Recorded:06Feb2017) to Recorded   4  Flomax 0 4 MG Oral Capsule; take 1 capsule daily; Therapy: (Recorded:06Feb2017) to Recorded   5  Flonase Allergy Relief 50 MCG/ACT Nasal Suspension; USE 1 TO 2 SPRAYS IN EACH NOSTRIL ONCE DAILY; Therapy: 66Beb0404 to (Evaluate:28Mar2018)  Requested for: 30Aug2017; Last Rx:30Aug2017 Ordered   6  Levocetirizine Dihydrochloride 5 MG Oral Tablet; TAKE 1 TABLET DAILY; Therapy: (Recorded:06Feb2017) to Recorded   7  Lipitor 10 MG Oral Tablet; TAKE 1 TABLET AT BEDTIME; Therapy: (Recorded:06Feb2017) to Recorded   8  Lumigan SOLN; APPLY 1 DROP  IN THE EVENING; Therapy: (Recorded:06Feb2017) to Recorded   9  Omeprazole 40 MG Oral Capsule Delayed Release; take one capsule once daily;  Therapy: 30Aug2017 to (Evaluate:28Dec2017)  Requested for: 02RAV7454; Last Rx:69Dws3695 Ordered   10  Pantoprazole Sodium 40 MG Oral Tablet Delayed Release; TAKE 1 TABLET EVERY  MORNING; Therapy: 42DEZ5957 to (Last Rx:84Vap9941)  Requested for: 54QRJ1751 Ordered   11  PARoxetine HCl - 20 MG Oral Tablet; TAKE 1 TABLET DAILY; Therapy: (Recorded:06Feb2017) to Recorded   12  Plavix 75 MG Oral Tablet; TAKE 1 TABLET DAILY; Therapy: (Recorded:06Feb2017) to Recorded   13  Potassium TABS; TAKE 1 TABLET DAILY; Therapy: (Recorded:06Feb2017) to Recorded   14  Spiriva HandiHaler 18 MCG Inhalation Capsule; INHALE CONTENTS OF CAPSULE  ONCE DAY; Therapy: (Recorded:06Feb2017) to Recorded   15  Spironolactone 25 MG Oral Tablet; TAKE 1 TABLET DAILY; Therapy: (Recorded:06Feb2017) to Recorded   16  Toviaz 8 MG Oral Tablet Extended Release 24 Hour; Take 1 tablet daily; Therapy: (Recorded:06Feb2017) to Recorded  Medication List Reviewed: The medication list was reviewed and updated today  Allergies  1  Penicillins    Vitals  Vital Signs    Recorded: 16PZS4302 01:53PM   Temperature 97 8 F   Heart Rate 78   Respiration 14   Systolic 522   Diastolic 60   Height 6 ft 5 in   Weight 208 lb    BMI Calculated 24 67   BSA Calculated 2 27   O2 Saturation 98, RA       Physical Exam   Constitutional  General appearance: No acute distress, well appearing and well nourished  -- elderly male in wheelchair, NAD  Eyes  Examination of pupil and irises: Anicteric, pupils reactive  Ears, Nose, Mouth, and Throat  Nasal mucosa, septum, and turbinates: Normal without edema or erythema  Lips, teeth, and gums: Abnormal  -- no thrush, mildly poor dentition, noted mild dysarthria and facial asymmetry - chronic  Oropharynx: Normal with no erythema, edema, exudate or lesions  Neck  Neck: Supple, symmetric, trachea midline, no masses  Pulmonary  Chest: Normal    Respiratory effort: No increased work of breathing or signs of respiratory distress     Auscultation of lungs: Abnormal  -- mildly diminished BS diffusely, no wheeze, no rales  Cardiovascular  Auscultation of heart: Normal rate and rhythm, normal S1 and S2, no murmurs  Examination of extremities for edema and/or varicosities: Normal    Lymphatic  Palpation of lymph nodes in neck: No lymphadenopathy  Musculoskeletal  Gait and station: Abnormal  -- in wheelchair, left leg brace in place  Digits and nails: Normal without clubbing or cyanosis  Neurologic  Mental Status: Normal  Not confused, no evidence of dementia, good comprehension, good concentration  Skin  Skin and subcutaneous tissue: Limited exam shows no rash  Psychiatric  Orientation to person, place and time: Normal    Mood and affect: Normal        Health Management  GERD (gastroesophageal reflux disease)   EGD; every 1 year; Last 90QHI6793; Next Due: 92PKT9656; Active  Nausea and/or vomiting   EGD; every 1 year; Last 49EOL7457; Next Due: 46SGU6331;  Active    Signatures   Electronically signed by : Nicholas Zapata DO; Oct 10 2017  4:18PM EST                       (Author)

## 2018-01-12 NOTE — RESULT NOTES
Message   Selma Hoskins to call patient's daughter      Gastric biopsies and discuss further biopsies are benign  Patient to continue Protonix twice daily and schedule for repeat EGD in one year  Please talk to patient's daughter Ms  Rita Woods RN in GI lab     Verified Results  (1) TISSUE EXAM [id number] 10:23AM Michelle Wesley     Test Name Result Flag Reference   LAB AP CASE REPORT (Report)     Surgical Pathology Report             Case: [id number]                   Authorizing Provider: Edwardo Ledezma MD     Collected:      03/15/2017 1023        Ordering Location:   [facility]    Received:      03/15/2017 [place] [id number] Endoscopy                               Pathologist:      Jama Dorsey MD                              Specimens:  A) - Stomach, Cold biopsy gastric body, r/o H pylori                          B) - Esophagus, Cold biopsy distal esophagus, r/o Olivares's   LAB AP FINAL DIAGNOSIS (Report)     A  Stomach, body, biopsy:        - Oxyntic mucosa with mild chronic inactive gastritis  - No Helicobacter pylori organisms are identified on the   immunohistochemical stain, performed with an appropriate positive control         - No intestinal metaplasia, dysplasia or malignancy is   identified  B  Esophagus, distal, biopsy:        - Squamous mucosa with reactive features and fewer than 2   eosinophils per high power field  - Glandular mucosa with columnar cell metaplasia  - No intestinal metaplasia, dysplasia or malignancy is   identified  Interpretation performed at , [address]   Electronically signed by Jama Dorsey MD on 3/17/2017 at 5:32 PM   LAB AP SURGICAL ADDITIONAL INFORMATION (Report)     These tests were developed and their performance characteristics   determined by [facility] or [place]  They may not be cleared or approved by the [place]   Food and   Drug Administration  The FDA has determined that such clearance or   approval is not necessary  These tests are used for clinical purposes  They should not be regarded as investigational or for research  This   laboratory has been approved by CLIA 88, designated as a high-complexity   laboratory and is qualified to perform these tests  LAB AP GROSS DESCRIPTION (Report)     A  The specimen is received in formalin, labeled with the patient's name   and hospital number, and is designated biopsy gastric body rule out H    pylori  The specimen consists of 3 tan soft tissue fragments measuring   0 2, 0 2 and 0 5 cm  Entirely submitted  One cassette  B  The specimen is received in formalin, labeled with the patient's name   and hospital number, and is designated biopsy distal esophagus  The   specimen consists of multiple tan red soft tissue fragments measuring in   aggregate 0 5 x 0 5 x 0 1 cm  Entirely submitted  One cassette  Note: The estimated total formalin fixation time based upon information   provided by the submitting clinician and the standard processing schedule   is 18 0 hours      [facility]   LAB AP CLINICAL INFORMATION R/o h pylori     R/o h pylori

## 2018-01-13 VITALS
RESPIRATION RATE: 16 BRPM | HEIGHT: 77 IN | DIASTOLIC BLOOD PRESSURE: 72 MMHG | WEIGHT: 210 LBS | BODY MASS INDEX: 24.79 KG/M2 | TEMPERATURE: 97.6 F | SYSTOLIC BLOOD PRESSURE: 120 MMHG | OXYGEN SATURATION: 96 % | HEART RATE: 68 BPM

## 2018-01-13 NOTE — MISCELLANEOUS
Message  called patient to get cardo and pcp for blood thinner clearance      Active Problems    1  Depression (311) (F32 9)   2  Diarrhea (787 91) (R19 7)   3  Dysphagia (787 20) (R13 10)   4  Essential hypertension (401 9) (I10)   5  Fever (780 60) (R50 9)   6  GERD (gastroesophageal reflux disease) (530 81) (K21 9)   7  High cholesterol (272 0) (E78 00)   8  Nausea and/or vomiting (787 01) (R11 2)   9  Stomach ulcer (531 90) (K25 9)   10  Stroke (434 91) (I63 9)    Current Meds   1  Carafate 1 GM/10ML Oral Suspension; TAKE 10 ML 4 TIMES DAILY; Therapy: 75BCS6879 to (Evaluate:92Euo0225)  Requested for: 69QVC8870; Last   Rx:88Zta6081 Ordered   2  Coreg CR 20 MG Oral Capsule Extended Release 24 Hour; TAKE 1 CAPSULE EVERY   MORNING WITH FOOD; Therapy: (Recorded:06Feb2017) to Recorded   3  Exforge HCT 5-160-12 5 MG Oral Tablet (Amlodipine-Valsartan-HCTZ); TAKE 1 TABLET   DAILY AS DIRECTED; Therapy: (Recorded:06Feb2017) to Recorded   4  Flomax 0 4 MG Oral Capsule (Tamsulosin HCl); take 1 capsule daily; Therapy: (Recorded:06Feb2017) to Recorded   5  Levocetirizine Dihydrochloride 5 MG Oral Tablet; TAKE 1 TABLET DAILY; Therapy: (Recorded:06Feb2017) to Recorded   6  Lipitor 10 MG Oral Tablet (Atorvastatin Calcium); TAKE 1 TABLET AT BEDTIME; Therapy: (Recorded:06Feb2017) to Recorded   7  Lumigan SOLN; APPLY 1 DROP  IN THE EVENING; Therapy: (Recorded:06Feb2017) to Recorded   8  Pantoprazole Sodium 40 MG Oral Tablet Delayed Release (Protonix); TAKE 1 TABLET   EVERY MORNING; Therapy: 63HOI3265 to (Last Rx:40Pic4773)  Requested for: 91CGA1571 Ordered   9  PARoxetine HCl - 20 MG Oral Tablet; TAKE 1 TABLET DAILY; Therapy: (Recorded:06Feb2017) to Recorded   10  Plavix 75 MG Oral Tablet (Clopidogrel Bisulfate); TAKE 1 TABLET DAILY; Therapy: (Recorded:14Iuw6821) to Recorded   11  Potassium TABS; TAKE 1 TABLET DAILY; Therapy: (Recorded:23Sed6915) to Recorded   12   Spiriva HandiHaler 18 MCG Inhalation Capsule; INHALE CONTENTS OF CAPSULE    ONCE DAY; Therapy: (Recorded:65Ncm9266) to Recorded   13  Spironolactone 25 MG Oral Tablet; TAKE 1 TABLET DAILY; Therapy: (Recorded:06Feb2017) to Recorded   14  Toviaz 8 MG Oral Tablet Extended Release 24 Hour; Take 1 tablet daily; Therapy: (Recorded:06Feb2017) to Recorded    Allergies    1   Penicillins    Signatures   Electronically signed by : Sravanthi Pride, ; Mar  7 2017  9:36AM EST                       (Author)

## 2018-01-14 VITALS
BODY MASS INDEX: 24.79 KG/M2 | OXYGEN SATURATION: 98 % | SYSTOLIC BLOOD PRESSURE: 114 MMHG | WEIGHT: 210 LBS | DIASTOLIC BLOOD PRESSURE: 62 MMHG | TEMPERATURE: 97.9 F | HEIGHT: 77 IN | RESPIRATION RATE: 16 BRPM | HEART RATE: 94 BPM

## 2018-01-14 NOTE — RESULT NOTES
Message   left message for Josse Bey (daughter and POA) to inform her that her father's stool culture was negative  Verified Results  (1) STOOL CULTURE [id number] 02:22PM Jaret Rosado Order Number: [id number]     Test Name Result Flag Reference   CLINICAL REPORT (Report)     Test:        Stool culture  Specimen Type:   Stool  Specimen Date:   2/8/2017 2:22 PM  Result Date:    2/10/2017 10:20 AM  Result Status:   Final result  Resulting Lab:   [facility] [address] [address]            Tel: [phone]      CULTURE                                       ------------------                                   No Salmonella, Shigella or Campylobacter isolated      Shiga Toxin 1 NOT detected, Shiga Toxin 2 NOT detected

## 2018-01-15 VITALS
SYSTOLIC BLOOD PRESSURE: 110 MMHG | TEMPERATURE: 97.8 F | RESPIRATION RATE: 14 BRPM | BODY MASS INDEX: 24.56 KG/M2 | HEIGHT: 77 IN | DIASTOLIC BLOOD PRESSURE: 60 MMHG | OXYGEN SATURATION: 98 % | HEART RATE: 78 BPM | WEIGHT: 208 LBS

## 2018-01-15 NOTE — RESULT NOTES
Verified Results  (1) STOOL WBCS [id number] 02:22PM Jasper Lui Order Number: [id number]     Test Name Result Flag Reference   STOOL WBC   None Seen   No white blood cells seen     Performed at:  [address] [place] [address]  [id number]  : Martita Pappas MD, Phone:  [phone]

## 2018-01-16 NOTE — RESULT NOTES
Message   Patient's number on file is out of service  Called daughter Agus Allen [facility]) and left message that c diff test was negative  Verified Results  (1) C  DIFFICILE TOXIN BY PCR 02HRI1527 02:22PM [facility] Order Number: [id number]     Test Name Result Flag Reference   C  DIFFICILE TOXIN BY PCR   NEGATIVE for C difficle toxin by PCR  NEGATIVE for C difficle toxin by PCR

## 2018-01-17 NOTE — MISCELLANEOUS
Michael Blair from Dr Augusta Murphy office stated patient can stop blood thinner for 5 days      Active Problems    1  Depression (311) (F32 9)   2  Diarrhea (787 91) (R19 7)   3  Dysphagia (787 20) (R13 10)   4  Essential hypertension (401 9) (I10)   5  Fever (780 60) (R50 9)   6  GERD (gastroesophageal reflux disease) (530 81) (K21 9)   7  High cholesterol (272 0) (E78 00)   8  Nausea and/or vomiting (787 01) (R11 2)   9  Stomach ulcer (531 90) (K25 9)   10  Stroke (434 91) (I63 9)    Current Meds   1  Carafate 1 GM/10ML Oral Suspension; TAKE 10 ML 4 TIMES DAILY; Therapy: 63UVM9270 to (Evaluate:95Fzl1907)  Requested for: 43QBZ0687; Last   Rx:47Rqw7516 Ordered   2  Coreg CR 20 MG Oral Capsule Extended Release 24 Hour; TAKE 1 CAPSULE EVERY   MORNING WITH FOOD; Therapy: (Recorded:06Feb2017) to Recorded   3  Exforge HCT 5-160-12 5 MG Oral Tablet (Amlodipine-Valsartan-HCTZ); TAKE 1 TABLET   DAILY AS DIRECTED; Therapy: (Recorded:06Feb2017) to Recorded   4  Flomax 0 4 MG Oral Capsule (Tamsulosin HCl); take 1 capsule daily; Therapy: (Recorded:06Feb2017) to Recorded   5  Levocetirizine Dihydrochloride 5 MG Oral Tablet; TAKE 1 TABLET DAILY; Therapy: (Recorded:06Feb2017) to Recorded   6  Lipitor 10 MG Oral Tablet (Atorvastatin Calcium); TAKE 1 TABLET AT BEDTIME; Therapy: (Recorded:06Feb2017) to Recorded   7  Lumigan SOLN; APPLY 1 DROP  IN THE EVENING; Therapy: (Recorded:06Feb2017) to Recorded   8  Pantoprazole Sodium 40 MG Oral Tablet Delayed Release (Protonix); TAKE 1 TABLET   EVERY MORNING; Therapy: 11BJL4282 to (Last Rx:11Uff9559)  Requested for: 70QUM3677 Ordered   9  PARoxetine HCl - 20 MG Oral Tablet; TAKE 1 TABLET DAILY; Therapy: (Recorded:06Feb2017) to Recorded   10  Plavix 75 MG Oral Tablet (Clopidogrel Bisulfate); TAKE 1 TABLET DAILY; Therapy: (Recorded:06Feb2017) to Recorded   11  Potassium TABS; TAKE 1 TABLET DAILY; Therapy: (Recorded:05Czu5169) to Recorded   12   Spiriva HandiHaler 18 MCG Inhalation Capsule; INHALE CONTENTS OF CAPSULE    ONCE DAY; Therapy: (Recorded:85Cdh3769) to Recorded   13  Spironolactone 25 MG Oral Tablet; TAKE 1 TABLET DAILY; Therapy: (Recorded:20Mhk5716) to Recorded   14  Toviaz 8 MG Oral Tablet Extended Release 24 Hour; Take 1 tablet daily; Therapy: (Recorded:95Dcy9996) to Recorded    Allergies    1   Penicillins    Signatures   Electronically signed by : Edilma Collazo, ; Mar 10 2017  2:36PM EST                       (Author)

## 2018-02-01 ENCOUNTER — OFFICE VISIT (OUTPATIENT)
Dept: PULMONOLOGY | Facility: CLINIC | Age: 78
End: 2018-02-01
Payer: MEDICARE

## 2018-02-01 VITALS
DIASTOLIC BLOOD PRESSURE: 70 MMHG | SYSTOLIC BLOOD PRESSURE: 130 MMHG | TEMPERATURE: 97.4 F | HEART RATE: 70 BPM | OXYGEN SATURATION: 97 %

## 2018-02-01 DIAGNOSIS — J39.8 TRACHEOMALACIA: ICD-10-CM

## 2018-02-01 DIAGNOSIS — Z86.73 HISTORY OF CVA (CEREBROVASCULAR ACCIDENT): Chronic | ICD-10-CM

## 2018-02-01 DIAGNOSIS — R13.10 DYSPHAGIA, UNSPECIFIED TYPE: ICD-10-CM

## 2018-02-01 DIAGNOSIS — K21.9 GASTROESOPHAGEAL REFLUX DISEASE WITHOUT ESOPHAGITIS: ICD-10-CM

## 2018-02-01 DIAGNOSIS — J44.9 CHRONIC OBSTRUCTIVE PULMONARY DISEASE, UNSPECIFIED COPD TYPE (HCC): ICD-10-CM

## 2018-02-01 DIAGNOSIS — R05.3 CHRONIC COUGH: Primary | ICD-10-CM

## 2018-02-01 PROBLEM — J40 BRONCHITIS: Status: RESOLVED | Noted: 2017-05-31 | Resolved: 2018-02-01

## 2018-02-01 PROBLEM — R06.02 SOB (SHORTNESS OF BREATH): Status: RESOLVED | Noted: 2017-04-09 | Resolved: 2018-02-01

## 2018-02-01 PROBLEM — J98.01 BRONCHOSPASM: Status: RESOLVED | Noted: 2017-06-09 | Resolved: 2018-02-01

## 2018-02-01 PROCEDURE — 99213 OFFICE O/P EST LOW 20 MIN: CPT | Performed by: INTERNAL MEDICINE

## 2018-02-01 RX ORDER — FLUOROURACIL 50 MG/G
CREAM TOPICAL
Refills: 1 | COMMUNITY
Start: 2018-01-29 | End: 2018-06-14 | Stop reason: ALTCHOICE

## 2018-02-01 RX ORDER — ACETAMINOPHEN 325 MG/1
1-2 TABLET ORAL EVERY 6 HOURS PRN
COMMUNITY
Start: 2017-10-10 | End: 2018-02-02

## 2018-02-01 RX ORDER — CLOPIDOGREL BISULFATE 75 MG/1
1 TABLET ORAL DAILY
COMMUNITY
End: 2018-02-10

## 2018-02-01 RX ORDER — ATORVASTATIN CALCIUM 10 MG/1
1 TABLET, FILM COATED ORAL
COMMUNITY
End: 2019-11-18 | Stop reason: HOSPADM

## 2018-02-01 RX ORDER — OMEPRAZOLE 40 MG/1
1 CAPSULE, DELAYED RELEASE ORAL DAILY
COMMUNITY
Start: 2017-08-30 | End: 2019-11-19 | Stop reason: ALTCHOICE

## 2018-02-01 RX ORDER — LEVOCETIRIZINE DIHYDROCHLORIDE 5 MG/1
1 TABLET, FILM COATED ORAL DAILY
COMMUNITY
End: 2018-02-10

## 2018-02-01 RX ORDER — TAMSULOSIN HYDROCHLORIDE 0.4 MG/1
1 CAPSULE ORAL DAILY
COMMUNITY

## 2018-02-01 RX ORDER — PAROXETINE HYDROCHLORIDE 20 MG/1
1 TABLET, FILM COATED ORAL DAILY
COMMUNITY
End: 2020-03-11 | Stop reason: ALTCHOICE

## 2018-02-01 RX ORDER — FLUTICASONE PROPIONATE 50 MCG
1-2 SPRAY, SUSPENSION (ML) NASAL DAILY
COMMUNITY
Start: 2017-08-30 | End: 2019-08-26 | Stop reason: ALTCHOICE

## 2018-02-01 RX ORDER — ALBUTEROL SULFATE 90 UG/1
AEROSOL, METERED RESPIRATORY (INHALATION)
COMMUNITY
Start: 2017-10-10 | End: 2018-02-18 | Stop reason: HOSPADM

## 2018-02-01 RX ORDER — LORAZEPAM 0.5 MG/1
1 TABLET ORAL DAILY
COMMUNITY
Start: 2017-10-10 | End: 2018-02-18 | Stop reason: HOSPADM

## 2018-02-01 RX ORDER — ALBUTEROL SULFATE 2.5 MG/3ML
SOLUTION RESPIRATORY (INHALATION)
COMMUNITY
Start: 2017-10-10 | End: 2018-02-18 | Stop reason: HOSPADM

## 2018-02-01 RX ORDER — SPIRONOLACTONE 25 MG/1
1 TABLET ORAL DAILY
COMMUNITY
End: 2018-02-10

## 2018-02-01 RX ORDER — FESOTERODINE FUMARATE 4 MG/1
TABLET, FILM COATED, EXTENDED RELEASE ORAL
Refills: 3 | COMMUNITY
Start: 2018-01-22 | End: 2018-02-10

## 2018-02-01 RX ORDER — BENZONATATE 100 MG/1
1 CAPSULE ORAL 3 TIMES DAILY PRN
COMMUNITY
Start: 2017-10-10 | End: 2018-02-01

## 2018-02-01 RX ORDER — AMLODIPINE BESYLATE 10 MG/1
1 TABLET ORAL DAILY
COMMUNITY
Start: 2017-10-10

## 2018-02-01 NOTE — PATIENT INSTRUCTIONS
· Use tessalon twice daily and can add third dose per day for periods of increased coughing  · Focus on aspiration precautions and proper swallowing      Aspiration Precautions   AMBULATORY CARE:   What you need to know about aspiration precautions:  Aspiration means that foods or fluids get into your airway  This can lead to trouble breathing or lung infections such as pneumonia  Aspiration precautions are practices that help prevent these problems  Seek care immediately if:   You have chest pain  You have shortness of breath  You have signs or symptoms of dehydration, such as increased thirst, dark urine, or little or no urine  Contact your healthcare provider if:   You have a cough, chills, or a fever  You cough or choke before, during, or after you eat or drink  You feel like you have to clear your throat after you eat or drink  You lose weight without trying  You have questions or concerns about your condition or care  Prevent aspiration:   Eat in a chair or sit upright while you eat  This will help prevent choking  Stay upright for 45 minutes to 1 hour after you eat or drink  Eat small amounts slowly  Do not eat or drink with a straw  Take small bites and chew well before you swallow  Avoid distractions while you eat  Keep the radio and TV turned off during meals  Do not try to talk to others while you eat  Make sure your dentures fit correctly  This will help you chew food into pieces that are easier to swallow  Limit spicy foods and caffeine  These may cause reflux  Reflux is the movement of foods and fluids from your stomach into your esophagus  This could increase the risk that foods or fluids will also move into your airway  Drink water with your meals  Water will help rinse food out of your mouth  This will decrease the risk that food will move into your airway  Do not smoke    Nicotine and other chemicals in cigarettes and cigars can damage your esophagus and cause trouble swallowing  Ask your healthcare provider for information if you currently smoke and need help to quit  E-cigarettes or smokeless tobacco still contain nicotine  Talk to your healthcare provider before you use these products  What you need to know about nutrition and aspiration:  Your healthcare provider may show you how to thicken liquids or soften foods  Thickened liquids and soft foods are easier to swallow  A registered dietitian can help you plan your meals:  Puree your foods as directed  This will help remove chunks or lumps  You can add gravy, sauce, vegetable juice, milk, or half and half to foods before you blend them  Your food should be the same consistency as pudding after you puree it  If your food is too thin after you puree it, thicken it as directed  The following are examples of foods that puree well into a pudding consistency:     Cream of wheat with small amounts of milk    Moistened breads, pancakes, Welsh pastries, or muffins    Well-cooked pasta, noodles, or rice    Cooked vegetables, tomato sauce, or cooked potatoes without skin    Casseroles, eggs, or cooked pureed meats    Margarine, sour cream, smooth cheese sauces, or strained gravy    Thicken your foods and drinks as directed  Your food and drinks should be thickened to the consistency of pudding  You can add flour, cornstarch, or potato flakes, or thickening products to thicken your foods or drinks  Follow directions on the package when you add thickening products to your food or drinks  Your healthcare provider will give you a complete list of foods and drinks that need to be thickened  The following are examples of foods and drinks that should be thickened:     Milk, milkshakes, nutritional shakes, or sherbet    Juices without pulp or gelatin    Coffee, tea, or soda    Alcoholic beverages    Keep a food diary  Write down everything you eat  Take the diary to your follow-up visits   This information will help your healthcare provider decide if you are getting enough nutrition  Follow up with your healthcare provider as directed:  Write down your questions so you remember to ask them during your visits  © 2017 [address] [employer] Information is for End User's use only and may not be sold, redistributed or otherwise used for commercial purposes  All illustrations and images included in CareNotes® are the copyrighted property of [employer]  or [address]  The above information is an  only  It is not intended as medical advice for individual conditions or treatments  Talk to your doctor, nurse or pharmacist before following any medical regimen to see if it is safe and effective for you    ·

## 2018-02-01 NOTE — ASSESSMENT & PLAN NOTE
· Continue spiriva and advair  · If he remains stable at next visit, I would consider trial off advair therapy

## 2018-02-01 NOTE — ASSESSMENT & PLAN NOTE
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

## 2018-02-01 NOTE — PROGRESS NOTES
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  · Improved with tessalon 100mg twice daily - will continue as well as low dose ativan to improve anxiety with coughing episodes  · Can add 3rd dose of tessalon as needed for periods of coughing  · Stressed need for strict aspiration precautions  · Follow up in 6 months or sooner as needed    COPD (chronic obstructive pulmonary disease) ([facility] )  · Continue spiriva and advair  · If he remains stable at next visit, I would consider trial off advair therapy    GERD (gastroesophageal reflux disease)  · Denied GERD symptoms  · Continue with PPI and GI follow up    Dysphagia  · Stressed need to focus on proper swallowing techniques and aspiration precautions      Plan:    Diagnoses and all orders for this visit:    Chronic cough    Tracheomalacia    Dysphagia, unspecified type    History of CVA (cerebrovascular accident)    Chronic obstructive pulmonary disease, unspecified COPD type ([facility] )    Gastroesophageal reflux disease without esophagitis    Other orders  -     ASPIRIN 81 PO; Take 1 tablet by mouth daily  -     acetaminophen (TYLENOL) 325 mg tablet; Take 1-2 tablets by mouth every 6 (six) hours as needed  -     TOVIAZ 4 MG TB24; TK 1 T PO QD  -     fluticasone (FLONASE) 50 mcg/act nasal spray; 1-2 sprays into each nostril daily  -     fluorouracil (EFUDEX) 5 % cream; APPLY TO ENTIRE LESIONS Q 12 H FOR 28 DAYS FROM 2/3/18 TO 3/2/18  -     mupirocin (BACTROBAN) 2 % ointment; ELA AA TID FOR 7 DAYS FROM 1/27/18 THROUGH 2/2/18  -     omeprazole (PriLOSEC) 40 MG capsule; Take 1 capsule by mouth daily  -     fluticasone-salmeterol (ADVAIR DISKUS) 250-50 mcg/dose inhaler; Inhale  -     albuterol (2 5 mg/3 mL) 0 083 % nebulizer solution; Inhale  -     amLODIPine (NORVASC) 10 mg tablet;  Take 1 tablet by mouth daily  -     Discontinue: benzonatate (TESSALON PERLES) 100 mg capsule; Take 1 capsule by mouth 3 (three) times a day as needed  -     tamsulosin (FLOMAX) 0 4 mg; Take 1 capsule by mouth daily  -     LORazepam (ATIVAN) 0 5 mg tablet; Take 1 tablet by mouth daily  -     bimatoprost (LUMIGAN) 0 01 % ophthalmic drops; Apply to eye  -     levocetirizine (XYZAL) 5 MG tablet; Take 1 tablet by mouth daily  -     spironolactone (ALDACTONE) 25 mg tablet; Take 1 tablet by mouth daily  -     albuterol (PROVENTIL HFA) 90 mcg/act inhaler; Inhale  -     clopidogrel (PLAVIX) 75 mg tablet; Take 1 tablet by mouth daily  -     atorvastatin (LIPITOR) 10 mg tablet; Take 1 tablet by mouth  -     PARoxetine (PAXIL) 20 mg tablet; Take 1 tablet by mouth daily  -     tiotropium (SPIRIVA HANDIHALER) 18 mcg inhalation capsule; Place into inhaler and inhale daily        Return in about 6 months (around 8/1/2018) for Recheck  History of Present Illness   HPI:  Lehman Fothergill  is a 65 y o  male who Mr Lori Platt is a 67year old man with history of prior CVA with resultant left hemiparesis, lung cancer post RLL lobectomy, possible COPD, likely tracheomalacia, and chronic cough  He has had multiple admissions for hypoxic respiratory failure an bronchospasm felt secondary to aspiration tracheobronchitis  He was last admitted 7/26-8/3 2017 then completed [facility] rehab until 8/21/17  He has been maintained on Spiriva Handihaler and Advair 250/50  He was last seen in October 2017 and plans made for trial of tessalon and focus on swallowing mechanisms  He presents today for follow up with his daughter  He reported overall feeling stable but does have intermittent periods of barking cough  He admitted to not focusing on dysphagia precautions and will cough with eating  He denied gross aspiration events  He is not using nebulizer therapies  He denied sputum production, no hemoptysis, no fevers  He did report increased cough 2 weeks ago that has improved    He was using tessalon twice daily  He denied chest pain, no worsened edema, no oral lesions, no dysphagia  He reports his GERD symptoms are under good control  Review of Systems   Constitutional: Negative for activity change, chills, fever and unexpected weight change  HENT: Positive for trouble swallowing  Negative for congestion, postnasal drip, rhinorrhea and voice change  Eyes: Negative for pain, redness and visual disturbance  Respiratory: Positive for cough  Negative for chest tightness, shortness of breath and wheezing  Cardiovascular: Positive for leg swelling  Negative for chest pain and palpitations  Gastrointestinal: Negative for abdominal distention, abdominal pain, diarrhea, nausea and vomiting  Endocrine: Negative for cold intolerance and heat intolerance  Genitourinary: Negative for dysuria, frequency and urgency  Musculoskeletal: Negative for arthralgias and myalgias  Skin: Negative for color change, rash and wound  Neurological: Positive for weakness  Negative for dizziness, syncope and light-headedness  Hematological: Negative for adenopathy  Psychiatric/Behavioral: Negative for confusion and sleep disturbance         Historical Information   Past Medical History:   Diagnosis Date    RONAL (acute kidney injury) ([facility] ) 5/31/2017    Aspiration into respiratory tract     Chest pain 5/31/2017    [facility]     Depression     Elevated troponin 5/31/2017    GERD (gastroesophageal reflux disease)     History of CVA (cerebrovascular accident) 4/13/2016    Hyperlipidemia     Hypertension     Lung cancer ([place] [address] ) 2005    Right, status post lobectomy    Pneumonia     Prostate cancer ([facility] )     Sleep apnea     awaiting sleep study results    Stroke ([place] [address] )     Weakness due to cerebrovascular accident ([facility]A) [facility])      Past Surgical History:   Procedure Laterality Date    COLONOSCOPY      ESOPHAGOGASTRODUODENOSCOPY N/A 4/13/2016    Procedure: ESOPHAGOGASTRODUODENOSCOPY (EGD); Surgeon: Ginette Root MD;  [facility]; Service:     JOINT REPLACEMENT      knee replacement    LUNG LOBECTOMY      CT ESOPHAGOGASTRODUODENOSCOPY TRANSORAL DIAGNOSTIC N/A 3/15/2017    Procedure: ESOPHAGOGASTRODUODENOSCOPY (EGD); Surgeon: Ginette Root MD;  [facility];   Service: Gastroenterology    TRIGEMINAL NERVE DECOMPRESSION       Family History   Problem Relation Age of Onset    Family history unknown: Yes         Meds/Allergies     Current Outpatient Prescriptions:     acetaminophen (TYLENOL) 325 mg tablet, Take 1-2 tablets by mouth every 6 (six) hours as needed, Disp: , Rfl:     albuterol (2 5 mg/3 mL) 0 083 % nebulizer solution, Inhale, Disp: , Rfl:     albuterol (PROVENTIL HFA) 90 mcg/act inhaler, Inhale, Disp: , Rfl:     amLODIPine (NORVASC) 10 mg tablet, Take 1 tablet by mouth daily, Disp: , Rfl:     ASPIRIN 81 PO, Take 1 tablet by mouth daily, Disp: , Rfl:     atorvastatin (LIPITOR) 10 mg tablet, Take 1 tablet by mouth, Disp: , Rfl:     bimatoprost (LUMIGAN) 0 01 % ophthalmic drops, Apply to eye, Disp: , Rfl:     clopidogrel (PLAVIX) 75 mg tablet, Take 1 tablet by mouth daily, Disp: , Rfl:     fluorouracil (EFUDEX) 5 % cream, APPLY TO ENTIRE LESIONS Q 12 H FOR 28 DAYS FROM 2/3/18 TO 3/2/18, Disp: , Rfl: 1    fluticasone (FLONASE) 50 mcg/act nasal spray, 1-2 sprays into each nostril daily, Disp: , Rfl:     fluticasone-salmeterol (ADVAIR DISKUS) 250-50 mcg/dose inhaler, Inhale, Disp: , Rfl:     levocetirizine (XYZAL) 5 MG tablet, Take 1 tablet by mouth daily, Disp: , Rfl:     LORazepam (ATIVAN) 0 5 mg tablet, Take 1 tablet by mouth daily, Disp: , Rfl:     mupirocin (BACTROBAN) 2 % ointment, ELA AA TID FOR 7 DAYS FROM 1/27/18 THROUGH 2/2/18, Disp: , Rfl: 0    omeprazole (PriLOSEC) 40 MG capsule, Take 1 capsule by mouth daily, Disp: , Rfl:     PARoxetine (PAXIL) 20 mg tablet, Take 1 tablet by mouth daily, Disp: , Rfl:    spironolactone (ALDACTONE) 25 mg tablet, Take 1 tablet by mouth daily, Disp: , Rfl:     tamsulosin (FLOMAX) 0 4 mg, Take 1 capsule by mouth daily, Disp: , Rfl:     tiotropium (SPIRIVA HANDIHALER) 18 mcg inhalation capsule, Place into inhaler and inhale daily, Disp: , Rfl:     TOVIAZ 4 MG TB24, TK 1 T PO QD, Disp: , Rfl: 3    acetaminophen (TYLENOL) 325 mg tablet, Take 2 tablets by mouth every 6 (six) hours as needed for fever, Disp: 30 tablet, Rfl: 0    albuterol (PROVENTIL HFA,VENTOLIN HFA) 90 mcg/act inhaler, Inhale 2 puffs 4 (four) times a day, Disp: 2 Inhaler, Rfl: 0    amLODIPine (NORVASC) 10 mg tablet, Take 1 tablet by mouth daily, Disp: 30 tablet, Rfl: 0    aspirin 81 mg chewable tablet, Chew 1 tablet daily, Disp: 30 tablet, Rfl: 0    atorvastatin (LIPITOR) 10 mg tablet, Take 1 tablet by mouth daily, Disp: 30 tablet, Rfl: 0    benzonatate (TESSALON PERLES) 100 mg capsule, Take 1 capsule by mouth 3 (three) times a day as needed for cough, Disp: 90 capsule, Rfl: 0    bimatoprost (LUMIGAN) 0 01 % ophthalmic drops, Administer 1 drop to both eyes daily at bedtime, Disp: 2 5 mL, Rfl: 0    carvedilol (COREG) 12 5 mg tablet, Take 1 tablet by mouth 2 (two) times a day with meals, Disp: 60 tablet, Rfl: 0    clopidogrel (PLAVIX) 75 mg tablet, Take 1 tablet by mouth daily, Disp: 30 tablet, Rfl: 0    Fesoterodine Fumarate ER (TOVIAZ) 8 MG TB24, Take 1 tablet by mouth every evening , Disp: , Rfl:     fluticasone-salmeterol (ADVAIR) 250-50 mcg/dose inhaler, Inhale 1 puff every 12 (twelve) hours, Disp: , Rfl:     levocetirizine (XYZAL) 5 MG tablet, Take 1 tablet by mouth every evening, Disp: 30 tablet, Rfl: 0    LORazepam (ATIVAN) 0 5 mg tablet, Take 0 5 tablets by mouth daily, Disp: 30 tablet, Rfl: 0    meclizine (ANTIVERT) 25 mg tablet, Take 1 tablet by mouth 3 (three) times a day as needed for dizziness, Disp: 30 tablet, Rfl: 0    PARoxetine (PAXIL) 20 mg tablet, Take 1 tablet by mouth every morning, Disp: 30 tablet, Rfl: 0    spironolactone (ALDACTONE) 25 mg tablet, Take 1 tablet by mouth daily, Disp: 30 tablet, Rfl: 0    tamsulosin (FLOMAX) 0 4 mg, Take 1 capsule by mouth daily with dinner, Disp: 30 capsule, Rfl: 0  Allergies   Allergen Reactions    Penicillins Rash       Vitals: Blood pressure 130/70, pulse 70, temperature (!) 97 4 °F (36 3 °C), temperature source Tympanic, SpO2 97 %  There is no height or weight on file to calculate BMI  Oxygen Therapy  SpO2: 97 %  Oxygen Therapy: None (Room air)      Physical Exam  Physical Exam   Constitutional: He is oriented to person, place, and time  He appears well-developed and well-nourished  Elderly male in wheelchair, mild chronic dysarthria   HENT:   Head: Normocephalic and atraumatic  Nose: Nose normal    Mouth/Throat: Oropharynx is clear and moist  No oropharyngeal exudate  Tongue midline   Eyes: Conjunctivae are normal  Pupils are equal, round, and reactive to light  Right eye exhibits no discharge  Left eye exhibits no discharge  No scleral icterus  Neck: Neck supple  No JVD present  No tracheal deviation present  Cardiovascular: Normal rate, regular rhythm and normal heart sounds  No murmur heard  Pulmonary/Chest: Effort normal and breath sounds normal  No stridor  No respiratory distress  He has no wheezes  He has no rales  Normal BS with tidal breathing, forced cough with barking sound secondary to tracheal collapse, no stridor   Abdominal: Soft  Bowel sounds are normal  He exhibits no distension  There is no tenderness  Musculoskeletal: He exhibits edema  He exhibits no deformity  1+ LE on left with brace in place, trace RLE edema   Lymphadenopathy:     He has no cervical adenopathy  Neurological: He is alert and oriented to person, place, and time  Chronic left sided weakness and mild contractures left upper extremity   Skin: Skin is warm and dry  Capillary refill takes less than 2 seconds  No rash noted     Psychiatric: He has a normal mood and affect  His behavior is normal        Labs: I have personally reviewed pertinent lab results  Lab Results   Component Value Date    WBC 4 79 08/21/2017    HGB 11 6 (L) 08/21/2017    HCT 35 5 (L) 08/21/2017    MCV 92 08/21/2017     (L) 08/21/2017     Lab Results   Component Value Date    GLUCOSE 81 08/21/2017    CALCIUM 8 9 08/21/2017     08/21/2017    K 3 9 08/21/2017    CO2 28 08/21/2017     08/21/2017    BUN 18 08/21/2017    CREATININE 1 10 08/21/2017     No results found for: IGE  Lab Results   Component Value Date    ALT 31 05/31/2017    AST 19 05/31/2017    ALKPHOS 91 05/31/2017    BILITOT 0 40 05/31/2017       Prior Studies  Chest X-ray 8/23/17 - CXR - images personally reviewed - noted right sided post-operative changes and mild volume loss, no acute infiltrates, no masses, no lesions appreciated  CT Scan 4/2017 - CT chest images reviewed with 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  Cardiac Testing 7/2017 TTE EF 55%, normal RV  Luciana Allred DO, [facility]

## 2018-02-02 ENCOUNTER — APPOINTMENT (EMERGENCY)
Dept: RADIOLOGY | Facility: HOSPITAL | Age: 78
End: 2018-02-02
Payer: MEDICARE

## 2018-02-02 ENCOUNTER — HOSPITAL ENCOUNTER (OUTPATIENT)
Facility: HOSPITAL | Age: 78
Setting detail: OBSERVATION
Discharge: HOME | End: 2018-02-02
Attending: EMERGENCY MEDICINE | Admitting: HOSPITALIST
Payer: MEDICARE

## 2018-02-02 VITALS
HEART RATE: 62 BPM | BODY MASS INDEX: 24.79 KG/M2 | SYSTOLIC BLOOD PRESSURE: 125 MMHG | DIASTOLIC BLOOD PRESSURE: 73 MMHG | OXYGEN SATURATION: 95 % | HEIGHT: 77 IN | TEMPERATURE: 97.6 F | WEIGHT: 210 LBS | RESPIRATION RATE: 16 BRPM

## 2018-02-02 DIAGNOSIS — R05.3 CHRONIC COUGH: ICD-10-CM

## 2018-02-02 DIAGNOSIS — R04.2 HEMOPTYSIS: Primary | ICD-10-CM

## 2018-02-02 DIAGNOSIS — R06.00 EXERTIONAL DYSPNEA: ICD-10-CM

## 2018-02-02 DIAGNOSIS — N18.30 CHRONIC KIDNEY DISEASE, STAGE 3 (HCC): ICD-10-CM

## 2018-02-02 PROBLEM — N17.9 AKI (ACUTE KIDNEY INJURY) (HCC): Status: ACTIVE | Noted: 2018-02-02

## 2018-02-02 PROBLEM — J44.9 COPD (CHRONIC OBSTRUCTIVE PULMONARY DISEASE) (HCC): Chronic | Status: ACTIVE | Noted: 2018-02-01

## 2018-02-02 PROBLEM — J39.8 TRACHEOMALACIA: Chronic | Status: ACTIVE | Noted: 2017-10-10

## 2018-02-02 PROBLEM — N17.9 AKI (ACUTE KIDNEY INJURY) (HCC): Status: RESOLVED | Noted: 2018-02-02 | Resolved: 2018-02-02

## 2018-02-02 LAB
ALBUMIN SERPL BCP-MCNC: 3.7 G/DL (ref 3.5–5)
ALP SERPL-CCNC: 116 U/L (ref 46–116)
ALT SERPL W P-5'-P-CCNC: 25 U/L (ref 12–78)
ANION GAP SERPL CALCULATED.3IONS-SCNC: 4 MMOL/L (ref 4–13)
ANION GAP SERPL CALCULATED.3IONS-SCNC: 8 MMOL/L (ref 4–13)
APTT PPP: 32 SECONDS (ref 23–35)
AST SERPL W P-5'-P-CCNC: 15 U/L (ref 5–45)
ATRIAL RATE: 64 BPM
BASOPHILS # BLD AUTO: 0.04 THOUSANDS/ΜL (ref 0–0.1)
BASOPHILS NFR BLD AUTO: 1 % (ref 0–1)
BILIRUB SERPL-MCNC: 0.36 MG/DL (ref 0.2–1)
BUN SERPL-MCNC: 26 MG/DL (ref 5–25)
BUN SERPL-MCNC: 26 MG/DL (ref 5–25)
CALCIUM SERPL-MCNC: 8.3 MG/DL (ref 8.3–10.1)
CALCIUM SERPL-MCNC: 9 MG/DL (ref 8.3–10.1)
CHLORIDE SERPL-SCNC: 108 MMOL/L (ref 100–108)
CHLORIDE SERPL-SCNC: 108 MMOL/L (ref 100–108)
CO2 SERPL-SCNC: 25 MMOL/L (ref 21–32)
CO2 SERPL-SCNC: 29 MMOL/L (ref 21–32)
CREAT SERPL-MCNC: 1.24 MG/DL (ref 0.6–1.3)
CREAT SERPL-MCNC: 1.38 MG/DL (ref 0.6–1.3)
EOSINOPHIL # BLD AUTO: 0.23 THOUSAND/ΜL (ref 0–0.61)
EOSINOPHIL NFR BLD AUTO: 4 % (ref 0–6)
ERYTHROCYTE [DISTWIDTH] IN BLOOD BY AUTOMATED COUNT: 14.6 % (ref 11.6–15.1)
ERYTHROCYTE [DISTWIDTH] IN BLOOD BY AUTOMATED COUNT: 14.6 % (ref 11.6–15.1)
GFR SERPL CREATININE-BSD FRML MDRD: 49 ML/MIN/1.73SQ M
GFR SERPL CREATININE-BSD FRML MDRD: 55 ML/MIN/1.73SQ M
GLUCOSE SERPL-MCNC: 113 MG/DL (ref 65–140)
GLUCOSE SERPL-MCNC: 90 MG/DL (ref 65–140)
HCT VFR BLD AUTO: 39.4 % (ref 36.5–49.3)
HCT VFR BLD AUTO: 41.6 % (ref 36.5–49.3)
HGB BLD-MCNC: 13 G/DL (ref 12–17)
HGB BLD-MCNC: 13.7 G/DL (ref 12–17)
INR PPP: 1.05 (ref 0.86–1.16)
LYMPHOCYTES # BLD AUTO: 1.34 THOUSANDS/ΜL (ref 0.6–4.47)
LYMPHOCYTES NFR BLD AUTO: 22 % (ref 14–44)
MCH RBC QN AUTO: 29.5 PG (ref 26.8–34.3)
MCH RBC QN AUTO: 29.6 PG (ref 26.8–34.3)
MCHC RBC AUTO-ENTMCNC: 32.9 G/DL (ref 31.4–37.4)
MCHC RBC AUTO-ENTMCNC: 33 G/DL (ref 31.4–37.4)
MCV RBC AUTO: 89 FL (ref 82–98)
MCV RBC AUTO: 90 FL (ref 82–98)
MONOCYTES # BLD AUTO: 0.63 THOUSAND/ΜL (ref 0.17–1.22)
MONOCYTES NFR BLD AUTO: 10 % (ref 4–12)
NEUTROPHILS # BLD AUTO: 3.85 THOUSANDS/ΜL (ref 1.85–7.62)
NEUTS SEG NFR BLD AUTO: 63 % (ref 43–75)
NRBC BLD AUTO-RTO: 0 /100 WBCS
P AXIS: 17 DEGREES
PLATELET # BLD AUTO: 117 THOUSANDS/UL (ref 149–390)
PLATELET # BLD AUTO: 120 THOUSANDS/UL (ref 149–390)
PMV BLD AUTO: 10.4 FL (ref 8.9–12.7)
PMV BLD AUTO: 10.4 FL (ref 8.9–12.7)
POTASSIUM SERPL-SCNC: 3.8 MMOL/L (ref 3.5–5.3)
POTASSIUM SERPL-SCNC: 4 MMOL/L (ref 3.5–5.3)
PR INTERVAL: 216 MS
PROT SERPL-MCNC: 7.1 G/DL (ref 6.4–8.2)
PROTHROMBIN TIME: 13.7 SECONDS (ref 12.1–14.4)
QRS AXIS: -9 DEGREES
QRSD INTERVAL: 98 MS
QT INTERVAL: 396 MS
QTC INTERVAL: 408 MS
RBC # BLD AUTO: 4.41 MILLION/UL (ref 3.88–5.62)
RBC # BLD AUTO: 4.63 MILLION/UL (ref 3.88–5.62)
SODIUM SERPL-SCNC: 141 MMOL/L (ref 136–145)
SODIUM SERPL-SCNC: 141 MMOL/L (ref 136–145)
T WAVE AXIS: 37 DEGREES
TROPONIN I SERPL-MCNC: <0.02 NG/ML
VENTRICULAR RATE: 64 BPM
WBC # BLD AUTO: 6.11 THOUSAND/UL (ref 4.31–10.16)
WBC # BLD AUTO: 6.32 THOUSAND/UL (ref 4.31–10.16)

## 2018-02-02 PROCEDURE — 93010 ELECTROCARDIOGRAM REPORT: CPT

## 2018-02-02 PROCEDURE — 36415 COLL VENOUS BLD VENIPUNCTURE: CPT | Performed by: EMERGENCY MEDICINE

## 2018-02-02 PROCEDURE — 99217 PR OBSERVATION CARE DISCHARGE MANAGEMENT: CPT | Performed by: NURSE PRACTITIONER

## 2018-02-02 PROCEDURE — 99213 OFFICE O/P EST LOW 20 MIN: CPT | Performed by: INTERNAL MEDICINE

## 2018-02-02 PROCEDURE — 99220 PR INITIAL OBSERVATION CARE/DAY 70 MINUTES: CPT | Performed by: HOSPITALIST

## 2018-02-02 PROCEDURE — 80053 COMPREHEN METABOLIC PANEL: CPT | Performed by: EMERGENCY MEDICINE

## 2018-02-02 PROCEDURE — 71046 X-RAY EXAM CHEST 2 VIEWS: CPT

## 2018-02-02 PROCEDURE — 80048 BASIC METABOLIC PNL TOTAL CA: CPT | Performed by: HOSPITALIST

## 2018-02-02 PROCEDURE — 84484 ASSAY OF TROPONIN QUANT: CPT | Performed by: EMERGENCY MEDICINE

## 2018-02-02 PROCEDURE — 93005 ELECTROCARDIOGRAM TRACING: CPT

## 2018-02-02 PROCEDURE — 87081 CULTURE SCREEN ONLY: CPT | Performed by: HOSPITALIST

## 2018-02-02 PROCEDURE — 85025 COMPLETE CBC W/AUTO DIFF WBC: CPT | Performed by: EMERGENCY MEDICINE

## 2018-02-02 PROCEDURE — 71260 CT THORAX DX C+: CPT

## 2018-02-02 PROCEDURE — 99285 EMERGENCY DEPT VISIT HI MDM: CPT

## 2018-02-02 PROCEDURE — 85027 COMPLETE CBC AUTOMATED: CPT | Performed by: HOSPITALIST

## 2018-02-02 PROCEDURE — 85610 PROTHROMBIN TIME: CPT | Performed by: EMERGENCY MEDICINE

## 2018-02-02 PROCEDURE — 85730 THROMBOPLASTIN TIME PARTIAL: CPT | Performed by: EMERGENCY MEDICINE

## 2018-02-02 RX ORDER — LEVOCETIRIZINE DIHYDROCHLORIDE 5 MG/1
5 TABLET, FILM COATED ORAL DAILY
Status: DISCONTINUED | OUTPATIENT
Start: 2018-02-02 | End: 2018-02-02 | Stop reason: RX

## 2018-02-02 RX ORDER — LORATADINE 10 MG/1
5 TABLET ORAL DAILY
Status: DISCONTINUED | OUTPATIENT
Start: 2018-02-02 | End: 2018-02-02 | Stop reason: HOSPADM

## 2018-02-02 RX ORDER — ACETAMINOPHEN 325 MG/1
650 TABLET ORAL EVERY 6 HOURS PRN
Status: DISCONTINUED | OUTPATIENT
Start: 2018-02-02 | End: 2018-02-02 | Stop reason: HOSPADM

## 2018-02-02 RX ORDER — POLYETHYLENE GLYCOL 3350 17 G/17G
17 POWDER, FOR SOLUTION ORAL DAILY
Status: DISCONTINUED | OUTPATIENT
Start: 2018-02-02 | End: 2018-02-02 | Stop reason: HOSPADM

## 2018-02-02 RX ORDER — ASPIRIN 81 MG/1
81 TABLET, CHEWABLE ORAL EVERY OTHER DAY
Status: DISCONTINUED | OUTPATIENT
Start: 2018-02-02 | End: 2018-02-02 | Stop reason: HOSPADM

## 2018-02-02 RX ORDER — DOCUSATE SODIUM 100 MG/1
100 CAPSULE, LIQUID FILLED ORAL 2 TIMES DAILY
Status: DISCONTINUED | OUTPATIENT
Start: 2018-02-02 | End: 2018-02-02 | Stop reason: HOSPADM

## 2018-02-02 RX ORDER — TAMSULOSIN HYDROCHLORIDE 0.4 MG/1
0.4 CAPSULE ORAL
Status: DISCONTINUED | OUTPATIENT
Start: 2018-02-02 | End: 2018-02-02 | Stop reason: HOSPADM

## 2018-02-02 RX ORDER — OXYBUTYNIN CHLORIDE 5 MG/1
5 TABLET, EXTENDED RELEASE ORAL DAILY
Status: DISCONTINUED | OUTPATIENT
Start: 2018-02-02 | End: 2018-02-02 | Stop reason: HOSPADM

## 2018-02-02 RX ORDER — ALBUTEROL SULFATE 2.5 MG/3ML
2.5 SOLUTION RESPIRATORY (INHALATION)
Status: DISCONTINUED | OUTPATIENT
Start: 2018-02-02 | End: 2018-02-02 | Stop reason: HOSPADM

## 2018-02-02 RX ORDER — AMLODIPINE BESYLATE 10 MG/1
10 TABLET ORAL DAILY
Status: DISCONTINUED | OUTPATIENT
Start: 2018-02-02 | End: 2018-02-02 | Stop reason: HOSPADM

## 2018-02-02 RX ORDER — ATORVASTATIN CALCIUM 10 MG/1
10 TABLET, FILM COATED ORAL
Status: DISCONTINUED | OUTPATIENT
Start: 2018-02-02 | End: 2018-02-02 | Stop reason: HOSPADM

## 2018-02-02 RX ORDER — SENNOSIDES 8.6 MG
1 TABLET ORAL DAILY
Status: DISCONTINUED | OUTPATIENT
Start: 2018-02-02 | End: 2018-02-02 | Stop reason: HOSPADM

## 2018-02-02 RX ORDER — SPIRONOLACTONE 25 MG/1
25 TABLET ORAL DAILY
Status: DISCONTINUED | OUTPATIENT
Start: 2018-02-02 | End: 2018-02-02 | Stop reason: HOSPADM

## 2018-02-02 RX ORDER — MECLIZINE HYDROCHLORIDE 25 MG/1
25 TABLET ORAL 3 TIMES DAILY PRN
Status: DISCONTINUED | OUTPATIENT
Start: 2018-02-02 | End: 2018-02-02 | Stop reason: HOSPADM

## 2018-02-02 RX ORDER — LORAZEPAM 0.5 MG/1
0.25 TABLET ORAL DAILY
Status: DISCONTINUED | OUTPATIENT
Start: 2018-02-02 | End: 2018-02-02 | Stop reason: HOSPADM

## 2018-02-02 RX ORDER — CLOPIDOGREL BISULFATE 75 MG/1
75 TABLET ORAL DAILY
Status: DISCONTINUED | OUTPATIENT
Start: 2018-02-02 | End: 2018-02-02 | Stop reason: HOSPADM

## 2018-02-02 RX ORDER — CARVEDILOL 12.5 MG/1
12.5 TABLET ORAL 2 TIMES DAILY WITH MEALS
Status: DISCONTINUED | OUTPATIENT
Start: 2018-02-02 | End: 2018-02-02 | Stop reason: HOSPADM

## 2018-02-02 RX ORDER — SODIUM CHLORIDE 9 MG/ML
75 INJECTION, SOLUTION INTRAVENOUS CONTINUOUS
Status: DISCONTINUED | OUTPATIENT
Start: 2018-02-02 | End: 2018-02-02 | Stop reason: HOSPADM

## 2018-02-02 RX ORDER — FLUTICASONE PROPIONATE 50 MCG
1 SPRAY, SUSPENSION (ML) NASAL DAILY
Status: DISCONTINUED | OUTPATIENT
Start: 2018-02-02 | End: 2018-02-02 | Stop reason: HOSPADM

## 2018-02-02 RX ORDER — PANTOPRAZOLE SODIUM 40 MG/1
40 TABLET, DELAYED RELEASE ORAL
Status: DISCONTINUED | OUTPATIENT
Start: 2018-02-02 | End: 2018-02-02 | Stop reason: HOSPADM

## 2018-02-02 RX ORDER — BENZONATATE 100 MG/1
100 CAPSULE ORAL 2 TIMES DAILY
Status: DISCONTINUED | OUTPATIENT
Start: 2018-02-02 | End: 2018-02-02 | Stop reason: HOSPADM

## 2018-02-02 RX ORDER — PAROXETINE HYDROCHLORIDE 20 MG/1
20 TABLET, FILM COATED ORAL DAILY
Status: DISCONTINUED | OUTPATIENT
Start: 2018-02-02 | End: 2018-02-02 | Stop reason: HOSPADM

## 2018-02-02 RX ORDER — ALBUTEROL SULFATE 90 UG/1
1 AEROSOL, METERED RESPIRATORY (INHALATION) EVERY 4 HOURS PRN
Status: DISCONTINUED | OUTPATIENT
Start: 2018-02-02 | End: 2018-02-02 | Stop reason: HOSPADM

## 2018-02-02 RX ORDER — SPIRONOLACTONE 25 MG/1
25 TABLET ORAL DAILY
Qty: 30 TABLET | Refills: 0
Start: 2018-02-04

## 2018-02-02 RX ADMIN — SPIRONOLACTONE 25 MG: 25 TABLET, FILM COATED ORAL at 08:30

## 2018-02-02 RX ADMIN — FLUTICASONE PROPIONATE 1 SPRAY: 50 SPRAY, METERED NASAL at 08:41

## 2018-02-02 RX ADMIN — DOCUSATE SODIUM 100 MG: 100 CAPSULE, LIQUID FILLED ORAL at 08:40

## 2018-02-02 RX ADMIN — CLOPIDOGREL BISULFATE 75 MG: 75 TABLET ORAL at 08:40

## 2018-02-02 RX ADMIN — IOHEXOL 85 ML: 350 INJECTION, SOLUTION INTRAVENOUS at 04:30

## 2018-02-02 RX ADMIN — CARVEDILOL 12.5 MG: 12.5 TABLET, FILM COATED ORAL at 08:29

## 2018-02-02 RX ADMIN — AMLODIPINE BESYLATE 10 MG: 10 TABLET ORAL at 08:30

## 2018-02-02 RX ADMIN — LORATADINE 5 MG: 10 TABLET ORAL at 08:30

## 2018-02-02 RX ADMIN — SODIUM CHLORIDE 75 ML/HR: 0.9 INJECTION, SOLUTION INTRAVENOUS at 07:21

## 2018-02-02 RX ADMIN — ASPIRIN 81 MG 81 MG: 81 TABLET ORAL at 08:40

## 2018-02-02 RX ADMIN — TIOTROPIUM BROMIDE 18 MCG: 18 CAPSULE ORAL; RESPIRATORY (INHALATION) at 08:54

## 2018-02-02 RX ADMIN — SODIUM CHLORIDE 500 ML: 0.9 INJECTION, SOLUTION INTRAVENOUS at 06:09

## 2018-02-02 RX ADMIN — BENZONATATE 100 MG: 100 CAPSULE ORAL at 08:30

## 2018-02-02 RX ADMIN — ENOXAPARIN SODIUM 40 MG: 40 INJECTION SUBCUTANEOUS at 08:41

## 2018-02-02 RX ADMIN — SODIUM CHLORIDE 500 ML: 0.9 INJECTION, SOLUTION INTRAVENOUS at 06:45

## 2018-02-02 RX ADMIN — PAROXETINE HYDROCHLORIDE 20 MG: 20 TABLET, FILM COATED ORAL at 08:30

## 2018-02-02 RX ADMIN — ALBUTEROL SULFATE 2.5 MG: 2.5 SOLUTION RESPIRATORY (INHALATION) at 08:41

## 2018-02-02 RX ADMIN — SENNOSIDES 8.6 MG: 8.6 TABLET, FILM COATED ORAL at 08:41

## 2018-02-02 RX ADMIN — MUPIROCIN: 20 OINTMENT TOPICAL at 08:53

## 2018-02-02 RX ADMIN — PANTOPRAZOLE SODIUM 40 MG: 40 TABLET, DELAYED RELEASE ORAL at 06:09

## 2018-02-02 RX ADMIN — OXYBUTYNIN CHLORIDE 5 MG: 5 TABLET, FILM COATED, EXTENDED RELEASE ORAL at 08:30

## 2018-02-02 RX ADMIN — LORAZEPAM 0.25 MG: 0.5 TABLET ORAL at 08:40

## 2018-02-02 NOTE — CONSULTS
Consultation - Pulmonary Medicine   Maddi Begum  65 y o  male MRN: [id number]  Unit/Bed#: ED 09 Encounter: [id number]      Assessment and Plan;    Trace hemoptysis  No acute pathology on imaging studies   Significant tracheomalacia and COPD and prior lobectomy for cancer  Needs ongoing antitussives ( tessalon), bronchodilators ( spiriva and advair)  and aspiration precautions  Does not need to be admitted to the hospital  Discussed with ED and SLIM        History of Present Illness   Physician Requesting Consult: Nilam Hodges DO  Reason for Consult / Principal Problem: Hemoptysis   Hx and PE limited by:  Not limited   HPI: Maddi Begum  is a 65y o  year old male who presents with trace hemoptysis ( he describes it as half of his pinky nail) yesterday  He was anxious and so came in  He has had no hemoptysis today  Baseline symptoms of cough and dyspnea not worse than baseline  No fevers chills, swelling of feet  Seen by Dr Lilian Mustafa at 3 20 pm in the office yesterday        In the ED no concerning vital signs    Consults    Review of Systems     No fever weight loss chills change in appetite constitutional symptoms  No headache blurry vision nausea vomiting diarrhea or weakness  No joint pains swelling or redness of an extremity  No hematuria dysuria urgency or frequency  No hematochezia melena or hematemesis  Rest of review of systems is normal/ HPI      Historical Information   Past Medical History:   Diagnosis Date    RONAL (acute kidney injury) ([address] ) 5/31/2017    Aspiration into respiratory tract     Chest pain 5/31/2017    COPD (chronic obstructive pulmonary disease) (HCC)     Depression     Elevated troponin 5/31/2017    GERD (gastroesophageal reflux disease)     History of CVA (cerebrovascular accident) 4/13/2016    Hyperlipidemia     Hypertension     Lung cancer ([address] ) 2005    Right, status post lobectomy    Pneumonia     [facility]     Sleep apnea     awaiting sleep study results    Stroke [facility])     Weakness due to cerebrovascular accident (CVA) [facility])      Past Surgical History:   Procedure Laterality Date    COLONOSCOPY      ESOPHAGOGASTRODUODENOSCOPY N/A 4/13/2016    Procedure: ESOPHAGOGASTRODUODENOSCOPY (EGD); Surgeon: Roselia Blanco MD;  [facility]; Service:     JOINT REPLACEMENT      knee replacement    LUNG LOBECTOMY      [place] ESOPHAGOGASTRODUODENOSCOPY TRANSORAL DIAGNOSTIC N/A 3/15/2017    Procedure: ESOPHAGOGASTRODUODENOSCOPY (EGD); Surgeon: Roselia Blanco MD;  [facility]; Service: Gastroenterology    TRIGEMINAL NERVE DECOMPRESSION       Social History   History   Alcohol Use No     History   Drug Use No     History   Smoking Status    Former Smoker    Packs/day: 1 00    Years: 22 00    Types: Cigarettes    Start date: 1955    Quit date: 1977   Smokeless Tobacco    Never Used         Family History: non-contributory    Meds/Allergies   all current active meds have been reviewed    Allergies   Allergen Reactions    Penicillins Rash       Objective   Vitals: Blood pressure 167/88, pulse 77, temperature 97 6 °F (36 4 °C), temperature source Oral, resp  rate 18, height 6' 5" (1 956 m), weight 95 3 kg (210 lb), SpO2 95 %  ,Body mass index is 24 9 kg/m²  Intake/Output Summary (Last 24 hours) at 02/02/18 1012  Last data filed at 02/02/18 0845   Gross per 24 hour   Intake             1000 ml   Output              450 ml   Net              550 ml     Invasive Devices     Peripheral Intravenous Line            Peripheral IV 02/02/18 Right Antecubital less than 1 day                Physical Exam     Elderly male  No respiratory distress  No JVD TM or LNE  Lungs diminished bilaterally but symmetrical  RRR no MRG  Abdomen soft ND NT  LE no edema   He has a brace in place on the LLE  Lab Results: I have personally reviewed pertinent lab results  No significant lab changes  Imaging Studies: I have personally reviewed pertinent reports     No significant imaging changes

## 2018-02-02 NOTE — ASSESSMENT & PLAN NOTE
Patient was seen by his pulmonologist Dr Parviz Heller yesterday and there was  no complain on hemoptysis according to outpatient pulmonology notes    ER asked to admit for pulmonary evaluation  Will monitor hemoglobin hematocrit  Given history of lung cancer patient admitted on an observation basis for pulmonary consult

## 2018-02-02 NOTE — ED NOTES
Per [employer] with case management, pt to be discharged home   Will set up transport via wheelchair Prabha Woo RN  02/02/18 [id number]

## 2018-02-02 NOTE — ED PROVIDER NOTES
History  Chief Complaint   Patient presents with    Cough     Patient reports a cough beginning yesterday  Patient went to PCP and was prescribed tessalon perles without relief     65year old male presents for evaluation of cough productive of grey sputum with small flecks of blood this evening as well as worsening exertional dyspnea  Cough awoke patient from sleep at around midnight tonight  He states he was given a 3rd pill for his cough (tessalon?) with no improvement  Patient states that he has had a chronic cough over the past month  Exertional dyspnea for the past year which has been worsening  Patient denies fevers, chills, nausea, vomiting, chest pain, abdominal pain or back pain  He currently resides at [facility] and has been going to physical therapy regularly  According to records, patient has history of aspiration and is poorly compliant with dysphagia diet  Dr Peyton Lundy is patient's pulmonologist  Last seen yesterday for this cough  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  History provided by:  Patient  Cough   Cough characteristics:  Productive  Sputum characteristics:  [place]ur[employer] and bloody  Severity:  Severe  Onset quality:  Gradual  Duration: 1 month  Timing:  Constant  Progression:  Worsening  Chronicity:  Chronic  Smoker: no (former)    Relieved by:  Nothing  Worsened by:  Nothing  Ineffective treatments: tessalon  Associated symptoms: shortness of breath    Associated symptoms: no chest pain, no diaphoresis, no fever, no headaches, no myalgias, no rhinorrhea and no sore throat    Shortness of breath:     Severity:  Moderate    Onset quality:  Unable to specify    Timing:  Constant (exertional)    Progression:  Worsening      Prior to Admission Medications   Prescriptions Last Dose Informant Patient Reported? Taking?    ASPIRIN 81 PO 2/1/2018 at 9am  Yes Yes   Sig: Take 1 tablet by mouth every other day     Fesoterodine Fumarate ER (TOVIAZ) 8 MG TB24   Yes No Sig: Take 1 tablet by mouth every evening     LORazepam (ATIVAN) 0 5 mg tablet 2/1/2018 at 9am  No Yes   Sig: Take 0 5 tablets by mouth daily   LORazepam (ATIVAN) 0 5 mg tablet   Yes No   Sig: Take 1 tablet by mouth daily   PARoxetine (PAXIL) 20 mg tablet 2/1/2018 at 9am  Yes Yes   Sig: Take 1 tablet by mouth daily   TOVIAZ 4 MG TB24 2/1/2018 at 5pm  Yes Yes   Sig: TK 1 T PO QD   acetaminophen (TYLENOL) 325 mg tablet   No Yes   Sig: Take 2 tablets by mouth every 6 (six) hours as needed for fever   albuterol (2 5 mg/3 mL) 0 083 % nebulizer solution 2/1/2018 at 9pm  Yes Yes   Sig: Inhale 4 (four) times a day     albuterol (PROVENTIL HFA) 90 mcg/act inhaler   Yes No   Sig: Inhale   albuterol (PROVENTIL HFA,VENTOLIN HFA) 90 mcg/act inhaler   No No   Sig: Inhale 2 puffs 4 (four) times a day   amLODIPine (NORVASC) 10 mg tablet   No No   Sig: Take 1 tablet by mouth daily   amLODIPine (NORVASC) 10 mg tablet 2/1/2018 at 9am  Yes Yes   Sig: Take 1 tablet by mouth daily   atorvastatin (LIPITOR) 10 mg tablet 2/1/2018 at 9am  No Yes   Sig: Take 1 tablet by mouth daily   atorvastatin (LIPITOR) 10 mg tablet   Yes No   Sig: Take 1 tablet by mouth   benzonatate (TESSALON PERLES) 100 mg capsule 2/1/2018 at 5pm  No Yes   Sig: Take 1 capsule by mouth 3 (three) times a day as needed for cough   Patient taking differently: Take 100 mg by mouth 2 (two) times a day     bimatoprost (LUMIGAN) 0 01 % ophthalmic drops 2/1/2018 at 9pm  No Yes   Sig: Administer 1 drop to both eyes daily at bedtime   bimatoprost (LUMIGAN) 0 01 % ophthalmic drops   Yes No   Sig: Apply to eye   carvedilol (COREG) 12 5 mg tablet 2/1/2018 at 9am  No Yes   Sig: Take 1 tablet by mouth 2 (two) times a day with meals   clopidogrel (PLAVIX) 75 mg tablet   No No   Sig: Take 1 tablet by mouth daily   clopidogrel (PLAVIX) 75 mg tablet 2/1/2018 at 9am  Yes Yes   Sig: Take 1 tablet by mouth daily   fluorouracil (EFUDEX) 5 % cream   Yes No   Sig: APPLY TO ENTIRE LESIONS Q 12 H FOR 28 DAYS FROM 2/3/18 TO 3/2/18   fluticasone (FLONASE) 50 mcg/act nasal spray   Yes No   Si-2 sprays into each nostril daily   fluticasone-salmeterol (ADVAIR DISKUS) 250-50 mcg/dose inhaler 2018 at 5pm  Yes Yes   Sig: Inhale 2 (two) times a day     fluticasone-salmeterol (ADVAIR) 250-50 mcg/dose inhaler 2018 at 9 amtime  Yes Yes   Sig: Inhale 1 puff daily     levocetirizine (XYZAL) 5 MG tablet   No No   Sig: Take 1 tablet by mouth every evening   levocetirizine (XYZAL) 5 MG tablet 2018 at 5pm  Yes Yes   Sig: Take 1 tablet by mouth daily   meclizine (ANTIVERT) 25 mg tablet   No No   Sig: Take 1 tablet by mouth 3 (three) times a day as needed for dizziness   mupirocin (BACTROBAN) 2 % ointment   Yes Yes   Sig: ELA AA TID FOR 7 DAYS FROM 18 THROUGH 18   omeprazole (PriLOSEC) 40 MG capsule 2018 at 830am  Yes Yes   Sig: Take 1 capsule by mouth daily   psyllium (METAMUCIL) 0 52 g capsule 2018 at 9pm  Yes Yes   Sig: Take 0 52 g by mouth daily   spironolactone (ALDACTONE) 25 mg tablet   No No   Sig: Take 1 tablet by mouth daily   spironolactone (ALDACTONE) 25 mg tablet 2018 at 9am  Yes Yes   Sig: Take 1 tablet by mouth daily   tamsulosin (FLOMAX) 0 4 mg   No No   Sig: Take 1 capsule by mouth daily with dinner   tamsulosin (FLOMAX) 0 4 mg 2018 at 5pm  Yes Yes   Sig: Take 1 capsule by mouth daily   tiotropium (SPIRIVA HANDIHALER) 18 mcg inhalation capsule 2018 at 9am  Yes Yes   Sig: Place into inhaler and inhale daily      Facility-Administered Medications: None       Past Medical History:   Diagnosis Date    [facility] 2017    Aspiration into respiratory tract     Chest pain 2017    COPD (chronic obstructive pulmonary disease) ([facility] )     Depression     Elevated troponin 2017    GERD (gastroesophageal reflux disease)     History of CVA (cerebrovascular accident) 2016    Hyperlipidemia     Hypertension     Lung cancer ([facility] )  Right, status post lobectomy    Pneumonia     [facility]     Sleep apnea     awaiting sleep study results    Stroke ([facility] )     Weakness due to cerebrovascular accident ([facility])        Past Surgical History:   Procedure Laterality Date    COLONOSCOPY      ESOPHAGOGASTRODUODENOSCOPY N/A 4/13/2016    Procedure: ESOPHAGOGASTRODUODENOSCOPY (EGD); Surgeon: Kenn Simental MD;  [facility]; Service:     JOINT REPLACEMENT      knee replacement    LUNG LOBECTOMY      TN ESOPHAGOGASTRODUODENOSCOPY TRANSORAL DIAGNOSTIC N/A 3/15/2017    Procedure: ESOPHAGOGASTRODUODENOSCOPY (EGD); Surgeon: Kenn Simental MD;  [facility]; Service: Gastroenterology    TRIGEMINAL NERVE DECOMPRESSION         Family History   Problem Relation Age of Onset    Family history unknown: Yes     I have reviewed and agree with the history as documented  Social History   Substance Use Topics    Smoking status: Former Smoker     Packs/day: 1 00     Years: 22 00     Types: Cigarettes     Start date: 1955     Quit date: 1977    Smokeless tobacco: Never Used    Alcohol use No        Review of Systems   Constitutional: Negative for appetite change, diaphoresis, fatigue and fever  HENT: Negative for congestion, rhinorrhea and sore throat  Respiratory: Positive for cough and shortness of breath  Negative for chest tightness  Cardiovascular: Positive for leg swelling  Negative for chest pain and palpitations  Gastrointestinal: Negative for abdominal pain, constipation, diarrhea, nausea and vomiting  Genitourinary: Negative for difficulty urinating and dysuria  Musculoskeletal: Negative for myalgias, neck pain and neck stiffness  Skin: Negative for pallor  Neurological: Negative for syncope and headaches  All other systems reviewed and are negative        Physical Exam  ED Triage Vitals [02/02/18 0135]   Temperature Pulse Respirations Blood Pressure SpO2   97 6 °F (36 4 °C) 67 20 141/67 95 % Temp Source Heart Rate Source Patient Position - Orthostatic VS BP Location FiO2 (%)   Oral Monitor Lying Right arm --      Pain Score       No Pain           Orthostatic Vital Signs  Vitals:    02/02/18 0415 02/02/18 0545 02/02/18 0645 02/02/18 0829   BP: 122/67 146/82 156/79 167/88   Pulse: 74 64 64 77   Patient Position - Orthostatic VS: Sitting Sitting Sitting        Physical Exam   Constitutional: He appears well-developed and well-nourished  Non-toxic appearance  No distress  HENT:   Head: Normocephalic and atraumatic  Eyes: EOM are normal  Pupils are equal, round, and reactive to light  Neck: Normal range of motion  No tracheal deviation present  No thyromegaly present  Cardiovascular: Normal rate, regular rhythm, normal heart sounds and intact distal pulses  Pulmonary/Chest: Effort normal  He has decreased breath sounds in the right lower field  He has rales in the left lower field  Abdominal: Soft  Bowel sounds are normal  He exhibits no distension  There is no tenderness  Musculoskeletal: He exhibits edema (1+ left lower extremity, trace right lower extremity)  Lymphadenopathy:     He has no cervical adenopathy  Skin: Skin is warm and dry  He is not diaphoretic  Nursing note and vitals reviewed        ED Medications  Medications   albuterol inhalation solution 2 5 mg (2 5 mg Nebulization Given 2/2/18 0841)   albuterol (PROVENTIL HFA,VENTOLIN HFA) inhaler 1 puff (not administered)   amLODIPine (NORVASC) tablet 10 mg (10 mg Oral Given 2/2/18 0830)   aspirin chewable tablet 81 mg (81 mg Oral Given 2/2/18 0840)   atorvastatin (LIPITOR) tablet 10 mg (not administered)   benzonatate (TESSALON PERLES) capsule 100 mg (100 mg Oral Given 2/2/18 0830)   bimatoprost (LUMIGAN) 0 01 % ophthalmic solution 1 drop (not administered)   carvedilol (COREG) tablet 12 5 mg (12 5 mg Oral Given 2/2/18 0829)   clopidogrel (PLAVIX) tablet 75 mg (75 mg Oral Given 2/2/18 0840)   oxybutynin (DITROPAN-XL) 24 hr tablet 5 mg (5 mg Oral Given 2/2/18 0830)   loratadine (CLARITIN) tablet 5 mg (5 mg Oral Given 2/2/18 0830)   LORazepam (ATIVAN) tablet 0 25 mg (0 25 mg Oral Given 2/2/18 0840)   meclizine (ANTIVERT) tablet 25 mg (not administered)   mupirocin (BACTROBAN) 2 % ointment ( Topical Given 2/2/18 0853)   pantoprazole (PROTONIX) EC tablet 40 mg (40 mg Oral Given 2/2/18 0609)   PARoxetine (PAXIL) tablet 20 mg (20 mg Oral Given 2/2/18 0830)   spironolactone (ALDACTONE) tablet 25 mg (25 mg Oral Given 2/2/18 0830)   tamsulosin (FLOMAX) capsule 0 4 mg (not administered)   tiotropium (SPIRIVA) capsule for inhaler 18 mcg (18 mcg Inhalation Given 2/2/18 0854)   docusate sodium (COLACE) capsule 100 mg (100 mg Oral Given 2/2/18 0840)   senna (SENOKOT) tablet 8 6 mg (8 6 mg Oral Given 2/2/18 0841)   polyethylene glycol (MIRALAX) packet 17 g (17 g Oral Not Given 2/2/18 0841)   enoxaparin (LOVENOX) subcutaneous injection 40 mg (40 mg Subcutaneous Given 2/2/18 0841)   acetaminophen (TYLENOL) tablet 650 mg (not administered)   fluticasone (FLONASE) 50 mcg/act nasal spray 1 spray (1 spray Each Nare Given 2/2/18 0841)   sodium chloride 0 9 % infusion (75 mL/hr Intravenous New Bag 2/2/18 0721)   iohexol (OMNIPAQUE) 350 MG/ML injection (MULTI-DOSE) 85 mL (85 mL Intravenous Given 2/2/18 0430)   sodium chloride 0 9 % bolus 500 mL (0 mL Intravenous Stopped 2/2/18 0645)   sodium chloride 0 9 % bolus 500 mL (0 mL Intravenous Stopped 2/2/18 0845)       Diagnostic Studies  Results Reviewed     Procedure Component Value Units Date/Time    Basic metabolic panel [[id number]]  (Abnormal) Collected:  02/02/18 0633    Lab Status:  Final result Specimen:  Blood from Arm, Right Updated:  02/02/18 0653     Sodium 141 mmol/L      Potassium 3 8 mmol/L      Chloride 108 mmol/L      CO2 25 mmol/L      Anion Gap 8 mmol/L      BUN 26 (H) mg/dL      Creatinine 1 24 mg/dL      Glucose 90 mg/dL      Calcium 8 3 mg/dL      eGFR 55 ml/min/1 73sq m     Narrative: National Kidney Disease Education Program recommendations are as follows:  GFR calculation is accurate only with a steady state creatinine  Chronic Kidney disease less than 60 ml/min/1 73 sq  meters  Kidney failure less than 15 ml/min/1 73 sq  meters  MRSA culture [[id number]] Collected:  02/02/18 0641    Lab Status: In process Specimen:  Nares from Nose Updated:  02/02/18 0649    CBC (With Platelets) [[id number]]  (Abnormal) Collected:  02/02/18 0092    Lab Status:  Final result Specimen:  Blood from Arm, Right Updated:  02/02/18 0640     WBC 6 32 Thousand/uL      RBC 4 41 Million/uL      Hemoglobin 13 0 g/dL      Hematocrit 39 4 %      MCV 89 fL      MCH 29 5 pg      MCHC 33 0 g/dL      RDW 14 6 %      Platelets 194 (L) Thousands/uL      MPV 10 4 fL     Troponin I [[id number]]  (Normal) Collected:  02/02/18 0150    Lab Status:  Final result Specimen:  Blood from Arm, Right Updated:  02/02/18 0216     Troponin I <0 02 ng/mL     Narrative:         [employer] Chemistry analyzer 99% cutoff is > 0 04 ng/mL in network labs    o cTnI 99% cutoff is useful only when applied to patients in the clinical setting of myocardial ischemia  o cTnI 99% cutoff should be interpreted in the context of clinical history, ECG findings and possibly cardiac imaging to establish correct diagnosis  o cTnI 99% cutoff may be suggestive but clearly not indicative of a coronary event without the clinical setting of myocardial ischemia      Comprehensive metabolic panel [[id number]]  (Abnormal) Collected:  02/02/18 0150    Lab Status:  Final result Specimen:  Blood from Arm, Right Updated:  02/02/18 0214     Sodium 141 mmol/L      Potassium 4 0 mmol/L      Chloride 108 mmol/L      CO2 29 mmol/L      Anion Gap 4 mmol/L      BUN 26 (H) mg/dL      Creatinine 1 38 (H) mg/dL      Glucose 113 mg/dL      Calcium 9 0 mg/dL      AST 15 U/L      ALT 25 U/L      Alkaline Phosphatase 116 U/L      Total Protein 7 1 g/dL      Albumin 3 7 g/dL      Total Bilirubin 0 36 mg/dL      eGFR 49 ml/min/1 73sq m     Narrative:         National Kidney Disease Education Program recommendations are as follows:  GFR calculation is accurate only with a steady state creatinine  Chronic Kidney disease less than 60 ml/min/1 73 sq  meters  Kidney failure less than 15 ml/min/1 73 sq  meters  Protime-INR [[id number]]  (Normal) Collected:  02/02/18 0151    Lab Status:  Final result Specimen:  Blood from Arm, Right Updated:  02/02/18 0208     Protime 13 7 seconds      INR 1 05    APTT [[id number]]  (Normal) Collected:  02/02/18 0151    Lab Status:  Final result Specimen:  Blood from Arm, Right Updated:  02/02/18 0208     PTT 32 seconds     Narrative: Therapeutic Heparin Range = 60-90 seconds    CBC and differential [[id number]]  (Abnormal) Collected:  02/02/18 0150    Lab Status:  Final result Specimen:  Blood from Arm, Right Updated:  02/02/18 0159     WBC 6 11 Thousand/uL      RBC 4 63 Million/uL      Hemoglobin 13 7 g/dL      Hematocrit 41 6 %      MCV 90 fL      MCH 29 6 pg      MCHC 32 9 g/dL      RDW 14 6 %      MPV 10 4 fL      Platelets 548 (L) Thousands/uL      nRBC 0 /100 WBCs      Neutrophils Relative 63 %      Lymphocytes Relative 22 %      Monocytes Relative 10 %      Eosinophils Relative 4 %      Basophils Relative 1 %      Neutrophils Absolute 3 85 Thousands/µL      Lymphocytes Absolute 1 34 Thousands/µL      Monocytes Absolute 0 63 Thousand/µL      Eosinophils Absolute 0 23 Thousand/µL      Basophils Absolute 0 04 Thousands/µL                  XR chest 2 views   ED Interpretation by Kateryna Lawton MD (02/02 0215)   No acute pulmonary pathology      Final Result by Savana Zuniga MD (02/02 9482)      No active pulmonary disease  Workstation performed: [id number]         CT chest with contrast   ED Interpretation by Kateryna Lawton MD (07/90 8824)   Reviewed interpretation by [facility]  Per their impression: Prior right thoracotomy   There is scarring and volume loss in the right lung  No focal consolidation  There may be gallstones      Final Result by Waldo Kilpatrick MD (78/06 7838)      No acute pathology  Findings are consistent with the preliminary report from [facility] which was provided shortly after completion of the exam                       Workstation performed: [id number]               Procedures  ECG 12 Lead Documentation  Date/Time: 2/2/2018 2:13 AM  Performed by: Sadia Martins by: Mere Hodge     Indications / Diagnosis:  Dyspnea  ECG reviewed by me, the ED Provider: yes    Patient location:  ED  Previous ECG:     Previous ECG:  Compared to current    Comparison ECG info:  8/23/2017 normal sinus rhythm with infrequent PVCs    Similarity:  No change  Interpretation:     Interpretation: abnormal    Rate:     ECG rate:  64    ECG rate assessment: normal    Rhythm:     Rhythm: sinus rhythm    Ectopy:     Ectopy: none    QRS:     QRS axis:  Normal    QRS intervals:  Normal  Conduction:     Conduction: abnormal      Abnormal conduction: 1st degree    ST segments:     ST segments:  Normal  T waves:     T waves: normal            Phone Consults  ED Phone Contact    ED Course  ED Course            Identification of Seniors at [address] [place] [address] Most Recent Value   (ISAR) Identification of Seniors at Risk   Before the illness or injury that brought you to the Emergency, did you need someone to help you on a regular basis? 1 Filed at: 02/02/2018 0137   In the last 24 hours, have you needed more help than usual?  0 Filed at: 02/02/2018 4538   Have you been hospitalized for one or more nights during the past 6 months? 1 Filed at: 02/02/2018 1049   In general, do you see well?  0 Filed at: 02/02/2018 6342   In general, do you have serious problems with your memory? 0 Filed at: 02/02/2018 0137   Do you take more than three different medications every day?   1 Filed at: 02/02/2018 6430   ISAR Score  3 Filed at: 02/02/2018 0316                          [facility]  Number of Diagnoses or Management Options  Chronic cough: established and worsening  Exertional dyspnea: established and worsening  Hemoptysis: new and requires workup  Diagnosis management comments: 65year old male presents for evaluation of worsening cough and exertional dyspnea with onset of small amount of hemoptysis this evening  Patient has history of right lobectomy from lung cancer  CXR unchanged  CT chest does not show pathology which would explain patient's hemoptysis  Hemoglobin stable  Patient admitted for further evaluation and management  Amount and/or Complexity of Data Reviewed  Clinical lab tests: ordered and reviewed  Tests in the radiology section of CPT®: ordered and reviewed  Independent visualization of images, tracings, or specimens: yes    Patient Progress  Patient progress: stable    CritCare Time    Disposition  Final diagnoses:   Hemoptysis   Chronic cough   Exertional dyspnea     Time reflects when diagnosis was documented in both MDM as applicable and the Disposition within this note     Time User Action Codes Description Comment    2/2/2018  5:36 AM Calvin Liprobinson Add [R04 2] Hemoptysis     2/2/2018  5:36 AM Gregory Quick Add [R05] Chronic cough     2/2/2018  5:36 AM Gregory Pena Add [R06 09] Exertional dyspnea       ED Disposition     ED Disposition Condition Comment    Admit  Case was discussed with NAN and the patient's admission status was agreed to be Admission Status: observation status to the service of Dr Mehreen Lowery   Follow-up Information    None       Patient's Medications   Discharge Prescriptions    No medications on file     No discharge procedures on file  ED Provider  Attending physically available and evaluated Wil Connelly I managed the patient along with the ED Attending      Electronically Signed by         Jacqui Schuler MD  02/02/18 0420

## 2018-02-02 NOTE — ASSESSMENT & PLAN NOTE
Secondary to microaspiration and tracheomalacia according to Pulmonary notes  Extra dose of Tessalon  recommended by pulmonary specialist yesterday  Continue to monitor

## 2018-02-02 NOTE — ED NOTES
Dr Resendez Friends at the bedside for patient evaluation at this time     Félix Ruiz RN  02/02/18 9081

## 2018-02-02 NOTE — DISCHARGE INSTRUCTIONS
Please call to schedule follow up appointment within the next week with your primary care doctor     Acute Kidney Injury   WHAT YOU NEED TO KNOW:   Acute kidney injury (RONAL) is also called acute kidney failure, or acute renal failure  RONAL happens when your kidneys suddenly stop working correctly  Normally, the kidneys remove fluid, chemicals, and waste from your blood  These wastes are turned into urine by your kidneys  RONAL usually happens over hours or days  When you have RONAL, your kidneys do not remove the waste, chemicals, or extra fluid from your body  A normal amount of urine is not produced  RONAL is usually temporary, it can take days to months to recover  RONAL can also become a chronic kidney condition  DISCHARGE INSTRUCTIONS:   Call 911 if:   · You have sudden chest pain or trouble breathing  Seek care immediately if:   · Your symptoms get worse  Contact your healthcare provider if:   · Your symptoms return  · Your blood sugar or blood pressure level is not within the range your healthcare provider recommends  · You have questions or concerns about your condition or care  Nutrition:  Your healthcare provider may tell you to eat food low in sodium (salt), potassium, phosphorus, or protein  A dietitian can help you plan your meals  Drink liquids as directed: Your healthcare provider may recommend that you drink a certain amount of liquids  This will help your kidneys work better and decrease your risk for dehydration  Ask how much liquid to drink each day and which liquids are best for you  Prevent acute kidney injury:   · Manage other health conditions  such as diabetes, high blood pressure, or heart disease  These conditions increase your risk for acute kidney injury  Take your medicines for these conditions as directed  Also, monitor your blood sugar and blood pressure levels as directed   Contact your healthcare provider if your levels are not in the range he or she says it should be     · Talk to your healthcare provider before you take over-the-counter-medicine  NSAIDs, stomach medicine, or laxatives may harm your kidneys and increase your risk for acute kidney injury  If it is okay to take the medicine, follow the directions on the package  Do not take more than directed  · Tell healthcare providers you have had acute kidney injury  before you get contrast liquid for an x-ray or CT scan  Your healthcare provider may give you medicine to prevent kidney problems caused by the liquid  Follow up with your healthcare provider as directed: You will need to return for more tests to make sure your kidneys are working properly  You may also be referred to a kidney specialist  Write down your questions so you remember to ask them during your visits  © 2017 [address]  Information is for End User's use only and may not be sold, redistributed or otherwise used for commercial purposes  All illustrations and images included in CareNotes® are the copyrighted property of A D A M , Inc  or Rolo Garcia  The above information is an  only  It is not intended as medical advice for individual conditions or treatments  Talk to your doctor, nurse or pharmacist before following any medical regimen to see if it is safe and effective for you  Acute Hemoptysis   WHAT YOU SHOULD KNOW:   Acute hemoptysis is sudden coughing or spitting up of blood  This occurs when blood vessels in your airway or lungs weaken or break, and begin to bleed  You may bleed in small or large amounts that appear in your sputum (spit)  Sometimes, bleeding from other areas, such as the nose, mouth, or throat, cause people to cough or spit up blood  AFTER YOU LEAVE:   Medicines:  · Antibiotics: This medicine may be given to fight or prevent an infection caused by bacteria  Always take your antibiotics exactly as ordered by your healthcare provider   Do not stop taking your medicine unless directed by your healthcare provider  Never save antibiotics or take leftover antibiotics that were given to you for another illness  · Antitussives: These medicines help control or stop your cough  · Take your medicine as directed  Call your healthcare provider if you think your medicine is not helping or if you have side effects  Tell him if you are allergic to any medicine  Keep a list of the medicines, vitamins, and herbs you take  Include the amounts, and when and why you take them  Bring the list or the pill bottles to follow-up visits  Carry your medicine list with you in case of an emergency  Follow up with your healthcare provider in 2 days or as directed: You may need frequent visits to monitor your condition and prevent further blood loss  Write down your questions so you remember to ask them during your visits  Do not take herbal medicines:  Herbal supplements increase your risk of bleeding  Examples are garlic, gingko, and ginseng  Do not smoke, and do not go to smoky areas:  Smoke may worsen your hemoptysis  If you smoke, it is never too late to quit  You are more likely to have heart disease, lung disease, cancer, and other health problems if you smoke  Stop smoking to improve your health and the health of those around you  If you smoke, ask for information about how to stop  Contact your healthcare provider if:   · You have new or increased shortness of breath  · You have a fever  · You lose weight without trying  · You feel more weak and tired than usual      · You have a cough that does not improve or gets worse  · You have questions or concerns about your condition or care  Seek care immediately or call 911 if:   · You have new or worse chest pain or shortness of breath  · Your bleeding gets worse or you cough a large amount of blood (more than 1 tablespoon)  · You cannot stop vomiting      · You are so dizzy that you think you may fall or you faint     · You have pain or swelling in your legs  · Your legs and arms feel cold or look pale  © 2014 [address] is for End User's use only and may not be sold, redistributed or otherwise used for commercial purposes  All illustrations and images included in CareNotes® are the copyrighted property of A D A M , Inc  or [address]  The above information is an  only  It is not intended as medical advice for individual conditions or treatments  Talk to your doctor, nurse or pharmacist before following any medical regimen to see if it is safe and effective for you

## 2018-02-02 NOTE — DISCHARGE SUMMARY
Discharge Summary - [facility] Internal Medicine, [facility]   Pt had no medication changes, however hold diuretic spironolactone till Sunday to start dt sandro, now improved   Has been encouraged to adequately hydrate, as his creatinine was mildly elevated   Seen by pulmonary who do not feel hemoptysis concerning at this time   Pt should take his antitussives   Left message with daughter Marcus Lee     Patient Information: Adebayo Sage  65 y o  male MRN: [id number]  Unit/Bed#: ED 09 Encounter: [id number]    Discharging Physician / Practitioner: DIANA Baugh  PCP: Kirsten Locke DO  Admission Date: 2/2/2018  Discharge Date: 02/02/18    Disposition:     Home    Reason for Admission: coughing up blood       Discharge Diagnoses:     Principal Problem:    Hemoptysis  Active Problems:    Lung cancer ([facility] [address] )    Dysphagia    History of cerebrovascular accident (CVA) with residual deficit    Chronic cough    Tracheomalacia    COPD (chronic obstructive pulmonary disease) ([facility] )    [facility]  Resolved Problems:    * No resolved hospital problems  *      Consultations During Hospital Stay:  · Pulmonary - Dr Mariann Kohli    Procedures Performed:     · Ct chest with contrast: no acute pathology   · Chest xray: No active disease    Significant Findings / Test Results:     · See above     Incidental Findings:   · None     Test Results Pending at Discharge (will require follow up): · None      Outpatient Tests Requested:  · Follow up with the pcp     Complications:  None     Hospital Course:     Adebayo Sage  is a 65 y o  male patient who originally presented to the hospital on 2/2/2018 due to flecks of blood in the sputum  Patient has history of CVA with left-sided residual weakness    Also has history of lung cancer, status post right lower lobe lobectomy, questionable COPD tracheomalacia chronic cough dysphagia who last night presented to the emergency room with flecks of blood in the sputum which started the night prior  Patient had taken an extra dose of Tessalon recommended by his pulmonologist Dr Yolanda Paige, whom he just sort day prior to admission  Patient denied any other symptoms  He was hemodynamically stable oxygen saturation in the 90s on room air  CT chest imaging had no acute pathology  Patient was admitted for observation for pulmonary consultation  Patient was seen this morning by Pulmonary, who have seen and examined the patient  They feel that patient only has trace him up this is  With no acute pathology found on imaging  Patient should continue on with his test salon antitussives, bronchodilators Spiriva and Advair with aspiration precautions  Patient is medically stable for discharge to home  Patient should call to schedule follow-up with his PCP within the next week      Condition at Discharge: good     Discharge Day Visit / Exam:     Subjective: pt did not know he was a little dehydrated discussed adequate hydration  No cough noted while in room pt   Denies any sob, nausea diarrhea   Vitals: Blood Pressure: 167/88 (02/02/18 0829)  Pulse: 77 (02/02/18 0829)  Temperature: 97 6 °F (36 4 °C) (02/02/18 0135)  Temp Source: Oral (02/02/18 0135)  Respirations: 18 (02/02/18 0829)  Height: 6' 5" (195 6 cm) (02/02/18 0415)  Weight - Scale: 95 3 kg (210 lb) (02/02/18 0135)  SpO2: 95 % (02/02/18 0829)  Exam:   Physical Exam   Constitutional: He is oriented to person, place, and time  He appears well-developed  No distress  HENT:   Head: Normocephalic and atraumatic  Mouth/Throat: No oropharyngeal exudate  Eyes: Conjunctivae are normal  Right eye exhibits no discharge  Left eye exhibits no discharge  No scleral icterus  Neck: JVD present  No tracheal deviation present  No thyromegaly present  Cardiovascular: Normal rate  Exam reveals no friction rub  No murmur heard  Pulmonary/Chest: No stridor  No respiratory distress   He has no wheezes  He has no rales  He exhibits no tenderness  Diminished bl bases    Musculoskeletal: He exhibits no edema, tenderness or deformity  Neurological: He is oriented to person, place, and time  Skin: No rash noted  He is not diaphoretic  No erythema  No pallor  Psychiatric:   Tired        Discussion with Family: none at bedside     Discharge instructions/Information to patient and family:   See after visit summary for information provided to patient and family  Provisions for Follow-Up Care:  See after visit summary for information related to follow-up care and any pertinent home health orders  Planned Readmission: no plan      Discharge Statement:  I spent 45 minutes discharging the patient  This time was spent on the day of discharge  I had direct contact with the patient on the day of discharge  Greater than 50% of the total time was spent examining patient, answering all patient questions, arranging and discussing plan of care with patient as well as directly providing post-discharge instructions  Additional time then spent on discharge activities  Discharge Medications:  See after visit summary for reconciled discharge medications provided to patient and family        ** Please Note: This note has been constructed using a voice recognition system **

## 2018-02-02 NOTE — CASE MANAGEMENT
Initial Clinical Review    Admission: Date/Time/Statement: 2/1/18 AT 0537 OBSERVATION    Orders Placed This Encounter   Procedures    Place in Observation (expected length of stay for this patient is less than two midnights)     Standing Status:   Standing     Number of Occurrences:   1     Order Specific Question:   Admitting Physician     Answer:   Yuliet Varela     Order Specific Question:   Level of Care     Answer:   Med Surg [16]     ED: Date/Time/Mode of Arrival:   ED Arrival Information     Expected Arrival Acuity Means of Arrival Escorted By Service Admission Type    2/2/2018 01:30 2/2/2018 01:33 Urgent Ambulance [facility] Urgent    Arrival Complaint    Cough        Chief Complaint:   Chief Complaint   Patient presents with    Cough     Patient reports a cough beginning yesterday  Patient went to PCP and was prescribed tessalon perles without relief     Chief Complaint:   Coughing up blood     History of Present Illness:   Michelle Khan  is a 65 y o  male with history of CVA with left-sided residual weakness , lung CA , s/p RLL lobectomy, questionable COPD tracheomalacia chronic cough dysphagia who presented to the emergency room with flecks of blood in the sputum started last night after patient took extra dose of Tessalon recommended by pulmonologist for his chronic cough  He denies any other complaints  Remains hemodynamically stable post, O2 sat in mid 90s on room air  hemoglobin stable, creatinine slightly elevated    CT chest impression no acute pathology  ER asked to admit for pulmonary evaluation         Review of Systems:  Review of Systems   Respiratory:   Hemoptysis       ED Vital Signs:   ED Triage Vitals [02/02/18 0135]   Temperature Pulse Respirations Blood Pressure SpO2   97 6 °F (36 4 °C) 67 20 141/67 95 %      Temp Source Heart Rate Source Patient Position - Orthostatic VS BP Location FiO2 (%)   Oral Monitor Lying Right arm --      Pain Score       No Pain        Wt Readings from Last 1 Encounters:   02/02/18 95 3 kg (210 lb)      02/01 0701  02/02 0700 02/02 0701  02/02 0956  Most Recent    Temperature (°F) 97 6    97 6 (36 4)    Pulse 6474 77  77    Respirations 1722 18  18    Blood Pressure 122/67156/79 167/88  167/88    SpO2 (%) 9596 95  95          LABS/Diagnostic Test Results:   Results from last 7 days  Lab Units 02/02/18  0633 02/02/18  0150   WBC Thousand/uL 6 32 6 11   HEMOGLOBIN g/dL 13 0 13 7   HEMATOCRIT % 39 4 41 6   PLATELETS Thousands/uL 117* 120*   NEUTROS PCT %  --  63   LYMPHS PCT %  --  22   MONOS PCT %  --  10   EOS PCT %  --  4       Results from last 7 days  Lab Units 02/02/18  0633 02/02/18  0150   SODIUM mmol/L 141 141   POTASSIUM mmol/L 3 8 4 0   CHLORIDE mmol/L 108 108   CO2 mmol/L 25 29   BUN mg/dL 26* 26*   CREATININE mg/dL 1 24 1 38*   CALCIUM mg/dL 8 3 9 0   TOTAL PROTEIN g/dL  --  7 1   BILIRUBIN TOTAL mg/dL  --  0 36   ALK PHOS U/L  --  116   ALT U/L  --  25   AST U/L  --  15   GLUCOSE RANDOM mg/dL 90 113       Results from last 7 days  Lab Units 02/02/18  0151   INR   1 05     TROPONIN NEG X 1    CT CHEST -  Prior right thoracotomy  There is scarring and volume loss in the right lung  No focal consolidation   There may be gallstones      ED Treatment:   Medication Administration from 02/02/2018 0130 to 02/02/2018 0999       Date/Time Order Dose Route Action Action by Comments     02/02/2018 0430 iohexol (OMNIPAQUE) 350 MG/ML injection (MULTI-DOSE) 85 mL 85 mL Intravenous Given Di Night      02/02/2018 0645 sodium chloride 0 9 % bolus 500 mL 0 mL Intravenous Stopped Carlos Mckinney RN      02/02/2018 6455 sodium chloride 0 9 % bolus 500 mL 500 mL Intravenous [address] Carlos Mckinney RN      02/02/2018 0841 albuterol inhalation solution 2 5 mg 2 5 mg Nebulization Given Pamela Berger RN      02/02/2018 0830 amLODIPine (NORVASC) tablet 10 mg 10 mg Oral Given Olga Gelineau, RN      02/02/2018 2964 aspirin chewable tablet 81 mg 81 mg Oral Given Esteban Noble RN      02/02/2018 0830 benzonatate (TESSALON PERLES) capsule 100 mg 100 mg Oral Given Esteban Noble RN      02/02/2018 0829 carvedilol (COREG) tablet 12 5 mg 12 5 mg Oral Given Esteban Noble RN      02/02/2018 0840 clopidogrel (PLAVIX) tablet 75 mg 75 mg Oral Given Esteban Noble RN      02/02/2018 0830 oxybutynin (DITROPAN-XL) 24 hr tablet 5 mg 5 mg Oral Given Esteban Noble RN      02/02/2018 0830 loratadine (CLARITIN) tablet 5 mg 5 mg Oral Given Esteban Noble RN      02/02/2018 0840 LORazepam (ATIVAN) tablet 0 25 mg 0 25 mg Oral Given Esteban Noble RN      02/02/2018 0853 mupirocin (BACTROBAN) 2 % ointment   Topical Given Esteban Noble RN Applied to scalp     02/02/2018 0609 pantoprazole (PROTONIX) EC tablet 40 mg 40 mg Oral Given Tatiana Vidal RN      02/02/2018 0830 PARoxetine (PAXIL) tablet 20 mg 20 mg Oral Given Esteban Noble RN      02/02/2018 0830 spironolactone (ALDACTONE) tablet 25 mg 25 mg Oral Given Esteban Noble RN      02/02/2018 0854 tiotropium (SPIRIVA) capsule for inhaler 18 mcg 18 mcg Inhalation Given Esteban Noble RN      02/02/2018 0840 docusate sodium (COLACE) capsule 100 mg 100 mg Oral Given Esteban Noble RN      02/02/2018 4370 senna (SENOKOT) tablet 8 6 mg 8 6 mg Oral Given Esteban Noble RN      02/02/2018 0841 polyethylene glycol (MIRALAX) packet 17 g 17 g Oral Not Given Esteban Noble RN      02/02/2018 0841 enoxaparin (LOVENOX) subcutaneous injection 40 mg 40 mg Subcutaneous Given Esteban Noble RN      02/02/2018 0841 fluticasone (FLONASE) 50 mcg/act nasal spray 1 spray 1 spray Each Nare Given Esteban Noble RN      02/02/2018 0845 sodium chloride 0 9 % bolus 500 mL 0 mL Intravenous Stopped Esteban Noble RN      02/02/2018 0645 sodium chloride 0 9 % bolus 500 mL 500 mL Intravenous [address] 37 Tatiana Vidal RN      02/02/2018 6230 sodium chloride 0 9 % infusion 75 mL/hr Intravenous New Bag Esteban Noble RN           Past Medical/Surgical History: Active Ambulatory Problems     Diagnosis Date Noted    Hypertension     [facility] [address] )     Dysphagia 04/13/2016    History of cerebrovascular accident (CVA) with residual deficit 04/13/2016    GERD (gastroesophageal reflux disease)     Left-sided weakness 04/11/2017    Chronic kidney disease, stage 3 04/12/2017    [facility]     Hyperlipidemia     Depression     Debility 08/04/2017    Chronic cough 02/01/2018    Tracheomalacia 10/10/2017    COPD (chronic obstructive pulmonary disease) ([facility] ) 02/01/2018     Resolved Ambulatory Problems     Diagnosis Date Noted    Dehydration 04/12/2016    Vomiting 04/12/2016    Acute renal failure (HCC) 04/12/2016    SOB (shortness of breath) 04/09/2017    Chest pain 05/31/2017    Elevated troponin 05/31/2017    Bronchitis 05/31/2017    Bronchospasm 06/09/2017     Past Medical History:   Diagnosis Date    RONAL (acute kidney injury) ([facility] ) 5/31/2017    Aspiration into respiratory tract     Chest pain 5/31/2017    COPD (chronic obstructive pulmonary disease) (HCC)     Depression     Elevated troponin 5/31/2017    GERD (gastroesophageal reflux disease)     History of CVA (cerebrovascular accident) 4/13/2016    Hyperlipidemia     Hypertension     Lung cancer ([facility] ) 2005    Pneumonia     Prostate cancer ([facility] )     Sleep apnea     Stroke ([facility] )     Weakness due to cerebrovascular accident (CVA) ([facility] )        Admitting Diagnosis: Cough [R05]    Age/Sex: 65 y o  male    Assessment/Plan:   Hemoptysis   Assessment & Plan     Patient was seen by his pulmonologist Dr Shanta Murphy yesterday and there was  no complain on hemoptysis according to outpatient pulmonology notes    ER asked to admit for pulmonary evaluation  Will monitor hemoglobin hematocrit  Given history of lung cancer patient admitted on an observation basis for pulmonary consult             Chronic cough   Assessment & Plan     Secondary to microaspiration and tracheomalacia according to Pulmonary notes  Extra dose of Tessalon  recommended by pulmonary specialist yesterday  Continue to monitor          COPD (chronic obstructive pulmonary disease) ([facility])   Assessment & Plan     Respiratory protocol ,continue Flonase ,nebs, Spiriva  Pulmonary evaluation          Dysphagia   Assessment & Plan     Patient noncompliant with dysphagia diet  Appropriate diet ordered  Aspiration precautions          Lung cancer [facility])   Assessment & Plan     Patient presented with 1 day of hemoptysis  Pulmonary evaluation          RONAL (acute kidney injury) ([address] )   Assessment & Plan     Likely secondary to dehydration provide IV fluids reassess in a m           Tracheomalacia   Assessment & Plan     Follow up with pulmonology  Aspiration precautions          History of cerebrovascular accident (CVA) with residual deficit   Assessment & Plan     Left-sided residual weakness fall precaution PT OT evaluation             VTE Prophylaxis: Enoxaparin (Lovenox)  / sequential compression device     Code Status: full  POLST: There is no POLST form on file for this patient (pre-hospital)      Anticipated Length of Stay:  Patient will be admitted on an Observation basis with an anticipated length of stay of  < 2 midnights       Justification for Hospital Stay:  Pulmonary evaluation         Admission Orders:  2/1/18 AT 0537 OBSERVATION  VS Q4HRS    FALL PRECAUTIONS  Up + OOB as Tolerated  SCD    Diet Cardiac; Cardiac TLC 2 3 GM NA; Cardiac Step 1, Consistent Carbohydrate Diet Level 1 (4 carb servings/60 grams CHO/meal), Dysphagia 2-Mechanical Soft; Nectar Thick Liquid       Continuous IV Infusions:   sodium chloride 75 mL/hr Last Rate: 75 mL/hr (02/02/18 0721)       Scheduled Meds:   Current Facility-Administered Medications:  acetaminophen 650 mg Oral Q6H PRN Akil Bassett MD    albuterol 1 puff Inhalation Q4H PRN Akil Bassett MD    albuterol 2 5 mg Nebulization 4x Daily Akil Bassett MD amLODIPine 10 mg Oral Daily Luci Quarnitza, MD    aspirin 81 mg Oral Every Other Day Luci Galo, MD    atorvastatin 10 mg Oral Daily With Lennice Burkitt, MD    benzonatate 100 mg Oral BID Luci Beatricery, MD    bimatoprost 1 drop Both Eyes HS Luci Quarry, MD    carvedilol 12 5 mg Oral BID With Meals Luci Galo, MD    clopidogrel 75 mg Oral Daily Luci Beatricery, MD    docusate sodium 100 mg Oral BID Luci Quarry, MD    enoxaparin 40 mg Subcutaneous Daily Luci Quarry, MD    fluticasone 1 spray Each Nare Daily Luci Quarry, MD    loratadine 5 mg Oral Daily Luci Quarry, MD    LORazepam 0 25 mg Oral Daily Luci Quarry, MD    meclizine 25 mg Oral TID PRN Luci Quarry, MD    mupirocin  Topical Daily Luci Quarry, MD    oxybutynin 5 mg Oral Daily Luci Quarry, MD    pantoprazole 40 mg Oral Early Morning Luci Quarry, MD    PARoxetine 20 mg Oral Daily Luci Quarry, MD    polyethylene glycol 17 g Oral Daily Luci Quarry, MD    senna 1 tablet Oral Daily Luci Quarry, MD    sodium chloride 75 mL/hr Intravenous Continuous Luci Galo, MD Last Rate: 75 mL/hr (02/02/18 0721)   spironolactone 25 mg Oral Daily Luci Quarry, MD    tamsulosin 0 4 mg Oral Daily With Lennice Burkitt, MD    tiotropium 18 mcg Inhalation Daily Luci MD Iraj        PRN Meds:     acetaminophen    albuterol    meclizine    REPEAT CBC IN AM    CONSULT PULM    PT EVAL    OT EVAL

## 2018-02-02 NOTE — ED ATTENDING ATTESTATION
I, Trudy Patel, DO, saw and evaluated the patient  I have discussed the patient with the resident/non-physician practitioner and agree with the resident's/non-physician practitioner's findings, Plan of Care, and MDM as documented in the resident's/non-physician practitioner's note, except where noted  All available labs and Radiology studies were reviewed  At this point I agree with the current assessment done in the Emergency Department  I have conducted an independent evaluation of this patient a history and physical is as follows:    Gray thick sputum with flecks of blood   Chronic cough worsening  No fever or chills  No back pain  Took tessalon Perls  RLL lobectomy  TAYLOR chronic but worse over past week    Per pulmonary h/o aspiration and noncompliance with diet  Lung cancer    CXR    Patient had episode of coughing today for which he had a thick gray sputum with specks of blood in it  Patient has not had hemoptysis in the past   He has history of lung cancer for which he follows with Dr Murtaza Ochoa  Patient states hemoptysis started after he took his [address]  Patient denies having any fevers or chills or recent URI type symptoms  He denies back pain or abdominal pain  Patient admits to chronic coughing as well as shortness of breath  He has a history of having right lower lobe lobectomy for lung cancer  Per Pulmonary documentation patient has history aspiration and noncompliance with dysphasia diet  Patient denies having coughing episodes after eating recently      GEN: NAD, awake and alert  HEENT: EOMI, PERRLA, MMM  CV: RRR,  no Murmur  Resp:  Poor inspiratory effort, decreased breath sounds bilateral bases  GI: soft,NT/ND  Neuro: non focal motor exam, CN symmetric, normal speech  Skin: warm, dry    Chest x-ray reviewed grossly unchanged from prior x-ray    Concern with hemoptysis is a new symptom for patient may be related to aspiration pneumonitis versus history lung cancer or lung injury with coughing  Plan to admit for observation  Continue with pulmonary hygiene and oxygen as needed        Critical Care Time  CritCare Time    Procedures

## 2018-02-02 NOTE — H&P
H&P- Louise Vásquez  1940, 65 y o  male MRN: [id number]    Unit/Bed#: ED 09 Encounter: [id number]    Primary Care Provider: Iman Roland DO   Date and time admitted to hospital: 2/2/2018  1:33 AM        * Hemoptysis   Assessment & Plan    Patient was seen by his pulmonologist Dr Shanta Murphy yesterday and there was  no complain on hemoptysis according to outpatient pulmonology notes  ER asked to admit for pulmonary evaluation  Will monitor hemoglobin hematocrit  Given history of lung cancer patient admitted on an observation basis for pulmonary consult          Chronic cough   Assessment & Plan    Secondary to microaspiration and tracheomalacia according to Pulmonary notes  Extra dose of Tessalon  recommended by pulmonary specialist yesterday  Continue to monitor        COPD (chronic obstructive pulmonary disease) ([facility] )   Assessment & Plan    Respiratory protocol ,continue Flonase ,nebs, Spiriva  Pulmonary evaluation        Dysphagia   Assessment & Plan    Patient noncompliant with dysphagia diet  Appropriate diet ordered  Aspiration precautions        Lung cancer [facility])   Assessment & Plan    Patient presented with 1 day of hemoptysis  Pulmonary evaluation        RONAL (acute kidney injury) ([facility] )   Assessment & Plan    Likely secondary to dehydration provide IV fluids reassess in a m  Tracheomalacia   Assessment & Plan    Follow up with pulmonology  Aspiration precautions        History of cerebrovascular accident (CVA) with residual deficit   Assessment & Plan    Left-sided residual weakness fall precaution PT OT evaluation          VTE Prophylaxis: Enoxaparin (Lovenox)  / sequential compression device   Code Status: full  POLST: There is no POLST form on file for this patient (pre-hospital)      Anticipated Length of Stay:  Patient will be admitted on an Observation basis with an anticipated length of stay of  < 2 midnights     Justification for Hospital Stay:  Pulmonary evaluation    Total Time for Visit, including Counseling / Coordination of Care: 45 minutes  Greater than 50% of this total time spent on direct patient counseling and coordination of care  Chief Complaint:   Coughing up blood    History of Present Illness:    Leonidas Majano  is a 65 y o  male with history of CVA with left-sided residual weakness , lung CA , s/p RLL lobectomy, questionable COPD tracheomalacia chronic cough dysphagia who presented to the emergency room with flecks of blood in the sputum started last night after patient took extra dose of Tessalon recommended by pulmonologist for his chronic cough  He denies any other complaints  Remains hemodynamically stable post, O2 sat in mid 90s on room air  hemoglobin stable, creatinine slightly elevated  CT chest impression no acute pathology  ER asked to admit for pulmonary evaluation            Review of Systems:    Review of Systems   Respiratory:        Hemoptysis       Past Medical and Surgical History:     Past Medical History:   Diagnosis Date    RONAL [facility]acute kidney injury) ([facility] ) 5/31/2017    Aspiration into respiratory tract     Chest pain 5/31/2017    COPD (chronic obstructive pulmonary disease) (HCC)     Depression     Elevated troponin 5/31/2017    GERD (gastroesophageal reflux disease)     History of CVA (cerebrovascular accident) 4/13/2016    Hyperlipidemia     Hypertension     [facility] 2005    Right, status post lobectomy    Pneumonia     Prostate cancer [facility] )     Sleep apnea     awaiting sleep study results    Stroke ([facility] )     Weakness due to cerebrovascular accident (CVA) [facility])        Past Surgical History:   Procedure Laterality Date    COLONOSCOPY      ESOPHAGOGASTRODUODENOSCOPY N/A 4/13/2016    Procedure: ESOPHAGOGASTRODUODENOSCOPY (EGD); Surgeon: Jasmin Napoles MD;  [facility]: [facility];   Service:     JOINT REPLACEMENT      knee replacement    LUNG LOBECTOMY      NV ESOPHAGOGASTRODUODENOSCOPY TRANSORAL DIAGNOSTIC N/A 3/15/2017 Procedure: ESOPHAGOGASTRODUODENOSCOPY (EGD); Surgeon: Ginette Root MD;  Location: [facility]; Service: Gastroenterology    TRIGEMINAL NERVE DECOMPRESSION         Meds/Allergies:    Prior to Admission medications    Medication Sig Start Date End Date Taking?  Authorizing Provider   acetaminophen (TYLENOL) 325 mg tablet Take 2 tablets by mouth every 6 (six) hours as needed for fever 6/19/17  Yes Giorgio Bustos MD   albuterol (2 5 mg/3 mL) 0 083 % nebulizer solution Inhale 4 (four) times a day   10/10/17  Yes Historical Provider, MD   amLODIPine (NORVASC) 10 mg tablet Take 1 tablet by mouth daily 10/10/17  Yes Historical Provider, MD   ASPIRIN 81 PO Take 1 tablet by mouth every other day   10/10/17  Yes Historical Provider, MD   atorvastatin (LIPITOR) 10 mg tablet Take 1 tablet by mouth daily 6/19/17  Yes Giorgio Bustos MD   benzonatate (TESSALON PERLES) 100 mg capsule Take 1 capsule by mouth 3 (three) times a day as needed for cough  Patient taking differently: Take 100 mg by mouth 2 (two) times a day   8/21/17  Yes Giorgio Bustos MD   bimatoprost (LUMIGAN) 0 01 % ophthalmic drops Administer 1 drop to both eyes daily at bedtime 6/19/17  Yes Giorgio Bustos MD   carvedilol (COREG) 12 5 mg tablet Take 1 tablet by mouth 2 (two) times a day with meals 8/21/17  Yes Giorgio Bustos MD   clopidogrel (PLAVIX) 75 mg tablet Take 1 tablet by mouth daily   Yes Historical Provider, MD   fluticasone-salmeterol (ADVAIR DISKUS) 250-50 mcg/dose inhaler Inhale 2 (two) times a day   10/10/17  Yes Historical Provider, MD   fluticasone-salmeterol (ADVAIR) 250-50 mcg/dose inhaler Inhale 1 puff daily     Yes Historical Provider, MD   levocetirizine (XYZAL) 5 MG tablet Take 1 tablet by mouth daily   Yes Historical Provider, MD   LORazepam (ATIVAN) 0 5 mg tablet Take 0 5 tablets by mouth daily 8/21/17  Yes Giorgio Bustos MD   mupirocin (BACTROBAN) 2 % ointment ELA AA TID FOR 7 DAYS FROM 1/27/18 THROUGH 2/2/18 1/27/18  Yes Historical Provider, MD   omeprazole (PriLOSEC) 40 MG capsule Take 1 capsule by mouth daily 8/30/17  Yes Historical Provider, MD   PARoxetine (PAXIL) 20 mg tablet Take 1 tablet by mouth daily   Yes Historical Provider, MD   psyllium (METAMUCIL) 0 52 g capsule Take 0 52 g by mouth daily   Yes Historical Provider, MD   spironolactone (ALDACTONE) 25 mg tablet Take 1 tablet by mouth daily   Yes Historical Provider, MD   tamsulosin (FLOMAX) 0 4 mg Take 1 capsule by mouth daily   Yes Historical Provider, MD   tiotropium (SPIRIVA HANDIHALER) 18 mcg inhalation capsule Place into inhaler and inhale daily   Yes Historical Provider, MD   TOVIAZ 4 MG TB24 TK 1 T PO QD 1/22/18  Yes Historical Provider, MD   albuterol (PROVENTIL HFA) 90 mcg/act inhaler Inhale 10/10/17   Historical Provider, MD   albuterol (PROVENTIL HFA,VENTOLIN HFA) 90 mcg/act inhaler Inhale 2 puffs 4 (four) times a day 6/19/17   Terry Hardin MD   amLODIPine (NORVASC) 10 mg tablet Take 1 tablet by mouth daily 8/21/17   Terry Hardin MD   atorvastatin (LIPITOR) 10 mg tablet Take 1 tablet by mouth    Historical Provider, MD   bimatoprost (LUMIGAN) 0 01 % ophthalmic drops Apply to eye    Historical Provider, MD   clopidogrel (PLAVIX) 75 mg tablet Take 1 tablet by mouth daily 6/19/17   Terry Hardin MD   Fesoterodine Fumarate ER (TOVIAZ) 8 MG TB24 Take 1 tablet by mouth every evening      Historical Provider, MD   fluorouracil (EFUDEX) 5 % cream APPLY TO ENTIRE LESIONS Q 12 H FOR 28 DAYS FROM 2/3/18 TO 3/2/18 1/29/18   Historical Provider, MD   fluticasone (FLONASE) 50 mcg/act nasal spray 1-2 sprays into each nostril daily 8/30/17   Historical Provider, MD   levocetirizine (XYZAL) 5 MG tablet Take 1 tablet by mouth every evening 6/19/17   Terry Hardin MD   LORazepam (ATIVAN) 0 5 mg tablet Take 1 tablet by mouth daily 10/10/17   Historical Provider, MD   meclizine (ANTIVERT) 25 mg tablet Take 1 tablet by mouth 3 (three) times a day as needed for dizziness 6/19/17   Mort Sicard MD [place]   spironolactone (ALDACTONE) 25 mg tablet Take 1 tablet by mouth daily 8/21/17   Neville Herrmann MD   tamsulosin (FLOMAX) 0 4 mg Take 1 capsule by mouth daily with dinner 6/19/17   Neville Herrmann MD   acetaminophen (TYLENOL) 325 mg tablet Take 1-2 tablets by mouth every 6 (six) hours as needed 10/10/17 2/2/18  Historical Provider, MD   aspirin 81 mg chewable tablet Chew 1 tablet daily 6/19/17 2/2/18  Neville Herrmann MD   PARoxetine (PAXIL) 20 mg tablet Take 1 tablet by mouth every morning 6/19/17 2/2/18  Neville Herrmann MD     I have reviewed home medications with a medical source (PCP, Pharmacy, other)  Allergies: Allergies   Allergen Reactions    Penicillins Rash       Social History:     Marital Status:    Occupation: none  Patient Pre-hospital Living Situation: home  Patient Pre-hospital Level of Mobility:  Unknown  Patient Pre-hospital Diet Restrictions:  Dysphagia  Substance Use History:   History   Alcohol Use No     History   Smoking Status    Former Smoker    Packs/day: 1 00    Years: 22 00    Types: Cigarettes    Start date: 5    Quit date: 1977   Smokeless Tobacco    Never Used     History   Drug Use No       Family History:    non-contributory    Physical Exam:     Vitals:   Blood Pressure: 156/79 (02/02/18 0645)  Pulse: 64 (02/02/18 0645)  Temperature: 97 6 °F (36 4 °C) (02/02/18 0135)  Temp Source: Oral (02/02/18 0135)  Respirations: 17 (02/02/18 0645)  Height: 6' 5" (195 6 cm) (02/02/18 0415)  Weight - Scale: 95 3 kg (210 lb) (02/02/18 0135)  SpO2: 95 % (02/02/18 0645)    Physical Exam   Constitutional: No distress  Eyes: Pupils are equal, round, and reactive to light  Neck: Normal range of motion  Cardiovascular: Regular rhythm  Pulmonary/Chest: No respiratory distress  Abdominal: He exhibits distension  There is no tenderness  Musculoskeletal: He exhibits no edema  Neurological: He is alert  Residual left-sided weakness   Skin: Skin is warm  Psychiatric: He has a normal mood and affect  Additional Data:     Lab Results: I have personally reviewed pertinent reports  Results from last 7 days  Lab Units 02/02/18  0633 02/02/18  0150   WBC Thousand/uL 6 32 6 11   HEMOGLOBIN g/dL 13 0 13 7   HEMATOCRIT % 39 4 41 6   PLATELETS Thousands/uL 117* 120*   NEUTROS PCT %  --  63   LYMPHS PCT %  --  22   MONOS PCT %  --  10   EOS PCT %  --  4       Results from last 7 days  Lab Units 02/02/18  0633 02/02/18  0150   SODIUM mmol/L 141 141   POTASSIUM mmol/L 3 8 4 0   CHLORIDE mmol/L 108 108   CO2 mmol/L 25 29   BUN mg/dL 26* 26*   CREATININE mg/dL 1 24 1 38*   CALCIUM mg/dL 8 3 9 0   TOTAL PROTEIN g/dL  --  7 1   BILIRUBIN TOTAL mg/dL  --  0 36   ALK PHOS U/L  --  116   ALT U/L  --  25   AST U/L  --  15   GLUCOSE RANDOM mg/dL 90 113       Results from last 7 days  Lab Units 02/02/18  0151   INR  1 05       Imaging: I have personally reviewed pertinent reports  XR chest 2 views   ED Interpretation by Terrie Morrison MD (02/02 1717)   No acute pulmonary pathology      Final Result by Radha Crowley MD (24/11 6622)      No active pulmonary disease  Workstation performed: [id number]         CT chest with contrast   ED Interpretation by Terrie Morrison MD (84/72 0760)   Reviewed interpretation by [facility]  Per their impression: Prior right thoracotomy  There is scarring and volume loss in the right lung  No focal consolidation  There may be gallstones      Final Result by Radha Crowley MD (51/78 1629)      No acute pathology  Findings are consistent with the preliminary report from [facility] which was provided shortly after completion of the exam                       Workstation performed: [id number]               Allscripts / Epic Records Reviewed: Yes     ** Please Note: This note has been constructed using a voice recognition system   **

## 2018-02-02 NOTE — SOCIAL WORK
Pt resides at [facility]  He uses r/w to ambulate  His PCP and medications are through [place]  Pt reports current PT in [facility]  History of "multiple SNF's " but does not remember the names  Reports his daughter is P O A  And emergency contact [facility] [phone]  Pt to D/C back to [facility], no needs at this time  [facility] transport scheduled through [facility] at 12:30pm  Pt aware of the out of pocket costs

## 2018-02-02 NOTE — Clinical Note
Case was discussed with NAN and the patient's admission status was agreed to be Admission Status: observation status to the service of Dr Jeremy Ferguson

## 2018-02-03 LAB — MRSA NOSE QL CULT: NORMAL

## 2018-02-10 ENCOUNTER — HOSPITAL ENCOUNTER (EMERGENCY)
Facility: HOSPITAL | Age: 78
Discharge: HOME | End: 2018-02-11
Attending: EMERGENCY MEDICINE
Payer: MEDICARE

## 2018-02-10 DIAGNOSIS — Z00.00 WELL ADULT EXAM: Primary | ICD-10-CM
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
## 2018-02-10 PROCEDURE — 93005 ELECTROCARDIOGRAM TRACING: CPT

## 2018-02-11 VITALS
HEART RATE: 86 BPM | TEMPERATURE: 97.6 F | BODY MASS INDEX: 28.46 KG/M2 | WEIGHT: 240 LBS | OXYGEN SATURATION: 95 % | DIASTOLIC BLOOD PRESSURE: 71 MMHG | RESPIRATION RATE: 20 BRPM | SYSTOLIC BLOOD PRESSURE: 147 MMHG

## 2018-02-11 PROCEDURE — 99284 EMERGENCY DEPT VISIT MOD MDM: CPT

## 2018-02-11 NOTE — ED PROVIDER NOTES
History  Chief Complaint   Patient presents with    Fall     Pt  arrives via [facility] from [address] [place] [address], complaints of unwitnessed fall by staff  Pt  was found on floor, no complaints by patient at present time  Alert to person, place, time, disoriented to situation  Patient brought in to the hospital via ambulance from a local nursing home after he was found on the floor after it was unwitnessed that he slid out of wheelchair  Patient denies any injury or complaints  He denies head or neck pain  He denies long bone trauma  Denies belly back or chest pain  He denies numbness tingling or weakness  He denies fever chills nausea vomiting or diarrhea   He is not on blood thinners  Prior to Admission Medications   Prescriptions Last Dose Informant Patient Reported? Taking? ASPIRIN 81 PO 2/10/2018 at Unknown time  Yes Yes   Sig: Take 1 tablet by mouth every other day     Fesoterodine Fumarate ER (TOVIAZ) 8 MG TB24 2/10/2018 at Unknown time  Yes Yes   Sig: Take 1 tablet by mouth every evening     LORazepam (ATIVAN) 0 5 mg tablet   No No   Sig: Take 0 5 tablets by mouth daily   LORazepam (ATIVAN) 0 5 mg tablet   Yes No   Sig: Take 1 tablet by mouth daily   PARoxetine (PAXIL) 20 mg tablet   Yes No   Sig: Take 1 tablet by mouth daily   TOVIAZ 4 MG TB24   Yes No   Sig: TK 1 T PO QD   acetaminophen (TYLENOL) 325 mg tablet   No Yes   Sig: Take 2 tablets by mouth every 6 (six) hours as needed for fever   albuterol (2 5 mg/3 mL) 0 083 % nebulizer solution   Yes Yes   Sig: Inhale 4 (four) times a day     albuterol (PROVENTIL HFA) 90 mcg/act inhaler   Yes Yes   Sig: Inhale   albuterol (PROVENTIL HFA,VENTOLIN HFA) 90 mcg/act inhaler   No Yes   Sig: Inhale 2 puffs 4 (four) times a day   amLODIPine (NORVASC) 10 mg tablet 2/10/2018 at Unknown time  Yes Yes   Sig: Take 1 tablet by mouth daily   atorvastatin (LIPITOR) 10 mg tablet 2/10/2018 at Unknown time  Yes Yes   Sig: Take 1 tablet by mouth benzonatate (TESSALON PERLES) 100 mg capsule 2/10/2018 at Unknown time  No Yes   Sig: Take 1 capsule by mouth 3 (three) times a day as needed for cough   bimatoprost (LUMIGAN) 0 01 % ophthalmic drops 2/10/2018 at Unknown time  No Yes   Sig: Administer 1 drop to both eyes daily at bedtime   carvedilol (COREG) 12 5 mg tablet 2/10/2018 at Unknown time  No Yes   Sig: Take 1 tablet by mouth 2 (two) times a day with meals   clopidogrel (PLAVIX) 75 mg tablet 2/10/2018 at Unknown time  No Yes   Sig: Take 1 tablet by mouth daily   fluorouracil (EFUDEX) 5 % cream   Yes Yes   Sig: APPLY TO ENTIRE LESIONS Q 12 H FOR 28 DAYS FROM 2/3/18 TO 3/2/18   fluticasone (FLONASE) 50 mcg/act nasal spray   Yes No   Si-2 sprays into each nostril daily   fluticasone-salmeterol (ADVAIR DISKUS) 250-50 mcg/dose inhaler   Yes No   Sig: Inhale 2 (two) times a day     fluticasone-salmeterol (ADVAIR) 250-50 mcg/dose inhaler   Yes No   Sig: Inhale 1 puff daily     levocetirizine (XYZAL) 5 MG tablet   No No   Sig: Take 1 tablet by mouth every evening   levocetirizine (XYZAL) 5 MG tablet   Yes No   Sig: Take 1 tablet by mouth daily   meclizine (ANTIVERT) 25 mg tablet   No No   Sig: Take 1 tablet by mouth 3 (three) times a day as needed for dizziness   mupirocin (BACTROBAN) 2 % ointment   Yes No   Sig: ELA AA TID FOR 7 DAYS FROM 18 THROUGH 18   omeprazole (PriLOSEC) 40 MG capsule   Yes No   Sig: Take 1 capsule by mouth daily   psyllium (METAMUCIL) 0 52 g capsule   Yes No   Sig: Take 0 52 g by mouth daily   spironolactone (ALDACTONE) 25 mg tablet   Yes No   Sig: Take 1 tablet by mouth daily   spironolactone (ALDACTONE) 25 mg tablet   No No   Sig: Take 1 tablet (25 mg total) by mouth daily   tamsulosin (FLOMAX) 0 4 mg   No No   Sig: Take 1 capsule by mouth daily with dinner   tamsulosin (FLOMAX) 0 4 mg   Yes No   Sig: Take 1 capsule by mouth daily   tiotropium (SPIRIVA HANDIHALER) 18 mcg inhalation capsule   Yes No   Sig: Place into inhaler and inhale daily      Facility-Administered Medications: None       Past Medical History:   Diagnosis Date    [facility] 5/31/2017    Aspiration into respiratory tract     Chest pain 5/31/2017    [facility]ch[facility] disease[facility]     Depression     Elevated troponin 5/31/2017    GERD (gastroesophageal reflux disease)     History of CVA (cerebrovascular accident) 4/13/2016    Hyperlipidemia     Hypertension     Lung cancer ([place] [address] ) 2005    Right, status post lobectomy    Pneumonia     Prostate cancer ([place] [address] )     Sleep apnea     awaiting sleep study results    Stroke ([place] [address] )     Weakness due to cerebrovascular accident (CVA) [facility])        Past Surgical History:   Procedure Laterality Date    COLONOSCOPY      ESOPHAGOGASTRODUODENOSCOPY N/A 4/13/2016    Procedure: ESOPHAGOGASTRODUODENOSCOPY (EGD); Surgeon: Clarissa Brunson MD;  [facility]; Service:     JOINT REPLACEMENT      knee replacement    LUNG LOBECTOMY      [place] ESOPHAGOGASTRODUODENOSCOPY TRANSORAL DIAGNOSTIC N/A 3/15/2017    Procedure: ESOPHAGOGASTRODUODENOSCOPY (EGD); Surgeon: Clarissa Brunson MD;  [facility]; Service: Gastroenterology    TRIGEMINAL NERVE DECOMPRESSION         Family History   Problem Relation Age of Onset    Family history unknown: Yes     I have reviewed and agree with the history as documented  Social History   Substance Use Topics    Smoking status: Former Smoker     Packs/day: 1 00     Years: 22 00     Types: Cigarettes     Start date: 1955     Quit date: 1977    Smokeless tobacco: Never Used    Alcohol use No        Review of Systems   Constitutional: Negative  Negative for activity change, appetite change, chills, diaphoresis, fatigue and fever  HENT: Negative  Eyes: Negative  Negative for photophobia and visual disturbance  Respiratory: Negative  Negative for chest tightness and shortness of breath  Cardiovascular: Negative    Negative for chest pain and leg swelling  Gastrointestinal: Negative  Negative for abdominal pain, nausea and vomiting  Endocrine: Negative  Genitourinary: Negative  Musculoskeletal: Negative  Negative for arthralgias, back pain, neck pain and neck stiffness  Skin: Negative  Allergic/Immunologic: Negative  Neurological: Negative  Negative for seizures, syncope and headaches  Hematological: Negative  Psychiatric/Behavioral: Negative  Physical Exam  ED Triage Vitals   Temperature Pulse Respirations Blood Pressure SpO2   02/10/18 2313 02/10/18 2300 02/10/18 2300 02/10/18 2300 02/10/18 2300   97 6 °F (36 4 °C) 84 20 131/72 95 %      Temp Source Heart Rate Source Patient Position - Orthostatic VS BP Location FiO2 (%)   02/10/18 2313 02/10/18 2300 02/10/18 2300 02/10/18 2300 --   Oral Monitor Sitting Left arm       Pain Score       02/10/18 2300       No Pain           Orthostatic Vital Signs  Vitals:    02/10/18 2300   BP: 131/72   Pulse: 84   Patient Position - Orthostatic VS: Sitting       Physical Exam   Constitutional: He is oriented to person, place, and time  He appears well-developed and well-nourished  Nontoxic appearance without respiratory distress  Patient answers simple questions follows simple commands and moves all extremities spontaneously and to command  HENT:   Head: Normocephalic and atraumatic  Right Ear: External ear normal    Left Ear: External ear normal    Mouth/Throat: Oropharynx is clear and moist    No visible face scalp or head trauma   Eyes: Conjunctivae and EOM are normal  Pupils are equal, round, and reactive to light  Neck: Normal range of motion  Neck supple  Cardiovascular: Normal rate, regular rhythm, normal heart sounds and intact distal pulses  Pulmonary/Chest: Effort normal and breath sounds normal  No respiratory distress  He has no wheezes  He has no rales  He exhibits no tenderness  Abdominal: Soft   Bowel sounds are normal  He exhibits no distension and no mass  There is no tenderness  There is no rebound and no guarding  No hernia  Musculoskeletal: Normal range of motion  He exhibits no edema, tenderness or deformity  Neurological: He is alert and oriented to person, place, and time  He has normal reflexes  He displays normal reflexes  No cranial nerve deficit or sensory deficit  He exhibits normal muscle tone  Coordination normal    Skin: Skin is warm and dry  No rash noted  No erythema  Psychiatric: He has a normal mood and affect  His behavior is normal  Judgment and thought content normal    Nursing note and vitals reviewed  ED Medications  Medications - No data to display    Diagnostic Studies  Results Reviewed     None                 No orders to display              Procedures  Procedures       Phone Contacts  ED Phone Contact    ED Course  ED Course as of Feb 10 2337   Sat Feb 10, 2018   2332   Patient is stable for discharge  His vital signs are stable and his examination is unremarkable  MDM  CritCare Time    Disposition  Final diagnoses: Well adult exam   Fall, initial encounter     Time reflects when diagnosis was documented in both MDM as applicable and the Disposition within this note     Time User Action Codes Description Comment    2/10/2018 11:33 PM Lola Fisher [Z00 [id number]] Well adult exam     2/10/2018 11:33 PM Lola Fisher [[id number]  Nesha Doctor, initial encounter       ED Disposition     ED Disposition Condition Comment    Discharge  Louise Vásquez  discharge to home/self care  Condition at discharge: Stable        Follow-up Information     Follow up With Specialties Details Why [facility] physician  Schedule an appointment as soon as possible for a visit          Patient's Medications   Discharge Prescriptions    No medications on file     No discharge procedures on file      ED Provider  Electronically Signed by           Richelle Florez MD  02/10/18 6229

## 2018-02-11 NOTE — DISCHARGE INSTRUCTIONS
Wellness Visit for Adults   WHAT YOU NEED TO KNOW:   What is a wellness visit? A wellness visit is when you see your healthcare provider to get screened for health problems  You can also get advice on how to stay healthy  Write down your questions so you remember to ask them  Ask your healthcare provider how often you should have a wellness visit  What happens at a wellness visit? Your healthcare provider will ask about your health, and your family history of health problems  This includes high blood pressure, heart disease, and cancer  He or she will ask if you have symptoms that concern you, if you smoke, and about your mood  You may also be asked about your intake of medicines, supplements, food, and alcohol  Any of the following may be done:  · Your weight  will be checked  Your height may also be checked so your body mass index (BMI) can be calculated  Your BMI shows if you are at a healthy weight  · Your blood pressure  and heart rate will be checked  Your temperature may also be checked  · Blood and urine tests  may be done  Blood tests may be done to check your cholesterol levels  Abnormal cholesterol levels increase your risk for heart disease and stroke  You may also need a blood or urine test to check for diabetes if you are at increased risk  Urine tests may be done to look for signs of an infection or kidney disease  · A physical exam  includes checking your heartbeat and lungs with a stethoscope  Your healthcare provider may also check your skin to look for sun damage  · Screening tests  may be recommended  A screening test is done to check for diseases that may not cause symptoms  The screening tests you may need depend on your age, gender, family history, and lifestyle habits  For example, colorectal screening may be recommended if you are 48years old or older  What screening tests do I need if I am a woman? · A Pap smear  is used to screen for cervical cancer   Pap smears are usually done every 3 to 5 years depending on your age  You may need them more often if you have had abnormal Pap smear test results in the past  Ask your healthcare provider how often you should have a Pap smear  · A mammogram  is an x-ray of your breasts to screen for breast cancer  Experts recommend mammograms every 2 years starting at age 48 years  You may need a mammogram at age 52 years or younger if you have an increased risk for breast cancer  Talk to your healthcare provider about when you should start having mammograms and how often you need them  What vaccines might I need? · Get an influenza vaccine  every year  The influenza vaccine protects you from the flu  Several types of viruses cause the flu  The viruses change over time, so new vaccines are made each year  · Get a tetanus-diphtheria (Td) booster vaccine  every 10 years  This vaccine protects you against tetanus and diphtheria  Tetanus is a severe infection that may cause painful muscle spasms and lockjaw  Diphtheria is a severe bacterial infection that causes a thick covering in the back of your mouth and throat  · Get a human papillomavirus (HPV) vaccine  if you are female and aged 23 to 32 or male 23 to 24 and never received it  This vaccine protects you from HPV infection  HPV is the most common infection spread by sexual contact  HPV may also cause vaginal, penile, and anal cancers  · Get a pneumococcal vaccine  if you are aged 72 years or older  The pneumococcal vaccine is an injection given to protect you from pneumococcal disease  Pneumococcal disease is an infection caused by pneumococcal bacteria  The infection may cause pneumonia, meningitis, or an ear infection  · Get a shingles vaccine  if you are aged 61 or older, even if you have had shingles before  The shingles vaccine is an injection to protect you from the varicella-zoster virus  This is the same virus that causes chickenpox   Shingles is a painful rash that develops in people who had chickenpox or have been exposed to the virus  How can I eat healthy? My Plate is a model for planning healthy meals  It shows the types and amounts of foods that should go on your plate  Fruits and vegetables make up about half of your plate, and grains and protein make up the other half  A serving of dairy is included on the side of your plate  The amount of calories and serving sizes you need depends on your age, gender, weight, and height  Examples of healthy foods are listed below:  · Eat a variety of vegetables  such as dark green, red, and orange vegetables  You can also include canned vegetables low in sodium (salt) and frozen vegetables without added butter or sauces  · Eat a variety of fresh fruits , canned fruit in 100% juice, frozen fruit, and dried fruit  · Include whole grains  At least half of the grains you eat should be whole grains  Examples include whole-wheat bread, wheat pasta, brown rice, and whole-grain cereals such as oatmeal     · Eat a variety of protein foods such as seafood (fish and shellfish), lean meat, and poultry without skin (turkey and chicken)  Examples of lean meats include pork leg, shoulder, or tenderloin, and beef round, sirloin, tenderloin, and extra lean ground beef  Other protein foods include eggs and egg substitutes, beans, peas, soy products, nuts, and seeds  · Choose low-fat dairy products such as skim or 1% milk or low-fat yogurt, cheese, and cottage cheese  · Limit unhealthy fats  such as butter, hard margarine, and shortening  How much exercise do I need? Exercise at least 30 minutes per day on most days of the week  Some examples of exercise include walking, biking, dancing, and swimming  You can also fit in more physical activity by taking the stairs instead of the elevator or parking farther away from stores  Include muscle strengthening activities 2 days each week   Regular exercise provides many health benefits  It helps you manage your weight, and decreases your risk for type 2 diabetes, heart disease, stroke, and high blood pressure  Exercise can also help improve your mood  Ask your healthcare provider about the best exercise plan for you  What are some general health and safety guidelines I should follow? · Do not smoke  Nicotine and other chemicals in cigarettes and cigars can cause lung damage  Ask your healthcare provider for information if you currently smoke and need help to quit  E-cigarettes or smokeless tobacco still contain nicotine  Talk to your healthcare provider before you use these products  · Limit alcohol  A drink of alcohol is 12 ounces of beer, 5 ounces of wine, or 1½ ounces of liquor  · Lose weight, if needed  Being overweight increases your risk of certain health conditions  These include heart disease, high blood pressure, type 2 diabetes, and certain types of cancer  · Protect your skin  Do not sunbathe or use tanning beds  Use sunscreen with a SPF 15 or higher  Apply sunscreen at least 15 minutes before you go outside  Reapply sunscreen every 2 hours  Wear protective clothing, hats, and sunglasses when you are outside  · Drive safely  Always wear your seatbelt  Make sure everyone in your car wears a seatbelt  A seatbelt can save your life if you are in an accident  Do not use your cell phone when you are driving  This could distract you and cause an accident  Pull over if you need to make a call or send a text message  · Practice safe sex  Use latex condoms if are sexually active and have more than one partner  Your healthcare provider may recommend screening tests for sexually transmitted infections (STIs)  · Wear helmets, lifejackets, and protective gear  Always wear a helmet when you ride a bike or motorcycle, go skiing, or play sports that could cause a head injury  Wear protective equipment when you play sports   Wear a lifejacket when you are on a boat or doing water sports  CARE AGREEMENT:   You have the right to help plan your care  Learn about your health condition and how it may be treated  Discuss treatment options with your caregivers to decide what care you want to receive  You always have the right to refuse treatment  The above information is an  only  It is not intended as medical advice for individual conditions or treatments  Talk to your doctor, nurse or pharmacist before following any medical regimen to see if it is safe and effective for you  © 2017 [employer] Information is for End User's use only and may not be sold, redistributed or otherwise used for commercial purposes  All illustrations and images included in CareNotes® are the copyrighted property of [employer]  or [employer]

## 2018-02-12 LAB
ATRIAL RATE: 85 BPM
P AXIS: 64 DEGREES
PR INTERVAL: 192 MS
QRS AXIS: -7 DEGREES
QRSD INTERVAL: 88 MS
QT INTERVAL: 346 MS
QTC INTERVAL: 411 MS
T WAVE AXIS: 57 DEGREES
VENTRICULAR RATE: 85 BPM

## 2018-02-12 PROCEDURE — 93010 ELECTROCARDIOGRAM REPORT: CPT | Performed by: INTERNAL MEDICINE

## 2018-02-13 ENCOUNTER — APPOINTMENT (EMERGENCY)
Dept: RADIOLOGY | Facility: HOSPITAL | Age: 78
DRG: 190 | End: 2018-02-13
Payer: MEDICARE

## 2018-02-13 ENCOUNTER — HOSPITAL ENCOUNTER (INPATIENT)
Facility: HOSPITAL | Age: 78
LOS: 5 days | Discharge: NURSING, REHAB OR LONG-TERM CARE | DRG: 190 | End: 2018-02-18
Attending: EMERGENCY MEDICINE | Admitting: INTERNAL MEDICINE
Payer: MEDICARE

## 2018-02-13 ENCOUNTER — HOSPITAL ENCOUNTER (EMERGENCY)
Facility: HOSPITAL | Age: 78
Discharge: HOME | DRG: 190 | End: 2018-02-13
Attending: EMERGENCY MEDICINE
Payer: MEDICARE

## 2018-02-13 VITALS
SYSTOLIC BLOOD PRESSURE: 140 MMHG | WEIGHT: 240 LBS | TEMPERATURE: 98.2 F | HEART RATE: 79 BPM | BODY MASS INDEX: 28.46 KG/M2 | DIASTOLIC BLOOD PRESSURE: 68 MMHG | RESPIRATION RATE: 20 BRPM | OXYGEN SATURATION: 95 %

## 2018-02-13 DIAGNOSIS — I69.30 HISTORY OF CEREBROVASCULAR ACCIDENT (CVA) WITH RESIDUAL DEFICIT: Chronic | ICD-10-CM

## 2018-02-13 DIAGNOSIS — R53.1 LEFT-SIDED WEAKNESS: ICD-10-CM

## 2018-02-13 DIAGNOSIS — J44.9 COPD WITHOUT EXACERBATION (HCC): ICD-10-CM

## 2018-02-13 DIAGNOSIS — IMO0001 SIRS DUE TO INFECTIOUS PROCESS WITHOUT ACUTE ORGAN DYSFUNCTION: ICD-10-CM
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
## 2018-02-13 DIAGNOSIS — R53.1 WEAKNESS: ICD-10-CM

## 2018-02-13 DIAGNOSIS — R29.6 RECURRENT FALLS: ICD-10-CM

## 2018-02-13 DIAGNOSIS — J44.1 COPD EXACERBATION (HCC): Primary | ICD-10-CM

## 2018-02-13 DIAGNOSIS — R41.0 ACUTE CONFUSION: ICD-10-CM

## 2018-02-13 DIAGNOSIS — R06.02 SHORTNESS OF BREATH: ICD-10-CM

## 2018-02-13 LAB
ALBUMIN SERPL BCP-MCNC: 3.5 G/DL (ref 3.5–5)
ALP SERPL-CCNC: 87 U/L (ref 46–116)
ALT SERPL W P-5'-P-CCNC: 26 U/L (ref 12–78)
ANION GAP SERPL CALCULATED.3IONS-SCNC: 10 MMOL/L (ref 4–13)
AST SERPL W P-5'-P-CCNC: 29 U/L (ref 5–45)
ATRIAL RATE: 71 BPM
BASOPHILS # BLD AUTO: 0.02 THOUSANDS/ΜL (ref 0–0.1)
BASOPHILS NFR BLD AUTO: 0 % (ref 0–1)
BILIRUB SERPL-MCNC: 1.17 MG/DL (ref 0.2–1)
BILIRUB UR QL STRIP: ABNORMAL
BILIRUB UR QL STRIP: NEGATIVE
BUN SERPL-MCNC: 18 MG/DL (ref 5–25)
CALCIUM SERPL-MCNC: 8.9 MG/DL (ref 8.3–10.1)
CHLORIDE SERPL-SCNC: 102 MMOL/L (ref 100–108)
CLARITY UR: ABNORMAL
CLARITY UR: CLEAR
CO2 SERPL-SCNC: 25 MMOL/L (ref 21–32)
COLOR UR: ABNORMAL
COLOR UR: YELLOW
COLOR, POC: NORMAL
CREAT SERPL-MCNC: 1.28 MG/DL (ref 0.6–1.3)
EOSINOPHIL # BLD AUTO: 0.04 THOUSAND/ΜL (ref 0–0.61)
EOSINOPHIL NFR BLD AUTO: 0 % (ref 0–6)
ERYTHROCYTE [DISTWIDTH] IN BLOOD BY AUTOMATED COUNT: 14.2 % (ref 11.6–15.1)
GFR SERPL CREATININE-BSD FRML MDRD: 53 ML/MIN/1.73SQ M
GLUCOSE SERPL-MCNC: 106 MG/DL (ref 65–140)
GLUCOSE UR STRIP-MCNC: NEGATIVE MG/DL
GLUCOSE UR STRIP-MCNC: NEGATIVE MG/DL
HCT VFR BLD AUTO: 40.9 % (ref 36.5–49.3)
HGB BLD-MCNC: 13.9 G/DL (ref 12–17)
HGB UR QL STRIP.AUTO: NEGATIVE
HGB UR QL STRIP.AUTO: NEGATIVE
KETONES UR STRIP-MCNC: ABNORMAL MG/DL
KETONES UR STRIP-MCNC: ABNORMAL MG/DL
LEUKOCYTE ESTERASE UR QL STRIP: NEGATIVE
LEUKOCYTE ESTERASE UR QL STRIP: NEGATIVE
LYMPHOCYTES # BLD AUTO: 0.68 THOUSANDS/ΜL (ref 0.6–4.47)
LYMPHOCYTES NFR BLD AUTO: 6 % (ref 14–44)
MCH RBC QN AUTO: 29.5 PG (ref 26.8–34.3)
MCHC RBC AUTO-ENTMCNC: 34 G/DL (ref 31.4–37.4)
MCV RBC AUTO: 87 FL (ref 82–98)
MONOCYTES # BLD AUTO: 1.26 THOUSAND/ΜL (ref 0.17–1.22)
MONOCYTES NFR BLD AUTO: 11 % (ref 4–12)
NEUTROPHILS # BLD AUTO: 9 THOUSANDS/ΜL (ref 1.85–7.62)
NEUTS SEG NFR BLD AUTO: 83 % (ref 43–75)
NITRITE UR QL STRIP: NEGATIVE
NITRITE UR QL STRIP: NEGATIVE
NRBC BLD AUTO-RTO: 0 /100 WBCS
P AXIS: 29 DEGREES
PH UR STRIP.AUTO: 6.5 [PH] (ref 4.5–8)
PH UR STRIP.AUTO: 6.5 [PH] (ref 4.5–8)
PLATELET # BLD AUTO: 121 THOUSANDS/UL (ref 149–390)
PMV BLD AUTO: 11.2 FL (ref 8.9–12.7)
POTASSIUM SERPL-SCNC: 3.7 MMOL/L (ref 3.5–5.3)
PR INTERVAL: 204 MS
PROT SERPL-MCNC: 7.4 G/DL (ref 6.4–8.2)
PROT UR STRIP-MCNC: ABNORMAL MG/DL
PROT UR STRIP-MCNC: NEGATIVE MG/DL
QRS AXIS: 12 DEGREES
QRSD INTERVAL: 100 MS
QT INTERVAL: 390 MS
QTC INTERVAL: 423 MS
RBC # BLD AUTO: 4.71 MILLION/UL (ref 3.88–5.62)
SODIUM SERPL-SCNC: 137 MMOL/L (ref 136–145)
SP GR UR STRIP.AUTO: 1.01 (ref 1–1.03)
SP GR UR STRIP.AUTO: 1.01 (ref 1–1.03)
T WAVE AXIS: 60 DEGREES
TROPONIN I SERPL-MCNC: <0.02 NG/ML
UROBILINOGEN UR QL STRIP.AUTO: 2 E.U./DL
UROBILINOGEN UR QL STRIP.AUTO: 2 E.U./DL
VENTRICULAR RATE: 71 BPM
WBC # BLD AUTO: 11.02 THOUSAND/UL (ref 4.31–10.16)

## 2018-02-13 PROCEDURE — 80053 COMPREHEN METABOLIC PANEL: CPT | Performed by: EMERGENCY MEDICINE

## 2018-02-13 PROCEDURE — 94640 AIRWAY INHALATION TREATMENT: CPT

## 2018-02-13 PROCEDURE — 84484 ASSAY OF TROPONIN QUANT: CPT | Performed by: EMERGENCY MEDICINE

## 2018-02-13 PROCEDURE — 36415 COLL VENOUS BLD VENIPUNCTURE: CPT

## 2018-02-13 PROCEDURE — 85025 COMPLETE CBC W/AUTO DIFF WBC: CPT | Performed by: EMERGENCY MEDICINE

## 2018-02-13 PROCEDURE — 99223 1ST HOSP IP/OBS HIGH 75: CPT | Performed by: INTERNAL MEDICINE

## 2018-02-13 PROCEDURE — 96374 THER/PROPH/DIAG INJ IV PUSH: CPT

## 2018-02-13 PROCEDURE — 81003 URINALYSIS AUTO W/O SCOPE: CPT | Performed by: INTERNAL MEDICINE

## 2018-02-13 PROCEDURE — 93005 ELECTROCARDIOGRAM TRACING: CPT

## 2018-02-13 PROCEDURE — 81002 URINALYSIS NONAUTO W/O SCOPE: CPT | Performed by: EMERGENCY MEDICINE

## 2018-02-13 PROCEDURE — 70450 CT HEAD/BRAIN W/O DYE: CPT

## 2018-02-13 PROCEDURE — 71046 X-RAY EXAM CHEST 2 VIEWS: CPT

## 2018-02-13 PROCEDURE — 99283 EMERGENCY DEPT VISIT LOW MDM: CPT

## 2018-02-13 PROCEDURE — 99285 EMERGENCY DEPT VISIT HI MDM: CPT

## 2018-02-13 PROCEDURE — 87798 DETECT AGENT NOS DNA AMP: CPT | Performed by: EMERGENCY MEDICINE

## 2018-02-13 PROCEDURE — 93010 ELECTROCARDIOGRAM REPORT: CPT | Performed by: INTERNAL MEDICINE

## 2018-02-13 RX ORDER — METHYLPREDNISOLONE SODIUM SUCCINATE 125 MG/2ML
60 INJECTION, POWDER, LYOPHILIZED, FOR SOLUTION INTRAMUSCULAR; INTRAVENOUS ONCE
Status: COMPLETED | OUTPATIENT
Start: 2018-02-13 | End: 2018-02-13

## 2018-02-13 RX ORDER — ASPIRIN 81 MG/1
81 TABLET, CHEWABLE ORAL EVERY OTHER DAY
Status: DISCONTINUED | OUTPATIENT
Start: 2018-02-13 | End: 2018-02-18 | Stop reason: HOSPADM

## 2018-02-13 RX ORDER — LORAZEPAM 0.5 MG/1
0.25 TABLET ORAL DAILY
Status: DISCONTINUED | OUTPATIENT
Start: 2018-02-14 | End: 2018-02-18 | Stop reason: HOSPADM

## 2018-02-13 RX ORDER — TOLTERODINE 2 MG/1
2 CAPSULE, EXTENDED RELEASE ORAL DAILY
Status: DISCONTINUED | OUTPATIENT
Start: 2018-02-14 | End: 2018-02-18 | Stop reason: HOSPADM

## 2018-02-13 RX ORDER — SODIUM CHLORIDE 9 MG/ML
50 INJECTION, SOLUTION INTRAVENOUS CONTINUOUS
Status: DISCONTINUED | OUTPATIENT
Start: 2018-02-13 | End: 2018-02-15

## 2018-02-13 RX ORDER — ACETAMINOPHEN 325 MG/1
650 TABLET ORAL EVERY 6 HOURS PRN
Status: DISCONTINUED | OUTPATIENT
Start: 2018-02-13 | End: 2018-02-18 | Stop reason: HOSPADM

## 2018-02-13 RX ORDER — CARVEDILOL 12.5 MG/1
12.5 TABLET ORAL 2 TIMES DAILY WITH MEALS
Status: DISCONTINUED | OUTPATIENT
Start: 2018-02-14 | End: 2018-02-18 | Stop reason: HOSPADM

## 2018-02-13 RX ORDER — TAMSULOSIN HYDROCHLORIDE 0.4 MG/1
0.4 CAPSULE ORAL
Status: DISCONTINUED | OUTPATIENT
Start: 2018-02-14 | End: 2018-02-18 | Stop reason: HOSPADM

## 2018-02-13 RX ORDER — ALBUTEROL SULFATE 2.5 MG/3ML
2.5 SOLUTION RESPIRATORY (INHALATION) EVERY 4 HOURS PRN
Status: DISCONTINUED | OUTPATIENT
Start: 2018-02-13 | End: 2018-02-15

## 2018-02-13 RX ORDER — FLUOROURACIL 50 MG/G
CREAM TOPICAL 2 TIMES DAILY
Status: DISCONTINUED | OUTPATIENT
Start: 2018-02-13 | End: 2018-02-15 | Stop reason: RX

## 2018-02-13 RX ORDER — PANTOPRAZOLE SODIUM 20 MG/1
20 TABLET, DELAYED RELEASE ORAL
Status: DISCONTINUED | OUTPATIENT
Start: 2018-02-14 | End: 2018-02-18 | Stop reason: HOSPADM

## 2018-02-13 RX ORDER — SPIRONOLACTONE 25 MG/1
25 TABLET ORAL DAILY
Status: DISCONTINUED | OUTPATIENT
Start: 2018-02-14 | End: 2018-02-18 | Stop reason: HOSPADM

## 2018-02-13 RX ORDER — TAMSULOSIN HYDROCHLORIDE 0.4 MG/1
0.4 CAPSULE ORAL DAILY
Status: DISCONTINUED | OUTPATIENT
Start: 2018-02-14 | End: 2018-02-13 | Stop reason: SDUPTHER

## 2018-02-13 RX ORDER — BENZONATATE 100 MG/1
100 CAPSULE ORAL 3 TIMES DAILY PRN
Status: DISCONTINUED | OUTPATIENT
Start: 2018-02-13 | End: 2018-02-18 | Stop reason: HOSPADM

## 2018-02-13 RX ORDER — MECLIZINE HCL 12.5 MG/1
25 TABLET ORAL 3 TIMES DAILY PRN
Status: DISCONTINUED | OUTPATIENT
Start: 2018-02-13 | End: 2018-02-18 | Stop reason: HOSPADM

## 2018-02-13 RX ORDER — ONDANSETRON 2 MG/ML
4 INJECTION INTRAMUSCULAR; INTRAVENOUS EVERY 6 HOURS PRN
Status: DISCONTINUED | OUTPATIENT
Start: 2018-02-13 | End: 2018-02-18 | Stop reason: HOSPADM

## 2018-02-13 RX ORDER — AMLODIPINE BESYLATE 10 MG/1
10 TABLET ORAL DAILY
Status: DISCONTINUED | OUTPATIENT
Start: 2018-02-14 | End: 2018-02-18 | Stop reason: HOSPADM

## 2018-02-13 RX ORDER — ALBUTEROL SULFATE 90 UG/1
2 AEROSOL, METERED RESPIRATORY (INHALATION) 4 TIMES DAILY
Status: DISCONTINUED | OUTPATIENT
Start: 2018-02-13 | End: 2018-02-18 | Stop reason: HOSPADM

## 2018-02-13 RX ORDER — CLOPIDOGREL BISULFATE 75 MG/1
75 TABLET ORAL DAILY
Status: DISCONTINUED | OUTPATIENT
Start: 2018-02-14 | End: 2018-02-18 | Stop reason: HOSPADM

## 2018-02-13 RX ORDER — ATORVASTATIN CALCIUM 10 MG/1
10 TABLET, FILM COATED ORAL
Status: DISCONTINUED | OUTPATIENT
Start: 2018-02-14 | End: 2018-02-18 | Stop reason: HOSPADM

## 2018-02-13 RX ORDER — LORAZEPAM 0.5 MG/1
0.5 TABLET ORAL
Status: DISCONTINUED | OUTPATIENT
Start: 2018-02-13 | End: 2018-02-18 | Stop reason: HOSPADM

## 2018-02-13 RX ORDER — PAROXETINE HYDROCHLORIDE 20 MG/1
20 TABLET, FILM COATED ORAL DAILY
Status: DISCONTINUED | OUTPATIENT
Start: 2018-02-14 | End: 2018-02-18 | Stop reason: HOSPADM

## 2018-02-13 RX ORDER — METHYLPREDNISOLONE SODIUM SUCCINATE 40 MG/ML
40 INJECTION, POWDER, LYOPHILIZED, FOR SOLUTION INTRAMUSCULAR; INTRAVENOUS EVERY 8 HOURS SCHEDULED
Status: DISCONTINUED | OUTPATIENT
Start: 2018-02-13 | End: 2018-02-14

## 2018-02-13 RX ADMIN — ASPIRIN 81 MG 81 MG: 81 TABLET ORAL at 22:00

## 2018-02-13 RX ADMIN — ALBUTEROL SULFATE 5 MG: 2.5 SOLUTION RESPIRATORY (INHALATION) at 06:40

## 2018-02-13 RX ADMIN — MECLIZINE HYDROCHLORIDE 25 MG: 25 TABLET ORAL at 22:28

## 2018-02-13 RX ADMIN — ALBUTEROL SULFATE 5 MG: 2.5 SOLUTION RESPIRATORY (INHALATION) at 13:27

## 2018-02-13 RX ADMIN — IPRATROPIUM BROMIDE 0.5 MG: 0.5 SOLUTION RESPIRATORY (INHALATION) at 06:40

## 2018-02-13 RX ADMIN — METHYLPREDNISOLONE SODIUM SUCCINATE 40 MG: 40 INJECTION, POWDER, FOR SOLUTION INTRAMUSCULAR; INTRAVENOUS at 22:30

## 2018-02-13 RX ADMIN — METHYLPREDNISOLONE SODIUM SUCCINATE 60 MG: 125 INJECTION, POWDER, FOR SOLUTION INTRAMUSCULAR; INTRAVENOUS at 14:22

## 2018-02-13 RX ADMIN — FLUTICASONE PROPIONATE AND SALMETEROL 1 PUFF: 50; 250 POWDER RESPIRATORY (INHALATION) at 22:00

## 2018-02-13 RX ADMIN — SODIUM CHLORIDE 100 ML/HR: 0.9 INJECTION, SOLUTION INTRAVENOUS at 22:36

## 2018-02-13 RX ADMIN — IPRATROPIUM BROMIDE 0.5 MG: 0.5 SOLUTION RESPIRATORY (INHALATION) at 13:27

## 2018-02-13 RX ADMIN — IPRATROPIUM BROMIDE 0.5 MG: 0.5 SOLUTION RESPIRATORY (INHALATION) at 14:21

## 2018-02-13 RX ADMIN — ALBUTEROL SULFATE 5 MG: 2.5 SOLUTION RESPIRATORY (INHALATION) at 14:21

## 2018-02-13 RX ADMIN — BIMATOPROST 1 DROP: 0.1 SOLUTION/ DROPS OPHTHALMIC at 22:31

## 2018-02-13 RX ADMIN — ALBUTEROL SULFATE 2 PUFF: 90 AEROSOL, METERED RESPIRATORY (INHALATION) at 22:28

## 2018-02-13 RX ADMIN — LORAZEPAM 0.5 MG: 0.5 TABLET ORAL at 22:28

## 2018-02-13 NOTE — MEDICAL STUDENT
MEDICAL STUDENT  Inpatient H&P for TRAINING ONLY   Not Part of Legal Medical Record       H&P Exam - Lupis Oro  65 y o  male MRN: [id number]    Unit/Bed#: ED 19 Encounter: [id number]    Assessment/Plan:  1  COPD exacerbation   · Diffuse wheezes bilaterally on exam   · Given 3 treatments of ipratropium/albuterol in ED, with improvement   · CXR: No infiltrates   · SpO2 ~96% on RA   · Solu-medrol 60mg daily   2  Frequent falls  · Recent ED evaluations for falls  · Per records, working with PT at nursing home to improve ambulation   · CT Head negative today  · No signs of trauma on physical exam   · PT/OT evaluation   3  Chronic cough  · Follows with pulmonology   · 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   · Takes tessalon 100mg BID   · Pt should be on aspiration precautions   4  Hx of CVA  · Residual left sided facial and LUE weakness  · Home meds: ASA 81mg, Plavix 75mg     5  HTN  · Home meds: amlodipine 10mg, Carvedilol 12 5mg BID  6  GERD  · Home meds: Omeprazole 40mg   7  HLD  · Home meds: [id number] daily  8  Depression   · Home meds: Paxil 20mg          History of Present Illness     65 y o  M w/ Hx of COPD, CKD, CVA, lung CA, prostate CA who presented today after a fall out of bed  Patient lives in nursing home [facility]) and was trying to get out of bed without assistance when he fell  There was no LOC, he is unsure if he hit his head  The patient has had a chronic cough and follows with pulmonology  He reported a recent GI illness last week, but today denies chest pain, shortness of breath, palpitations, N/V, diarrhea or urinary symptoms  Upon arrival to the ED his HR, BP, RR and SpO2 were normal  He was found to have significant wheezing on physical exam  He was given three breathing treatments and reports "feeling much better " WBC is 11  CMP is WNL  Troponin is <0 02  EKG shows no ischemic changes  CXR negative for infiltrates  CT head is negative   The patient was given 60mg of methylprednisolone  Historical Information   Past Medical History:   Diagnosis Date    [facility]AL [facility]) ([facility] ) 5/31/2017    Aspiration into respiratory tract     Chest pain 5/31/2017    [facility]     Depression     Elevated troponin 5/31/2017    GERD (gastroesophageal reflux disease)     History of CVA (cerebrovascular accident) 4/13/2016    Hyperlipidemia     Hypertension     Lung [place] ([place] [address] ) 2005    Right, status post lobectomy    Pneumonia     Prostate cancer ([place] [address] )     Sleep apnea     awaiting sleep study results    Stroke ([place] [address] )     Weakness due to cerebrovascular accident ([facility])      Past Surgical History:   Procedure Laterality Date    COLONOSCOPY      ESOPHAGOGASTRODUODENOSCOPY N/A 4/13/2016    Procedure: ESOPHAGOGASTRODUODENOSCOPY (EGD); Surgeon: Pilar Park MD;  [facility]; Service:     JOINT REPLACEMENT      knee replacement    LUNG LOBECTOMY      CT ESOPHAGOGASTRODUODENOSCOPY TRANSORAL DIAGNOSTIC N/A 3/15/2017    Procedure: ESOPHAGOGASTRODUODENOSCOPY (EGD); Surgeon: Pilar Park MD;  [facility]; Service: Gastroenterology    TRIGEMINAL NERVE DECOMPRESSION       Social History   History   Alcohol Use No     History   Drug Use No     History   Smoking Status    Former Smoker    Packs/day: 1 00    Years: 22 00    Types: Cigarettes    Start date: 1955    Quit date: 1977   Smokeless Tobacco    Never Used     Family History:   Family History   Problem Relation Age of Onset    Family history unknown: Yes       Meds/Allergies   PTA meds:   Prior to Admission Medications   Prescriptions Last Dose Informant Patient Reported? Taking?    ASPIRIN 81 PO 2/13/2018 at Unknown time  Yes Yes   Sig: Take 1 tablet by mouth every other day     Fesoterodine Fumarate ER (TOVIAZ) 8 MG TB24 2/12/2018 at Unknown time Outside Facility (Specify) Yes Yes   Sig: Take 4 mg by mouth every evening LORazepam (ATIVAN) 0 5 mg tablet   No No   Sig: Take 0 5 tablets by mouth daily   LORazepam (ATIVAN) 0 5 mg tablet 2018 at Unknown time  Yes Yes   Sig: Take 1 tablet by mouth daily   PARoxetine (PAXIL) 20 mg tablet 2018 at Unknown time  Yes Yes   Sig: Take 1 tablet by mouth daily   acetaminophen (TYLENOL) 325 mg tablet Unknown at Unknown time  No No   Sig: Take 2 tablets by mouth every 6 (six) hours as needed for fever   albuterol (2 5 mg/3 mL) 0 083 % nebulizer solution   Yes No   Sig: Inhale 4 (four) times a day     albuterol (PROVENTIL HFA) 90 mcg/act inhaler 2018 at Unknown time  Yes Yes   Sig: Inhale   albuterol (PROVENTIL HFA,VENTOLIN HFA) 90 mcg/act inhaler   No No   Sig: Inhale 2 puffs 4 (four) times a day   amLODIPine (NORVASC) 10 mg tablet   Yes Yes   Sig: Take 1 tablet by mouth daily   atorvastatin (LIPITOR) 10 mg tablet 2018 at Unknown time  Yes Yes   Sig: Take 1 tablet by mouth   benzonatate (TESSALON PERLES) 100 mg capsule 2018 at Unknown time  No Yes   Sig: Take 1 capsule by mouth 3 (three) times a day as needed for cough   bimatoprost (LUMIGAN) 0 01 % ophthalmic drops 2018 at Unknown time  No Yes   Sig: Administer 1 drop to both eyes daily at bedtime   carvedilol (COREG) 12 5 mg tablet 2018 at Unknown time  No Yes   Sig: Take 1 tablet by mouth 2 (two) times a day with meals   clopidogrel (PLAVIX) 75 mg tablet 2018 at Unknown time  No Yes   Sig: Take 1 tablet by mouth daily   fluorouracil (EFUDEX) 5 % cream   Yes No   Sig: APPLY TO ENTIRE LESIONS Q 12 H FOR 28 DAYS FROM 2/3/18 TO 3/2/18   fluticasone (FLONASE) 50 mcg/act nasal spray 2018 at Unknown time  Yes Yes   Si-2 sprays into each nostril daily   fluticasone-salmeterol (ADVAIR DISKUS) 250-50 mcg/dose inhaler 2018 at Unknown time  Yes Yes   Sig: Inhale 2 (two) times a day     levocetirizine (XYZAL) 5 MG tablet 2018 at Unknown time  No Yes   Sig: Take 1 tablet by mouth every evening meclizine (ANTIVERT) 25 mg tablet   No No   Sig: Take 1 tablet by mouth 3 (three) times a day as needed for dizziness   mupirocin (BACTROBAN) 2 % ointment   Yes No   Sig: ELA AA TID FOR 7 DAYS FROM 1/27/18 THROUGH 2/2/18   omeprazole (PriLOSEC) 40 MG capsule 2/12/2018 at Unknown time  Yes Yes   Sig: Take 1 capsule by mouth daily   psyllium (METAMUCIL) 0 52 g capsule 2/12/2018 at Unknown time  Yes Yes   Sig: Take 0 52 g by mouth daily   spironolactone (ALDACTONE) 25 mg tablet 2/12/2018 at Unknown time  No Yes   Sig: Take 1 tablet (25 mg total) by mouth daily   tamsulosin (FLOMAX) 0 4 mg   No No   Sig: Take 1 capsule by mouth daily with dinner   tamsulosin (FLOMAX) 0 4 mg 2/12/2018 at Unknown time  Yes Yes   Sig: Take 1 capsule by mouth daily   tiotropium (SPIRIVA HANDIHALER) 18 mcg inhalation capsule 2/12/2018 at Unknown time  Yes Yes   Sig: Place into inhaler and inhale daily      Facility-Administered Medications: None     Allergies   Allergen Reactions    Penicillins Rash       Objective   First Vitals:   Blood Pressure: 128/61 (02/13/18 1132)  Pulse: 68 (02/13/18 1132)  Temperature: 97 8 °F (36 6 °C) (02/13/18 1648)  Temp Source: Oral (02/13/18 1648)  Respirations: 19 (02/13/18 1132)  Height: 6' 5" (195 6 cm) (02/13/18 1132)  Weight - Scale: 104 kg (230 lb) (02/13/18 1132)  SpO2: 96 % (02/13/18 1132)    Current Vitals:   Blood Pressure: 115/70 (02/13/18 1627)  Pulse: (!) 109 (02/13/18 1627)  Temperature: 97 8 °F (36 6 °C) (02/13/18 1648)  Temp Source: Oral (02/13/18 1648)  Respirations: 16 (02/13/18 1627)  Height: 6' 5" (195 6 cm) (02/13/18 1132)  Weight - Scale: 104 kg (230 lb) (02/13/18 1132)  SpO2: 94 % (02/13/18 1627)    No intake or output data in the 24 hours ending 02/13/18 1708    Invasive Devices          No matching active lines, drains, or airways          Physical Exam:     General appearance: Alert, in no distress   Head: Normocephalic, without obvious abnormality, atraumatic  Neck: No JVD and supple, symmetrical, trachea midline  Lungs: Moderate wheezing heard bilaterally    Heart: Regular rate, regular rhythm   Abdomen: Soft, non-tender   Extremities: No lower extremity edema   Neurologic: Left facial droop and LUE weakness due to previous CVA  Oriented to person, place, and time

## 2018-02-13 NOTE — ED PROVIDER NOTES
History  Chief Complaint   Patient presents with    Fall     Pt had unwitnessed fall at nursing home out of bed  Pt at baseline per nursing home  24-year-old male history of CVA with residual left-sided deficits from nursing home COPD comes emergent department for evaluation of mechanical fall  Patient states that he was getting out of bed and fell hit his head denies loss of consciousness  Of note, patient is on blood thinners  Fall was unwitnessed  Patient has no complaints at this time he remembers the events does not denies headache denies loss of consciousness  Otherwise, patient denies fever chest pain shortness of breath nausea vomiting abdominal pain urinary any bowel complaints  Medical management decision making:  Given the patient has wheezing on physical exam in complaining of dyspnea I will give patient DuoNeb treatment and discharge back to nursing home with appropriate return  precautions            Prior to Admission Medications   Prescriptions Last Dose Informant Patient Reported? Taking?    ASPIRIN 81 PO Unknown at Unknown time  Yes No   Sig: Take 1 tablet by mouth every other day     Fesoterodine Fumarate ER (TOVIAZ) 8 MG TB24 Unknown at Unknown time Outside Facility (Specify) Yes No   Sig: Take 4 mg by mouth every evening     LORazepam (ATIVAN) 0 5 mg tablet Unknown at Unknown time  No No   Sig: Take 0 5 tablets by mouth daily   LORazepam (ATIVAN) 0 5 mg tablet Unknown at Unknown time  Yes No   Sig: Take 1 tablet by mouth daily   PARoxetine (PAXIL) 20 mg tablet Unknown at Unknown time  Yes No   Sig: Take 1 tablet by mouth daily   acetaminophen (TYLENOL) 325 mg tablet Unknown at Unknown time  No No   Sig: Take 2 tablets by mouth every 6 (six) hours as needed for fever   albuterol (2 5 mg/3 mL) 0 083 % nebulizer solution Unknown at Unknown time  Yes No   Sig: Inhale 4 (four) times a day     albuterol (PROVENTIL HFA) 90 mcg/act inhaler Unknown at Unknown time  Yes No   Sig: Inhale albuterol (PROVENTIL HFA,VENTOLIN HFA) 90 mcg/act inhaler Unknown at Unknown time  No No   Sig: Inhale 2 puffs 4 (four) times a day   amLODIPine (NORVASC) 10 mg tablet Unknown at Unknown time  Yes No   Sig: Take 1 tablet by mouth daily   atorvastatin (LIPITOR) 10 mg tablet Unknown at Unknown time  Yes No   Sig: Take 1 tablet by mouth   benzonatate (TESSALON PERLES) 100 mg capsule Unknown at Unknown time  No No   Sig: Take 1 capsule by mouth 3 (three) times a day as needed for cough   bimatoprost (LUMIGAN) 0 01 % ophthalmic drops Unknown at Unknown time  No No   Sig: Administer 1 drop to both eyes daily at bedtime   carvedilol (COREG) 12 5 mg tablet Unknown at Unknown time  No No   Sig: Take 1 tablet by mouth 2 (two) times a day with meals   clopidogrel (PLAVIX) 75 mg tablet Unknown at Unknown time  No No   Sig: Take 1 tablet by mouth daily   fluorouracil (EFUDEX) 5 % cream Unknown at Unknown time  Yes No   Sig: APPLY TO ENTIRE LESIONS Q 12 H FOR 28 DAYS FROM 2/3/18 TO 3/2/18   fluticasone (FLONASE) 50 mcg/act nasal spray Unknown at Unknown time  Yes No   Si-2 sprays into each nostril daily   fluticasone-salmeterol (ADVAIR DISKUS) 250-50 mcg/dose inhaler Unknown at Unknown time  Yes No   Sig: Inhale 2 (two) times a day     levocetirizine (XYZAL) 5 MG tablet Unknown at Unknown time  No No   Sig: Take 1 tablet by mouth every evening   meclizine (ANTIVERT) 25 mg tablet Unknown at Unknown time  No No   Sig: Take 1 tablet by mouth 3 (three) times a day as needed for dizziness   mupirocin (BACTROBAN) 2 % ointment Unknown at Unknown time  Yes No   Sig: ELA AA TID FOR 7 DAYS FROM 18 THROUGH 18   omeprazole (PriLOSEC) 40 MG capsule Unknown at Unknown time  Yes No   Sig: Take 1 capsule by mouth daily   psyllium (METAMUCIL) 0 52 g capsule Unknown at Unknown time  Yes No   Sig: Take 0 52 g by mouth daily   spironolactone (ALDACTONE) 25 mg tablet Unknown at Unknown time  No No   Sig: Take 1 tablet (25 mg total) by mouth daily   tamsulosin (FLOMAX) 0 4 mg Unknown at Unknown time  No No   Sig: Take 1 capsule by mouth daily with dinner   tamsulosin (FLOMAX) 0 4 mg Unknown at Unknown time  Yes No   Sig: Take 1 capsule by mouth daily   tiotropium (SPIRIVA HANDIHALER) 18 mcg inhalation capsule Unknown at Unknown time  Yes No   Sig: Place into inhaler and inhale daily      Facility-Administered Medications: None       Past Medical History:   Diagnosis Date    [facility]2017    Aspiration into respiratory tract     Chest pain 5/31/2017    [facility]     Depression     Elevated troponin 5/31/2017    GERD (gastroesophageal reflux disease)     History of CVA (cerebrovascular accident) 4/13/2016    Hyperlipidemia     Hypertension     Lung cancer ([place] [address] ) 2005    Right, status post lobectomy    Pneumonia     Prostate cancer ([place] [address] )     Sleep apnea     awaiting sleep study results    Stroke ([place] [address] )     Weakness due to cerebrovascular accident ([facility])        Past Surgical History:   Procedure Laterality Date    COLONOSCOPY      ESOPHAGOGASTRODUODENOSCOPY N/A 4/13/2016    Procedure: ESOPHAGOGASTRODUODENOSCOPY (EGD); Surgeon: Pilar Park MD;  [facility]; Service:     JOINT REPLACEMENT      knee replacement    LUNG LOBECTOMY      [place] ESOPHAGOGASTRODUODENOSCOPY TRANSORAL DIAGNOSTIC N/A 3/15/2017    Procedure: ESOPHAGOGASTRODUODENOSCOPY (EGD); Surgeon: Pilar Park MD;  [facility]; Service: Gastroenterology    TRIGEMINAL NERVE DECOMPRESSION         Family History   Problem Relation Age of Onset    Family history unknown: Yes     I have reviewed and agree with the history as documented      Social History   Substance Use Topics    Smoking status: Former Smoker     Packs/day: 1 00     Years: 22 00     Types: Cigarettes     Start date: 1955     Quit date: 1977    Smokeless tobacco: Never Used    Alcohol use No        Review of Systems Constitutional: Negative for appetite change, chills, diaphoresis, fatigue and fever  HENT: Negative for congestion, ear discharge, ear pain, hearing loss, postnasal drip, rhinorrhea, sneezing and sore throat  Eyes: Negative for pain, discharge and redness  Respiratory: Negative for cough, choking, chest tightness, shortness of breath, wheezing and stridor  Cardiovascular: Negative for chest pain and palpitations  Gastrointestinal: Negative for abdominal distention, abdominal pain, blood in stool, constipation, diarrhea, nausea and vomiting  Genitourinary: Negative for decreased urine volume, difficulty urinating, dysuria, flank pain, frequency and hematuria  Musculoskeletal: Negative for arthralgias, gait problem, joint swelling and neck pain  Skin: Negative for color change, pallor and rash  Allergic/Immunologic: Negative for environmental allergies, food allergies and immunocompromised state  Neurological: Negative for dizziness, seizures, weakness, light-headedness, numbness and headaches  Hematological: Negative for adenopathy  Does not bruise/bleed easily  Psychiatric/Behavioral: Negative for agitation and behavioral problems  Physical Exam  ED Triage Vitals   Temperature Pulse Respirations Blood Pressure SpO2   02/13/18 0618 02/13/18 0619 02/13/18 0619 02/13/18 0619 02/13/18 0619   98 2 °F (36 8 °C) 79 20 140/68 95 %      Temp Source Heart Rate Source Patient Position - Orthostatic VS BP Location FiO2 (%)   02/13/18 0618 -- -- -- --   Oral          Pain Score       02/13/18 0619       No Pain           Orthostatic Vital Signs  Vitals:    02/13/18 0619   BP: 140/68   Pulse: 79       Physical Exam   Constitutional: He is oriented to person, place, and time  He appears well-developed and well-nourished  HENT:   Head: Normocephalic and atraumatic     Nose: Nose normal    Mouth/Throat: Oropharynx is clear and moist    Eyes: Conjunctivae and EOM are normal  Pupils are equal, round, and reactive to light  Neck: Normal range of motion  Neck supple  Cardiovascular: Normal rate, regular rhythm and normal heart sounds  Exam reveals no gallop and no friction rub  No murmur heard  Pulmonary/Chest: Effort normal and breath sounds normal  No respiratory distress  He has no wheezes  He has no rales  Abdominal: Soft  Bowel sounds are normal  He exhibits no distension  There is no tenderness  There is no rebound and no guarding  Musculoskeletal: Normal range of motion  Neurological: He is alert and oriented to person, place, and time  No sensory deficit  Residual left sided motor weakness 4/5   Skin: Skin is warm and dry  Psychiatric: He has a normal mood and affect  His behavior is normal    Nursing note and vitals reviewed  ED Medications  Medications   albuterol inhalation solution 5 mg (5 mg Nebulization Given 2/13/18 0640)   ipratropium (ATROVENT) 0 02 % inhalation solution 0 5 mg (0 5 mg Nebulization Given 2/13/18 0640)       Diagnostic Studies  Results Reviewed     None                 No orders to display         Procedures  Procedures      Phone Consults  ED Phone Contact    ED Course  ED Course as of Feb 14 1902 Tue Feb 13, 2018   0619 Patient states that he felt better and no longer presents with wheezing                                MDM  CritCare Time    Disposition  Final diagnoses:   Fall   Shortness of breath   Nursing home resident   COPD without exacerbation [facility])     Time reflects when diagnosis was documented in both MDM as applicable and the Disposition within this note     Time User Action Codes Description Comment    2/13/2018  6:51 AM Gilberto Must Add [J66  XXXA] Fall     2/13/2018  6:51 AM Gilberto Must Add [R06 02] Shortness of breath     2/13/2018  6:51 AM Gilberto Must Add [Z59 3] Nursing home resident     2/13/2018  6:51 AM Nithya Lo Mercury Add [J44 9] COPD without exacerbation [facility])       ED Disposition     ED Disposition Condition Comment Discharge  Loy Arenas  discharge to home/self care  Condition at discharge: Stable        Follow-up Information     Follow up With Specialties Details Why Contact Jr Quintero DO Family Medicine In 3 days  [address]  [phone]          Discharge Medication List as of 2/13/2018  6:52 AM      CONTINUE these medications which have NOT CHANGED    Details   !! albuterol (PROVENTIL HFA) 90 mcg/act inhaler Inhale, Starting Tue 10/10/2017, Historical Med      amLODIPine (NORVASC) 10 mg tablet Take 1 tablet by mouth daily, Starting Tue 10/10/2017, Historical Med      ASPIRIN 81 PO Take 1 tablet by mouth every other day  , Starting Tue 10/10/2017, Historical Med      atorvastatin (LIPITOR) 10 mg tablet Take 1 tablet by mouth, Historical Med      benzonatate (TESSALON PERLES) 100 mg capsule Take 1 capsule by mouth 3 (three) times a day as needed for cough, Starting Mon 8/21/2017, Print      bimatoprost (LUMIGAN) 0 01 % ophthalmic drops Administer 1 drop to both eyes daily at bedtime, Starting Mon 6/19/2017, Print      carvedilol (COREG) 12 5 mg tablet Take 1 tablet by mouth 2 (two) times a day with meals, Starting Mon 8/21/2017, Print      clopidogrel (PLAVIX) 75 mg tablet Take 1 tablet by mouth daily, Starting Mon 6/19/2017, Print      Fesoterodine Fumarate ER (TOVIAZ) 8 MG TB24 Take 1 tablet by mouth every evening , Until Discontinued, Historical Med      fluticasone (FLONASE) 50 mcg/act nasal spray 1-2 sprays into each nostril daily, Starting Wed 8/30/2017, Historical Med      fluticasone-salmeterol (ADVAIR DISKUS) 250-50 mcg/dose inhaler Inhale 2 (two) times a day  , Starting Tue 10/10/2017, Historical Med      levocetirizine (XYZAL) 5 MG tablet Take 1 tablet by mouth every evening, Starting Mon 6/19/2017, Print      !!  LORazepam (ATIVAN) 0 5 mg tablet Take 1 tablet by mouth daily, Starting Tue 10/10/2017, Historical Med      omeprazole (PriLOSEC) 40 MG capsule Take 1 capsule by mouth daily, Starting Wed 8/30/2017, Historical Med      PARoxetine (PAXIL) 20 mg tablet Take 1 tablet by mouth daily, Historical Med      psyllium (METAMUCIL) 0 52 g capsule Take 0 52 g by mouth daily, Historical Med      spironolactone (ALDACTONE) 25 mg tablet Take 1 tablet (25 mg total) by mouth daily, Starting Sun 2/4/2018, No Print      !! tamsulosin (FLOMAX) 0 4 mg Take 1 capsule by mouth daily, Historical Med      tiotropium (SPIRIVA HANDIHALER) 18 mcg inhalation capsule Place into inhaler and inhale daily, Historical Med      acetaminophen (TYLENOL) 325 mg tablet Take 2 tablets by mouth every 6 (six) hours as needed for fever, Starting Mon 6/19/2017, Print      albuterol (2 5 mg/3 mL) 0 083 % nebulizer solution Inhale 4 (four) times a day  , Starting Tue 10/10/2017, Historical Med      !! albuterol (PROVENTIL HFA,VENTOLIN HFA) 90 mcg/act inhaler Inhale 2 puffs 4 (four) times a day, Starting Mon 6/19/2017, Print      fluorouracil (EFUDEX) 5 % cream APPLY TO ENTIRE LESIONS Q 12 H FOR 28 DAYS FROM 2/3/18 TO 3/2/18, Historical Med      !! LORazepam (ATIVAN) 0 5 mg tablet Take 0 5 tablets by mouth daily, Starting Mon 8/21/2017, Print      meclizine (ANTIVERT) 25 mg tablet Take 1 tablet by mouth 3 (three) times a day as needed for dizziness, Starting Mon 6/19/2017, Print      mupirocin (BACTROBAN) 2 % ointment ELA AA TID FOR 7 DAYS FROM 1/27/18 THROUGH 2/2/18, Historical Med      !! tamsulosin (FLOMAX) 0 4 mg Take 1 capsule by mouth daily with dinner, Starting Mon 6/19/2017, Print       !! - Potential duplicate medications found  Please discuss with provider  No discharge procedures on file  ED Provider  Attending physically available and evaluated Myriam Aguirre    I managed the patient along with the ED Attending      Electronically Signed by         Oscar Jaimes MD  02/14/18 3799

## 2018-02-13 NOTE — H&P
History and Physical - [facility] Internal Medicine    Patient Information: Yaritza Mgcee  65 y o  male MRN: [id number]  Unit/Bed#: ED 19 Encounter: [id number]  Admitting Physician: Anayeli Bustamante MD  PCP: Shivani Wright DO  Date of Admission:  02/13/18    ASSESSMENT:      Hospital Problem List:     Active Problems:    Lung cancer ([facility] )    History of cerebrovascular accident (CVA) with residual deficit    GERD (gastroesophageal reflux disease)    Chronic kidney disease, stage 3    Debility    Chronic cough    COPD (chronic obstructive pulmonary disease) ([facility] )      PLAN:    · SIRS syndrome  · R/o Influenza  · R/o UTI - UA - if positive -ABx; reported mild confusion prior to presentation  · Monitor vitals and cbc  · COPD with exacerbation  · Solumedrol IV  · Continue nebs and o2 as needed  · Cough suppressants  · H/o Lung CA s/p rt lung resection  · Associated Tracheomalacia - under Dr Gerhard Post' care  · Chronic cough - symptomatic Rx  · GERD  · PPI  · Generalized weakness  · Recurrent falls  · PT/OT  · IVF for component of dehydration and monitor      VTE Prophylaxis: Enoxaparin (Lovenox)  / sequential compression device   Code Status: Prior  POLST: Full code - d/w dtr who is a Kyree    Anticipated Length of Stay:  Patient will be admitted on an Inpatient basis with an anticipated length of stay of  > 2 midnights  Justification for Hospital Stay:     Total Time for Visit, including Counseling / Coordination of Care: 1 hour  Greater than 50% of this total time spent on direct patient counseling and coordination of care  Chief Complaint:   Weakness, wheezing and falls    of Present Illness:    Yaritza Mcgee  is a 65 y o  male who presents with generalized weakness and associated recurrent falls at [facility]  There is also reported mild episode of confusion which he had with previous UTI's  Pt also reported significant dyspnea and wheezing that responded to nebulizer therapy in ER   Denied any fevers, chills but fatigue ++    Review of Systems:    Review of Systems   Constitutional: Positive for activity change, appetite change and fatigue  HENT: Negative  Eyes: Negative  Respiratory: Positive for cough, shortness of breath and wheezing  Cardiovascular: Negative for chest pain  Gastrointestinal: Negative  Endocrine: Negative  Genitourinary: Negative  Musculoskeletal: Negative  Allergic/Immunologic: Negative  Neurological: Negative  Hematological: Negative  Psychiatric/Behavioral: Negative  All other systems reviewed and are negative  Past Medical and Surgical History:     Past Medical History:   Diagnosis Date    [facility] ([facility] ) 5/31/2017    Aspiration into respiratory tract     Chest pain 5/31/2017    [facility]     Depression     Elevated troponin 5/31/2017    GERD (gastroesophageal reflux disease)     History of CVA (cerebrovascular accident) 4/13/2016    Hyperlipidemia     Hypertension     Lung cancer ([place] [address] ) 2005    Right, status post lobectomy    Pneumonia     Prostate cancer ([place] [address] )     Sleep apnea     awaiting sleep study results    Stroke ([place] [address] )     Weakness due to cerebrovascular accident (CVA) [facility])        Past Surgical History:   Procedure Laterality Date    COLONOSCOPY      ESOPHAGOGASTRODUODENOSCOPY N/A 4/13/2016    Procedure: ESOPHAGOGASTRODUODENOSCOPY (EGD); Surgeon: Dion Gipson MD;  [facility]; Service:     JOINT REPLACEMENT      knee replacement    LUNG LOBECTOMY      AR ESOPHAGOGASTRODUODENOSCOPY TRANSORAL DIAGNOSTIC N/A 3/15/2017    Procedure: ESOPHAGOGASTRODUODENOSCOPY (EGD); Surgeon: Dion Gipson MD;  [facility]; Service: Gastroenterology    TRIGEMINAL NERVE DECOMPRESSION         Meds/Allergies:    PTA meds:   Prior to Admission Medications   Prescriptions Last Dose Informant Patient Reported? Taking?    ASPIRIN 81 PO 2/13/2018 at Unknown time Yes Yes   Sig: Take 1 tablet by mouth every other day     Fesoterodine Fumarate ER (TOVIAZ) 8 MG  at Unknown time Outside Facility ([facility]) Yes Yes   Sig: Take 4 mg by mouth every evening     LORazepam (ATIVAN) 0 5 mg tablet   No No   Sig: Take 0 5 tablets by mouth daily   LORazepam (ATIVAN) 0 5 mg tablet 2018 at Unknown time  Yes Yes   Sig: Take 1 tablet by mouth daily   PARoxetine (PAXIL) 20 mg tablet 2018 at Unknown time  Yes Yes   Sig: Take 1 tablet by mouth daily   acetaminophen (TYLENOL) 325 mg tablet Unknown at Unknown time  No No   Sig: Take 2 tablets by mouth every 6 (six) hours as needed for fever   albuterol (2 5 mg/3 mL) 0 083 % nebulizer solution   Yes No   Sig: Inhale 4 (four) times a day     albuterol (PROVENTIL HFA) 90 mcg/act inhaler 2018 at Unknown time  Yes Yes   Sig: Inhale   albuterol (PROVENTIL HFA,VENTOLIN HFA) 90 mcg/act inhaler   No No   Sig: Inhale 2 puffs 4 (four) times a day   amLODIPine (NORVASC) 10 mg tablet   Yes Yes   Sig: Take 1 tablet by mouth daily   atorvastatin (LIPITOR) 10 mg tablet 2018 at Unknown time  Yes Yes   Sig: Take 1 tablet by mouth   benzonatate (TESSALON PERLES) 100 mg capsule 2018 at Unknown time  No Yes   Sig: Take 1 capsule by mouth 3 (three) times a day as needed for cough   bimatoprost (LUMIGAN) 0 01 % ophthalmic drops 2018 at Unknown time  No Yes   Sig: Administer 1 drop to both eyes daily at bedtime   carvedilol (COREG) 12 5 mg tablet 2018 at Unknown time  No Yes   Sig: Take 1 tablet by mouth 2 (two) times a day with meals   clopidogrel (PLAVIX) 75 mg tablet 2018 at Unknown time  No Yes   Sig: Take 1 tablet by mouth daily   fluorouracil (EFUDEX) 5 % cream   Yes No   Sig: APPLY TO ENTIRE LESIONS Q 12 H FOR 28 DAYS FROM 2/3/18 TO 3/2/18   fluticasone (FLONASE) 50 mcg/act nasal spray 2018 at Unknown time  Yes Yes   Si-2 sprays into each nostril daily   fluticasone-salmeterol (ADVAIR DISKUS) 250-50 mcg/dose inhaler 2/12/2018 at Unknown time  Yes Yes   Sig: Inhale 2 (two) times a day     levocetirizine (XYZAL) 5 MG tablet 2/12/2018 at Unknown time  No Yes   Sig: Take 1 tablet by mouth every evening   meclizine (ANTIVERT) 25 mg tablet   No No   Sig: Take 1 tablet by mouth 3 (three) times a day as needed for dizziness   mupirocin (BACTROBAN) 2 % ointment   Yes No   Sig: ELA AA TID FOR 7 DAYS FROM 1/27/18 THROUGH 2/2/18   omeprazole (PriLOSEC) 40 MG capsule 2/12/2018 at Unknown time  Yes Yes   Sig: Take 1 capsule by mouth daily   psyllium (METAMUCIL) 0 52 g capsule 2/12/2018 at Unknown time  Yes Yes   Sig: Take 0 52 g by mouth daily   spironolactone (ALDACTONE) 25 mg tablet 2/12/2018 at Unknown time  No Yes   Sig: Take 1 tablet (25 mg total) by mouth daily   tamsulosin (FLOMAX) 0 4 mg   No No   Sig: Take 1 capsule by mouth daily with dinner   tamsulosin (FLOMAX) 0 4 mg 2/12/2018 at Unknown time  Yes Yes   Sig: Take 1 capsule by mouth daily   tiotropium (SPIRIVA HANDIHALER) 18 mcg inhalation capsule 2/12/2018 at Unknown time  Yes Yes   Sig: Place into inhaler and inhale daily      Facility-Administered Medications: None       Allergies: Allergies   Allergen Reactions    Penicillins Rash     History:     Marital Status:     Occupation:   Patient Pre-hospital Living Situation:   Patient Pre-hospital Level of Mobility:   Patient Pre-hospital Diet Restrictions:   Substance Use History:   History   Alcohol Use No     History   Smoking Status    Former Smoker    Packs/day: 1 00    Years: 22 00    Types: Cigarettes    Start date: 5    Quit date: 1977   Smokeless Tobacco    Never Used     History   Drug Use No       Family History:    Family History   Problem Relation Age of Onset    Family history unknown: Yes       Physical Exam:     Vitals:   Blood Pressure: (!) 133/103 (02/13/18 1830)  Pulse: 70 (02/13/18 1815)  Temperature: 97 8 °F (36 6 °C) (02/13/18 1648)  Temp Source: Oral (02/13/18 1648)  Respirations: 18 (02/13/18 1725)  Height: 6' 5" (195 6 cm) (02/13/18 1132)  Weight - Scale: 104 kg (230 lb) (02/13/18 1132)  SpO2: 95 % (02/13/18 1815)    Physical Exam   Constitutional: He is oriented to person, place, and time  HENT:   Head: Normocephalic  Eyes: Pupils are equal, round, and reactive to light  Cardiovascular: Normal heart sounds  Pulmonary/Chest: He has wheezes  He has rales  Abdominal: Soft  Bowel sounds are normal    Neurological: He is alert and oriented to person, place, and time  Skin: There is pallor  Some bruising of elbow area         Lab and Imaging Results: I have personally reviewed pertinent films in PACS      Results from last 7 days  Lab Units 02/13/18  1138   WBC Thousand/uL 11 02*   HEMOGLOBIN g/dL 13 9   HEMATOCRIT % 40 9   PLATELETS Thousands/uL 121*   NEUTROS PCT % 83*   LYMPHS PCT % 6*   MONOS PCT % 11   EOS PCT % 0       Results from last 7 days  Lab Units 02/13/18  1138   SODIUM mmol/L 137   POTASSIUM mmol/L 3 7   CHLORIDE mmol/L 102   CO2 mmol/L 25   BUN mg/dL 18   CREATININE mg/dL 1 28   CALCIUM mg/dL 8 9   TOTAL PROTEIN g/dL 7 4   BILIRUBIN TOTAL mg/dL 1 17*   ALK PHOS U/L 87   ALT U/L 26   AST U/L 29   GLUCOSE RANDOM mg/dL 106           X-ray Chest 2 Views    Result Date: 2/13/2018  Narrative: CHEST INDICATION: Weakness and cough  COMPARISON: 2/2/2018  VIEWS:  Frontal and lateral projections; 2 images FINDINGS: Stable cardiomegaly is noted  The lungs are clear  No pleural effusions  Osseous structures are age appropriate  Impression: No active pulmonary disease  Workstation performed: [id number]     Xr Chest 2 Views    Result Date: 2/2/2018  Narrative: CHEST - DUAL ENERGY INDICATION:  Cough  COMPARISON:  8/23/2017  VIEWS:  PA (including soft tissue/bone algorithms) and lateral projections IMAGES:  5 FINDINGS:     Heart shadow appears unremarkable  Atherosclerotic vascular calcifications are noted   Right lung volume loss consistent with prior lobectomy again identified  The lungs are clear  No pneumothorax or pleural effusion  Visualized osseous structures appear stable, including resection of multiple right ribs, and thoracic dextroscoliosis  Impression: No active pulmonary disease  Workstation performed: [id number]     Ct Head Without Contrast    Result Date: 2/13/2018  Narrative: CT BRAIN - WITHOUT CONTRAST INDICATION: fall, on blood thinners  History taken directly from the electronic ordering system  COMPARISON:  CT head 8/23/2017 TECHNIQUE:  CT examination of the brain was performed  In addition to axial images, coronal reformatted images were created and submitted for interpretation  Radiation dose length product (DLP) for this visit:  1102 mGy-cm   This examination, like all CT scans performed in the [facility], was performed utilizing techniques to minimize radiation dose exposure, including the use of iterative reconstruction and automated exposure control  IMAGE QUALITY:  Diagnostic  FINDINGS:  PARENCHYMA:  No intracranial mass, mass effect or midline shift  No CT signs of acute infarction  There is no parenchymal hemorrhage  Periventricular and centrum semiovale white matter hypodensity is a nonspecific finding likely representing chronic microangiopathy  Chronic lacunar infarcts bilateral basal ganglia  Calcification bilateral cavernous internal carotid and distal vertebral arteries  VENTRICLES AND EXTRA-AXIAL SPACES:  No acute hydrocephalus  Mild prominence of CSF spaces diffusely attributed to generalized volume loss  VISUALIZED ORBITS AND PARANASAL SINUSES:  Orbits appear normal   Mild scattered sinus mucosal thickening is noted  This is most prominent in the right maxillary sinus  No fluid levels are seen  CALVARIUM AND EXTRACRANIAL SOFT TISSUES:  Trace chronic opacification inferior right mastoid  No acute skull fracture  Impression: No acute intracranial process  No skull fracture   Chronic microangiopathy  Workstation performed: [id number]     Ct Chest With Contrast    Result Date: 2/2/2018  Narrative: CT CHEST WITH IV CONTRAST INDICATION:  hemoptysis  History taken directly from the electronic ordering system  Cough  COMPARISON: 4/9/2017 TECHNIQUE: CT examination of the chest was performed  Reformatted images were created in axial, sagittal, and coronal planes  Radiation dose length product (DLP) for this visit:  597 17 mGy-cm   This examination, like all CT scans performed in the [facility], was performed utilizing techniques to minimize radiation dose exposure, including the use of iterative  reconstruction and automated exposure control  IV Contrast:  85 mL of iohexol (OMNIPAQUE)         FINDINGS: LUNGS:  Stable postoperative changes in the right lung related to prior right upper lobectomy  Lungs are clear  There is no tracheal or endobronchial lesion  PLEURA:  Unremarkable  HEART/GREAT VESSELS:  Unremarkable for patient's age  MEDIASTINUM AND DALIA:  Unremarkable  CHEST WALL AND LOWER NECK:       Normal  VISUALIZED STRUCTURES IN THE UPPER ABDOMEN:  Gallstones again identified, with no obvious pericholecystic inflammatory changes  OSSEOUS STRUCTURES:  No acute fracture  No destructive osseous lesion  Impression: No acute pathology  Findings are consistent with the preliminary report from [facility] which was provided shortly after completion of the exam              Workstation performed: [id number]       EKG, Pathology, and Other Studies Reviewed on Admission:   · CXR - loss of rt lung volume; no infiltrate    [employer] Records Reviewed: Yes     ** Please Note: Dragon 360 Dictation voice to text software may have been used in the creation of this document   **

## 2018-02-13 NOTE — ED PROVIDER NOTES
History  Chief Complaint   Patient presents with    Fall     Patient d/c from ER at 0530 this am for previous fall  Patient attempted to walk to bathroom at [facility] and slid to floor  Patient denies any injury at this time  Per staff at [facility] patient appears altered  67-year-old history stroke with residual left-sided weakness, lung cancer status post right-sided lobectomy, COPD, CKD presents after recurrent falls  Patient was evaluated this morning after having a fall out of bed, he was discharged back home and then again later today he had a witnessed fall in which she was noted to slight before  He did not lose consciousness  Patient reports feeling weak and fatigued as well as having persistent cough for the past few days  Denies chest pain or significant shortness of breath, fevers or chills  Prior to Admission Medications   Prescriptions Last Dose Informant Patient Reported? Taking?    ASPIRIN 81 PO 2/13/2018 at Unknown time  Yes Yes   Sig: Take 1 tablet by mouth every other day     Fesoterodine Fumarate ER (TOVIAZ) 8 MG TB24 2/12/2018 at Unknown time Outside Facility ([facility]) Yes Yes   Sig: Take 4 mg by mouth every evening     LORazepam (ATIVAN) 0 5 mg tablet   No No   Sig: Take 0 5 tablets by mouth daily   LORazepam (ATIVAN) 0 5 mg tablet 2/12/2018 at Unknown time  Yes Yes   Sig: Take 1 tablet by mouth daily   PARoxetine (PAXIL) 20 mg tablet 2/12/2018 at Unknown time  Yes Yes   Sig: Take 1 tablet by mouth daily   acetaminophen (TYLENOL) 325 mg tablet Unknown at Unknown time  No No   Sig: Take 2 tablets by mouth every 6 (six) hours as needed for fever   albuterol (2 5 mg/3 mL) 0 083 % nebulizer solution   Yes No   Sig: Inhale 4 (four) times a day     albuterol (PROVENTIL HFA) 90 mcg/act inhaler 2/12/2018 at Unknown time  Yes Yes   Sig: Inhale   albuterol (PROVENTIL HFA,VENTOLIN HFA) 90 mcg/act inhaler   No No   Sig: Inhale 2 puffs 4 (four) times a day   amLODIPine (NORVASC) 10 mg tablet Yes Yes   Sig: Take 1 tablet by mouth daily   atorvastatin (LIPITOR) 10 mg tablet 2018 at Unknown time  Yes Yes   Sig: Take 1 tablet by mouth   benzonatate (TESSALON PERLES) 100 mg capsule 2018 at Unknown time  No Yes   Sig: Take 1 capsule by mouth 3 (three) times a day as needed for cough   bimatoprost (LUMIGAN) 0 01 % ophthalmic drops 2018 at Unknown time  No Yes   Sig: Administer 1 drop to both eyes daily at bedtime   carvedilol (COREG) 12 5 mg tablet 2018 at Unknown time  No Yes   Sig: Take 1 tablet by mouth 2 (two) times a day with meals   clopidogrel (PLAVIX) 75 mg tablet 2018 at Unknown time  No Yes   Sig: Take 1 tablet by mouth daily   fluorouracil (EFUDEX) 5 % cream   Yes No   Sig: APPLY TO ENTIRE LESIONS Q 12 H FOR 28 DAYS FROM 2/3/18 TO 3/2/18   fluticasone (FLONASE) 50 mcg/act nasal spray 2018 at Unknown time  Yes Yes   Si-2 sprays into each nostril daily   fluticasone-salmeterol (ADVAIR DISKUS) 250-50 mcg/dose inhaler 2018 at Unknown time  Yes Yes   Sig: Inhale 2 (two) times a day     levocetirizine (XYZAL) 5 MG tablet 2018 at Unknown time  No Yes   Sig: Take 1 tablet by mouth every evening   meclizine (ANTIVERT) 25 mg tablet   No No   Sig: Take 1 tablet by mouth 3 (three) times a day as needed for dizziness   mupirocin (BACTROBAN) 2 % ointment   Yes No   Sig: ELA AA TID FOR 7 DAYS FROM 18 THROUGH 18   omeprazole (PriLOSEC) 40 MG capsule 2018 at Unknown time  Yes Yes   Sig: Take 1 capsule by mouth daily   psyllium (METAMUCIL) 0 52 g capsule 2018 at Unknown time  Yes Yes   Sig: Take 0 52 g by mouth daily   spironolactone (ALDACTONE) 25 mg tablet 2018 at Unknown time  No Yes   Sig: Take 1 tablet (25 mg total) by mouth daily   tamsulosin (FLOMAX) 0 4 mg   No No   Sig: Take 1 capsule by mouth daily with dinner   tamsulosin (FLOMAX) 0 4 mg 2018 at Unknown time  Yes Yes   Sig: Take 1 capsule by mouth daily   tiotropium (SPIRIVA HANDIHALER) 18 mcg inhalation capsule 2/12/2018 at Unknown time  Yes Yes   Sig: Place into inhaler and inhale daily      Facility-Administered Medications: None       Past Medical History:   Diagnosis Date    [facility]/31/2017    Aspiration into respiratory tract     Chest pain 5/31/2017    COPD (chronic obstructive pulmonary disease) (HCC)     Depression     Elevated troponin 5/31/2017    GERD (gastroesophageal reflux disease)     History of CVA (cerebrovascular accident) 4/13/2016    Hyperlipidemia     Hypertension     Lung cancer ([facility] ) 2005    Right, status post lobectomy    Pneumonia     Prostate cancer ([facility] )     Sleep apnea     awaiting sleep study results    Stroke ([place] [address] )     Weakness due to cerebrovascular accident (CVA) [facility])        Past Surgical History:   Procedure Laterality Date    COLONOSCOPY      ESOPHAGOGASTRODUODENOSCOPY N/A 4/13/2016    Procedure: ESOPHAGOGASTRODUODENOSCOPY (EGD); Surgeon: Trinidad Null MD;  [facility]; Service:     JOINT REPLACEMENT      knee replacement    LUNG LOBECTOMY      DC ESOPHAGOGASTRODUODENOSCOPY TRANSORAL DIAGNOSTIC N/A 3/15/2017    Procedure: ESOPHAGOGASTRODUODENOSCOPY (EGD); Surgeon: Trinidad Null MD;  [facility]; Service: Gastroenterology    TRIGEMINAL NERVE DECOMPRESSION         Family History   Problem Relation Age of Onset    Family history unknown: Yes     I have reviewed and agree with the history as documented  Social History   Substance Use Topics    Smoking status: Former Smoker     Packs/day: 1 00     Years: 22 00     Types: Cigarettes     Start date: 1955     Quit date: 1977    Smokeless tobacco: Never Used    Alcohol use No        Review of Systems   Constitutional: Positive for fatigue  Negative for chills and fever  HENT: Negative for congestion and sore throat  Eyes: Negative for pain and redness  Respiratory: Positive for cough   Negative for shortness of breath and wheezing  Cardiovascular: Negative for chest pain and palpitations  Gastrointestinal: Negative for abdominal pain, diarrhea and vomiting  Endocrine: Negative for polydipsia and polyphagia  Genitourinary: Negative for dysuria and flank pain  Musculoskeletal: Negative for arthralgias and back pain  Skin: Negative for rash and wound  Neurological: Positive for weakness  Negative for seizures and headaches  Psychiatric/Behavioral: Negative for agitation and behavioral problems  All other systems reviewed and are negative  Physical Exam  ED Triage Vitals   Temperature Pulse Respirations Blood Pressure SpO2   02/13/18 1648 02/13/18 1132 02/13/18 1132 02/13/18 1132 02/13/18 1132   97 8 °F (36 6 °C) 68 19 128/61 96 %      Temp Source Heart Rate Source Patient Position - Orthostatic VS BP Location FiO2 (%)   02/13/18 1648 02/13/18 1132 02/13/18 1132 02/13/18 1132 --   Oral Monitor Sitting Right arm       Pain Score       02/13/18 1415       No Pain           Orthostatic Vital Signs  Vitals:    02/13/18 1725 02/13/18 1815 02/13/18 1830 02/13/18 2015   BP: 131/63  (!) 133/103 116/62   Pulse: 70 70  74   Patient Position - Orthostatic VS: Lying          Physical Exam   Constitutional: He is oriented to person, place, and time  He appears well-developed and well-nourished  No distress  HENT:   Head: Normocephalic and atraumatic  Right Ear: External ear normal    Left Ear: External ear normal    Mouth/Throat: Oropharynx is clear and moist    Eyes: Conjunctivae and EOM are normal  Pupils are equal, round, and reactive to light  Neck: Normal range of motion  Neck supple  No thyromegaly present  Cardiovascular: Normal rate, regular rhythm and normal heart sounds  No murmur heard  Pulmonary/Chest: No respiratory distress  Diffuse rhonchi  Decreased breath sounds on R   Abdominal: Soft  He exhibits no distension  There is no tenderness  There is no rebound and no guarding     Musculoskeletal: Normal range of motion  He exhibits no deformity  No C, T or L-spine tenderness  4/5 strength on left upper and lower extremity  No trauma noted to head  Neurological: He is alert and oriented to person, place, and time  No cranial nerve deficit  Skin: Skin is warm  He is not diaphoretic  No erythema  Psychiatric: He has a normal mood and affect  His behavior is normal    Nursing note and vitals reviewed        ED Medications  Medications   acetaminophen (TYLENOL) tablet 650 mg (not administered)   albuterol inhalation solution 2 5 mg (not administered)   albuterol (PROVENTIL HFA,VENTOLIN HFA) inhaler 2 puff (not administered)   amLODIPine (NORVASC) tablet 10 mg (not administered)   aspirin chewable tablet 81 mg (not administered)   atorvastatin (LIPITOR) tablet 10 mg (not administered)   benzonatate (TESSALON PERLES) capsule 100 mg (not administered)   bimatoprost (LUMIGAN) 0 01 % ophthalmic solution 1 drop (not administered)   carvedilol (COREG) tablet 12 5 mg (not administered)   clopidogrel (PLAVIX) tablet 75 mg (not administered)   tolterodine (DETROL LA) 24 hr capsule 2 mg (not administered)   fluorouracil (EFUDEX) 5 % cream (not administered)   fluticasone-salmeterol (ADVAIR) 250-50 mcg/dose inhaler 1 puff (not administered)   LORazepam (ATIVAN) tablet 0 25 mg (not administered)   LORazepam (ATIVAN) tablet 0 5 mg (not administered)   meclizine (ANTIVERT) tablet 25 mg (not administered)   pantoprazole (PROTONIX) EC tablet 20 mg (not administered)   PARoxetine (PAXIL) tablet 20 mg (not administered)   spironolactone (ALDACTONE) tablet 25 mg (not administered)   tamsulosin (FLOMAX) capsule 0 4 mg (not administered)   tamsulosin (FLOMAX) capsule 0 4 mg (not administered)   tiotropium (SPIRIVA) capsule for inhaler 18 mcg (not administered)   ondansetron (ZOFRAN) injection 4 mg (not administered)   enoxaparin (LOVENOX) subcutaneous injection 40 mg (not administered)   sodium chloride 0 9 % infusion (not administered)   methylPREDNISolone sodium succinate (Solu-MEDROL) injection 40 mg (not administered)   albuterol inhalation solution 5 mg (5 mg Nebulization Given 2/13/18 1327)   ipratropium (ATROVENT) 0 02 % inhalation solution 0 5 mg (0 5 mg Nebulization Given 2/13/18 1327)   methylPREDNISolone sodium succinate (Solu-MEDROL) injection 60 mg (60 mg Intravenous Given 2/13/18 1422)   albuterol inhalation solution 5 mg (5 mg Nebulization Given 2/13/18 1421)   ipratropium (ATROVENT) 0 02 % inhalation solution 0 5 mg (0 5 mg Nebulization Given 2/13/18 1421)       Diagnostic Studies  Results Reviewed     Procedure Component Value Units Date/Time    MRSA culture [[id number]]     Lab Status:  No result Specimen:  Nares from Nose     Platelet count [[id number]]     Lab Status:  No result Specimen:  Blood     UA w Reflex to Microscopic w Reflex to Culture [[id number]]     Lab Status:  No result Specimen:  Urine from Urine, Clean Catch     Influenza A/B and RSV by PCR (indicated for patients >2 mo of age) [[id number]] Updated:  02/13/18 1430    Lab Status:   In process Specimen:  Nasopharyngeal from Nasopharyngeal Swab     POCT urinalysis dipstick [[id number]]     Lab Status:  No result Specimen:  Urine     Comprehensive metabolic panel [[id number]]  (Abnormal) Collected:  02/13/18 1138    Lab Status:  Final result Specimen:  Blood from Arm, Left Updated:  02/13/18 1209     Sodium 137 mmol/L      Potassium 3 7 mmol/L      Chloride 102 mmol/L      CO2 25 mmol/L      Anion Gap 10 mmol/L      BUN 18 mg/dL      Creatinine 1 28 mg/dL      Glucose 106 mg/dL      Calcium 8 9 mg/dL      AST 29 U/L      ALT 26 U/L      Alkaline Phosphatase 87 U/L      Total Protein 7 4 g/dL      Albumin 3 5 g/dL      Total Bilirubin 1 17 (H) mg/dL      eGFR 53 ml/min/1 73sq m     Narrative:         National Kidney Disease Education Program recommendations are as follows:  GFR calculation is accurate only with a steady state creatinine  Chronic Kidney disease less than 60 ml/min/1 73 sq  meters  Kidney failure less than 15 ml/min/1 73 sq  meters  Troponin I [[id number]]  (Normal) Collected:  02/13/18 1138    Lab Status:  Final result Specimen:  Blood from Arm, Left Updated:  02/13/18 1209     Troponin I <0 02 ng/mL     Narrative:         [employer] Chemistry analyzer 99% cutoff is > 0 04 ng/mL in network labs    o cTnI 99% cutoff is useful only when applied to patients in the clinical setting of myocardial ischemia  o cTnI 99% cutoff should be interpreted in the context of clinical history, ECG findings and possibly cardiac imaging to establish correct diagnosis  o cTnI 99% cutoff may be suggestive but clearly not indicative of a coronary event without the clinical setting of myocardial ischemia  CBC and differential [[id number]]  (Abnormal) Collected:  02/13/18 1138    Lab Status:  Final result Specimen:  Blood from Arm, Left Updated:  02/13/18 1150     WBC 11 02 (H) Thousand/uL      RBC 4 71 Million/uL      Hemoglobin 13 9 g/dL      Hematocrit 40 9 %      MCV 87 fL      MCH 29 5 pg      MCHC 34 0 g/dL      RDW 14 2 %      MPV 11 2 fL      Platelets 179 (L) Thousands/uL      nRBC 0 /100 WBCs      Neutrophils Relative 83 (H) %      Lymphocytes Relative 6 (L) %      Monocytes Relative 11 %      Eosinophils Relative 0 %      Basophils Relative 0 %      Neutrophils Absolute 9 00 (H) Thousands/µL      Lymphocytes Absolute 0 68 Thousands/µL      Monocytes Absolute 1 26 (H) Thousand/µL      Eosinophils Absolute 0 04 Thousand/µL      Basophils Absolute 0 02 Thousands/µL                  X-ray chest 2 views   Final Result by Geovani Holley MD (02/13 9098)      No active pulmonary disease  Workstation performed: [id number]         CT head without contrast   Final Result by Lara Leonardo MD (02/13 1324)      No acute intracranial process  No skull fracture  Chronic microangiopathy                       Workstation performed: [id number] Procedures  Procedures      Phone Consults  ED Phone Contact    ED Course  ED Course as of Feb 13 2148   Tue Feb 13, 2018   1414 Bronchospasm improved after neb treatment, will repeat  Treat with steroids  Identification of Seniors at [facility] Most Recent Value   (ISAR) Identification of Seniors at Risk   Before the illness or injury that brought you to the Emergency, did you need someone to help you on a regular basis? 0 Filed at: 02/13/2018 1133   In the last 24 hours, have you needed more help than usual?  1 Filed at: 02/13/2018 1133   Have you been hospitalized for one or more nights during the past 6 months? 1 Filed at: 02/13/2018 1133   In general, do you see well?  0 Filed at: 02/13/2018 1133   In general, do you have serious problems with your memory? 0 Filed at: 02/13/2018 1133   Do you take more than three different medications every day? 1 Filed at: 02/13/2018 1133   ISAR Score  3 Filed at: 02/13/2018 1133                          MDM  Number of Diagnoses or Management Options  COPD exacerbation ([facility] ):   Recurrent falls:   Weakness:   Diagnosis management comments: Imp: recurrent falls, weakness, rhonchi  Mild COPD exacerbation  Plan: ct head, cxr, cardiac eval, reassess     CritCare Time    Disposition  Final diagnoses:   COPD exacerbation ([facility] )   Weakness   Recurrent falls     Time reflects when diagnosis was documented in both MDM as applicable and the Disposition within this note     Time User Action Codes Description Comment    2/13/2018  3:24 PM Khalif Fruits Add [J44 1] COPD exacerbation ([facility] )     2/13/2018  3:24 PM Farheen MORENO Add [R53 1] Weakness     2/13/2018  3:24 PM Durward Fruits Add [R29 6] Recurrent falls       ED Disposition     ED Disposition Condition Comment    Admit  Case was discussed with NAN and the patient's admission status was agreed to be Admission Status: inpatient status to the service of Dr Montez Russo           Follow-up Information    None       Patient's Medications   Discharge Prescriptions    No medications on file     No discharge procedures on file  ED Provider  Attending physically available and evaluated Wally Wood I managed the patient along with the ED Attending      Electronically Signed by         Guillermina Goncalves MD  02/13/18 9862

## 2018-02-13 NOTE — ED ATTENDING ATTESTATION
Cheli Esteban MD, saw and evaluated the patient  I have discussed the patient with the resident/non-physician practitioner and agree with the resident's/non-physician practitioner's findings, Plan of Care, and MDM as documented in the resident's/non-physician practitioner's note, except where noted  All available labs and Radiology studies were reviewed  At this point I agree with the current assessment done in the Emergency Department    I have conducted an independent evaluation of this patient a history and physical is as follows:   the patient presents after falling at his assisted living facility patient has a history of lung cancer, history of stroke,    the patient has had a cough no fever the patient has no complaints of headache or neck pain and no complaints of new weakness   exam the patient is in no acute distress vital signs are stable     no external evidence of head trauma neck nontender lungs with rhonchi in few expiratory wheezes heart regular no murmurs abdomen soft nontender pelvis stable neurologic exam left-sided weakness residual from old stroke   Will obtain CT scan of head First nurse cardiac labs were ordered     Critical Care Time  CritCare Time    Procedures

## 2018-02-13 NOTE — DISCHARGE INSTRUCTIONS
Follow up with pcp in 3-5 days  If you start to have lightheadedness, fatigue, increased confusion, pass out, return to the ED immediately

## 2018-02-13 NOTE — ED ATTENDING ATTESTATION
Kurt Wiley DO, saw and evaluated the patient  I have discussed the patient with the resident/non-physician practitioner and agree with the resident's/non-physician practitioner's findings, Plan of Care, and MDM as documented in the resident's/non-physician practitioner's note, except where noted  All available labs and Radiology studies were reviewed  At this point I agree with the current assessment done in the Emergency Department  I have conducted an independent evaluation of this patient a history and physical is as follows:    64 yo male presents for evaluation from SNF after an unwitnessed fall after he rolled out of bed  Pt only c/o mild dyspnea at this time  Denies HA, LOC, neck pain, focal weakness/numbness/tingling  Pt remembers event  Occurred just PTA  Again, pt has no pain at this time  Pt does have hx of COPD  Pt is on plavix  No external signs of trauma  No midline Cspine TTP  FROM without pain  MARIA  [facility] 15  Imp: mech fall after rolling out of bed  Did have head injury but no HA, LOC  C/o mild dyspnea and has some faint wheezing on exam plan: nebs  Will defer brain imaging at this time given lack of symptoms        Critical Care Time  CritCare Time    Procedures

## 2018-02-14 PROBLEM — R26.2 AMBULATORY DYSFUNCTION: Chronic | Status: ACTIVE | Noted: 2018-02-14

## 2018-02-14 PROBLEM — IMO0001 SIRS DUE TO INFECTIOUS PROCESS WITHOUT ACUTE ORGAN DYSFUNCTION: Status: ACTIVE | Noted: 2018-02-14

## 2018-02-14 PROBLEM — R05.3 CHRONIC COUGH: Status: RESOLVED | Noted: 2018-02-01 | Resolved: 2018-02-14

## 2018-02-14 PROBLEM — R53.81 DEBILITY: Status: RESOLVED | Noted: 2017-08-04 | Resolved: 2018-02-14

## 2018-02-14 PROBLEM — J44.1 CHRONIC OBSTRUCTIVE PULMONARY DISEASE WITH ACUTE EXACERBATION (HCC): Chronic | Status: ACTIVE | Noted: 2018-02-01

## 2018-02-14 PROBLEM — N18.30 CHRONIC KIDNEY DISEASE, STAGE 3 (HCC): Status: RESOLVED | Noted: 2017-04-12 | Resolved: 2018-02-14

## 2018-02-14 LAB
ALBUMIN SERPL BCP-MCNC: 3.2 G/DL (ref 3.5–5)
ALP SERPL-CCNC: 87 U/L (ref 46–116)
ALT SERPL W P-5'-P-CCNC: 24 U/L (ref 12–78)
ANION GAP SERPL CALCULATED.3IONS-SCNC: 10 MMOL/L (ref 4–13)
AST SERPL W P-5'-P-CCNC: 22 U/L (ref 5–45)
BASOPHILS # BLD AUTO: 0.01 THOUSANDS/ΜL (ref 0–0.1)
BASOPHILS NFR BLD AUTO: 0 % (ref 0–1)
BILIRUB SERPL-MCNC: 0.75 MG/DL (ref 0.2–1)
BUN SERPL-MCNC: 26 MG/DL (ref 5–25)
CALCIUM SERPL-MCNC: 8.6 MG/DL (ref 8.3–10.1)
CHLORIDE SERPL-SCNC: 104 MMOL/L (ref 100–108)
CO2 SERPL-SCNC: 23 MMOL/L (ref 21–32)
CREAT SERPL-MCNC: 1.24 MG/DL (ref 0.6–1.3)
EOSINOPHIL # BLD AUTO: 0 THOUSAND/ΜL (ref 0–0.61)
EOSINOPHIL NFR BLD AUTO: 0 % (ref 0–6)
ERYTHROCYTE [DISTWIDTH] IN BLOOD BY AUTOMATED COUNT: 14.3 % (ref 11.6–15.1)
ERYTHROCYTE [DISTWIDTH] IN BLOOD BY AUTOMATED COUNT: 14.3 % (ref 11.6–15.1)
FLUAV AG SPEC QL: NORMAL
FLUBV AG SPEC QL: NORMAL
GFR SERPL CREATININE-BSD FRML MDRD: 55 ML/MIN/1.73SQ M
GLUCOSE SERPL-MCNC: 143 MG/DL (ref 65–140)
HCT VFR BLD AUTO: 40 % (ref 36.5–49.3)
HCT VFR BLD AUTO: 40 % (ref 36.5–49.3)
HGB BLD-MCNC: 13.7 G/DL (ref 12–17)
HGB BLD-MCNC: 13.7 G/DL (ref 12–17)
LYMPHOCYTES # BLD AUTO: 0.63 THOUSANDS/ΜL (ref 0.6–4.47)
LYMPHOCYTES NFR BLD AUTO: 8 % (ref 14–44)
MCH RBC QN AUTO: 29.7 PG (ref 26.8–34.3)
MCH RBC QN AUTO: 29.7 PG (ref 26.8–34.3)
MCHC RBC AUTO-ENTMCNC: 34.3 G/DL (ref 31.4–37.4)
MCHC RBC AUTO-ENTMCNC: 34.3 G/DL (ref 31.4–37.4)
MCV RBC AUTO: 87 FL (ref 82–98)
MCV RBC AUTO: 87 FL (ref 82–98)
MONOCYTES # BLD AUTO: 0.21 THOUSAND/ΜL (ref 0.17–1.22)
MONOCYTES NFR BLD AUTO: 3 % (ref 4–12)
NEUTROPHILS # BLD AUTO: 7.43 THOUSANDS/ΜL (ref 1.85–7.62)
NEUTS SEG NFR BLD AUTO: 89 % (ref 43–75)
NRBC BLD AUTO-RTO: 0 /100 WBCS
PLATELET # BLD AUTO: 133 THOUSANDS/UL (ref 149–390)
PLATELET # BLD AUTO: 133 THOUSANDS/UL (ref 149–390)
PLATELET # BLD AUTO: 135 THOUSANDS/UL (ref 149–390)
PMV BLD AUTO: 10.9 FL (ref 8.9–12.7)
PMV BLD AUTO: 11.4 FL (ref 8.9–12.7)
PMV BLD AUTO: 11.4 FL (ref 8.9–12.7)
POTASSIUM SERPL-SCNC: 3.7 MMOL/L (ref 3.5–5.3)
PROT SERPL-MCNC: 7.2 G/DL (ref 6.4–8.2)
RBC # BLD AUTO: 4.61 MILLION/UL (ref 3.88–5.62)
RBC # BLD AUTO: 4.61 MILLION/UL (ref 3.88–5.62)
RSV B RNA SPEC QL NAA+PROBE: NORMAL
SODIUM SERPL-SCNC: 137 MMOL/L (ref 136–145)
WBC # BLD AUTO: 8.3 THOUSAND/UL (ref 4.31–10.16)
WBC # BLD AUTO: 8.3 THOUSAND/UL (ref 4.31–10.16)

## 2018-02-14 PROCEDURE — 94760 N-INVAS EAR/PLS OXIMETRY 1: CPT | Performed by: SOCIAL WORKER

## 2018-02-14 PROCEDURE — 99232 SBSQ HOSP IP/OBS MODERATE 35: CPT | Performed by: HOSPITALIST

## 2018-02-14 PROCEDURE — 85027 COMPLETE CBC AUTOMATED: CPT | Performed by: INTERNAL MEDICINE

## 2018-02-14 PROCEDURE — 80053 COMPREHEN METABOLIC PANEL: CPT | Performed by: INTERNAL MEDICINE

## 2018-02-14 PROCEDURE — 85025 COMPLETE CBC W/AUTO DIFF WBC: CPT | Performed by: INTERNAL MEDICINE

## 2018-02-14 PROCEDURE — 87081 CULTURE SCREEN ONLY: CPT | Performed by: INTERNAL MEDICINE

## 2018-02-14 PROCEDURE — 85049 AUTOMATED PLATELET COUNT: CPT | Performed by: INTERNAL MEDICINE

## 2018-02-14 RX ORDER — PREDNISONE 20 MG/1
40 TABLET ORAL DAILY
Status: DISCONTINUED | OUTPATIENT
Start: 2018-02-14 | End: 2018-02-18 | Stop reason: HOSPADM

## 2018-02-14 RX ADMIN — CLOPIDOGREL BISULFATE 75 MG: 75 TABLET ORAL at 09:21

## 2018-02-14 RX ADMIN — ALBUTEROL SULFATE 2 PUFF: 90 AEROSOL, METERED RESPIRATORY (INHALATION) at 14:07

## 2018-02-14 RX ADMIN — TIOTROPIUM BROMIDE 18 MCG: 18 CAPSULE ORAL; RESPIRATORY (INHALATION) at 09:24

## 2018-02-14 RX ADMIN — LORAZEPAM 0.5 MG: 0.5 TABLET ORAL at 22:26

## 2018-02-14 RX ADMIN — PAROXETINE HYDROCHLORIDE 20 MG: 20 TABLET, FILM COATED ORAL at 09:23

## 2018-02-14 RX ADMIN — PANTOPRAZOLE SODIUM 20 MG: 20 TABLET, DELAYED RELEASE ORAL at 05:04

## 2018-02-14 RX ADMIN — METHYLPREDNISOLONE SODIUM SUCCINATE 40 MG: 40 INJECTION, POWDER, FOR SOLUTION INTRAMUSCULAR; INTRAVENOUS at 14:10

## 2018-02-14 RX ADMIN — SODIUM CHLORIDE 100 ML/HR: 0.9 INJECTION, SOLUTION INTRAVENOUS at 09:24

## 2018-02-14 RX ADMIN — AMLODIPINE BESYLATE 10 MG: 10 TABLET ORAL at 09:21

## 2018-02-14 RX ADMIN — ALBUTEROL SULFATE 2 PUFF: 90 AEROSOL, METERED RESPIRATORY (INHALATION) at 18:28

## 2018-02-14 RX ADMIN — CARVEDILOL 12.5 MG: 12.5 TABLET, FILM COATED ORAL at 09:21

## 2018-02-14 RX ADMIN — FLUTICASONE PROPIONATE AND SALMETEROL 1 PUFF: 50; 250 POWDER RESPIRATORY (INHALATION) at 22:27

## 2018-02-14 RX ADMIN — TOLTERODINE TARTRATE 2 MG: 2 CAPSULE, EXTENDED RELEASE ORAL at 09:23

## 2018-02-14 RX ADMIN — TAMSULOSIN HYDROCHLORIDE 0.4 MG: 0.4 CAPSULE ORAL at 18:27

## 2018-02-14 RX ADMIN — ALBUTEROL SULFATE 2 PUFF: 90 AEROSOL, METERED RESPIRATORY (INHALATION) at 22:27

## 2018-02-14 RX ADMIN — SPIRONOLACTONE 25 MG: 25 TABLET, FILM COATED ORAL at 09:18

## 2018-02-14 RX ADMIN — METHYLPREDNISOLONE SODIUM SUCCINATE 40 MG: 40 INJECTION, POWDER, FOR SOLUTION INTRAMUSCULAR; INTRAVENOUS at 05:05

## 2018-02-14 RX ADMIN — CARVEDILOL 12.5 MG: 12.5 TABLET, FILM COATED ORAL at 18:27

## 2018-02-14 RX ADMIN — ATORVASTATIN CALCIUM 10 MG: 10 TABLET, FILM COATED ORAL at 18:28

## 2018-02-14 RX ADMIN — ENOXAPARIN SODIUM 40 MG: 40 INJECTION SUBCUTANEOUS at 09:22

## 2018-02-14 RX ADMIN — FLUTICASONE PROPIONATE AND SALMETEROL 1 PUFF: 50; 250 POWDER RESPIRATORY (INHALATION) at 14:06

## 2018-02-14 RX ADMIN — LORAZEPAM 0.25 MG: 0.5 TABLET ORAL at 09:18

## 2018-02-14 NOTE — ASSESSMENT & PLAN NOTE
SIRS resolved  Chest x-ray reviewed no active pulmonary disease  Vital signs, reviewed, unremarkable  Influenza A, B, RSV negative  Labs reviewed:  WBC within normal range  Continue gentle IV hydration-   No antimicrobial therapy at this point

## 2018-02-14 NOTE — SOCIAL WORK
Attempted to meet with the patient and called [facility] to obtain history  Spoke to Sarah Malhotra, nurse [phone] who reports patient is usually alert and oriented at facility but had mental status change and multiple falls  Patient uses a wc for mobility and propels with his feet  He receives assistance with ADL's and transfers  Primary contact is daughterSierra Em [phone]  [facility] will need to do on-site evaluation prior to his return there and Sarah Malhotra can be called at above phone #

## 2018-02-14 NOTE — CASE MANAGEMENT
Initial Clinical Review    Admission: Date/Time/Statement:    2/13/18 AT 1527    Orders Placed This Encounter   Procedures    Inpatient Admission (expected length of stay for this patient is greater than two midnights)     Standing Status:   Standing     Number of Occurrences:   1     Order Specific Question:   Admitting Physician     Answer:   Gillian Omer     Order Specific Question:   Level of Care     Answer:   Med Surg [16]     Order Specific Question:   Estimated length of stay     Answer:   More than 2 Midnights     Order Specific Question:   Certification     Answer:   I certify that inpatient services are medically necessary for this patient for a duration of greater than two midnights  See H&P and MD Progress Notes for additional information about the patient's course of treatment  ED: Date/Time/Mode of Arrival:   ED Arrival Information     Expected Arrival Acuity Means of Arrival Escorted By Service Admission Type    - 2/13/2018 11:21 Urgent Ambulance [address] Urgent    Arrival Complaint    altered mental status        Chief Complaint:   Chief Complaint   Patient presents with    Fall     Patient d/c from ER at 0530 this am for previous fall  Patient attempted to walk to bathroom at [facility] and slid to floor  Patient denies any injury at this time  Per staff at [facility] patient appears altered  Chief Complaint:   Weakness, wheezing and falls     History of Present Illness:   Myriam Aguirre  is a 65 y o  male who presents with generalized weakness and associated recurrent falls at [facility]  There is also reported mild episode of confusion which he had with previous UTI's  Pt also reported significant dyspnea and wheezing that responded to nebulizer therapy in ER  Denied any fevers, chills but fatigue ++     Review of Systems:   Constitutional: Positive for activity change, appetite change and fatigue     Respiratory: Positive for cough, shortness of breath and wheezing  Cardiovascular: Negative for chest pain  Gastrointestinal: Negative  Neurological: Negative  All other systems reviewed and are negative  ED Vital Signs:   ED Triage Vitals   Temperature Pulse Respirations Blood Pressure SpO2   02/13/18 1648 02/13/18 1132 02/13/18 1132 02/13/18 1132 02/13/18 1132   97 8 °F (36 6 °C) 68 19 128/61 96 %      Temp Source Heart Rate Source Patient Position - Orthostatic VS BP Location FiO2 (%)   02/13/18 1648 02/13/18 1132 02/13/18 1132 02/13/18 1132 --   Oral Monitor Sitting Right arm       Pain Score       02/13/18 1415       No Pain        Wt Readings from Last 1 Encounters:   02/13/18 104 kg (230 lb)      02/13 0701  02/14 0700 02/14 0701  02/14 1339  Most Recent    Temperature (°F) 97  598 2 97 898 6  97 8 (36 6)    Pulse 68109 7273  72    Respirations 1620 1822  18    Blood Pressure 115/70137/65 109/60127/60  109/60    SpO2 (%) 9297 9495  95        LABS/Diagnostic Test Results:   CBC  from last 7 days  Lab Units 02/13/18  1138   WBC Thousand/uL 11 02*   HEMOGLOBIN g/dL 13 9   HEMATOCRIT % 40 9   PLATELETS Thousands/uL 121*   NEUTROS PCT % 83*   LYMPHS PCT % 6*   MONOS PCT % 11   EOS PCT % 0       Results from last 7 days  Lab Units 02/13/18  1138   SODIUM mmol/L 137   POTASSIUM mmol/L 3 7   CHLORIDE mmol/L 102   CO2 mmol/L 25   BUN mg/dL 18   CREATININE mg/dL 1 28   CALCIUM mg/dL 8 9   TOTAL PROTEIN g/dL 7 4   BILIRUBIN TOTAL mg/dL 1 17*   ALK PHOS U/L 87   ALT U/L 26   AST U/L 29   GLUCOSE RANDOM mg/dL 106     TROPONIN NEG X 1    CT BRAIN -  No acute intracranial process  No skull fracture  Chronic microangiopathy        CHEST X RAY -  No active pulmonary disease      ED Treatment:   Medication Administration from 02/13/2018 1121 to 02/13/2018 2308       Date/Time Order Dose Route Action Action by Comments     02/13/2018 1327 albuterol inhalation solution 5 mg 5 mg Nebulization Given Johanny Jama RN      02/13/2018 1327 ipratropium (ATROVENT) 0 02 % inhalation solution 0 5 mg 0 5 mg Nebulization Given Fab Sage RN      02/13/2018 1422 methylPREDNISolone sodium succinate (Solu-MEDROL) injection 60 mg 60 mg Intravenous Given Fab Sage RN      02/13/2018 1421 albuterol inhalation solution 5 mg 5 mg Nebulization Given Fab Sage RN      02/13/2018 1421 ipratropium (ATROVENT) 0 02 % inhalation solution 0 5 mg 0 5 mg Nebulization Given Fab Sage RN      02/13/2018 2228 albuterol (PROVENTIL HFA,VENTOLIN HFA) inhaler 2 puff 2 puff Inhalation Given Faith Menjivar, RN      02/13/2018 2200 aspirin chewable tablet 81 mg 81 mg Oral Given Faith Menjivar RN      02/13/2018 2231 bimatoprost (LUMIGAN) 0 01 % ophthalmic solution 1 drop 1 drop Both Eyes Given Faith Menjivar RN      02/13/2018 2308 fluorouracil (EFUDEX) 5 % cream   Topical Not Given Daniel Aragon RN      02/13/2018 2200 fluticasone-salmeterol (ADVAIR) 250-50 mcg/dose inhaler 1 puff 1 puff Inhalation Given Faith Menjivar RN      02/13/2018 2228 LORazepam (ATIVAN) tablet 0 5 mg 0 5 mg Oral Given [place] YFN Menjivar      02/13/2018 2228 meclizine (ANTIVERT) tablet 25 mg 25 mg Oral Given [place] YFN Menjivar      02/13/2018 2236 sodium chloride 0 9 % infusion 100 mL/hr Intravenous [facility] Faith Menjivar, RN      02/13/2018 2230 methylPREDNISolone sodium succinate (Solu-MEDROL) injection 40 mg 40 mg Intravenous Given [place] YFN Menjivar           Past Medical/Surgical History:    Active Ambulatory Problems     Diagnosis Date Noted    Essential hypertension     Lung [facility] )     Dysphagia 04/13/2016    History of cerebrovascular accident (CVA) with residual deficit 04/13/2016    GERD (gastroesophageal reflux disease)     Left-sided weakness 04/11/2017    Prostate cancer ([facility] )     Hyperlipidemia     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     Tracheomalacia 10/10/2017    Chronic obstructive pulmonary disease with [facility] exacerbation ([facility] ) 02/01/2018     Resolved Ambulatory Problems     Diagnosis Date Noted    Dehydration 04/12/2016    Vomiting 04/12/2016    Acute renal failure (HCC) 04/12/2016    SOB (shortness of breath) 04/09/2017    Chronic kidney disease, stage 3 04/12/2017    Chest pain 05/31/2017    Elevated troponin 05/31/2017    Bronchitis 05/31/2017    Bronchospasm 06/09/2017    Debility 08/04/2017    Chronic cough 02/01/2018    Hemoptysis 02/02/2018    RONAL (acute kidney injury) ([facility] ) 02/02/2018     Past Medical History:   Diagnosis Date    RONAL (acute kidney injury) ([facility] ) 5/31/2017    Aspiration into respiratory tract     Chest pain 5/31/2017    [facility]     Depression     Elevated troponin 5/31/2017    GERD (gastroesophageal reflux disease)     History of CVA (cerebrovascular accident) 4/13/2016    Hyperlipidemia     Hypertension     Lung cancer ([place] [address] ) 2005    Pneumonia     Prostate cancer ([place] [address] )     Sleep apnea     Stroke ([place] [address] )     Weakness due to cerebrovascular accident (CVA) ([place] [address] )        Admitting Diagnosis: Altered mental status [R41 82]  Weakness [R53 1]  COPD exacerbation ([place] [address] ) [J44 1]  Recurrent falls [R29 6]       Age/Sex: 65 y o  male       Assessment/Plan:  ASSESSMENT:     Hospital Problem List:   Active Problems:    Lung cancer ([facility] )    History of cerebrovascular accident (CVA) with residual deficit    GERD (gastroesophageal reflux disease)    Chronic kidney disease, stage 3    Debility    Chronic cough    COPD (chronic obstructive pulmonary disease) ([facility])      PLAN:   · SIRS syndrome  ? R/o Influenza  ? R/o UTI - UA - if positive -ABx; reported mild confusion prior to presentation  ? Monitor vitals and cbc  · COPD with exacerbation  ? Solumedrol IV  ? Continue nebs and o2 as needed  ? Cough suppressants  · H/o Lung CA s/p rt lung resection  ? Associated Tracheomalacia - under Dr Shanta Murphy' care  ?  Chronic cough - symptomatic Rx  · GERD  ? PPI  · Generalized weakness  ? Recurrent falls  ? PT/OT  IVF for component of dehydration and monitor      VTE Prophylaxis: Enoxaparin (Lovenox)  / sequential compression device   Code Status: Prior  POLST: Full code - d/w dtr who is a Kyree     Anticipated Length of Stay:  Patient will be admitted on an Inpatient basis with an anticipated length of stay of  > 2 midnights       Justification for Hospital Stay:       Admission Orders:  2/13/18 AT 1527  ADMIT INPATIENT  TELEMETRY  VS Q4HRS    Up as Tolerated  SCD    Regular House Diet    Continuous IV Infusions:   sodium chloride 100 mL/hr Last Rate: 100 mL/hr (02/14/18 0924)       Scheduled Meds:   Current Facility-Administered Medications:  acetaminophen 650 mg Oral Q6H PRN Vevelyn Pac, MD    albuterol 2 puff Inhalation 4x Daily Vevelyn Pac, MD    albuterol 2 5 mg Nebulization Q4H PRN Vevelyn Pac, MD    amLODIPine 10 mg Oral Daily Vevelyn Pac, MD    aspirin 81 mg Oral Every Other Day Vevelyn Pac, MD    atorvastatin 10 mg Oral Daily With Josh MD Johnson    benzonatate 100 mg Oral TID PRN Vevelyn Pac, MD    bimatoprost 1 drop Both Eyes HS Vevelyn Pac, MD    carvedilol 12 5 mg Oral BID With Meals Vevelyn Pac, MD    clopidogrel 75 mg Oral Daily Vevelyn Pac, MD    enoxaparin 40 mg Subcutaneous Daily Vevelyn Pac, MD    fluorouracil  Topical BID Vevelyn Pac, MD    fluticasone-salmeterol 1 puff Inhalation Q12H [facility] Roshan Stokes MD    LORazepam 0 25 mg Oral Daily Vevelyn Pac, MD    LORazepam 0 5 mg Oral HS Vevelyn Pac, MD    meclizine 25 mg Oral TID PRN Vevelyn Pac, MD    methylPREDNISolone sodium succinate 40 mg Intravenous Q8H [facility] Roshan Stokes MD    ondansetron 4 mg Intravenous Q6H PRN Vevelyn Pac, MD    pantoprazole 20 mg Oral Early Morning Vevelyn Pac, MD    PARoxetine 20 mg Oral Daily Vevelyn Pac, MD sodium chloride 100 mL/hr Intravenous Continuous Thien Mendez MD Last Rate: 100 mL/hr (02/14/18 0924)   spironolactone 25 mg Oral Daily Thien Mendez MD    tamsulosin 0 4 mg Oral Daily With Lulu Iyer MD    tiotropium 18 mcg Inhalation Daily Thien Mendez MD    tolterodine 2 mg Oral Daily Thien Mendez MD        PRN Meds:    acetaminophen    albuterol    benzonatate    Meclizine 25 mg TID prn given x 1    ondansetron

## 2018-02-14 NOTE — ASSESSMENT & PLAN NOTE
Generalized weakness in upper lower extremities  History of CVA  Continue physical therapy occupational therapy

## 2018-02-14 NOTE — ASSESSMENT & PLAN NOTE
Admitted for recurrent falls, recently becoming more frequent, associated with periods of confusion  Patient alert oriented x3, and able to converse appropriately, with some speech aphasia  History of CVA  CT head unremarkable for new changes  Unable to distinguish what appeared to of confusion related to seizure-like activity, or TIA  Plan:  Consult Neurology for evaluation/further recommendation

## 2018-02-14 NOTE — PLAN OF CARE
Problem: DISCHARGE PLANNING - CARE MANAGEMENT  Goal: Discharge to post-acute care or home with appropriate resources  INTERVENTIONS:  - Conduct assessment to determine patient/family and health care team treatment goals, and need for post-acute services based on payer coverage, community resources, and patient preferences, and barriers to discharge  - Address psychosocial, clinical, and financial barriers to discharge as identified in assessment in conjunction with the patient/family and health care team  - Arrange appropriate level of post-acute services according to patient's   needs and preference and payer coverage in collaboration with the physician and health care team  - Communicate with and update the patient/family, physician, and health care team regarding progress on the discharge plan  - Arrange appropriate transportation to post-acute venues  - Arrange on-site evaluation by [facility] and coordinate discharge back to [facility] at discharge  Outcome: Adequate for Discharge

## 2018-02-14 NOTE — RESPIRATORY THERAPY NOTE
respiratory protocol      02/14/18 0858   Respiratory Assessment   Assessment Type Assess only   General Appearance Awake; Alert   Respiratory Pattern Normal   Chest Assessment Chest expansion symmetrical   Bilateral Breath Sounds Diminished  (pt moaning with breathing )   Resp Comments pt has no resp c/o  states he just had a coughing spell  states he uses prn mdi at home for his breathing  denies need for therapy at this time     Additional Assessments   SpO2 94 %

## 2018-02-14 NOTE — PROGRESS NOTES
Progress Note - Yaritza Mcgee  1940, 65 y o  male MRN: [id number]    Unit/Bed#: [facility] 503-01 Encounter: [id number]    Primary Care Provider: Shivani Wright DO   Date and time admitted to hospital: 2/13/2018 11:22 AM        * SIRS due to infectious process without acute organ dysfunction [facility])   Assessment & Plan    SIRS resolved  Chest x-ray reviewed no active pulmonary disease  Vital signs, reviewed, unremarkable  Influenza A, B, RSV negative  Labs reviewed:  WBC within normal range, thrombocytopenia present  Continue gentle IV hydration-   No antimicrobial therapy at this point  Chronic obstructive pulmonary disease with acute exacerbation (HCC)   Assessment & Plan    No active wheezing   Shortness breath improved with bronchodilator therapy  Continue bronchodilator therapy: advair, albuterol, spiriva  Continue IV steroids, will taper down    Continue above management        Ambulatory dysfunction   Assessment & Plan    Admitted for recurrent falls, recently becoming more frequent, associated with periods of confusion  Patient alert oriented x3, and able to converse appropriately, with some speech aphasia  History of CVA  CT head unremarkable for new changes  Unable to distinguish what appeared to of confusion related to seizure-like activity, or TIA  Plan:  Consult Neurology for evaluation/further recommendation        Tracheomalacia   Assessment & Plan    Stable  Continue above management        Left-sided weakness   Assessment & Plan    Status post CVA 10 years ago  Continue PT/OT        Dysphagia   Assessment & Plan    CVA 10 years ago  Continue modified diet  Continue supportive care, above management        Essential hypertension   Assessment & Plan    BP controlled  Continue blood pressure monitoring  Continue meds        L[facility] cancer ([facility] )   Assessment & Plan    Stable  Continue above management        GERD (gastroesophageal reflux disease)   Assessment & Plan    Stable, continue PPI History of cerebrovascular accident (CVA) with residual deficit   Assessment & Plan    Generalized weakness in upper lower extremities  History of CVA  Continue physical therapy occupational therapy        Hyperlipidemia   Assessment & Plan    Stable  Continue statin        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   Assessment & Plan    Mood stable  Continue meds          VTE Pharmacologic Prophylaxis:   Pharmacologic: Enoxaparin (Lovenox)  Mechanical VTE Prophylaxis in Place: Yes    Patient Centered Rounds: I have performed bedside rounds with nursing staff today  Discussions with Specialists or Other Care Team Provider:  Yes    Education and Discussions with Family / Patient:  Yes with daughter Simona Winter  Time Spent for Care: 45 minutes  More than 50% of total time spent on counseling and coordination of care as described above  Current Length of Stay: 1 day(s)    Current Patient Status: Inpatient   Certification Statement: The patient will continue to require additional inpatient hospital stay due to Medical management    Discharge Plan:  Pending neurology evaluation    Code Status: Level 1 - Full Code      Subjective:   Continues to have a productive cough-white phlegm  Denied any fever, chest pain, shortness of breath  Reported significant weakness  Review of systems negative otherwise    Objective:     Vitals:   Temp (24hrs), Av °F (36 7 °C), Min:97 5 °F (36 4 °C), Max:98 6 °F (37 °C)    HR:  [] 72  Resp:  [16-22] 18  BP: (109-134)/() 109/60  SpO2:  [92 %-95 %] 95 %  Body mass index is 27 27 kg/m²  Input and Output Summary (last 24 hours): Intake/Output Summary (Last 24 hours) at 18 1437  Last data filed at 18 1024   Gross per 24 hour   Intake           806 67 ml   Output              475 ml   Net           331 67 ml       Physical Exam:     Physical Exam   Constitutional: He is oriented to person, place, and time  He appears well-developed   No distress  Left-sided hemiparesis  Left-sided facial paresis   HENT:   Head: Normocephalic and atraumatic  Eyes: Conjunctivae are normal  Pupils are equal, round, and reactive to light  Neck: Normal range of motion  No JVD present  Cardiovascular: Normal rate and normal heart sounds  Exam reveals no gallop and no friction rub  No murmur heard  Pulmonary/Chest: Effort normal  No respiratory distress  He has no wheezes  He has no rales  He exhibits no tenderness  Diminished breath sounds bilaterally   Abdominal: Soft  Bowel sounds are normal  He exhibits no distension and no mass  There is no tenderness  There is no rebound and no guarding  Musculoskeletal: Normal range of motion  He exhibits no edema, tenderness or deformity  Neurological: He is alert and oriented to person, place, and time  He displays abnormal reflex  A cranial nerve deficit is present  Coordination abnormal    Hyper reflexia on left side of brachioradialis biceps reflex, reduced bilateral knee reflexes, ankle reflexes bilaterally   Left-sided hemiparesis, left-sided weakness in the upper and lower extremity, left-sided facial paresis   Skin: No rash noted  No erythema  No pallor  Psychiatric: He has a normal mood and affect   His behavior is normal  Judgment and thought content normal          Additional Data:     Labs:      Results from last 7 days  Lab Units 02/14/18  0438   WBC Thousand/uL 8 30  8 30   HEMOGLOBIN g/dL 13 7  13 7   HEMATOCRIT % 40 0  40 0   PLATELETS Thousands/uL 135*  133*  133*   NEUTROS PCT % 89*   LYMPHS PCT % 8*   MONOS PCT % 3*   EOS PCT % 0       Results from last 7 days  Lab Units 02/14/18  0438   SODIUM mmol/L 137   POTASSIUM mmol/L 3 7   CHLORIDE mmol/L 104   CO2 mmol/L 23   BUN mg/dL 26*   CREATININE mg/dL 1 24   CALCIUM mg/dL 8 6   TOTAL PROTEIN g/dL 7 2   BILIRUBIN TOTAL mg/dL 0 75   ALK PHOS U/L 87   ALT U/L 24   AST U/L 22   GLUCOSE RANDOM mg/dL 143*           * I Have Reviewed All Lab Data Listed Above  * Additional Pertinent Lab Tests Reviewed:  Elise[facility] 65 Admission Reviewed    Imaging:  Reviewed    Recent Cultures (last 7 days):       Results from last 7 days  Lab Units 02/14/18  0731   INFLUENZA A PCR  None Detected   INFLUENZA B PCR  None Detected   RSV PCR  None Detected       Last 24 Hours Medication List:     Current Facility-Administered Medications:  acetaminophen 650 mg Oral Q6H PRN Douglas Diaz MD    albuterol 2 puff Inhalation 4x Daily Douglas Diaz MD    albuterol 2 5 mg Nebulization Q4H PRN Douglas Diaz MD    amLODIPine 10 mg Oral Daily Douglas Diaz MD    aspirin 81 mg Oral Every Other Melissa Downs MD    atorvastatin 10 mg Oral Daily With Spring Dailey MD    benzonatate 100 mg Oral TID PRN Douglas Diaz MD    bimatoprost 1 drop Both Eyes HS Douglas Diaz MD    carvedilol 12 5 mg Oral BID With Meals Douglas Diaz MD    clopidogrel 75 mg Oral Daily Douglas Diaz MD    enoxaparin 40 mg Subcutaneous Daily Douglas Diaz MD    fluorouracil  Topical BID Douglas Diaz MD    fluticasone-salmeterol 1 puff Inhalation Q12H [facility] Roshan Stokes MD    LORazepam 0 25 mg Oral Daily Douglas Diaz MD    LORazepam 0 5 mg Oral HS Douglas Diaz MD    meclizine 25 mg Oral TID PRN Douglas Diaz MD    ondansetron 4 mg Intravenous Q6H PRN Douglas Diaz MD    pantoprazole 20 mg Oral Early Morning Douglas Diaz MD    PARoxetine 20 mg Oral Daily Douglas Diaz MD    predniSONE 40 mg Oral Daily Otilia Shultz MD    sodium chloride 50 mL/hr Intravenous Continuous Otilia Shultz MD Last Rate: 50 mL/hr (02/14/18 1403)   spironolactone 25 mg Oral Daily Douglas Diaz MD    tamsulosin 0 4 mg Oral Daily With Spring Dailey MD    tiotropium 18 mcg Inhalation Daily Douglas Diaz MD    tolterodine 2 mg Oral Daily Douglas Diaz MD         Today, Patient Was Seen By: Meir Fry MD    ** Please Note: Dictation voice to text software may have been used in the creation of this document   **

## 2018-02-14 NOTE — ASSESSMENT & PLAN NOTE
No active wheezing   Shortness breath improved with bronchodilator therapy  Continue bronchodilator therapy  Continue IV steroids, will taper down    Continue above management

## 2018-02-15 LAB
FOLATE SERPL-MCNC: 10.2 NG/ML (ref 3.1–17.5)
MRSA NOSE QL CULT: NORMAL
TSH SERPL DL<=0.05 MIU/L-ACNC: 0.63 UIU/ML (ref 0.36–3.74)
VIT B12 SERPL-MCNC: 355 PG/ML (ref 100–900)

## 2018-02-15 PROCEDURE — G8988 SELF CARE GOAL STATUS: HCPCS

## 2018-02-15 PROCEDURE — G8978 MOBILITY CURRENT STATUS: HCPCS

## 2018-02-15 PROCEDURE — 82746 ASSAY OF FOLIC ACID SERUM: CPT | Performed by: HOSPITALIST

## 2018-02-15 PROCEDURE — 99232 SBSQ HOSP IP/OBS MODERATE 35: CPT | Performed by: HOSPITALIST

## 2018-02-15 PROCEDURE — 84443 ASSAY THYROID STIM HORMONE: CPT | Performed by: HOSPITALIST

## 2018-02-15 PROCEDURE — G8989 SELF CARE D/C STATUS: HCPCS

## 2018-02-15 PROCEDURE — 94640 AIRWAY INHALATION TREATMENT: CPT | Performed by: SOCIAL WORKER

## 2018-02-15 PROCEDURE — 94668 MNPJ CHEST WALL SBSQ: CPT

## 2018-02-15 PROCEDURE — 82607 VITAMIN B-12: CPT | Performed by: HOSPITALIST

## 2018-02-15 PROCEDURE — 94760 N-INVAS EAR/PLS OXIMETRY 1: CPT | Performed by: SOCIAL WORKER

## 2018-02-15 PROCEDURE — 94668 MNPJ CHEST WALL SBSQ: CPT | Performed by: SOCIAL WORKER

## 2018-02-15 PROCEDURE — G8979 MOBILITY GOAL STATUS: HCPCS

## 2018-02-15 PROCEDURE — 97163 PT EVAL HIGH COMPLEX 45 MIN: CPT

## 2018-02-15 PROCEDURE — 99223 1ST HOSP IP/OBS HIGH 75: CPT | Performed by: PHYSICIAN ASSISTANT

## 2018-02-15 PROCEDURE — G8987 SELF CARE CURRENT STATUS: HCPCS

## 2018-02-15 PROCEDURE — 97167 OT EVAL HIGH COMPLEX 60 MIN: CPT

## 2018-02-15 RX ORDER — IPRATROPIUM BROMIDE AND ALBUTEROL SULFATE 2.5; .5 MG/3ML; MG/3ML
3 SOLUTION RESPIRATORY (INHALATION)
Status: DISCONTINUED | OUTPATIENT
Start: 2018-02-15 | End: 2018-02-15

## 2018-02-15 RX ORDER — ALBUTEROL SULFATE 2.5 MG/3ML
2.5 SOLUTION RESPIRATORY (INHALATION) EVERY 4 HOURS PRN
Status: DISCONTINUED | OUTPATIENT
Start: 2018-02-15 | End: 2018-02-18 | Stop reason: HOSPADM

## 2018-02-15 RX ADMIN — FLUTICASONE PROPIONATE AND SALMETEROL 1 PUFF: 50; 250 POWDER RESPIRATORY (INHALATION) at 23:15

## 2018-02-15 RX ADMIN — ALBUTEROL SULFATE 2 PUFF: 90 AEROSOL, METERED RESPIRATORY (INHALATION) at 18:05

## 2018-02-15 RX ADMIN — SPIRONOLACTONE 25 MG: 25 TABLET, FILM COATED ORAL at 08:32

## 2018-02-15 RX ADMIN — CARVEDILOL 12.5 MG: 12.5 TABLET, FILM COATED ORAL at 16:47

## 2018-02-15 RX ADMIN — ALBUTEROL SULFATE 2 PUFF: 90 AEROSOL, METERED RESPIRATORY (INHALATION) at 23:15

## 2018-02-15 RX ADMIN — ALBUTEROL SULFATE 2 PUFF: 90 AEROSOL, METERED RESPIRATORY (INHALATION) at 14:16

## 2018-02-15 RX ADMIN — TAMSULOSIN HYDROCHLORIDE 0.4 MG: 0.4 CAPSULE ORAL at 16:47

## 2018-02-15 RX ADMIN — ENOXAPARIN SODIUM 40 MG: 40 INJECTION SUBCUTANEOUS at 08:33

## 2018-02-15 RX ADMIN — LORAZEPAM: 0.5 TABLET ORAL at 08:31

## 2018-02-15 RX ADMIN — TOLTERODINE TARTRATE 2 MG: 2 CAPSULE, EXTENDED RELEASE ORAL at 08:37

## 2018-02-15 RX ADMIN — CARVEDILOL 12.5 MG: 12.5 TABLET, FILM COATED ORAL at 08:32

## 2018-02-15 RX ADMIN — TIOTROPIUM BROMIDE 18 MCG: 18 CAPSULE ORAL; RESPIRATORY (INHALATION) at 08:37

## 2018-02-15 RX ADMIN — AMLODIPINE BESYLATE 10 MG: 10 TABLET ORAL at 08:32

## 2018-02-15 RX ADMIN — PANTOPRAZOLE SODIUM 20 MG: 20 TABLET, DELAYED RELEASE ORAL at 05:46

## 2018-02-15 RX ADMIN — LORAZEPAM 0.5 MG: 0.5 TABLET ORAL at 23:16

## 2018-02-15 RX ADMIN — BENZONATATE 100 MG: 100 CAPSULE ORAL at 14:16

## 2018-02-15 RX ADMIN — ALBUTEROL SULFATE 2 PUFF: 90 AEROSOL, METERED RESPIRATORY (INHALATION) at 08:38

## 2018-02-15 RX ADMIN — BENZONATATE 100 MG: 100 CAPSULE ORAL at 23:21

## 2018-02-15 RX ADMIN — FLUTICASONE PROPIONATE AND SALMETEROL 1 PUFF: 50; 250 POWDER RESPIRATORY (INHALATION) at 08:34

## 2018-02-15 RX ADMIN — CLOPIDOGREL BISULFATE 75 MG: 75 TABLET ORAL at 08:32

## 2018-02-15 RX ADMIN — IPRATROPIUM BROMIDE AND ALBUTEROL SULFATE 3 ML: .5; 3 SOLUTION RESPIRATORY (INHALATION) at 13:24

## 2018-02-15 RX ADMIN — ASPIRIN 81 MG 81 MG: 81 TABLET ORAL at 08:32

## 2018-02-15 RX ADMIN — PREDNISONE 40 MG: 20 TABLET ORAL at 08:32

## 2018-02-15 RX ADMIN — PAROXETINE HYDROCHLORIDE 20 MG: 20 TABLET, FILM COATED ORAL at 08:35

## 2018-02-15 RX ADMIN — SODIUM CHLORIDE 50 ML/HR: 0.9 INJECTION, SOLUTION INTRAVENOUS at 03:32

## 2018-02-15 RX ADMIN — ATORVASTATIN CALCIUM 10 MG: 10 TABLET, FILM COATED ORAL at 16:47

## 2018-02-15 NOTE — PHYSICAL THERAPY NOTE
Physical Therapy Eval    Patient Name: Leonidas Majano  Today's Date: 2/15/2018     Problem List  Patient Active Problem List   Diagnosis    Essential hypertension    [facility]    Dysphagia    History of cerebrovascular accident (CVA) with residual deficit    GERD (gastroesophageal reflux disease)    Left-sided weakness    Hyperlipidemia    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    Tracheomalacia    Chronic obstructive pulmonary disease with acute exacerbation ([facility]    SIRS due to infectious process without acute organ dysfunction ([facility] )    Ambulatory dysfunction        Past Medical History  Past Medical History:   Diagnosis Date    RONAL (acute kidney injury) ([facility] ) 5/31/2017    Aspiration into respiratory tract     Chest pain 5/31/2017    COPD (chronic obstructive pulmonary disease) (HCC)     Depression     Elevated troponin 5/31/2017    GERD (gastroesophageal reflux disease)     History of CVA (cerebrovascular accident) 4/13/2016    Hyperlipidemia     Hypertension     Lung cancer ([facility] ) 2005    Right, status post lobectomy    Pneumonia     Prostate cancer ([facility] )     Sleep apnea     awaiting sleep study results    Stroke ([place] [address] )     Weakness due to cerebrovascular accident [facility]A) [facility])         Past Surgical History  Past Surgical History:   Procedure Laterality Date    COLONOSCOPY      ESOPHAGOGASTRODUODENOSCOPY N/A 4/13/2016    Procedure: ESOPHAGOGASTRODUODENOSCOPY (EGD); Surgeon: Jasmin Napoles MD;  [facility]; Service:     JOINT REPLACEMENT      knee replacement    LUNG LOBECTOMY      MT ESOPHAGOGASTRODUODENOSCOPY TRANSORAL DIAGNOSTIC N/A 3/15/2017    Procedure: ESOPHAGOGASTRODUODENOSCOPY (EGD); Surgeon: Jasmin Napoles MD;  [facility];   Service: Gastroenterology    TRIGEMINAL NERVE DECOMPRESSION           02/15/18 1212   Note Type   Note type Eval only   Pain Assessment   Pain Assessment No/denies pain   Pain Score No Pain   Home Living   Type of Home Assisted living  ([facility])   Home Layout One level   Home Equipment Walker; Wheelchair-manual   Additional Comments has A for all ADL's and xfers at baseline   Prior Function   Level of [place] Needs assistance with ADLs and functional mobility   Lives With Facility staff   Receives Help From Personal care attendant  ([facility])   ADL Assistance Needs assistance   IADLs Needs assistance   Falls in the last 6 months 1 to 4   Vocational Retired   Comments Ax1-2 PTA at [employer] for bed skills and transfers OOB to [address]- pt reports having A for all functional transfers consisting of 1-2 people and reports "needs alot of help there"- pt has been nonambulatory since CVA and self propells w/c w/ B/L LE's for 1* mobiltiy at facility- receives both PT/ OT at [facility] presently    Restrictions/Precautions   Weight Bearing Precautions Per Order No   Braces or Orthoses MAFO  (L - was not presnet for PT/OT eval)   Other Precautions Contact/isolation; Chair Alarm; Bed Alarm; Fall Risk;Multiple lines  (L hemiparesis)   General   Family/Caregiver Present No   Cognition   Arousal/Participation Alert   Orientation Level Oriented X4   Memory Within functional limits   Following Commands Follows one step commands without difficulty   Comments pleasant and cooperative-    LUE Assessment   LUE Assessment X  (impairments from prior CVA- see OT for full)   RLE Assessment   RLE Assessment WFL  (3+/4/5- limited knee extension - hamstring tightness)   LLE Assessment   LLE Assessment X   Strength LLE   L Hip Flexion 3+/5   L Knee Extension 3-/5   L Ankle Dorsiflexion 2+/5  (increased ext tone)   Coordination   Movements are Fluid and Coordinated 0   Coordination and Movement Description ataxia; decreased fine motor L UE; decreased rapid alt L hemibody   Sensation X  (numbess and tinglinging reported in B/L LE's L>R)   Light Touch   RLE Light Touch Impaired   RLE Light Touch Comments below knees   LLE Light Touch Impaired   LLE Light Touch Comments below knees   Proprioception   LLE Proprioception Impaired   Bed Mobility   Supine to Sit 4  Minimal assistance   Additional items Assist x 1;Bedrails; Increased time required   Transfers   Sit to Stand 3  Moderate assistance   Additional items Assist x 2; Increased time required;Verbal cues   Stand to Sit 4  Minimal assistance   Additional items Assist x 2; Increased time required;Verbal cues   Stand pivot 2  Maximal assistance   Additional items Assist x 2  (for stand piviot to R- )   Ambulation/Elevation   Gait pattern (non ambulatory at baseline)   Balance   Static Sitting Poor +  (R  lean )   Dynamic Sitting Poor   Static Standing Poor -   Endurance Deficit   Endurance Deficit Yes   Endurance Deficit Description fatigues easily- requires frequent rest breaks    Activity Tolerance   Activity Tolerance Patient limited by fatigue   Medical Staff Made Aware yes- updated CM/ MSW   Nurse Made Aware RN - Gurmeet lares pt for PT/OT and OOB to chair - alarm intact post session   Assessment   Prognosis Fair   Problem List Decreased strength;Decreased range of motion;Decreased endurance; Impaired balance;Decreased mobility; Decreased coordination; Impaired judgement; Impaired sensation;Obesity; Impaired tone   Assessment Pt is 65 y o  male seen for PT evaluation s/p admit to [facility] on 2/13/2018  Pt presenting to ED  From [facility] w/ c/o weakness and associated recurrent falls; confusion; TAYLOR;   Current dx/ problem list includes: falls; AMS; COPD exacerbation; ambulatory dysfunction  PT now consulted for assessment of mobility and d/c needs  PMhx lung CA; hx of CVA w/ residual L deficits; COPD; CKD; HTN; HLD (as above);   and  personal factors currently affecting pt's physical performance include: falls; limited social support    Prior to admission, residing at [facility] and receiving A for transfers and all ADL's pt reports nonambulatory and having A of 1-2 for all trnasfers "a lot of help" pt receiving both PT/OT PTA and able to self propel w/c w/ B/L LE's at baseline  Upon evaluation, pt currently is requiring min/modA A for bed skills; maxA x2 for functional transfer bed<>chair and modA x2 for sit to stand w/ RW  Pt presents currently w/ overall mobility deficits 2* to: decreased LE strength/AROM; limited flexibility;  generalized weakness/ deconditioning; decreased endurance; L hemiparesis at baseline; impaired sensation; incresed LLE tone decreased activity tolerance; decreased coordination; impaired balance; gait deviations; decreased safety awareness; SOB/TAYLOR; fatigue; impaired safety and judgement; limited insight into current deficits; bed/ chair alarms; multiple lines  Pt currently at risk for falls  (Please find additional objective findings from PT assessment regarding body systems outlined above ) Pt will continue to benefit from skilled PT interventions to address stated impairments; to maximize functional potential; for ongoing  training; and DME needs  PT is currently recommending return to retirement w/ PT/OT if accepting as pt appears to be nearing baseline (pt requires A for all ADL's as well as xfers- cannot xfers alone)- vs inpt rehab   Will follow  Goals   Patient Goals "go home"    STG Expiration Date 02/25/18   Short Term Goal #1 In 10 days pt will complete: 1) Bed mobility skills with SBA 2) Functional transfers with Cheryl x1 via sit pivot 3) w/c management and mobility on level surfaces 300'  5) Improve balance grades to Good 6) Improve LE strength grades by 1   7) LE HEP independently  8) PT for ongoing pt and family education; DME needs and D/C planning to promote highest level of function in least restrictive environment  Treatment Day 0   Plan   Treatment/Interventions Functional transfer training;LE strengthening/ROM; Therapeutic exercise; Endurance training;Patient/family training;Equipment eval/education; Bed mobility;Gait training;Spoke to nursing;Spoke to case management;OT   PT Frequency 5x/wk   Recommendation   Recommendation (back to [facility] w/ PT/OT vs rehab  )   Modified Gentry Scale   Modified Gentry Scale 4   Barthel Index   Feeding 10   Bathing 0   Grooming Score 0   Dressing Score 5   Bladder Score 10   Bowels Score 10   Toilet Use Score 5   Transfers (Bed/Chair) Score 5   Mobility (Level Surface) Score 0   Stairs Score 0   Barthel Index Score 45     Kurt Smith, PT

## 2018-02-15 NOTE — PLAN OF CARE
Problem: PHYSICAL THERAPY ADULT  Goal: Performs mobility at highest level of function for planned discharge setting  See evaluation for individualized goals  Treatment/Interventions: Functional transfer training, LE strengthening/ROM, Therapeutic exercise, Endurance training, Patient/family training, Equipment eval/education, Bed mobility, Gait training, Spoke to nursing, Spoke to case management, OT          See flowsheet documentation for full assessment, interventions and recommendations  Prognosis: Fair  Problem List: Decreased strength, Decreased range of motion, Decreased endurance, Impaired balance, Decreased mobility, Decreased coordination, Impaired judgement, Impaired sensation, Obesity, Impaired tone  Assessment: Pt is 65 y o  male seen for PT evaluation s/p admit to [facility] on 2/13/2018  Pt presenting to ED  From [facility] w/ c/o weakness and associated recurrent falls; confusion; TAYLOR;   Current dx/ problem list includes: falls; AMS; COPD exacerbation; ambulatory dysfunction  PT now consulted for assessment of mobility and d/c needs  PMhx lung CA; hx of CVA w/ residual L deficits; COPD; CKD; HTN; HLD (as above);   and  personal factors currently affecting pt's physical performance include: falls; limited social support   Prior to admission, residing at [facility] and receiving A for transfers and all ADL's pt reports nonambulatory and having A of 1-2 for all trnasfers "a lot of help" pt receiving both PT/OT PTA and able to self propel w/c w/ B/L LE's at baseline  Upon evaluation, pt currently is requiring min/modA A for bed skills; maxA x2 for functional transfer bed<>chair and modA x2 for sit to stand w/ RW      Pt presents currently w/ overall mobility deficits 2* to: decreased LE strength/AROM; limited flexibility;  generalized weakness/ deconditioning; decreased endurance; L hemiparesis at baseline; impaired sensation; incresed LLE tone decreased activity tolerance; decreased coordination; impaired balance; gait deviations; decreased safety awareness; SOB/TAYLOR; fatigue; impaired safety and judgement; limited insight into current deficits; bed/ chair alarms; multiple lines  Pt currently at risk for falls  (Please find additional objective findings from PT assessment regarding body systems outlined above ) Pt will continue to benefit from skilled PT interventions to address stated impairments; to maximize functional potential; for ongoing  training; and DME needs  PT is currently recommending return to [facility] w/ PT/OT if accepting as pt appears to be nearing baseline (pt requires A for all ADL's as well as xfers- cannot xfers alone)- vs inpt rehab   Will follow  Recommendation:  (back to [facility] w/ PT/OT vs rehab  )          See flowsheet documentation for full assessment

## 2018-02-15 NOTE — OCCUPATIONAL THERAPY NOTE
[address]  Evaluation     Patient Name: Loy Arenas  Today's Date: 2/15/2018  Problem List  Patient Active Problem List   Diagnosis    Essential hypertension    [facility]    Dysphagia    History of cerebrovascular accident (CVA) with residual deficit    GERD (gastroesophageal reflux disease)    Left-sided weakness    Hyperlipidemia    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    Tracheomalacia    Chronic obstructive pulmonary disease with acute exacerbation ([facility] )    SIRS due to infectious process without acute organ dysfunction ([facility] )    Ambulatory dysfunction     Past Medical History  Past Medical History:   Diagnosis Date    RONAL (acute kidney injury) ([facility] ) 5/31/2017    Aspiration into respiratory tract     Chest pain 5/31/2017    COPD (chronic obstructive pulmonary disease) (HCC)     Depression     Elevated troponin 5/31/2017    GERD (gastroesophageal reflux disease)     History of CVA (cerebrovascular accident) 4/13/2016    Hyperlipidemia     Hypertension     Lung cancer ([facility] ) 2005    Right, status post lobectomy    Pneumonia     [facility]     Sleep apnea     awaiting sleep study results    Stroke ([place] [address] )     Weakness due to cerebrovascular accident (CVA) [facility])      Past Surgical History  Past Surgical History:   Procedure Laterality Date    COLONOSCOPY      ESOPHAGOGASTRODUODENOSCOPY N/A 4/13/2016    Procedure: ESOPHAGOGASTRODUODENOSCOPY (EGD); Surgeon: Clarissa Brunson MD;  [facility]; Service:     JOINT REPLACEMENT      knee replacement    LUNG LOBECTOMY      [place] ESOPHAGOGASTRODUODENOSCOPY TRANSORAL DIAGNOSTIC N/A 3/15/2017    Procedure: ESOPHAGOGASTRODUODENOSCOPY (EGD); Surgeon: Clarissa Brunson MD;  [facility];   Service: Gastroenterology    TRIGEMINAL NERVE DECOMPRESSION        02/15/18 1213   Note Type   Note type Eval only   Restrictions/Precautions   Weight Bearing Precautions Per Order No   Braces or Orthoses MAFO  (L not present for eval)   Other Precautions Contact/isolation; Chair Alarm; Bed Alarm; Fall Risk;Multiple lines   Pain Assessment   Pain Assessment No/denies pain   Pain Score No Pain   Home Living   Type of Home Assisted living  ([facility])   Home Layout One level   Bathroom Shower/Tub Walk-in shower   Bathroom Toilet Raised   Bathroom Equipment Grab bars in shower; Shower chair;Grab bars around toilet   Bathroom Accessibility Accessible via wheelchair   [address] [facility]; Wheelchair-manual   Prior Function   Level of [place] Needs assistance with ADLs and functional mobility   Lives With Facility staff   Receives Help From Personal care attendant   ADL Assistance Needs assistance   IADLs Needs assistance   Falls in the last 6 months 1 to 4   Vocational Retired   Comments Ax1-2 PTA at [facility] for bed skills and transfers OOB to [facility]- pt reports having A for all functional transfers consisting of 1-2 people and reports "needs alot of help there"- pt has been nonambulatory since CVA and self propells w/c w/ R LE for 1* mobiltiy at facility- receives both PT/ OT at [facility] presently    Lifestyle   Autonomy needs A for ADLs/IADLs   Reciprocal Relationships well known at facility, supportive brother and niece   Service to Others retired    Intrinsic Gratification enjoys TV and propelling w/c in [facility] [address] "They could help me if I need"   ADL   Eating Assistance 4  Minimal Assistance   Eating Deficit (opening containers)   Grooming Assistance 4  Minimal Assistance   Grooming Deficit Setup; Increased time to complete   UB Dressing Assistance 3  Moderate Assistance   LB Dressing Assistance 1  Total Assistance   LB Dressing Deficit Don/doff R sock; Don/doff L sock   Bed Mobility   Supine to Sit 3  Moderate assistance   Additional items Assist x 1;HOB elevated; Bedrails; Increased time required   Transfers   Sit to Stand 3  Moderate assistance Additional items Assist x 2; Increased time required;Verbal cues   Stand to Sit 4  Minimal assistance   Additional items Assist x 2; Increased time required;Verbal cues   Stand pivot 2  Maximal assistance   Additional items Assist x 2  (for stand piviot to R-)   Balance   Static Sitting Poor +   Dynamic Sitting Poor   Static Standing Poor -   Activity Tolerance   Activity Tolerance Patient limited by fatigue   Nurse Made Aware EFRAIN COULTER Assessment   RUE Assessment WFL   LUE Assessment   LUE Assessment X  (MMT: 3+/6, decreased [address])   Hand Function   Gross Motor Coordination Functional   Fine Motor Coordination Impaired   Sensation   Light Touch No apparent deficits   Cognition   Arousal/Participation Alert; Cooperative   Attention Within functional limits   Orientation Level Oriented X4   Memory Within functional limits   Following Commands Follows one step commands without difficulty   Assessment   Limitation Decreased ADL status; Decreased UE strength;Decreased Safe judgement during ADL;Decreased endurance;Decreased self-care trans;Decreased high-level ADLs   Prognosis Good   Assessment Pt  is a 65 y o  male who was [facility] to [facility] on [facility] w/ SIRS  Pt  w/a significant PMH/co-morbidities of lung cancer, hx  Of CVA w/ L residual deficit, GERD, CKD, COPD, recurrent falls, depression, HTN, HLD, sleep apnea, hx  Of knee replacement  Pt  currently lives in a [facility] longterm  PTA, pt  Received (A) in all ADLs/IADLs, bed mobility, some transfers  Pt used a w/c for functional mobility  Currently, pt  requires mod Ax1 for bed mobility, max-min A x2 for transfers, mod-min A for UB ADLs and total A for LB ADLs  A is needed d/t the following impairments: decreased LUE strength,  LUE [address], decreased functional activity tolerance, generalized weakness, deconditioning, decreased balance, decreased safety/insight/judgment  Additionally, pt  requires mod v c  during functional activity for safe transfer technique   Pt  Currently reporting that he receives A x1-2 for ADLs and transfers and receives home OT at facility  Therefore, pt  With no acute Ot needs  Acute care OT to sign off and Rec  Return to facility with S/A as needed and previously instated OT services  Pt  Also reporting that he transfers to toilet independently however based on transfer today,  Rec  Pt  Have A for all transfers  Pt states that facility is able to provide this level of A and agreeable to rec      Goals   Patient Goals to get into the chair   Recommendation   OT Discharge Recommendation Home OT  (return to faciltiy)   OT - OK to Discharge Yes   Barthel Index   Feeding 10   Bathing 0   Grooming Score 0   Dressing Score 0   Bladder Score 10   Bowels Score 10   Toilet Use Score 5   Transfers (Bed/Chair) Score 5   Mobility (Level Surface) Score 0   Stairs Score 0   Barthel Index Score 40   Modified [place] Scale   Modified [place] Scale 4   Jorge Rowe, MOT, OTR/L

## 2018-02-15 NOTE — RESPIRATORY THERAPY NOTE
respiratory protocol      02/15/18 0907   Respiratory Assessment   Assessment Type Assess only   General Appearance Awake; Alert   Respiratory Pattern Normal   Chest Assessment Chest expansion symmetrical   Bilateral Breath Sounds Diminished   Cough Non-productive   Resp Comments pt using mdi qid  has not needed prn udn   bs clear  will d/c prn udn and d/c pt from resp protocol     Additional Assessments   SpO2 95 %

## 2018-02-15 NOTE — PROGRESS NOTES
Progress Note - Brittani Lindo  1940, 65 y o  male MRN: [id number]    Unit/Bed#: [facility] 503-01 Encounter: [id number]    Primary Care Provider: Shakira Woodruff DO   Date and time admitted to hospital: 2/13/2018 11:22 AM        * SIRS due to infectious process without acute organ dysfunction [facility])   Assessment & Plan    SIRS resolved  Chest x-ray reviewed no active pulmonary disease  Vital signs, reviewed, unremarkable  Influenza A, B, RSV negative  Labs reviewed:  WBC within normal range  Continue gentle IV hydration-   No antimicrobial therapy at this point  Chronic obstructive pulmonary disease with acute exacerbation (HCC)   Assessment & Plan    No active wheezing   Shortness breath improved   Continues to have chronic cough  Continue bronchodilator therapy  Continue IV steroids, will taper down    Continue above management        Ambulatory dysfunction   Assessment & Plan    Admitted for recurrent falls, recently becoming more frequent, associated with periods of confusion  Patient alert oriented x3, and able to converse appropriately, with some speech aphasia  History of CVA  CT head unremarkable for new changes  Unable to distinguish what appeared to of confusion related to seizure-like activity, or TIA  Patient was evaluated by neurology  - no further neuroimaging at this time  -low suspicion of seizure activity  -B12 folate, TSH levels to identify reversible causes of delirium/confusion  -PT/OT eval/tx        Tracheomalacia   Assessment & Plan    Stable  Continue above management        Left-sided weakness   Assessment & Plan    Status post CVA 10 years ago  Continue PT/OT        Dysphagia   Assessment & Plan    CVA 10 years ago  Continue modified diet  Continue supportive care, above management        Essential hypertension   Assessment & Plan    BP controlled  Continue blood pressure monitoring  Continue meds        Lung cancer ([facility] [address] )   Assessment & Plan    Stable  Continue above management        GERD (gastroesophageal reflux disease)   Assessment & Plan    Stable, continue PPI        History of cerebrovascular accident (CVA) with residual deficit   Assessment & Plan    Generalized weakness in upper lower extremities  History of CVA  Continue physical therapy occupational therapy        Hyperlipidemia   Assessment & Plan    Stable  Continue statin        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   Assessment & Plan    Mood stable  Continue meds            VTE Pharmacologic Prophylaxis:   Pharmacologic: Enoxaparin (Lovenox)  Mechanical VTE Prophylaxis in Place: Yes    Patient Centered Rounds: I have performed bedside rounds with nursing staff today  Discussions with Specialists or Other Care Team Provider: yes    Education and Discussions with Family / Patient:yes  Time Spent for Care: 45 minutes  More than 50% of total time spent on counseling and coordination of care as described above  Current Length of Stay: 2 day(s)    Current Patient Status: Inpatient   Certification Statement: The patient will continue to require additional inpatient hospital stay due to medically stable for dc tomorrow    Discharge Plan: rehab facility    Code Status: Level 1 - Full Code      Subjective:   Cough- nonproductive ( underlying COPD)  Denied any fever, chest pain, shortness of breath  ROS negative otherwise  Objective:     Vitals:   Temp (24hrs), Av 5 °F (36 4 °C), Min:97 5 °F (36 4 °C), Max:97 6 °F (36 4 °C)    HR:  [56-66] 60  Resp:  [18-22] 20  BP: (110-144)/(56-72) 144/72  SpO2:  [91 %-95 %] 95 %  Body mass index is 27 27 kg/m²  Input and Output Summary (last 24 hours): Intake/Output Summary (Last 24 hours) at 02/15/18 1430  Last data filed at 02/15/18 1246   Gross per 24 hour   Intake          2316 67 ml   Output              775 ml   Net          1541 67 ml       Physical Exam:     Physical Exam   Constitutional: He is oriented to person, place, and time   He appears well-developed and well-nourished  No distress  HENT:   Head: Normocephalic and atraumatic  Mouth/Throat: Oropharynx is clear and moist    Eyes: EOM are normal  Pupils are equal, round, and reactive to light  Neck: Normal range of motion  Neck supple  No JVD present  Cardiovascular: Normal rate and normal heart sounds  Exam reveals no gallop and no friction rub  No murmur heard  Pulmonary/Chest: Effort normal and breath sounds normal  No respiratory distress  He has no wheezes  He has no rales  Abdominal: Soft  Bowel sounds are normal  He exhibits no distension  There is no tenderness  There is no rebound and no guarding  Musculoskeletal: Normal range of motion  He exhibits no edema, tenderness or deformity  Left hemiparesis    Neurological: He is alert and oriented to person, place, and time  He displays abnormal reflex  No cranial nerve deficit  He exhibits abnormal muscle tone  Coordination abnormal    aphasia   Skin: Skin is warm and dry  No rash noted  No erythema  No pallor  Psychiatric: He has a normal mood and affect  His behavior is normal  Judgment and thought content normal      Additional Data:     Labs:      Results from last 7 days  Lab Units 02/14/18  0438   WBC Thousand/uL 8 30  8 30   HEMOGLOBIN g/dL 13 7  13 7   HEMATOCRIT % 40 0  40 0   PLATELETS Thousands/uL 135*  133*  133*   NEUTROS PCT % 89*   LYMPHS PCT % 8*   MONOS PCT % 3*   EOS PCT % 0       Results from last 7 days  Lab Units 02/14/18  0438   SODIUM mmol/L 137   POTASSIUM mmol/L 3 7   CHLORIDE mmol/L 104   CO2 mmol/L 23   BUN mg/dL 26*   CREATININE mg/dL 1 24   CALCIUM mg/dL 8 6   TOTAL PROTEIN g/dL 7 2   BILIRUBIN TOTAL mg/dL 0 75   ALK PHOS U/L 87   ALT U/L 24   AST U/L 22   GLUCOSE RANDOM mg/dL 143*           * I Have Reviewed All Lab Data Listed Above  * Additional Pertinent Lab Tests Reviewed:  [facility]    Imaging:reviewed    Recent Cultures (last 7 days): Results from last 7 days  Lab Units 02/14/18  0731   INFLUENZA A PCR  None Detected   INFLUENZA B PCR  None Detected   RSV PCR  None Detected       Last 24 Hours Medication List:     Current Facility-Administered Medications:  acetaminophen 650 mg Oral Q6H PRN Ashley Gonsales MD    albuterol 2 puff Inhalation 4x Daily Ashley Gonsales MD    albuterol 2 5 mg Nebulization Q4H PRN Jeremy Ortiz MD    amLODIPine 10 mg Oral Daily Ashley Gonsales MD    aspirin 81 mg Oral Every Other Aminah Torres MD    atorvastatin 10 mg Oral Daily With Steph Rachel MD    benzonatate 100 mg Oral TID PRN Ashley Gonsales MD    bimatoprost 1 drop Both Eyes HS Ashley Gonsales MD    carvedilol 12 5 mg Oral BID With Meals Ashley Gonsales MD    clopidogrel 75 mg Oral Daily Ashley Gonsales MD    enoxaparin 40 mg Subcutaneous Daily Ashley Gonsales MD    fluticasone-salmeterol 1 puff Inhalation Q12H [address] Roshan Stokes MD    LORazepam 0 25 mg Oral Daily Ashley Gonsales MD    LORazepam 0 5 mg Oral HS Ashley Gonsales MD    meclizine 25 mg Oral TID PRN Ashley Gonsales MD    ondansetron 4 mg Intravenous Q6H PRN Ashley Gonsales MD    pantoprazole 20 mg Oral Early Morning Ashley Gonsales MD    PARoxetine 20 mg Oral Daily Ashley Gonsales MD    predniSONE 40 mg Oral Daily Jeremy Ortiz MD    sodium chloride 50 mL/hr Intravenous Continuous Jeremy Ortiz MD Last Rate: 50 mL/hr (02/15/18 0332)   spironolactone 25 mg Oral Daily Ashley Gonsales MD    tamsulosin 0 4 mg Oral Daily With Steph Rachel MD    tiotropium 18 mcg Inhalation Daily Ashley Gonsales MD    tolterodine 2 mg Oral Daily Ashley Gonsales MD         Today, Patient Was Seen By: Jeremy Ortiz MD    ** Please Note: Dictation voice to text software may have been used in the creation of this document   **

## 2018-02-15 NOTE — SOCIAL WORK
Cm faxed therapy evals to Sharri at [facility] to determine if pt is eligible for return  Per Dashawn Wallis pt would benefit from rehab at a SNF  Cm spoke with daughter Monica Eaton and she would like to see if pt can go to [facility]  Cm explained pt is a max assist and needs to tolerate up to 3hrs of therapy   Asked for subacute choices and was provided [facility] as a back-up  Cm paged Dr Trista Parker to enter PMR consult

## 2018-02-15 NOTE — ASSESSMENT & PLAN NOTE
Admitted for recurrent falls, recently becoming more frequent, associated with periods of confusion  Patient alert oriented x3, and able to converse appropriately, with some speech aphasia  History of CVA  CT head unremarkable for new changes  Unable to distinguish what appeared to of confusion related to seizure-like activity, or TIA  Patient was evaluated by neurology  - no further neuroimaging at this time  -low suspicion of seizure activity  -B12 folate, TSH levels to identify reversible causes of delirium/confusion  -PT/OT eval/tx

## 2018-02-15 NOTE — ASSESSMENT & PLAN NOTE
No active wheezing   Shortness breath improved   Continues to have chronic cough  Continue bronchodilator therapy  Continue IV steroids, will taper down    Continue above management

## 2018-02-15 NOTE — CONSULTS
Consultation - Neurology   Elza Lucio  65 y o  male MRN: [id number]  Unit/Bed#: [facility] 503-01 Encounter: [id number]      Assessment/Plan   Assessment:  77-year-old male with history of stroke approximately 10 years ago with residual left-sided weakness, ambulatory dysfunction, history of lung cancer status post lung resection, COPD, CKD, hypertension, hyperlipidemia, who presents with recurrent falls at his assisted living facility with generalized weakness and degree of confusion/altered mental status, which nursing staff described as delusions  Unclear the etiology of altered mentation, consider secondary to respiratory insufficiency as patient suspected to have COPD exacerbation  No clear metabolic or infectious derangement noted thus far during workup  No clear worsening of left-sided weakness on current exam compared to previous neurologic exams to suspect acute cerebrovascular event  Per discussion with justin, no evidence suspicious for seizure-like activity  Plan:  1  Patient with seemingly baseline neurologic exam in terms of left-sided hemiparesis  Would not recommend additional neuroimaging at this time  Patient remains on dual antiplatelet therapy and statin  2   No suspicion of focal or generalized seizure-like activity, low suspicion of seizure activity as cause of altered mentation/delusions  Can defer EEG  3   Patient denies complaints of headache, afebrile without leukocytosis today  No meningeal signs on exam, no need for LP as low suspicion of CNS process  4  Recommend check B12, Folate, TSH to evaluate for reversible causes of delirium/confusion prior to discharge  5   Continued monitoring and correction of any metabolic/infectious derangements per primary team   6   Patient's respiratory management per primary team, continuing on bronchodilator management and prednisone  7   Supportive care, on IV fluids at this time  8   Recommend PT/OT evaluations    9  Likely no further inpatient neurologic workup  Will discuss case with attending neurologist  Continue to monitor neurologic exam during course of admission  History of Present Illness     Reason for Consult / Principal Problem:  Altered mentation/recurrent falls    HPI: Shaniqua Gleason  is a 65 y o  male who neurology is asked to evaluate regarding altered mental status and question of worsening residual left-sided weakness, after patient presented from [facility] after several recurrent falls  Patient initially presented on February 10th, after patient had an unwitnessed fall out of his wheelchair, he was deemed stable for discharge and returned back to the facility  He again fell on 02/13 and again presented to the ED; patient in fell out of bed, without hitting his head loss of consciousness  He was deemed at his baseline neurologic state and again discharged home  Later that same day 2/13, patient again fell, was again brought to the ED and admitted for further workup  Patient is noted to have increased generalized weakness and fatigue, as well as persisting cough over the past several days  Workup in ED included CT head, revealing old bilateral basal ganglia infarcts, but without acute abnormalities  Afebrile, improvement of diffuse wheezing after several ipratropium/albuterol treatments  Placed on Solu-Medrol for suspected COPD exacerbation  Discussed patient's baseline mental and functional status with [facility] staff  Patient on good days able to ambulate with a roller walker, however self propels in his wheelchair for the majority of the time, and requires assistance with dressing/bathing  Patient has had approximately 3 falls over the past several days, each falling out of bed as patient is having confined to the rooms due to the facility being on lock down/quarantine    Patient chronically has left-sided weakness from his prior stroke, however there was no clear evidence of worsening of that weakness over the past few days prior to this hospitalization  Staff also noticed over the past several days, patient has been delusional; he is told staff he is being going to the beach and hanging out with family  Normally he is alert and fully oriented to person, place and date  Patient today states himself that he does not appreciated any weakness of his left side compared to normal; he has been going to physical therapy twice a week, and states there seems to be some improvement with his left side  He denies any other neurologic symptoms including headache  Notes his vision is unclear currently, but states that is because he does not have his glasses, which are at [facility]  Denies any gross slurring of his speech or expressive aphasia  He denies any new sensory changes (notes some chronic distal extremity sensory changes on the left side from old stroke )  Currently he denies any chest pain, states his worsened cough has quieted down this morning  Denies any overt shortness of breath or abdominal pain  Patient states he was sick recently and had the bug  Per review of chart, patient was evaluated by neuro hospitalist team in April of 2017 for left-sided weakness in the setting of systemic respiratory illness  His left-sided weakness improved following respiratory management and it was suspected the weakness was recrudescence in origin (from prior stroke approximately 10 years ago)  For stroke prevention, patient is on dual antiplatelet therapy and low-dose Lipitor  Lipid panel was last obtained in April, with normal values, LDL of 41  Inpatient consult to Neurology  Consult performed by: Jd Lira ordered by: Marjorie Turner          Review of Systems   Constitutional: Negative for chills, diaphoresis, fatigue and fever  HENT: Negative for trouble swallowing and voice change  Eyes: Negative for photophobia and visual disturbance  Respiratory: Positive for cough, shortness of breath and wheezing  Negative for chest tightness  Cardiovascular: Negative for chest pain and palpitations  Gastrointestinal: Negative for abdominal pain, diarrhea, nausea and vomiting  Genitourinary: Negative for difficulty urinating  Musculoskeletal: Negative  Skin: Negative  Neurological: Positive for facial asymmetry, weakness and numbness  Negative for dizziness, tremors, seizures, syncope, speech difficulty, light-headedness and headaches  Historical Information   Past Medical History:   Diagnosis Date    [facility]AL [facility]) ([facility] ) 5/31/2017    Aspiration into respiratory tract     Chest pain 5/31/2017    COPD (chronic obstructive pulmonary disease) (HCC)     Depression     Elevated troponin 5/31/2017    GERD (gastroesophageal reflux disease)     History of CVA (cerebrovascular accident) 4/13/2016    Hyperlipidemia     Hypertension     Lung cancer ([place] [address] ) 2005    Right, status post lobectomy    Pneumonia     Prostate cancer ([place] [address] )     Sleep apnea     awaiting sleep study results    Stroke ([place] [address] )     Weakness due to cerebrovascular accident (CVA) [facility])      Past Surgical History:   Procedure Laterality Date    COLONOSCOPY      ESOPHAGOGASTRODUODENOSCOPY N/A 4/13/2016    Procedure: ESOPHAGOGASTRODUODENOSCOPY (EGD); Surgeon: Juliette Menjivar MD;  [facility]: [facility]; Service:     JOINT REPLACEMENT      knee replacement    LUNG LOBECTOMY      AK ESOPHAGOGASTRODUODENOSCOPY TRANSORAL DIAGNOSTIC N/A 3/15/2017    Procedure: ESOPHAGOGASTRODUODENOSCOPY (EGD); Surgeon: Juliette Menjivar MD;  [facility];   Service: Gastroenterology    TRIGEMINAL NERVE DECOMPRESSION       Social History   History   Alcohol Use No     History   Drug Use No     History   Smoking Status    Former Smoker    Packs/day: 1 00    Years: 22 00    Types: Cigarettes    Start date: 5    Quit date: 1977   Smokeless Tobacco    Never Used Family History:   Family History   Problem Relation Age of Onset    Family history unknown: Yes       Review of previous medical records was completed      Meds/Allergies   current meds:   Current Facility-Administered Medications   Medication Dose Route Frequency    acetaminophen (TYLENOL) tablet 650 mg  650 mg Oral Q6H PRN    albuterol (PROVENTIL HFA,VENTOLIN HFA) inhaler 2 puff  2 puff Inhalation 4x Daily    albuterol inhalation solution 2 5 mg  2 5 mg Nebulization Q4H PRN    amLODIPine (NORVASC) tablet 10 mg  10 mg Oral Daily    aspirin chewable tablet 81 mg  81 mg Oral Every Other Day    atorvastatin (LIPITOR) tablet 10 mg  10 mg Oral Daily With Dinner    benzonatate (TESSALON PERLES) capsule 100 mg  100 mg Oral TID PRN    bimatoprost (LUMIGAN) 0 01 % ophthalmic solution 1 drop  1 drop Both Eyes HS    carvedilol (COREG) tablet 12 5 mg  12 5 mg Oral BID With Meals    clopidogrel (PLAVIX) tablet 75 mg  75 mg Oral Daily    enoxaparin (LOVENOX) subcutaneous injection 40 mg  40 mg Subcutaneous Daily    fluorouracil (EFUDEX) 5 % cream   Topical BID    fluticasone-salmeterol (ADVAIR) 250-50 mcg/dose inhaler 1 puff  1 puff Inhalation Q12H [address]    LORazepam (ATIVAN) tablet 0 25 mg  0 25 mg Oral Daily    LORazepam (ATIVAN) tablet 0 5 mg  0 5 mg Oral HS    meclizine (ANTIVERT) tablet 25 mg  25 mg Oral TID PRN    ondansetron (ZOFRAN) injection 4 mg  4 mg Intravenous Q6H PRN    pantoprazole (PROTONIX) EC tablet 20 mg  20 mg Oral Early Morning    PARoxetine (PAXIL) tablet 20 mg  20 mg Oral Daily    predniSONE tablet 40 mg  40 mg Oral Daily    sodium chloride 0 9 % infusion  50 mL/hr Intravenous Continuous    spironolactone (ALDACTONE) tablet 25 mg  25 mg Oral Daily    tamsulosin (FLOMAX) capsule 0 4 mg  0 4 mg Oral Daily With Dinner    tiotropium (SPIRIVA) capsule for inhaler 18 mcg  18 mcg Inhalation Daily    tolterodine (DETROL LA) 24 hr capsule 2 mg  2 mg Oral Daily    and PTA meds:   Prior to Admission Medications   Prescriptions Last Dose Informant Patient Reported? Taking?    ASPIRIN 81 PO 2/13/2018 at Unknown time  Yes Yes   Sig: Take 1 tablet by mouth every other day     Fesoterodine Fumarate ER (TOVIAZ) 8 MG TB24 2/12/2018 at Unknown time Outside Facility ([address] [place] [address]) Yes Yes   Sig: Take 4 mg by mouth every evening     LORazepam (ATIVAN) 0 5 mg tablet   No No   Sig: Take 0 5 tablets by mouth daily   LORazepam (ATIVAN) 0 5 mg tablet 2/12/2018 at Unknown time  Yes Yes   Sig: Take 1 tablet by mouth daily   PARoxetine (PAXIL) 20 mg tablet 2/12/2018 at Unknown time  Yes Yes   Sig: Take 1 tablet by mouth daily   acetaminophen (TYLENOL) 325 mg tablet Unknown at Unknown time  No No   Sig: Take 2 tablets by mouth every 6 (six) hours as needed for fever   albuterol (2 5 mg/3 mL) 0 083 % nebulizer solution   Yes No   Sig: Inhale 4 (four) times a day     albuterol (PROVENTIL HFA) 90 mcg/act inhaler 2/12/2018 at Unknown time  Yes Yes   Sig: Inhale   albuterol (PROVENTIL HFA,VENTOLIN HFA) 90 mcg/act inhaler   No No   Sig: Inhale 2 puffs 4 (four) times a day   amLODIPine (NORVASC) 10 mg tablet   Yes Yes   Sig: Take 1 tablet by mouth daily   atorvastatin (LIPITOR) 10 mg tablet 2/12/2018 at Unknown time  Yes Yes   Sig: Take 1 tablet by mouth   benzonatate (TESSALON PERLES) 100 mg capsule 2/12/2018 at Unknown time  No Yes   Sig: Take 1 capsule by mouth 3 (three) times a day as needed for cough   bimatoprost (LUMIGAN) 0 01 % ophthalmic drops 2/12/2018 at Unknown time  No Yes   Sig: Administer 1 drop to both eyes daily at bedtime   carvedilol (COREG) 12 5 mg tablet 2/12/2018 at Unknown time  No Yes   Sig: Take 1 tablet by mouth 2 (two) times a day with meals   clopidogrel (PLAVIX) 75 mg tablet 2/12/2018 at Unknown time  No Yes   Sig: Take 1 tablet by mouth daily   fluorouracil (EFUDEX) 5 % cream   Yes No   Sig: APPLY TO ENTIRE LESIONS Q 12 H FOR 28 DAYS FROM 2/3/18 TO 3/2/18   fluticasone (FLONASE) 50 mcg/act nasal spray 2018 at Unknown time  Yes Yes   Si-2 sprays into each nostril daily   fluticasone-salmeterol (ADVAIR DISKUS) 250-50 mcg/dose inhaler 2018 at Unknown time  Yes Yes   Sig: Inhale 2 (two) times a day     levocetirizine (XYZAL) 5 MG tablet 2018 at Unknown time  No Yes   Sig: Take 1 tablet by mouth every evening   meclizine (ANTIVERT) 25 mg tablet   No No   Sig: Take 1 tablet by mouth 3 (three) times a day as needed for dizziness   mupirocin (BACTROBAN) 2 % ointment   Yes No   Sig: ELA AA TID FOR 7 DAYS FROM 18 THROUGH 18   omeprazole (PriLOSEC) 40 MG capsule 2018 at Unknown time  Yes Yes   Sig: Take 1 capsule by mouth daily   psyllium (METAMUCIL) 0 52 g capsule 2018 at Unknown time  Yes Yes   Sig: Take 0 52 g by mouth daily   spironolactone (ALDACTONE) 25 mg tablet 2018 at Unknown time  No Yes   Sig: Take 1 tablet (25 mg total) by mouth daily   tamsulosin (FLOMAX) 0 4 mg   No No   Sig: Take 1 capsule by mouth daily with dinner   tamsulosin (FLOMAX) 0 4 mg 2018 at Unknown time  Yes Yes   Sig: Take 1 capsule by mouth daily   tiotropium (SPIRIVA HANDIHALER) 18 mcg inhalation capsule 2018 at Unknown time  Yes Yes   Sig: Place into inhaler and inhale daily      Facility-Administered Medications: None       Allergies   Allergen Reactions    Penicillins Rash       Objective   Vitals:Blood pressure 121/56, pulse 56, temperature 97 5 °F (36 4 °C), temperature source Oral, resp  rate 22, height 6' 5" (1 956 m), weight 104 kg (230 lb), SpO2 91 %  ,Body mass index is 27 27 kg/m²  Intake/Output Summary (Last 24 hours) at 02/15/18 0721  Last data filed at 18 2350   Gross per 24 hour   Intake          2491 67 ml   Output              625 ml   Net          1866 67 ml       Invasive Devices:    Invasive Devices     Peripheral Intravenous Line            Peripheral IV 18 Left Antecubital less than 1 day                Physical Exam   Constitutional: He appears well-developed and well-nourished  HENT:   Head: Normocephalic and atraumatic  Eyes: Conjunctivae and EOM are normal  Pupils are equal, round, and reactive to light  Neck: Normal range of motion  Neck supple  Cardiovascular: Normal rate and regular rhythm  Pulmonary/Chest: Effort normal and breath sounds normal    Abdominal: Soft  Bowel sounds are normal    Musculoskeletal: Normal range of motion  Neurological: He is alert  Finger-nose-finger test: Patient ataxic with left, which she states is chronic  No clear ataxia or dysmetria with the right  Heel-to-shin test: Not able to clearly assess, performs more rapid foot wiggling with the right compared to left  Reflex Scores:       Bicep reflexes are 2+ on the right side and 2+ on the left side  Brachioradialis reflexes are 2+ on the right side and 2+ on the left side  Patellar reflexes are 0 on the right side and 0 on the left side  Achilles reflexes are 0 on the right side and 0 on the left side  Skin: Skin is warm and dry  Neurologic Exam     Mental Status     Patient is alert, oriented to person  Notes we are in [facility]   He states the year is 25; initially states the month is November but corrects to February  Able to recognize the president stays in February when asked for holidays  He identifies the President, initially states 5 nickels in a dollar but corrects to 20 on his own  He is able to identify simple colors and objects  He repeats complex phrases and converses without evidence of dysarthria or word-finding difficulty  Able to states he is in the hospital due to a bad cough, and has had recent falls at [facility]  He is able to state he is weak on his left side from a stroke, approximately 12 years ago  Cranial Nerves     CN III, IV, VI   Pupils are equal, round, and reactive to light    Extraocular motions are normal      CN V   Right facial sensation deficit: 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, 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  CN VII   Left facial weakness: Left facial droop noted  CN VIII   CN VIII normal      CN IX, X   CN IX normal    CN X normal      CN XI   CN XI normal      CN XII   CN XII normal      Motor Exam   Right arm pronator drift: absent  Left arm pronator drift: present  Patient with intact and full right upper and lower extremity strength  He displays a minimal degree of weakness with the left deltoid/shoulder raise, but seemingly symmetric biceps/triceps/ strength with the left compared to right  With the left leg, he displays a mild (4+/5-) degree of hip and knee weakness compared to the right lower extremity  Weaker with left dorsi/plantar flexion compared to right, patient again states this is chronic  Compared to previous neurology evaluations, these findings are noted to be chronic, without clear worsening  Sensory Exam     Patient extinguishes with the distal right lower extremity with double simultaneous tactile stimuli  However there is no extinction in the proximal/mid lower extremities and upper extremities with double simultaneous tactile stim  He notes absent vibratory sensation in the toes, intact at the knees and upper extremities and symmetric  Diminished temperature sensation in the distal lower extremities bilaterally, and intact in the mid proximal lower extremities and upper extremities  Gait, Coordination, and Reflexes     Gait  Gait: (Gait not assessed )    Coordination   Finger-nose-finger test: Patient ataxic with left, which she states is chronic  No clear ataxia or dysmetria with the right  Heel-to-shin test: Not able to clearly assess, performs more rapid foot wiggling with the right compared to left      Tremor   Resting tremor: absent  Intention tremor: absent    Reflexes   Right brachioradialis: 2+  Left brachioradialis: 2+  Right biceps: 2+  Left biceps: 2+  Right patellar: 0  Left patellar: 0  Right achilles: 0  Left achilles: 0  Right ankle clonus: absent  Left ankle clonus: absent  Mute plantars bilaterally  Lab Results:   CBC:   Results from last 7 days  Lab Units 02/14/18  0438 02/13/18  1138   WBC Thousand/uL 8 30  8 30 11 02*   RBC Million/uL 4 61  4 61 4 71   HEMOGLOBIN g/dL 13 7  13 7 13 9   HEMATOCRIT % 40 0  40 0 40 9   MCV fL 87  87 87   PLATELETS Thousands/uL 135*  133*  133* 121*   , BMP/CMP:   Results from last 7 days  Lab Units 02/14/18  0438 02/13/18  1138   SODIUM mmol/L 137 137   POTASSIUM mmol/L 3 7 3 7   CHLORIDE mmol/L 104 102   CO2 mmol/L 23 25   ANION GAP mmol/L 10 10   BUN mg/dL 26* 18   CREATININE mg/dL 1 24 1 28   GLUCOSE RANDOM mg/dL 143* 106   CALCIUM mg/dL 8 6 8 9   AST U/L 22 29   ALT U/L 24 26   ALK PHOS U/L 87 87   TOTAL PROTEIN g/dL 7 2 7 4   BILIRUBIN TOTAL mg/dL 0 75 1 17*   EGFR ml/min/1 73sq m 55 53   , Vitamin B12:   , HgBA1C:   , TSH:   , Coagulation:   , Lipid Profile:   , Urinalysis:   Results from last 7 days  Lab Units 02/13/18  2246 02/13/18  2243 02/13/18  2241   COLOR UA  Yellow Dk Yellow complete   CLARITY UA  Clear Cloudy  --    SPEC GRAV UA  1 015 1 015  --    PH UA  6 5 6 5  --    LEUKOCYTES UA  Negative Negative  --    NITRITE UA  Negative Negative  --    PROTEIN UA mg/dl Trace* Negative  --    GLUCOSE UA mg/dl Negative Negative  --    KETONES UA mg/dl 15 (1+)* 15 (1+)*  --    BILIRUBIN UA  Interference- unable to analyze* Negative  --    BLOOD UA  Negative Negative  --      Imaging Studies: I have personally reviewed pertinent films in PACS   CT head 02/13/2018:  No acute intracranial process  No skull fracture  Chronic microangiopathy  XR chest 2/13/18:  No active pulmonary disease  EKG, Pathology, and Other Studies: I have personally reviewed pertinent reports         VTE Prophylaxis: Sequential compression device (Venodyne)  and Enoxaparin (Lovenox)    Code Status: Level 1 - Full Code    Counseling / Coordination of Care  Total time spent today 55 minutes  Greater than 50% of total time was spent with the patient and / or family counseling and / or coordination of care   A description of the counseling / coordination of care: Discussed plan of care with patient and primary team

## 2018-02-15 NOTE — RESPIRATORY THERAPY NOTE
respiratory protocol      02/15/18 1300   Respiratory Assessment   Cough Non-productive;Dry   Resp Comments pt on spiriva,advair and albuterol qid mdi  called to give pt a tx  pt has clear bs, states he had a spasm earlier but is ok at this time  albuterol mdi ordered qid as was not given at scheduled time  pt offers no other resp c/o   RN states pt is having no trouble using mdi's, will cont spiriva,advair and albuterol mdi and change udn to prn for wheezing sob  Will add flutter valve if needed for cough if pt becomes congested

## 2018-02-16 PROCEDURE — 99232 SBSQ HOSP IP/OBS MODERATE 35: CPT | Performed by: HOSPITALIST

## 2018-02-16 PROCEDURE — 99232 SBSQ HOSP IP/OBS MODERATE 35: CPT | Performed by: INTERNAL MEDICINE

## 2018-02-16 PROCEDURE — 94760 N-INVAS EAR/PLS OXIMETRY 1: CPT | Performed by: SOCIAL WORKER

## 2018-02-16 PROCEDURE — 94668 MNPJ CHEST WALL SBSQ: CPT | Performed by: SOCIAL WORKER

## 2018-02-16 PROCEDURE — 97530 THERAPEUTIC ACTIVITIES: CPT

## 2018-02-16 PROCEDURE — 99232 SBSQ HOSP IP/OBS MODERATE 35: CPT | Performed by: PHYSICIAN ASSISTANT

## 2018-02-16 PROCEDURE — 94668 MNPJ CHEST WALL SBSQ: CPT

## 2018-02-16 RX ADMIN — PAROXETINE HYDROCHLORIDE 20 MG: 20 TABLET, FILM COATED ORAL at 08:15

## 2018-02-16 RX ADMIN — LORAZEPAM 0.5 MG: 0.5 TABLET ORAL at 21:49

## 2018-02-16 RX ADMIN — SPIRONOLACTONE 25 MG: 25 TABLET, FILM COATED ORAL at 08:12

## 2018-02-16 RX ADMIN — TIOTROPIUM BROMIDE 18 MCG: 18 CAPSULE ORAL; RESPIRATORY (INHALATION) at 08:15

## 2018-02-16 RX ADMIN — FLUTICASONE PROPIONATE AND SALMETEROL 1 PUFF: 50; 250 POWDER RESPIRATORY (INHALATION) at 21:51

## 2018-02-16 RX ADMIN — ALBUTEROL SULFATE 2 PUFF: 90 AEROSOL, METERED RESPIRATORY (INHALATION) at 08:14

## 2018-02-16 RX ADMIN — AMLODIPINE BESYLATE 10 MG: 10 TABLET ORAL at 08:12

## 2018-02-16 RX ADMIN — PREDNISONE 40 MG: 20 TABLET ORAL at 08:13

## 2018-02-16 RX ADMIN — ENOXAPARIN SODIUM 40 MG: 40 INJECTION SUBCUTANEOUS at 08:14

## 2018-02-16 RX ADMIN — LORAZEPAM 0.25 MG: 0.5 TABLET ORAL at 08:13

## 2018-02-16 RX ADMIN — ATORVASTATIN CALCIUM 10 MG: 10 TABLET, FILM COATED ORAL at 16:20

## 2018-02-16 RX ADMIN — PANTOPRAZOLE SODIUM 20 MG: 20 TABLET, DELAYED RELEASE ORAL at 05:02

## 2018-02-16 RX ADMIN — CARVEDILOL 12.5 MG: 12.5 TABLET, FILM COATED ORAL at 16:20

## 2018-02-16 RX ADMIN — ALBUTEROL SULFATE 2 PUFF: 90 AEROSOL, METERED RESPIRATORY (INHALATION) at 21:51

## 2018-02-16 RX ADMIN — CLOPIDOGREL BISULFATE 75 MG: 75 TABLET ORAL at 08:12

## 2018-02-16 RX ADMIN — ALBUTEROL SULFATE 2 PUFF: 90 AEROSOL, METERED RESPIRATORY (INHALATION) at 17:55

## 2018-02-16 RX ADMIN — CARVEDILOL 12.5 MG: 12.5 TABLET, FILM COATED ORAL at 08:12

## 2018-02-16 RX ADMIN — FLUTICASONE PROPIONATE AND SALMETEROL 1 PUFF: 50; 250 POWDER RESPIRATORY (INHALATION) at 08:15

## 2018-02-16 RX ADMIN — TOLTERODINE TARTRATE 2 MG: 2 CAPSULE, EXTENDED RELEASE ORAL at 08:15

## 2018-02-16 RX ADMIN — TAMSULOSIN HYDROCHLORIDE 0.4 MG: 0.4 CAPSULE ORAL at 16:20

## 2018-02-16 NOTE — ASSESSMENT & PLAN NOTE
Admitted for recurrent falls, recently becoming more frequent, associated with periods of confusion  Patient alert oriented x3, and able to converse appropriately, with some speech aphasia  History of CVA  CT head unremarkable for new changes  Unable to distinguish what appeared to of confusion related to seizure-like activity, or TIA  Patient was evaluated by neurology  - no further neuroimaging at this time  -low suspicion of seizure activity  -B12 folate, TSH levels wnl  -PT/OT eval/tx

## 2018-02-16 NOTE — CONSULTS
Consultation - Pulmonary Medicine   Shaniqua Gleason  65 y o  male MRN: [id number]  Unit/Bed#: [facility] 503-01 Encounter: [id number]      Assessment:  1  COPD, not in exacerbation  At least when I interviewed him  2   History of CVA  3   Peripheral arterial disease  4   History of tracheal malacia  Plan:   1  Agree with your current management including prednisone 40 mg p  o  daily  I will continue the treatment for 7 days then stop it  No need for taper  2   Continue with current bronchodilators  3   Long-acting bronchodilators for maintenance  4   No evidence of intraparenchymal pathology to account for his recent admission to the hospital with SIRS  History of Present Illness   Physician Requesting Consult: Chau Santiago MD  Reason for Consult / Principal Problem:  COPD  Hx and PE limited by:  Patient is poor historian  HPI: Shaniqua Gleason  is a 65y o  year old male who presents with generalized weakness and he has been occasionally wheezing at home  The patient does have history of CVA and he does not ambulate very much  The patient denies any shortness of breath when I interviewed him  No cough or sputum production reported  The patient uses inhaler on occasional basis  He denies using inhalers on regular basis  The patient did not have any wheezing when I interviewed him  No hemoptysis and no cough was reported  He was then comfortably in the supine position  And he denies any chest pain  No him nocturnal symptoms reported  The patient was seen in the past by Pulmonary and was followed for COPD however he does not remember the name of the pulmonologist who was been seen him  According to the records, the patient was diagnosed with lung CA status post right upper lobe lobectomy, he was seen by Dr Jalen Feliz from Pulmonary and underwent a bronchoscopy in the past and 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  The patient is unreliable historian      Consults    Review of Systems    Historical Information   Past Medical History:   Diagnosis Date    [facility] 5/31/2017    Aspiration into respiratory tract     Chest pain 5/31/2017    [facility]     Depression     Elevated troponin 5/31/2017    GERD (gastroesophageal reflux disease)     History of CVA (cerebrovascular accident) 4/13/2016    Hyperlipidemia     Hypertension     Lung cancer ([facility] ) 2005    Right, status post lobectomy    Pneumonia     Prostate cancer ([facility] )     Sleep apnea     awaiting sleep study results    Stroke ([place] [address] )     Weakness due to cerebrovascular accident (CVA) [facility])      Past Surgical History:   Procedure Laterality Date    COLONOSCOPY      ESOPHAGOGASTRODUODENOSCOPY N/A 4/13/2016    Procedure: ESOPHAGOGASTRODUODENOSCOPY (EGD); Surgeon: Sahil Pinto MD;  [facility]: [facility]; Service:     JOINT REPLACEMENT      knee replacement    LUNG LOBECTOMY      IN ESOPHAGOGASTRODUODENOSCOPY TRANSORAL DIAGNOSTIC N/A 3/15/2017    Procedure: ESOPHAGOGASTRODUODENOSCOPY (EGD); Surgeon: Sahil Pinto MD;  [facility]: [facility]; Service: Gastroenterology    TRIGEMINAL NERVE DECOMPRESSION       Social History   History   Alcohol Use No     History   Drug Use No     History   Smoking Status    Former Smoker    Packs/day: 1 00    Years: 22 00    Types: Cigarettes    Start date: 5    Quit date: 1977   Smokeless Tobacco    Never Used     Occupational History:  Over 40 pack year history of smoking quit 10 years ago after he had a CVA  No industrial exposure  Family History: non-contributory    Meds/Allergies   all current active meds have been reviewed    Allergies   Allergen Reactions    Penicillins Rash       Objective   Vitals: Blood pressure 129/69, pulse 64, temperature 98 1 °F (36 7 °C), temperature source Oral, resp  rate 18, height 6' 5" (1 956 m), weight 104 kg (230 lb), SpO2 97 %  ,Body mass index is 27 27 kg/m²  Intake/Output Summary (Last 24 hours) at 02/16/18 1719  Last data filed at 02/16/18 1419   Gross per 24 hour   Intake              700 ml   Output             1200 ml   Net             -500 ml     Invasive Devices     Peripheral Intravenous Line            Peripheral IV 02/14/18 Left Antecubital 1 day                Physical Exam vital signs are stable, O2 saturation 95% on room air, no wheezing or crackles, S1-S2 regular rate and rhythm, abdomen is benign, no edema  Lab Results: I have personally reviewed pertinent lab results  Imaging Studies: I have personally reviewed pertinent reports  Chest x-ray and CT scan both of them were reviewed which did not reveal any infiltrate  Hyperinflated lungs, possible tracheal malacia based on the respiratory variation in the diameter of the trachea with inspiration  Noted on the CT scan  EKG, Pathology, and Other Studies: I have personally reviewed pertinent reports      VTE Prophylaxis: Sequential compression device Franko Ch     Code Status: Level 1 - Full Code  Advance Directive and Living Will:      Power of :    POLST:      None

## 2018-02-16 NOTE — ASSESSMENT & PLAN NOTE
No active wheezing   Shortness breath improved   Continues to have chronic cough, had episode of choking on his pills earlier today, with cough    Continue bronchodilator therapy  Continue above management  F/u pulmonary

## 2018-02-16 NOTE — SOCIAL WORK
Bryson received call from Rony Ledezma at [facility]  Pt was reviewed by ARC MD and based on his current level of function would be more appropriate for SNF level of care  Message left for daughter Lexis Boyle to contact bryson

## 2018-02-16 NOTE — RESTORATIVE TECHNICIAN NOTE
Restorative Specialist Mobility Note       Activity: Bedrest, Dangle, Stand at bedside, Turn, Chair     Assistive Device: Front wheel walker     Ambulation Response:  Tolerated fairly well  Repositioned: Sitting, Up in chair

## 2018-02-16 NOTE — PROGRESS NOTES
Progress Note - Leonora Chew  1940, 65 y o  male MRN: [id number]    Unit/Bed#: [facility] 503-01 Encounter: [id number]    Primary Care Provider: Cynthia Talavera DO   Date and time admitted to hospital: 2/13/2018 11:22 AM        * SIRS due to infectious process without acute organ dysfunction [facility])   Assessment & Plan    SIRS resolved  Chest x-ray reviewed no active pulmonary disease  Vital signs, reviewed, unremarkable  Influenza A, B, RSV negative  Labs reviewed:  WBC within normal range  No antimicrobial therapy at this point  Chronic obstructive pulmonary disease with acute exacerbation (HCC)   Assessment & Plan    No active wheezing   Shortness breath improved   Continues to have chronic cough, had episode of choking on his pills earlier today, with cough    Continue bronchodilator therapy  Continue above management  F/u pulmonary        Ambulatory dysfunction   Assessment & Plan    Admitted for recurrent falls, recently becoming more frequent, associated with periods of confusion  Patient alert oriented x3, and able to converse appropriately, with some speech aphasia  History of CVA  CT head unremarkable for new changes  Unable to distinguish what appeared to of confusion related to seizure-like activity, or TIA  Patient was evaluated by neurology  - no further neuroimaging at this time  -low suspicion of seizure activity  -B12 folate, TSH levels wnl  -PT/OT eval/tx        Tracheomalacia   Assessment & Plan    Stable  Continue above management        Left-sided weakness   Assessment & Plan    Status post CVA 10 years ago  Continue PT/OT        Dysphagia   Assessment & Plan    CVA 10 years ago  Continue modified diet  Crush pills to avoid choking  Continue supportive care, above management        Essential hypertension   Assessment & Plan    BP controlled  Continue blood pressure monitoring  Continue meds        Lung cancer ([facility] )   Assessment & Plan    Stable  Continue above management        GERD (gastroesophageal reflux disease)   Assessment & Plan    Stable, continue PPI        History of cerebrovascular accident (CVA) with residual deficit   Assessment & Plan    Generalized weakness in upper lower extremities  History of CVA  Continue physical therapy occupational therapy        Hyperlipidemia   Assessment & Plan    Stable  Continue statin        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   Assessment & Plan    Mood stable  Continue meds            VTE Pharmacologic Prophylaxis:   Pharmacologic: Enoxaparin (Lovenox)  Mechanical VTE Prophylaxis in Place: Yes    Patient Centered Rounds: I have performed bedside rounds with nursing staff today  Discussions with Specialists or Other Care Team Provider: yes    Education and Discussions with Family / Patient: yes    Time Spent for Care: 45 minutes  More than 50% of total time spent on counseling and coordination of care as described above  Current Length of Stay: 3 day(s)    Current Patient Status: Inpatient   Certification Statement: The patient will continue to require additional inpatient hospital stay due to medically stable to be discharged  Discharge Plan: return to [facility]     Code Status: Level 1 - Full Code      Subjective:   Cough continues, nonproductive  He denied any fever, shortness of breath, palpitations  Able to participate with PT well  Objective:     Vitals:   Temp (24hrs), Av 6 °F (36 4 °C), Min:97 3 °F (36 3 °C), Max:97 9 °F (36 6 °C)    HR:  [55-59] 59  Resp:  [18-20] 18  BP: (118-168)/(62-77) 168/77  SpO2:  [94 %-98 %] 96 %  Body mass index is 27 27 kg/m²  Input and Output Summary (last 24 hours): Intake/Output Summary (Last 24 hours) at 18 1251  Last data filed at 18 1042   Gross per 24 hour   Intake             1110 ml   Output             1025 ml   Net               85 ml       Physical Exam:     Physical Exam   Constitutional: He is oriented to person, place, and time   He appears well-nourished  No distress  HENT:   Head: Normocephalic and atraumatic  Mouth/Throat: Oropharynx is clear and moist  No oropharyngeal exudate  Eyes: Conjunctivae and EOM are normal  Pupils are equal, round, and reactive to light  No scleral icterus  Neck: Normal range of motion  Neck supple  No JVD present  Cardiovascular: Normal rate and normal heart sounds  Exam reveals no gallop and no friction rub  No murmur heard  Pulmonary/Chest: Breath sounds normal  No respiratory distress  He has no wheezes  He has no rales  Abdominal: Soft  Bowel sounds are normal  He exhibits no distension  There is no tenderness  There is no rebound  Musculoskeletal: Normal range of motion  He exhibits no edema, tenderness or deformity  Lymphadenopathy:     He has no cervical adenopathy  Neurological: He is alert and oriented to person, place, and time  He displays normal reflexes  No cranial nerve deficit  He exhibits normal muscle tone  Coordination normal    Aphasic   Left sided hemiparesis improving daily with PT   Skin: Skin is warm and dry  No rash noted  No erythema  No pallor  Psychiatric: He has a normal mood and affect  His behavior is normal  Judgment and thought content normal          Additional Data:     Labs:      Results from last 7 days  Lab Units 02/14/18  0438   WBC Thousand/uL 8 30  8 30   HEMOGLOBIN g/dL 13 7  13 7   HEMATOCRIT % 40 0  40 0   PLATELETS Thousands/uL 135*  133*  133*   NEUTROS PCT % 89*   LYMPHS PCT % 8*   MONOS PCT % 3*   EOS PCT % 0       Results from last 7 days  Lab Units 02/14/18  0438   SODIUM mmol/L 137   POTASSIUM mmol/L 3 7   CHLORIDE mmol/L 104   CO2 mmol/L 23   BUN mg/dL 26*   CREATININE mg/dL 1 24   CALCIUM mg/dL 8 6   TOTAL PROTEIN g/dL 7 2   BILIRUBIN TOTAL mg/dL 0 75   ALK PHOS U/L 87   ALT U/L 24   AST U/L 22   GLUCOSE RANDOM mg/dL 143*           * I Have Reviewed All Lab Data Listed Above  * Additional Pertinent Lab Tests Reviewed:  All Labs For Current Hospital Admission Reviewed    Imaging:reviewed    Recent Cultures (last 7 days):       Results from last 7 days  Lab Units 02/14/18  0731   INFLUENZA A PCR  None Detected   INFLUENZA B PCR  None Detected   RSV PCR  None Detected       Last 24 Hours Medication List:     Current Facility-Administered Medications:  acetaminophen 650 mg Oral Q6H PRN Anayeli Bustamante MD   albuterol 2 puff Inhalation 4x Daily Anayeli Bustamante MD   albuterol 2 5 mg Nebulization Q4H PRN Shania Horvath MD   amLODIPine 10 mg Oral Daily Anayeli Bustamante MD   aspirin 81 mg Oral Every Other Day Anayeli Bustamante MD   atorvastatin 10 mg Oral Daily With Susie Babcock MD   benzonatate 100 mg Oral TID PRN Anayeli Bustamante MD   bimatoprost 1 drop Both Eyes HS Anayeli Bustamante MD   carvedilol 12 5 mg Oral BID With Meals Anayeli Bustamante MD   clopidogrel 75 mg Oral Daily Anayeli Bustamante MD   enoxaparin 40 mg Subcutaneous Daily Anayeli Bustamante MD   fluticasone-salmeterol 1 puff Inhalation Q12H [address] Roshan Stokes MD   LORazepam 0 25 mg Oral Daily Anayeli Bustamante MD   LORazepam 0 5 mg Oral HS Anayeli Bustamante MD   meclizine 25 mg Oral TID PRN Anayeli Bustamante MD   ondansetron 4 mg Intravenous Q6H PRN Anayeli Bustamante MD   pantoprazole 20 mg Oral Early Morning Anayeli Bustamante MD   PARoxetine 20 mg Oral Daily Anayeli Bustamante MD   predniSONE 40 mg Oral Daily Shania Horvath MD   spironolactone 25 mg Oral Daily Anayeli Bustamante MD   tamsulosin 0 4 mg Oral Daily With Susie Babcock MD   tiotropium 18 mcg Inhalation Daily Anayeli Bustamante MD   tolterodine 2 mg Oral Daily Anayeli Bustamante MD        Today, Patient Was Seen By: Shania Horvath MD    ** Please Note: Dictation voice to text software may have been used in the creation of this document   **

## 2018-02-16 NOTE — SOCIAL WORK
CM called and left a message for the Pt's daughter Nate Claros, requesting a call back to discuss Pt's d/c planning, regarding [facility] denial, [facility]OR[facility] acceptance and transport upon time of d/c  CM will continue to follow

## 2018-02-16 NOTE — ASSESSMENT & PLAN NOTE
CVA 10 years ago  Continue modified diet  Crush pills to avoid choking  Continue supportive care, above management

## 2018-02-16 NOTE — RESTORATIVE TECHNICIAN NOTE
Restorative Specialist Mobility Note       Activity: Chair, Dangle, Stand at bedside, Turn, Bedrest     Assistive Device: Front wheel walker     Ambulation Response:  Tolerated fairly well  Repositioned: Supine

## 2018-02-16 NOTE — ASSESSMENT & PLAN NOTE
SIRS resolved  Chest x-ray reviewed no active pulmonary disease  Vital signs, reviewed, unremarkable  Influenza A, B, RSV negative  Labs reviewed:  WBC within normal range  No antimicrobial therapy at this point

## 2018-02-16 NOTE — PLAN OF CARE
Problem: PHYSICAL THERAPY ADULT  Goal: Performs mobility at highest level of function for planned discharge setting  See evaluation for individualized goals  Treatment/Interventions: Functional transfer training, LE strengthening/ROM, Therapeutic exercise, Endurance training, Patient/family training, Equipment eval/education, Bed mobility, Gait training, Spoke to nursing, Spoke to case management, OT          See flowsheet documentation for full assessment, interventions and recommendations  Outcome: Progressing  Prognosis: Fair  Problem List: Decreased strength, Decreased endurance, Impaired balance, Decreased mobility  Assessment: received pt sitting  in  recliner   states that he is tired but willin g to participate in tx session      pt requires  mod A x2  to transfer  from  chr  and min  A x2  to return to sitting   (pt performed 3 trials STS)  pt  was able to stnad  x 2-3 min each time   he is  rw  reliant   B knee buckle c fatgiue     pt  did  complete  therex BLE seated   Reggie Simple pt is  motivated to improve and  will benefit  from continued  PT intervention         Recommendation:  (back to [facility] pending progress)          See flowsheet documentation for full assessment

## 2018-02-16 NOTE — SOCIAL WORK
MCG Guide Used for Initial Round: sepsis  Optimal GLOS: 3  Hospital Day: 3 days  DC Readiness: Discharge readiness is indicated by patient meeting Recovery Milestones, including ALL of the following:   Hemodynamic stability   Tachypnea absent   Hypoxemia absent   Fever absent or reduced   Mental status normal or at baseline   Cultures negative or infection identified and under adequate treatment   Ambulatory   Oral hydration, medications[M]   Discharge plans and education understood    Identified Barriers: awaiting pulmonary consult  Discussion Date (Time): 02/16/18 with Dr Mercedes Zhao

## 2018-02-16 NOTE — PHYSICAL THERAPY NOTE
Physical Therapy Progress Note     02/16/18 1405   Pain Assessment   Pain Assessment 0-10   Pain Score No Pain   Restrictions/Precautions   Weight Bearing Precautions Per Order No   Other Precautions Contact/isolation; Chair Alarm; Fall Risk   General   Chart Reviewed Yes   Family/Caregiver Present No   Cognition   Arousal/Participation Alert; Cooperative   Orientation Level Oriented X4   Following Commands Follows one step commands without difficulty   Subjective   Subjective pt  states  that he  has been using wc at [facility]   Bed Mobility   Supine to Sit (pt  received sitting in   recliner)   Transfers   Sit to Stand 3  Moderate assistance   Additional items Assist x 2;Armrests; Increased time required;Verbal cues   Stand to Sit 4  Minimal assistance   Additional items Assist x 2;Armrests; Increased time required;Verbal cues   Balance   Static Sitting Fair -   Static Standing Poor   Endurance Deficit   Endurance Deficit Yes   Endurance Deficit Description fatigue and muscle weakness    Activity Tolerance   Activity Tolerance Patient limited by fatigue   Nurse Made Aware yes Lorena rn    Exercises   THR Sitting;20 reps;AROM; Bilateral   Assessment   Prognosis Fair   Problem List Decreased strength;Decreased endurance; Impaired balance;Decreased mobility   Assessment received pt sitting  in  recliner   states that he is tired but willin g to participate in tx session      pt requires  mod A x2  to transfer  from  chr  and min  A x2  to return to sitting   (pt performed 3 trials STS)  pt  was able to stnad  x 2-3 min each time   he is  rw  reliant   B knee buckle c fatgiue     pt  did  complete  therex BLE seated   Randolph Merle pt is  motivated to improve and  will benefit  from continued  PT intervention    Goals   Patient Goals get stronger   STG Expiration Date 02/25/18   Treatment Day 1   Plan   Treatment/Interventions Functional transfer training;LE strengthening/ROM; Patient/family training;Spoke to nursing   Progress Progressing toward goals   PT Frequency 5x/wk   Recommendation   Recommendation (back to assisted pending progress)   Equipment Recommended Alina Hwang, PRABHU

## 2018-02-17 PROCEDURE — 99232 SBSQ HOSP IP/OBS MODERATE 35: CPT | Performed by: HOSPITALIST

## 2018-02-17 PROCEDURE — 99232 SBSQ HOSP IP/OBS MODERATE 35: CPT | Performed by: INTERNAL MEDICINE

## 2018-02-17 PROCEDURE — 94760 N-INVAS EAR/PLS OXIMETRY 1: CPT

## 2018-02-17 PROCEDURE — 97530 THERAPEUTIC ACTIVITIES: CPT

## 2018-02-17 PROCEDURE — 94668 MNPJ CHEST WALL SBSQ: CPT

## 2018-02-17 RX ORDER — HYDROCHLOROTHIAZIDE 12.5 MG/1
12.5 TABLET ORAL DAILY
Status: DISCONTINUED | OUTPATIENT
Start: 2018-02-17 | End: 2018-02-18 | Stop reason: HOSPADM

## 2018-02-17 RX ORDER — POLYETHYLENE GLYCOL 3350 17 G/17G
17 POWDER, FOR SOLUTION ORAL DAILY
Status: DISCONTINUED | OUTPATIENT
Start: 2018-02-17 | End: 2018-02-18 | Stop reason: HOSPADM

## 2018-02-17 RX ORDER — DOCUSATE SODIUM 100 MG/1
100 CAPSULE, LIQUID FILLED ORAL 2 TIMES DAILY
Status: DISCONTINUED | OUTPATIENT
Start: 2018-02-17 | End: 2018-02-18 | Stop reason: HOSPADM

## 2018-02-17 RX ADMIN — PANTOPRAZOLE SODIUM 20 MG: 20 TABLET, DELAYED RELEASE ORAL at 05:58

## 2018-02-17 RX ADMIN — TIOTROPIUM BROMIDE 18 MCG: 18 CAPSULE ORAL; RESPIRATORY (INHALATION) at 08:56

## 2018-02-17 RX ADMIN — TOLTERODINE TARTRATE 2 MG: 2 CAPSULE, EXTENDED RELEASE ORAL at 08:52

## 2018-02-17 RX ADMIN — FLUTICASONE PROPIONATE AND SALMETEROL 1 PUFF: 50; 250 POWDER RESPIRATORY (INHALATION) at 08:56

## 2018-02-17 RX ADMIN — ALBUTEROL SULFATE 2 PUFF: 90 AEROSOL, METERED RESPIRATORY (INHALATION) at 08:56

## 2018-02-17 RX ADMIN — ENOXAPARIN SODIUM 40 MG: 40 INJECTION SUBCUTANEOUS at 08:53

## 2018-02-17 RX ADMIN — ALBUTEROL SULFATE 2 PUFF: 90 AEROSOL, METERED RESPIRATORY (INHALATION) at 17:51

## 2018-02-17 RX ADMIN — DOCUSATE SODIUM 100 MG: 100 CAPSULE, LIQUID FILLED ORAL at 17:56

## 2018-02-17 RX ADMIN — POLYETHYLENE GLYCOL 3350 17 G: 17 POWDER, FOR SOLUTION ORAL at 10:27

## 2018-02-17 RX ADMIN — BIMATOPROST 1 DROP: 0.1 SOLUTION/ DROPS OPHTHALMIC at 21:18

## 2018-02-17 RX ADMIN — LORAZEPAM 0.5 MG: 0.5 TABLET ORAL at 21:18

## 2018-02-17 RX ADMIN — SPIRONOLACTONE 25 MG: 25 TABLET, FILM COATED ORAL at 08:53

## 2018-02-17 RX ADMIN — CLOPIDOGREL BISULFATE 75 MG: 75 TABLET ORAL at 08:53

## 2018-02-17 RX ADMIN — ALBUTEROL SULFATE 2 PUFF: 90 AEROSOL, METERED RESPIRATORY (INHALATION) at 11:50

## 2018-02-17 RX ADMIN — FLUTICASONE PROPIONATE AND SALMETEROL 1 PUFF: 50; 250 POWDER RESPIRATORY (INHALATION) at 21:17

## 2018-02-17 RX ADMIN — CARVEDILOL 12.5 MG: 12.5 TABLET, FILM COATED ORAL at 17:52

## 2018-02-17 RX ADMIN — DOCUSATE SODIUM 100 MG: 100 CAPSULE, LIQUID FILLED ORAL at 10:27

## 2018-02-17 RX ADMIN — PAROXETINE HYDROCHLORIDE 20 MG: 20 TABLET, FILM COATED ORAL at 08:52

## 2018-02-17 RX ADMIN — ASPIRIN 81 MG 81 MG: 81 TABLET ORAL at 08:53

## 2018-02-17 RX ADMIN — TAMSULOSIN HYDROCHLORIDE 0.4 MG: 0.4 CAPSULE ORAL at 17:51

## 2018-02-17 RX ADMIN — CARVEDILOL 12.5 MG: 12.5 TABLET, FILM COATED ORAL at 08:52

## 2018-02-17 RX ADMIN — ALBUTEROL SULFATE 2 PUFF: 90 AEROSOL, METERED RESPIRATORY (INHALATION) at 21:17

## 2018-02-17 RX ADMIN — HYDROCHLOROTHIAZIDE 12.5 MG: 12.5 TABLET ORAL at 10:27

## 2018-02-17 RX ADMIN — AMLODIPINE BESYLATE 10 MG: 10 TABLET ORAL at 08:52

## 2018-02-17 RX ADMIN — LORAZEPAM 0.25 MG: 0.5 TABLET ORAL at 08:53

## 2018-02-17 RX ADMIN — ATORVASTATIN CALCIUM 10 MG: 10 TABLET, FILM COATED ORAL at 17:51

## 2018-02-17 RX ADMIN — PREDNISONE 40 MG: 20 TABLET ORAL at 08:53

## 2018-02-17 NOTE — SOCIAL WORK
Patient is approved at [facility] and bed is available Sunday, 2/18/18  Scheduled [place] [facility] for transport at [facility]  Informed patient and daughter, Bevtoft  Daughter is aware of cost for [facility]  Informed MAKEDA, RN  and Dr Angel Pineda of transport time

## 2018-02-17 NOTE — SOCIAL WORK
Message received from [facility] that no bed is available despite patient being accepted yesterday  Called [facility] and left message for admissions  Met with patient to discuss and he declined to review snf list  He asks CM to call sindy, Annika Navarrete for choices  Called [phone] and left message for snidy to call CM   Discussed with MD

## 2018-02-17 NOTE — PROGRESS NOTES
Progress Note - Carmel Patel  1940, 65 y o  male MRN: [id number]    Unit/Bed#: [facility] 503-01 Encounter: [id number]    Primary Care Provider: Lacey Abreu DO   Date and time admitted to hospital: 2/13/2018 11:22 AM        * SIRS due to infectious process without acute organ dysfunction [facility])   Assessment & Plan    SIRS resolved  Chest x-ray reviewed no active pulmonary disease  Vital signs, reviewed, unremarkable  Influenza A, B, RSV negative  Labs reviewed:  WBC within normal range  No antimicrobial therapy at this point  Constipation  miralax ordered          Chronic obstructive pulmonary disease with acute exacerbation (HCC)   Assessment & Plan    No active wheezing   Shortness breath improved   Continues to have chronic cough  Continue bronchodilator therapy  Continue above management   pulmonary consult recommended 7 days of prednisone therapy, then stop        Ambulatory dysfunction   Assessment & Plan    Admitted for recurrent falls, recently becoming more frequent, associated with periods of confusion  Patient alert oriented x3, and able to converse appropriately, with some speech aphasia  History of CVA  CT head unremarkable for new changes  Unable to distinguish what appeared to of confusion related to seizure-like activity, or TIA  Patient was evaluated by neurology  - no further neuroimaging at this time  -low suspicion of seizure activity  -B12 folate, TSH levels wnl  -PT/OT eval/tx        Tracheomalacia   Assessment & Plan    Stable  Continue above management        Left-sided weakness   Assessment & Plan    Status post CVA 10 years ago  Continue PT/OT        Dysphagia   Assessment & Plan    CVA 10 years ago  Continue modified diet  Crush pills to avoid choking  Continue supportive care, above management        Essential hypertension   Assessment & Plan    BP elevated, added hydrochlorothiazide 12 5 mg daily  Continue blood pressure monitoring  Continue meds        Lung cancer ([facility] [address] ) Assessment & Plan    Stable  Continue above management        GERD (gastroesophageal reflux disease)   Assessment & Plan    Stable, continue PPI        History of cerebrovascular accident (CVA) with residual deficit   Assessment & Plan    Generalized weakness in upper lower extremities  History of CVA  Continue physical therapy occupational therapy        Hyperlipidemia   Assessment & Plan    Stable  Continue statin        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   Assessment & Plan    Mood stable  Continue meds          VTE Pharmacologic Prophylaxis:   Pharmacologic: Enoxaparin (Lovenox)  Mechanical VTE Prophylaxis in Place: Yes    Patient Centered Rounds: I have performed bedside rounds with nursing staff today  Discussions with Specialists or Other Care Team Provider:  Yes    Education and Discussions with Family / Patient:  Yes    Time Spent for Care: 45 minutes  More than 50% of total time spent on counseling and coordination of care as described above  Current Length of Stay: 4 day(s)    Current Patient Status: Inpatient   Certification Statement: The patient will continue to require additional inpatient hospital stay due to Awaiting placement to long-term care facility    Discharge Plan:  Long-term care facility    Code Status: Level 1 - Full Code      Subjective:   Reported improved cough, and slurred speech  Constipation today  Review of systems negative otherwise  Discussion today about nursing Home Care, and he felt very strongly that he needs more assistance in these facility  Objective:     Vitals:   Temp (24hrs), Av 4 °F (36 9 °C), Min:98 1 °F (36 7 °C), Max:99 °F (37 2 °C)    HR:  [56-64] 64  Resp:  [18-19] 18  BP: (129-164)/(69-79) 144/79  SpO2:  [94 %-97 %] 97 %  Body mass index is 27 27 kg/m²  Input and Output Summary (last 24 hours):        Intake/Output Summary (Last 24 hours) at 18 0957  Last data filed at 18 1259   Gross per 24 hour   Intake 918 ml   Output             1175 ml   Net             -257 ml       Physical Exam:     Physical Exam   Constitutional: He is oriented to person, place, and time  He appears well-developed  No distress  HENT:   Head: Normocephalic and atraumatic  Mouth/Throat: Oropharynx is clear and moist    Eyes: EOM are normal  Pupils are equal, round, and reactive to light  Neck: Neck supple  No JVD present  Cardiovascular: Normal rate  Exam reveals no gallop and no friction rub  No murmur heard  Pulmonary/Chest: Effort normal and breath sounds normal  No respiratory distress  He has no wheezes  He has no rales  Abdominal: Soft  Bowel sounds are normal  He exhibits no distension  There is no tenderness  There is no rebound and no guarding  Musculoskeletal: Normal range of motion  He exhibits no edema, tenderness or deformity  Neurological: He is alert and oriented to person, place, and time  No cranial nerve deficit  Coordination normal    Skin: Skin is warm and dry  No rash noted  No erythema  No pallor  Psychiatric: He has a normal mood and affect  His behavior is normal  Judgment and thought content normal          Additional Data:     Labs:      Results from last 7 days  Lab Units 02/14/18  0438   WBC Thousand/uL 8 30  8 30   HEMOGLOBIN g/dL 13 7  13 7   HEMATOCRIT % 40 0  40 0   PLATELETS Thousands/uL 135*  133*  133*   NEUTROS PCT % 89*   LYMPHS PCT % 8*   MONOS PCT % 3*   EOS PCT % 0       Results from last 7 days  Lab Units 02/14/18  0438   SODIUM mmol/L 137   POTASSIUM mmol/L 3 7   CHLORIDE mmol/L 104   CO2 mmol/L 23   BUN mg/dL 26*   CREATININE mg/dL 1 24   CALCIUM mg/dL 8 6   TOTAL PROTEIN g/dL 7 2   BILIRUBIN TOTAL mg/dL 0 75   ALK PHOS U/L 87   ALT U/L 24   AST U/L 22   GLUCOSE RANDOM mg/dL 143*           * I Have Reviewed All Lab Data Listed Above  * Additional Pertinent Lab Tests Reviewed:  The patient will continue to require additional inpatient hospital stay due to Awaiting placement to long-term care facility    Imaging:  Reviewed    Recent Cultures (last 7 days):       Results from last 7 days  Lab Units 02/14/18  0731   INFLUENZA A PCR  None Detected   INFLUENZA B PCR  None Detected   RSV PCR  None Detected       Last 24 Hours Medication List:     Current Facility-Administered Medications:  acetaminophen 650 mg Oral Q6H PRN Jerel Livingston MD   albuterol 2 puff Inhalation 4x Daily Jerel Livingston MD   albuterol 2 5 mg Nebulization Q4H PRN Darinel Newton MD   amLODIPine 10 mg Oral Daily Jerel Livingston MD   aspirin 81 mg Oral Every Other Tanner Bowie MD   atorvastatin 10 mg Oral Daily With Gary Ellington MD   benzonatate 100 mg Oral TID PRN Jerel Livingston MD   bimatoprost 1 drop Both Eyes HS Jerel Livingston MD   carvedilol 12 5 mg Oral BID With Meals Jerel Livingston MD   clopidogrel 75 mg Oral Daily Jerel Livingston MD   enoxaparin 40 mg Subcutaneous Daily Jerel Livingston MD   fluticasone-salmeterol 1 puff Inhalation Q12H [address] [place] [address], MD   hydrochlorothiazide 12 5 mg Oral Daily Darinel Newton MD   LORazepam 0 25 mg Oral Daily Jerel Livingston MD   LORazepam 0 5 mg Oral HS Jerel Livingston MD   meclizine 25 mg Oral TID PRN Jerel Livingston MD   ondansetron 4 mg Intravenous Q6H PRN Jerel Livingston MD   pantoprazole 20 mg Oral Early Morning Jerel Livingston MD   PARoxetine 20 mg Oral Daily Jerel Livingston MD   predniSONE 40 mg Oral Daily Darinel Newton MD   spironolactone 25 mg Oral Daily Jerel Livingston MD   tamsulosin 0 4 mg Oral Daily With Gary Ellington MD   tiotropium 18 mcg Inhalation Daily Jerel Livingston MD   tolterodine 2 mg Oral Daily Jerel Livingston MD        Today, Patient Was Seen By: Darinel Newton MD    ** Please Note: Dictation voice to text software may have been used in the creation of this document   **

## 2018-02-17 NOTE — ASSESSMENT & PLAN NOTE
BP elevated, added hydrochlorothiazide 12 5 mg daily  Continue blood pressure monitoring  Continue meds

## 2018-02-17 NOTE — PLAN OF CARE
Problem: PHYSICAL THERAPY ADULT  Goal: Performs mobility at highest level of function for planned discharge setting  See evaluation for individualized goals  Treatment/Interventions: Functional transfer training, LE strengthening/ROM, Therapeutic exercise, Endurance training, Patient/family training, Equipment eval/education, Bed mobility, Gait training, Spoke to nursing, Spoke to case management, OT          See flowsheet documentation for full assessment, interventions and recommendations  Outcome: Progressing  Prognosis: Fair  Problem List: Decreased strength, Decreased range of motion, Decreased endurance, Impaired balance, Decreased mobility  Assessment: pt  sitting in  chr   he is  awake and alert and follow s direction well  pt  did  complete  therex arom  setaed  in chr BLE      trialled stnading    pt is improved  needing mod A x1  c extra time  to  complete   Yaritza Estrada once  stnading he  did  remains upright x 3 min   Yaritza bunn     pt  fatgiues  quickly and  remains a fall risk     he  will require  continued PT intervention         Recommendation:  (to EMILIE campos insreased assist)          See flowsheet documentation for full assessment

## 2018-02-17 NOTE — PHYSICAL THERAPY NOTE
Physical Therapy Progress Note     02/17/18 1545   Pain Assessment   Pain Assessment No/denies pain   Pain Score No Pain   Restrictions/Precautions   Weight Bearing Precautions Per Order No   Other Precautions Cognitive; Chair Alarm; Fall Risk   General   Chart Reviewed Yes   Family/Caregiver Present No   Cognition   Arousal/Participation Alert; Cooperative   Attention Within functional limits   Orientation Level Oriented X4   Following Commands Follows one step commands without difficulty   Subjective   Subjective no c/o   Transfers   Sit to Stand 3  Moderate assistance   Additional items Assist x 1; Armrests; Increased time required;Verbal cues   Stand to Sit 3  Moderate assistance   Additional items Assist x 1; Increased time required;Verbal cues   Balance   Static Sitting Fair   Static Standing Poor +   Endurance Deficit   Endurance Deficit Yes   Endurance Deficit Description fatgiue  and  muscle weakness    Activity Tolerance   Activity Tolerance Patient limited by fatigue   Nurse Made Aware yes  [place]    Exercises   THR Sitting;20 reps;AROM; Bilateral   Assessment   Prognosis Fair   Problem List Decreased strength;Decreased range of motion;Decreased endurance; Impaired balance;Decreased mobility   Assessment pt  sitting in  chr   he is  awake and alert and follow s direction well  pt  did  complete  therex arom  setaed  in chr BLE      trialled stnading    pt is improved  needing mod A x1  c extra time  to  complete   Ephraim Ashdown once  stnading he  did  remains upright x 3 min   Ephraim Ashdown usig  rw     pt  fatgiues  quickly and  remains a fall risk     he  will require  continued PT intervention    Goals   Patient Goals none stated   STG Expiration Date 02/25/18   Treatment Day 2   Plan   Treatment/Interventions Functional transfer training;LE strengthening/ROM; Patient/family training;Spoke to nursing   Progress Progressing toward goals   PT Frequency 5x/wk   Recommendation   Recommendation (to EMILIE campos insreased assist) Equipment Recommended Musa Mckeon, PTA

## 2018-02-17 NOTE — SOCIAL WORK
Call received from daughter and explained no bed at [facility]  [facility] referrals were made to [address] [place] [address]

## 2018-02-17 NOTE — PROGRESS NOTES
Progress Note - Pulmonary   Tally Primrose  65 y o  male MRN: [id number]  Unit/Bed#: [facility] 503-01 Encounter: [id number]    Assessment:  1  COPD, gold level 2, not in exacerbation  2   History of CVA  Left-sided down  3   Peripheral arterial disease  4 TBM  Plan:  1  Continue with Spiriva and bronchodilators  2   Short course of prednisone 40 mg daily for 7 days only  3   No further recommendations from pulmonary standpoint  4   Disposition plan per Dr Herberth Lewis  5   Will follow as needed  Please do not hesitate to call me with any questions  Chief Complaint:   Denies any respiratory symptoms  Subjective:   Unremarkable review of system  Objective:     Vitals: Blood pressure 121/65, pulse 62, temperature (!) 97 3 °F (36 3 °C), temperature source Oral, resp  rate 18, height 6' 5" (1 956 m), weight 104 kg (230 lb), SpO2 96 %  ,Body mass index is 27 27 kg/m²  Intake/Output Summary (Last 24 hours) at 02/17/18 1554  Last data filed at 02/17/18 1224   Gross per 24 hour   Intake             1038 ml   Output              800 ml   Net              238 ml       Invasive Devices     Peripheral Intravenous Line            Peripheral IV 02/14/18 Left Antecubital 2 days                Physical Exam:  No fever, vital signs are stable, S1-S2, regular rate and rhythm, distant breath sounds and clear, abdomen is benign, no edema, left upper extremity weakness  Labs: I have personally reviewed pertinent lab results  Imaging and other studies: I have personally reviewed pertinent reports

## 2018-02-17 NOTE — ASSESSMENT & PLAN NOTE
No active wheezing   Shortness breath improved   Continues to have chronic cough  Continue bronchodilator therapy  Continue above management   pulmonary consult recommended 7 days of prednisone therapy, then stop

## 2018-02-18 VITALS
HEIGHT: 77 IN | DIASTOLIC BLOOD PRESSURE: 80 MMHG | RESPIRATION RATE: 18 BRPM | SYSTOLIC BLOOD PRESSURE: 171 MMHG | TEMPERATURE: 98.3 F | HEART RATE: 64 BPM | WEIGHT: 230 LBS | OXYGEN SATURATION: 96 % | BODY MASS INDEX: 27.16 KG/M2

## 2018-02-18 PROBLEM — IMO0001 SIRS DUE TO INFECTIOUS PROCESS WITHOUT ACUTE ORGAN DYSFUNCTION: Status: RESOLVED | Noted: 2018-02-14 | Resolved: 2018-02-18

## 2018-02-18 PROCEDURE — 94668 MNPJ CHEST WALL SBSQ: CPT

## 2018-02-18 PROCEDURE — 94760 N-INVAS EAR/PLS OXIMETRY 1: CPT

## 2018-02-18 PROCEDURE — 99239 HOSP IP/OBS DSCHRG MGMT >30: CPT | Performed by: HOSPITALIST

## 2018-02-18 RX ORDER — HYDROCHLOROTHIAZIDE 12.5 MG/1
12.5 TABLET ORAL DAILY
Qty: 30 TABLET | Refills: 0 | Status: SHIPPED | OUTPATIENT
Start: 2018-02-19 | End: 2018-07-15

## 2018-02-18 RX ORDER — DOCUSATE SODIUM 100 MG/1
100 CAPSULE, LIQUID FILLED ORAL 2 TIMES DAILY
Qty: 10 CAPSULE | Refills: 0 | Status: SHIPPED | OUTPATIENT
Start: 2018-02-18 | End: 2020-10-01 | Stop reason: HOSPADM

## 2018-02-18 RX ORDER — POLYETHYLENE GLYCOL 3350 17 G/17G
17 POWDER, FOR SOLUTION ORAL DAILY
Qty: 14 EACH | Refills: 0 | Status: SHIPPED | OUTPATIENT
Start: 2018-02-19 | End: 2018-06-14 | Stop reason: ALTCHOICE

## 2018-02-18 RX ORDER — PREDNISONE 20 MG/1
40 TABLET ORAL DAILY
Qty: 6 TABLET | Refills: 0 | Status: SHIPPED | OUTPATIENT
Start: 2018-02-19 | End: 2018-02-26

## 2018-02-18 RX ADMIN — AMLODIPINE BESYLATE 10 MG: 10 TABLET ORAL at 08:05

## 2018-02-18 RX ADMIN — ENOXAPARIN SODIUM 40 MG: 40 INJECTION SUBCUTANEOUS at 08:05

## 2018-02-18 RX ADMIN — PREDNISONE 40 MG: 20 TABLET ORAL at 08:05

## 2018-02-18 RX ADMIN — SPIRONOLACTONE 25 MG: 25 TABLET, FILM COATED ORAL at 08:05

## 2018-02-18 RX ADMIN — HYDROCHLOROTHIAZIDE 12.5 MG: 12.5 TABLET ORAL at 08:05

## 2018-02-18 RX ADMIN — DOCUSATE SODIUM 100 MG: 100 CAPSULE, LIQUID FILLED ORAL at 08:05

## 2018-02-18 RX ADMIN — TOLTERODINE TARTRATE 2 MG: 2 CAPSULE, EXTENDED RELEASE ORAL at 08:06

## 2018-02-18 RX ADMIN — LORAZEPAM 0.25 MG: 0.5 TABLET ORAL at 08:05

## 2018-02-18 RX ADMIN — TIOTROPIUM BROMIDE 18 MCG: 18 CAPSULE ORAL; RESPIRATORY (INHALATION) at 08:06

## 2018-02-18 RX ADMIN — PAROXETINE HYDROCHLORIDE 20 MG: 20 TABLET, FILM COATED ORAL at 08:06

## 2018-02-18 RX ADMIN — POLYETHYLENE GLYCOL 3350 17 G: 17 POWDER, FOR SOLUTION ORAL at 08:05

## 2018-02-18 RX ADMIN — PANTOPRAZOLE SODIUM 20 MG: 20 TABLET, DELAYED RELEASE ORAL at 05:07

## 2018-02-18 RX ADMIN — CARVEDILOL 12.5 MG: 12.5 TABLET, FILM COATED ORAL at 08:04

## 2018-02-18 RX ADMIN — ALBUTEROL SULFATE 2 PUFF: 90 AEROSOL, METERED RESPIRATORY (INHALATION) at 08:06

## 2018-02-18 RX ADMIN — FLUTICASONE PROPIONATE AND SALMETEROL 1 PUFF: 50; 250 POWDER RESPIRATORY (INHALATION) at 08:06

## 2018-02-18 RX ADMIN — CLOPIDOGREL BISULFATE 75 MG: 75 TABLET ORAL at 08:05

## 2018-02-18 NOTE — DISCHARGE INSTRUCTIONS
COPD (Chronic Obstructive Pulmonary Disease)   WHAT YOU NEED TO KNOW:   What is chronic obstructive pulmonary disease (COPD)? COPD is a lung disease that makes it hard for you to breathe  It is usually a result of lung damage caused by years of irritation and inflammation in your lungs  This limits air flow in your lungs  Smoking, pollution, genetics, or a history of lung infections can increase your risk for COPD  What are the signs and symptoms of COPD? · Shortness of breath     · A dry cough     · Coughing fits that bring up mucus from your lungs     · Wheezing and chest tightness  How is COPD diagnosed? Your healthcare provider will ask about your symptoms and examine you  He or she will ask if a family member has COPD or breathing problems  He or she will ask if you are a current or former smoker  Tell your provider if you have other medical conditions, such as heart disease or asthma  Tell him or her how long you have had symptoms, what makes them worse, and how they affect your life  You may need the following:  · Lung function tests  measure the airflow in your lungs and show how well you can breathe  · Blood tests  check for infection and measure oxygen levels in your blood  · A chest x-ray  is done to check for other lung problems  · CT scan pictures  may be taken of your lungs  You may be given contrast liquid to help your lungs show up better in the pictures  Tell the healthcare provider if you have ever had an allergic reaction to contrast liquid  How is COPD treated? · Medicines  may be used to open your airways, decrease swelling and inflammation in your lungs, or treat an infection  You may need 2 or more medicines  A short-acting medicine relieves symptoms quickly  Long-acting medicines will control or prevent symptoms  Ask your healthcare provider for more information about the medicines you are given and how to use them safely       · Pulmonary rehabilitation  is a program to help you manage your symptoms and improve your quality of life  It may include nutritional counseling and exercise to strengthen your lungs  · Oxygen  may help you breathe easier and feel more alert if you have severe COPD  · Surgery  is sometimes done if all other treatments have failed  A lung reduction is surgery to remove part of your damaged lung  A lung transplant is the replacement of your lung with a donor lung  Ask your healthcare provider for more information about surgery for COPD  What are the risks of COPD? COPD raises your risk for diabetes, high blood pressure, and heart disease  Without treatment, COPD can become life-threatening  What can I do to help make breathing easier? · Use pursed-lip breathing any time you feel short of breath  Take a deep breath in through your nose  Slowly breathe out through your mouth with your lips pursed for twice as long as you inhaled  You can also practice this breathing pattern while you bend, lift, climb stairs, or exercise  It slows down your breathing and helps move more air in and out of your lungs  · Do not smoke, and avoid others who smoke  Nicotine and other substances can cause lung irritation or damage and make it harder for you to breathe  Do not use e-cigarettes or smokeless tobacco  They still contain nicotine  Ask your healthcare provider for information if you currently smoke and need help to quit  For support and more information:  ¨ [employer]  Phone: 7- 792 - 028-1538  Web Address: [employer]      · Be aware of and avoid anything that makes your symptoms worse  Stay out of high altitudes and places with high humidity  Stay inside, or cover your mouth and nose with a scarf when you are outside during cold weather  Stay inside on days when air pollution or pollen counts are high  Do not use aerosol sprays such as deodorant, bug spray, and hair spray    How can I manage COPD and help prevent exacerbations? COPD is a serious condition that gets worse over time  A COPD exacerbation means your symptoms suddenly get worse  It is important to prevent exacerbations  An exacerbation can cause more lung damage  COPD cannot be cured, but you can take action to feel better and prevent COPD exacerbations:  · Protect yourself from germs  Germs can get into your lungs and cause an infection  An infection in your lungs can create more mucus and make it harder to breathe  An infection can also create swelling in your airways and prevent air from getting in  You can decrease your risk for infection by doing the following:     [facility] your hands often with soap and water  Carry germ-killing gel with you  You can use the gel to clean your hands when soap and water are not available  ¨ Do not touch your eyes, nose, or mouth unless you have washed your hands first      ¨ Always cover your mouth when you cough  Cough into a tissue or your shirtsleeve so you do not spread germs from your hands  ¨ Try to avoid people who have a cold or the flu  If you are sick, stay away from others as much as possible  · Drink more liquids  This will help to keep your air passages moist and help you cough up mucus  Ask how much liquid to drink each day and which liquids are best for you  · Exercise daily  Exercise for at least 20 minutes each day to help increase your energy and decrease shortness of breath  Talk to your healthcare provider about the best exercise plan for you  · Ask about vaccines  Your healthcare provider may recommend that you get regular flu and pneumonia vaccines  Pneumonia can become life-threatening for a person who has COPD  Ask about other vaccines you may need  Ask your healthcare provider about the flu and pneumonia vaccines  All adults should get the flu (influenza) vaccine every year as soon as it becomes available   The pneumonia vaccine is given to adults aged 72 or older to prevent pneumococcal disease, such as pneumonia  Adults aged 23 to 59 years who are at high risk for pneumococcal disease also should get the pneumococcal vaccine  It may need to be repeated 1 or 5 years later  Call 911 if:   · You feel lightheaded, short of breath, and have chest pain  When should I seek immediate care? · You are confused, dizzy, or feel faint  · Your arm or leg feels warm, tender, and painful  It may look swollen and red  · You cough up blood  When should I contact my healthcare provider? · You have more shortness of breath than usual      · You need more medicine than usual to control your symptoms  · You are coughing or wheezing more than usual      · You are coughing up more mucus, or it is a different color or has a different odor  · You gain more than 3 pounds in a week  · You have a fever, a runny or stuffy nose, and a sore throat, or other cold or flu symptoms  · Your skin, lips, or nails start to turn blue  · You have swelling in your legs or ankles  · You are very tired or weak for more than a day  · You notice changes in your mood, or changes in your ability to think or concentrate  · You have questions or concerns about your condition or care  CARE AGREEMENT:   You have the right to help plan your care  Learn about your health condition and how it may be treated  Discuss treatment options with your caregivers to decide what care you want to receive  You always have the right to refuse treatment  The above information is an  only  It is not intended as medical advice for individual conditions or treatments  Talk to your doctor, nurse or pharmacist before following any medical regimen to see if it is safe and effective for you  © 2017 [address] [employer] Information is for End User's use only and may not be sold, redistributed or otherwise used for commercial purposes   All illustrations and images included in [employer] are the copyrighted property of [employer]  or [address]

## 2018-05-20 ENCOUNTER — HOSPITAL ENCOUNTER (EMERGENCY)
Facility: HOSPITAL | Age: 78
Discharge: HOME | End: 2018-05-20
Attending: EMERGENCY MEDICINE
Payer: MEDICARE

## 2018-05-20 ENCOUNTER — APPOINTMENT (EMERGENCY)
Dept: RADIOLOGY | Facility: HOSPITAL | Age: 78
End: 2018-05-20
Payer: MEDICARE

## 2018-05-20 VITALS
HEART RATE: 75 BPM | SYSTOLIC BLOOD PRESSURE: 159 MMHG | TEMPERATURE: 98.8 F | BODY MASS INDEX: 28.18 KG/M2 | RESPIRATION RATE: 22 BRPM | WEIGHT: 237.66 LBS | DIASTOLIC BLOOD PRESSURE: 83 MMHG | OXYGEN SATURATION: 98 %

## 2018-05-20 DIAGNOSIS — R05.9 COUGH: Primary | ICD-10-CM

## 2018-05-20 PROCEDURE — 71046 X-RAY EXAM CHEST 2 VIEWS: CPT

## 2018-05-20 PROCEDURE — 99283 EMERGENCY DEPT VISIT LOW MDM: CPT

## 2018-05-20 NOTE — DISCHARGE INSTRUCTIONS
Acute Cough   WHAT YOU NEED TO KNOW:   An acute cough can last up to 3 weeks  Common causes of an acute cough include a cold, allergies, or a lung infection  DISCHARGE INSTRUCTIONS:   Return to the emergency department if:   · You have trouble breathing or feel short of breath  · You cough up blood, or you see blood in your mucus  · You faint or feel weak or dizzy  · You have chest pain when you cough or take a deep breath  · You have new wheezing  Contact your healthcare provider if:   · You have a fever  · Your cough lasts longer than 4 weeks  · Your symptoms do not improve with treatment  · You have questions or concerns about your condition or care  Medicines:   · Medicines  may be needed to stop the cough, decrease swelling in your airways, or help open your airways  Medicine may also be given to help you cough up mucus  Ask your healthcare provider what over-the-counter medicines you can take  If you have an infection caused by bacteria, you may need antibiotics  · Take your medicine as directed  Contact your healthcare provider if you think your medicine is not helping or if you have side effects  Tell him or her if you are allergic to any medicine  Keep a list of the medicines, vitamins, and herbs you take  Include the amounts, and when and why you take them  Bring the list or the pill bottles to follow-up visits  Carry your medicine list with you in case of an emergency  Manage your symptoms:   · Do not smoke and stay away from others who smoke  Nicotine and other chemicals in cigarettes and cigars can cause lung damage and make your cough worse  Ask your healthcare provider for information if you currently smoke and need help to quit  E-cigarettes or smokeless tobacco still contain nicotine  Talk to your healthcare provider before you use these products  · Drink extra liquids as directed  Liquids will help thin and loosen mucus so you can cough it up   Liquids will also help prevent dehydration  Examples of good liquids to drink include water, fruit juice, and broth  Do not drink liquids that contain caffeine  Caffeine can increase your risk for dehydration  Ask your healthcare provider how much liquid to drink each day  · Rest as directed  Do not do activities that make your cough worse, such as exercise  · Use a humidifier or vaporizer  Use a cool mist humidifier or a vaporizer to increase air moisture in your home  This may make it easier for you to breathe and help decrease your cough  · Eat 2 to 5 mL of honey 2 times each day  Honey can help thin mucus and decrease your cough  · Use cough drops or lozenges  These can help decrease throat irritation and your cough  Follow up with your healthcare provider as directed:  Write down your questions so you remember to ask them during your visits  © 2017 [address] Information is for End User's use only and may not be sold, redistributed or otherwise used for commercial purposes  All illustrations and images included in CareNotes® are the copyrighted property of A D A M , Inc  or [employer] Garcia  The above information is an  only  It is not intended as medical advice for individual conditions or treatments  Talk to your doctor, nurse or pharmacist before following any medical regimen to see if it is safe and effective for you

## 2018-06-14 ENCOUNTER — HOSPITAL ENCOUNTER (EMERGENCY)
Facility: HOSPITAL | Age: 78
Discharge: HOME | End: 2018-06-14
Attending: EMERGENCY MEDICINE | Admitting: EMERGENCY MEDICINE
Payer: MEDICARE

## 2018-06-14 ENCOUNTER — APPOINTMENT (EMERGENCY)
Dept: RADIOLOGY | Facility: HOSPITAL | Age: 78
End: 2018-06-14
Payer: MEDICARE

## 2018-06-14 VITALS
OXYGEN SATURATION: 96 % | DIASTOLIC BLOOD PRESSURE: 73 MMHG | HEART RATE: 76 BPM | SYSTOLIC BLOOD PRESSURE: 169 MMHG | RESPIRATION RATE: 18 BRPM | TEMPERATURE: 98.4 F

## 2018-06-14 DIAGNOSIS — R50.9 FEBRILE: Primary | ICD-10-CM

## 2018-06-14 DIAGNOSIS — R06.89 ADVENTITIOUS BREATH SOUNDS: ICD-10-CM

## 2018-06-14 LAB
ALBUMIN SERPL BCP-MCNC: 3.6 G/DL (ref 3.5–5)
ALP SERPL-CCNC: 109 U/L (ref 46–116)
ALT SERPL W P-5'-P-CCNC: 19 U/L (ref 12–78)
ANION GAP SERPL CALCULATED.3IONS-SCNC: 7 MMOL/L (ref 4–13)
APTT PPP: 36 SECONDS (ref 24–36)
AST SERPL W P-5'-P-CCNC: 10 U/L (ref 5–45)
ATRIAL RATE: 84 BPM
BASOPHILS # BLD AUTO: 0.02 THOUSANDS/ΜL (ref 0–0.1)
BASOPHILS NFR BLD AUTO: 0 % (ref 0–1)
BILIRUB SERPL-MCNC: 0.84 MG/DL (ref 0.2–1)
BILIRUB UR QL STRIP: NEGATIVE
BUN SERPL-MCNC: 22 MG/DL (ref 5–25)
CALCIUM SERPL-MCNC: 8.8 MG/DL (ref 8.3–10.1)
CHLORIDE SERPL-SCNC: 106 MMOL/L (ref 100–108)
CLARITY UR: CLEAR
CO2 SERPL-SCNC: 24 MMOL/L (ref 21–32)
COLOR UR: YELLOW
CREAT SERPL-MCNC: 1.32 MG/DL (ref 0.6–1.3)
EOSINOPHIL # BLD AUTO: 0.08 THOUSAND/ΜL (ref 0–0.61)
EOSINOPHIL NFR BLD AUTO: 1 % (ref 0–6)
ERYTHROCYTE [DISTWIDTH] IN BLOOD BY AUTOMATED COUNT: 14.6 % (ref 11.6–15.1)
GFR SERPL CREATININE-BSD FRML MDRD: 51 ML/MIN/1.73SQ M
GLUCOSE SERPL-MCNC: 125 MG/DL (ref 65–140)
GLUCOSE UR STRIP-MCNC: NEGATIVE MG/DL
HCT VFR BLD AUTO: 44 % (ref 36.5–49.3)
HGB BLD-MCNC: 14.5 G/DL (ref 12–17)
HGB UR QL STRIP.AUTO: NEGATIVE
IMM GRANULOCYTES # BLD AUTO: 0.04 THOUSAND/UL (ref 0–0.2)
IMM GRANULOCYTES NFR BLD AUTO: 0 % (ref 0–2)
INR PPP: 1.07 (ref 0.86–1.17)
KETONES UR STRIP-MCNC: NEGATIVE MG/DL
LACTATE SERPL-SCNC: 1.6 MMOL/L (ref 0.5–2)
LEUKOCYTE ESTERASE UR QL STRIP: NEGATIVE
LYMPHOCYTES # BLD AUTO: 0.56 THOUSANDS/ΜL (ref 0.6–4.47)
LYMPHOCYTES NFR BLD AUTO: 5 % (ref 14–44)
MCH RBC QN AUTO: 29.2 PG (ref 26.8–34.3)
MCHC RBC AUTO-ENTMCNC: 33 G/DL (ref 31.4–37.4)
MCV RBC AUTO: 89 FL (ref 82–98)
MONOCYTES # BLD AUTO: 1.06 THOUSAND/ΜL (ref 0.17–1.22)
MONOCYTES NFR BLD AUTO: 9 % (ref 4–12)
NEUTROPHILS # BLD AUTO: 9.52 THOUSANDS/ΜL (ref 1.85–7.62)
NEUTS SEG NFR BLD AUTO: 85 % (ref 43–75)
NITRITE UR QL STRIP: NEGATIVE
NRBC BLD AUTO-RTO: 0 /100 WBCS
P AXIS: 71 DEGREES
PH UR STRIP.AUTO: 5.5 [PH] (ref 4.5–8)
PLATELET # BLD AUTO: 123 THOUSANDS/UL (ref 149–390)
PMV BLD AUTO: 11.5 FL (ref 8.9–12.7)
POTASSIUM SERPL-SCNC: 3.7 MMOL/L (ref 3.5–5.3)
PR INTERVAL: 200 MS
PROCALCITONIN SERPL-MCNC: 0.17 NG/ML
PROT SERPL-MCNC: 7.4 G/DL (ref 6.4–8.2)
PROT UR STRIP-MCNC: NEGATIVE MG/DL
PROTHROMBIN TIME: 14 SECONDS (ref 11.8–14.2)
QRS AXIS: -7 DEGREES
QRSD INTERVAL: 86 MS
QT INTERVAL: 350 MS
QTC INTERVAL: 413 MS
RBC # BLD AUTO: 4.97 MILLION/UL (ref 3.88–5.62)
SODIUM SERPL-SCNC: 137 MMOL/L (ref 136–145)
SP GR UR STRIP.AUTO: 1.02 (ref 1–1.03)
T WAVE AXIS: 74 DEGREES
TROPONIN I SERPL-MCNC: <0.02 NG/ML
UROBILINOGEN UR QL STRIP.AUTO: 1 E.U./DL
VENTRICULAR RATE: 84 BPM
WBC # BLD AUTO: 11.28 THOUSAND/UL (ref 4.31–10.16)

## 2018-06-14 PROCEDURE — 99285 EMERGENCY DEPT VISIT HI MDM: CPT

## 2018-06-14 PROCEDURE — 81003 URINALYSIS AUTO W/O SCOPE: CPT | Performed by: EMERGENCY MEDICINE

## 2018-06-14 PROCEDURE — 87040 BLOOD CULTURE FOR BACTERIA: CPT | Performed by: EMERGENCY MEDICINE

## 2018-06-14 PROCEDURE — 85610 PROTHROMBIN TIME: CPT | Performed by: EMERGENCY MEDICINE

## 2018-06-14 PROCEDURE — 84484 ASSAY OF TROPONIN QUANT: CPT | Performed by: EMERGENCY MEDICINE

## 2018-06-14 PROCEDURE — 80053 COMPREHEN METABOLIC PANEL: CPT | Performed by: EMERGENCY MEDICINE

## 2018-06-14 PROCEDURE — 96360 HYDRATION IV INFUSION INIT: CPT

## 2018-06-14 PROCEDURE — 85730 THROMBOPLASTIN TIME PARTIAL: CPT | Performed by: EMERGENCY MEDICINE

## 2018-06-14 PROCEDURE — 93005 ELECTROCARDIOGRAM TRACING: CPT

## 2018-06-14 PROCEDURE — 71046 X-RAY EXAM CHEST 2 VIEWS: CPT

## 2018-06-14 PROCEDURE — 94640 AIRWAY INHALATION TREATMENT: CPT

## 2018-06-14 PROCEDURE — 36415 COLL VENOUS BLD VENIPUNCTURE: CPT | Performed by: EMERGENCY MEDICINE

## 2018-06-14 PROCEDURE — 84145 PROCALCITONIN (PCT): CPT | Performed by: EMERGENCY MEDICINE

## 2018-06-14 PROCEDURE — 93010 ELECTROCARDIOGRAM REPORT: CPT | Performed by: INTERNAL MEDICINE

## 2018-06-14 PROCEDURE — 85025 COMPLETE CBC W/AUTO DIFF WBC: CPT | Performed by: EMERGENCY MEDICINE

## 2018-06-14 PROCEDURE — 83605 ASSAY OF LACTIC ACID: CPT | Performed by: EMERGENCY MEDICINE

## 2018-06-14 RX ORDER — ACETAMINOPHEN 650 MG/1
650 SUPPOSITORY RECTAL ONCE
Status: COMPLETED | OUTPATIENT
Start: 2018-06-14 | End: 2018-06-14

## 2018-06-14 RX ORDER — LORAZEPAM 0.5 MG/1
TABLET ORAL DAILY
Status: ON HOLD | COMMUNITY
End: 2018-07-19

## 2018-06-14 RX ORDER — ALBUTEROL SULFATE 2.5 MG/3ML
5 SOLUTION RESPIRATORY (INHALATION) ONCE
Status: COMPLETED | OUTPATIENT
Start: 2018-06-14 | End: 2018-06-14

## 2018-06-14 RX ORDER — IPRATROPIUM BROMIDE AND ALBUTEROL SULFATE 2.5; .5 MG/3ML; MG/3ML
3 SOLUTION RESPIRATORY (INHALATION) 4 TIMES DAILY PRN
COMMUNITY
End: 2020-01-15 | Stop reason: SDUPTHER

## 2018-06-14 RX ADMIN — IPRATROPIUM BROMIDE 0.5 MG: 0.5 SOLUTION RESPIRATORY (INHALATION) at 08:57

## 2018-06-14 RX ADMIN — SODIUM CHLORIDE 1000 ML: 0.9 INJECTION, SOLUTION INTRAVENOUS at 09:44

## 2018-06-14 RX ADMIN — ACETAMINOPHEN 650 MG: 650 SUPPOSITORY RECTAL at 08:27

## 2018-06-14 RX ADMIN — ALBUTEROL SULFATE 5 MG: 2.5 SOLUTION RESPIRATORY (INHALATION) at 08:57

## 2018-06-14 NOTE — ED NOTES
Transport arranged with [place] back to [facility] at 4:00 pm      Leslie Sumner RN  06/14/18 6582

## 2018-06-14 NOTE — ED NOTES
Pt redressed into street clothes   1 white tshirt to go home with pt      Ita Foster RN  06/14/18 5963

## 2018-06-14 NOTE — DISCHARGE INSTRUCTIONS
Fever in Adults, Ambulatory Care   GENERAL INFORMATION:   A fever  is an increase in body temperature above 100 4°F (38°C)  Fever may be caused by infection, inflammatory disorders, or the cause may not be known  Other signs and symptoms may include any of the following:   · Chills and shivers     · Muscle stiffness    · Weight loss    · Night sweats    · Fever that comes and goes    · Fever that is higher in the morning  Seek immediate care for the following symptoms:   · Shortness of breath or chest pain    · Blue skin, lips, or nails    · Stiff neck and a bad headache    · Fever does not go away or gets worse even after treatment    · Confusion    · Urinate only very small amounts or not at all    · Heart beats faster than normal even after treatment  Treatment for a fever  may include medicine to decrease your fever  You may also need medicine to treat an infection caused by bacteria  Ask your healthcare provider for more information about the medicines you are given and how to use them safely  Manage your fever:   · Take a bath  in cool or lukewarm water  · Use an ice pack  wrapped in a small towel or wet a washcloth with cool water  Place the ice pack or wet washcloth on your forehead or the back of your neck  · Drink liquids as directed  Ask how much liquid to drink each day and which liquids are best for you  Liquids will help prevent dehydration  Follow up with your healthcare provider as directed:  Write down your questions so you remember to ask them during your visits  CARE AGREEMENT:   You have the right to help plan your care  Learn about your health condition and how it may be treated  Discuss treatment options with your caregivers to decide what care you want to receive  You always have the right to refuse treatment  The above information is an  only  It is not intended as medical advice for individual conditions or treatments   Talk to your doctor, nurse or pharmacist before following any medical regimen to see if it is safe and effective for you  © 2014 [address] is for End User's use only and may not be sold, redistributed or otherwise used for commercial purposes  All illustrations and images included in CareNotes® are the copyrighted property of A D A M , Inc  or Rolo Garcia

## 2018-06-14 NOTE — ED PROVIDER NOTES
Final Diagnosis:  1  Febrile    2  Adventitious breath sounds      Chief Complaint   Patient presents with    Fever - 9 weeks to 74 years     EMS reports fever and altered mental status  Per EMS, patient was calling out for nurse at nursing home but forgot why he was calling  recently diagnosed and treatment for pneumonia     This is a 50-year-old male presenting for evaluation of likely pneumonia  The patient himself is a very poor historian  Per EMS, the patient is in [address] [place] [address] due to stroke with left-sided deficits and chronically garbled speech  The patient was diagnosed with a pneumonia maybe a week or 2 ago, was on antibiotics  It is unclear if he still on antibiotics or if he finished it  Today he woke up, was a little bit altered, screaming for help because feeling very short of breath  When staff members came by, they noted that he was febrile and so called EMS to bring him in for evaluation  He is febrile today, unclear how long fevers been going on for  Denies nausea vomiting chest pain  Denies any urinary symptoms including burning or pain  Denies rashes  Denies falls or injuries  He doesn't know what ABX he was taking  PMH:  - 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-12 years ago with dysarthria and L  Weakness  - HTN  - HLD  - Sleep apnea  - Lung CA, right lobectomy  - Depression  - GERD  - COPD  PSH:  - Lung lobectomy  - Joint replacement  - Colonoscopy  - EGD  Former smoker  No alcohol/drugs  PE:   Vitals:    06/14/18 1015 06/14/18 1045 06/14/18 1115 06/14/18 1145   BP: 134/61 124/58 132/62 147/63   BP Location: Left arm  Right arm Right arm   Pulse: 78 76 76 78   Resp: 22 22 22 22   Temp:       TempSrc:       SpO2: 94% 93% 90% 92%   General: VSS, NAD, awake, alert  Well-nourished, well-developed  Appears stated age  Head: Normocephalic, atraumatic, nontender  Eyes: PERRL, EOM-I  No diplopia  No hyphema  No subconjunctival hemorrhages  Symmetrical lids     ENT: Atraumatic external nose and ears     Dry MM  No malocclusion  No stridor  Normal phonation  No drooling  Normal swallowing  Neck: Symmetric, trachea midline  No JVD  CV: RRR  +S1/S2  No murmurs or gallops  Peripheral pulses +2 throughout  No chest wall tenderness  Lungs:   Coarse sounds bilaterally  Occasional expiratory wheezing but sounds more like mucous  No retractions  lungs sounds equal bilateral    No crepitus  No tachypnea  Abd: +BS, soft, NT/ND  No guarding  No rigidity  No rebound  MSK:   FROM   No lower extremity edema  Back:   No CVAT  Skin: Dry, intact  There is a ring like structure on the right medial distal leg  Neuro: AAOx3, GCS 15, CN II-XII grossly intact, dysarthria  Motor grossly at baseline, except L weaker than R  Sensory grossly intact  Psychiatric/Behavioral: Appropriate mood and affect   Exam: deferred  A:  - SOB  - Cough  - Febrile  P:  - Sepsis workup  - Neb  - 13 point ROS was performed and all are normal unless stated in the history above  - Nursing note reviewed  Vitals reviewed  - Orders placed by myself and/or advanced practitioner / resident     - Previous chart was reviewed  - No language barrier    - History obtained from patient  - There are limitations to the history obtained  Reasons ROS could not be obtained: dementia  - Critical care time: Not applicable for this patient  ED Course as of Jun 14 1233   Thu Jun 14, 2018   0842 WBC: (!) 11 28   0842 Chronic  Platelets: (!) 135   [id number] CKD Creatinine: (!) 1 32   0931 X-ray and labs are pretty normal appearing  Giving fluids  Will do procalcitonin given the indeterminate nature  I really do think the patient is septic by appearance but overall his lab work is fairly reassuring  1105 Normal; which is re-assuring  Procalcitonin: 0 17   1105 Likely aspiration pneumonitis  1128 Leukocytes, UA: Negative   1128 Nitrite, UA: Negative   1233 Patient feels much better  Will discharge home    I went over return precautions with the patient  Medications    EMS REPLENISHMENT MED (not administered)   acetaminophen (TYLENOL) rectal suppository 650 mg (650 mg Rectal Given 6/14/18 0827)   albuterol inhalation solution 5 mg (5 mg Nebulization Given 6/14/18 0857)   ipratropium (ATROVENT) 0 02 % inhalation solution 0 5 mg (0 5 mg Nebulization Given 6/14/18 0857)   sodium chloride 0 9 % bolus 1,000 mL (0 mL Intravenous Stopped 6/14/18 1029)     X-ray chest 2 views   ED Interpretation   The CXR was ordered by me and interpreted by me independently  On my read, it appears normal without acute abnormalities  Similar to 05-        Final Result      No acute cardiopulmonary disease              Workstation performed: [id number]           Orders Placed This Encounter   Procedures    Straight cath for urine    Blood culture #1    Blood culture #2    X-ray chest 2 views    APTT    Protime-INR    CBC and differential    Comprehensive metabolic panel    Lactic Acid x2    UA w Reflex to Microscopic w Reflex to Culture    Troponin I    Procalcitonin    Insert and maintain peripheral IV x 2 (18 gauge or >)    Continuous cardiac monitoring    Notify physician if BP < 90    Nasal cannula oxygen    ECG 12 lead     Labs Reviewed   CBC AND DIFFERENTIAL - Abnormal        Result Value Ref Range Status    WBC 11 28 (*) 4 31 - 10 16 Thousand/uL Final    RBC 4 97  3 88 - 5 62 Million/uL Final    Hemoglobin 14 5  12 0 - 17 0 g/dL Final    Hematocrit 44 0  36 5 - 49 3 % Final    MCV 89  82 - 98 fL Final    MCH 29 2  26 8 - 34 3 pg Final    MCHC 33 0  31 4 - 37 4 g/dL Final    RDW 14 6  11 6 - 15 1 % Final    MPV 11 5  8 9 - 12 7 fL Final    Platelets 605 (*) 127 - 390 Thousands/uL Final    nRBC 0  /100 WBCs Final    Neutrophils Relative 85 (*) 43 - 75 % Final    Immat GRANS % 0  0 - 2 % Final    Lymphocytes Relative 5 (*) 14 - 44 % Final    Monocytes Relative 9  4 - 12 % Final    Eosinophils Relative 1  0 - 6 % Final    Basophils Relative 0 0 - 1 % Final    Neutrophils Absolute 9 52 (*) 1 85 - 7 62 Thousands/µL Final    Immature Grans Absolute 0 04  0 00 - 0 20 Thousand/uL Final    Lymphocytes Absolute 0 56 (*) 0 60 - 4 47 Thousands/µL Final    Monocytes Absolute 1 06  0 17 - 1 22 Thousand/µL Final    Eosinophils Absolute 0 08  0 00 - 0 61 Thousand/µL Final    Basophils Absolute 0 02  0 00 - 0 10 Thousands/µL Final   COMPREHENSIVE METABOLIC PANEL - Abnormal     Sodium 137  136 - 145 mmol/L Final    Potassium 3 7  3 5 - 5 3 mmol/L Final    Chloride 106  100 - 108 mmol/L Final    CO2 24  21 - 32 mmol/L Final    Anion Gap 7  4 - 13 mmol/L Final    BUN 22  5 - 25 mg/dL Final    Creatinine 1 32 (*) 0 60 - 1 30 mg/dL Final    Comment: Standardized to IDMS reference method    Glucose 125  65 - 140 mg/dL Final    Comment:   If the patient is fasting, the ADA then defines impaired fasting glucose as > 100 mg/dL and diabetes as > or equal to 123 mg/dL  Specimen collection should occur prior to Sulfasalazine administration due to the potential for falsely depressed results  Specimen collection should occur prior to Sulfapyridine administration due to the potential for falsely elevated results  Calcium 8 8  8 3 - 10 1 mg/dL Final    AST 10  5 - 45 U/L Final    Comment:   Specimen collection should occur prior to Sulfasalazine administration due to the potential for falsely depressed results  ALT 19  12 - 78 U/L Final    Comment:   Specimen collection should occur prior to Sulfasalazine and/or Sulfapyridine administration due to the potential for falsely depressed results       Alkaline Phosphatase 109  46 - 116 U/L Final    Total Protein 7 4  6 4 - 8 2 g/dL Final    Albumin 3 6  3 5 - 5 0 g/dL Final    Total Bilirubin 0 84  0 20 - 1 00 mg/dL Final    eGFR 51  ml/min/1 73sq m Final    Narrative:     National Kidney Disease Education Program recommendations are as follows:  GFR calculation is accurate only with a steady state creatinine  Chronic Kidney disease less than 60 ml/min/1 73 sq  meters  Kidney failure less than 15 ml/min/1 73 sq  meters  APTT - Normal    PTT 36  24 - 36 seconds Final    Comment: Therapeutic Heparin Range =  60-90 seconds   PROTIME-INR - Normal    Protime 14 0  11 8 - 14 2 seconds Final    INR 1 07  0 86 - 1 17 Final   LACTIC ACID, PLASMA - Normal    LACTIC ACID 1 6  0 5 - 2 0 mmol/L Final    Narrative:     Result may be elevated if tourniquet was used during collection  TROPONIN I - Normal    Troponin I <0 02  <=0 04 ng/mL Final    Narrative:     [employer] Chemistry analyzer 99% cutoff is > 0 04 ng/mL in network labs    o cTnI 99% cutoff is useful only when applied to patients in the clinical setting of myocardial ischemia  o cTnI 99% cutoff should be interpreted in the context of clinical history, ECG findings and possibly cardiac imaging to establish correct diagnosis  o cTnI 99% cutoff may be suggestive but clearly not indicative of a coronary event without the clinical setting of myocardial ischemia  PROCALCITONIN TEST - Normal    Procalcitonin 0 17  <=0 25 ng/ml Final    Comment: Suspected Lower Respiratory Tract Infection (LRTI):   -   0 01 - 0 25 ng/mL   :low likelihood for bacterial infection; antibiotics discouraged  -   > 0 25 ng/mL   :increased likelihood bacterial infection; antibiotics encouraged  Suspected Sepsis: strongly consider initiating antibiotics in all unstable patients    -   0 01 - 0 5 ng/mL   :low likelihood for sepsis; antibiotics discouraged  -   > 0 5 to 2 ng/mL   :increased likelihood for sepsis; antibiotics encouraged  -   > 2 ng/mL   :high risk of severe sepsis / septic shock; antibiotics strongly encouraged  Decisions on antibiotic use should not be based solely on procalcitonin levels  If antibiotics are held but uncertainty exists, repeat a procalcitonin level in 6-12 hours to confirm low level   If antibiotics are administered, repeat procalcitonin testing should be obtained every 1-2 days to consider early antibiotic cessation (when > 80% decrease from the peak level OR at <= 0 25 ng/mL for LRTI or ,<= 0 5 ng/mL for sepsis), so long as the infection is not one typically requiring prolonged durations of therapy (e g , bone/joint infections, endocarditis, etc )       Situations where procalcitonin elevations may be due to a non-bacterial cause:   -   Newborns < 67 hours old   -   Massive stress from severe trauma, surgery, cardiac shock, burns (in absence of infection, levels trend down after inciting event)   -   Treatment with agents which stimulate cytokines (OKT3, anti-lymphocyte globulins, alemtuzumab, IL-2, granulocyte transfusion)   -   Malaria and some fungal infections   -   Prolonged, severe cardiogenic shock or organ perfusion abnormalities   -   Some forms of vasculitis and acute graft vs  host disease   -   Paraneoplastic syndromes due to medullary thyroid and small cell lung cancer   -   Significantly compromised renal function, especially ESRD/hemodialysis (consider > 0 75 ng/mL as an abnormal cut-off)     BLOOD CULTURE   BLOOD CULTURE   UA W REFLEX TO MICROSCOPIC WITH REFLEX TO CULTURE    Color, UA Yellow   Final    Clarity, UA Clear   Final    Specific Gravity, UA 1 025  1 003 - 1 030 Final    pH, UA 5 5  4 5 - 8 0 Final    Leukocytes, UA Negative  Negative Final    Nitrite, UA Negative  Negative Final    Protein, UA Negative  Negative mg/dl Final    Glucose, UA Negative  Negative mg/dl Final    Ketones, UA Negative  Negative mg/dl Final    Urobilinogen, UA 1 0  0 2, 1 0 E U /dl E U /dl Final    Bilirubin, UA Negative  Negative Final    Blood, UA Negative  Negative Final   LACTIC ACID, PLASMA     Time reflects when diagnosis was documented in both MDM as applicable and the Disposition within this note     Time User Action Codes Description Comment    6/14/2018  8:31 AM Dallas Fisher [R50 9] Febrile     6/14/2018  8:31 AM Dallas Fisher [R06 89] Adventitious breath sounds     6/14/2018 12:02 PM Yasmani Marroquin Add [J69 0] Aspiration pneumonitis ([address] )     6/14/2018 12:02 PM Ronnie Estrada Remove [J69 0] Aspiration pneumonitis [facility])       ED Disposition     ED Disposition Condition Comment    Discharge  Olivia Grimm  discharge to home/self care  Condition at discharge: Good        Follow-up Information     Follow up With Specialties Details Why Contact Info Additional [address] Emergency Department Emergency Medicine Go to If symptoms worsen [address]  [phone]  ED, [address], [facility],  Family Medicine Call in 1 day To make appointment for re-evaluation in 1 week [place] [address]  [phone]           Patient's Medications   Discharge Prescriptions    No medications on file     No discharge procedures on file  Prior to Admission Medications   Prescriptions Last Dose Informant Patient Reported? Taking?    ASPIRIN 81 PO Past Week at Unknown time  Yes Yes   Sig: Take 1 tablet by mouth every other day     Fesoterodine Fumarate ER (TOVIAZ) 8 MG TB24 Past Week at Unknown time Outside Facility (Specify) Yes Yes   Sig: Take 4 mg by mouth every evening     LORazepam (ATIVAN) 0 5 mg tablet 6/13/2018 at Unknown time  Yes Yes   Sig: Take by mouth daily Daily @@ 0900   PARoxetine (PAXIL) 20 mg tablet 6/13/2018 at Unknown time  Yes Yes   Sig: Take 1 tablet by mouth daily   acetaminophen (TYLENOL) 325 mg tablet 6/13/2018 at Unknown time  No Yes   Sig: Take 2 tablets by mouth every 6 (six) hours as needed for fever   albuterol (PROVENTIL HFA,VENTOLIN HFA) 90 mcg/act inhaler 6/13/2018 at Unknown time  No Yes   Sig: Inhale 2 puffs 4 (four) times a day   amLODIPine (NORVASC) 10 mg tablet 6/13/2018 at Unknown time  Yes Yes   Sig: Take 1 tablet by mouth daily   atorvastatin (LIPITOR) 10 mg tablet 6/13/2018 at Unknown time  Yes Yes Sig: Take 1 tablet by mouth   benzonatate (TESSALON PERLES) 100 mg capsule 2018 at Unknown time  No Yes   Sig: Take 1 capsule by mouth 3 (three) times a day as needed for cough   bimatoprost (LUMIGAN) 0 01 % ophthalmic drops 2018 at Unknown time  No Yes   Sig: Administer 1 drop to both eyes daily at bedtime   carvedilol (COREG) 12 5 mg tablet 2018 at Unknown time  No Yes   Sig: Take 1 tablet by mouth 2 (two) times a day with meals   clopidogrel (PLAVIX) 75 mg tablet 2018 at Unknown time  No Yes   Sig: Take 1 tablet by mouth daily   docusate sodium (COLACE) 100 mg capsule 2018 at Unknown time  No Yes   Sig: Take 1 capsule (100 mg total) by mouth 2 (two) times a day   fluticasone (FLONASE) 50 mcg/act nasal spray 2018 at Unknown time  Yes Yes   Si-2 sprays into each nostril daily   fluticasone-salmeterol (ADVAIR DISKUS) 250-50 mcg/dose inhaler 2018 at Unknown time  Yes Yes   Sig: Inhale 2 (two) times a day     hydrochlorothiazide (HYDRODIURIL) 12 5 mg tablet   No No   Sig: Take 1 tablet (12 5 mg total) by mouth daily   ipratropium-albuterol (DUO-NEB) 0 5-2 5 mg/3 mL nebulizer solution   Yes Yes   Sig: Take 3 mL by nebulization 4 (four) times a day as needed for wheezing or shortness of breath   levocetirizine (XYZAL) 5 MG tablet 2018 at Unknown time  No Yes   Sig: Take 1 tablet by mouth every evening   meclizine (ANTIVERT) 25 mg tablet 2018 at Unknown time  No Yes   Sig: Take 1 tablet by mouth 3 (three) times a day as needed for dizziness   omeprazole (PriLOSEC) 40 MG capsule 2018 at Unknown time  Yes Yes   Sig: Take 1 capsule by mouth daily   psyllium (METAMUCIL) 0 52 g capsule 2018 at Unknown time  Yes Yes   Sig: Take 0 52 g by mouth daily   spironolactone (ALDACTONE) 25 mg tablet 2018 at Unknown time  No Yes   Sig: Take 1 tablet (25 mg total) by mouth daily   tamsulosin (FLOMAX) 0 4 mg 2018 at Unknown time  Yes Yes   Sig: Take 1 capsule by mouth daily   tiotropium (SPIRIVA HANDIHALER) 18 mcg inhalation capsule 6/13/2018 at Unknown time  Yes Yes   Sig: Place into inhaler and inhale daily      Facility-Administered Medications: None       Portions of the record may have been created with voice recognition software  Occasional wrong word or "sound a like" substitutions may have occurred due to the inherent limitations of voice recognition software  Read the chart carefully and recognize, using context, where substitutions have occurred      Electronically signed by:  Viktor Maloney MD  06/14/18 2172

## 2018-06-14 NOTE — ED NOTES
Nursing home made aware that patient is going back to nursing home at 4 pm via [facility], [facility]  06/14/18 8611

## 2018-06-19 LAB
BACTERIA BLD CULT: NORMAL
BACTERIA BLD CULT: NORMAL

## 2018-07-15 ENCOUNTER — APPOINTMENT (EMERGENCY)
Dept: RADIOLOGY | Facility: HOSPITAL | Age: 78
DRG: 177 | End: 2018-07-15
Payer: MEDICARE

## 2018-07-15 ENCOUNTER — HOSPITAL ENCOUNTER (INPATIENT)
Facility: HOSPITAL | Age: 78
LOS: 4 days | Discharge: NURSING, REHAB OR LONG-TERM CARE | DRG: 177 | End: 2018-07-19
Attending: INTERNAL MEDICINE | Admitting: INTERNAL MEDICINE
Payer: MEDICARE

## 2018-07-15 DIAGNOSIS — J69.0 ASPIRATION PNEUMONIA OF RIGHT LOWER LOBE DUE TO VOMIT (HCC): ICD-10-CM

## 2018-07-15 DIAGNOSIS — N17.9 ACUTE KIDNEY INJURY (HCC): ICD-10-CM

## 2018-07-15 DIAGNOSIS — F33.2 SEVERE EPISODE OF RECURRENT MAJOR DEPRESSIVE DISORDER, WITHOUT PSYCHOTIC FEATURES (HCC): ICD-10-CM

## 2018-07-15 DIAGNOSIS — R53.1 ASTHENIA: ICD-10-CM

## 2018-07-15 DIAGNOSIS — J69.0 ASPIRATION PNEUMONIA (HCC): Primary | ICD-10-CM

## 2018-07-15 PROBLEM — J44.0 CHRONIC OBSTRUCTIVE PULMONARY DISEASE WITH ACUTE LOWER RESPIRATORY INFECTION (HCC): Status: ACTIVE | Noted: 2018-02-01

## 2018-07-15 PROBLEM — J96.01 ACUTE RESPIRATORY FAILURE WITH HYPOXIA (HCC): Status: ACTIVE | Noted: 2018-07-15

## 2018-07-15 PROBLEM — J18.9 HCAP (HEALTHCARE-ASSOCIATED PNEUMONIA): Status: ACTIVE | Noted: 2018-07-15

## 2018-07-15 LAB
ALBUMIN SERPL BCP-MCNC: 4 G/DL (ref 3.5–5)
ALP SERPL-CCNC: 112 U/L (ref 46–116)
ALT SERPL W P-5'-P-CCNC: 23 U/L (ref 12–78)
ANION GAP SERPL CALCULATED.3IONS-SCNC: 8 MMOL/L (ref 4–13)
APTT PPP: 33 SECONDS (ref 24–36)
AST SERPL W P-5'-P-CCNC: 17 U/L (ref 5–45)
BASOPHILS # BLD AUTO: 0.01 THOUSANDS/ΜL (ref 0–0.1)
BASOPHILS NFR BLD AUTO: 0 % (ref 0–1)
BILIRUB SERPL-MCNC: 0.8 MG/DL (ref 0.2–1)
BUN SERPL-MCNC: 28 MG/DL (ref 5–25)
CALCIUM SERPL-MCNC: 9.4 MG/DL (ref 8.3–10.1)
CHLORIDE SERPL-SCNC: 104 MMOL/L (ref 100–108)
CO2 SERPL-SCNC: 28 MMOL/L (ref 21–32)
CREAT SERPL-MCNC: 1.38 MG/DL (ref 0.6–1.3)
EOSINOPHIL # BLD AUTO: 0.04 THOUSAND/ΜL (ref 0–0.61)
EOSINOPHIL NFR BLD AUTO: 0 % (ref 0–6)
ERYTHROCYTE [DISTWIDTH] IN BLOOD BY AUTOMATED COUNT: 14.4 % (ref 11.6–15.1)
GFR SERPL CREATININE-BSD FRML MDRD: 49 ML/MIN/1.73SQ M
GLUCOSE SERPL-MCNC: 130 MG/DL (ref 65–140)
HCT VFR BLD AUTO: 45.6 % (ref 36.5–49.3)
HGB BLD-MCNC: 15.1 G/DL (ref 12–17)
INR PPP: 1.03 (ref 0.86–1.17)
LACTATE SERPL-SCNC: 1.6 MMOL/L (ref 0.5–2)
LYMPHOCYTES # BLD AUTO: 0.38 THOUSANDS/ΜL (ref 0.6–4.47)
LYMPHOCYTES NFR BLD AUTO: 4 % (ref 14–44)
MAGNESIUM SERPL-MCNC: 1.7 MG/DL (ref 1.6–2.6)
MCH RBC QN AUTO: 28.1 PG (ref 26.8–34.3)
MCHC RBC AUTO-ENTMCNC: 33.1 G/DL (ref 31.4–37.4)
MCV RBC AUTO: 85 FL (ref 82–98)
MONOCYTES # BLD AUTO: 0.7 THOUSAND/ΜL (ref 0.17–1.22)
MONOCYTES NFR BLD AUTO: 7 % (ref 4–12)
NEUTROPHILS # BLD AUTO: 9.37 THOUSANDS/ΜL (ref 1.85–7.62)
NEUTS SEG NFR BLD AUTO: 89 % (ref 43–75)
NT-PROBNP SERPL-MCNC: 116 PG/ML
PLATELET # BLD AUTO: 110 THOUSANDS/UL (ref 149–390)
PLATELET # BLD AUTO: 123 THOUSANDS/UL (ref 149–390)
PMV BLD AUTO: 11.1 FL (ref 8.9–12.7)
PMV BLD AUTO: 11.4 FL (ref 8.9–12.7)
POTASSIUM SERPL-SCNC: 4.1 MMOL/L (ref 3.5–5.3)
PROT SERPL-MCNC: 8.2 G/DL (ref 6.4–8.2)
PROTHROMBIN TIME: 13.2 SECONDS (ref 11.8–14.2)
RBC # BLD AUTO: 5.38 MILLION/UL (ref 3.88–5.62)
SODIUM SERPL-SCNC: 140 MMOL/L (ref 136–145)
TROPONIN I SERPL-MCNC: <0.02 NG/ML
WBC # BLD AUTO: 10.5 THOUSAND/UL (ref 4.31–10.16)

## 2018-07-15 PROCEDURE — 80053 COMPREHEN METABOLIC PANEL: CPT | Performed by: PHYSICIAN ASSISTANT

## 2018-07-15 PROCEDURE — 93005 ELECTROCARDIOGRAM TRACING: CPT

## 2018-07-15 PROCEDURE — 85610 PROTHROMBIN TIME: CPT | Performed by: PHYSICIAN ASSISTANT

## 2018-07-15 PROCEDURE — 94760 N-INVAS EAR/PLS OXIMETRY 1: CPT

## 2018-07-15 PROCEDURE — 71046 X-RAY EXAM CHEST 2 VIEWS: CPT

## 2018-07-15 PROCEDURE — 96360 HYDRATION IV INFUSION INIT: CPT

## 2018-07-15 PROCEDURE — 94640 AIRWAY INHALATION TREATMENT: CPT

## 2018-07-15 PROCEDURE — 94668 MNPJ CHEST WALL SBSQ: CPT

## 2018-07-15 PROCEDURE — 94664 DEMO&/EVAL PT USE INHALER: CPT

## 2018-07-15 PROCEDURE — 87070 CULTURE OTHR SPECIMN AEROBIC: CPT | Performed by: PHYSICIAN ASSISTANT

## 2018-07-15 PROCEDURE — 85730 THROMBOPLASTIN TIME PARTIAL: CPT | Performed by: PHYSICIAN ASSISTANT

## 2018-07-15 PROCEDURE — 84484 ASSAY OF TROPONIN QUANT: CPT | Performed by: PHYSICIAN ASSISTANT

## 2018-07-15 PROCEDURE — 99285 EMERGENCY DEPT VISIT HI MDM: CPT

## 2018-07-15 PROCEDURE — 87205 SMEAR GRAM STAIN: CPT | Performed by: PHYSICIAN ASSISTANT

## 2018-07-15 PROCEDURE — 94762 N-INVAS EAR/PLS OXIMTRY CONT: CPT

## 2018-07-15 PROCEDURE — 83735 ASSAY OF MAGNESIUM: CPT | Performed by: PHYSICIAN ASSISTANT

## 2018-07-15 PROCEDURE — 83605 ASSAY OF LACTIC ACID: CPT | Performed by: PHYSICIAN ASSISTANT

## 2018-07-15 PROCEDURE — 85049 AUTOMATED PLATELET COUNT: CPT | Performed by: PHYSICIAN ASSISTANT

## 2018-07-15 PROCEDURE — 87040 BLOOD CULTURE FOR BACTERIA: CPT | Performed by: PHYSICIAN ASSISTANT

## 2018-07-15 PROCEDURE — 36415 COLL VENOUS BLD VENIPUNCTURE: CPT | Performed by: PHYSICIAN ASSISTANT

## 2018-07-15 PROCEDURE — 99233 SBSQ HOSP IP/OBS HIGH 50: CPT | Performed by: INTERNAL MEDICINE

## 2018-07-15 PROCEDURE — 83880 ASSAY OF NATRIURETIC PEPTIDE: CPT | Performed by: PHYSICIAN ASSISTANT

## 2018-07-15 PROCEDURE — 87449 NOS EACH ORGANISM AG IA: CPT | Performed by: PHYSICIAN ASSISTANT

## 2018-07-15 PROCEDURE — 87147 CULTURE TYPE IMMUNOLOGIC: CPT | Performed by: PHYSICIAN ASSISTANT

## 2018-07-15 PROCEDURE — 85025 COMPLETE CBC W/AUTO DIFF WBC: CPT | Performed by: PHYSICIAN ASSISTANT

## 2018-07-15 RX ORDER — DOCUSATE SODIUM 100 MG/1
100 CAPSULE, LIQUID FILLED ORAL 2 TIMES DAILY
Status: DISCONTINUED | OUTPATIENT
Start: 2018-07-15 | End: 2018-07-19 | Stop reason: HOSPADM

## 2018-07-15 RX ORDER — MECLIZINE HYDROCHLORIDE 25 MG/1
25 TABLET ORAL 3 TIMES DAILY PRN
Status: DISCONTINUED | OUTPATIENT
Start: 2018-07-15 | End: 2018-07-19 | Stop reason: HOSPADM

## 2018-07-15 RX ORDER — SODIUM CHLORIDE 9 MG/ML
125 INJECTION, SOLUTION INTRAVENOUS CONTINUOUS
Status: DISCONTINUED | OUTPATIENT
Start: 2018-07-15 | End: 2018-07-15

## 2018-07-15 RX ORDER — PAROXETINE HYDROCHLORIDE 20 MG/1
20 TABLET, FILM COATED ORAL DAILY
Status: DISCONTINUED | OUTPATIENT
Start: 2018-07-15 | End: 2018-07-19 | Stop reason: HOSPADM

## 2018-07-15 RX ORDER — HEPARIN SODIUM 5000 [USP'U]/ML
5000 INJECTION, SOLUTION INTRAVENOUS; SUBCUTANEOUS EVERY 8 HOURS SCHEDULED
Status: DISCONTINUED | OUTPATIENT
Start: 2018-07-15 | End: 2018-07-19 | Stop reason: HOSPADM

## 2018-07-15 RX ORDER — ASPIRIN 81 MG/1
81 TABLET, CHEWABLE ORAL DAILY
Status: DISCONTINUED | OUTPATIENT
Start: 2018-07-15 | End: 2018-07-19 | Stop reason: HOSPADM

## 2018-07-15 RX ORDER — PANTOPRAZOLE SODIUM 40 MG/1
40 TABLET, DELAYED RELEASE ORAL
Status: DISCONTINUED | OUTPATIENT
Start: 2018-07-16 | End: 2018-07-19 | Stop reason: HOSPADM

## 2018-07-15 RX ORDER — CARVEDILOL 12.5 MG/1
12.5 TABLET ORAL 2 TIMES DAILY WITH MEALS
Status: DISCONTINUED | OUTPATIENT
Start: 2018-07-15 | End: 2018-07-19 | Stop reason: HOSPADM

## 2018-07-15 RX ORDER — SPIRONOLACTONE 25 MG/1
25 TABLET ORAL DAILY
Status: DISCONTINUED | OUTPATIENT
Start: 2018-07-15 | End: 2018-07-19 | Stop reason: HOSPADM

## 2018-07-15 RX ORDER — FLUTICASONE FUROATE AND VILANTEROL 200; 25 UG/1; UG/1
1 POWDER RESPIRATORY (INHALATION) DAILY
Status: DISCONTINUED | OUTPATIENT
Start: 2018-07-15 | End: 2018-07-16

## 2018-07-15 RX ORDER — ONDANSETRON 2 MG/ML
4 INJECTION INTRAMUSCULAR; INTRAVENOUS EVERY 6 HOURS PRN
Status: DISCONTINUED | OUTPATIENT
Start: 2018-07-15 | End: 2018-07-19 | Stop reason: HOSPADM

## 2018-07-15 RX ORDER — CLOPIDOGREL BISULFATE 75 MG/1
75 TABLET ORAL DAILY
Status: DISCONTINUED | OUTPATIENT
Start: 2018-07-15 | End: 2018-07-19 | Stop reason: HOSPADM

## 2018-07-15 RX ORDER — TAMSULOSIN HYDROCHLORIDE 0.4 MG/1
0.4 CAPSULE ORAL DAILY
Status: DISCONTINUED | OUTPATIENT
Start: 2018-07-15 | End: 2018-07-19 | Stop reason: HOSPADM

## 2018-07-15 RX ORDER — GUAIFENESIN 600 MG
1200 TABLET, EXTENDED RELEASE 12 HR ORAL EVERY 12 HOURS SCHEDULED
Status: DISCONTINUED | OUTPATIENT
Start: 2018-07-15 | End: 2018-07-19 | Stop reason: HOSPADM

## 2018-07-15 RX ORDER — AMLODIPINE BESYLATE 10 MG/1
10 TABLET ORAL DAILY
Status: DISCONTINUED | OUTPATIENT
Start: 2018-07-15 | End: 2018-07-19 | Stop reason: HOSPADM

## 2018-07-15 RX ORDER — OXYBUTYNIN CHLORIDE 5 MG/1
5 TABLET, EXTENDED RELEASE ORAL
Status: DISCONTINUED | OUTPATIENT
Start: 2018-07-15 | End: 2018-07-19 | Stop reason: HOSPADM

## 2018-07-15 RX ORDER — POLYETHYLENE GLYCOL 3350 17 G/17G
17 POWDER, FOR SOLUTION ORAL DAILY PRN
COMMUNITY

## 2018-07-15 RX ORDER — LATANOPROST 50 UG/ML
1 SOLUTION/ DROPS OPHTHALMIC
Status: DISCONTINUED | OUTPATIENT
Start: 2018-07-15 | End: 2018-07-19 | Stop reason: HOSPADM

## 2018-07-15 RX ORDER — ACETAMINOPHEN 325 MG/1
650 TABLET ORAL EVERY 6 HOURS
Status: DISCONTINUED | OUTPATIENT
Start: 2018-07-15 | End: 2018-07-19 | Stop reason: HOSPADM

## 2018-07-15 RX ORDER — ATORVASTATIN CALCIUM 10 MG/1
10 TABLET, FILM COATED ORAL
Status: DISCONTINUED | OUTPATIENT
Start: 2018-07-15 | End: 2018-07-19 | Stop reason: HOSPADM

## 2018-07-15 RX ORDER — FLUTICASONE PROPIONATE 50 MCG
2 SPRAY, SUSPENSION (ML) NASAL DAILY
Status: DISCONTINUED | OUTPATIENT
Start: 2018-07-15 | End: 2018-07-19 | Stop reason: HOSPADM

## 2018-07-15 RX ORDER — CALCIUM CARBONATE 200(500)MG
1000 TABLET,CHEWABLE ORAL DAILY PRN
Status: DISCONTINUED | OUTPATIENT
Start: 2018-07-15 | End: 2018-07-19 | Stop reason: HOSPADM

## 2018-07-15 RX ORDER — ALBUTEROL SULFATE 2.5 MG/3ML
5 SOLUTION RESPIRATORY (INHALATION) ONCE
Status: COMPLETED | OUTPATIENT
Start: 2018-07-15 | End: 2018-07-15

## 2018-07-15 RX ORDER — LATANOPROST 50 UG/ML
1 SOLUTION/ DROPS OPHTHALMIC
COMMUNITY

## 2018-07-15 RX ORDER — LORAZEPAM 0.5 MG/1
0.5 TABLET ORAL DAILY
Status: DISCONTINUED | OUTPATIENT
Start: 2018-07-16 | End: 2018-07-17

## 2018-07-15 RX ORDER — ALBUTEROL SULFATE 2.5 MG/3ML
2.5 SOLUTION RESPIRATORY (INHALATION) EVERY 6 HOURS PRN
Status: DISCONTINUED | OUTPATIENT
Start: 2018-07-15 | End: 2018-07-19 | Stop reason: HOSPADM

## 2018-07-15 RX ORDER — SODIUM CHLORIDE 9 MG/ML
100 INJECTION, SOLUTION INTRAVENOUS CONTINUOUS
Status: DISCONTINUED | OUTPATIENT
Start: 2018-07-15 | End: 2018-07-19 | Stop reason: HOSPADM

## 2018-07-15 RX ORDER — BENZONATATE 100 MG/1
100 CAPSULE ORAL 3 TIMES DAILY
Status: DISCONTINUED | OUTPATIENT
Start: 2018-07-15 | End: 2018-07-16

## 2018-07-15 RX ORDER — POLYETHYLENE GLYCOL 3350 17 G/17G
17 POWDER, FOR SOLUTION ORAL DAILY PRN
Status: DISCONTINUED | OUTPATIENT
Start: 2018-07-15 | End: 2018-07-19 | Stop reason: HOSPADM

## 2018-07-15 RX ADMIN — ACETAMINOPHEN 650 MG: 325 TABLET, FILM COATED ORAL at 21:29

## 2018-07-15 RX ADMIN — SODIUM CHLORIDE 100 ML/HR: 0.9 INJECTION, SOLUTION INTRAVENOUS at 16:54

## 2018-07-15 RX ADMIN — BENZONATATE 100 MG: 100 CAPSULE ORAL at 21:29

## 2018-07-15 RX ADMIN — SODIUM CHLORIDE 1000 ML: 0.9 INJECTION, SOLUTION INTRAVENOUS at 12:37

## 2018-07-15 RX ADMIN — ALBUTEROL SULFATE 5 MG: 2.5 SOLUTION RESPIRATORY (INHALATION) at 12:37

## 2018-07-15 RX ADMIN — HEPARIN SODIUM 5000 UNITS: 5000 INJECTION, SOLUTION INTRAVENOUS; SUBCUTANEOUS at 16:50

## 2018-07-15 RX ADMIN — METRONIDAZOLE 500 MG: 500 INJECTION, SOLUTION INTRAVENOUS at 16:00

## 2018-07-15 RX ADMIN — FLUTICASONE FUROATE AND VILANTEROL TRIFENATATE 1 PUFF: 200; 25 POWDER RESPIRATORY (INHALATION) at 16:53

## 2018-07-15 RX ADMIN — OXYBUTYNIN CHLORIDE 5 MG: 5 TABLET, EXTENDED RELEASE ORAL at 21:29

## 2018-07-15 RX ADMIN — BENZONATATE 100 MG: 100 CAPSULE ORAL at 16:50

## 2018-07-15 RX ADMIN — DOCUSATE SODIUM 100 MG: 100 CAPSULE, LIQUID FILLED ORAL at 18:00

## 2018-07-15 RX ADMIN — CEFEPIME HYDROCHLORIDE 2000 MG: 2 INJECTION, POWDER, FOR SOLUTION INTRAVENOUS at 14:11

## 2018-07-15 RX ADMIN — ATORVASTATIN CALCIUM 10 MG: 10 TABLET, FILM COATED ORAL at 16:50

## 2018-07-15 RX ADMIN — TAMSULOSIN HYDROCHLORIDE 0.4 MG: 0.4 CAPSULE ORAL at 16:50

## 2018-07-15 RX ADMIN — ACETAMINOPHEN 650 MG: 325 TABLET, FILM COATED ORAL at 16:50

## 2018-07-15 RX ADMIN — IPRATROPIUM BROMIDE 0.5 MG: 0.5 SOLUTION RESPIRATORY (INHALATION) at 12:37

## 2018-07-15 RX ADMIN — HEPARIN SODIUM 5000 UNITS: 5000 INJECTION, SOLUTION INTRAVENOUS; SUBCUTANEOUS at 21:29

## 2018-07-15 RX ADMIN — GUAIFENESIN 1200 MG: 600 TABLET, EXTENDED RELEASE ORAL at 21:28

## 2018-07-15 RX ADMIN — CARVEDILOL 12.5 MG: 12.5 TABLET, FILM COATED ORAL at 16:50

## 2018-07-15 NOTE — ASSESSMENT & PLAN NOTE
· Noted to be 87% on RA upon arrival to ED   Does not wear O2 at home  · Currently on 3L NC and O2 saturations are in the 90s  · Wean as tolerated to keep saturations at 88% or above

## 2018-07-15 NOTE — RESPIRATORY THERAPY NOTE
RT Protocol Note  Madie Becker  65 y o  male MRN: [id number]  Unit/Bed#: -01 Encounter: [id number]    Assessment    Principal Problem:    HCAP (healthcare-associated pneumonia)  Active Problems:    Essential hypertension    Lung cancer ([facility] )    Dysphagia    History of cerebrovascular accident (CVA) with residual deficit    GERD (gastroesophageal reflux disease)    Hyperlipidemia    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    Tracheomalacia    Chronic obstructive pulmonary disease with acute lower respiratory infection ([place] [address] )    Acute respiratory failure with hypoxia ([place] [address] )      Home Pulmonary Medications:  Spiriva, Albuterol       Past Medical History:   Diagnosis Date    RONAL (acute kidney injury) ([facility] ) 5/31/2017    Aspiration into respiratory tract     Chest pain 5/31/2017    COPD [facility]ch[facility] obstructive pulmonary disease) ([facility]     Depression     Elevated troponin 5/31/2017    GERD (gastroesophageal reflux disease)     History of CVA (cerebrovascular accident) 4/13/2016    Hyperlipidemia     Hypertension     Lung cancer ([place] [address] ) 2005    Right, status post lobectomy    Pneumonia     Prostate cancer ([place] [address] )     Sleep apnea     awaiting sleep study results    Stroke ([place] [address] )     Weakness due to cerebrovascular accident (CVA)      Social History     Social History    Marital status:       Spouse name: N/A    Number of children: N/A    Years of education: N/A     Social History Main Topics    Smoking status: Former Smoker     Packs/day: 1 00     Years: 22 00     Types: Cigarettes     Start date: 1955     Quit date: 1977    Smokeless tobacco: Never Used    Alcohol use No    Drug use: No    Sexual activity: Not Asked     Other Topics Concern    None     Social History Narrative    None       Physical Exam:   Assessment Type: Assess only  General Appearance: Awake, Alert  Respiratory Pattern: Dyspnea at rest  Chest Assessment: Chest expansion symmetrical  Bilateral Breath Sounds: Rhonchi, Coarse  Cough: Non-productive, Strong  O2 Device: 4L NC    Vitals:  Blood pressure 125/58, pulse 86, temperature 98 2 °F (36 8 °C), temperature source Oral, resp  rate 22, weight 102 kg (225 lb 1 4 oz), SpO2 93 %  Imaging and other studies: I have personally reviewed pertinent reports  O2 Device: 4L NC     Plan    Respiratory Plan: Mild Distress pathway, Home Bronchodilator Patient pathway  Airway Clearance Plan: Flutter     Resp Comments: Pt assessed per RCP  Breathsounds are course/rhochi  Airway Clearance Protocol ordered and pt instructed on flutter device usage  Pt ordered q6prn nebs for SOB/wheezing per RCP  Will continue to monitor

## 2018-07-15 NOTE — H&P
Progress Note - [place]  1940, 65 y o  male MRN: [id number]    Unit/Bed#: -01 Encounter: [id number]    Primary Care Provider: Janet Pearce DO   Date and time admitted to hospital: 7/15/2018 12:08 PM    * HCAP (healthcare-associated pneumonia)   Assessment & Plan    · RLL, noted on CXR (does have hx of RLL lobectomy, questionable if actually RML)  · Admit  · IV abx: Cefepime, Flagyl (MDR Risk factor score is 2, has hx of dysphagia/microaspiration and had posttussive emesis today)  · Respiratory protocol: flutter valve, PRN nebs  · Tessalon, mucinex  · NC oxygen-- wean as tolerated  · Pulmonary consult-- patient is known to Dr Rosendo Mcgee        Acute respiratory failure with hypoxia ([facility] )   Assessment & Plan    · Noted to be 87% on RA upon arrival to ED   Does not wear O2 at home  · Currently on 3L NC and O2 saturations are in the 90s  · Wean as tolerated to keep saturations at 88% or above        Chronic obstructive pulmonary disease with acute lower respiratory infection (HCC)   Assessment & Plan    · Continue home inhalers/nebulizers as directed        Dysphagia   Assessment & Plan    · Patient follows modified diet: dental soft, nectar thick liquids  · Aspiration precautions        Lung cancer ([facility] )   Assessment & Plan    · S/p RLL resection        Tracheomalacia   Assessment & Plan    · Appears stable  · Follows with Dr Rosendo Mcgee        History of cerebrovascular accident (CVA) with residual deficit   Assessment & Plan    · CVA 10 years ago   · Has LUE hemiplegia  · Ambulates with wheelchair        Essential hypertension   Assessment & Plan    · BP well controlled on current regimen  · Continue with current medications with holding parameters        GERD (gastroesophageal reflux disease)   Assessment & Plan    · Continue PPI        Hyperlipidemia   Assessment & Plan    · Reviewed recent lipid panel-- WNL  · Continue statin at current dose        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   Assessment & Plan    · Paxil, Ativan          VTE Prophylaxis: Heparin  / sequential compression device   Code Status: Level 1-- Full Code  POLST: There is no POLST form on file for this patient (pre-hospital)  Discussion with family: family aware of admission    Anticipated Length of Stay:  Patient will be admitted on an Inpatient basis with an anticipated length of stay of  > 2 midnights  Justification for Hospital Stay: tx PNA    Total Time for Visit, including Counseling / Coordination of Care: 30 minutes  Greater than 50% of this total time spent on direct patient counseling and coordination of care  Chief Complaint:   SOB    History of Present Illness:    Nery Lerner  is a 65 y o  male with multiple medical conditions including tracheomalacia, COPD, dysphagia, history of CVA with left upper extremity residual deficits, hypertension, hyperlipidemia presents the ED for evaluation of worsening shortness of breath x1 day  Patient reports he has had an increase in his cough for the last 4 weeks  Patient typically takes Tessalon and PPIs to control his cough, which seen the working up until about 3 days ago  Over the course last 3 days, patient reports that his cough has been more severe  He reports today that after going to [employer] in his nursing home, he felt acutely short of breath  He noticed and short of breath was worse on exertion and resolved slightly with rest   Nursing home reports that patient had a coughing fit so severe today that he had post-tussive emesis  He also for increased weakness over the last 3 days  There unclear if patient aspirated, the patient does have a history aspiration in the past   No measured fevers, patient did feel hot and cold today  Patient denies any chest pain  He states his cough is not productive at this time  Patient has been trying test lawn, p r n  breathing treatments, home inhalers with no improvement of symptoms    Patient has had symptoms like this in the past   He has multiple admissions for acute tracheobronchitis secondary to aspiration  Review of Systems:    Review of Systems   Constitutional: Positive for chills and fever  HENT: Negative  Eyes: Negative  Respiratory: Positive for cough and shortness of breath  Cardiovascular: Negative  Gastrointestinal: Positive for vomiting  Genitourinary: Negative  Musculoskeletal: Negative  Skin: Negative  Neurological: Positive for weakness  Hematological: Negative  Psychiatric/Behavioral: Negative  Past Medical and Surgical History:     Past Medical History:   Diagnosis Date    [facility] ) 5/31/2017    Aspiration into respiratory tract     Chest pain 5/31/2017    [facility]     Depression     Elevated troponin 5/31/2017    GERD (gastroesophageal reflux disease)     History of CVA (cerebrovascular accident) 4/13/2016    Hyperlipidemia     Hypertension     Lung cancer ([place] [address] ) 2005    Right, status post lobectomy    Pneumonia     Prostate cancer ([place] [address] )     Sleep apnea     awaiting sleep study results    Stroke ([place] [address] )     Weakness due to cerebrovascular accident (CVA)        Past Surgical History:   Procedure Laterality Date    COLONOSCOPY      ESOPHAGOGASTRODUODENOSCOPY N/A 4/13/2016    Procedure: ESOPHAGOGASTRODUODENOSCOPY (EGD); Surgeon: Delfina Warren MD;  [facility]: [facility]; Service:     JOINT REPLACEMENT      knee replacement    LUNG LOBECTOMY      [place] ESOPHAGOGASTRODUODENOSCOPY TRANSORAL DIAGNOSTIC N/A 3/15/2017    Procedure: ESOPHAGOGASTRODUODENOSCOPY (EGD); Surgeon: Delfina Warren MD;  [facility]: [facility]; Service: Gastroenterology    TRIGEMINAL NERVE DECOMPRESSION         Meds/Allergies:    Prior to Admission medications    Medication Sig Start Date End Date Taking?  Authorizing Provider   acetaminophen (TYLENOL) 325 mg tablet Take 2 tablets by mouth every 6 (six) hours as needed for fever 6/19/17  Yes Brianne Tompkins MD   albuterol (PROVENTIL HFA,VENTOLIN HFA) 90 mcg/act inhaler Inhale 2 puffs 4 (four) times a day 6/19/17  Yes Brianne Tompkins MD   amLODIPine (NORVASC) 10 mg tablet Take 1 tablet by mouth daily 10/10/17  Yes Historical Provider, MD   ASPIRIN 81 PO Take 1 tablet by mouth every other day   10/10/17  Yes Historical Provider, MD   atorvastatin (LIPITOR) 10 mg tablet Take 1 tablet by mouth   Yes Historical Provider, MD   benzonatate (TESSALON PERLES) 100 mg capsule Take 1 capsule by mouth 3 (three) times a day as needed for cough  Patient taking differently: Take 100 mg by mouth 2 (two) times a day   8/21/17  Yes Brianne Tompkins MD   carvedilol (COREG) 12 5 mg tablet Take 1 tablet by mouth 2 (two) times a day with meals 8/21/17  Yes Brianne Tompkins MD   clopidogrel (PLAVIX) 75 mg tablet Take 1 tablet by mouth daily 6/19/17  Yes Brianne Tompkins MD   docusate sodium (COLACE) 100 mg capsule Take 1 capsule (100 mg total) by mouth 2 (two) times a day 2/18/18  Yes Nika Mckeon MD   Fesoterodine Fumarate ER (TOVIAZ) 8 MG TB24 Take 4 mg by mouth every evening     Yes Historical Provider, MD   fluticasone (FLONASE) 50 mcg/act nasal spray 1-2 sprays into each nostril daily 8/30/17  Yes Historical Provider, MD   fluticasone-salmeterol (ADVAIR DISKUS) 250-50 mcg/dose inhaler Inhale 2 (two) times a day   10/10/17  Yes Historical Provider, MD   ipratropium-albuterol (DUO-NEB) 0 5-2 5 mg/3 mL nebulizer solution Take 3 mL by nebulization 4 (four) times a day as needed for wheezing or shortness of breath   Yes Historical Provider, MD   latanoprost (XALATAN) 0 005 % ophthalmic solution Administer 1 drop to both eyes daily at bedtime   Yes Historical Provider, MD   levocetirizine (XYZAL) 5 MG tablet Take 1 tablet by mouth every evening 6/19/17  Yes Brianne Tompkins MD   LORazepam (ATIVAN) 0 5 mg tablet Take by mouth daily Daily @@ 0900   Yes Historical Provider, MD   magnesium hydroxide (MILK OF MAGNESIA) 400 mg/5 mL oral suspension Take 30 mL by mouth daily as needed for constipation   Yes Historical Provider, MD   meclizine (ANTIVERT) 25 mg tablet Take 1 tablet by mouth 3 (three) times a day as needed for dizziness 6/19/17  Yes Cristina Ashby MD   omeprazole (PriLOSEC) 40 MG capsule Take 1 capsule by mouth daily 8/30/17  Yes Historical Provider, MD   PARoxetine (PAXIL) 20 mg tablet Take 1 tablet by mouth daily   Yes Historical Provider, MD   polyethylene glycol (MIRALAX) 17 g packet Take 17 g by mouth daily as needed   Yes Historical Provider, MD   psyllium (METAMUCIL) 0 52 g capsule Take 0 52 g by mouth daily   Yes Historical Provider, MD   spironolactone (ALDACTONE) 25 mg tablet Take 1 tablet (25 mg total) by mouth daily 2/4/18  Yes DIAAN Paz   tamsulosin (FLOMAX) 0 4 mg Take 1 capsule by mouth daily   Yes Historical Provider, MD   tiotropium (SPIRIVA) 18 mcg inhalation capsule Place 18 mcg into inhaler and inhale daily   Yes Historical Provider, MD   bimatoprost (LUMIGAN) 0 01 % ophthalmic drops Administer 1 drop to both eyes daily at bedtime 6/19/17 7/15/18  Cristina Ashby MD   hydrochlorothiazide (HYDRODIURIL) 12 5 mg tablet Take 1 tablet (12 5 mg total) by mouth daily 2/19/18 7/15/18  Lenny Bess MD   tiotropium (Mana Ros) 18 mcg inhalation capsule Place into inhaler and inhale daily  7/15/18  Historical Provider, MD     I have reveiwed home medications using records provided by [facility]  Allergies: Allergies   Allergen Reactions    Penicillins Rash       Social History:     Marital Status:     Occupation: retired  Patient Pre-hospital Living Situation: [facility]  Patient Pre-hospital Level of Mobility: wheelchari  Patient Pre-hospital Diet Restrictions: dental soft, nectar thick liquids  Substance Use History:   History   Alcohol Use No     History   Smoking Status    Former Smoker    Packs/day: 1 00    Years: 22 00    Types: Cigarettes    Start date: [facility] date: 0   Smokeless Tobacco    Never Used     History   Drug Use No       Family History:    non-contributory    Physical Exam:     Vitals:   Blood Pressure: 125/58 (07/15/18 1500)  Pulse: 86 (07/15/18 1500)  Temperature: 98 2 °F (36 8 °C) (07/15/18 1500)  Temp Source: Oral (07/15/18 1500)  Respirations: 22 (07/15/18 1500)  Weight - Scale: 102 kg (225 lb 1 4 oz) (07/15/18 1208)  SpO2: 92 % (07/15/18 1500)    Physical Exam   Constitutional: He is oriented to person, place, and time  He appears well-developed and well-nourished  No distress  HENT:   Head: Normocephalic and atraumatic  Mouth/Throat: No oropharyngeal exudate  Eyes: Conjunctivae and EOM are normal  Pupils are equal, round, and reactive to light  No scleral icterus  Neck: No JVD present  Cardiovascular: Normal rate and regular rhythm  Exam reveals no gallop and no friction rub  No murmur heard  Pulmonary/Chest: Effort normal  No respiratory distress  Audible wet breath sounds  Diffuse rhonchi   Abdominal: Soft  Bowel sounds are normal  He exhibits no distension  There is no tenderness  There is no rebound  Musculoskeletal: He exhibits no edema or tenderness  Neurological: He is alert and oriented to person, place, and time  He displays normal reflexes  No cranial nerve deficit  He exhibits abnormal muscle tone (LUE weakness-- baseline)  Skin: Skin is warm and dry  No rash noted  He is not diaphoretic  No erythema  Psychiatric: He has a normal mood and affect  His behavior is normal        Additional Data:     Lab Results: I have personally reviewed pertinent reports          Results from last 7 days  Lab Units 07/15/18  1230   WBC Thousand/uL 10 50*   HEMOGLOBIN g/dL 15 1   HEMATOCRIT % 45 6   PLATELETS Thousands/uL 123*   NEUTROS PCT % 89*   LYMPHS PCT % 4*   MONOS PCT % 7   EOS PCT % 0       Results from last 7 days  Lab Units 07/15/18  1230   SODIUM mmol/L 140   POTASSIUM mmol/L 4 1   CHLORIDE mmol/L 104   CO2 mmol/L 28 BUN mg/dL 28*   CREATININE mg/dL 1 38*   CALCIUM mg/dL 9 4   TOTAL PROTEIN g/dL 8 2   BILIRUBIN TOTAL mg/dL 0 80   ALK PHOS U/L 112   ALT U/L 23   AST U/L 17   GLUCOSE RANDOM mg/dL 130       Results from last 7 days  Lab Units 07/15/18  1230   INR  1 03               Imaging: I have personally reviewed pertinent reports  XR chest 2 views   ED Interpretation by Kina Munoz PA-C (07/15 1505)   RLL pneumonia      Final Result by Duran Castro MD (07/15 4483)      New right lower lobe infiltrate consistent with pneumonia  Workstation performed: [id number]             EKG, Pathology, and Other Studies Reviewed on Admission:   · EKG: NSR rate 97    Allscripts / Epic Records Reviewed: Yes     ** Please Note: This note has been constructed using a voice recognition system   **

## 2018-07-15 NOTE — ED PROVIDER NOTES
History  Chief Complaint   Patient presents with    Vomiting     Pt  presents to the ED with complaints of vomiting, fever, and cough  Most significant syptoms began after eating as per sending facility  Pt  was seen here recently for aspiration while eating  Patient was sent to the emergency room by [facility] with complaint of a congested cough with posttussive vomiting  He has been taking Tessalon with no relief  He has had increasing weakness over the past few days  Was sent for evaluation  Denies any chest pain  He complains of some shortness of breath with activity  He denies any abdominal pain  He denies any urinary frequency, urgency, hematuria, dysuria  He denies any fever chills  He has a past medical history that is positive for acute kidney injury, aspiration pneumonia, chest pain, COPD, depression, elevated troponin, GERD, CVA, hyperlipidemia, hypertension, lung cancer, prostate cancer cyst, sleep apnea  History provided by:  Patient  Fatigue   Severity:  Moderate  Onset quality:  Gradual  Duration:  3 days  Timing:  Constant  Progression:  Worsening  Chronicity:  New  Context: recent infection    Context: not alcohol use, not allergies, not change in medication, not decreased sleep, not dehydration, not drug use, not increased activity, not pinched nerve, not stress and not urinary tract infection    Relieved by:  None tried  Worsened by:   Activity  Ineffective treatments:  None tried  Associated symptoms: anorexia, cough, lethargy and vomiting    Associated symptoms: no abdominal pain, no aphasia, no arthralgias, no ataxia, no chest pain, no diarrhea, no difficulty walking, no dizziness, no drooling, no dysphagia, no dysuria, no numbness in extremities, no falls, no fever, no foul-smelling urine, no frequency, no headaches, no hematochezia, no loss of consciousness, no melena, no myalgias, no nausea, no near-syncope, no seizures, no sensory-motor deficit, no shortness of breath, no stroke symptoms, no syncope, no urgency and no vision change    Risk factors: coronary artery disease and heart disease    Risk factors: no new medications and no recent stressors    Cough   Cough characteristics:  Productive  Sputum characteristics:  Yellow (post tussive vomiting)  Severity:  Moderate  Onset quality:  Gradual  Duration:  4 weeks  Timing:  Intermittent  Progression:  Worsening  Smoker: no    Context: with activity    Context: not animal exposure, not exposure to allergens, not fumes, not occupational exposure, not sick contacts, not smoke exposure, not upper respiratory infection and not weather changes    Relieved by:  None tried  Worsened by: Activity, deep breathing and lying down  Ineffective treatments: Tessalon Perles  Associated symptoms: no chest pain, no chills, no diaphoresis, no ear fullness, no ear pain, no eye discharge, no fever, no headaches, no myalgias, no rash, no rhinorrhea, no shortness of breath, no sinus congestion, no sore throat, no weight loss and no wheezing    Risk factors: no chemical exposure, no recent infection and no recent travel        Prior to Admission Medications   Prescriptions Last Dose Informant Patient Reported? Taking?    ASPIRIN 81 PO   Yes Yes   Sig: Take 1 tablet by mouth every other day     Fesoterodine Fumarate ER (TOVIAZ) 8 MG TB24  Outside Facility (Specify) Yes Yes   Sig: Take 4 mg by mouth every evening     LORazepam (ATIVAN) 0 5 mg tablet   Yes Yes   Sig: Take by mouth daily Daily @@ 0900   PARoxetine (PAXIL) 20 mg tablet   Yes Yes   Sig: Take 1 tablet by mouth daily   acetaminophen (TYLENOL) 325 mg tablet   No Yes   Sig: Take 2 tablets by mouth every 6 (six) hours as needed for fever   albuterol (PROVENTIL HFA,VENTOLIN HFA) 90 mcg/act inhaler   No Yes   Sig: Inhale 2 puffs 4 (four) times a day   amLODIPine (NORVASC) 10 mg tablet   Yes Yes   Sig: Take 1 tablet by mouth daily   atorvastatin (LIPITOR) 10 mg tablet   Yes Yes   Sig: Take 1 tablet by mouth   benzonatate (TESSALON PERLES) 100 mg capsule   No Yes   Sig: Take 1 capsule by mouth 3 (three) times a day as needed for cough   Patient taking differently: Take 100 mg by mouth 2 (two) times a day     carvedilol (COREG) 12 5 mg tablet   No Yes   Sig: Take 1 tablet by mouth 2 (two) times a day with meals   clopidogrel (PLAVIX) 75 mg tablet   No Yes   Sig: Take 1 tablet by mouth daily   docusate sodium (COLACE) 100 mg capsule   No Yes   Sig: Take 1 capsule (100 mg total) by mouth 2 (two) times a day   fluticasone (FLONASE) 50 mcg/act nasal spray   Yes Yes   Si-2 sprays into each nostril daily   fluticasone-salmeterol (ADVAIR DISKUS) 250-50 mcg/dose inhaler   Yes Yes   Sig: Inhale 2 (two) times a day     ipratropium-albuterol (DUO-NEB) 0 5-2 5 mg/3 mL nebulizer solution   Yes Yes   Sig: Take 3 mL by nebulization 4 (four) times a day as needed for wheezing or shortness of breath   latanoprost (XALATAN) 0 005 % ophthalmic solution   Yes Yes   Sig: Administer 1 drop to both eyes daily at bedtime   levocetirizine (XYZAL) 5 MG tablet   No Yes   Sig: Take 1 tablet by mouth every evening   magnesium hydroxide (MILK OF MAGNESIA) 400 mg/5 mL oral suspension   Yes Yes   Sig: Take 30 mL by mouth daily as needed for constipation   meclizine (ANTIVERT) 25 mg tablet   No Yes   Sig: Take 1 tablet by mouth 3 (three) times a day as needed for dizziness   omeprazole (PriLOSEC) 40 MG capsule   Yes Yes   Sig: Take 1 capsule by mouth daily   polyethylene glycol (MIRALAX) 17 g packet   Yes Yes   Sig: Take 17 g by mouth daily as needed   psyllium (METAMUCIL) 0 52 g capsule   Yes Yes   Sig: Take 0 52 g by mouth daily   spironolactone (ALDACTONE) 25 mg tablet   No Yes   Sig: Take 1 tablet (25 mg total) by mouth daily   tamsulosin (FLOMAX) 0 4 mg   Yes Yes   Sig: Take 1 capsule by mouth daily   tiotropium (SPIRIVA) 18 mcg inhalation capsule   Yes Yes   Sig: Place 18 mcg into inhaler and inhale daily Facility-Administered Medications: None       Past Medical History:   Diagnosis Date    [facility]    Aspiration into respiratory tract     Chest pain 5/31/2017    [facility]     Depression     Elevated troponin 5/31/2017    GERD (gastroesophageal reflux disease)     History of CVA (cerebrovascular accident) 4/13/2016    Hyperlipidemia     Hypertension     [facility] cancer ([facility] ) 2005    Right, status post lobectomy    Pneumonia     Prostate cancer ([place] [address] )     Sleep apnea     awaiting sleep study results    Stroke ([place] [address] )     Weakness due to cerebrovascular accident (CVA)        Past Surgical History:   Procedure Laterality Date    COLONOSCOPY      ESOPHAGOGASTRODUODENOSCOPY N/A 4/13/2016    Procedure: ESOPHAGOGASTRODUODENOSCOPY (EGD); Surgeon: Ines Carter MD;  [facility]; Service:     JOINT REPLACEMENT      knee replacement    LUNG LOBECTOMY      NH ESOPHAGOGASTRODUODENOSCOPY TRANSORAL DIAGNOSTIC N/A 3/15/2017    Procedure: ESOPHAGOGASTRODUODENOSCOPY (EGD); Surgeon: Ines Carter MD;  [facility]: [facility]; Service: Gastroenterology    TRIGEMINAL NERVE DECOMPRESSION         Family History   Problem Relation Age of Onset    Family history unknown: Yes     I have reviewed and agree with the history as documented  Social History   Substance Use Topics    Smoking status: Former Smoker     Packs/day: 1 00     Years: 22 00     Types: Cigarettes     Start date: 1955     Quit date: 1977    Smokeless tobacco: Never Used    Alcohol use No        Review of Systems   Constitutional: Positive for activity change, appetite change and fatigue  Negative for chills, diaphoresis, fever, unexpected weight change and weight loss  HENT: Negative for congestion, drooling, ear pain, rhinorrhea, sore throat and trouble swallowing  Eyes: Negative for pain, discharge, redness and itching  Respiratory: Positive for cough  Negative for chest tightness, shortness of breath and wheezing  Cardiovascular: Negative for chest pain, palpitations, leg swelling, syncope and near-syncope  Gastrointestinal: Positive for anorexia and vomiting  Negative for abdominal pain, diarrhea, dysphagia, hematochezia, melena and nausea  Genitourinary: Negative for difficulty urinating, dysuria, frequency, hematuria and urgency  Musculoskeletal: Negative for arthralgias, falls and myalgias  Skin: Negative for color change and rash  Neurological: Positive for weakness  Negative for dizziness, seizures, loss of consciousness and headaches  Psychiatric/Behavioral: Negative for confusion  All other systems reviewed and are negative  Physical Exam  Physical Exam   Constitutional: He appears well-developed and well-nourished  No distress  HENT:   Head: Normocephalic  Right Ear: External ear normal    Left Ear: External ear normal    Nose: Nose normal    Dry mucous membranes  No erythema or exudates  Eyes: Conjunctivae are normal  Right eye exhibits no discharge  Left eye exhibits no discharge  Neck: Neck supple  No JVD present  No tracheal deviation present  No thyromegaly present  Cardiovascular: Normal rate, regular rhythm and normal heart sounds  Pulmonary/Chest: Effort normal  No stridor  No respiratory distress  He has no wheezes  He has no rales  Scattered rhonchi  Abdominal: Soft  He exhibits no distension and no mass  There is no tenderness  There is no rebound and no guarding  Musculoskeletal: He exhibits no edema  MAFO left leg   Lymphadenopathy:     He has no cervical adenopathy  Neurological: He is alert  Oriented to person and place  Skin: Skin is warm  Capillary refill takes less than 2 seconds  He is not diaphoretic  Psychiatric: He has a normal mood and affect  His behavior is normal  Judgment and thought content normal    Nursing note and vitals reviewed        Vital Signs  ED Triage Vitals [07/15/18 1208]   Temperature Pulse Respirations Blood Pressure SpO2   98 8 °F (37 1 °C) 96 22 156/80 94 %      Temp Source Heart Rate Source Patient Position - Orthostatic VS BP Location FiO2 (%)   Oral Monitor Lying Right arm --      Pain Score       No Pain           Vitals:    07/15/18 1208 07/15/18 1315 07/15/18 1430 07/15/18 1500   BP: 156/80 147/73 140/70 125/58   Pulse: 96 94 88 86   Patient Position - Orthostatic VS: Lying  Sitting Lying       Visual Acuity      ED Medications  Medications   sodium chloride 0 9 % infusion (not administered)   acetaminophen (TYLENOL) tablet 650 mg (not administered)   amLODIPine (NORVASC) tablet 10 mg (not administered)   aspirin chewable tablet 81 mg (not administered)   atorvastatin (LIPITOR) tablet 10 mg (not administered)   benzonatate (TESSALON PERLES) capsule 100 mg (not administered)   carvedilol (COREG) tablet 12 5 mg (not administered)   clopidogrel (PLAVIX) tablet 75 mg (not administered)   docusate sodium (COLACE) capsule 100 mg (not administered)   oxybutynin (DITROPAN-XL) 24 hr tablet 5 mg (not administered)   fluticasone (FLONASE) 50 mcg/act nasal spray 2 spray (not administered)   fluticasone-vilanterol (BREO ELLIPTA) 200-25 MCG/INH inhaler 1 puff (not administered)   latanoprost (XALATAN) 0 005 % ophthalmic solution 1 drop (not administered)   LORazepam (ATIVAN) tablet 0 5 mg (not administered)   magnesium hydroxide (MILK OF MAGNESIA) 400 mg/5 mL oral suspension 30 mL (not administered)   meclizine (ANTIVERT) tablet 25 mg (not administered)   pantoprazole (PROTONIX) EC tablet 40 mg (not administered)   PARoxetine (PAXIL) tablet 20 mg (not administered)   polyethylene glycol (MIRALAX) packet 17 g (not administered)   psyllium (METAMUCIL) 1 packet (not administered)   spironolactone (ALDACTONE) tablet 25 mg (not administered)   tamsulosin (FLOMAX) capsule 0 4 mg (not administered)   tiotropium (SPIRIVA) capsule for inhaler 18 mcg (not administered) ondansetron (ZOFRAN) injection 4 mg (not administered)   calcium carbonate (TUMS) chewable tablet 1,000 mg (not administered)   heparin (porcine) subcutaneous injection 5,000 Units (not administered)   cefepime (MAXIPIME) 2,000 mg in dextrose 5 % 50 mL IVPB (not administered)   metroNIDAZOLE (FLAGYL) IVPB (premix) 500 mg (not administered)   guaiFENesin (MUCINEX) 12 hr tablet 1,200 mg (not administered)    EMS REPLENISHMENT MED ( Does not apply Given to EMS 7/15/18 1230)   sodium chloride 0 9 % bolus 1,000 mL (1,000 mL Intravenous New Bag 7/15/18 1237)   albuterol inhalation solution 5 mg (5 mg Nebulization Given 7/15/18 1237)   ipratropium (ATROVENT) 0 02 % inhalation solution 0 5 mg (0 5 mg Nebulization Given 7/15/18 1237)   cefepime (MAXIPIME) 2 g/50 mL dextrose IVPB (0 mg Intravenous Stopped 7/15/18 1457)       Diagnostic Studies  Results Reviewed     Procedure Component Value Units Date/Time    Comprehensive metabolic panel [[id number]]  (Abnormal) Collected:  07/15/18 1230    Lab Status:  Final result Specimen:  Blood from Arm, Right Updated:  07/15/18 1304     Sodium 140 mmol/L      Potassium 4 1 mmol/L      Chloride 104 mmol/L      CO2 28 mmol/L      Anion Gap 8 mmol/L      BUN 28 (H) mg/dL      Creatinine 1 38 (H) mg/dL      Glucose 130 mg/dL      Calcium 9 4 mg/dL      AST 17 U/L      ALT 23 U/L      Alkaline Phosphatase 112 U/L      Total Protein 8 2 g/dL      Albumin 4 0 g/dL      Total Bilirubin 0 80 mg/dL      eGFR 49 ml/min/1 73sq m     Narrative:         National Kidney Disease Education Program recommendations are as follows:  GFR calculation is accurate only with a steady state creatinine  Chronic Kidney disease less than 60 ml/min/1 73 sq  meters  Kidney failure less than 15 ml/min/1 73 sq  meters      Magnesium [[id number]]  (Normal) Collected:  07/15/18 1230    Lab Status:  Final result Specimen:  Blood from Arm, Right Updated:  07/15/18 1304     Magnesium 1 7 mg/dL     BNP [[id number]]  (Normal) Collected:  07/15/18 1230    Lab Status:  Final result Specimen:  Blood from Arm, Right Updated:  07/15/18 1304     NT-proBNP 116 pg/mL     Troponin I [[id number]]  (Normal) Collected:  07/15/18 1230    Lab Status:  Final result Specimen:  Blood from Arm, Right Updated:  07/15/18 1257     Troponin I <0 02 ng/mL     Lactic acid, plasma [[id number]]  (Normal) Collected:  07/15/18 1230    Lab Status:  Final result Specimen:  Blood from Arm, Right Updated:  07/15/18 1257     LACTIC ACID 1 6 mmol/L     Narrative:         Result may be elevated if tourniquet was used during collection  Blood culture #1 [[id number]] Collected:  07/15/18 1245    Lab Status: In process Specimen:  Blood from Arm, Left Updated:  07/15/18 1251    Protime-INR [[id number]]  (Normal) Collected:  07/15/18 1230    Lab Status:  Final result Specimen:  Blood from Arm, Right Updated:  07/15/18 1249     Protime 13 2 seconds      INR 1 03    APTT [[id number]]  (Normal) Collected:  07/15/18 1230    Lab Status:  Final result Specimen:  Blood from Arm, Right Updated:  07/15/18 1249     PTT 33 seconds     CBC and differential [[id number]]  (Abnormal) Collected:  07/15/18 1230    Lab Status:  Final result Specimen:  Blood from Arm, Right Updated:  07/15/18 1245     WBC 10 50 (H) Thousand/uL      RBC 5 38 Million/uL      Hemoglobin 15 1 g/dL      Hematocrit 45 6 %      MCV 85 fL      MCH 28 1 pg      MCHC 33 1 g/dL      RDW 14 4 %      MPV 11 4 fL      Platelets 361 (L) Thousands/uL      Neutrophils Relative 89 (H) %      Lymphocytes Relative 4 (L) %      Monocytes Relative 7 %      Eosinophils Relative 0 %      Basophils Relative 0 %      Neutrophils Absolute 9 37 (H) Thousands/µL      Lymphocytes Absolute 0 38 (L) Thousands/µL      Monocytes Absolute 0 70 Thousand/µL      Eosinophils Absolute 0 04 Thousand/µL      Basophils Absolute 0 01 Thousands/µL     Blood culture #2 [[id number]] Collected:  07/15/18 1230    Lab Status:   In process Specimen:  Blood from Arm, Right Updated:  07/15/18 1236                 XR chest 2 views   ED Interpretation by Aury Nascimento PA-C (07/15 1879)   RLL pneumonia      Final Result by Dario Hensley MD (07/15 8355)      New right lower lobe infiltrate consistent with pneumonia  Workstation performed: [id number]                    Procedures  ECG 12 Lead Documentation  Date/Time: 7/15/2018 12:52 PM  Performed by: Pari Morrison  Authorized by: Pari Morrison     Indications / Diagnosis:  Weakness, cough, dyspnea  ECG reviewed by me, the ED Provider: yes    Patient location:  ED  Previous ECG:     Previous ECG:  Compared to current    Comparison ECG info:  6/14/18    Similarity:  No change  Interpretation:     Interpretation: non-specific    Rate:     ECG rate:  97    ECG rate assessment: normal    Rhythm:     Rhythm: sinus rhythm    Ectopy:     Ectopy: none    QRS:     QRS axis:  Normal    QRS intervals:  Normal  Conduction:     Conduction: normal    ST segments:     ST segments:  Non-specific  T waves:     T waves: normal             Phone Contacts  ED Phone Contact    ED Course                         Initial Sepsis Screening     [address] Name 07/15/18 2746                Is the patient's history suggestive of a new or worsening infection? (!)  Yes (Proceed)  -JG        Suspected source of infection pneumonia  -JG        Are two or more of the following signs & symptoms of infection both present and new to the patient? No  -JG        Indicate SIRS criteria          If the answer is yes to both questions, suspicion of sepsis is present          If severe sepsis is present AND tissue hypoperfusion perists in the hour after fluid resuscitation or lactate > 4, the patient meets criteria for SEPTIC SHOCK          Are any of the following organ dysfunction criteria present within 6 hours of suspected infection and SIRS criteria that are NOT considered to be chronic conditions?  No  -JG        Organ dysfunction          Date of presentation of severe sepsis          Time of presentation of severe sepsis          Tissue hypoperfusion persists in the hour after crystalloid fluid administration, evidenced, by either:          Was hypotension present within one hour of the conclusion of crystalloid fluid administration?         Date of presentation of septic shock          Time of presentation of septic shock            User Key  (r) = Recorded By, (t) = Taken By, (c) = Cosigned By    [address] Name Provider Type    [address] PA-C Physician Assistant                  MDM  Number of Diagnoses or Management Options  Acute kidney injury [facility]): new and requires workup  Aspiration pneumonia [facility]): new and requires workup  Asthenia: new and requires workup     Amount and/or Complexity of Data Reviewed  Clinical lab tests: ordered and reviewed  Tests in the radiology section of CPT®: ordered and reviewed  Tests in the medicine section of CPT®: ordered and reviewed  Review and summarize past medical records: (Old chart from previous admission in June/2018)  Independent visualization of images, tracings, or specimens: (Chest x-ray-right lower lobe pneumonia )    Risk of Complications, Morbidity, and/or Mortality  Presenting problems: high  Diagnostic procedures: high  Management options: high  General comments: Patient presents emergency room for the nursing home  He has had a exacerbation of his chronic bronchitis  Has noticed a posttussive vomiting as well  He was a pocket hypoxic in the high 80s upon arrival   With oxygen his oxygen saturations were anywhere from 94-96% on 3 L  He was seen and evaluated  The chest x-ray demonstrated a right lower lobe pneumonia that was new from previous studies  His laboratory studies were reviewed which demonstrated acute kidney injury  Patient was intravenously hydrated  He was started on cefepime, vancomycin, Flagyl for aspiration pneumonia    He did not meet acute see ears are sepsis criteria  He was admitted to the [facility] service on a regular med surge floor  His old charts were reviewed  This is a healthcare acquired pneumonia    Patient Progress  Patient progress: stable    CritCare Time    Disposition  Final diagnoses:   Aspiration pneumonia ([facility] )   Asthenia   Acute kidney injury ([facility] )     Time reflects when diagnosis was documented in both MDM as applicable and the Disposition within this note     Time User Action Codes Description Comment    7/15/2018  1:57 PM Diehl Res Add [J69 0] Aspiration pneumonia ([facility] )     7/15/2018  1:57 PM Diehl Res Add [R53 1] Asthenia     7/15/2018  1:58 PM Beryl LamerStephanie Kaden Add [N17 9] Acute kidney injury [facility])       ED Disposition     ED Disposition Condition Comment    Admit  Case was discussed with dr Neville Kiran and the patient's admission status was agreed to be Admission Status: inpatient status to the service of Dr Neville Kiran           Follow-up Information    None         Current Discharge Medication List      CONTINUE these medications which have NOT CHANGED    Details   acetaminophen (TYLENOL) 325 mg tablet Take 2 tablets by mouth every 6 (six) hours as needed for fever  Qty: 30 tablet, Refills: 0      albuterol (PROVENTIL HFA,VENTOLIN HFA) 90 mcg/act inhaler Inhale 2 puffs 4 (four) times a day  Qty: 2 Inhaler, Refills: 0      amLODIPine (NORVASC) 10 mg tablet Take 1 tablet by mouth daily      ASPIRIN 81 PO Take 1 tablet by mouth every other day        atorvastatin (LIPITOR) 10 mg tablet Take 1 tablet by mouth      benzonatate (TESSALON PERLES) 100 mg capsule Take 1 capsule by mouth 3 (three) times a day as needed for cough  Qty: 90 capsule, Refills: 0      carvedilol (COREG) 12 5 mg tablet Take 1 tablet by mouth 2 (two) times a day with meals  Qty: 60 tablet, Refills: 0      clopidogrel (PLAVIX) 75 mg tablet Take 1 tablet by mouth daily  Qty: 30 tablet, Refills: 0      docusate sodium (COLACE) 100 mg capsule Take 1 capsule (100 mg total) by mouth 2 (two) times a day  Qty: 10 capsule, Refills: 0    Associated Diagnoses: SIRS due to infectious process without acute organ dysfunction (HCC)      Fesoterodine Fumarate ER (TOVIAZ) 8 MG TB24 Take 4 mg by mouth every evening        fluticasone (FLONASE) 50 mcg/act nasal spray 1-2 sprays into each nostril daily      fluticasone-salmeterol (ADVAIR DISKUS) 250-50 mcg/dose inhaler Inhale 2 (two) times a day        ipratropium-albuterol (DUO-NEB) 0 5-2 5 mg/3 mL nebulizer solution Take 3 mL by nebulization 4 (four) times a day as needed for wheezing or shortness of breath      latanoprost (XALATAN) 0 005 % ophthalmic solution Administer 1 drop to both eyes daily at bedtime      levocetirizine (XYZAL) 5 MG tablet Take 1 tablet by mouth every evening  Qty: 30 tablet, Refills: 0      LORazepam (ATIVAN) 0 5 mg tablet Take by mouth daily Daily @@ 0900      magnesium hydroxide (MILK OF MAGNESIA) 400 mg/5 mL oral suspension Take 30 mL by mouth daily as needed for constipation      meclizine (ANTIVERT) 25 mg tablet Take 1 tablet by mouth 3 (three) times a day as needed for dizziness  Qty: 30 tablet, Refills: 0      omeprazole (PriLOSEC) 40 MG capsule Take 1 capsule by mouth daily      PARoxetine (PAXIL) 20 mg tablet Take 1 tablet by mouth daily      polyethylene glycol (MIRALAX) 17 g packet Take 17 g by mouth daily as needed      psyllium (METAMUCIL) 0 52 g capsule Take 0 52 g by mouth daily      spironolactone (ALDACTONE) 25 mg tablet Take 1 tablet (25 mg total) by mouth daily  Qty: 30 tablet, Refills: 0    Associated Diagnoses: Chronic kidney disease, stage 3      tamsulosin (FLOMAX) 0 4 mg Take 1 capsule by mouth daily      tiotropium (SPIRIVA) 18 mcg inhalation capsule Place 18 mcg into inhaler and inhale daily           No discharge procedures on file      ED Provider  Electronically Signed by           Jagdeep Ludwig PA-C  07/15/18 9877

## 2018-07-15 NOTE — SEPSIS NOTE
Sepsis Note   Wanda Ludwig  65 y o  male MRN: [id number]  Unit/Bed#: ED 13 Encounter: [id number]            Initial Sepsis Screening     Row Name 07/15/18 4661                Is the patient's history suggestive of a new or worsening infection? (!)  Yes (Proceed)  -JG        Suspected source of infection pneumonia  -JG        Are two or more of the following signs & symptoms of infection both present and new to the patient? No  -JG        Indicate SIRS criteria          If the answer is yes to both questions, suspicion of sepsis is present          If severe sepsis is present AND tissue hypoperfusion perists in the hour after fluid resuscitation or lactate > 4, the patient meets criteria for SEPTIC SHOCK          Are any of the following organ dysfunction criteria present within 6 hours of suspected infection and SIRS criteria that are NOT considered to be chronic conditions? No  -JG        Organ dysfunction          Date of presentation of severe sepsis          Time of presentation of severe sepsis          Tissue hypoperfusion persists in the hour after crystalloid fluid administration, evidenced, by either:          Was hypotension present within one hour of the conclusion of crystalloid fluid administration?           Date of presentation of septic shock          Time of presentation of septic shock            User Key  (r) = Recorded By, (t) = Taken By, (c) = Cosigned By    [address] Name Provider Type    [address] PA-C Physician Assistant

## 2018-07-15 NOTE — ASSESSMENT & PLAN NOTE
· RLL, noted on CXR (does have hx of RLL lobectomy, questionable if actually RML)  · Admit  · IV abx: Cefepime, Flagyl (MDR Risk factor score is 2, has hx of dysphagia/microaspiration and had posttussive emesis today)  · Respiratory protocol: flutter valve, PRN nebs  · Tessalon, mucinex  · NC oxygen-- wean as tolerated  · Pulmonary consult-- patient is known to Dr Jolene Choe

## 2018-07-15 NOTE — ASSESSMENT & PLAN NOTE
· BP well controlled on current regimen  · Continue with current medications with holding parameters

## 2018-07-16 LAB
ATRIAL RATE: 97 BPM
L PNEUMO1 AG UR QL IA.RAPID: NEGATIVE
P AXIS: 62 DEGREES
PR INTERVAL: 190 MS
PROCALCITONIN SERPL-MCNC: 2.66 NG/ML
QRS AXIS: 19 DEGREES
QRSD INTERVAL: 96 MS
QT INTERVAL: 318 MS
QTC INTERVAL: 403 MS
S PNEUM AG UR QL: NEGATIVE
T WAVE AXIS: 72 DEGREES
VENTRICULAR RATE: 97 BPM

## 2018-07-16 PROCEDURE — 87081 CULTURE SCREEN ONLY: CPT | Performed by: NURSE PRACTITIONER

## 2018-07-16 PROCEDURE — 94760 N-INVAS EAR/PLS OXIMETRY 1: CPT

## 2018-07-16 PROCEDURE — 93010 ELECTROCARDIOGRAM REPORT: CPT | Performed by: INTERNAL MEDICINE

## 2018-07-16 PROCEDURE — 84145 PROCALCITONIN (PCT): CPT | Performed by: PHYSICIAN ASSISTANT

## 2018-07-16 PROCEDURE — 92610 EVALUATE SWALLOWING FUNCTION: CPT

## 2018-07-16 PROCEDURE — 94640 AIRWAY INHALATION TREATMENT: CPT

## 2018-07-16 PROCEDURE — 99232 SBSQ HOSP IP/OBS MODERATE 35: CPT | Performed by: PHYSICIAN ASSISTANT

## 2018-07-16 PROCEDURE — 94762 N-INVAS EAR/PLS OXIMTRY CONT: CPT

## 2018-07-16 PROCEDURE — 94668 MNPJ CHEST WALL SBSQ: CPT

## 2018-07-16 PROCEDURE — 99223 1ST HOSP IP/OBS HIGH 75: CPT | Performed by: INTERNAL MEDICINE

## 2018-07-16 RX ORDER — SODIUM CHLORIDE FOR INHALATION 0.9 %
VIAL, NEBULIZER (ML) INHALATION
Status: COMPLETED
Start: 2018-07-16 | End: 2018-07-16

## 2018-07-16 RX ORDER — BENZONATATE 100 MG/1
200 CAPSULE ORAL 3 TIMES DAILY
Status: DISCONTINUED | OUTPATIENT
Start: 2018-07-16 | End: 2018-07-19 | Stop reason: HOSPADM

## 2018-07-16 RX ORDER — SODIUM CHLORIDE 30 MG/ML INHALATION SOLUTION 30 MG/ML
4 SOLUTION INHALANT
Status: DISCONTINUED | OUTPATIENT
Start: 2018-07-16 | End: 2018-07-17

## 2018-07-16 RX ORDER — LEVALBUTEROL 1.25 MG/.5ML
1.25 SOLUTION, CONCENTRATE RESPIRATORY (INHALATION)
Status: DISCONTINUED | OUTPATIENT
Start: 2018-07-16 | End: 2018-07-19 | Stop reason: HOSPADM

## 2018-07-16 RX ORDER — METRONIDAZOLE 500 MG/1
500 TABLET ORAL EVERY 8 HOURS SCHEDULED
Status: DISCONTINUED | OUTPATIENT
Start: 2018-07-16 | End: 2018-07-18

## 2018-07-16 RX ORDER — FLUTICASONE FUROATE AND VILANTEROL 100; 25 UG/1; UG/1
1 POWDER RESPIRATORY (INHALATION) DAILY
Status: DISCONTINUED | OUTPATIENT
Start: 2018-07-16 | End: 2018-07-19 | Stop reason: HOSPADM

## 2018-07-16 RX ADMIN — PSYLLIUM HUSK 1 PACKET: 3.4 POWDER ORAL at 09:53

## 2018-07-16 RX ADMIN — BENZONATATE 100 MG: 100 CAPSULE ORAL at 09:48

## 2018-07-16 RX ADMIN — HEPARIN SODIUM 5000 UNITS: 5000 INJECTION, SOLUTION INTRAVENOUS; SUBCUTANEOUS at 13:46

## 2018-07-16 RX ADMIN — METRONIDAZOLE 500 MG: 500 TABLET ORAL at 21:23

## 2018-07-16 RX ADMIN — CEFEPIME HYDROCHLORIDE 2000 MG: 2 INJECTION, POWDER, FOR SOLUTION INTRAVENOUS at 23:09

## 2018-07-16 RX ADMIN — ATORVASTATIN CALCIUM 10 MG: 10 TABLET, FILM COATED ORAL at 15:57

## 2018-07-16 RX ADMIN — AMLODIPINE BESYLATE 10 MG: 10 TABLET ORAL at 09:52

## 2018-07-16 RX ADMIN — SPIRONOLACTONE 25 MG: 25 TABLET, FILM COATED ORAL at 09:50

## 2018-07-16 RX ADMIN — CEFEPIME HYDROCHLORIDE 2000 MG: 2 INJECTION, POWDER, FOR SOLUTION INTRAVENOUS at 13:46

## 2018-07-16 RX ADMIN — METRONIDAZOLE 500 MG: 500 TABLET ORAL at 13:57

## 2018-07-16 RX ADMIN — LATANOPROST 1 DROP: 50 SOLUTION OPHTHALMIC at 00:56

## 2018-07-16 RX ADMIN — TAMSULOSIN HYDROCHLORIDE 0.4 MG: 0.4 CAPSULE ORAL at 15:57

## 2018-07-16 RX ADMIN — GUAIFENESIN 1200 MG: 600 TABLET, EXTENDED RELEASE ORAL at 09:49

## 2018-07-16 RX ADMIN — ACETAMINOPHEN 650 MG: 325 TABLET, FILM COATED ORAL at 21:24

## 2018-07-16 RX ADMIN — LEVALBUTEROL HYDROCHLORIDE 1.25 MG: 1.25 SOLUTION, CONCENTRATE RESPIRATORY (INHALATION) at 10:54

## 2018-07-16 RX ADMIN — LEVALBUTEROL HYDROCHLORIDE 1.25 MG: 1.25 SOLUTION, CONCENTRATE RESPIRATORY (INHALATION) at 13:35

## 2018-07-16 RX ADMIN — HEPARIN SODIUM 5000 UNITS: 5000 INJECTION, SOLUTION INTRAVENOUS; SUBCUTANEOUS at 05:51

## 2018-07-16 RX ADMIN — METRONIDAZOLE 500 MG: 500 INJECTION, SOLUTION INTRAVENOUS at 00:53

## 2018-07-16 RX ADMIN — ACETAMINOPHEN 650 MG: 325 TABLET, FILM COATED ORAL at 15:57

## 2018-07-16 RX ADMIN — DOCUSATE SODIUM 100 MG: 100 CAPSULE, LIQUID FILLED ORAL at 09:51

## 2018-07-16 RX ADMIN — DOCUSATE SODIUM 100 MG: 100 CAPSULE, LIQUID FILLED ORAL at 17:12

## 2018-07-16 RX ADMIN — ASPIRIN 81 MG 81 MG: 81 TABLET ORAL at 09:52

## 2018-07-16 RX ADMIN — HEPARIN SODIUM 5000 UNITS: 5000 INJECTION, SOLUTION INTRAVENOUS; SUBCUTANEOUS at 21:31

## 2018-07-16 RX ADMIN — CLOPIDOGREL BISULFATE 75 MG: 75 TABLET ORAL at 09:49

## 2018-07-16 RX ADMIN — TIOTROPIUM BROMIDE 18 MCG: 18 CAPSULE ORAL; RESPIRATORY (INHALATION) at 09:56

## 2018-07-16 RX ADMIN — BENZONATATE 200 MG: 100 CAPSULE ORAL at 15:57

## 2018-07-16 RX ADMIN — FLUTICASONE FUROATE AND VILANTEROL TRIFENATATE 1 PUFF: 200; 25 POWDER RESPIRATORY (INHALATION) at 09:54

## 2018-07-16 RX ADMIN — ISODIUM CHLORIDE 3 ML: 0.03 SOLUTION RESPIRATORY (INHALATION) at 19:29

## 2018-07-16 RX ADMIN — BENZONATATE 200 MG: 100 CAPSULE ORAL at 21:24

## 2018-07-16 RX ADMIN — GUAIFENESIN 1200 MG: 600 TABLET, EXTENDED RELEASE ORAL at 21:28

## 2018-07-16 RX ADMIN — CEFEPIME HYDROCHLORIDE 2000 MG: 2 INJECTION, POWDER, FOR SOLUTION INTRAVENOUS at 01:37

## 2018-07-16 RX ADMIN — METRONIDAZOLE 500 MG: 500 INJECTION, SOLUTION INTRAVENOUS at 09:44

## 2018-07-16 RX ADMIN — SODIUM CHLORIDE 100 ML/HR: 0.9 INJECTION, SOLUTION INTRAVENOUS at 13:58

## 2018-07-16 RX ADMIN — LEVALBUTEROL HYDROCHLORIDE 1.25 MG: 1.25 SOLUTION, CONCENTRATE RESPIRATORY (INHALATION) at 19:29

## 2018-07-16 RX ADMIN — FLUTICASONE PROPIONATE 2 SPRAY: 50 SPRAY, METERED NASAL at 09:53

## 2018-07-16 RX ADMIN — SODIUM CHLORIDE 100 ML/HR: 0.9 INJECTION, SOLUTION INTRAVENOUS at 02:34

## 2018-07-16 RX ADMIN — LATANOPROST 1 DROP: 50 SOLUTION OPHTHALMIC at 21:31

## 2018-07-16 RX ADMIN — OXYBUTYNIN CHLORIDE 5 MG: 5 TABLET, EXTENDED RELEASE ORAL at 21:24

## 2018-07-16 RX ADMIN — ACETAMINOPHEN 650 MG: 325 TABLET, FILM COATED ORAL at 04:20

## 2018-07-16 RX ADMIN — CARVEDILOL 12.5 MG: 12.5 TABLET, FILM COATED ORAL at 09:52

## 2018-07-16 RX ADMIN — PAROXETINE HYDROCHLORIDE 20 MG: 20 TABLET, FILM COATED ORAL at 09:49

## 2018-07-16 RX ADMIN — PANTOPRAZOLE SODIUM 40 MG: 40 TABLET, DELAYED RELEASE ORAL at 05:51

## 2018-07-16 RX ADMIN — CARVEDILOL 12.5 MG: 12.5 TABLET, FILM COATED ORAL at 15:57

## 2018-07-16 NOTE — ASSESSMENT & PLAN NOTE
· On modified diet: dental soft, nectar thick liquids  · Aspiration precautions  · Speech therapy eval pending

## 2018-07-16 NOTE — CASE MANAGEMENT
Initial Clinical Review    Admission: Date/Time/Statement: 7/15/18 @ 1405     Orders Placed This Encounter   Procedures    Inpatient Admission (expected length of stay for this patient is greater than two midnights)     Standing Status:   Standing     Number of Occurrences:   1     Order Specific Question:   Admitting Physician     Answer:   Janes Dangelo [[id number]]     Order Specific Question:   Level of Care     Answer:   Med Surg [[id number]]     Order Specific Question:   Estimated length of stay     Answer:   More than 2 Midnights     Order Specific Question:   Certification     Answer:   I certify that inpatient services are medically necessary for this patient for a duration of greater than two midnights  See H&P and MD Progress Notes for additional information about the patient's course of treatment  ED: Date/Time/Mode of Arrival:   ED Arrival Information     Expected Arrival Acuity Means of Arrival Escorted By Service Admission Type    - 7/15/2018 12:07 Urgent Ambulance [facility] General Medicine Urgent    Arrival Complaint    weakness          Chief Complaint:   Chief Complaint   Patient presents with    Vomiting     Pt  presents to the ED with complaints of vomiting, fever, and cough  Most significant syptoms began after eating as per sending facility  Pt  was seen here recently for aspiration while eating  History of Illness: 65 y o  male with multiple medical conditions including tracheomalacia, COPD, dysphagia, history of CVA with left upper extremity residual deficits, hypertension, hyperlipidemia presents the ED for evaluation of worsening shortness of breath x1 day  Patient reports he has had an increase in his cough for the last 4 weeks  Patient typically takes Tessalon and PPIs to control his cough, which seen the working up until about 3 days ago  Over the course last 3 days, patient reports that his cough has been more severe    He reports today that after going to [facility] in his nursing home, he felt acutely short of breath  He noticed and short of breath was worse on exertion and resolved slightly with rest   Nursing home reports that patient had a coughing fit so severe today that he had post-tussive emesis  He also for increased weakness over the last 3 days  There unclear if patient aspirated, the patient does have a history aspiration in the past   He states his cough is not productive at this time  Patient has been trying test lawn, p r n  breathing treatments, home inhalers with no improvement of symptoms  ED Vital Signs:   ED Triage Vitals   Temperature Pulse Respirations Blood Pressure SpO2   07/15/18 1208 07/15/18 1208 07/15/18 1208 07/15/18 1208 07/15/18 1208   98 8 °F (37 1 °C) 96 22 156/80 94 %      Temp Source Heart Rate Source Patient Position - Orthostatic VS BP Location FiO2 (%)   07/15/18 1208 07/15/18 1208 07/15/18 1208 07/15/18 1208 07/15/18 2350   Oral Monitor Lying Right arm 50      Pain Score       07/15/18 1208       No Pain        Wt Readings from Last 1 Encounters:   07/15/18 102 kg (225 lb 1 4 oz)       Vital Signs (abnormal): 87% room air    Abnormal Labs/Diagnostic Test Results:   Bun 28  Creatinine 1 38  Wbc 10 50  Platelets 916  CxR- New right lower lobe infiltrate consistent with pneumonia      ED Treatment: oxygen 3 liters  Blood cultures     Medication Administration from 07/15/2018 1207 to 07/15/2018 1453       Date/Time Order Dose Route Action Comments     07/15/2018 1230  EMS REPLENISHMENT MED 0  Does not apply Given to EMS      07/15/2018 1237 sodium chloride 0 9 % bolus 1,000 mL 1,000 mL Intravenous New Bag      07/15/2018 1237 albuterol inhalation solution 5 mg 5 mg Nebulization Given      07/15/2018 1237 ipratropium (ATROVENT) 0 02 % inhalation solution 0 5 mg 0 5 mg Nebulization Given      07/15/2018 1411 cefepime (MAXIPIME) 2 g/50 mL dextrose IVPB 2,000 mg Intravenous New Bag           Past Medical/Surgical History: Active Ambulatory Problems     Diagnosis Date Noted    Essential hypertension     [facility]     Dysphagia 04/13/2016    History of cerebrovascular accident (CVA) with residual deficit 04/13/2016    GERD (gastroesophageal reflux disease)     Left-sided weakness 04/11/2017    Hyperlipidemia     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     Tracheomalacia 10/10/2017    Chronic obstructive pulmonary disease with acute lower respiratory infection ([facility] ) 02/01/2018    Ambulatory dysfunction 02/14/2018     Resolved Ambulatory Problems     Diagnosis Date Noted    Dehydration 04/12/2016    Vomiting 04/12/2016    Acute renal failure (HCC) 04/12/2016    SOB (shortness of breath) 04/09/2017    Chronic kidney disease, stage 3 04/12/2017    Prostate cancer ([facility]     Chest pain 05/31/2017    Elevated troponin 05/31/2017    Bronchitis 05/31/2017    Bronchospasm 06/09/2017    Debility 08/04/2017    Chronic cough 02/01/2018    Hemoptysis 02/02/2018    RONAL (acute kidney injury) ([facility] ) 02/02/2018    SIRS due to infectious process without acute organ dysfunction ([facility] ) 02/14/2018     Past Medical History:   Diagnosis Date    RONAL (acute kidney injury) ([facility] ) 5/31/2017    Aspiration into respiratory tract     Chest pain 5/31/2017    [facility]ch[facility] obstructive [facility] disease) [facility]     Depression     Elevated troponin 5/31/2017    GERD (gastroesophageal reflux disease)     History of CVA (cerebrovascular accident) 4/13/2016    Hyperlipidemia     Hypertension     Lung cancer ([facility] ) 2005    Pneumonia     [facility]     Sleep apnea     Stroke ([facility]     Weakness due to cerebrovascular accident (CVA)        Admitting Diagnosis: Aspiration pneumonia ([facility] ) [J69 0]  Asthenia [R53 1]  Weakness [R53 1]  Acute kidney injury ([facility] ) [N17 9]    Age/Sex: 65 y o  male    · Assessment/Plan: Right lower lung pneumonia:  Healthcare associated pneumonia versus aspiration pneumonia - ? Antibiotic - cefepime and Flagyl will be used initially  If patient fails to improve or if there is any worsening consider adding vancomycin  If patient begins to improve then we will either use culture data to scale back antibiotics or should try to deescalate the cefepime within the next 48 hours empirically if culture data remains negative  ? Incentive spirometry  ? Flutter valve and guaifenesin may be used to help loosen for mucus  Patient may also be given a Y[employer]auer suction catheter to allow him to suction his mouth himself as he does at the nursing facility  ? Oxygen will be used and weaned once tolerated  ? Pulmonary consultation will be requested  Speech swallowing evaluation will be done    Acute respiratory failure with hypoxia [facility])   Assessment & Plan     · Noted to be 87% on RA upon arrival to ED   Does not wear O2 at home  · Currently on 3L NC and O2 saturations are in the 90s  · Wean as tolerated to keep saturations at 88% or above          Chronic obstructive pulmonary disease with acute lower respiratory infection (HCC)   Assessment & Plan     · Continue home inhalers/nebulizers as directed          Dysphagia   Assessment & Plan     · Patient follows modified diet: dental soft, nectar thick liquids  · Aspiration precautions          Lung cancer (HCC)   Assessment & Plan     · S/p RLL resection          Tracheomalacia   Assessment & Plan     · Appears stable  · Follows with Dr Chan Said          History of cerebrovascular accident (CVA) with residual deficit   Assessment & Plan     · CVA 10 years ago   · Has LUE hemiplegia  · Ambulates with wheelchair          Essential hypertension   Assessment & Plan     · BP well controlled on current regimen  · Continue with current medications with holding parameters          GERD (gastroesophageal reflux disease)   Assessment & Plan     · Continue PPI          Hyperlipidemia   Assessment & Plan     · Reviewed recent lipid panel-- WNL  · Continue statin at current dose          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   Assessment & Plan     · Paxil, Ativan          Admission Orders: 7//15/2018  1405 INPATIENT   Scheduled Meds:   Current Facility-Administered Medications:  acetaminophen 650 mg Oral Q6H    amLODIPine 10 mg Oral Daily    aspirin 81 mg Oral Daily    atorvastatin 10 mg Oral Daily With Dinner    benzonatate 200 mg Oral TID    carvedilol 12 5 mg Oral BID With Meals    cefepime 2,000 mg Intravenous Q12H Last Rate: 2,000 mg (07/16/18 0137)   clopidogrel 75 mg Oral Daily    docusate sodium 100 mg Oral BID    fluticasone 2 spray Nasal Daily    fluticasone-vilanterol 1 puff Inhalation Daily    guaiFENesin 1,200 mg Oral Q12H [address]    heparin (porcine) 5,000 Units Subcutaneous Q8H [address]    latanoprost 1 drop Both Eyes HS    levalbuterol 1 25 mg Nebulization TID    LORazepam 0 5 mg Oral Daily    meclizine 25 mg Oral TID PRN    metroNIDAZOLE 500 mg Oral Q8H [address]    oxybutynin 5 mg Oral HS    pantoprazole 40 mg Oral Early Morning    PARoxetine 20 mg Oral Daily    polyethylene glycol 17 g Oral Daily PRN    psyllium 1 packet Oral Daily    sodium chloride 100 mL/hr Intravenous Continuous Last Rate: 100 mL/hr (07/16/18 0234)   spironolactone 25 mg Oral Daily    tamsulosin 0 4 mg Oral Daily    tiotropium 18 mcg Inhalation Daily      Continuous Infusions:   sodium chloride 100 mL/hr Last Rate: 100 mL/hr (07/16/18 0234)     PRN Meds: not used  scds  Aspiration precautions  Consult pulmonary  Respiratory protocol  ( oxygen titrated 3 liters to 4 liters to HFNC 45% FIO2    speech

## 2018-07-16 NOTE — DISCHARGE INSTR - DIET
Dysphagia 3/Soft to [employer] (no raw vegetables, no skins on fruit, avoid very dense chewy meat), nectar thick liquids  Maintain aspiration precautions

## 2018-07-16 NOTE — SPEECH THERAPY NOTE
Speech-Language Pathology Bedside Swallow Evaluation      Patient Name: Shaila Crespo's Date: 7/16/2018     Problem List  Patient Active Problem List   Diagnosis    Essential hypertension    [facility]    Dysphagia    History of cerebrovascular accident (CVA) with residual deficit    GERD (gastroesophageal reflux disease)    Left-sided weakness    Hyperlipidemia    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    Tracheomalacia    Chronic obstructive pulmonary disease with acute lower respiratory infection ([facility]    Ambulatory dysfunction    Right lower lobe pneumonia ([facility]    [facility]       Past Medical History  Past Medical History:   Diagnosis Date    RONAL (acute kidney injury) ([facility] ) 5/31/2017    Aspiration into respiratory tract     Chest pain 5/31/2017    [facility]     Depression     Elevated troponin 5/31/2017    GERD (gastroesophageal reflux disease)     History of CVA (cerebrovascular accident) 4/13/2016    Hyperlipidemia     Hypertension     Lung cancer ([facility] ) 2005    Right, status post lobectomy    Pneumonia     Prostate cancer ([facility]     Sleep apnea     awaiting sleep study results    Stroke [facility]     Weakness due to cerebrovascular accident (CVA)        Past Surgical History  Past Surgical History:   Procedure Laterality Date    COLONOSCOPY      ESOPHAGOGASTRODUODENOSCOPY N/A 4/13/2016    Procedure: ESOPHAGOGASTRODUODENOSCOPY (EGD); Surgeon: Delfina Warren MD;  [facility]; Service:     JOINT REPLACEMENT      knee replacement    LUNG LOBECTOMY      HI ESOPHAGOGASTRODUODENOSCOPY TRANSORAL DIAGNOSTIC N/A 3/15/2017    Procedure: ESOPHAGOGASTRODUODENOSCOPY (EGD); Surgeon: Delfina Warren MD;  [facility]: [facility];   Service: Gastroenterology    TRIGEMINAL NERVE DECOMPRESSION         Summary   Pt presented with s/s suggestive of mild-moderate oral and suspected moderate pharyngeal dysphagia  Symptoms or concerns included decreased mastication, decreased bolus formation and suspected decreased control of thin liquids and suspected pharyngeal swallow delay, suspected decreased hyolaryngeal elevation upon palpation and suspected pharyngeal residue  Recommend VFSS to r/o aspiration and determine safest diet/liquid consistency  Risk for Aspiration: Moderate-High    Recommendations: soft/level 3 diet and nectar thick liquids, follow up with video barium swallow study    Recommended Form of Meds: crushed with puree     Aspiration precautions and compensatory swallowing strategies: upright posture, slow rate of feeding, small bites/sips, cough every few bites/sips and alternating bites and sips          Current Medical Status per LADY Coronado' Progress note 7/15/18  Pt is a 65 y o  male who is admitted with cough and respiratory congestion  The patient states that he has a chronic cough but then when he woke up this morning he felt more short of breath  Usually does not feel short of breath  The patient states that he then went to [employer] and after that he had been back in his room when he started retching  The patient is not sure however when knew that he had a problem but thinks he might have hit a call button that he wears on his wrist   Nonetheless, when staff found him the patient had vomited and there was concern that he may have aspirated the emesis as he has a history of aspiration and micro aspiration in the past   Patient was sent to the emergency room for evaluation and a chest x-ray was done showing evidence for a pneumonia in the right lower lung field  On exam, the patient has rhonchi throughout all lung fields consistent with upper airway congestion  The patient has a rattly sounding voice and appears mildly dyspneic on oxygen  However, there is no use of accessory muscles of the abdomen with breathing    Additionally there is no wheezing although the patient does sound slightly stridor is at times as he takes deep breaths between words  The heart is with a regular rate and rhythm with normal S1 and S2 heart sounds  No obvious murmurs on exam although exam is limited by the overall rhonchi and congestion  Abdomen is soft nondistended nontender with normoactive bowel sounds  Extremities are without any significant edema and no calf tenderness  Patient is awake alert and oriented x3  Pt has been seen by ST during past admissions:   VFSS 6/1/2017 Assessment Summary:  mild-mod oral dysphagia  No penetration or aspiration observed  *Despite negative findings for aspiration, pt was placed on NTL due to persistent cough during meals  Pt was readmitted and seen by ST 7/27/17, and placed back on thin liquids  However, pt requested to be placed on NTL shortly after, and ST discharged services  Pt returns now with report that he is not getting thickened liquids at [facility] and reports they would not allow him to be on thickened liquids  He does report coughing with liquid intake  ST mcintyre ordered and completed this date  Past medical history:  Please see H&P for details      Special Studies:  CXR 7/15/2018 IMPRESSION:   New right lower lobe infiltrate consistent with pneumonia  Social/Education/Vocational Hx:  Pt lives in assisted living facility      Swallow Information   Current Risks for Dysphagia & Aspiration: hx CVA, known history of dysphagia and known history of aspiration     Current Symptoms/Concerns: coughing with po    Current Diet: soft/level 3 diet and nectar thick liquids      Baseline Diet: regular diet and thin liquids      Baseline Assessment   Behavior/Cognition: alert    Speech/Language Status: able to participate in conversation and able to follow commands    Patient Positioning: upright in bed    Pain Status/Interventions/Response to Interventions:  No report of or nonverbal indications of pain         Swallow Mechanism Exam     Facial: left facial droop  Labial: decreased ROM left side  Lingual: decreased strength left side  Velum: unable to visualize  Mandible: adequate ROM  Dentition: several molars missing, pt feels his teeth do not line up well for mastication  Vocal quality:gurgly   Volitional Cough: Harsh and non-productive       Consistencies Assessed and Performance   Consistencies Administered: thin liquids, nectar thick, puree and mechanical soft solids  Specific materials administered included water, NTL apple juice, soup, pasta    Oral Stage: mild-moderate  Mastication was reduced with disorganized bolus formation for soft solids  Suspect decreased bolus control and premature spillage with thin liquids  Despite difficulty with mastication and bolus formation of soft solids, pt expressed dislike for soft/chopped foods  Pharyngeal Stage: suspected moderate impairment    Swallow Mechanics:  Swallowing initiation appeared delayed and poorly coordinated  Laryngeal rise was palpated and judged to be reduced, with laryngeal "pumping" and at times seemingly incomplete swallow  Coughing noted after trials of thin liquid via cup sip, as well as increased stridor and vocal wetness noted with both thin and nectar thick liquids  Pt was also noted to have vocal wetness and stridor at baseline  Esophageal Concerns: none reported however pt does have a GERD diagnosis    Strategies and Efficacy: Small sips of NTL tolerated best, however may need to consider HTL pending VFSS results        Summary and Recommendations (see above)      Results Reviewed with: patient, RN and PA     Consider referral for: VFSS    Treatment Recommended: Swallow therapy    Frequency of treatment: 3-5x/week    Dysphagia Goals per SLP: pt will tolerate least restrictive diet with least restrictive liquid consistency without s/s of aspiration x72hrs and pt will demonstrate accurate use of safe swallow strategies with 80% accuracy given min cues     PtEducation: initiated  Pt and caregivers would benefit from continued education

## 2018-07-16 NOTE — PLAN OF CARE
Problem: SLP ADULT - SWALLOWING, IMPAIRED  Goal: Initial SLP swallow eval performed  Outcome: Completed Date Met: 07/16/18

## 2018-07-16 NOTE — CONSULTS
Consultation - Pulmonary Medicine   Mariajose Michaels  65 y o  male MRN: [id number]  Unit/Bed#: -01 Encounter: [id number]      Assessment/Plan:    Acute hypoxic respiratory failure due to abnormal chest x-ray:  Right basilar aspiration pneumonia/HCAP   Titrate oxygen to maintain POX > or = 88%  Hopeful transition from [facility] to [place] today  OOB as tolerated  Pulmonary hygiene with airway clearance protocol and IS  Continue cefepime and Flagyl  Follow culture data and WBCs/temperatures  Follow procalcitonin  Check MRSA culture  Continue Mucinex and Tessalon (dose increased)  COPD of unknown severity without acute exacerbation   Continue Breo (changed to 100) in place of home Advair  Continue Spiriva  Add Xopenex TID for pulmonary hygiene  Dysphagia with history of aspiration   Aspiration precautions and previous modifications  Speech consult  GERD   Continue PPI  Chronic cough   Management as above  Outpatient follow-up pending hospital course  Discussed with Internal Medicine and RT  History of Present Illness   Physician Requesting Consult: Marianela Castro DO  Reason for Consult / Principal Problem: Aspiration pneumonia  Hx and PE limited by:  none  Chief Complaint:  "I was vomiting  "  HPI: Mariajose Michaels  is a 65 y o   male who presented to [place] [address] with complaints of shortness of breath and cough after vomiting  Peyton Bosworth reports that he was of his normal state of health and went to [facility] yesterday at his skilled nursing facility  When he returned back to his room, he started coughing so hard that it made him vomit  He does not remember a lot of the events after that, but was sent to the ER for congestion and shortness of breath  He reports he was not having any nausea or diarrhea/constipation  He denies abdominal pain  He does admit to some chills prior to this episode, but does not believe he has had fevers or diaphoresis    He has had a good appetite  He denies any shortness of breath at baseline, but does admit to a congested cough at times, which is chronic  He reports his cough has been slightly worse lately  He is not aware of what he is expectorating  He denies any chest pain or tightness  From a pulmonary standpoint, Roel Guaman follows with Dr Jolene Choe for COPD and chronic cough  He maintains on Advair and Spiriva with DuoNeb and albuterol as needed  He does also use Tessalon twice daily and Ativan for anxiety related to his coughing episodes  He does not wear oxygen  Inpatient consult to Pulmonology  Consult performed by: Nelson Balderas ordered by: Melissa Mathews        Review of Systems   All other systems reviewed and are negative  A full 12-point review of systems was completed and is negative except for those outlined in the HPI  Historical Information   Past Medical History:   Diagnosis Date    RONAL (acute kidney injury) ([facility] ) 5/31/2017    Aspiration into respiratory tract     Chest pain 5/31/2017    COPD (chronic obstructive pulmonary disease) (HCC)     Depression     Elevated troponin 5/31/2017    GERD (gastroesophageal reflux disease)     History of CVA (cerebrovascular accident) 4/13/2016    Hyperlipidemia     Hypertension     Lung cancer ([facility] [address] ) 2005    Right, status post lobectomy    Pneumonia     Prostate cancer ([facility] [address] )     Sleep apnea     awaiting sleep study results    [facility]     Weakness due to cerebrovascular accident (CVA)      Past Surgical History:   Procedure Laterality Date    COLONOSCOPY      ESOPHAGOGASTRODUODENOSCOPY N/A 4/13/2016    Procedure: ESOPHAGOGASTRODUODENOSCOPY (EGD); Surgeon: Demar Sullivan MD;  [facility]; Service:     JOINT REPLACEMENT      knee replacement    LUNG LOBECTOMY      DC ESOPHAGOGASTRODUODENOSCOPY TRANSORAL DIAGNOSTIC N/A 3/15/2017    Procedure: ESOPHAGOGASTRODUODENOSCOPY (EGD);   Surgeon: Demar Sullivan MD;  [facility]: [facility]; Service: Gastroenterology    TRIGEMINAL NERVE DECOMPRESSION       Social History   History   Alcohol Use No     History   Drug Use No     History   Smoking Status    Former Smoker    Packs/day: 1 00    Years: 22 00    Types: Cigarettes    Start date: 5    Quit date: 1977   Smokeless Tobacco    Never Used     Occupational History:   Retired      Family History:   Family History   Problem Relation Age of Onset    Family history unknown: Yes       Meds/Allergies   all current active meds have been reviewed, pertinent pulmonary meds have been reviewed, current meds:   Current Facility-Administered Medications   Medication Dose Route Frequency    acetaminophen (TYLENOL) tablet 650 mg  650 mg Oral Q6H    albuterol inhalation solution 2 5 mg  2 5 mg Nebulization Q6H PRN    amLODIPine (NORVASC) tablet 10 mg  10 mg Oral Daily    aspirin chewable tablet 81 mg  81 mg Oral Daily    atorvastatin (LIPITOR) tablet 10 mg  10 mg Oral Daily With Dinner    benzonatate (TESSALON PERLES) capsule 200 mg  200 mg Oral TID    calcium carbonate (TUMS) chewable tablet 1,000 mg  1,000 mg Oral Daily PRN    carvedilol (COREG) tablet 12 5 mg  12 5 mg Oral BID With Meals    cefepime (MAXIPIME) 2,000 mg in dextrose 5 % 50 mL IVPB  2,000 mg Intravenous Q12H    clopidogrel (PLAVIX) tablet 75 mg  75 mg Oral Daily    docusate sodium (COLACE) capsule 100 mg  100 mg Oral BID    fluticasone (FLONASE) 50 mcg/act nasal spray 2 spray  2 spray Nasal Daily    fluticasone-vilanterol (BREO ELLIPTA) 100-25 mcg/inh inhaler 1 puff  1 puff Inhalation Daily    guaiFENesin (MUCINEX) 12 hr tablet 1,200 mg  1,200 mg Oral Q12H LISA    heparin (porcine) subcutaneous injection 5,000 Units  5,000 Units Subcutaneous Q8H [address]    latanoprost (XALATAN) 0 005 % ophthalmic solution 1 drop  1 drop Both Eyes HS    levalbuterol (XOPENEX) inhalation solution 1 25 mg  1 25 mg Nebulization TID    LORazepam (ATIVAN) tablet 0 5 mg  0 5 mg Oral Daily  magnesium hydroxide (MILK OF MAGNESIA) 400 mg/5 mL oral suspension 30 mL  30 mL Oral Daily PRN    meclizine (ANTIVERT) tablet 25 mg  25 mg Oral TID PRN    metroNIDAZOLE (FLAGYL) IVPB (premix) 500 mg  500 mg Intravenous Q8H    ondansetron (ZOFRAN) injection 4 mg  4 mg Intravenous Q6H PRN    oxybutynin (DITROPAN-XL) 24 hr tablet 5 mg  5 mg Oral HS    pantoprazole (PROTONIX) EC tablet 40 mg  40 mg Oral Early Morning    PARoxetine (PAXIL) tablet 20 mg  20 mg Oral Daily    polyethylene glycol (MIRALAX) packet 17 g  17 g Oral Daily PRN    psyllium (METAMUCIL) 1 packet  1 packet Oral Daily    sodium chloride 0 9 % infusion  100 mL/hr Intravenous Continuous    spironolactone (ALDACTONE) tablet 25 mg  25 mg Oral Daily    tamsulosin (FLOMAX) capsule 0 4 mg  0 4 mg Oral Daily    tiotropium (SPIRIVA) capsule for inhaler 18 mcg  18 mcg Inhalation Daily    and PTA meds:   Prior to Admission Medications   Prescriptions Last Dose Informant Patient Reported? Taking?    ASPIRIN 81 PO   Yes Yes   Sig: Take 1 tablet by mouth every other day     Fesoterodine Fumarate ER (TOVIAZ) 8 MG TB24  Outside Facility (Specify) Yes Yes   Sig: Take 4 mg by mouth every evening     LORazepam (ATIVAN) 0 5 mg tablet   Yes Yes   Sig: Take by mouth daily Daily @@ 0900   PARoxetine (PAXIL) 20 mg tablet   Yes Yes   Sig: Take 1 tablet by mouth daily   acetaminophen (TYLENOL) 325 mg tablet   No Yes   Sig: Take 2 tablets by mouth every 6 (six) hours as needed for fever   albuterol (PROVENTIL HFA,VENTOLIN HFA) 90 mcg/act inhaler   No Yes   Sig: Inhale 2 puffs 4 (four) times a day   amLODIPine (NORVASC) 10 mg tablet   Yes Yes   Sig: Take 1 tablet by mouth daily   atorvastatin (LIPITOR) 10 mg tablet   Yes Yes   Sig: Take 1 tablet by mouth   benzonatate (TESSALON PERLES) 100 mg capsule   No Yes   Sig: Take 1 capsule by mouth 3 (three) times a day as needed for cough   Patient taking differently: Take 100 mg by mouth 2 (two) times a day carvedilol (COREG) 12 5 mg tablet   No Yes   Sig: Take 1 tablet by mouth 2 (two) times a day with meals   clopidogrel (PLAVIX) 75 mg tablet   No Yes   Sig: Take 1 tablet by mouth daily   docusate sodium (COLACE) 100 mg capsule   No Yes   Sig: Take 1 capsule (100 mg total) by mouth 2 (two) times a day   fluticasone (FLONASE) 50 mcg/act nasal spray   Yes Yes   Si-2 sprays into each nostril daily   fluticasone-salmeterol (ADVAIR DISKUS) 250-50 mcg/dose inhaler   Yes Yes   Sig: Inhale 2 (two) times a day     ipratropium-albuterol (DUO-NEB) 0 5-2 5 mg/3 mL nebulizer solution   Yes Yes   Sig: Take 3 mL by nebulization 4 (four) times a day as needed for wheezing or shortness of breath   latanoprost (XALATAN) 0 005 % ophthalmic solution   Yes Yes   Sig: Administer 1 drop to both eyes daily at bedtime   levocetirizine (XYZAL) 5 MG tablet   No Yes   Sig: Take 1 tablet by mouth every evening   magnesium hydroxide (MILK OF MAGNESIA) 400 mg/5 mL oral suspension   Yes Yes   Sig: Take 30 mL by mouth daily as needed for constipation   meclizine (ANTIVERT) 25 mg tablet   No Yes   Sig: Take 1 tablet by mouth 3 (three) times a day as needed for dizziness   omeprazole (PriLOSEC) 40 MG capsule   Yes Yes   Sig: Take 1 capsule by mouth daily   polyethylene glycol (MIRALAX) 17 g packet   Yes Yes   Sig: Take 17 g by mouth daily as needed   psyllium (METAMUCIL) 0 52 g capsule   Yes Yes   Sig: Take 0 52 g by mouth daily   spironolactone (ALDACTONE) 25 mg tablet   No Yes   Sig: Take 1 tablet (25 mg total) by mouth daily   tamsulosin (FLOMAX) 0 4 mg   Yes Yes   Sig: Take 1 capsule by mouth daily   tiotropium (SPIRIVA) 18 mcg inhalation capsule   Yes Yes   Sig: Place 18 mcg into inhaler and inhale daily      Facility-Administered Medications: None       Allergies   Allergen Reactions    Penicillins Rash       Objective   Vitals: Blood pressure 133/63, pulse 73, temperature 98 °F (36 7 °C), temperature source Axillary, resp   rate 19, weight 102 kg (225 lb 1 4 oz), SpO2 93 %  50% 45L  HFNC,Body mass index is 26 69 kg/m²  Intake/Output Summary (Last 24 hours) at 07/16/18 1019  Last data filed at 07/16/18 0801   Gross per 24 hour   Intake             1000 ml   Output              200 ml   Net              800 ml     Invasive Devices     Peripheral Intravenous Line            Peripheral IV 07/15/18 Right Antecubital less than 1 day                Physical Exam   Constitutional: He is oriented to person, place, and time  He appears well-developed and well-nourished  He is cooperative  Non-toxic appearance  No distress  HENT:   Head: Normocephalic and atraumatic  Mouth/Throat: No oropharyngeal exudate  Eyes: EOM are normal  No scleral icterus  Neck: Neck supple  No JVD present  No tracheal deviation present  Cardiovascular: Normal rate, regular rhythm, S1 normal and S2 normal   Exam reveals no gallop and no friction rub  No murmur heard  Pulmonary/Chest: Effort normal  No accessory muscle usage or stridor  No tachypnea  No respiratory distress  He has no decreased breath sounds  He has no wheezes  He has rhonchi  He has no rales  He exhibits no tenderness  Abdominal: Soft  Bowel sounds are normal  He exhibits no distension  There is no tenderness  There is no rebound and no guarding  Musculoskeletal: He exhibits no edema or tenderness  Lymphadenopathy:     He has no cervical adenopathy  Neurological: He is alert and oriented to person, place, and time  He has normal strength  GCS eye subscore is 4  GCS verbal subscore is 5  GCS motor subscore is 6  Skin: Skin is warm and dry  No abrasion, no ecchymosis, no lesion and no rash noted  He is not diaphoretic  No cyanosis or erythema  Nails show no clubbing  Psychiatric: He has a normal mood and affect  His speech is normal and behavior is normal    Vitals reviewed      Lab Results:   BNP: 116  CBC:   Lab Results   Component Value Date    WBC 10 50 (H) 07/15/2018    HGB 15 1 07/15/2018    HCT 45 6 07/15/2018    MCV 85 07/15/2018     (L) 07/15/2018    MCH 28 1 07/15/2018    MCHC 33 1 07/15/2018    RDW 14 4 07/15/2018    MPV 11 1 07/15/2018   , CMP:   Lab Results   Component Value Date     07/15/2018    K 4 1 07/15/2018     07/15/2018    CO2 28 07/15/2018    ANIONGAP 8 07/15/2018    BUN 28 (H) 07/15/2018    CREATININE 1 38 (H) 07/15/2018    GLUCOSE 130 07/15/2018    CALCIUM 9 4 07/15/2018    AST 17 07/15/2018    ALT 23 07/15/2018    ALKPHOS 112 07/15/2018    PROT 8 2 07/15/2018    BILITOT 0 80 07/15/2018    EGFR 49 07/15/2018   , PT/INR:   Lab Results   Component Value Date    INR 1 03 07/15/2018   , Troponin:   Lab Results   Component Value Date    TROPONINI <0 02 07/15/2018     Procalcitonin pending    Blood Cultures x 2 pending    Urine Strep/Legionella negative    Sputum Culture pending    Imaging Studies: I have personally reviewed pertinent reports   , I have personally reviewed pertinent films in PACS and Chest x-ray shows right basilar infiltrate    EKG, Pathology, and Other Studies: None    Pulmonary Results (PFTs, PSG): None    VTE Prophylaxis: Sequential compression device (Venodyne)  and Heparin    Code Status: Level 1 - Full Code    None    Portions of the record may have been created with voice recognition software  Occasional wrong word or "sound a like" substitutions may have occurred due to the inherent limitations of voice recognition software  Read the chart carefully and recognize, using context, where substitutions have occurred

## 2018-07-16 NOTE — PROGRESS NOTES
The metronidazole has been converted to Oral per [facility] IV-to-PO Auto-Conversion Protocol for Adults as approved by the Pharmacy and Therapeutics Committee  The patient met all eligible criteria:  3 Age = 25years old   2) Received at least one dose of the IV form   3) Receiving at least one other scheduled oral/enteral medication   4) Tolerating an oral/enteral diet   and did not have any exclusions:   1) Critical care patient   2) Active GI bleed (IF assessing H2RAs or PPIs)   3) Continuous tube feeding (IF assessing cipro, doxycycline, levofloxacin, minocycline, rifampin, or voriconazole)   4) Receiving PO vancomycin (IF assessing metronidazole)   5) Persistent nausea and/or vomiting   6) Ileus or gastrointestinal obstruction   7) Tavo/nasogastric tube set for continuous suction   8) Specific order not to automatically convert to PO (in the order's comments or if discussed in the most recent Infectious Disease or primary team's progress notes)

## 2018-07-16 NOTE — ASSESSMENT & PLAN NOTE
· CVA 10 years ago   · With left-sided deficits   Discussed with patient today who tells me this is not new

## 2018-07-16 NOTE — PROGRESS NOTES
Progress Note - Nery Lerner  1940, 65 y o  male MRN: [id number]    Unit/Bed#: -01 Encounter: [id number]    Primary Care Provider: Edy Teran DO   Date and time admitted to hospital: 7/15/2018 12:08 PM        * Right lower lobe pneumonia ([facility] [address] )   Assessment & Plan    · Noted on CXR (has documented history of RLL lobectomy?)  · Unclear etiology, HCAP vs aspiration  · Pulm following - discussed with them today  Continue IV Cefepime, Flagyl   · Respiratory protoco  · Wean off HFNC as able  · Tessalon, mucinex  · Follow sputum culture  · MRSA swab pending  · Procalcitonin pending - trend          Acute respiratory failure with hypoxia ([facility] [address] )   Assessment & Plan    · Noted to be 87% on NC O2 overnight and was reportedly 87% on RA upon arrival to ED  · He was placed on HFNC overnight  Pulm going to discuss weaning off with RT  · Was satting 97-98% on HFNC during my exam        Dysphagia   Assessment & Plan    · On modified diet: dental soft, nectar thick liquids  · Aspiration precautions  · Speech therapy eval pending        Chronic obstructive pulmonary disease with acute lower respiratory infection ([facility] [address] )   Assessment & Plan    · Pulm following, discussed with them today  · Scheduled nebs        Essential hypertension   Assessment & Plan    · /63  · Continue with current medications with holding parameters        History of cerebrovascular accident (CVA) with residual deficit   Assessment & Plan    · CVA 10 years ago   · With left-sided deficits   Discussed with patient today who tells me this is not new        GERD (gastroesophageal reflux disease)   Assessment & Plan    · Continue PPI  · OP follow up with GI        Lung cancer ([facility] [address] )   Assessment & Plan    · S/p RLL resection per his pulmonologist's records, however there is RLL infiltrate on CXR?  · Pulm following        Hyperlipidemia   Assessment & Plan    · Continue statin        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 Assessment & Plan    · Paxil, Ativan        Tracheomalacia   Assessment & Plan    · Pulmonology following          VTE Pharmacologic Prophylaxis:   Pharmacologic: Heparin  Mechanical VTE Prophylaxis in Place: Yes    Patient Centered Rounds: I have performed bedside rounds with nursing staff today  Pauline Lynn    Discussions with Specialists or Other Care Team Provider: YFN Alonso    Education and Discussions with Family / Patient: patient and his daughter, Patricia Sandoval, over the phone    Time Spent for Care: 30 minutes  More than 50% of total time spent on counseling and coordination of care as described above  Current Length of Stay: 1 day(s)    Current Patient Status: Inpatient   Certification Statement: The patient will continue to require additional inpatient hospital stay due to continued IV abx, weaning off HFNC, cultures pending    Discharge Plan: anticipate will need another 24-48 hours    Code Status: Level 1 - Full Code      Subjective:   Mr Aiden Barrera states that he is feeling better today  He is finally able to bring up his sputum which he says he was not able to do for weeks  He had a coughing episode this AM  He denies CP  He tells me that he has a history of a stroke which affected his left side and this is not new  Objective:     Vitals:   Temp (24hrs), Av 5 °F (36 9 °C), Min:98 °F (36 7 °C), Max:99 °F (37 2 °C)    HR:  [64-96] 73  Resp:  [19-22] 19  BP: (108-156)/(58-80) 133/63  SpO2:  [87 %-96 %] 93 %  Body mass index is 26 69 kg/m²  Input and Output Summary (last 24 hours): Intake/Output Summary (Last 24 hours) at 18 1038  Last data filed at 18 1035   Gross per 24 hour   Intake             1000 ml   Output              400 ml   Net              600 ml       Physical Exam:     Physical Exam   Constitutional: He is oriented to person, place, and time  No distress     Patient seen sitting upright with nursing staff present, pleasant and cooperative   Eyes: Pupils are equal, round, and reactive to light  Cardiovascular: Normal rate and regular rhythm  Pulmonary/Chest: He has no wheezes  HFNC satting 97-98%  Rhonchi diffusely   Abdominal: Soft  Bowel sounds are normal  There is no tenderness  Musculoskeletal: He exhibits no edema  Neurological: He is alert and oriented to person, place, and time  He has left corner of mouth droop with smiling - patient tells me this is not new  He has left-sided weakness left upper weaker compared to left lower extremity and left greater than right both uppers and lowers   Skin: Skin is warm  Psychiatric: He has a normal mood and affect  Vitals reviewed  Additional Data:     Labs:      Results from last 7 days  Lab Units 07/15/18  1714 07/15/18  1230   WBC Thousand/uL  --  10 50*   HEMOGLOBIN g/dL  --  15 1   HEMATOCRIT %  --  45 6   PLATELETS Thousands/uL 110* 123*   NEUTROS PCT %  --  89*   LYMPHS PCT %  --  4*   MONOS PCT %  --  7   EOS PCT %  --  0       Results from last 7 days  Lab Units 07/15/18  1230   SODIUM mmol/L 140   POTASSIUM mmol/L 4 1   CHLORIDE mmol/L 104   CO2 mmol/L 28   BUN mg/dL 28*   CREATININE mg/dL 1 38*   CALCIUM mg/dL 9 4   TOTAL PROTEIN g/dL 8 2   BILIRUBIN TOTAL mg/dL 0 80   ALK PHOS U/L 112   ALT U/L 23   AST U/L 17   GLUCOSE RANDOM mg/dL 130       Results from last 7 days  Lab Units 07/15/18  1230   INR  1 03                 * I Have Reviewed All Lab Data Listed Above  * Additional Pertinent Lab Tests Reviewed:  All Labs Within Last 24 Hours Reviewed    Imaging:    Imaging Reports Reviewed Today Include: CXR  Imaging Personally Reviewed by Myself Includes:  None    Recent Cultures (last 7 days):       Results from last 7 days  Lab Units 07/15/18  1840   LEGIONELLA URINARY ANTIGEN  Negative       Last 24 Hours Medication List:     Current Facility-Administered Medications:  acetaminophen 650 mg Oral Q6H Jessie Arthur PA-C    albuterol 2 5 mg Nebulization Q6H PRN Gene Art DO    amLODIPine 10 mg Oral Daily Jessie Arthur, LADY    aspirin 81 mg Oral Daily Jessie Arthur, LADY    atorvastatin 10 mg Oral Daily With Dinner Jessie Arthur, LADY    benzonatate 200 mg Oral TID Devin Roman, DIANA    calcium carbonate 1,000 mg Oral Daily PRN Jessie Arthur, LADY    carvedilol 12 5 mg Oral BID With Meals Jessie Arthur, LADY    cefepime 2,000 mg Intravenous Q12H Jessie Arthur, LADY Last Rate: 2,000 mg (07/16/18 0137)   clopidogrel 75 mg Oral Daily Jessie Arthur, LADY    docusate sodium 100 mg Oral BID Jessie Arthur, LADY    fluticasone 2 spray Nasal Daily Jessie Arthur, LADY    fluticasone-vilanterol 1 puff Inhalation Daily Devin Roman, DIANA    guaiFENesin 1,200 mg Oral Q12H [address] Jessie Arthur, LADY    heparin (porcine) 5,000 Units Subcutaneous Q8H [address] Jessie Arthur, LADY    latanoprost 1 drop Both Eyes HS Jessie Arthur, LADY    levalbuterol 1 25 mg Nebulization TID Devin Roman, DIANA    LORazepam 0 5 mg Oral Daily Jessie Arthur, LADY    magnesium hydroxide 30 mL Oral Daily PRN Jessie Arthur, LADY    meclizine 25 mg Oral TID PRN Jessie Arthur, LADY    metroNIDAZOLE 500 mg Oral Q8H [address] Abe Dow DO    ondansetron 4 mg Intravenous Q6H PRN Jessie Arthur, LADY    oxybutynin 5 mg Oral HS Jessie Arthur, LADY    pantoprazole 40 mg Oral Early Morning Jessie Arthur, PA-C    PARoxetine 20 mg Oral Daily Jessie Arthur, PA-C    polyethylene glycol 17 g Oral Daily PRN Jessie Arthur, PA-C    psyllium 1 packet Oral Daily Jessie Arthur, PA-C    sodium chloride 100 mL/hr Intravenous Continuous Jessie Arthur PA-C Last Rate: 100 mL/hr (07/16/18 0234)   spironolactone 25 mg Oral Daily Jessie Arthur PA-C    tamsulosin 0 4 mg Oral Daily Jessie Arthur PA-C    tiotropium 18 mcg Inhalation Daily Jessie Arthur PA-C         Today, Patient Was Seen By: Shirley Glasgow PA-C    ** Please Note: Dictation voice to text software may have been used in the creation of this document   **

## 2018-07-16 NOTE — ASSESSMENT & PLAN NOTE
· Noted to be 87% on NC O2 overnight and was reportedly 87% on RA upon arrival to ED  · He was placed on HFNC overnight   Pulm going to discuss weaning off with RT  · Was satting 97-98% on HFNC during my exam

## 2018-07-16 NOTE — ASSESSMENT & PLAN NOTE
· S/p RLL resection per his pulmonologist's records, however there is RLL infiltrate on CXR?  · Pulm following

## 2018-07-16 NOTE — ASSESSMENT & PLAN NOTE
· Noted on CXR (has documented history of RLL lobectomy?)  · Unclear etiology, HCAP vs aspiration  · Pulm following - discussed with them today   Continue IV Cefepime, Flagyl   · Respiratory protoco  · Wean off HFNC as able  · Tessalon, mucinex  · Follow sputum culture  · MRSA swab pending  · Procalcitonin pending - trend

## 2018-07-17 ENCOUNTER — APPOINTMENT (OUTPATIENT)
Dept: RADIOLOGY | Facility: HOSPITAL | Age: 78
DRG: 177 | End: 2018-07-17
Attending: INTERNAL MEDICINE
Payer: MEDICARE

## 2018-07-17 LAB
ANION GAP SERPL CALCULATED.3IONS-SCNC: 8 MMOL/L (ref 4–13)
BUN SERPL-MCNC: 21 MG/DL (ref 5–25)
CALCIUM SERPL-MCNC: 8.5 MG/DL (ref 8.3–10.1)
CHLORIDE SERPL-SCNC: 107 MMOL/L (ref 100–108)
CO2 SERPL-SCNC: 24 MMOL/L (ref 21–32)
CREAT SERPL-MCNC: 1.29 MG/DL (ref 0.6–1.3)
GFR SERPL CREATININE-BSD FRML MDRD: 53 ML/MIN/1.73SQ M
GLUCOSE SERPL-MCNC: 92 MG/DL (ref 65–140)
MRSA NOSE QL CULT: NORMAL
POTASSIUM SERPL-SCNC: 4 MMOL/L (ref 3.5–5.3)
PROCALCITONIN SERPL-MCNC: 1.18 NG/ML
SODIUM SERPL-SCNC: 139 MMOL/L (ref 136–145)

## 2018-07-17 PROCEDURE — 80048 BASIC METABOLIC PNL TOTAL CA: CPT | Performed by: PHYSICIAN ASSISTANT

## 2018-07-17 PROCEDURE — 92611 MOTION FLUOROSCOPY/SWALLOW: CPT

## 2018-07-17 PROCEDURE — 94760 N-INVAS EAR/PLS OXIMETRY 1: CPT

## 2018-07-17 PROCEDURE — 99232 SBSQ HOSP IP/OBS MODERATE 35: CPT | Performed by: INTERNAL MEDICINE

## 2018-07-17 PROCEDURE — 94640 AIRWAY INHALATION TREATMENT: CPT

## 2018-07-17 PROCEDURE — 84145 PROCALCITONIN (PCT): CPT | Performed by: INTERNAL MEDICINE

## 2018-07-17 PROCEDURE — 94668 MNPJ CHEST WALL SBSQ: CPT

## 2018-07-17 PROCEDURE — 99232 SBSQ HOSP IP/OBS MODERATE 35: CPT | Performed by: PHYSICIAN ASSISTANT

## 2018-07-17 PROCEDURE — 74230 X-RAY XM SWLNG FUNCJ C+: CPT

## 2018-07-17 RX ORDER — SODIUM CHLORIDE FOR INHALATION 0.9 %
VIAL, NEBULIZER (ML) INHALATION
Status: COMPLETED
Start: 2018-07-17 | End: 2018-07-17

## 2018-07-17 RX ORDER — SODIUM CHLORIDE FOR INHALATION 0.9 %
3 VIAL, NEBULIZER (ML) INHALATION
Status: DISCONTINUED | OUTPATIENT
Start: 2018-07-17 | End: 2018-07-19 | Stop reason: HOSPADM

## 2018-07-17 RX ORDER — LORAZEPAM 2 MG/ML
0.25 INJECTION INTRAMUSCULAR ONCE
Status: COMPLETED | OUTPATIENT
Start: 2018-07-17 | End: 2018-07-17

## 2018-07-17 RX ORDER — LORAZEPAM 0.5 MG/1
0.25 TABLET ORAL
Status: DISCONTINUED | OUTPATIENT
Start: 2018-07-17 | End: 2018-07-19 | Stop reason: HOSPADM

## 2018-07-17 RX ADMIN — ACETAMINOPHEN 650 MG: 325 TABLET, FILM COATED ORAL at 17:15

## 2018-07-17 RX ADMIN — LEVALBUTEROL HYDROCHLORIDE 1.25 MG: 1.25 SOLUTION, CONCENTRATE RESPIRATORY (INHALATION) at 07:49

## 2018-07-17 RX ADMIN — ASPIRIN 81 MG 81 MG: 81 TABLET ORAL at 10:26

## 2018-07-17 RX ADMIN — DOCUSATE SODIUM 100 MG: 100 CAPSULE, LIQUID FILLED ORAL at 17:16

## 2018-07-17 RX ADMIN — GUAIFENESIN 1200 MG: 600 TABLET, EXTENDED RELEASE ORAL at 21:53

## 2018-07-17 RX ADMIN — SPIRONOLACTONE 25 MG: 25 TABLET, FILM COATED ORAL at 10:27

## 2018-07-17 RX ADMIN — ISODIUM CHLORIDE 3 ML: 0.03 SOLUTION RESPIRATORY (INHALATION) at 07:49

## 2018-07-17 RX ADMIN — ATORVASTATIN CALCIUM 10 MG: 10 TABLET, FILM COATED ORAL at 17:15

## 2018-07-17 RX ADMIN — OXYBUTYNIN CHLORIDE 5 MG: 5 TABLET, EXTENDED RELEASE ORAL at 21:54

## 2018-07-17 RX ADMIN — DOCUSATE SODIUM 100 MG: 100 CAPSULE, LIQUID FILLED ORAL at 10:28

## 2018-07-17 RX ADMIN — AMLODIPINE BESYLATE 10 MG: 10 TABLET ORAL at 10:27

## 2018-07-17 RX ADMIN — CEFEPIME HYDROCHLORIDE 2000 MG: 2 INJECTION, POWDER, FOR SOLUTION INTRAVENOUS at 11:42

## 2018-07-17 RX ADMIN — BENZONATATE 200 MG: 100 CAPSULE ORAL at 10:28

## 2018-07-17 RX ADMIN — METRONIDAZOLE 500 MG: 500 TABLET ORAL at 21:53

## 2018-07-17 RX ADMIN — HEPARIN SODIUM 5000 UNITS: 5000 INJECTION, SOLUTION INTRAVENOUS; SUBCUTANEOUS at 21:51

## 2018-07-17 RX ADMIN — LORAZEPAM 0.25 MG: 2 INJECTION INTRAMUSCULAR; INTRAVENOUS at 00:35

## 2018-07-17 RX ADMIN — CARVEDILOL 12.5 MG: 12.5 TABLET, FILM COATED ORAL at 10:28

## 2018-07-17 RX ADMIN — LEVALBUTEROL HYDROCHLORIDE 1.25 MG: 1.25 SOLUTION, CONCENTRATE RESPIRATORY (INHALATION) at 13:22

## 2018-07-17 RX ADMIN — ACETAMINOPHEN 650 MG: 325 TABLET, FILM COATED ORAL at 10:27

## 2018-07-17 RX ADMIN — ACETAMINOPHEN 650 MG: 325 TABLET, FILM COATED ORAL at 21:53

## 2018-07-17 RX ADMIN — FLUTICASONE FUROATE AND VILANTEROL TRIFENATATE 1 PUFF: 100; 25 POWDER RESPIRATORY (INHALATION) at 10:29

## 2018-07-17 RX ADMIN — LORAZEPAM 0.25 MG: 0.5 TABLET ORAL at 22:26

## 2018-07-17 RX ADMIN — METRONIDAZOLE 500 MG: 500 TABLET ORAL at 13:24

## 2018-07-17 RX ADMIN — METRONIDAZOLE 500 MG: 500 TABLET ORAL at 05:29

## 2018-07-17 RX ADMIN — CARVEDILOL 12.5 MG: 12.5 TABLET, FILM COATED ORAL at 17:15

## 2018-07-17 RX ADMIN — SODIUM CHLORIDE 100 ML/HR: 0.9 INJECTION, SOLUTION INTRAVENOUS at 03:47

## 2018-07-17 RX ADMIN — FLUTICASONE PROPIONATE 2 SPRAY: 50 SPRAY, METERED NASAL at 10:28

## 2018-07-17 RX ADMIN — BENZONATATE 200 MG: 100 CAPSULE ORAL at 17:15

## 2018-07-17 RX ADMIN — CLOPIDOGREL BISULFATE 75 MG: 75 TABLET ORAL at 10:27

## 2018-07-17 RX ADMIN — GUAIFENESIN 1200 MG: 600 TABLET, EXTENDED RELEASE ORAL at 10:28

## 2018-07-17 RX ADMIN — LEVALBUTEROL HYDROCHLORIDE 1.25 MG: 1.25 SOLUTION, CONCENTRATE RESPIRATORY (INHALATION) at 19:32

## 2018-07-17 RX ADMIN — ISODIUM CHLORIDE 3 ML: 0.03 SOLUTION RESPIRATORY (INHALATION) at 13:23

## 2018-07-17 RX ADMIN — TAMSULOSIN HYDROCHLORIDE 0.4 MG: 0.4 CAPSULE ORAL at 17:16

## 2018-07-17 RX ADMIN — HEPARIN SODIUM 5000 UNITS: 5000 INJECTION, SOLUTION INTRAVENOUS; SUBCUTANEOUS at 13:24

## 2018-07-17 RX ADMIN — PANTOPRAZOLE SODIUM 40 MG: 40 TABLET, DELAYED RELEASE ORAL at 05:29

## 2018-07-17 RX ADMIN — SODIUM CHLORIDE 100 ML/HR: 0.9 INJECTION, SOLUTION INTRAVENOUS at 16:26

## 2018-07-17 RX ADMIN — LATANOPROST 1 DROP: 50 SOLUTION OPHTHALMIC at 21:54

## 2018-07-17 RX ADMIN — BENZONATATE 200 MG: 100 CAPSULE ORAL at 21:52

## 2018-07-17 RX ADMIN — TIOTROPIUM BROMIDE 18 MCG: 18 CAPSULE ORAL; RESPIRATORY (INHALATION) at 10:29

## 2018-07-17 RX ADMIN — ISODIUM CHLORIDE 3 ML: 0.03 SOLUTION RESPIRATORY (INHALATION) at 19:32

## 2018-07-17 RX ADMIN — PAROXETINE HYDROCHLORIDE 20 MG: 20 TABLET, FILM COATED ORAL at 10:27

## 2018-07-17 RX ADMIN — PSYLLIUM HUSK 1 PACKET: 3.4 POWDER ORAL at 10:28

## 2018-07-17 RX ADMIN — HEPARIN SODIUM 5000 UNITS: 5000 INJECTION, SOLUTION INTRAVENOUS; SUBCUTANEOUS at 05:29

## 2018-07-17 NOTE — PROCEDURES
Speech-Language Pathology Videofluoroscopic Swallow Study      Patient Name: Nas Reis  Today's Date: 7/17/2018     Problem List  Patient Active Problem List   Diagnosis    Essential hypertension    [facility] ([facility]    Dysphagia    History of cerebrovascular accident (CVA) with residual deficit    GERD (gastroesophageal reflux disease)    Left-sided weakness    Hyperlipidemia    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    Tracheomalacia    Chronic obstructive pulmonary disease with acute lower respiratory infection ([facility]    Ambulatory dysfunction    Right lower lobe pneumonia ([facility]    Acute respiratory failure with hypoxia ([facility] )       Past Medical History  Past Medical History:   Diagnosis Date    RONAL (acute kidney injury) ([facility] ) 5/31/2017    Aspiration into respiratory tract     Chest pain 5/31/2017    [facility]     Depression     Elevated troponin 5/31/2017    GERD (gastroesophageal reflux disease)     History of CVA (cerebrovascular accident) 4/13/2016    Hyperlipidemia     Hypertension     Lung cancer ([facility] ) 2005    Right, status post lobectomy    Pneumonia     Prostate cancer ([facility] )     Sleep apnea     awaiting sleep study results    Stroke ([facility]     Weakness due to cerebrovascular accident (CVA)        Past Surgical History  Past Surgical History:   Procedure Laterality Date    COLONOSCOPY      ESOPHAGOGASTRODUODENOSCOPY N/A 4/13/2016    Procedure: ESOPHAGOGASTRODUODENOSCOPY (EGD); Surgeon: Susan Rodriguez MD;  [facility]: [facility]; Service:     JOINT REPLACEMENT      knee replacement    LUNG LOBECTOMY      [place] ESOPHAGOGASTRODUODENOSCOPY TRANSORAL DIAGNOSTIC N/A 3/15/2017    Procedure: ESOPHAGOGASTRODUODENOSCOPY (EGD); Surgeon: Susan Rodriguez MD;  Location: [facility];   Service: Gastroenterology    TRIGEMINAL NERVE DECOMPRESSION           General Information:  Pt is a 65 y o  male with a PMH remarkable for right lower lobe PNA, HTN, Lung CA, dysphagia, hx CVA, GERD, tracheomalacia, COPD, and resp failure with hypoxia  Current concerns for dysphagia include right lower lobe pneumonia, hx of dysphagia, and cough  VBS was recommended to assess oropharyngeal stage swallowing skills at this time  Pt was viewed in lateral position and was given trials of pureed, soft moist (apple sauce, sliced banana) and solid food (Elmira Doone cookie) as well as honey (moderately thick), nectar (mildly thick), and thin liquid  A mixed consistency item (fruit with thin liquid) and a13mm pill with thin liquid was also administered  Oral stage:  Pt presented with mild-moderate oral stage dysphagia  Mastication and bolus manipulation were prolonged and disorganized with materials administered today  Bolus formation and transfer were labored  Oral control appeared reduced with gross premature spillage over the base of tongue  Pharyngeal stage; Pt presented with mild-moderate pharyngeal dysphagia  Swallowing initiation was significantly delayed with premature spillage to the pyriforms for all liquid consistencies and puree  Hyolaryngeal rise and anterior displacement were reduced  Airway closure/protection appeared reduced, with penetration of all liquid consistencied (thin, nectar, honey) as well as puree (apple sauce)  No aspiration was observed however there is increased risk  Tongue base retraction appeared to be mildly reduced in strength  Pharyngeal constriction appeared adequate  Management of food/liquid follows:   Pt has a severe, violent cough at baseline prior to PO intake  Small amount of brownish-yellow sputum was expectorated during study  Coughing spells leave pt short of breath despite O2 via nasal cannula  Pt was noted several times to hold material in his oral cavity while breathing heavily through nose   Suspect reduced swallow safety and efficiency due to increased difficulty coordinating breathing and swallowing  Strategies and Efficacy: Single controlled sips appear best  Chin down posture did not eliminate penetration and appeared to further impede swallow initiation  Despite penetration on all consistencies, suspect slightly improved bolus control with nectar thick liquids  Aspiration Response and Efficacy:  Pt occasionally clears his throat spontaneously in response to penetration, which occasionally then triggers a coughing episode  However, no aspiration was noted on this study  Esophageal stage;  Esophageal screening was completed with a barium tablet and liquid wash  Brief holdup of the pill noted in distal esophagus, which cleared with additional liquid swallows  Assessment Summary; Pt presents with mild-moderate oropharyngeal dysphagia characterized by reduced mastication and bolus formation/control, premature spillage to the pyriforms with all liquid consistencies and puree, delayed swallow initiation with reduced laryngeal elevation and penetration of thin, nectar, honey, and puree with no aspiration seen on this assessment  Pt has difficulty coordinating breathing and swallowing which puts him at additional risk for aspiration  Pt intermittently demonstrated a well coordinated swallow without penetration, although more frequently he demonstrated swallow delay with penetration  Recommendations: soft/level 3 diet and nectar thick liquids, may need to consider level 2 if noted to fatigue with mastication  Although pt penetrated on all liquid consistencies (thin, nectar, and honey thick), nectar is recommended for improved bolus control, as pt is at higher risk for aspiration based on multiple factors  Recommended Form of Meds: whole with liquid (nectar thick)       Aspiration precautions and compensatory swallowing strategies: upright posture, slow rate of feeding, small bites/sips and cough every 2-3 bites/sips    Results Reviewed with: patient and RN     Treatment Recommended:  follow up for diet tolerance and safe swallow strategies    Frequency of treatment: 3-5x/week    Dysphagia Goals per SLP: pt will tolerate least restrictive diet with least restrictive liquid consistency without s/s of aspiration x72hrs and pt will demonstrate accurate use of safe PO intake  strategies with 80% accuracy given min cues  Pt/Family Education: initiated  Pt and caregivers would benefit from/require continued education  *Note: due to a technical malfunction with the LORE recording system, images from this study were unable to be saved to PACS

## 2018-07-17 NOTE — SOCIAL WORK
LOS- 2 DAYS  PATIENT IS NOT A READMIT  PATIENT IS A BUNDLE : SIMPLE PNEUMONIA AND RESPIRATORY SNF LOS INFECTIONS SNF LOS 12-14  CM met with patient at bedside  Patient states he currently lives in an assisted living facility but states he will be moving in with his daughter in a few weeks  This new house is located in [place]   Patient states he uses a walker occasionally and receives assistance from a family friend 2 times a week  Per patient sometimes he needs some extra help but he isn't always able to receive the help he needs  CM suggested having VNA come out to help  Patient is agreeable  Patient uses the [facility] and has prescription coverage  Patient's daughter is the POA and CM asked for a copy to be put on file  Patient relies on his daughters for transport  CM placed a referral thru [address] for [facility]  CM dept will continue to follow until d/c

## 2018-07-17 NOTE — ASSESSMENT & PLAN NOTE
· O2 sat reportedly 87% on RA upon arrival to ED  · He was weaned off of HFNC yesterday  · Today he was satting 96% on 3L NC O2 and nursing was attempting to wean down to 2L

## 2018-07-17 NOTE — ASSESSMENT & PLAN NOTE
· CVA 10 years ago (daughter reports may be more like 12 years)  · With chronic left-sided deficits  Patient denies new neurologic symptoms today   Confirmed with the patient's daughter over the phone today that he has chronic left-sided symptoms

## 2018-07-17 NOTE — ASSESSMENT & PLAN NOTE
· On modified diet: dental soft, nectar thick liquids per SLP recs  · Aspiration precautions  · Patient went for VBS today - follow up results

## 2018-07-17 NOTE — PROGRESS NOTES
Progress Note - Pulmonary   Magda Hernandez  65 y o  male MRN: [id number]  Unit/Bed#: -01 Encounter: [id number]    Assessment/Plan:    Acute hypoxic respiratory failure (improved) due to abnormal chest x-ray:  Right basilar aspiration pneumonia/HCAP              Titrate oxygen to maintain POX > or = 88%  Will need ambulatory POX prior to D/C to ensure no supplemental oxygen needs  OOB as tolerated  Pulmonary hygiene with airway clearance protocol and IS  Continue cefepime and Flagyl  Follow culture data and WBCs/temperatures  Follow-up MRSA culture  Procalcitonin is improved  Continue Mucinex and Tessalon      COPD of unknown severity without acute exacerbation              Continue Breo in place of home Advair  Continue Spiriva  Continue Xopenex TID for pulmonary hygiene      Dysphagia with history of aspiration              Aspiration precautions and previous modifications  Speech consulted and VBS done this AM with results pending      GERD              Continue PPI      Chronic cough              Management as above  Lung Cancer S/P RLL Lobectomy   Marcellus Wright is unsure of where or when this was, but it is more than 10 years ago      Outpatient follow-up pending hospital course  Discussed with Internal Medicine and nursing  Chief Complaint:    "I feel better "    Subjective:    Marcellus Wright is sitting up in the chair after his VBS today  He reports SOB is improved  He denies chest pain  He continues to cough, which is close to baseline  Objective:    Vitals: Blood pressure 128/62, pulse 68, temperature 98 2 °F (36 8 °C), temperature source Oral, resp  rate 18, weight 102 kg (225 lb 1 4 oz), SpO2 96 %  2L NC,Body mass index is 26 69 kg/m²        Intake/Output Summary (Last 24 hours) at 07/17/18 1054  Last data filed at 07/17/18 0600   Gross per 24 hour   Intake          2841 66 ml Output              725 ml   Net          2116 66 ml       Invasive Devices     Peripheral Intravenous Line            Peripheral IV 07/15/18 Right Antecubital 1 day                Physical Exam:     Physical Exam   Constitutional: He is oriented to person, place, and time  He appears well-developed and well-nourished  He is cooperative  Non-toxic appearance  No distress  HENT:   Head: Normocephalic and atraumatic  Mouth/Throat: No oropharyngeal exudate  Eyes: EOM are normal  No scleral icterus  Neck: Neck supple  No JVD present  No tracheal deviation present  Cardiovascular: Normal rate, regular rhythm, S1 normal and S2 normal   Exam reveals no gallop and no friction rub  No murmur heard  Pulmonary/Chest: Effort normal  No accessory muscle usage or stridor  No tachypnea  No respiratory distress  He has no decreased breath sounds  He has no wheezes  He has rhonchi  He has no rales  He exhibits no tenderness  Abdominal: Soft  Bowel sounds are normal  He exhibits no distension  There is no tenderness  There is no rebound and no guarding  Musculoskeletal: He exhibits no edema or tenderness  Neurological: He is alert and oriented to person, place, and time  He has normal strength  GCS eye subscore is 4  GCS verbal subscore is 5  GCS motor subscore is 6  Skin: Skin is warm and dry  No abrasion, no ecchymosis, no lesion and no rash noted  He is not diaphoretic  No cyanosis or erythema  Nails show no clubbing  Psychiatric: He has a normal mood and affect  His speech is normal and behavior is normal    Vitals reviewed      Labs: Urine Strep and Legionella negative    Sputum Culture pending    Procalcitonin 1 18    MRSA Culture pending    Imaging and other studies: VBS pending

## 2018-07-17 NOTE — PROGRESS NOTES
Progress Note - Nas Reis  1940, 65 y o  male MRN: [id number]    Unit/Bed#: -01 Encounter: [id number]    Primary Care Provider: Yaritza Meyer DO   Date and time admitted to hospital: 7/15/2018 12:08 PM        * Right lower lobe pneumonia ([facility] [address] )   Assessment & Plan    · Noted on CXR (has documented history of RLL lobectomy?)  · Unclear etiology, HCAP vs aspiration  · Pulm following - discussed with them today  On IV Cefepime, Flagyl   · Respiratory protocol  · Was O2 as able  · Tessalon, mucinex  · Await final sputum culture  · MRSA swab pending  · Procalcitonin trending down  · CBC in AM          Acute respiratory failure with hypoxia (HCC)   Assessment & Plan    · O2 sat reportedly 87% on RA upon arrival to ED  · He was weaned off of HFNC yesterday  · Today he was satting 96% on 3L NC O2 and nursing was attempting to wean down to 2L        Dysphagia   Assessment & Plan    · On modified diet: dental soft, nectar thick liquids per SLP recs  · Aspiration precautions  · Patient went for VBS today - follow up results        Chronic obstructive pulmonary disease with acute lower respiratory infection ([facility] [address] )   Assessment & Plan    · Pulm following; I discussed with them today  · Scheduled nebs        Essential hypertension   Assessment & Plan    · /62  · Continue with current medications with holding parameters        History of cerebrovascular accident (CVA) with residual deficit   Assessment & Plan    · CVA 10 years ago (daughter reports may be more like 12 years)  · With chronic left-sided deficits  Patient denies new neurologic symptoms today   Confirmed with the patient's daughter over the phone today that he has chronic left-sided symptoms        GERD (gastroesophageal reflux disease)   Assessment & Plan    · Continue PPI  · OP follow up with GI        Lung cancer [facility])   Assessment & Plan    · S/p RLL resection per his pulmonologist's records, however there is RLL infiltrate on CXR?  · Pulm following        Hyperlipidemia   Assessment & Plan    · Continue statin        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   Assessment & Plan    · Paxil, Ativan        Tracheomalacia   Assessment & Plan    · Pulmonology following          VTE Pharmacologic Prophylaxis:   Pharmacologic: Heparin  Mechanical VTE Prophylaxis in Place: Yes    Patient Centered Rounds: I have performed bedside rounds with nursing staff today  Veronica Rodriguez    Discussions with Specialists or Other Care Team Provider: CM, pulmonology     Education and Discussions with Family / Patient:   I spoke with the patient at bedside  I also spoke with the patient's daughter, Stan Robbins, over the phone with an update  Answered all of her questions to the best of my ability  Time Spent for Care: 30 minutes  More than 50% of total time spent on counseling and coordination of care as described above  Current Length of Stay: 2 day(s)    Current Patient Status: Inpatient   Certification Statement: The patient will continue to require additional inpatient hospital stay due to Continued IV antibiotics    Discharge Plan:  Continue IV antibiotics  Await final Sputum culture  Wean oxygen as able  Discussed with pulmonology team, if patient continues to improve, potential discharge tomorrow  Will ask for PT and OT eval for safe discharge planning  Code Status: Level 1 - Full Code      Subjective:   Mr Carlena Lennox tells me that his breathing is still heavy today  Is coughing and tells me that his friends tell him that he sounds like a "sea jones"  Objective:     Vitals:   Temp (24hrs), Av 2 °F (37 3 °C), Min:98 2 °F (36 8 °C), Max:101 °F (38 3 °C)    HR:  [68-75] 68  Resp:  [18] 18  BP: (116-140)/(55-65) 128/62  SpO2:  [94 %-97 %] 96 %  Body mass index is 26 69 kg/m²  Input and Output Summary (last 24 hours):        Intake/Output Summary (Last 24 hours) at 18 1100  Last data filed at 18 0600   Gross per 24 hour Intake          2841 66 ml   Output              725 ml   Net          2116 66 ml       Physical Exam:     Physical Exam   Constitutional: He is oriented to person, place, and time  Patient seen sitting upright in bedside chair with nurse present  He is pleasant and cooperative  Eyes: Pupils are equal, round, and reactive to light  Cardiovascular: Normal rate and regular rhythm  Pulmonary/Chest: Effort normal    Coarse breath sounds bilaterally   Abdominal: Soft  Bowel sounds are normal  There is no tenderness  Musculoskeletal: He exhibits no edema  Neurological: He is alert and oriented to person, place, and time  Left corner of mouth does not raise when patient smiles  Eyebrows raise equally bilaterally  Left upper and left lower extremity weakness   Skin: Skin is warm  Psychiatric: He has a normal mood and affect  Vitals reviewed  Additional Data:     Labs:      Results from last 7 days  Lab Units 07/15/18  1714 07/15/18  1230   WBC Thousand/uL  --  10 50*   HEMOGLOBIN g/dL  --  15 1   HEMATOCRIT %  --  45 6   PLATELETS Thousands/uL 110* 123*   NEUTROS PCT %  --  89*   LYMPHS PCT %  --  4*   MONOS PCT %  --  7   EOS PCT %  --  0       Results from last 7 days  Lab Units 07/15/18  1230   SODIUM mmol/L 140   POTASSIUM mmol/L 4 1   CHLORIDE mmol/L 104   CO2 mmol/L 28   BUN mg/dL 28*   CREATININE mg/dL 1 38*   CALCIUM mg/dL 9 4   TOTAL PROTEIN g/dL 8 2   BILIRUBIN TOTAL mg/dL 0 80   ALK PHOS U/L 112   ALT U/L 23   AST U/L 17   GLUCOSE RANDOM mg/dL 130       Results from last 7 days  Lab Units 07/15/18  1230   INR  1 03                 * I Have Reviewed All Lab Data Listed Above  * Additional Pertinent Lab Tests Reviewed:  All Labs Within Last 24 Hours Reviewed    Imaging:    Imaging Reports Reviewed Today Include: None new  Imaging Personally Reviewed by Myself Includes:  None    Recent Cultures (last 7 days):       Results from last 7 days  Lab Units 07/15/18  2026 07/15/18  1840 07/15/18  1245 07/15/18  1230   BLOOD CULTURE   --   --  No Growth at 24 hrs  No Growth at 24 hrs     GRAM STAIN RESULT  1+ Epithelial Cells  3+ Polys  2+ Gram positive cocci in pairs and chains  Rare Gram negative rods  Rare Gram positive rods  1+ Budding yeast  --   --   --    LEGIONELLA URINARY ANTIGEN   --  Negative  --   --        Last 24 Hours Medication List:     Current Facility-Administered Medications:  acetaminophen 650 mg Oral Q6H Jessie Arthur PA-C    albuterol 2 5 mg Nebulization Q6H PRN Aleks Cabrales DO    amLODIPine 10 mg Oral Daily Jessie Arthur PA-C    aspirin 81 mg Oral Daily Jessie Arthur PA-C    atorvastatin 10 mg Oral Daily With [employer] Arthur PA-C    barium sulfate 1 tablet Oral Once in imaging Paul Curtis MD    benzonatate 200 mg Oral TID DIANA Mendes    calcium carbonate 1,000 mg Oral Daily PRN Jessie Arthur PA-C    carvedilol 12 5 mg Oral BID With Meals Jessie Arthur PA-C    cefepime 2,000 mg Intravenous Q12H Jessie Arthur PA-C Last Rate: 2,000 mg (07/16/18 2309)   clopidogrel 75 mg Oral Daily Jessie Arthur PA-C    docusate sodium 100 mg Oral BID Jessie Arthur PA-C    fluticasone 2 spray Nasal Daily Jessie Arthur PA-C    fluticasone-vilanterol 1 puff Inhalation Daily DIANA Mendes    guaiFENesin 1,200 mg Oral Q12H [address] Jessie Arthur PA-C    heparin (porcine) 5,000 Units Subcutaneous Q8H [address] Jessie Arthur PA-C    latanoprost 1 drop Both Eyes HS Jessie Arthur PA-C    levalbuterol 1 25 mg Nebulization TID DIANA Mendes    LORazepam 0 25 mg Oral HS Phyllis Nino PA-C    magnesium hydroxide 30 mL Oral Daily PRN Jessie Arthur PA-C    meclizine 25 mg Oral TID PRN Jessie Arthur PA-C    metroNIDAZOLE 500 mg Oral [facility] Abe Dow DO    ondansetron 4 mg Intravenous Q6H PRN Jessie Arthur PA-C    oxybutynin 5 mg Oral  Jessie BROWN LADY Arthur    pantoprazole 40 mg Oral Early Morning Jessie Arthur PA-C    PARoxetine 20 mg Oral Daily Jessie Arthur PA-C    polyethylene glycol 17 g Oral Daily PRN Jessie Arthur PA-C    psyllium 1 packet Oral Daily Jessie Arthur PA-C    sodium chloride 100 mL/hr Intravenous Continuous Jessie Arthur PA-C Last Rate: 100 mL/hr (07/17/18 0347)   sodium chloride 3 mL Nebulization TID Marshall Bautista MD    spironolactone 25 mg Oral Daily Jessie Arthur PA-C    tamsulosin 0 4 mg Oral Daily Jessie Arthur PA-C    tiotropium 18 mcg Inhalation Daily Jessie Arthur PA-C         Today, Patient Was Seen By: Deidra Zambrano PA-C    ** Please Note: Dictation voice to text software may have been used in the creation of this document   **

## 2018-07-17 NOTE — PLAN OF CARE
Problem: DISCHARGE PLANNING - CARE MANAGEMENT  Goal: Discharge to post-acute care or home with appropriate resources  INTERVENTIONS:  - Conduct assessment to determine patient/family and health care team treatment goals, and need for post-acute services based on payer coverage, community resources, and patient preferences, and barriers to discharge  - Address psychosocial, clinical, and financial barriers to discharge as identified in assessment in conjunction with the patient/family and health care team  - Arrange appropriate level of post-acute services according to patients   needs and preference and payer coverage in collaboration with the physician and health care team  - Communicate with and update the patient/family, physician, and health care team regarding progress on the discharge plan  - Arrange appropriate transportation to post-acute venues  Outcome: Progressing  LOS- 2 DAYS  PATIENT IS NOT A READMIT  PATIENT IS A BUNDLE : SIMPLE PNEUMONIA AND RESPIRATORY SNF LOS INFECTIONS SNF LOS 12-14  CM met with patient at bedside  Patient states he currently lives in an assisted living facility but states he will be moving in with his daughter in a few weeks  This new house is located in [facility] [place] [facility]   Patient states he uses a walker occasionally and receives assistance from a family friend 2 times a week  Per patient sometimes he needs some extra help but he isn't always able to receive the help he needs  CM suggested having VNA come out to help  Patient is agreeable  Patient uses the [facility] pharmacy and has prescription coverage  Patient's daughter is the POA and CM asked for a copy to be put on file  Patient relies on his daughters for transport  CM placed a referral thru [address] for [facility]  CM dept will continue to follow until d/c

## 2018-07-17 NOTE — PLAN OF CARE
METABOLIC, FLUID AND ELECTROLYTES - ADULT     Electrolytes maintained within normal limits Progressing     Fluid balance maintained Progressing        Potential for Falls     Patient will remain free of falls Progressing        Prexisting or High Potential for Compromised Skin Integrity     Skin integrity is maintained or improved Progressing        RESPIRATORY - ADULT     Achieves optimal ventilation and oxygenation Progressing

## 2018-07-17 NOTE — ASSESSMENT & PLAN NOTE
· Noted on CXR (has documented history of RLL lobectomy?)  · Unclear etiology, HCAP vs aspiration  · Pulm following - discussed with them today   On IV Cefepime, Flagyl   · Respiratory protocol  · Was O2 as able  · Tessalon, mucinex  · Await final sputum culture  · MRSA swab pending  · Procalcitonin trending down  · CBC in AM

## 2018-07-17 NOTE — H&P
History and physical was erroneously dictated as a progress note  Please see progress note by Juana Blanco and cosigned by myself on 07/15/2018      Dom Ortiz, DO

## 2018-07-18 LAB
ANION GAP SERPL CALCULATED.3IONS-SCNC: 7 MMOL/L (ref 4–13)
BACTERIA SPT RESP CULT: ABNORMAL
BACTERIA SPT RESP CULT: ABNORMAL
BUN SERPL-MCNC: 18 MG/DL (ref 5–25)
CALCIUM SERPL-MCNC: 9 MG/DL (ref 8.3–10.1)
CHLORIDE SERPL-SCNC: 107 MMOL/L (ref 100–108)
CO2 SERPL-SCNC: 26 MMOL/L (ref 21–32)
CREAT SERPL-MCNC: 1.25 MG/DL (ref 0.6–1.3)
ERYTHROCYTE [DISTWIDTH] IN BLOOD BY AUTOMATED COUNT: 14.4 % (ref 11.6–15.1)
GFR SERPL CREATININE-BSD FRML MDRD: 55 ML/MIN/1.73SQ M
GLUCOSE SERPL-MCNC: 95 MG/DL (ref 65–140)
GRAM STN SPEC: ABNORMAL
HCT VFR BLD AUTO: 39 % (ref 36.5–49.3)
HGB BLD-MCNC: 12.6 G/DL (ref 12–17)
MCH RBC QN AUTO: 27.9 PG (ref 26.8–34.3)
MCHC RBC AUTO-ENTMCNC: 32.3 G/DL (ref 31.4–37.4)
MCV RBC AUTO: 87 FL (ref 82–98)
PLATELET # BLD AUTO: 119 THOUSANDS/UL (ref 149–390)
PMV BLD AUTO: 11.1 FL (ref 8.9–12.7)
POTASSIUM SERPL-SCNC: 3.7 MMOL/L (ref 3.5–5.3)
RBC # BLD AUTO: 4.51 MILLION/UL (ref 3.88–5.62)
SODIUM SERPL-SCNC: 140 MMOL/L (ref 136–145)
WBC # BLD AUTO: 7.95 THOUSAND/UL (ref 4.31–10.16)

## 2018-07-18 PROCEDURE — 80048 BASIC METABOLIC PNL TOTAL CA: CPT | Performed by: PHYSICIAN ASSISTANT

## 2018-07-18 PROCEDURE — 92526 ORAL FUNCTION THERAPY: CPT

## 2018-07-18 PROCEDURE — 85027 COMPLETE CBC AUTOMATED: CPT | Performed by: PHYSICIAN ASSISTANT

## 2018-07-18 PROCEDURE — 94640 AIRWAY INHALATION TREATMENT: CPT

## 2018-07-18 PROCEDURE — 99233 SBSQ HOSP IP/OBS HIGH 50: CPT | Performed by: PHYSICIAN ASSISTANT

## 2018-07-18 PROCEDURE — 94760 N-INVAS EAR/PLS OXIMETRY 1: CPT

## 2018-07-18 PROCEDURE — 99232 SBSQ HOSP IP/OBS MODERATE 35: CPT | Performed by: INTERNAL MEDICINE

## 2018-07-18 RX ADMIN — POLYETHYLENE GLYCOL 3350 17 G: 17 POWDER, FOR SOLUTION ORAL at 17:14

## 2018-07-18 RX ADMIN — ASPIRIN 81 MG 81 MG: 81 TABLET ORAL at 08:50

## 2018-07-18 RX ADMIN — LATANOPROST 1 DROP: 50 SOLUTION OPHTHALMIC at 21:33

## 2018-07-18 RX ADMIN — FLUTICASONE FUROATE AND VILANTEROL TRIFENATATE 1 PUFF: 100; 25 POWDER RESPIRATORY (INHALATION) at 08:51

## 2018-07-18 RX ADMIN — HEPARIN SODIUM 5000 UNITS: 5000 INJECTION, SOLUTION INTRAVENOUS; SUBCUTANEOUS at 05:39

## 2018-07-18 RX ADMIN — CEFEPIME HYDROCHLORIDE 2000 MG: 2 INJECTION, POWDER, FOR SOLUTION INTRAVENOUS at 00:10

## 2018-07-18 RX ADMIN — LEVALBUTEROL HYDROCHLORIDE 1.25 MG: 1.25 SOLUTION, CONCENTRATE RESPIRATORY (INHALATION) at 20:17

## 2018-07-18 RX ADMIN — PANTOPRAZOLE SODIUM 40 MG: 40 TABLET, DELAYED RELEASE ORAL at 05:40

## 2018-07-18 RX ADMIN — ACETAMINOPHEN 650 MG: 325 TABLET, FILM COATED ORAL at 21:21

## 2018-07-18 RX ADMIN — OXYBUTYNIN CHLORIDE 5 MG: 5 TABLET, EXTENDED RELEASE ORAL at 21:21

## 2018-07-18 RX ADMIN — CLOPIDOGREL BISULFATE 75 MG: 75 TABLET ORAL at 08:48

## 2018-07-18 RX ADMIN — HEPARIN SODIUM 5000 UNITS: 5000 INJECTION, SOLUTION INTRAVENOUS; SUBCUTANEOUS at 21:21

## 2018-07-18 RX ADMIN — AMLODIPINE BESYLATE 10 MG: 10 TABLET ORAL at 08:50

## 2018-07-18 RX ADMIN — CARVEDILOL 12.5 MG: 12.5 TABLET, FILM COATED ORAL at 17:13

## 2018-07-18 RX ADMIN — METRONIDAZOLE 500 MG: 500 TABLET ORAL at 05:40

## 2018-07-18 RX ADMIN — CEFTRIAXONE 1000 MG: 1 INJECTION, POWDER, FOR SOLUTION INTRAMUSCULAR; INTRAVENOUS at 11:54

## 2018-07-18 RX ADMIN — ACETAMINOPHEN 650 MG: 325 TABLET, FILM COATED ORAL at 05:39

## 2018-07-18 RX ADMIN — ISODIUM CHLORIDE 3 ML: 0.03 SOLUTION RESPIRATORY (INHALATION) at 07:27

## 2018-07-18 RX ADMIN — LEVALBUTEROL HYDROCHLORIDE 1.25 MG: 1.25 SOLUTION, CONCENTRATE RESPIRATORY (INHALATION) at 07:27

## 2018-07-18 RX ADMIN — ATORVASTATIN CALCIUM 10 MG: 10 TABLET, FILM COATED ORAL at 17:14

## 2018-07-18 RX ADMIN — HEPARIN SODIUM 5000 UNITS: 5000 INJECTION, SOLUTION INTRAVENOUS; SUBCUTANEOUS at 14:29

## 2018-07-18 RX ADMIN — BENZONATATE 200 MG: 100 CAPSULE ORAL at 21:21

## 2018-07-18 RX ADMIN — PSYLLIUM HUSK 1 PACKET: 3.4 POWDER ORAL at 08:51

## 2018-07-18 RX ADMIN — DOCUSATE SODIUM 100 MG: 100 CAPSULE, LIQUID FILLED ORAL at 17:12

## 2018-07-18 RX ADMIN — SPIRONOLACTONE 25 MG: 25 TABLET, FILM COATED ORAL at 08:48

## 2018-07-18 RX ADMIN — ACETAMINOPHEN 650 MG: 325 TABLET, FILM COATED ORAL at 17:12

## 2018-07-18 RX ADMIN — ISODIUM CHLORIDE 3 ML: 0.03 SOLUTION RESPIRATORY (INHALATION) at 20:17

## 2018-07-18 RX ADMIN — GUAIFENESIN 1200 MG: 600 TABLET, EXTENDED RELEASE ORAL at 08:50

## 2018-07-18 RX ADMIN — TIOTROPIUM BROMIDE 18 MCG: 18 CAPSULE ORAL; RESPIRATORY (INHALATION) at 08:52

## 2018-07-18 RX ADMIN — BENZONATATE 200 MG: 100 CAPSULE ORAL at 17:12

## 2018-07-18 RX ADMIN — ACETAMINOPHEN 650 MG: 325 TABLET, FILM COATED ORAL at 11:54

## 2018-07-18 RX ADMIN — FLUTICASONE PROPIONATE 2 SPRAY: 50 SPRAY, METERED NASAL at 08:52

## 2018-07-18 RX ADMIN — SODIUM CHLORIDE 100 ML/HR: 0.9 INJECTION, SOLUTION INTRAVENOUS at 20:07

## 2018-07-18 RX ADMIN — DOCUSATE SODIUM 100 MG: 100 CAPSULE, LIQUID FILLED ORAL at 08:48

## 2018-07-18 RX ADMIN — GUAIFENESIN 1200 MG: 600 TABLET, EXTENDED RELEASE ORAL at 21:21

## 2018-07-18 RX ADMIN — LORAZEPAM 0.25 MG: 0.5 TABLET ORAL at 21:22

## 2018-07-18 RX ADMIN — BENZONATATE 200 MG: 100 CAPSULE ORAL at 08:48

## 2018-07-18 RX ADMIN — TAMSULOSIN HYDROCHLORIDE 0.4 MG: 0.4 CAPSULE ORAL at 17:14

## 2018-07-18 RX ADMIN — SODIUM CHLORIDE 100 ML/HR: 0.9 INJECTION, SOLUTION INTRAVENOUS at 07:12

## 2018-07-18 RX ADMIN — PAROXETINE HYDROCHLORIDE 20 MG: 20 TABLET, FILM COATED ORAL at 08:48

## 2018-07-18 RX ADMIN — CARVEDILOL 12.5 MG: 12.5 TABLET, FILM COATED ORAL at 08:48

## 2018-07-18 NOTE — SOCIAL WORK
CM returned call from Tim School, admissions director from [address] [facility]  Patient is unable to return to the facility on current diet, Dysphagia diet 3-Dental Soft with Nectar Thick Liquids  Per Tim School, patient would have to self maintain diet at facility  CM spoke with Dianna Mcdonald, the  at [address] [place] [address] regarding patient diet and returning to facility  Dianna Mcdonald stated that legally the facility cannot accommodate a Dental soft Nectar thick liquid diet  Dianna Mcdonald stated it is out of their guidelines and the patient would have to go to another Assisted living facility or SNF  Dianna Mcdonald is going to speak with Tim Cid from admissions and also call the daughter to update on patients status  Dianna Mcdonald will call CM back with an update  EMILIE will continue to f/u  Rita FLOOD aware

## 2018-07-18 NOTE — PROGRESS NOTES
Progress Note - Nas Reis  1940, 65 y o  male MRN: [id number]    Unit/Bed#: -01 Encounter: [id number]    Primary Care Provider: Yaritza Meyer DO   Date and time admitted to hospital: 7/15/2018 12:08 PM    * Right lower lobe pneumonia [facility])   Assessment & Plan    · Noted on CXR (has documented history of RLL lobectomy?)  · Unclear etiology, HCAP vs aspiration  · Pulm following-- decreased abx from cefepime/flagyl to ceftriaxone  · Respiratory protocol  · Was O2 as able-- check saturations while patient uses wheelchair (this is his baseline form of ambulation)  · Tessalon, mucinex  · Final sputum culture-- group B strep on sputum cx  · MRSA swab NEGATIVE  · Procalcitonin trending down  · CBC in AM          Acute respiratory failure with hypoxia (HCC)   Assessment & Plan    · O2 sat reportedly 87% on RA upon arrival to ED  · He was weaned off of HFNC yesterday  · Today he was satting 96% on 3L NC O2 and nursing was attempting to wean down to 2L  · Check saturations while patient is using wheelchair (he propels himself with his legs)        Chronic obstructive pulmonary disease with acute lower respiratory infection ([facility] [address] )   Assessment & Plan    · Pulm following; I discussed with them today  · Scheduled nebs        Dysphagia   Assessment & Plan    · On modified diet: dental soft, nectar thick liquids per SLP recs  · Aspiration precautions  · Patient went for VBS yesterday-- recommendations as follows  · Pt presents with mild-moderate oropharyngeal dysphagia characterized by reduced mastication and bolus formation/control, premature spillage to the pyriforms with all liquid consistencies and puree, delayed swallow initiation with reduced laryngeal elevation and penetration of thin, nectar, honey, and puree with no aspiration seen on this assessment  Pt has difficulty coordinating breathing and swallowing which puts him at additional risk for aspiration   Pt intermittently demonstrated a well coordinated swallow without penetration, although more frequently he demonstrated swallow delay with penetration  · soft/level 3 diet and nectar thick liquids, may need to consider level 2 if noted to fatigue with mastication  Although pt penetrated on all liquid consistencies (thin, nectar, and honey thick), nectar is recommended for improved bolus control, as pt is at higher risk for aspiration based on multiple factors  · Medications whole with liquid (nectar thick)  Lung cancer [facility])   Assessment & Plan    · S/p RLL resection per his pulmonologist's records, however there is RLL infiltrate on CXR?  · Pulm following        Tracheomalacia   Assessment & Plan    · Pulmonology following        History of cerebrovascular accident (CVA) with residual deficit   Assessment & Plan    · CVA 10 years ago (daughter reports may be more like 12 years)  · With chronic left-sided deficits  Patient denies new neurologic symptoms today  Confirmed with the patient's daughter over the phone today that he has chronic left-sided symptoms        Essential hypertension   Assessment & Plan    · BPs in acceptable range  · Continue with current medications with holding parameters        GERD (gastroesophageal reflux disease)   Assessment & Plan    · Continue PPI  · OP follow up with GI        Hyperlipidemia   Assessment & Plan    · Continue statin        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   Assessment & Plan    · Paxil, Ativan          VTE Pharmacologic Prophylaxis:   Pharmacologic: Heparin  Mechanical VTE Prophylaxis in Place: Yes    Patient Centered Rounds: I have performed bedside rounds with nursing staff today  Discussions with Specialists or Other Care Team Provider: case management, pulmonary    Education and Discussions with Family / Patient: patient, daughter updated via phone    Time Spent for Care: 30 minutes    More than 50% of total time spent on counseling and coordination of care as described above     Current Length of Stay: 3 day(s)    Current Patient Status: Inpatient   Certification Statement: The patient will continue to require additional inpatient hospital stay due to IV abx    Discharge Plan: likely d/c tomorrow-- needs PT/OT ambulatory saturations    Code Status: Level 1 - Full Code      Subjective:   Patient reports feeling well today  No chest pain  Feels his cough and SOB are better  Objective:     Vitals:   Temp (24hrs), Av 2 °F (36 8 °C), Min:97 6 °F (36 4 °C), Max:98 5 °F (36 9 °C)    HR:  [55-62] 55  Resp:  [16-18] 18  BP: (123-147)/(60-70) 123/60  SpO2:  [95 %-97 %] 96 %  Body mass index is 26 69 kg/m²  Input and Output Summary (last 24 hours): Intake/Output Summary (Last 24 hours) at 18 1247  Last data filed at 18 1235   Gross per 24 hour   Intake              100 ml   Output             1403 ml   Net            -1303 ml       Physical Exam:     Physical Exam   Constitutional: He is oriented to person, place, and time  No distress  HENT:   Head: Normocephalic and atraumatic  Mouth/Throat: No oropharyngeal exudate  Eyes: Conjunctivae and EOM are normal  Pupils are equal, round, and reactive to light  No scleral icterus  Neck: No JVD present  Cardiovascular: Normal rate and regular rhythm  Exam reveals no gallop and no friction rub  No murmur heard  Pulmonary/Chest: Effort normal  No respiratory distress  He has no wheezes  He has no rales  Decreased breath sounds   Abdominal: Soft  Bowel sounds are normal  He exhibits no distension  There is no tenderness  There is no rebound  Musculoskeletal: He exhibits no edema or tenderness  Neurological: He is alert and oriented to person, place, and time  He displays normal reflexes  No cranial nerve deficit  He exhibits normal muscle tone  Skin: Skin is warm and dry  No rash noted  He is not diaphoretic  No erythema  Psychiatric: He has a normal mood and affect   His behavior is normal  Additional Data:     Labs:      Results from last 7 days  Lab Units 07/18/18  0440  07/15/18  1230   WBC Thousand/uL 7 95  --  10 50*   HEMOGLOBIN g/dL 12 6  --  15 1   HEMATOCRIT % 39 0  --  45 6   PLATELETS Thousands/uL 119*  < > 123*   NEUTROS PCT %  --   --  89*   LYMPHS PCT %  --   --  4*   MONOS PCT %  --   --  7   EOS PCT %  --   --  0   < > = values in this interval not displayed  Results from last 7 days  Lab Units 07/18/18  0440  07/15/18  1230   SODIUM mmol/L 140  < > 140   POTASSIUM mmol/L 3 7  < > 4 1   CHLORIDE mmol/L 107  < > 104   CO2 mmol/L 26  < > 28   BUN mg/dL 18  < > 28*   CREATININE mg/dL 1 25  < > 1 38*   CALCIUM mg/dL 9 0  < > 9 4   TOTAL PROTEIN g/dL  --   --  8 2   BILIRUBIN TOTAL mg/dL  --   --  0 80   ALK PHOS U/L  --   --  112   ALT U/L  --   --  23   AST U/L  --   --  17   GLUCOSE RANDOM mg/dL 95  < > 130   < > = values in this interval not displayed  Results from last 7 days  Lab Units 07/15/18  1230   INR  1 03                 * I Have Reviewed All Lab Data Listed Above  * Additional Pertinent Lab Tests Reviewed: [facility]    Imaging:    Imaging Reports Reviewed Today Include: CXR, VBS  Imaging Personally Reviewed by Myself Includes:  CXR, VBS    Recent Cultures (last 7 days):       Results from last 7 days  Lab Units 07/15/18  2026 07/15/18  1840 07/15/18  1245 07/15/18  1230   BLOOD CULTURE   --   --  No Growth at 48 hrs  No Growth at 48 hrs     SPUTUM CULTURE  3+ Growth of Beta Hemolytic Streptococcus Group B*  1+ Growth of   --   --   --    GRAM STAIN RESULT  1+ Epithelial Cells  3+ Polys  2+ Gram positive cocci in pairs and chains  Rare Gram negative rods  Rare Gram positive rods  1+ Budding yeast  --   --   --    LEGIONELLA URINARY ANTIGEN   --  Negative  --   --        Last 24 Hours Medication List:     Current Facility-Administered Medications:  acetaminophen 650 mg Oral Q6H Jessie Arthur PA-C    albuterol 2 5 mg Nebulization Q6H PRN Chaz Moreno DO    amLODIPine 10 mg Oral Daily Jessie Arthur PA-C    aspirin 81 mg Oral Daily Jessie Arthur PA-C    atorvastatin 10 mg Oral Daily With Dinner Jessie Arthur PA-C    barium sulfate 1 tablet Oral Once in imaging Georgina Royal MD    benzonatate 200 mg Oral TID DIANA Mc    calcium carbonate 1,000 mg Oral Daily PRN Jessie Arthur PA-C    carvedilol 12 5 mg Oral BID With Meals Jessie Arthur PA-C    cefTRIAXone 1,000 mg Intravenous Q24H DIANA Mc Last Rate: 1,000 mg (07/18/18 1154)   clopidogrel 75 mg Oral Daily Jessie Arthur PA-C    docusate sodium 100 mg Oral BID Jessie Arthur PA-C    fluticasone 2 spray Nasal Daily Jessie Arthur PA-C    fluticasone-vilanterol 1 puff Inhalation Daily DIANA Mc    guaiFENesin 1,200 mg Oral Q12H [address] Jessie Arthur PA-C    heparin (porcine) 5,000 Units Subcutaneous Q8H [address] Jessie Arthur PA-C    latanoprost 1 drop Both Eyes HS Jessie Arthur PA-C    levalbuterol 1 25 mg Nebulization TID DIANA Mc    LORazepam 0 25 mg Oral HS Phyllis Nino PA-C    magnesium hydroxide 30 mL Oral Daily PRN Jessie Arthur PA-C    meclizine 25 mg Oral TID PRN Jessie Arthur PA-C    ondansetron 4 mg Intravenous Q6H PRN Jessie Arthur PA-C    oxybutynin 5 mg Oral HS Jessie Arthur PA-C    pantoprazole 40 mg Oral Early Morning Jessie Arthur PA-C    PARoxetine 20 mg Oral Daily Jessie Arthur PA-C    polyethylene glycol 17 g Oral Daily PRN Jessie Arthur PA-C    psyllium 1 packet Oral Daily Jessie Arthur PA-C    sodium chloride 100 mL/hr Intravenous Continuous Jessie Arthur PA-C Last Rate: 100 mL/hr (07/18/18 1148)   sodium chloride 3 mL Nebulization TID Georgina Royal MD    spironolactone 25 mg Oral Daily Jessie Arthur PA-C    tamsulosin 0 4 mg Oral Daily Jessie Arthur, LADY    tiotropium 18 mcg Inhalation Daily Jessie Arthur PA-C         Today, Patient Was Seen By: Gaudencio Reeves PA-C    ** Please Note: Dictation voice to text software may have been used in the creation of this document   **

## 2018-07-18 NOTE — ASSESSMENT & PLAN NOTE
· On modified diet: dental soft, nectar thick liquids per SLP recs  · Aspiration precautions  · Patient went for VBS yesterday-- recommendations as follows  · Pt presents with mild-moderate oropharyngeal dysphagia characterized by reduced mastication and bolus formation/control, premature spillage to the pyriforms with all liquid consistencies and puree, delayed swallow initiation with reduced laryngeal elevation and penetration of thin, nectar, honey, and puree with no aspiration seen on this assessment  Pt has difficulty coordinating breathing and swallowing which puts him at additional risk for aspiration  Pt intermittently demonstrated a well coordinated swallow without penetration, although more frequently he demonstrated swallow delay with penetration  · soft/level 3 diet and nectar thick liquids, may need to consider level 2 if noted to fatigue with mastication  Although pt penetrated on all liquid consistencies (thin, nectar, and honey thick), nectar is recommended for improved bolus control, as pt is at higher risk for aspiration based on multiple factors  · Medications whole with liquid (nectar thick)

## 2018-07-18 NOTE — SPEECH THERAPY NOTE
Speech Language/Pathology    Speech/Language Pathology Progress Note    Patient Name: Magda Hernandez  Today's Date: 7/18/2018      Subjective:  "I'm feeling much better," pt is not on O2 today  Seen for tx sitting up in chair at bedside  Objective:  Diagnostic dysphagia therapy provided for analysis of tolerance of NTL and dysphagia 3 soft to chew diet  Pt seen at lunch with meat loaf, mashed potatoes, cookies, and NTL apple juice  Pt tolerated NTL via single straw sips without s/s aspiration  Breathing is far less labored today and pt did not experience violent coughing episodes as he has been in recent sessions  Mastication is prolonged for soft to chew textures (i e  meat loaf)  Mild delayed cough present intermittently throughout session today, expected to be unrelated to swallowing however will follow up for 1 visit to ensure consistency of diet tolerance and r/o need to downgrade to dental soft dysphagia 2  Reviewed results of yesterday's VBS with pt, including reasoning for recommending he continue with NTL  Pt reports his daughter has already ordered thickening supplies on [employer] and he plans to continue with nectar thick liquids  Assessment:  Pt appeared overall much improved today  Suspect improved swallow coordination as a result of less labored respiration and absence of violent coughing episodes  Plan/Recommendations:  Continue current diet at this time, Recommend brief ST follow up for consistency/efficiency of diet tolerance

## 2018-07-18 NOTE — SOCIAL WORK
CM spoke with daughter Rebekah Spine via telephone  Patient daughter updated the plan of care for patient  Daughter would like referrals sent to 1  [facility] 2  [facility] 3  [facility]  Daughter stated she closes on her new home on August 9th and then feels she would be able to take care of her father  Daughter is agreeable to PT evaluating her father and then possible placement to a SNF facility  CM will update patient and call daughter Rebekah Spine tomorrow to f/u

## 2018-07-18 NOTE — ASSESSMENT & PLAN NOTE
· Noted on CXR (has documented history of RLL lobectomy?)  · Unclear etiology, HCAP vs aspiration  · Pulm following-- decreased abx from cefepime/flagyl to ceftriaxone  · Respiratory protocol  · Was O2 as able-- check saturations while patient uses wheelchair (this is his baseline form of ambulation)  · Tessalon, mucinex  · Final sputum culture-- group B strep on sputum cx  · MRSA swab NEGATIVE  · Procalcitonin trending down  · CBC in AM

## 2018-07-18 NOTE — ASSESSMENT & PLAN NOTE
· O2 sat reportedly 87% on RA upon arrival to ED  · He was weaned off of HFNC yesterday  · Today he was satting 96% on 3L NC O2 and nursing was attempting to wean down to 2L  · Check saturations while patient is using wheelchair (he propels himself with his legs)

## 2018-07-18 NOTE — PROGRESS NOTES
Progress Note - Pulmonary   Wally Sanchez  65 y o  male MRN: [id number]  Unit/Bed#: -01 Encounter: [id number]    Assessment/Plan:    Acute hypoxic respiratory failure (improved) due to abnormal chest x-ray:  Right basilar aspiration pneumonia/HCAP              Titrate oxygen to maintain POX > or = 88%  Will need ambulatory POX at baseline activity prior to D/C to ensure no supplemental oxygen needs  Discussed with Internal Medicine               OOB as tolerated               Pulmonary hygiene with airway clearance protocol and IS              Continue antibiotics per primary team to complete seven day course  De-escalated from cefepime and Flagyl to ceftriaxone               Continue Mucinex and Tessalon      COPD of unknown severity without acute exacerbation              At discharge, continue Advair, Spiriva, and albuterol as needed      Dysphagia with history of aspiration              Aspiration precautions and diet modifications      GERD              Continue PPI      Chronic cough              Management as above      Lung Cancer S/P RLL Lobectomy              Corey Person is unsure of where or when this was, but it is more than 10 years ago      Outpatient follow-up as per discharge instructions with chest x-ray prior   Discussed with Internal Medicine  Will sign off  Please call with further questions or concerns  Chief Complaint:    I feel like I am getting better      Subjective:    Corey Person is sitting out of bed in the chair  He reports his breathing and cough are improved  He denies any shortness of breath  He denies chest pain  He is eager to go home  Objective:    Vitals: Blood pressure 123/60, pulse 55, temperature 97 6 °F (36 4 °C), temperature source Oral, resp  rate 18, weight 102 kg (225 lb 1 4 oz), SpO2 96 %  1 liter nasal cannula,Body mass index is 26 69 kg/m²        Intake/Output Summary (Last 24 hours) at 07/18/18 1227  Last data filed at 07/18/18 0909   Gross per 24 hour   Intake              100 ml   Output             1403 ml   Net            -1303 ml       Invasive Devices     Peripheral Intravenous Line            Peripheral IV 07/18/18 Right Arm less than 1 day                Physical Exam:     Physical Exam   Constitutional: He is oriented to person, place, and time  He appears well-developed  He is cooperative  Non-toxic appearance  No distress  HENT:   Head: Normocephalic and atraumatic  Mouth/Throat: No oropharyngeal exudate  Eyes: EOM are normal  No scleral icterus  Neck: Neck supple  No JVD present  No tracheal deviation present  Cardiovascular: Normal rate, regular rhythm, S1 normal and S2 normal   Exam reveals no gallop and no friction rub  No murmur heard  Pulmonary/Chest: Effort normal and breath sounds normal  No accessory muscle usage or stridor  No tachypnea  No respiratory distress  He has no decreased breath sounds  He has no wheezes  He has no rhonchi  He has no rales  He exhibits no tenderness  Abdominal: Soft  Bowel sounds are normal  He exhibits no distension  There is no tenderness  There is no rebound and no guarding  Musculoskeletal: He exhibits no edema or tenderness  Neurological: He is alert and oriented to person, place, and time  He has normal strength  GCS eye subscore is 4  GCS verbal subscore is 5  GCS motor subscore is 6  Skin: Skin is warm and dry  No abrasion, no ecchymosis, no lesion and no rash noted  He is not diaphoretic  No cyanosis or erythema  Nails show no clubbing  Psychiatric: He has a normal mood and affect  His speech is normal and behavior is normal    Vitals reviewed        Labs:   CBC:   Lab Results   Component Value Date    WBC 7 95 07/18/2018    HGB 12 6 07/18/2018    HCT 39 0 07/18/2018    MCV 87 07/18/2018     (L) 07/18/2018    MCH 27 9 07/18/2018    MCHC 32 3 07/18/2018    RDW 14 4 07/18/2018    MPV 11 1 07/18/2018   , CMP:   Lab Results   Component Value Date     07/18/2018    K 3 7 07/18/2018     07/18/2018    CO2 26 07/18/2018    ANIONGAP 7 07/18/2018    BUN 18 07/18/2018    CREATININE 1 25 07/18/2018    GLUCOSE 95 07/18/2018    CALCIUM 9 0 07/18/2018    EGFR 55 07/18/2018   , sputum culture shows 3+ beta-hemolytic strep group B  MRSA culture negative    Imaging and other studies: None today

## 2018-07-19 VITALS
RESPIRATION RATE: 18 BRPM | OXYGEN SATURATION: 95 % | SYSTOLIC BLOOD PRESSURE: 115 MMHG | DIASTOLIC BLOOD PRESSURE: 60 MMHG | HEART RATE: 73 BPM | BODY MASS INDEX: 26.69 KG/M2 | WEIGHT: 225.09 LBS | TEMPERATURE: 98.7 F

## 2018-07-19 PROCEDURE — G8979 MOBILITY GOAL STATUS: HCPCS

## 2018-07-19 PROCEDURE — 99239 HOSP IP/OBS DSCHRG MGMT >30: CPT | Performed by: PHYSICIAN ASSISTANT

## 2018-07-19 PROCEDURE — 94640 AIRWAY INHALATION TREATMENT: CPT

## 2018-07-19 PROCEDURE — 94760 N-INVAS EAR/PLS OXIMETRY 1: CPT

## 2018-07-19 PROCEDURE — 94668 MNPJ CHEST WALL SBSQ: CPT

## 2018-07-19 PROCEDURE — 97163 PT EVAL HIGH COMPLEX 45 MIN: CPT

## 2018-07-19 PROCEDURE — G8978 MOBILITY CURRENT STATUS: HCPCS

## 2018-07-19 PROCEDURE — 92526 ORAL FUNCTION THERAPY: CPT

## 2018-07-19 RX ORDER — LORAZEPAM 0.5 MG/1
0.25 TABLET ORAL
Qty: 10 TABLET | Refills: 0 | Status: SHIPPED | OUTPATIENT
Start: 2018-07-19 | End: 2018-08-22 | Stop reason: SDUPTHER

## 2018-07-19 RX ORDER — CEFUROXIME AXETIL 500 MG/1
500 TABLET ORAL EVERY 12 HOURS SCHEDULED
Qty: 4 TABLET | Refills: 0 | Status: SHIPPED | OUTPATIENT
Start: 2018-07-19 | End: 2018-07-21

## 2018-07-19 RX ADMIN — ISODIUM CHLORIDE 3 ML: 0.03 SOLUTION RESPIRATORY (INHALATION) at 13:21

## 2018-07-19 RX ADMIN — ACETAMINOPHEN 650 MG: 325 TABLET, FILM COATED ORAL at 15:48

## 2018-07-19 RX ADMIN — SODIUM CHLORIDE 100 ML/HR: 0.9 INJECTION, SOLUTION INTRAVENOUS at 06:19

## 2018-07-19 RX ADMIN — ATORVASTATIN CALCIUM 10 MG: 10 TABLET, FILM COATED ORAL at 15:48

## 2018-07-19 RX ADMIN — SPIRONOLACTONE 25 MG: 25 TABLET, FILM COATED ORAL at 08:43

## 2018-07-19 RX ADMIN — PAROXETINE HYDROCHLORIDE 20 MG: 20 TABLET, FILM COATED ORAL at 08:43

## 2018-07-19 RX ADMIN — ASPIRIN 81 MG 81 MG: 81 TABLET ORAL at 08:43

## 2018-07-19 RX ADMIN — ACETAMINOPHEN 650 MG: 325 TABLET, FILM COATED ORAL at 05:09

## 2018-07-19 RX ADMIN — HEPARIN SODIUM 5000 UNITS: 5000 INJECTION, SOLUTION INTRAVENOUS; SUBCUTANEOUS at 05:11

## 2018-07-19 RX ADMIN — LEVALBUTEROL HYDROCHLORIDE 1.25 MG: 1.25 SOLUTION, CONCENTRATE RESPIRATORY (INHALATION) at 07:15

## 2018-07-19 RX ADMIN — ONDANSETRON 4 MG: 2 INJECTION INTRAMUSCULAR; INTRAVENOUS at 03:34

## 2018-07-19 RX ADMIN — PANTOPRAZOLE SODIUM 40 MG: 40 TABLET, DELAYED RELEASE ORAL at 05:11

## 2018-07-19 RX ADMIN — FLUTICASONE FUROATE AND VILANTEROL TRIFENATATE 1 PUFF: 100; 25 POWDER RESPIRATORY (INHALATION) at 08:44

## 2018-07-19 RX ADMIN — GUAIFENESIN 1200 MG: 600 TABLET, EXTENDED RELEASE ORAL at 08:43

## 2018-07-19 RX ADMIN — BENZONATATE 200 MG: 100 CAPSULE ORAL at 08:43

## 2018-07-19 RX ADMIN — ISODIUM CHLORIDE 3 ML: 0.03 SOLUTION RESPIRATORY (INHALATION) at 20:06

## 2018-07-19 RX ADMIN — TAMSULOSIN HYDROCHLORIDE 0.4 MG: 0.4 CAPSULE ORAL at 15:48

## 2018-07-19 RX ADMIN — AMLODIPINE BESYLATE 10 MG: 10 TABLET ORAL at 08:43

## 2018-07-19 RX ADMIN — CARVEDILOL 12.5 MG: 12.5 TABLET, FILM COATED ORAL at 08:43

## 2018-07-19 RX ADMIN — CLOPIDOGREL BISULFATE 75 MG: 75 TABLET ORAL at 08:43

## 2018-07-19 RX ADMIN — HEPARIN SODIUM 5000 UNITS: 5000 INJECTION, SOLUTION INTRAVENOUS; SUBCUTANEOUS at 13:27

## 2018-07-19 RX ADMIN — FLUTICASONE PROPIONATE 2 SPRAY: 50 SPRAY, METERED NASAL at 08:44

## 2018-07-19 RX ADMIN — CEFTRIAXONE 1000 MG: 1 INJECTION, POWDER, FOR SOLUTION INTRAMUSCULAR; INTRAVENOUS at 11:25

## 2018-07-19 RX ADMIN — BENZONATATE 200 MG: 100 CAPSULE ORAL at 15:48

## 2018-07-19 RX ADMIN — LEVALBUTEROL HYDROCHLORIDE 1.25 MG: 1.25 SOLUTION, CONCENTRATE RESPIRATORY (INHALATION) at 20:06

## 2018-07-19 RX ADMIN — CARVEDILOL 12.5 MG: 12.5 TABLET, FILM COATED ORAL at 15:48

## 2018-07-19 RX ADMIN — TIOTROPIUM BROMIDE 18 MCG: 18 CAPSULE ORAL; RESPIRATORY (INHALATION) at 08:44

## 2018-07-19 RX ADMIN — LEVALBUTEROL HYDROCHLORIDE 1.25 MG: 1.25 SOLUTION, CONCENTRATE RESPIRATORY (INHALATION) at 13:20

## 2018-07-19 RX ADMIN — ISODIUM CHLORIDE 3 ML: 0.03 SOLUTION RESPIRATORY (INHALATION) at 07:15

## 2018-07-19 NOTE — SPEECH THERAPY NOTE
Speech Language/Pathology    Speech/Language Pathology Progress Note    Patient Name: Will Chavarria  Today's Date: 7/19/2018       Subjective:  Pt seen for dysphagia tx at lunch  Pt sitting OOB in chair  Breathing is much improved  Spoke w/ nsg  Objective:  Pt self fed mac and cheese, peaches and drank NTL by straw  Pt eating slowly  Mastication, manipulation and transfer appeared WNL  Pt w/ good oral control of NTL by straw  Swallows timely and complete  No coughing, choking, or wet voice noted  Discussed w/ pt, if resp status remains stable and unlabored, will re-assess to advance to thin liquids tomorrow  Assessment:  Pt tolerating dysphagia 3 diet w/ nectar thick liquids by straw  Resp status much improved, less fatigue noted while eating  Plan/Recommendations:   Will cont to follow, re-assess for thin liquids

## 2018-07-19 NOTE — PHYSICAL THERAPY NOTE
PHYSICAL THERAPY EVALUATION  NAME:  Saqib Amin  DATE: 07/19/18    AGE:   65 y o  Mrn:   [id number]  ADMIT DX:  Aspiration pneumonia ([facility] ) [J69 0]  Asthenia [R53 1]  Weakness [R53 1]  Acute kidney injury ([facility] ) [N17 9]    Past Medical History:   Diagnosis Date    RONAL (acute kidney injury) ([facility] ) 5/31/2017    Aspiration into respiratory tract     Chest pain 5/31/2017    COPD (chronic obstructive pulmonary disease) (HCC)     Depression     Elevated troponin 5/31/2017    GERD (gastroesophageal reflux disease)     History of CVA (cerebrovascular accident) 4/13/2016    Hyperlipidemia     Hypertension     Lung cancer ([place] [address] ) 2005    Right, status post lobectomy    Pneumonia     Prostate cancer ([place] [address] )     Sleep apnea     awaiting sleep study results    Stroke ([place] [address] )     Weakness due to cerebrovascular accident (CVA)      Length Of Stay: 4  Performed at least 2 patient identifiers during session: Name and Birthday  PHYSICAL THERAPY EVALUATION :     07/19/18 1200   Note Type   Note type Eval only   Pain Assessment   Pain Assessment No/denies pain   Home Living   Type of Home Assisted living  ([facility])   Home Layout One level;Elevator  (his room on 3rd floor and meals are on first floor)   [address] [facility]; Wheelchair-manual; L AFO   Prior Function   Level of Angelina Needs assistance with ADLs and functional mobility   ADL Assistance Needs assistance   IADLs Needs assistance   Falls in the last 6 months 0   Restrictions/Precautions   Weight Bearing Precautions Per Order No   Other Precautions Bed Alarm; Chair Alarm;Cognitive; Impulsive;O2;Fall Risk   General   Family/Caregiver Present No   Cognition   Overall Cognitive Status Impaired   Arousal/Participation Alert   Orientation Level Oriented to person;Oriented to place   Following Commands Follows one step commands with increased time or repetition   RUE Strength   RUE Overall Strength Within Functional Limits - able to perform ADL tasks with strength   LUE Strength   LUE Overall Strength Within Functional Limits - able to perform ADL tasks with strength   Strength RLE   R Hip Flexion 4/5   R Knee Extension 4/5   R Ankle Dorsiflexion 4/5   Strength LLE   L Hip Flexion 4-/5   L Knee Extension 3-/5   L Ankle Dorsiflexion 3/5   Coordination   Movements are Fluid and Coordinated 0   Coordination and Movement Description tremors noted RUE dysmetria on LLE   Light Touch   RLE Light Touch Not tested   LLE Light Touch Not tested   Transfers   Sit to Stand 3  Moderate assistance   Additional items Assist x 2; Increased time required;Verbal cues;Armrests   Stand to Sit 3  Moderate assistance   Additional items Assist x 2; Increased time required;Verbal cues;Armrests   Ambulation/Elevation   Gait pattern Excessively slow; Step to; Inconsistent sariah;Decreased L stance;L Foot drag  (R Lean)   Gait Assistance 3  Moderate assist   Additional items Verbal cues; Tactile cues; Assist x 3  (A of 2 @ sides, and third A for chair follow  )   Assistive Device Rolling walker; L AFO   Distance 8'   Balance   Static Sitting Good   Static Standing Poor +   Ambulatory Zero   Endurance Deficit   Endurance Deficit Yes   Endurance Deficit Description limited amb distance   Activity Tolerance   Activity Tolerance Patient limited by fatigue   Medical Staff Made Aware spoke to Kaila Hadley from case management   Nurse Made Aware spoke to KINZA RETREAT   Assessment   Prognosis Fair   Problem List Decreased strength;Decreased range of motion;Decreased endurance; Impaired balance;Decreased mobility; Decreased coordination; Impaired judgement;Decreased safety awareness; Obesity  (gait deviations)   Barriers to Discharge Decreased caregiver support   Goals   Patient Goals to eventually go back to [facility]    [facility] Expiration Date 07/29/18   Treatment Day 0   Plan   Treatment/Interventions Functional transfer training;LE strengthening/ROM; Endurance training; Therapeutic exercise; Bed mobility;Gait training;Equipment eval/education;Patient/family training;Cognitive reorientation;Spoke to nursing;Spoke to case management   PT Frequency (3-5x/wk)   Recommendation   Recommendation Post acute IP rehab   Equipment Recommended [employer]; Wheelchair   Barthel Index   Feeding 5   Bathing 0   Grooming Score 0   Dressing Score 0   Bladder Score 0   Bowels Score 10   Toilet Use Score 5   Transfers (Bed/Chair) Score 5   Mobility (Level Surface) Score 0   Stairs Score 0   Barthel Index Score 25   (Please find full objective findings from PT assessment regarding body systems outlined above)  Assessment: Pt is a 65 y o  male seen for PT evaluation s/p admit to [address] [place] [address] on 7/15/2018 w/ Right lower lobe pneumonia ([address] )  Order placed for PT  Prior to admission, pt required A for mobility and used WC for primary means for mobility  Per prior PT eval information 2/15/2018: Ax1-2 PTA at [facility] for bed skills and transfers OOB to W/C- pt reports having A for all functional transfers consisting of 1-2 people and reports "needs alot of help there"- pt has been nonambulatory since CVA and self propels w/c w/ B/L LE's for 1* mobility at facility- receives both PT/ OT at [facility] presently   Upon evaluation: Pt requires 2-person assistance for transfers and 2-3 person assistance for ambulation with rolling walker  Pt's clinical presentation is currently unstable/unpredictable given the functional mobility deficits above, especially weakness, decreased ROM, decreased skin integrity, decreased endurance, gait deviations, decreased activity tolerance, decreased functional mobility tolerance and decreased safety awareness, coupled with fall risks including impulsivity, impaired balance and impaired coordination, and combined with medical complications of multiple readmissions  Pt to benefit from continued skilled PT tx while in hospital and upon DC to address deficits as defined above and maximize level of functional mobility   From PT/mobility standpoint, recommendation at time of d/c would be inpatient rehab pending progress in order to maximize pt's functional independence and consistency w/ mobility in order to facilitate return to PLOF  Recommend trial with walker next 1-2 sessions, trial with WC next 1-2 sessions and ther ex next 1-2 sessions  Goals: Pt will: Perform bed mobility tasks to Supervision to decrease burden of care and improve ease of bed mobility  Perform sit to stand and pivot transfers to consistent A Of 1 to decrease burden of care and improve ease of transfers  Perform ambulation with rolling walker for 25' to  A Of 1  to decrease burden of care and improve ability to perform upright tasks at home  Propel WC for > 300' modified I and indep brakes and legrests to decrease burden of care and increase Indep in prior living environment  The following objective measures were performed on IE: Barthel Index 25/100  Comorbidities affecting pt's physical performance at time of assessment include: HTN, CVA, COPD, cancer history and/or treatment and TKR, lobectomy, aspiration  Personal factors affecting pt at time of IE include: depression, limited home support, past experience, inability to perform IADLs, inability to perform ADLs and limited insight into impairments       Piero Hussein, PT, DPT

## 2018-07-19 NOTE — PLAN OF CARE

## 2018-07-19 NOTE — PLAN OF CARE
DISCHARGE PLANNING - CARE MANAGEMENT     Discharge to post-acute care or home with appropriate resources Progressing        METABOLIC, FLUID AND ELECTROLYTES - ADULT     Electrolytes maintained within normal limits Progressing     Fluid balance maintained Progressing        Nutrition/Hydration-ADULT     Nutrient/Hydration intake appropriate for improving, restoring or maintaining nutritional needs Progressing        Potential for Falls     Patient will remain free of falls Progressing        Prexisting or High Potential for Compromised Skin Integrity     Skin integrity is maintained or improved Progressing        RESPIRATORY - ADULT     Achieves optimal ventilation and oxygenation Progressing

## 2018-07-19 NOTE — DISCHARGE SUMMARY
Discharge- Alla Jackson  1940, 65 y o  male MRN: [id number]    Unit/Bed#: -01 Encounter: [id number]    Primary Care Provider: Mode Pierre DO   Date and time admitted to hospital: 7/15/2018 12:08 PM    * Right lower lobe pneumonia ([facility] )   Assessment & Plan    · Noted on CXR (has documented history of RLL lobectomy?)  · Unclear etiology, HCAP vs aspiration  · Pulm following-- decreased abx from cefepime/flagyl to ceftriaxone-- transition to ceftin 500mg PO BID x2 days   · Tessalon, mucinex-- home medications  · Resume advair, spiriva, albuterol PRN  · Final sputum culture-- group B strep on sputum cx  · MRSA swab NEGATIVE          Acute respiratory failure with hypoxia ([facility] )   Assessment & Plan    · Patient has not been requiring O2 for the last 48 hours  · Ambulatory saturations were checked (patient uses wheelchair)-- no saturations below 91% on RA-- will not require O2        Chronic obstructive pulmonary disease with acute lower respiratory infection (HCC)   Assessment & Plan    · Nebs PRN        Dysphagia   Assessment & Plan    · On modified diet: dental soft, nectar thick liquids per SLP recs  · Aspiration precautions  · Patient went for VBS yesterday-- recommendations as follows  · Pt presents with mild-moderate oropharyngeal dysphagia characterized by reduced mastication and bolus formation/control, premature spillage to the pyriforms with all liquid consistencies and puree, delayed swallow initiation with reduced laryngeal elevation and penetration of thin, nectar, honey, and puree with no aspiration seen on this assessment  Pt has difficulty coordinating breathing and swallowing which puts him at additional risk for aspiration  Pt intermittently demonstrated a well coordinated swallow without penetration, although more frequently he demonstrated swallow delay with penetration     · soft/level 3 diet and nectar thick liquids, may need to consider level 2 if noted to fatigue with mastication  Although pt penetrated on all liquid consistencies (thin, nectar, and honey thick), nectar is recommended for improved bolus control, as pt is at higher risk for aspiration based on multiple factors  · Medications whole with liquid (nectar thick)  Lung cancer [facility])   Assessment & Plan    · S/p RLL resection per his pulmonologist's records        Tracheomalacia   Assessment & Plan    · Pulmonary follow up as an outpatient         History of cerebrovascular accident (CVA) with residual deficit   Assessment & Plan    · CVA 10 years ago (daughter reports may be more like 12 years)  · With chronic left-sided deficits  Patient denies new neurologic symptoms today   Confirmed with the patient's daughter over the phone that he has chronic left-sided symptoms        Essential hypertension   Assessment & Plan    · BPs in acceptable range  · Continue with current medications with holding parameters        GERD (gastroesophageal reflux disease)   Assessment & Plan    · Continue PPI  · OP follow up with GI        Hyperlipidemia   Assessment & Plan    · Continue statin        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   Assessment & Plan    · Patient's mental health behaviors have been stable since his admission  · He can continue his current regimen of paxil and ativan  · Patient will not require more than 30 days at [facility]-- He is moving in with his Daughter on August 9th           Discharging Physician / Practitioner: Jaylon Bella PA-C  PCP: Margarita Jay DO  Admission Date:   Admission Orders     Ordered        07/15/18 1406  Inpatient Admission (expected length of stay for this patient is greater than two midnights)  Once             Discharge Date: 07/19/18    Resolved Problems  Date Reviewed: 7/19/2018    None          Consultations During Hospital Stay:  · Pulmonlogy-- Dr Wilver Johnson    Procedures Performed:   · CXR  · Barium Swallow    Significant Findings / Test Results:   · CXR  · New right lower lobe infiltrate consistent with pneumonia  · Barium Swallow  · Pt presents with mild-moderate oropharyngeal dysphagia characterized by reduced mastication and bolus formation/control, premature spillage to the pyriforms with all liquid consistencies and puree, delayed swallow initiation with reduced laryngeal elevation and penetration of thin, nectar, honey, and puree with no aspiration seen on this assessment  Pt has difficulty coordinating breathing and swallowing which puts him at additional risk for aspiration  Pt intermittently demonstrated a well coordinated swallow without penetration, although more frequently he demonstrated swallow delay with penetration  · soft/level 3 diet and nectar thick liquids, may need to consider level 2 if noted to fatigue with mastication  Although pt penetrated on all liquid consistencies (thin, nectar, and honey thick), nectar is recommended for improved bolus control, as pt is at higher risk for aspiration based on multiple factors  · Medications whole with liquid (nectar thick)    Incidental Findings:   · none     Test Results Pending at Discharge (will require follow up):   · none     Outpatient Tests Requested:  · CXR in 1 month    Complications:  none    Reason for Admission: pneumonia     Hospital Course:     Ghislaine Starks  is a 65 y o  male patient who originally presented to the hospital on 7/15/2018 acute hypoxic respiratory failure  At his nursing home patient is found to have increased cough and they were concerned for aspiration  On arrival to the ED, patient oxygen saturations 84% on room air  He is found to have a new right lower lobe pneumonia  Patient was started on nasal cannula oxygen and cefepime and Flagyl to cover for possible aspiration  Overnight, patient was placed on high-flow nasal cannula for hypoxia  He was evaluated by pulmonary service on day 1 of hospitalization    At this time they recommended continuing cefepime and Flagyl until cultures were obtained  They were able to to wean him to 5 L nasal cannula on hospital day 1  Patient had a video barium swallow performed on day 2 of hospitalization  The and barium swallow indicated that patient does not aspirate  However, in order to maintain control of his swallow they recommended a dental soft diet with nectar thick liquids  Patient continued to improve throughout hospitalization requiring less oxygen  On hospital day 3, his antibiotics were changed from cefepime and Flagyl ceftriaxone  Upon conversation with case management, patient would be unable to go back to his previous living arrangements if he needed to follow a dental soft/nectar thick liquid diet  In discussion with patient's family were, they were hoping patient will qualify for short-term rehab that he go to a place for a few weeks until she purchased her new home and patient was able to move in with her  On day of discharge, patient was evaluated by Physical therapy who recommended short-term rehabilitation  Patient was accepted [facility] for short-term rehabilitation  Patient will be discharged on following medications:  Ceftin 250 mg p o  b i d  for 2 days to complete a total of 7 days of treatment with antibiotics  He can resume his Spiriva, Advair and p r n  albuterol  He can also resume Tessalon Perles and Mucinex  He will maintain on a dental soft diet with nectar thick liquids  During the course of patient's stay, his mental health behaviors were stable  Patient is moving in with his daughter after he purchased a new home on August 9th  Therefore, patient will not need more than 30 days of short-term rehabilitation  Please see above list of diagnoses and related plan for additional information  Condition at Discharge: fair     Discharge Day Visit / Exam:     Subjective:  Patient reports feeling well today  Reports some increased coughing overnight  No chest pain   No fever/hypoxia  Vitals: Blood Pressure: 115/60 (07/19/18 1544)  Pulse: 73 (07/19/18 1544)  Temperature: 98 7 °F (37 1 °C) (07/19/18 1544)  Temp Source: Oral (07/19/18 1544)  Respirations: 18 (07/19/18 1544)  Weight - Scale: 102 kg (225 lb 1 4 oz) (07/15/18 1208)  SpO2: 94 % (07/19/18 1611)  Exam:   Physical Exam   Constitutional: He is oriented to person, place, and time  He appears well-developed and well-nourished  No distress  HENT:   Head: Normocephalic and atraumatic  Mouth/Throat: No oropharyngeal exudate  Eyes: Conjunctivae and EOM are normal  Pupils are equal, round, and reactive to light  No scleral icterus  Neck: No JVD present  Cardiovascular: Normal rate and regular rhythm  Exam reveals no gallop and no friction rub  No murmur heard  Pulmonary/Chest: Effort normal and breath sounds normal  No respiratory distress  He has no wheezes  He has no rales  Abdominal: Soft  Bowel sounds are normal  He exhibits no distension  There is no tenderness  There is no rebound  Musculoskeletal: He exhibits no edema or tenderness  Neurological: He is alert and oriented to person, place, and time  He displays normal reflexes  No cranial nerve deficit  He exhibits normal muscle tone  Skin: Skin is warm and dry  No rash noted  He is not diaphoretic  No erythema  Psychiatric: He has a normal mood and affect  His behavior is normal      Discussion with Family: Daughter aware of Discharge to [facility]    Discharge instructions/Information to patient and family:   See after visit summary for information provided to patient and family  Provisions for Follow-Up Care:  See after visit summary for information related to follow-up care and any pertinent home health orders        Disposition:     Acute Rehab at [address] [place] [address] to [facility] SNF:   · [address] Texted SNF Physician    Planned Readmission: none     Discharge Statement:  I spent 35 minutes discharging the patient  This time was spent on the day of discharge  I had direct contact with the patient on the day of discharge  Greater than 50% of the total time was spent examining patient, answering all patient questions, arranging and discussing plan of care with patient as well as directly providing post-discharge instructions  Additional time then spent on discharge activities  Discharge Medications:  See after visit summary for reconciled discharge medications provided to patient and family        ** Please Note: This note has been constructed using a voice recognition system **

## 2018-07-19 NOTE — ASSESSMENT & PLAN NOTE
· Patient's mental health behaviors have been stable since his admission  · He can continue his current regimen of paxil and ativan  · Patient will not require more than 30 days at [facility]-- He is moving in with his Daughter on August 9th

## 2018-07-19 NOTE — ASSESSMENT & PLAN NOTE
· Noted on CXR (has documented history of RLL lobectomy?)  · Unclear etiology, HCAP vs aspiration  · Pulm following-- decreased abx from cefepime/flagyl to ceftriaxone-- transition to ceftin 500mg PO BID x2 days   · Tessalon, mucinex-- home medications  · Resume advair, spiriva, albuterol PRN  · Final sputum culture-- group B strep on sputum cx  · MRSA swab NEGATIVE

## 2018-07-19 NOTE — SOCIAL WORK
CM spoke with daughter Pavithra Seay  Daughter agreeable to patient going to [facility]  Slim updated that daughter is agreeable to facility  CM updated [facility]  PASSR completed for facility  They are able to offer a bed tonight for patient  Nursing made aware  Slets called to schedule a p/u time

## 2018-07-19 NOTE — SOCIAL WORK
Tyra called back with a p/u time of [phone] with [facility] via [employer]  LMN faxed to [facility]  Pawel, patient, nursing and facility updated with discharge time

## 2018-07-19 NOTE — ASSESSMENT & PLAN NOTE
· CVA 10 years ago (daughter reports may be more like 12 years)  · With chronic left-sided deficits  Patient denies new neurologic symptoms today   Confirmed with the patient's daughter over the phone that he has chronic left-sided symptoms

## 2018-07-19 NOTE — ASSESSMENT & PLAN NOTE
· Patient has not been requiring O2 for the last 48 hours    · Ambulatory saturations were checked (patient uses wheelchair)-- no saturations below 91% on RA-- will not require O2

## 2018-07-19 NOTE — SOCIAL WORK
CM left a message with daughter Orquidea Fontenot to update that [facility] has a bed available  Waiting for callback  Slim updated

## 2018-07-19 NOTE — PLAN OF CARE
Problem: PHYSICAL THERAPY ADULT  Goal: Performs mobility at highest level of function for planned discharge setting  See evaluation for individualized goals  Treatment/Interventions: Functional transfer training, LE strengthening/ROM, Endurance training, Therapeutic exercise, Bed mobility, Gait training, Equipment eval/education, Patient/family training, Cognitive reorientation, Spoke to nursing, Spoke to case management  Equipment Recommended: Imani Lechuga, Wheelchair       See flowsheet documentation for full assessment, interventions and recommendations  Prognosis: Fair  Problem List: Decreased strength, Decreased range of motion, Decreased endurance, Impaired balance, Decreased mobility, Decreased coordination, Impaired judgement, Decreased safety awareness, Obesity (gait deviations)  Assessment: Assessment: Pt is a 65 y o  male seen for PT evaluation s/p admit to [address] [place] [address] on 7/15/2018 w/ Right lower lobe pneumonia ([address] )  Order placed for PT  Prior to admission, pt required A for mobility and used WC for primary means for mobility  Per prior PT eval information 2/15/2018: Ax1-2 PTA at [facility] for bed skills and transfers OOB to W/C- pt reports having A for all functional transfers consisting of 1-2 people and reports "needs alot of help there"- pt has been nonambulatory since CVA and self propels w/c w/ B/L LE's for 1* mobility at facility- receives both PT/ OT at [facility] presently   Upon evaluation: Pt requires 2-person assistance for transfers and 2-3 person assistance for ambulation with rolling walker    Pt's clinical presentation is currently unstable/unpredictable given the functional mobility deficits above, especially weakness, decreased ROM, decreased skin integrity, decreased endurance, gait deviations, decreased activity tolerance, decreased functional mobility tolerance and decreased safety awareness, coupled with fall risks including impulsivity, impaired balance and impaired coordination, and combined with medical complications of multiple readmissions  Pt to benefit from continued skilled PT tx while in hospital and upon DC to address deficits as defined above and maximize level of functional mobility  From PT/mobility standpoint, recommendation at time of d/c would be inpatient rehab pending progress in order to maximize pt's functional independence and consistency w/ mobility in order to facilitate return to PLOF  Recommend trial with walker next 1-2 sessions, trial with WC next 1-2 sessions and ther ex next 1-2 sessions  Barriers to Discharge: Decreased caregiver support     Recommendation: Post acute IP rehab          See flowsheet documentation for full assessment

## 2018-07-19 NOTE — CASE MANAGEMENT
Continued Stay Review    Date: 7/19/2018    Vital Signs: /59 (BP Location: Left arm)   Pulse 64   Temp 98 9 °F (37 2 °C) (Oral)   Resp 18   Wt 102 kg (225 lb 1 4 oz)   SpO2 97%   BMI 26 69 kg/m²     Medications:   Scheduled Meds:   Current Facility-Administered Medications:  acetaminophen 650 mg Oral Q6H    albuterol 2 5 mg Nebulization Q6H PRN    amLODIPine 10 mg Oral Daily    aspirin 81 mg Oral Daily    atorvastatin 10 mg Oral Daily With Dinner    barium sulfate 1 tablet Oral Once in imaging    benzonatate 200 mg Oral TID    calcium carbonate 1,000 mg Oral Daily PRN    carvedilol 12 5 mg Oral BID With Meals    cefTRIAXone 1,000 mg Intravenous Q24H Last Rate: Stopped (07/19/18 1155)   clopidogrel 75 mg Oral Daily    docusate sodium 100 mg Oral BID    fluticasone 2 spray Nasal Daily    fluticasone-vilanterol 1 puff Inhalation Daily    guaiFENesin 1,200 mg Oral Q12H [facility]    heparin (porcine) 5,000 Units Subcutaneous Q8H [facility]    latanoprost 1 drop Both Eyes HS    levalbuterol 1 25 mg Nebulization TID    LORazepam 0 25 mg Oral HS    magnesium hydroxide 30 mL Oral Daily PRN    meclizine 25 mg Oral TID PRN    ondansetron 4 mg Intravenous Q6H PRN    oxybutynin 5 mg Oral HS    pantoprazole 40 mg Oral Early Morning    PARoxetine 20 mg Oral Daily    polyethylene glycol 17 g Oral Daily PRN    psyllium 1 packet Oral Daily    sodium chloride 100 mL/hr Intravenous Continuous Last Rate: Stopped (07/19/18 1217)   sodium chloride 3 mL Nebulization TID    spironolactone 25 mg Oral Daily    tamsulosin 0 4 mg Oral Daily    tiotropium 18 mcg Inhalation Daily      Continuous Infusions:   sodium chloride 100 mL/hr Last Rate: Stopped (07/19/18 1217)     PRN Meds:     Ondansetron  4 mg iv - used x 1    Abnormal Labs/Diagnostic Results:  Platelets 222    Age/Sex: 65 y o  male     Assessment/Plan: on 7/19-   Right lower lobe pneumonia ([address] )   Assessment & Plan     · Noted on CXR (has documented history of RLL lobectomy?)  · Unclear etiology, HCAP vs aspiration  · Pulm following-- decreased abx from cefepime/flagyl to ceftriaxone  · Respiratory protocol  · Was O2 as able-- check saturations while patient uses wheelchair (this is his baseline form of ambulation)  · Tessalon, mucinex  · Final sputum culture-- group B strep on sputum cx  · MRSA swab NEGATIVE  · Procalcitonin trending down  · CBC in AM             Acute respiratory failure with hypoxia (HCC)   Assessment & Plan     · O2 sat reportedly 87% on RA upon arrival to ED  · He was weaned off of HFNC yesterday  · Today he was satting 96% on 3L NC O2 and nursing was attempting to wean down to 2L  · Check saturations while patient is using wheelchair (he propels himself with his legs)          Chronic obstructive pulmonary disease with acute lower respiratory infection ([address] )   Assessment & Plan     · Pulm following; I discussed with them today  · Scheduled nebs          Dysphagia   Assessment & Plan     · On modified diet: dental soft, nectar thick liquids per SLP recs  · Aspiration precautions  · Patient went for VBS yesterday-- recommendations as follows  ? Pt presents with mild-moderate oropharyngeal dysphagia characterized by reduced mastication and bolus formation/control, premature spillage to the pyriforms with all liquid consistencies and puree, delayed swallow initiation with reduced laryngeal elevation and penetration of thin, nectar, honey, and puree with no aspiration seen on this assessment  Pt has difficulty coordinating breathing and swallowing which puts him at additional risk for aspiration  Pt intermittently demonstrated a well coordinated swallow without penetration, although more frequently he demonstrated swallow delay with penetration  ? soft/level 3 diet and nectar thick liquids, may need to consider level 2 if noted to fatigue with mastication   Although pt penetrated on all liquid consistencies (thin, nectar, and honey thick), nectar is recommended for improved bolus control, as pt is at higher risk for aspiration based on multiple factors  ? Medications whole with liquid (nectar thick)             Lung cancer ([facility] )   Assessment & Plan     · S/p RLL resection per his pulmonologist's records, however there is RLL infiltrate on CXR?  · Pulm following          Tracheomalacia   Assessment & Plan     · Pulmonology following          History of cerebrovascular accident (CVA) with residual deficit   Assessment & Plan     · CVA 10 years ago (daughter reports may be more like 12 years)  · With chronic left-sided deficits  Patient denies new neurologic symptoms today  Confirmed with the patient's daughter over the phone today that he has chronic left-sided symptoms          Essential hypertension   Assessment & Plan     · BPs in acceptable range  · Continue with current medications with holding parameters          GERD (gastroesophageal reflux disease)   Assessment & Plan     · Continue PPI  · OP follow up with GI          Hyperlipidemia   Assessment & Plan     · Continue statin          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   Assessment & Plan     · Paxil, Ativan         Discharge Plan: PT recommends post acute IP rehab

## 2018-07-20 LAB
BACTERIA BLD CULT: NORMAL
BACTERIA BLD CULT: NORMAL

## 2018-08-21 ENCOUNTER — HOSPITAL ENCOUNTER (OUTPATIENT)
Dept: RADIOLOGY | Facility: HOSPITAL | Age: 78
Discharge: HOME | End: 2018-08-21
Payer: MEDICARE

## 2018-08-21 DIAGNOSIS — J69.0 ASPIRATION PNEUMONIA OF RIGHT LOWER LOBE DUE TO VOMIT (HCC): ICD-10-CM

## 2018-08-21 PROCEDURE — 71046 X-RAY EXAM CHEST 2 VIEWS: CPT

## 2018-08-22 ENCOUNTER — OFFICE VISIT (OUTPATIENT)
Dept: PULMONOLOGY | Facility: CLINIC | Age: 78
End: 2018-08-22
Payer: MEDICARE

## 2018-08-22 VITALS
HEART RATE: 76 BPM | SYSTOLIC BLOOD PRESSURE: 120 MMHG | BODY MASS INDEX: 26.68 KG/M2 | TEMPERATURE: 98 F | DIASTOLIC BLOOD PRESSURE: 80 MMHG | WEIGHT: 225 LBS | RESPIRATION RATE: 16 BRPM | OXYGEN SATURATION: 96 %

## 2018-08-22 DIAGNOSIS — J69.0 ASPIRATION PNEUMONIA OF RIGHT LOWER LOBE, UNSPECIFIED ASPIRATION PNEUMONIA TYPE (HCC): Primary | ICD-10-CM

## 2018-08-22 DIAGNOSIS — F33.2 SEVERE EPISODE OF RECURRENT MAJOR DEPRESSIVE DISORDER, WITHOUT PSYCHOTIC FEATURES (HCC): ICD-10-CM

## 2018-08-22 DIAGNOSIS — R05.3 CHRONIC COUGH: ICD-10-CM

## 2018-08-22 DIAGNOSIS — J43.8 OTHER EMPHYSEMA (HCC): ICD-10-CM

## 2018-08-22 PROCEDURE — 99214 OFFICE O/P EST MOD 30 MIN: CPT | Performed by: PHYSICIAN ASSISTANT

## 2018-08-22 RX ORDER — LORAZEPAM 0.5 MG/1
0.25 TABLET ORAL DAILY
Qty: 10 TABLET | Refills: 5 | Status: SHIPPED | OUTPATIENT
Start: 2018-08-22 | End: 2019-02-02 | Stop reason: HOSPADM

## 2018-08-22 NOTE — PROGRESS NOTES
Pulmonary Follow Up Note   Mariajose Michaels  65 y o  male MRN: [id number]  8/22/2018      Assessment:    Right lower lobe pneumonia [facility])  · Chest x-ray was reviewed with patient and daughter today  Right lower lobe infiltrate has resolved  Other emphysema ([facility] )  · COPD is stable at this time  · He will continue with Advair and Spiriva  He does not need refills at this time  · Albuterol nebulizer can be used on an as-needed basis  · He will continue with Proventil as needed    Chronic cough  · Stable and at its baseline secondary to microaspiration and tracheobronchomalacia  · Continue with Tessalon 2-3 times daily  · Continue with Mucinex twice a day  · Low-dose Ativan daily to help with anxiety during coughing spells  This was refilled today for the patient  · Continue modified diet with nectar thick liquids      Plan:    Diagnoses and all orders for this visit:    Aspiration pneumonia of right lower lobe, unspecified aspiration pneumonia type ([facility] )    Severe episode of recurrent major depressive disorder, without psychotic features ([facility] )  -     LORazepam (ATIVAN) 0 5 mg tablet; Take 0 5 tablets (0 25 mg total) by mouth daily for 10 days Daily @@ 0900    Chronic cough    Other emphysema (HCC)      ~He will follow-up in 3-4 months or sooner if problems arise  All her questions were answered  He was instructed to call the office with any questions he may have or if he develops any changes in his breathing   ~Daughter was updated today as she was present for appointment  History of Present Illness   HPI:  Mariajose Michaels  is a 65 y o  male who presents to the office this morning with his daughter for follow-up after his recent hospitalization  He was admitted to [facility] from July 15th through July 19, 2018 with right lower lobe pneumonia and acute hypoxic respiratory failure    We saw the patient in pulmonary consultation and felt that his pneumonia was likely related to aspiration versus healthcare associated pneumonia as he was in a skilled nursing facility at that time  He had an episode of vomiting and not soon after developed cough with increasing shortness of breath  We saw the patient in pulmonary consultation as he was initially on high-flow nasal cannula  He was given cefepime and Flagyl  Speech therapy saw the patient and he was maintained on aspiration precautions with modified diet  He did not have a COPD exacerbation at that time and was continued on his home regimen of Advair and Spiriva  He was eventually weaned from his supplemental oxygen and at his time of discharge saturations were 91% on room air so he did not require supplemental oxygen  He was transitioned to oral Ceftin for 2 more days to complete a 7 day course  Final sputum culture grew group B strep and MRSA swab was negative  Since being discharged from the hospital, he is doing quite well from a pulmonary standpoint  He recently was removed from his skilled nursing facility and is now living with his daughter and they are new home  He does have chronic cough which is stable and at its baseline  He occasionally has white thick sputum production  This cough is secondary to microaspiration as well as tracheobronchomalacia  He is maintained on Tessalon 2-3 times daily as well as Mucinex for this  He also uses low-dose Ativan and to help with his anxiety during coughing episodes  He has been on a modified diet with nectar thick liquids as a recent change  The daughter feels that this is really helping  His COPD otherwise has been stable and he is not using his nebulizer  He uses his rescue inhaler once daily  He denies hemoptysis, bronchospasm, resting shortness of breath, chest pain, pleurisy or lower extremity edema  He has been afebrile  He had a repeat chest x-ray yesterday for follow-up of his infiltrate  Review of Systems   Constitutional: Negative  HENT: Negative      Eyes: Negative  Wears glasses   Respiratory: Positive for cough  Negative for apnea, choking, chest tightness, shortness of breath, wheezing and stridor  Cardiovascular: Negative  Gastrointestinal: Negative  Endocrine: Negative  Genitourinary: Negative  Musculoskeletal: Negative  Presents in wheelchair   Skin: Negative  Allergic/Immunologic: Negative  Neurological: Negative  Psychiatric/Behavioral: Negative  Historical Information   Past Medical History:   Diagnosis Date    [facility] 5/31/2017    Aspiration into respiratory tract     Chest pain 5/31/2017    COPD [facility]chronic obstructive pulmonary disease) ([facility])     Depression     Elevated troponin 5/31/2017    GERD (gastroesophageal reflux disease)     History of CVA (cerebrovascular accident) 4/13/2016    Hyperlipidemia     Hypertension     Lung cancer ([place] [address] ) 2005    Right, status post lobectomy    Pneumonia     Prostate cancer ([place] [address] )     Sleep apnea     awaiting sleep study results    Stroke ([place] [address] )     Weakness due to cerebrovascular accident (CVA)      Past Surgical History:   Procedure Laterality Date    COLONOSCOPY      ESOPHAGOGASTRODUODENOSCOPY N/A 4/13/2016    Procedure: ESOPHAGOGASTRODUODENOSCOPY (EGD); Surgeon: Ladonna Diggs MD;  [facility]; Service:     JOINT REPLACEMENT      knee replacement    LUNG LOBECTOMY      [place] ESOPHAGOGASTRODUODENOSCOPY TRANSORAL DIAGNOSTIC N/A 3/15/2017    Procedure: ESOPHAGOGASTRODUODENOSCOPY (EGD); Surgeon: Ladonna Diggs MD;  [facility];   Service: Gastroenterology    TRIGEMINAL NERVE DECOMPRESSION       Family History   Problem Relation Age of Onset    Family history unknown: Yes       History   Smoking Status    Former Smoker    Packs/day: 1 00    Years: 22 00    Types: Cigarettes    Start date: 1955    Quit date: 1977   Smokeless Tobacco    Never Used         Meds/Allergies     Current Outpatient Prescriptions:    acetaminophen (TYLENOL) 325 mg tablet, Take 2 tablets by mouth every 6 (six) hours as needed for fever, Disp: 30 tablet, Rfl: 0    amLODIPine (NORVASC) 10 mg tablet, Take 1 tablet by mouth daily, Disp: , Rfl:     ASPIRIN 81 PO, Take 1 tablet by mouth every other day  , Disp: , Rfl:     atorvastatin (LIPITOR) 10 mg tablet, Take 1 tablet by mouth, Disp: , Rfl:     benzonatate (TESSALON PERLES) 100 mg capsule, Take 1 capsule by mouth 3 (three) times a day as needed for cough (Patient taking differently: Take 100 mg by mouth 2 (two) times a day  ), Disp: 90 capsule, Rfl: 0    carvedilol (COREG) 12 5 mg tablet, Take 1 tablet by mouth 2 (two) times a day with meals, Disp: 60 tablet, Rfl: 0    clopidogrel (PLAVIX) 75 mg tablet, Take 1 tablet by mouth daily, Disp: 30 tablet, Rfl: 0    docusate sodium (COLACE) 100 mg capsule, Take 1 capsule (100 mg total) by mouth 2 (two) times a day, Disp: 10 capsule, Rfl: 0    Fesoterodine Fumarate ER (TOVIAZ) 8 MG TB24, Take 4 mg by mouth every evening  , Disp: , Rfl:     fluticasone (FLONASE) 50 mcg/act nasal spray, 1-2 sprays into each nostril daily, Disp: , Rfl:     fluticasone-salmeterol (ADVAIR DISKUS) 250-50 mcg/dose inhaler, Inhale 2 (two) times a day  , Disp: , Rfl:     ipratropium-albuterol (DUO-NEB) 0 5-2 5 mg/3 mL nebulizer solution, Take 3 mL by nebulization 4 (four) times a day as needed for wheezing or shortness of breath, Disp: , Rfl:     omeprazole (PriLOSEC) 40 MG capsule, Take 1 capsule by mouth daily, Disp: , Rfl:     PARoxetine (PAXIL) 20 mg tablet, Take 1 tablet by mouth daily, Disp: , Rfl:     spironolactone (ALDACTONE) 25 mg tablet, Take 1 tablet (25 mg total) by mouth daily, Disp: 30 tablet, Rfl: 0    tamsulosin (FLOMAX) 0 4 mg, Take 1 capsule by mouth daily, Disp: , Rfl:     tiotropium (SPIRIVA) 18 mcg inhalation capsule, Place 18 mcg into inhaler and inhale daily, Disp: , Rfl:     albuterol (PROVENTIL HFA,VENTOLIN HFA) 90 mcg/act inhaler, Inhale 2 puffs 4 (four) times a day, Disp: 2 Inhaler, Rfl: 0    latanoprost (XALATAN) 0 005 % ophthalmic solution, Administer 1 drop to both eyes daily at bedtime, Disp: , Rfl:     levocetirizine (XYZAL) 5 MG tablet, Take 1 tablet by mouth every evening (Patient not taking: Reported on 8/22/2018 ), Disp: 30 tablet, Rfl: 0    LORazepam (ATIVAN) 0 5 mg tablet, Take 0 5 tablets (0 25 mg total) by mouth daily for 10 days Daily @@ 0900, Disp: 10 tablet, Rfl: 5    magnesium hydroxide (MILK OF MAGNESIA) 400 mg/5 mL oral suspension, Take 30 mL by mouth daily as needed for constipation, Disp: , Rfl:     meclizine (ANTIVERT) 25 mg tablet, Take 1 tablet by mouth 3 (three) times a day as needed for dizziness (Patient not taking: Reported on 8/22/2018 ), Disp: 30 tablet, Rfl: 0    polyethylene glycol (MIRALAX) 17 g packet, Take 17 g by mouth daily as needed, Disp: , Rfl:     psyllium (METAMUCIL) 0 52 g capsule, Take 0 52 g by mouth daily, Disp: , Rfl:   Allergies   Allergen Reactions    Penicillins Rash       Vitals: Blood pressure 120/80, pulse 76, temperature 98 °F (36 7 °C), resp  rate 16, weight 102 kg (225 lb), SpO2 96 %  Body mass index is 26 68 kg/m²  Oxygen Therapy  SpO2: 96 %    Physical Exam  Physical Exam   Constitutional: He is oriented to person, place, and time  He appears well-developed and well-nourished  No distress  HENT:   Head: Normocephalic and atraumatic  Eyes: Conjunctivae and EOM are normal  Pupils are equal, round, and reactive to light  No scleral icterus  Wears glasses   Neck: Normal range of motion  Neck supple  No JVD present  No tracheal deviation present  Cardiovascular: Normal rate, regular rhythm and normal heart sounds  Exam reveals no gallop and no friction rub  No murmur heard  Pulmonary/Chest: Effort normal  No stridor  No respiratory distress  He has no wheezes  He has no rales  He exhibits no tenderness  Decreased breath sounds bilaterally   Abdominal: Soft   Bowel sounds are normal    Musculoskeletal: He exhibits no edema  Left lower extremity in brace  Patient in wheelchair   Neurological: He is alert and oriented to person, place, and time  Skin: Skin is warm and dry  No rash noted  He is not diaphoretic  No pallor  Psychiatric: He has a normal mood and affect  His behavior is normal  Judgment and thought content normal    Vitals reviewed  Labs: I have personally reviewed pertinent lab results  , ABG: No results found for: PHART, QJW2VDI, PO2ART, XGD8LQB, L6KCADWL, BEART, SOURCE, BNP: No results found for: BNP, CBC: No results found for: WBC, HGB, HCT, MCV, PLT, ADJUSTEDWBC, MCH, MCHC, RDW, MPV, NRBC, CMP: No results found for: NA, K, CL, CO2, ANIONGAP, BUN, CREATININE, GLUCOSE, CALCIUM, AST, ALT, ALKPHOS, PROT, ALBUMIN, BILITOT, EGFR, PT/INR: No results found for: PT, INR, Troponin: No results found for: TROPONINI     Lab Results   Component Value Date    WBC 7 95 07/18/2018    HGB 12 6 07/18/2018    HCT 39 0 07/18/2018    MCV 87 07/18/2018     (L) 07/18/2018     Lab Results   Component Value Date    GLUCOSE 95 07/18/2018    CALCIUM 9 0 07/18/2018     07/18/2018    K 3 7 07/18/2018    CO2 26 07/18/2018     07/18/2018    BUN 18 07/18/2018    CREATININE 1 25 07/18/2018     No results found for: IGE  Lab Results   Component Value Date    ALT 23 07/15/2018    AST 17 07/15/2018    ALKPHOS 112 07/15/2018    BILITOT 0 80 07/15/2018       Imaging and other studies: I have personally reviewed pertinent films in PACS     PA & Lat CXR 8/21/18  Shows resolution of right lower lobe infiltrate  There are no new consolidations or effusions noted

## 2018-08-22 NOTE — ASSESSMENT & PLAN NOTE
· COPD is stable at this time  · He will continue with Advair and Spiriva  He does not need refills at this time  · Albuterol nebulizer can be used on an as-needed basis    · He will continue with Proventil as needed

## 2018-08-22 NOTE — PATIENT INSTRUCTIONS
1  Continue Advair and Spiriva  2  Changed albuterol nebulizer to as needed  3  Continue ProAir as needed  4  Continue Tessalon 2-3 times daily along with Mucinex twice daily  5  Ativan 0 25 mg daily  6   Continue modified diet with nectar thick liquids

## 2018-08-22 NOTE — ASSESSMENT & PLAN NOTE
· Stable and at its baseline secondary to microaspiration and tracheobronchomalacia  · Continue with Tessalon 2-3 times daily  · Continue with Mucinex twice a day  · Low-dose Ativan daily to help with anxiety during coughing spells  This was refilled today for the patient    · Continue modified diet with nectar thick liquids

## 2018-08-22 NOTE — ASSESSMENT & PLAN NOTE
· Chest x-ray was reviewed with patient and daughter today  Right lower lobe infiltrate has resolved

## 2018-08-31 DIAGNOSIS — J44.9 CHRONIC OBSTRUCTIVE PULMONARY DISEASE, UNSPECIFIED COPD TYPE (HCC): Primary | ICD-10-CM

## 2018-08-31 RX ORDER — PREDNISONE 20 MG/1
40 TABLET ORAL DAILY
Qty: 10 TABLET | Refills: 0 | Status: SHIPPED | OUTPATIENT
Start: 2018-08-31 | End: 2018-09-05

## 2018-08-31 NOTE — PROGRESS NOTES
Received call from [facility] nurse regarding at Radha Weston  I returned call to the phone number given and spoke with his daughter via telephone  She reports that Radha Weston was doing well since his last office visit on August 22nd  His pneumonia was resolved and his COPD was at baseline  Since that time, with increased humidity, he has had increased wheezing and cough productive of white mucus  His cough is at baseline  He has not had any fevers, chills, or night sweats  He denies any chest pain  He is using his Advair and Spiriva with albuterol nebulized as needed and Ventolin rescue inhaler  I have prescribed him a prednisone course of 40 milligrams daily for five days  His daughter is aware that they should call the office with further questions or concerns  If he does not improve on this, he will need to go to the ER or have a sick visit in our office  They will call next week should he not be further improved      DIANA Hyde

## 2018-09-07 ENCOUNTER — DOCUMENTATION (OUTPATIENT)
Dept: CCU | Facility: HOSPITAL | Age: 78
End: 2018-09-07

## 2018-09-07 NOTE — PROGRESS NOTES
Pre-operative Recommendations    I am asked for pre-operative recommendations by Dr Ignacio Zuniga prior to teeth extraction and implants  Mr Carlena Lennox has COPD, chronic bronchitis and recurrent aspiration post CVA  Listed below are my recommendations:    1  Procedure should be performed in hospital and NOT office under conscious sedation  2  Patient should be wheeze free and at baseline respiratory status on day of procedure  3  Monitor for hypoxia and provide supplemental oxygen to keep SpO2 >88%  4  He should continue advair and spiriva in the hi-operative period  5  Monitor closely for aspiration and provide good aspiration precautions secondary to patient's prior CVA  6  Please call with any further questions or concerns      Patrizia Kind, DO

## 2018-09-07 NOTE — LETTER
September 7, 2018     Rafi Bowman, DMD  [address]  [address] [place] [address]    Patient: Shaila Morris  YOB: 1940   Date of Visit: 9/7/2018       Dear Dr Andrei Padron: Thank you for referring Douglas Brown to me for evaluation  Below are my notes for this consultation  If you have questions, please do not hesitate to call me  I look forward to following your patient along with you  Sincerely,        Anam Juarez DO        CC: No Recipients  Anam Juarez DO  9/7/2018  9:01 AM  Sign at close encounter  Pre-operative Recommendations    I am asked for pre-operative recommendations by Dr Adnrei Padron prior to teeth extraction and implants  Mr Camacho Dickey has COPD, chronic bronchitis and recurrent aspiration post CVA  Listed below are my recommendations:    1  Procedure should be performed in hospital and NOT office under conscious sedation  2  Patient should be wheeze free and at baseline respiratory status on day of procedure  3  Monitor for hypoxia and provide supplemental oxygen to keep SpO2 >88%  4  He should continue advair and spiriva in the hi-operative period  5  Monitor closely for aspiration and provide good aspiration precautions secondary to patient's prior CVA  6  Please call with any further questions or concerns      Anam Juarez DO

## 2018-10-29 ENCOUNTER — TELEPHONE (OUTPATIENT)
Dept: PULMONOLOGY | Facility: CLINIC | Age: 78
End: 2018-10-29

## 2018-10-29 DIAGNOSIS — R05.9 COUGH: Primary | ICD-10-CM

## 2018-10-29 RX ORDER — BENZONATATE 200 MG/1
200 CAPSULE ORAL 3 TIMES DAILY PRN
Qty: 20 CAPSULE | Refills: 0 | Status: SHIPPED | OUTPATIENT
Start: 2018-10-29 | End: 2018-12-27 | Stop reason: SDUPTHER

## 2018-10-29 NOTE — TELEPHONE ENCOUNTER
Daughter Moira Rogers calling saying that Dakota Joiner has been coughing non stop since last night  He could not eat his dinner last night  She said he choked on Friday and she had to give him the heimlich  He did ok with his breakfast this morning and is doing ok with his thick liquids  She fears he aspirated  She is asking if he needs a CXR or prednisone  I guess the prednisone helped in the past when this happened  He is coughing up clear sputum  He is short of breath and it is worse than normal   Denies wheezing, fevers, chills or night sweats  Please advise

## 2018-10-29 NOTE — TELEPHONE ENCOUNTER
I spoke with Zac's daughter Kim  She reports that he had been doing well with his modified diet up until Friday and had one episode of choking which she thought was due to a piece of potato that was slightly big  She reports since then he has had a cough productive of copious clear mucus  He is slightly short of breath with his coughing  He has no chest pain or tightness  He has no fevers, chills, or night sweats  He has no wheezing  She reports she has also been sick and on antibiotics for bronchitis  Chest x-ray order was placed to evaluate for pneumonia and sputum culture was placed as well  Tessalon was increased to 200 milligrams 3 times daily as needed for cough  I will call her when result of chest x-ray are available  She is aware that if his symptoms worsen, he will need to go to the ER  She will call our office with further questions or concerns, or if symptoms do not improve      DIANA Carrillo

## 2018-10-31 ENCOUNTER — HOSPITAL ENCOUNTER (OUTPATIENT)
Dept: RADIOLOGY | Facility: HOSPITAL | Age: 78
Discharge: HOME | DRG: 177 | End: 2018-10-31
Payer: MEDICARE

## 2018-10-31 DIAGNOSIS — R05.9 COUGH: ICD-10-CM

## 2018-10-31 PROCEDURE — 71046 X-RAY EXAM CHEST 2 VIEWS: CPT

## 2018-11-01 DIAGNOSIS — J18.9 COMMUNITY ACQUIRED PNEUMONIA OF LEFT LOWER LOBE OF LUNG: Primary | ICD-10-CM

## 2018-11-01 RX ORDER — DOXYCYCLINE HYCLATE 100 MG/1
100 CAPSULE ORAL EVERY 12 HOURS SCHEDULED
Qty: 14 CAPSULE | Refills: 0 | Status: ON HOLD | OUTPATIENT
Start: 2018-11-01 | End: 2018-11-05

## 2018-11-01 NOTE — PROGRESS NOTES
Results of Zac's chest x-ray reviewed with his daughter  There is a subtle infiltrate in the left base concerning for pneumonia  His daughter reports he is still coughing, but has not had fevers or chills  His cough is productive of yellow mucus  Prescription for doxycycline written and sent to the pharmacy  If Juma Media does not improve in the next 2 to 3 days or worsens, he should present to the ER for further evaluation or call our office  She is aware of this and agreeable  Dr Peyton Lundy aware      DIANA Ward

## 2018-11-02 ENCOUNTER — HOSPITAL ENCOUNTER (INPATIENT)
Facility: HOSPITAL | Age: 78
LOS: 3 days | Discharge: HOME HEALTH | DRG: 177 | End: 2018-11-05
Attending: EMERGENCY MEDICINE | Admitting: INTERNAL MEDICINE
Payer: MEDICARE

## 2018-11-02 ENCOUNTER — APPOINTMENT (EMERGENCY)
Dept: RADIOLOGY | Facility: HOSPITAL | Age: 78
DRG: 177 | End: 2018-11-02
Payer: MEDICARE

## 2018-11-02 DIAGNOSIS — J18.9 PNEUMONIA OF RIGHT LOWER LOBE DUE TO INFECTIOUS ORGANISM: ICD-10-CM

## 2018-11-02 DIAGNOSIS — A41.9 SEPSIS (HCC): Primary | ICD-10-CM

## 2018-11-02 DIAGNOSIS — R41.0 DELIRIUM: ICD-10-CM

## 2018-11-02 DIAGNOSIS — N17.9 AKI (ACUTE KIDNEY INJURY) (HCC): ICD-10-CM

## 2018-11-02 DIAGNOSIS — J18.9 COMMUNITY ACQUIRED PNEUMONIA OF LEFT LOWER LOBE OF LUNG: ICD-10-CM

## 2018-11-02 DIAGNOSIS — J39.8 TRACHEOMALACIA: Chronic | ICD-10-CM

## 2018-11-02 LAB
ALBUMIN SERPL BCP-MCNC: 3.7 G/DL (ref 3.5–5)
ALP SERPL-CCNC: 121 U/L (ref 46–116)
ALT SERPL W P-5'-P-CCNC: 21 U/L (ref 12–78)
ANION GAP SERPL CALCULATED.3IONS-SCNC: 10 MMOL/L (ref 4–13)
APTT PPP: 42 SECONDS (ref 24–36)
AST SERPL W P-5'-P-CCNC: 12 U/L (ref 5–45)
BASE EX.OXY STD BLDV CALC-SCNC: 86.7 % (ref 60–80)
BASE EXCESS BLDV CALC-SCNC: -2.6 MMOL/L
BASOPHILS # BLD AUTO: 0.04 THOUSANDS/ΜL (ref 0–0.1)
BASOPHILS NFR BLD AUTO: 0 % (ref 0–1)
BILIRUB DIRECT SERPL-MCNC: 0.24 MG/DL (ref 0–0.2)
BILIRUB SERPL-MCNC: 1 MG/DL (ref 0.2–1)
BILIRUB UR QL STRIP: NEGATIVE
BUN SERPL-MCNC: 30 MG/DL (ref 5–25)
CALCIUM SERPL-MCNC: 9.4 MG/DL (ref 8.3–10.1)
CHLORIDE SERPL-SCNC: 102 MMOL/L (ref 100–108)
CLARITY UR: CLEAR
CO2 SERPL-SCNC: 26 MMOL/L (ref 21–32)
COLOR UR: YELLOW
CREAT SERPL-MCNC: 1.72 MG/DL (ref 0.6–1.3)
EOSINOPHIL # BLD AUTO: 0.02 THOUSAND/ΜL (ref 0–0.61)
EOSINOPHIL NFR BLD AUTO: 0 % (ref 0–6)
ERYTHROCYTE [DISTWIDTH] IN BLOOD BY AUTOMATED COUNT: 14.8 % (ref 11.6–15.1)
GFR SERPL CREATININE-BSD FRML MDRD: 37 ML/MIN/1.73SQ M
GLUCOSE SERPL-MCNC: 132 MG/DL (ref 65–140)
GLUCOSE UR STRIP-MCNC: NEGATIVE MG/DL
HCO3 BLDV-SCNC: 20.9 MMOL/L (ref 24–30)
HCT VFR BLD AUTO: 45.2 % (ref 36.5–49.3)
HGB BLD-MCNC: 14.8 G/DL (ref 12–17)
HGB UR QL STRIP.AUTO: NEGATIVE
IMM GRANULOCYTES # BLD AUTO: 0.07 THOUSAND/UL (ref 0–0.2)
IMM GRANULOCYTES NFR BLD AUTO: 0 % (ref 0–2)
INR PPP: 1.09 (ref 0.86–1.17)
KETONES UR STRIP-MCNC: ABNORMAL MG/DL
LACTATE SERPL-SCNC: 2 MMOL/L (ref 0.5–2)
LEUKOCYTE ESTERASE UR QL STRIP: NEGATIVE
LYMPHOCYTES # BLD AUTO: 0.84 THOUSANDS/ΜL (ref 0.6–4.47)
LYMPHOCYTES NFR BLD AUTO: 5 % (ref 14–44)
MCH RBC QN AUTO: 29.6 PG (ref 26.8–34.3)
MCHC RBC AUTO-ENTMCNC: 32.7 G/DL (ref 31.4–37.4)
MCV RBC AUTO: 90 FL (ref 82–98)
MONOCYTES # BLD AUTO: 1.41 THOUSAND/ΜL (ref 0.17–1.22)
MONOCYTES NFR BLD AUTO: 8 % (ref 4–12)
NEUTROPHILS # BLD AUTO: 14.97 THOUSANDS/ΜL (ref 1.85–7.62)
NEUTS SEG NFR BLD AUTO: 87 % (ref 43–75)
NITRITE UR QL STRIP: NEGATIVE
NRBC BLD AUTO-RTO: 0 /100 WBCS
O2 CT BLDV-SCNC: 18.5 ML/DL
PCO2 BLDV: 32.9 MM HG (ref 42–50)
PH BLDV: 7.42 [PH] (ref 7.3–7.4)
PH UR STRIP.AUTO: 5.5 [PH] (ref 4.5–8)
PLATELET # BLD AUTO: 156 THOUSANDS/UL (ref 149–390)
PMV BLD AUTO: 11.1 FL (ref 8.9–12.7)
PO2 BLDV: 53 MM HG (ref 35–45)
POTASSIUM SERPL-SCNC: 4.1 MMOL/L (ref 3.5–5.3)
PROT SERPL-MCNC: 7.6 G/DL (ref 6.4–8.2)
PROT UR STRIP-MCNC: NEGATIVE MG/DL
PROTHROMBIN TIME: 13.8 SECONDS (ref 11.8–14.2)
RBC # BLD AUTO: 5 MILLION/UL (ref 3.88–5.62)
SODIUM SERPL-SCNC: 138 MMOL/L (ref 136–145)
SP GR UR STRIP.AUTO: 1.02 (ref 1–1.03)
UROBILINOGEN UR QL STRIP.AUTO: 0.2 E.U./DL
WBC # BLD AUTO: 17.35 THOUSAND/UL (ref 4.31–10.16)

## 2018-11-02 PROCEDURE — 99285 EMERGENCY DEPT VISIT HI MDM: CPT

## 2018-11-02 PROCEDURE — 83605 ASSAY OF LACTIC ACID: CPT | Performed by: EMERGENCY MEDICINE

## 2018-11-02 PROCEDURE — 99223 1ST HOSP IP/OBS HIGH 75: CPT | Performed by: INTERNAL MEDICINE

## 2018-11-02 PROCEDURE — 85730 THROMBOPLASTIN TIME PARTIAL: CPT | Performed by: EMERGENCY MEDICINE

## 2018-11-02 PROCEDURE — 36415 COLL VENOUS BLD VENIPUNCTURE: CPT | Performed by: EMERGENCY MEDICINE

## 2018-11-02 PROCEDURE — 85025 COMPLETE CBC W/AUTO DIFF WBC: CPT | Performed by: EMERGENCY MEDICINE

## 2018-11-02 PROCEDURE — 85610 PROTHROMBIN TIME: CPT | Performed by: EMERGENCY MEDICINE

## 2018-11-02 PROCEDURE — 94668 MNPJ CHEST WALL SBSQ: CPT

## 2018-11-02 PROCEDURE — 71046 X-RAY EXAM CHEST 2 VIEWS: CPT

## 2018-11-02 PROCEDURE — 94664 DEMO&/EVAL PT USE INHALER: CPT

## 2018-11-02 PROCEDURE — 81003 URINALYSIS AUTO W/O SCOPE: CPT | Performed by: EMERGENCY MEDICINE

## 2018-11-02 PROCEDURE — 94640 AIRWAY INHALATION TREATMENT: CPT

## 2018-11-02 PROCEDURE — 87040 BLOOD CULTURE FOR BACTERIA: CPT | Performed by: EMERGENCY MEDICINE

## 2018-11-02 PROCEDURE — 96365 THER/PROPH/DIAG IV INF INIT: CPT

## 2018-11-02 PROCEDURE — 80076 HEPATIC FUNCTION PANEL: CPT | Performed by: EMERGENCY MEDICINE

## 2018-11-02 PROCEDURE — 87631 RESP VIRUS 3-5 TARGETS: CPT | Performed by: PHYSICIAN ASSISTANT

## 2018-11-02 PROCEDURE — 80048 BASIC METABOLIC PNL TOTAL CA: CPT | Performed by: EMERGENCY MEDICINE

## 2018-11-02 PROCEDURE — 82805 BLOOD GASES W/O2 SATURATION: CPT | Performed by: EMERGENCY MEDICINE

## 2018-11-02 PROCEDURE — 93005 ELECTROCARDIOGRAM TRACING: CPT

## 2018-11-02 PROCEDURE — 94760 N-INVAS EAR/PLS OXIMETRY 1: CPT

## 2018-11-02 RX ORDER — FLUTICASONE FUROATE AND VILANTEROL 200; 25 UG/1; UG/1
1 POWDER RESPIRATORY (INHALATION)
Status: DISCONTINUED | OUTPATIENT
Start: 2018-11-03 | End: 2018-11-05 | Stop reason: HOSPADM

## 2018-11-02 RX ORDER — PANTOPRAZOLE SODIUM 40 MG/1
40 TABLET, DELAYED RELEASE ORAL
Status: DISCONTINUED | OUTPATIENT
Start: 2018-11-03 | End: 2018-11-05 | Stop reason: HOSPADM

## 2018-11-02 RX ORDER — CARVEDILOL 12.5 MG/1
12.5 TABLET ORAL 2 TIMES DAILY WITH MEALS
Status: DISCONTINUED | OUTPATIENT
Start: 2018-11-02 | End: 2018-11-05 | Stop reason: HOSPADM

## 2018-11-02 RX ORDER — NYSTATIN 100000 U/G
CREAM TOPICAL 2 TIMES DAILY
Status: DISCONTINUED | OUTPATIENT
Start: 2018-11-02 | End: 2018-11-05 | Stop reason: HOSPADM

## 2018-11-02 RX ORDER — PREDNISOLONE ACETATE 10 MG/ML
1 SUSPENSION/ DROPS OPHTHALMIC 4 TIMES DAILY
Status: ON HOLD | COMMUNITY
End: 2019-01-29 | Stop reason: ALTCHOICE

## 2018-11-02 RX ORDER — ALBUTEROL SULFATE 2.5 MG/3ML
2.5 SOLUTION RESPIRATORY (INHALATION) EVERY 4 HOURS PRN
Status: DISCONTINUED | OUTPATIENT
Start: 2018-11-02 | End: 2018-11-05 | Stop reason: HOSPADM

## 2018-11-02 RX ORDER — LORAZEPAM 0.5 MG/1
0.25 TABLET ORAL DAILY
Status: DISCONTINUED | OUTPATIENT
Start: 2018-11-03 | End: 2018-11-05 | Stop reason: HOSPADM

## 2018-11-02 RX ORDER — AMLODIPINE BESYLATE 10 MG/1
10 TABLET ORAL DAILY
Status: DISCONTINUED | OUTPATIENT
Start: 2018-11-03 | End: 2018-11-05 | Stop reason: HOSPADM

## 2018-11-02 RX ORDER — TAMSULOSIN HYDROCHLORIDE 0.4 MG/1
0.4 CAPSULE ORAL DAILY
Status: DISCONTINUED | OUTPATIENT
Start: 2018-11-03 | End: 2018-11-05 | Stop reason: HOSPADM

## 2018-11-02 RX ORDER — ONDANSETRON 2 MG/ML
4 INJECTION INTRAMUSCULAR; INTRAVENOUS EVERY 6 HOURS PRN
Status: DISCONTINUED | OUTPATIENT
Start: 2018-11-02 | End: 2018-11-05 | Stop reason: HOSPADM

## 2018-11-02 RX ORDER — FLUTICASONE PROPIONATE 50 MCG
1 SPRAY, SUSPENSION (ML) NASAL DAILY
Status: DISCONTINUED | OUTPATIENT
Start: 2018-11-03 | End: 2018-11-05 | Stop reason: HOSPADM

## 2018-11-02 RX ORDER — LEVALBUTEROL 1.25 MG/.5ML
1.25 SOLUTION, CONCENTRATE RESPIRATORY (INHALATION)
Status: DISCONTINUED | OUTPATIENT
Start: 2018-11-02 | End: 2018-11-05 | Stop reason: HOSPADM

## 2018-11-02 RX ORDER — SODIUM CHLORIDE FOR INHALATION 0.9 %
3 VIAL, NEBULIZER (ML) INHALATION
Status: DISCONTINUED | OUTPATIENT
Start: 2018-11-02 | End: 2018-11-05 | Stop reason: HOSPADM

## 2018-11-02 RX ORDER — CLOPIDOGREL BISULFATE 75 MG/1
75 TABLET ORAL DAILY
Status: DISCONTINUED | OUTPATIENT
Start: 2018-11-03 | End: 2018-11-05 | Stop reason: HOSPADM

## 2018-11-02 RX ORDER — ATORVASTATIN CALCIUM 10 MG/1
10 TABLET, FILM COATED ORAL
Status: DISCONTINUED | OUTPATIENT
Start: 2018-11-02 | End: 2018-11-05 | Stop reason: HOSPADM

## 2018-11-02 RX ORDER — SENNOSIDES 8.6 MG
2 TABLET ORAL
Status: DISCONTINUED | OUTPATIENT
Start: 2018-11-02 | End: 2018-11-05 | Stop reason: HOSPADM

## 2018-11-02 RX ORDER — PAROXETINE HYDROCHLORIDE 20 MG/1
20 TABLET, FILM COATED ORAL DAILY
Status: DISCONTINUED | OUTPATIENT
Start: 2018-11-03 | End: 2018-11-05 | Stop reason: HOSPADM

## 2018-11-02 RX ORDER — SPIRONOLACTONE 25 MG/1
25 TABLET ORAL DAILY
Status: DISCONTINUED | OUTPATIENT
Start: 2018-11-03 | End: 2018-11-05 | Stop reason: HOSPADM

## 2018-11-02 RX ORDER — CALCIUM CARBONATE 200(500)MG
1000 TABLET,CHEWABLE ORAL DAILY PRN
Status: DISCONTINUED | OUTPATIENT
Start: 2018-11-02 | End: 2018-11-05 | Stop reason: HOSPADM

## 2018-11-02 RX ORDER — BENZONATATE 100 MG/1
200 CAPSULE ORAL 3 TIMES DAILY PRN
Status: DISCONTINUED | OUTPATIENT
Start: 2018-11-02 | End: 2018-11-05 | Stop reason: HOSPADM

## 2018-11-02 RX ORDER — SODIUM CHLORIDE FOR INHALATION 0.9 %
VIAL, NEBULIZER (ML) INHALATION
Status: COMPLETED
Start: 2018-11-02 | End: 2018-11-02

## 2018-11-02 RX ORDER — SENNA PLUS 8.6 MG/1
2 TABLET ORAL
COMMUNITY

## 2018-11-02 RX ORDER — FESOTERODINE FUMARATE 8 MG/1
4 TABLET, EXTENDED RELEASE ORAL EVERY EVENING
Status: DISCONTINUED | OUTPATIENT
Start: 2018-11-02 | End: 2018-11-05 | Stop reason: HOSPADM

## 2018-11-02 RX ORDER — ALBUTEROL SULFATE 90 UG/1
2 AEROSOL, METERED RESPIRATORY (INHALATION) 4 TIMES DAILY
Status: DISCONTINUED | OUTPATIENT
Start: 2018-11-02 | End: 2018-11-02

## 2018-11-02 RX ORDER — GUAIFENESIN 600 MG
1200 TABLET, EXTENDED RELEASE 12 HR ORAL EVERY 12 HOURS SCHEDULED
Status: DISCONTINUED | OUTPATIENT
Start: 2018-11-02 | End: 2018-11-05 | Stop reason: HOSPADM

## 2018-11-02 RX ORDER — PREDNISOLONE ACETATE 10 MG/ML
1 SUSPENSION/ DROPS OPHTHALMIC 4 TIMES DAILY
Status: DISCONTINUED | OUTPATIENT
Start: 2018-11-02 | End: 2018-11-05 | Stop reason: HOSPADM

## 2018-11-02 RX ORDER — SODIUM CHLORIDE 9 MG/ML
75 INJECTION, SOLUTION INTRAVENOUS CONTINUOUS
Status: DISCONTINUED | OUTPATIENT
Start: 2018-11-02 | End: 2018-11-04

## 2018-11-02 RX ORDER — DOCUSATE SODIUM 100 MG/1
100 CAPSULE, LIQUID FILLED ORAL 2 TIMES DAILY
Status: DISCONTINUED | OUTPATIENT
Start: 2018-11-02 | End: 2018-11-05 | Stop reason: HOSPADM

## 2018-11-02 RX ORDER — HEPARIN SODIUM 5000 [USP'U]/ML
5000 INJECTION, SOLUTION INTRAVENOUS; SUBCUTANEOUS EVERY 8 HOURS SCHEDULED
Status: DISCONTINUED | OUTPATIENT
Start: 2018-11-02 | End: 2018-11-05 | Stop reason: HOSPADM

## 2018-11-02 RX ORDER — POLYETHYLENE GLYCOL 3350 17 G/17G
17 POWDER, FOR SOLUTION ORAL DAILY PRN
Status: DISCONTINUED | OUTPATIENT
Start: 2018-11-02 | End: 2018-11-05 | Stop reason: HOSPADM

## 2018-11-02 RX ORDER — ASPIRIN 81 MG/1
81 TABLET, CHEWABLE ORAL DAILY
Status: DISCONTINUED | OUTPATIENT
Start: 2018-11-03 | End: 2018-11-05 | Stop reason: HOSPADM

## 2018-11-02 RX ORDER — LATANOPROST 50 UG/ML
1 SOLUTION/ DROPS OPHTHALMIC
Status: DISCONTINUED | OUTPATIENT
Start: 2018-11-02 | End: 2018-11-05 | Stop reason: HOSPADM

## 2018-11-02 RX ORDER — ACETAMINOPHEN 325 MG/1
650 TABLET ORAL EVERY 6 HOURS PRN
Status: DISCONTINUED | OUTPATIENT
Start: 2018-11-02 | End: 2018-11-05 | Stop reason: HOSPADM

## 2018-11-02 RX ADMIN — GUAIFENESIN 1200 MG: 600 TABLET, EXTENDED RELEASE ORAL at 22:02

## 2018-11-02 RX ADMIN — ISODIUM CHLORIDE 3 ML: 0.03 SOLUTION RESPIRATORY (INHALATION) at 20:43

## 2018-11-02 RX ADMIN — LEVALBUTEROL HYDROCHLORIDE 1.25 MG: 1.25 SOLUTION, CONCENTRATE RESPIRATORY (INHALATION) at 20:42

## 2018-11-02 RX ADMIN — ATORVASTATIN CALCIUM 10 MG: 10 TABLET, FILM COATED ORAL at 18:41

## 2018-11-02 RX ADMIN — SODIUM CHLORIDE 500 ML: 0.9 INJECTION, SOLUTION INTRAVENOUS at 14:21

## 2018-11-02 RX ADMIN — CEFEPIME HYDROCHLORIDE 2000 MG: 2 INJECTION, POWDER, FOR SOLUTION INTRAVENOUS at 18:38

## 2018-11-02 RX ADMIN — AZITHROMYCIN MONOHYDRATE 500 MG: 500 INJECTION, POWDER, LYOPHILIZED, FOR SOLUTION INTRAVENOUS at 15:00

## 2018-11-02 RX ADMIN — PREDNISOLONE ACETATE 1 DROP: 10 SUSPENSION/ DROPS OPHTHALMIC at 22:02

## 2018-11-02 RX ADMIN — SENNOSIDES 17.2 MG: 8.6 TABLET, FILM COATED ORAL at 22:02

## 2018-11-02 RX ADMIN — HEPARIN SODIUM 5000 UNITS: 5000 INJECTION, SOLUTION INTRAVENOUS; SUBCUTANEOUS at 22:02

## 2018-11-02 RX ADMIN — METRONIDAZOLE 500 MG: 500 INJECTION, SOLUTION INTRAVENOUS at 17:17

## 2018-11-02 RX ADMIN — LATANOPROST 1 DROP: 50 SOLUTION OPHTHALMIC at 22:02

## 2018-11-02 RX ADMIN — ALBUTEROL SULFATE 2 PUFF: 90 AEROSOL, METERED RESPIRATORY (INHALATION) at 18:49

## 2018-11-02 RX ADMIN — CARVEDILOL 12.5 MG: 12.5 TABLET, FILM COATED ORAL at 18:41

## 2018-11-02 RX ADMIN — CEFTRIAXONE SODIUM 1000 MG: 10 INJECTION, POWDER, FOR SOLUTION INTRAVENOUS at 13:36

## 2018-11-02 RX ADMIN — SODIUM CHLORIDE 75 ML/HR: 0.9 INJECTION, SOLUTION INTRAVENOUS at 18:37

## 2018-11-02 RX ADMIN — NYSTATIN: 100000 CREAM TOPICAL at 22:03

## 2018-11-02 NOTE — ASSESSMENT & PLAN NOTE
· POA  Dx yesterday via outpatient CXR  Hx of chronic aspiration with recent choking episode while eating about 1 week ago   Given hx of COPD and poor functional status 2/2 CVA deficits, he would be considered high risk CAP/HCAP (thought has not been hospitalized in the last 3 months  · Continue Cefepime/flagyl  · Respiratory protocol  · Incentive spirometer  · Nebs-- avoid ipratropium/consider adding mucomyst per pulmonary  · Anti-tussives/mucolytics  · Trend labs/vitals  · Pulmonary following along-- appreciate input

## 2018-11-02 NOTE — ASSESSMENT & PLAN NOTE
· With chronic microaspiration  · Continue dysphagia diet: dental soft, NTL   · Repeat speech/bedside swallow eval appreciated

## 2018-11-02 NOTE — ASSESSMENT & PLAN NOTE
· Baseline cr appears to be around 1 2; today back to baseline after overnight IVF hydration  · Stop IVF hydration  · Repeat BMP in AM

## 2018-11-02 NOTE — H&P
H&P- Karine Kasper  1940, 65 y o  male MRN: [id number]    Unit/Bed#: -01 Encounter: [id number]    Primary Care Provider: Kendall Hall DO   Date and time admitted to hospital: 11/2/2018 12:30 PM    * Community acquired pneumonia of left lower lobe of lung ([facility] [address] )   Assessment & Plan    · POA  Dx yesterday via outpatient CXR  Hx of chronic aspiration with recent choking episode while eating about 1 week ago   Given hx of COPD and poor functional status 2/2 CVA deficits, he would be considered high risk CAP/HCAP (thought has not been hospitalized in the last 3 months  · Admit  · Cefepime/flagyl  · Respiratory protocol  · Incentive spirometer  · nebs  · Anti-tussives/mucolytics  · Trend labs/vitals  · Pulmonary consult-- appreciate input     Chronic obstructive pulmonary disease with acute lower respiratory infection ([facility] [address] )   Assessment & Plan    · Patient without wheezing  · Continue home medications/inhalers/nebulizers     Dysphagia   Assessment & Plan    · With chronic microaspiration  · Continue dysphagia diet: dental soft, NTL   · Repeat speech/bedside swallow eval appreciated     Tracheomalacia   Assessment & Plan    · Follows with Dr Melissa Powers     History of cerebrovascular accident (CVA) with residual deficit   Assessment & Plan    · Supportive care/braces     GERD (gastroesophageal reflux disease)   Assessment & Plan    · Continue PPI     Chronic cough   Assessment & Plan    · Continue tessalon, mucinex     RONAL (acute kidney injury) ([facility] [address] )   Assessment & Plan    · Baseline cr appears to be around 1 2; today 1 72  · Gentle IVF hydration  · Hold nephrotoxins  · Repeat BMP in AM         VTE Prophylaxis: Heparin  / sequential compression device   Code Status: Level 1-- Full Code  POLST: There is no POLST form on file for this patient (pre-hospital)  Discussion with family: daugther    Anticipated Length of Stay:  Patient will be admitted on an Inpatient basis with an anticipated length of stay of  > 2 midnights  Justification for Hospital Stay: tx PNA    Total Time for Visit, including Counseling / Coordination of Care: 30 minutes  Greater than 50% of this total time spent on direct patient counseling and coordination of care  Chief Complaint:   Cough/PNA    History of Present Illness:    Zonia Rodney  is a 65 y o  male with multiple medical conditions including COPD, tracheomalacia, chronic micro aspiration, hypertension, history of CVA with deficits presents the ED for evaluation of pneumonia  History obtained from ER records that were mostly provided by patient's daughter  She reports that approximately week and half ago he choked while eating a potato during dinner  Since that happened, patient had increased cough with sputum production that has changed from clear sputum to yellow sputum  No associated fevers, chills, nausea, vomiting, wheezing  Patient was diagnosed with pneumonia as an outpatient by his primary pulmonologist office  He was started on doxycycline today  Today, family also noted that his cough is worsening that he was having some visual hallucinations, including turkeys and the police pounding on his door  Patient has been taking all medications according to regular dosing  Healing new medication patient has had added was doxycycline which was added today  Review of Systems:    Review of Systems   Constitutional: Negative  HENT: Negative  Eyes: Negative  Respiratory: Positive for cough, choking and shortness of breath  Cardiovascular: Negative  Gastrointestinal: Negative  Genitourinary: Negative  Musculoskeletal: Negative  Skin: Negative  Neurological: Negative  Hematological: Negative  Psychiatric/Behavioral: Positive for confusion and hallucinations         Past Medical and Surgical History:     Past Medical History:   Diagnosis Date    RONAL (acute kidney injury) ([facility] [address] ) 5/31/2017    Aspiration into respiratory tract     Chest pain 5/31/2017    COPD (chronic obstructive pulmonary disease) (HCC)     Depression     Elevated troponin 5/31/2017    GERD (gastroesophageal reflux disease)     History of CVA (cerebrovascular accident) 4/13/2016    Hyperlipidemia     Hypertension     Lung cancer ([facility] ) 2005    Right, status post lobectomy    Pneumonia     Prostate cancer ([facility] )     Sleep apnea     awaiting sleep study results    Stroke ([place] [id number] )     Weakness due to cerebrovascular accident (CVA)        Past Surgical History:   Procedure Laterality Date    COLONOSCOPY      ESOPHAGOGASTRODUODENOSCOPY N/A 4/13/2016    Procedure: ESOPHAGOGASTRODUODENOSCOPY (EGD); Surgeon: Pilar Park MD;  [facility]; Service:     JOINT REPLACEMENT      knee replacement    LUNG LOBECTOMY      RI ESOPHAGOGASTRODUODENOSCOPY TRANSORAL DIAGNOSTIC N/A 3/15/2017    Procedure: ESOPHAGOGASTRODUODENOSCOPY (EGD); Surgeon: Pilar Park MD;  [facility]: [facility]; Service: Gastroenterology    TRIGEMINAL NERVE DECOMPRESSION         Meds/Allergies:    Prior to Admission medications    Medication Sig Start Date End Date Taking?  Authorizing Provider   acetaminophen (TYLENOL) 325 mg tablet Take 2 tablets by mouth every 6 (six) hours as needed for fever 6/19/17  Yes Esthela Boothe MD   albuterol (PROVENTIL HFA,VENTOLIN HFA) 90 mcg/act inhaler Inhale 2 puffs 4 (four) times a day 6/19/17  Yes Esthela Boothe MD   amLODIPine (NORVASC) 10 mg tablet Take 1 tablet by mouth daily 10/10/17  Yes Historical Provider, MD   ASPIRIN 81 PO Take 1 tablet by mouth every other day   10/10/17  Yes Historical Provider, MD   atorvastatin (LIPITOR) 10 mg tablet Take 1 tablet by mouth   Yes Historical Provider, MD   benzonatate (TESSALON) 200 MG capsule Take 1 capsule (200 mg total) by mouth 3 (three) times a day as needed for cough 10/29/18  Yes DIANA Ochoa   bromfenac sodium (PROLENSA) 0 07 % SOLN Apply 1 drop to eye daily   Yes Historical Provider, MD   carvedilol (COREG) 12 5 mg tablet Take 1 tablet by mouth 2 (two) times a day with meals 8/21/17  Yes Venson Kocher, MD   clopidogrel (PLAVIX) 75 mg tablet Take 1 tablet by mouth daily 6/19/17  Yes Venson Kocher, MD   docusate sodium (COLACE) 100 mg capsule Take 1 capsule (100 mg total) by mouth 2 (two) times a day 2/18/18  Yes Chau Santiago MD   doxycycline hyclate (VIBRAMYCIN) 100 mg capsule Take 1 capsule (100 mg total) by mouth every 12 (twelve) hours for 7 days 11/1/18 11/8/18 Yes DIANA Ann   Fesoterodine Fumarate ER (TOVIAZ) 8 MG TB24 Take 4 mg by mouth every evening     Yes Historical Provider, MD   fluticasone (FLONASE) 50 mcg/act nasal spray 1-2 sprays into each nostril daily 8/30/17  Yes Historical Provider, MD   fluticasone-salmeterol (ADVAIR DISKUS) 250-50 mcg/dose inhaler Inhale 2 (two) times a day   10/10/17  Yes Historical Provider, MD   latanoprost (XALATAN) 0 005 % ophthalmic solution Administer 1 drop to both eyes daily at bedtime   Yes Historical Provider, MD   levocetirizine (XYZAL) 5 MG tablet Take 1 tablet by mouth every evening 6/19/17  Yes Venson Kocher, MD   LORazepam (ATIVAN) 0 5 mg tablet Take 0 5 tablets (0 25 mg total) by mouth daily for 10 days Daily @@ 0900 8/22/18 11/2/18 Yes Judah Warren PA-C   omeprazole (PriLOSEC) 40 MG capsule Take 1 capsule by mouth daily 8/30/17  Yes Historical Provider, MD   PARoxetine (PAXIL) 20 mg tablet Take 1 tablet by mouth daily   Yes Historical Provider, MD   polyethylene glycol (MIRALAX) 17 g packet Take 17 g by mouth daily as needed   Yes Historical Provider, MD   prednisoLONE acetate (PRED FORTE) 1 % ophthalmic suspension Administer 1 drop to the right eye 4 (four) times a day   Yes Historical Provider, MD   psyllium (METAMUCIL) 0 52 g capsule Take 0 52 g by mouth daily   Yes Historical Provider, MD   senna (SENOKOT) 8 6 MG tablet Take 2 tablets by mouth daily at bedtime   Yes Historical Provider, MD   spironolactone (ALDACTONE) 25 mg tablet Take 1 tablet (25 mg total) by mouth daily 2/4/18  Yes DIANA Mathews   tamsulosin (FLOMAX) 0 4 mg Take 1 capsule by mouth daily   Yes Historical Provider, MD   tiotropium (SPIRIVA) 18 mcg inhalation capsule Place 18 mcg into inhaler and inhale daily   Yes Historical Provider, MD   ipratropium-albuterol (DUO-NEB) 0 5-2 5 mg/3 mL nebulizer solution Take 3 mL by nebulization 4 (four) times a day as needed for wheezing or shortness of breath    Historical Provider, MD   magnesium hydroxide (MILK OF MAGNESIA) 400 mg/5 mL oral suspension Take 30 mL by mouth daily as needed for constipation    Historical Provider, MD   meclizine (ANTIVERT) 25 mg tablet Take 1 tablet by mouth 3 (three) times a day as needed for dizziness  Patient not taking: Reported on 8/22/2018 6/19/17   Esthela Boothe MD     I have reviewed home medications with patient family member  Allergies: Allergies   Allergen Reactions    Penicillins Rash       Social History:     Marital Status:     Occupation: retired  [facility]  Patient Pre-hospital Level of Mobility: some limitations 2/2 stroke deficits  Patient Pre-hospital Diet Restrictions: dental soft, ntl  Substance Use History:   History   Alcohol Use No     History   Smoking Status    Former Smoker    Packs/day: 1 00    Years: 22 00    Types: Cigarettes    Start date: 5    Quit date: 1977   Smokeless Tobacco    Never Used     History   Drug Use No       Family History:    Family History   Problem Relation Age of Onset    Family history unknown: Yes       Physical Exam:     Vitals:   Blood Pressure: 123/57 (11/02/18 1732)  Pulse: 71 (11/02/18 1732)  Temperature: 99 1 °F (37 3 °C) (11/02/18 1732)  Temp Source: Oral (11/02/18 1732)  Respirations: 18 (11/02/18 1732)  Height: 6' 5" (195 6 cm) (11/02/18 1732)  Weight - Scale: 102 kg (225 lb 5 oz) (11/02/18 1732)  SpO2: 95 % (11/02/18 1732)    Physical Exam   Constitutional: He is oriented to person, place, and time  No distress  HENT:   Head: Normocephalic and atraumatic  Mouth/Throat: No oropharyngeal exudate  Eyes: Pupils are equal, round, and reactive to light  Conjunctivae and EOM are normal  No scleral icterus  Neck: No JVD present  Cardiovascular: Normal rate and regular rhythm  Exam reveals no gallop and no friction rub  No murmur heard  Pulmonary/Chest: Effort normal  No respiratory distress  He has no wheezes  Coarse breath sounds greatest at L base   Abdominal: Soft  Bowel sounds are normal  He exhibits no distension  There is no tenderness  There is no rebound  Musculoskeletal: He exhibits no edema or tenderness  Neurological: He is alert and oriented to person, place, and time  He displays normal reflexes  No cranial nerve deficit  He exhibits normal muscle tone  Skin: Skin is warm and dry  Rash (fungal-appearing rash on LE) noted  He is not diaphoretic  No erythema  Psychiatric: He has a normal mood and affect  His behavior is normal        Additional Data:     Lab Results: I have personally reviewed pertinent reports  Results from last 7 days  Lab Units 11/02/18  1305   WBC Thousand/uL 17 35*   HEMOGLOBIN g/dL 14 8   HEMATOCRIT % 45 2   PLATELETS Thousands/uL 156   NEUTROS ABS Thousands/µL 14 97*   NEUTROS PCT % 87*   LYMPHS PCT % 5*   MONOS PCT % 8   EOS PCT % 0       Results from last 7 days  Lab Units 11/02/18  1305   POTASSIUM mmol/L 4 1   CHLORIDE mmol/L 102   CO2 mmol/L 26   BUN mg/dL 30*   CREATININE mg/dL 1 72*   ANION GAP mmol/L 10   CALCIUM mg/dL 9 4   ALBUMIN g/dL 3 7   TOTAL BILIRUBIN mg/dL 1 00   ALK PHOS U/L 121*   ALT U/L 21   AST U/L 12       Results from last 7 days  Lab Units 11/02/18  1305   INR  1 09               Results from last 7 days  Lab Units 11/02/18  1305   LACTIC ACID mmol/L 2 0       Imaging: I have personally reviewed pertinent reports        XR chest 2 views   Final Result by Mariam Bales MD (11/02 0633) No infiltrates are seen            Workstation performed: [id number]             EKG, Pathology, and Other Studies Reviewed on Admission:   · EKG: unavailable    Allscripts / Epic Records Reviewed: Yes     ** Please Note: This note has been constructed using a voice recognition system   **

## 2018-11-02 NOTE — ASSESSMENT & PLAN NOTE
· With chronic microaspiration  · SLP eval: recommend Dysphagia 2 with NTL  · Recommend repeat VBS to assess for aspiration to compare to prior study from July

## 2018-11-02 NOTE — ASSESSMENT & PLAN NOTE
· POA  Dx yesterday via outpatient CXR  Hx of chronic aspiration with recent choking episode while eating about 1 week ago   Given hx of COPD and poor functional status 2/2 CVA deficits, he would be considered high risk CAP/HCAP (thought has not been hospitalized in the last 3 months  · Admit  · Cefepime/flagyl  · Respiratory protocol  · Incentive spirometer  · nebs  · Anti-tussives/mucolytics  · Trend labs/vitals  · Pulmonary consult-- appreciate input

## 2018-11-02 NOTE — ED PROVIDER NOTES
History  Chief Complaint   Patient presents with    Altered Mental Status     pt dx yesterday with pneumonia and given antibiotics, pt started having visual hallucinations and altered mental status       History provided by:  Patient and relative (Patient's daughter)  History limited by:  Mental status change  Altered Mental Status   Presenting symptoms: behavior changes    Presenting symptoms comment:  Hallucinations/delusions  , patient states he has been seeing turkey Emai is running through his backyard and police officers are at his door trying to get him  Severity:  Moderate  Most recent episode:  Yesterday  Episode history:  Continuous  Duration:  2 days  Timing:  Constant  Progression:  Worsening  Chronicity:  New  Context comment:  Diagnosed with outpatient pneumonia yesterday, started on oral doxycycline  Worsening symptoms today prompting ED visit  Associated symptoms: agitation and hallucinations    Associated symptoms: no abdominal pain, no fever, no headaches, no light-headedness, no nausea, no palpitations, no rash and no vomiting        Prior to Admission Medications   Prescriptions Last Dose Informant Patient Reported? Taking?    ASPIRIN 81 PO   Yes Yes   Sig: Take 1 tablet by mouth every other day     Fesoterodine Fumarate ER (TOVIAZ) 8 MG TB24  Outside Facility (Specify) Yes Yes   Sig: Take 4 mg by mouth every evening     LORazepam (ATIVAN) 0 5 mg tablet   No Yes   Sig: Take 0 5 tablets (0 25 mg total) by mouth daily for 10 days Daily @@ 0900   PARoxetine (PAXIL) 20 mg tablet   Yes Yes   Sig: Take 1 tablet by mouth daily   acetaminophen (TYLENOL) 325 mg tablet   No Yes   Sig: Take 2 tablets by mouth every 6 (six) hours as needed for fever   albuterol (PROVENTIL HFA,VENTOLIN HFA) 90 mcg/act inhaler   No Yes   Sig: Inhale 2 puffs 4 (four) times a day   amLODIPine (NORVASC) 10 mg tablet   Yes Yes   Sig: Take 1 tablet by mouth daily   atorvastatin (LIPITOR) 10 mg tablet   Yes Yes   Sig: Take 1 tablet by mouth   benzonatate (TESSALON) 200 MG capsule   No Yes   Sig: Take 1 capsule (200 mg total) by mouth 3 (three) times a day as needed for cough   bromfenac sodium (PROLENSA) 0 07 % SOLN   Yes Yes   Sig: Apply 1 drop to eye daily   carvedilol (COREG) 12 5 mg tablet   No Yes   Sig: Take 1 tablet by mouth 2 (two) times a day with meals   clopidogrel (PLAVIX) 75 mg tablet   No Yes   Sig: Take 1 tablet by mouth daily   docusate sodium (COLACE) 100 mg capsule   No Yes   Sig: Take 1 capsule (100 mg total) by mouth 2 (two) times a day   doxycycline hyclate (VIBRAMYCIN) 100 mg capsule   No Yes   Sig: Take 1 capsule (100 mg total) by mouth every 12 (twelve) hours for 7 days   fluticasone (FLONASE) 50 mcg/act nasal spray   Yes Yes   Si-2 sprays into each nostril daily   fluticasone-salmeterol (ADVAIR DISKUS) 250-50 mcg/dose inhaler   Yes Yes   Sig: Inhale 2 (two) times a day     ipratropium-albuterol (DUO-NEB) 0 5-2 5 mg/3 mL nebulizer solution   Yes No   Sig: Take 3 mL by nebulization 4 (four) times a day as needed for wheezing or shortness of breath   latanoprost (XALATAN) 0 005 % ophthalmic solution   Yes Yes   Sig: Administer 1 drop to both eyes daily at bedtime   levocetirizine (XYZAL) 5 MG tablet   No Yes   Sig: Take 1 tablet by mouth every evening   magnesium hydroxide (MILK OF MAGNESIA) 400 mg/5 mL oral suspension   Yes No   Sig: Take 30 mL by mouth daily as needed for constipation   meclizine (ANTIVERT) 25 mg tablet   No No   Sig: Take 1 tablet by mouth 3 (three) times a day as needed for dizziness   Patient not taking: Reported on 2018    omeprazole (PriLOSEC) 40 MG capsule   Yes Yes   Sig: Take 1 capsule by mouth daily   polyethylene glycol (MIRALAX) 17 g packet   Yes Yes   Sig: Take 17 g by mouth daily as needed   prednisoLONE acetate (PRED FORTE) 1 % ophthalmic suspension   Yes Yes   Sig: Administer 1 drop to the right eye 4 (four) times a day   psyllium (METAMUCIL) 0 52 g capsule   Yes Yes Sig: Take 0 52 g by mouth daily   senna (SENOKOT) 8 6 MG tablet   Yes Yes   Sig: Take 2 tablets by mouth daily at bedtime   spironolactone (ALDACTONE) 25 mg tablet   No Yes   Sig: Take 1 tablet (25 mg total) by mouth daily   tamsulosin (FLOMAX) 0 4 mg   Yes Yes   Sig: Take 1 capsule by mouth daily   tiotropium (SPIRIVA) 18 mcg inhalation capsule   Yes Yes   Sig: Place 18 mcg into inhaler and inhale daily      Facility-Administered Medications: None       Past Medical History:   Diagnosis Date    [facility]AL [facility]) ([facility] ) 5/31/2017    Aspiration into respiratory tract     Chest pain 5/31/2017    COPD (chronic obstructive pulmonary disease) ([place] [address] )     Depression     Elevated troponin 5/31/2017    GERD (gastroesophageal reflux disease)     History of CVA (cerebrovascular accident) 4/13/2016    Hyperlipidemia     Hypertension     Lung cancer ([place] [address] ) 2005    Right, status post lobectomy    Pneumonia     Prostate cancer ([place] [address] )     Sleep apnea     awaiting sleep study results    Stroke ([place] [address] )     Weakness due to cerebrovascular accident (CVA)        Past Surgical History:   Procedure Laterality Date    COLONOSCOPY      ESOPHAGOGASTRODUODENOSCOPY N/A 4/13/2016    Procedure: ESOPHAGOGASTRODUODENOSCOPY (EGD); Surgeon: Nhan Johnson MD;  [facility]; Service:     JOINT REPLACEMENT      knee replacement    LUNG LOBECTOMY      [place] ESOPHAGOGASTRODUODENOSCOPY TRANSORAL DIAGNOSTIC N/A 3/15/2017    Procedure: ESOPHAGOGASTRODUODENOSCOPY (EGD); Surgeon: Nhan Johnson MD;  [facility]; Service: Gastroenterology    TRIGEMINAL NERVE DECOMPRESSION         Family History   Problem Relation Age of Onset    Family history unknown: Yes     I have reviewed and agree with the history as documented      Social History   Substance Use Topics    Smoking status: Former Smoker     Packs/day: 1 00     Years: 22 00     Types: Cigarettes     Start date: 1955     Quit date: 1977    Smokeless tobacco: Never Used    Alcohol use No        Review of Systems   Constitutional: Positive for activity change and appetite change  Negative for chills, diaphoresis and fever  HENT: Negative for congestion, sinus pressure and sore throat  Eyes: Negative for pain and visual disturbance  Respiratory: Positive for cough  Negative for chest tightness, shortness of breath, wheezing and stridor  Cardiovascular: Negative for chest pain and palpitations  Gastrointestinal: Negative for abdominal distention, abdominal pain, constipation, diarrhea, nausea and vomiting  Genitourinary: Negative for dysuria and frequency  Musculoskeletal: Negative for neck pain and neck stiffness  Skin: Negative for rash  Neurological: Negative for dizziness, speech difficulty, light-headedness, numbness and headaches  Psychiatric/Behavioral: Positive for agitation and hallucinations  Physical Exam  Physical Exam   Constitutional: He is oriented to person, place, and time  He appears distressed  HENT:   Head: Normocephalic and atraumatic  Eyes: Pupils are equal, round, and reactive to light  Neck: Normal range of motion  Neck supple  No tracheal deviation present  Cardiovascular: Normal rate, regular rhythm, normal heart sounds and intact distal pulses  No murmur heard  Pulmonary/Chest: Effort normal  No stridor  No respiratory distress  He has decreased breath sounds in the right lower field and the left lower field  Abdominal: Soft  He exhibits no distension  There is no tenderness  There is no rebound and no guarding  Musculoskeletal: Normal range of motion  Neurological: He is alert and oriented to person, place, and time  GCS eye subscore is 2  GCS verbal subscore is 4  GCS motor subscore is 6  GCS 12, after a sternal rub patient does wake up and will answer nonsensical questions and then drift back to sleep  But will follow full commands during time awake   Skin: Skin is warm and dry  He is not diaphoretic  No erythema  No pallor  Psychiatric: He has a normal mood and affect  Vitals reviewed  Vital Signs  ED Triage Vitals [11/02/18 1214]   Temperature Pulse Respirations Blood Pressure SpO2   98 °F (36 7 °C) 83 18 112/78 93 %      Temp Source Heart Rate Source Patient Position - Orthostatic VS BP Location FiO2 (%)   Oral Monitor Sitting Left arm --      Pain Score       No Pain           Vitals:    11/02/18 1214 11/02/18 1430 11/02/18 1452 11/02/18 1500   BP: 112/78 131/71 142/76 142/76   Pulse: 83 70 73 72   Patient Position - Orthostatic VS: Sitting  Lying        Visual Acuity  Visual Acuity      Most Recent Value   L Pupil Size (mm)  3   R Pupil Size (mm)  3          ED Medications  Medications   azithromycin (ZITHROMAX) 500 mg in sodium chloride 0 9 % 250 mL IVPB (500 mg Intravenous New Bag 11/2/18 1500)   metroNIDAZOLE (FLAGYL) IVPB (premix) 500 mg (not administered)   sodium chloride 0 9 % bolus 500 mL (0 mL Intravenous Stopped 11/2/18 1534)   ceftriaxone (ROCEPHIN) 1 g/50 mL in dextrose IVPB (0 mg Intravenous Stopped 11/2/18 1535)       Diagnostic Studies  Results Reviewed     Procedure Component Value Units Date/Time    UA w Reflex to Microscopic w Reflex to Culture [[id number]]  (Abnormal) Collected:  11/02/18 1422    Lab Status:  Final result Specimen:  Urine from Urine, Straight Cath Updated:  11/02/18 1434     Color, UA Yellow     Clarity, UA Clear     Specific Gravity, UA 1 025     pH, UA 5 5     Leukocytes, UA Negative     Nitrite, UA Negative     Protein, UA Negative mg/dl      Glucose, UA Negative mg/dl      Ketones, UA Trace (A) mg/dl      Urobilinogen, UA 0 2 E U /dl      Bilirubin, UA Negative     Blood, UA Negative    Blood culture #2 [[id number]] Collected:  11/02/18 1338    Lab Status:   In process Specimen:  Blood from Arm, Left Updated:  11/02/18 9603    Basic metabolic panel [[id number]]  (Abnormal) Collected:  11/02/18 1305    Lab Status:  Final result Specimen:  Blood from Arm, Right Updated:  11/02/18 1339     Sodium 138 mmol/L      Potassium 4 1 mmol/L      Chloride 102 mmol/L      CO2 26 mmol/L      ANION GAP 10 mmol/L      BUN 30 (H) mg/dL      Creatinine 1 72 (H) mg/dL      Glucose 132 mg/dL      Calcium 9 4 mg/dL      eGFR 37 ml/min/1 73sq m     Narrative:         National Kidney Disease Education Program recommendations are as follows:  GFR calculation is accurate only with a steady state creatinine  Chronic Kidney disease less than 60 ml/min/1 73 sq  meters  Kidney failure less than 15 ml/min/1 73 sq  meters  Hepatic function panel [[id number]]  (Abnormal) Collected:  11/02/18 1305    Lab Status:  Final result Specimen:  Blood from Arm, Right Updated:  11/02/18 1339     Total Bilirubin 1 00 mg/dL      Bilirubin, Direct 0 24 (H) mg/dL      Alkaline Phosphatase 121 (H) U/L      AST 12 U/L      ALT 21 U/L      Total Protein 7 6 g/dL      Albumin 3 7 g/dL     Lactic acid, plasma [[id number]]  (Normal) Collected:  11/02/18 1305    Lab Status:  Final result Specimen:  Blood from Arm, Right Updated:  11/02/18 1332     LACTIC ACID 2 0 mmol/L     Narrative:         Result may be elevated if tourniquet was used during collection      Protime-INR [[id number]]  (Normal) Collected:  11/02/18 1305    Lab Status:  Final result Specimen:  Blood from Arm, Right Updated:  11/02/18 1326     Protime 13 8 seconds      INR 1 09    APTT [[id number]]  (Abnormal) Collected:  11/02/18 1305    Lab Status:  Final result Specimen:  Blood from Arm, Right Updated:  11/02/18 1326     PTT 42 (H) seconds     CBC and differential [[id number]]  (Abnormal) Collected:  11/02/18 1305    Lab Status:  Final result Specimen:  Blood from Arm, Right Updated:  11/02/18 1313     WBC 17 35 (H) Thousand/uL      RBC 5 00 Million/uL      Hemoglobin 14 8 g/dL      Hematocrit 45 2 %      MCV 90 fL      MCH 29 6 pg      MCHC 32 7 g/dL      RDW 14 8 %      MPV 11 1 fL      Platelets 653 Thousands/uL      nRBC 0 /100 WBCs      Neutrophils Relative 87 (H) %      Immat GRANS % 0 %      Lymphocytes Relative 5 (L) %      Monocytes Relative 8 %      Eosinophils Relative 0 %      Basophils Relative 0 %      Neutrophils Absolute 14 97 (H) Thousands/µL      Immature Grans Absolute 0 07 Thousand/uL      Lymphocytes Absolute 0 84 Thousands/µL      Monocytes Absolute 1 41 (H) Thousand/µL      Eosinophils Absolute 0 02 Thousand/µL      Basophils Absolute 0 04 Thousands/µL     Blood gas, venous [[id number]]  (Abnormal) Collected:  11/02/18 1305    Lab Status:  Final result Specimen:  Blood from Arm, Right Updated:  11/02/18 1312     pH, Alfonzo 7 421 (H)     pCO2, Alfonzo 32 9 (L) mm Hg      pO2, Alfonzo 53 0 (H) mm Hg      HCO3, Alfonzo 20 9 (L) mmol/L      Base Excess, Alfonzo -2 6 mmol/L      O2 Content, Alfonzo 18 5 ml/dL      O2 HGB, VENOUS 86 7 (H) %     Blood culture #1 [[id number]] Collected:  11/02/18 1305    Lab Status: In process Specimen:  Blood from Arm, Right Updated:  11/02/18 1309    Sputum culture and Gram stain [[id number]]     Lab Status:  No result Specimen:  Sputum from Expectorated Sputum                  XR chest 2 views   Final Result by Mik Marinelli MD (11/02 1353)      No infiltrates are seen            Workstation performed: [id number]                    Procedures  Procedures       Phone Contacts  ED Phone Contact    ED Course                               MDM  Number of Diagnoses or Management Options  RONAL (acute kidney injury) [facility]): new and requires workup  Delirium: new and requires workup  Pneumonia of right lower lobe due to infectious organism [facility]): new and requires workup  Sepsis [facility]): new and requires workup  Diagnosis management comments:       Initial ED assessment:  79-year-old male, outpatient diagnosis pneumonia now with delusions and hallucinations    Initial DDx includes but is not limited to:   Sepsis secondary to pneumonia  , possibly UTI as well    Initial ED plan:   Blood work, IV antibiotics, IV fluids, patient acutely delirious with a GCS of 12  Patient will warrant admission to hospital               Amount and/or Complexity of Data Reviewed  Clinical lab tests: ordered and reviewed  Tests in the radiology section of CPT®: ordered and reviewed  Decide to obtain previous medical records or to obtain history from someone other than the patient: yes  Obtain history from someone other than the patient: yes  Review and summarize past medical records: yes  Independent visualization of images, tracings, or specimens: yes      The patient presented with a condition in which there was a high probability of imminent or life-threatening deterioration, and critical care services (excluding separately billable procedures) totalled 30-74 minutes  Disposition  Final diagnoses:   Delirium   Pneumonia of right lower lobe due to infectious organism ([facility] )   Sepsis ([facility] )   RONAL (acute kidney injury) ([facility] )     Time reflects when diagnosis was documented in both MDM as applicable and the Disposition within this note     Time User Action Codes Description Comment    11/2/2018  1:10 PM Dion Peter, [facility] [R41 0] Delirium     11/2/2018  1:57 PM Sherice GUERRERO Add [J18 1] Pneumonia of right lower lobe due to infectious organism ([facility] )     11/2/2018  1:57 PM Master Jewel [A41 9] Sepsis ([facility] )     11/2/2018  1:57 PM [place] Gu [R41 0] Delirium     11/2/2018  1:57 PM Sherice GUERRERO Modify [A41 9] Sepsis ([facility] )     11/2/2018  2:15 PM Verkvng Springer Add [N17 9] RONAL (acute kidney injury) [facility])       ED Disposition     ED Disposition Condition Comment    Admit  Case was discussed with Dr Rose Marie Murillo and the patient's admission status was agreed to be Admission Status: inpatient status to the service of Dr Rose Marie Murillo   Follow-up Information    None         Patient's Medications   Discharge Prescriptions    No medications on file     No discharge procedures on file      ED Provider  Electronically Signed by           Galileo Fleming DO  11/02/18 Alicia Perales

## 2018-11-03 PROBLEM — J69.0 ASPIRATION PNEUMONIA (HCC): Status: ACTIVE | Noted: 2018-11-02

## 2018-11-03 LAB
ANION GAP SERPL CALCULATED.3IONS-SCNC: 11 MMOL/L (ref 4–13)
BUN SERPL-MCNC: 22 MG/DL (ref 5–25)
CALCIUM SERPL-MCNC: 9.5 MG/DL (ref 8.3–10.1)
CHLORIDE SERPL-SCNC: 104 MMOL/L (ref 100–108)
CO2 SERPL-SCNC: 24 MMOL/L (ref 21–32)
CREAT SERPL-MCNC: 1.26 MG/DL (ref 0.6–1.3)
ERYTHROCYTE [DISTWIDTH] IN BLOOD BY AUTOMATED COUNT: 14.9 % (ref 11.6–15.1)
FLUAV AG SPEC QL: NORMAL
FLUBV AG SPEC QL: NORMAL
GFR SERPL CREATININE-BSD FRML MDRD: 54 ML/MIN/1.73SQ M
GLUCOSE SERPL-MCNC: 97 MG/DL (ref 65–140)
HCT VFR BLD AUTO: 42.4 % (ref 36.5–49.3)
HGB BLD-MCNC: 13.8 G/DL (ref 12–17)
MCH RBC QN AUTO: 29.6 PG (ref 26.8–34.3)
MCHC RBC AUTO-ENTMCNC: 32.5 G/DL (ref 31.4–37.4)
MCV RBC AUTO: 91 FL (ref 82–98)
PLATELET # BLD AUTO: 129 THOUSANDS/UL (ref 149–390)
PMV BLD AUTO: 10.7 FL (ref 8.9–12.7)
POTASSIUM SERPL-SCNC: 3.8 MMOL/L (ref 3.5–5.3)
PROCALCITONIN SERPL-MCNC: 0.11 NG/ML
RBC # BLD AUTO: 4.67 MILLION/UL (ref 3.88–5.62)
RSV B RNA SPEC QL NAA+PROBE: NORMAL
SODIUM SERPL-SCNC: 139 MMOL/L (ref 136–145)
WBC # BLD AUTO: 11.72 THOUSAND/UL (ref 4.31–10.16)

## 2018-11-03 PROCEDURE — G8996 SWALLOW CURRENT STATUS: HCPCS

## 2018-11-03 PROCEDURE — G8997 SWALLOW GOAL STATUS: HCPCS

## 2018-11-03 PROCEDURE — 99233 SBSQ HOSP IP/OBS HIGH 50: CPT | Performed by: PHYSICIAN ASSISTANT

## 2018-11-03 PROCEDURE — 94760 N-INVAS EAR/PLS OXIMETRY 1: CPT

## 2018-11-03 PROCEDURE — 85027 COMPLETE CBC AUTOMATED: CPT | Performed by: PHYSICIAN ASSISTANT

## 2018-11-03 PROCEDURE — 80048 BASIC METABOLIC PNL TOTAL CA: CPT | Performed by: PHYSICIAN ASSISTANT

## 2018-11-03 PROCEDURE — 84145 PROCALCITONIN (PCT): CPT | Performed by: INTERNAL MEDICINE

## 2018-11-03 PROCEDURE — 94668 MNPJ CHEST WALL SBSQ: CPT

## 2018-11-03 PROCEDURE — 87449 NOS EACH ORGANISM AG IA: CPT | Performed by: PHYSICIAN ASSISTANT

## 2018-11-03 PROCEDURE — 92610 EVALUATE SWALLOWING FUNCTION: CPT

## 2018-11-03 PROCEDURE — 94640 AIRWAY INHALATION TREATMENT: CPT

## 2018-11-03 RX ADMIN — PREDNISOLONE ACETATE 1 DROP: 10 SUSPENSION/ DROPS OPHTHALMIC at 17:33

## 2018-11-03 RX ADMIN — SODIUM CHLORIDE 75 ML/HR: 0.9 INJECTION, SOLUTION INTRAVENOUS at 09:54

## 2018-11-03 RX ADMIN — TAMSULOSIN HYDROCHLORIDE 0.4 MG: 0.4 CAPSULE ORAL at 09:35

## 2018-11-03 RX ADMIN — SODIUM CHLORIDE 75 ML/HR: 0.9 INJECTION, SOLUTION INTRAVENOUS at 23:57

## 2018-11-03 RX ADMIN — PAROXETINE HYDROCHLORIDE 20 MG: 20 TABLET, FILM COATED ORAL at 09:35

## 2018-11-03 RX ADMIN — LATANOPROST 1 DROP: 50 SOLUTION OPHTHALMIC at 23:39

## 2018-11-03 RX ADMIN — FLUTICASONE PROPIONATE 1 SPRAY: 50 SPRAY, METERED NASAL at 09:36

## 2018-11-03 RX ADMIN — NYSTATIN 1 APPLICATION: 100000 CREAM TOPICAL at 09:37

## 2018-11-03 RX ADMIN — SPIRONOLACTONE 25 MG: 25 TABLET, FILM COATED ORAL at 09:35

## 2018-11-03 RX ADMIN — LEVALBUTEROL HYDROCHLORIDE 1.25 MG: 1.25 SOLUTION, CONCENTRATE RESPIRATORY (INHALATION) at 13:54

## 2018-11-03 RX ADMIN — PREDNISOLONE ACETATE 1 DROP: 10 SUSPENSION/ DROPS OPHTHALMIC at 11:57

## 2018-11-03 RX ADMIN — LEVALBUTEROL HYDROCHLORIDE 1.25 MG: 1.25 SOLUTION, CONCENTRATE RESPIRATORY (INHALATION) at 07:43

## 2018-11-03 RX ADMIN — ISODIUM CHLORIDE 3 ML: 0.03 SOLUTION RESPIRATORY (INHALATION) at 07:43

## 2018-11-03 RX ADMIN — METRONIDAZOLE 500 MG: 500 INJECTION, SOLUTION INTRAVENOUS at 17:06

## 2018-11-03 RX ADMIN — CARVEDILOL 12.5 MG: 12.5 TABLET, FILM COATED ORAL at 17:32

## 2018-11-03 RX ADMIN — GUAIFENESIN 1200 MG: 600 TABLET, EXTENDED RELEASE ORAL at 23:32

## 2018-11-03 RX ADMIN — DOCUSATE SODIUM 100 MG: 100 CAPSULE, LIQUID FILLED ORAL at 09:35

## 2018-11-03 RX ADMIN — CLOPIDOGREL 75 MG: 75 TABLET, FILM COATED ORAL at 09:35

## 2018-11-03 RX ADMIN — PSYLLIUM HUSK 1 PACKET: 3.4 POWDER ORAL at 09:48

## 2018-11-03 RX ADMIN — ISODIUM CHLORIDE 3 ML: 0.03 SOLUTION RESPIRATORY (INHALATION) at 13:54

## 2018-11-03 RX ADMIN — LEVALBUTEROL HYDROCHLORIDE 1.25 MG: 1.25 SOLUTION, CONCENTRATE RESPIRATORY (INHALATION) at 20:09

## 2018-11-03 RX ADMIN — TIOTROPIUM BROMIDE 18 MCG: 18 CAPSULE ORAL; RESPIRATORY (INHALATION) at 09:37

## 2018-11-03 RX ADMIN — HEPARIN SODIUM 5000 UNITS: 5000 INJECTION, SOLUTION INTRAVENOUS; SUBCUTANEOUS at 06:40

## 2018-11-03 RX ADMIN — CEFEPIME HYDROCHLORIDE 2000 MG: 2 INJECTION, POWDER, FOR SOLUTION INTRAVENOUS at 06:40

## 2018-11-03 RX ADMIN — CARVEDILOL 12.5 MG: 12.5 TABLET, FILM COATED ORAL at 09:35

## 2018-11-03 RX ADMIN — HEPARIN SODIUM 5000 UNITS: 5000 INJECTION, SOLUTION INTRAVENOUS; SUBCUTANEOUS at 13:01

## 2018-11-03 RX ADMIN — NYSTATIN 1 APPLICATION: 100000 CREAM TOPICAL at 17:33

## 2018-11-03 RX ADMIN — FLUTICASONE FUROATE AND VILANTEROL TRIFENATATE 1 PUFF: 200; 25 POWDER RESPIRATORY (INHALATION) at 09:36

## 2018-11-03 RX ADMIN — GUAIFENESIN 1200 MG: 600 TABLET, EXTENDED RELEASE ORAL at 09:35

## 2018-11-03 RX ADMIN — METRONIDAZOLE 500 MG: 500 INJECTION, SOLUTION INTRAVENOUS at 09:33

## 2018-11-03 RX ADMIN — LORAZEPAM 0.25 MG: 0.5 TABLET ORAL at 09:35

## 2018-11-03 RX ADMIN — HEPARIN SODIUM 5000 UNITS: 5000 INJECTION, SOLUTION INTRAVENOUS; SUBCUTANEOUS at 23:32

## 2018-11-03 RX ADMIN — METRONIDAZOLE 500 MG: 500 INJECTION, SOLUTION INTRAVENOUS at 00:49

## 2018-11-03 RX ADMIN — ATORVASTATIN CALCIUM 10 MG: 10 TABLET, FILM COATED ORAL at 17:32

## 2018-11-03 RX ADMIN — ISODIUM CHLORIDE 3 ML: 0.03 SOLUTION RESPIRATORY (INHALATION) at 20:09

## 2018-11-03 RX ADMIN — PREDNISOLONE ACETATE 1 DROP: 10 SUSPENSION/ DROPS OPHTHALMIC at 23:32

## 2018-11-03 RX ADMIN — PANTOPRAZOLE SODIUM 40 MG: 40 TABLET, DELAYED RELEASE ORAL at 06:40

## 2018-11-03 RX ADMIN — PREDNISOLONE ACETATE 1 DROP: 10 SUSPENSION/ DROPS OPHTHALMIC at 09:36

## 2018-11-03 RX ADMIN — ASPIRIN 81 MG 81 MG: 81 TABLET ORAL at 09:35

## 2018-11-03 RX ADMIN — CEFEPIME HYDROCHLORIDE 2000 MG: 2 INJECTION, POWDER, FOR SOLUTION INTRAVENOUS at 17:32

## 2018-11-03 RX ADMIN — AMLODIPINE BESYLATE 10 MG: 10 TABLET ORAL at 09:34

## 2018-11-03 NOTE — PROGRESS NOTES
Patient has 434 in bladder  SLIM notified to put a urinary retention protocol in  SLIM asked if we could get the patient out of bed to stand to pee  RN and another RN attempted to stand the patient with success, but the patient unable to pee still  Straight cath order is put in

## 2018-11-03 NOTE — SPEECH THERAPY NOTE
Speech Language/Pathology  Speech-Language Pathology Bedside Swallow Evaluation        Patient Name: Gypsy De Los Santos  Today's Date: 11/3/2018     Problem List  Patient Active Problem List   Diagnosis    Essential hypertension    Lung cancer ([address] )    Dysphagia    History of cerebrovascular accident (CVA) with residual deficit    GERD (gastroesophageal reflux disease)    Left-sided weakness    Hyperlipidemia    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    Tracheomalacia    Chronic obstructive pulmonary disease with acute lower respiratory infection ([address] )    RONAL (acute kidney injury) ([address] )    Ambulatory dysfunction    Right lower lobe pneumonia ([address] )    Acute respiratory failure with hypoxia (HCC)    Chronic cough    Other emphysema ([address] )    Community acquired pneumonia of left lower lobe of lung ([address] )       Past Medical History  Past Medical History:   Diagnosis Date    RONAL (acute kidney injury) ([address] ) 5/31/2017    Aspiration into respiratory tract     Chest pain 5/31/2017    COPD (chronic obstructive pulmonary disease) (HCC)     Depression     Elevated troponin 5/31/2017    GERD (gastroesophageal reflux disease)     History of CVA (cerebrovascular accident) 4/13/2016    Hyperlipidemia     Hypertension     Lung cancer ([address] ) 2005    Right, status post lobectomy    Pneumonia     Prostate cancer ([address] )     Sleep apnea     awaiting sleep study results    St[facility]     Weakness due to cerebrovascular accident (CVA)        Past Surgical History  Past Surgical History:   Procedure Laterality Date    COLONOSCOPY      ESOPHAGOGASTRODUODENOSCOPY N/A 4/13/2016    Procedure: ESOPHAGOGASTRODUODENOSCOPY (EGD); Surgeon: Neto Holguin MD;  [facility]; Service:     JOINT REPLACEMENT      knee replacement    LUNG LOBECTOMY      [place] ESOPHAGOGASTRODUODENOSCOPY TRANSORAL DIAGNOSTIC N/A 3/15/2017    Procedure: ESOPHAGOGASTRODUODENOSCOPY (EGD);   Surgeon: Neto Holguin MD; [facility]; Service: Gastroenterology    TRIGEMINAL NERVE DECOMPRESSION         Summary    Pt presents with moderate oropharyngeal dysphagia secondary to decreased mastication of soft solids, delayed swallow initiation with decreased elevation, and an inconsistent deep wet cough with and w/o PO intake  A choking episode was noted x1 with NTL  Prior VBS study with ST did show penetration with all liquid consistencies  F/u study may be warranted  Recommendations:   Diet: mechanically altered/level 2 diet and nectar thick liquids   Meds: crushed with puree   Aspiration precautions and compensatory swallowing strategies: upright posture, only feed when fully alert, slow rate of feeding, small bites/sips and alternating bites and sips  Other Recommendations/ considerations:   -F/u VBS may be warrented to compare to prior study completed on 7/17/18  Current Medical Status  Pt is a 65 y o  male who presented to [address] [place] [address] with increased coughing and sputum production  Pt was also noted to have a choking incident on a small potato last week  Per H&P- Pt with PMH of COPD, tracheomalacia, chronic micro aspiration, hypertension, history of CVA with deficits presents the ED for evaluation of pneumonia  History obtained from ER records that were mostly provided by patient's daughter  She reports that approximately week and half ago he choked while eating a potato during dinner  Since that happened, patient had increased cough with sputum production that has changed from clear sputum to yellow sputum  No associated fevers, chills, nausea, vomiting, wheezing  Patient was diagnosed with pneumonia as an outpatient by his primary pulmonologist office  He was started on doxycycline  Family also noted that his cough is worsening that he was having some visual hallucinations, including turkeys and the police pounding on his door    Patient has been taking all medications according to regular dosing  Healing new medication patient has had added was doxycycline which was added  Past medical history:   Please see H&P for details    Special Studies:  CXR 10/31/18-Interval development of suspected infiltrate lateral left lung base  CXR 11/2/18- No infiltrates are seen  VBS 7/17/18- Pt presents with mild-moderate oropharyngeal dysphagia characterized by reduced mastication and bolus formation/control, premature spillage to the pyriforms with all liquid consistencies and puree, delayed swallow initiation with reduced laryngeal elevation and penetration of thin, nectar, honey, and puree with no aspiration seen on this assessment  Pt has difficulty coordinating breathing and swallowing which puts him at additional risk for aspiration  Pt intermittently demonstrated a well coordinated swallow without penetration, although more frequently he demonstrated swallow delay with penetration       Social/Education/Vocational Hx:  Pt lives with family    Swallow Information   Current Risks for Dysphagia & Aspiration: known history of dysphagia  Current Symptoms/Concerns: choking incident  Current Diet: soft/level 3 diet and nectar thick liquids   Baseline Diet: soft/level 3 diet and nectar thick liquids- per chart review     Baseline Assessment   Behavior/Cognition: lethargic and waxing and waning arousal level  Speech/Language Status: able to participate in conversation and able to follow commands  Patient Positioning: upright in bed     Swallow Mechanism Exam   Facial: symmetrical  Labial: decreased ROM left side  Lingual: WFL  Velum: unable to visualize  Mandible: adequate ROM  Dentition: adequate  Vocal quality:hoarse and rough   Volitional Cough: weak   Respiratory:RA    Consistencies Assessed and Performance   Consistencies Administered: nectar thick, honey thick, puree and soft solids- pt trialed pudding, manuel crackers, HTL, and NTL    Oral Stage: Pt presented with decreased and mildly prolonged mastication of soft solids with decreased bolus formation and mild residue after the swallow requiring a NTL wash to clear  Pt with adequate bolus retrieval, oral control, and a-p transfer with puree, HTL, and NTL  Pharyngeal Stage: Mildly delayed swallow initiation and decreased hyolaryngeal elevation noted with all consistencies  Pt had a deep wet nonproductive cough prior to PO intake and throughout evaluation, but did have 1 episode of deep coughing/choking with NTL via cup  No other overt s/s of aspiration noted  Esophageal Concerns: none reported      Results Reviewed with: patient, RN and MD   Dysphagia Goals:   1  Pt will tolerate dysphagia 2-mechanical with NTL with adeqaute mastication and w/o overt s/s of aspiration or dysphagia   2   Pt will participate in f/u VBS study with ST to further assess pt's swallow function and make further recommendations           Speech Therapy Prognosis   Prognosis: fair    Prognosis Considerations: age, medical status, prior medical history, respiratory status and therapeutic potential

## 2018-11-03 NOTE — RESPIRATORY THERAPY NOTE
RT Protocol Note  Adrianna Taylor  65 y o  male MRN: [id number]  Unit/Bed#: -01 Encounter: [id number]    Assessment    Principal Problem:    Community acquired pneumonia of left lower lobe of lung ([facility] )  Active Problems:    Dysphagia    History of cerebrovascular accident (CVA) with residual deficit    GERD (gastroesophageal reflux disease)    Tracheomalacia    Chronic obstructive pulmonary disease with acute lower respiratory infection ([place] [address] )    RONAL (acute kidney injury) ([place] [address] )    Chronic cough      Home Pulmonary Medications:  Albuterol MDI, Duoneb       Past Medical History:   Diagnosis Date    RONAL (acute kidney injury) ([place] [address] ) 5/31/2017    Aspiration into respiratory tract     Chest pain 5/31/2017    COPD [facility]chronic obstructive pulmonary disease) ([facility])     Depression     Elevated troponin 5/31/2017    GERD (gastroesophageal reflux disease)     History of CVA (cerebrovascular accident) 4/13/2016    Hyperlipidemia     Hypertension     Lung cancer ([place] [address] ) 2005    Right, status post lobectomy    Pneumonia     Prostate cancer ([place] [address] )     Sleep apnea     awaiting sleep study results    Stroke ([place] [address] )     Weakness due to cerebrovascular accident (CVA)      Social History     Social History    Marital status:       Spouse name: N/A    Number of children: N/A    Years of education: N/A     Social History Main Topics    Smoking status: Former Smoker     Packs/day: 1 00     Years: 22 00     Types: Cigarettes     Start date: 1955     Quit date: 1977    Smokeless tobacco: Never Used    Alcohol use No    Drug use: No    Sexual activity: Not Asked     Other Topics Concern    None     Social History Narrative    None       Subjective         Objective    Physical Exam:   Assessment Type: Pre-treatment  General Appearance: Alert, Awake  Respiratory Pattern: Normal  Chest Assessment: Chest expansion symmetrical  Bilateral Breath Sounds: Rhonchi, Coarse  Cough: Non-productive, Congested, Strong    Vitals:  Blood pressure 123/57, pulse 68, temperature 99 1 °F (37 3 °C), temperature source Oral, resp  rate 18, height 6' 5" (1 956 m), weight 102 kg (225 lb 5 oz), SpO2 97 %  Imaging and other studies: I have personally reviewed pertinent reports  Plan    Respiratory Plan: Home Bronchodilator Patient pathway  Airway Clearance Plan: Flutter     Resp Comments: Pt assessed per respiratory protocol  SPO2 97% on RA with b/l coarse rhonchi breath sounds  Pt has hx of COPD and says that he uses his nebulizer 3 times/day  Pt previously ordered Spiriva inhaler by [facility] pt ordered TID 1 25mg Xopenex and NSS per protocol  Scheduled neb tx given post assessment  Pt c/o not being able to cough his mucus up  Pt given flutter valve and instructed on use however pt states that he has one at home and is familiar with it

## 2018-11-03 NOTE — PROGRESS NOTES
Progress Note - Gladstone Habermann  1940, 65 y o  male MRN: [id number]    Unit/Bed#: -01 Encounter: [id number]    Primary Care Provider: Rose Marie Johnston DO   Date and time admitted to hospital: 11/2/2018 12:30 PM    * Aspiration pneumonia ([facility] )   Assessment & Plan    · POA  Dx yesterday via outpatient CXR  Hx of chronic aspiration with recent choking episode while eating about 1 week ago   Given hx of COPD and poor functional status 2/2 CVA deficits, he would be considered high risk CAP/HCAP (thought has not been hospitalized in the last 3 months  · Continue Cefepime/flagyl  · Respiratory protocol  · Incentive spirometer  · Nebs-- avoid ipratropium/consider adding mucomyst per pulmonary  · Anti-tussives/mucolytics  · Trend labs/vitals  · Pulmonary following along-- appreciate input      Chronic obstructive pulmonary disease with acute lower respiratory infection ([facility] )   Assessment & Plan    · Patient without wheezing  · Continue home medications/inhalers/nebulizers     Dysphagia   Assessment & Plan    · With chronic microaspiration  · SLP eval: recommend Dysphagia 2 with NTL  · Recommend repeat VBS to assess for aspiration to compare to prior study from July     Tracheomalacia   Assessment & Plan    · Follows with Dr Beryl Bonilla  · No further interventions/treatments at this time      History of cerebrovascular accident (CVA) with residual deficit   Assessment & Plan    · Supportive care/braces     GERD (gastroesophageal reflux disease)   Assessment & Plan    · Continue PPI     Chronic cough   Assessment & Plan    · Continue tessalon, mucinex     RONAL (acute kidney injury) ([facility] )   Assessment & Plan    · Baseline cr appears to be around 1 2; today back to baseline after overnight IVF hydration  · Stop IVF hydration  · Repeat BMP in AM       VTE Pharmacologic Prophylaxis:   Pharmacologic: Heparin  Mechanical VTE Prophylaxis in Place: Yes    Patient Centered Rounds: I have performed bedside rounds with nursing staff today  Discussions with Specialists or Other Care Team Provider: case management, pulmonary    Education and Discussions with Family / Patient: patient, daughter Coco Spencer updated via phone    Time Spent for Care: 30 minutes  More than 50% of total time spent on counseling and coordination of care as described above  Current Length of Stay: 1 day(s)    Current Patient Status: Inpatient   Certification Statement: The patient will continue to require additional inpatient hospital stay due to IV abx, ongoing speech eval    Discharge Plan: will obtain PT/OT prior to d/c; likely will stay through weekend  Possible d/c Monday; home with home services vs  STR pending PT/OT eval     Code Status: Level 1 - Full Code      Subjective:   Patient feels better today  Less coughing  No SOB  No events overnight per nursing  Objective:     Vitals:   Temp (24hrs), Av 8 °F (37 1 °C), Min:98 1 °F (36 7 °C), Max:99 1 °F (37 3 °C)    Temp:  [98 1 °F (36 7 °C)-99 1 °F (37 3 °C)] 98 1 °F (36 7 °C)  HR:  [64-74] 67  Resp:  [18-20] 20  BP: (118-142)/(57-76) 126/69  SpO2:  [94 %-97 %] 96 %  Body mass index is 26 72 kg/m²  Input and Output Summary (last 24 hours): Intake/Output Summary (Last 24 hours) at 18 1224  Last data filed at 18 0900   Gross per 24 hour   Intake              200 ml   Output              240 ml   Net              -40 ml       Physical Exam:     Physical Exam   Constitutional: He is oriented to person, place, and time  No distress  HENT:   Head: Normocephalic and atraumatic  Mouth/Throat: No oropharyngeal exudate  Eyes: Pupils are equal, round, and reactive to light  Conjunctivae and EOM are normal  No scleral icterus  Neck: No JVD present  Cardiovascular: Normal rate and regular rhythm  Exam reveals no gallop and no friction rub  No murmur heard  Pulmonary/Chest: Effort normal  No respiratory distress  He has no wheezes  He has no rales     Slightly coarse breath sounds L base, improved compared to yesterday   Abdominal: Soft  Bowel sounds are normal  He exhibits no distension  There is no tenderness  There is no rebound  Musculoskeletal:   Contractures of LUE noted   Neurological: He is alert and oriented to person, place, and time  He displays normal reflexes  No cranial nerve deficit  He exhibits normal muscle tone  Skin: Skin is warm and dry  No rash noted  He is not diaphoretic  No erythema  Psychiatric: He has a normal mood and affect  His behavior is normal          Additional Data:     Labs:      Results from last 7 days  Lab Units 11/03/18  0545 11/02/18  1305   WBC Thousand/uL 11 72* 17 35*   HEMOGLOBIN g/dL 13 8 14 8   HEMATOCRIT % 42 4 45 2   PLATELETS Thousands/uL 129* 156   NEUTROS PCT %  --  87*   LYMPHS PCT %  --  5*   MONOS PCT %  --  8   EOS PCT %  --  0       Results from last 7 days  Lab Units 11/03/18  0545 11/02/18  1305   POTASSIUM mmol/L 3 8 4 1   CHLORIDE mmol/L 104 102   CO2 mmol/L 24 26   BUN mg/dL 22 30*   CREATININE mg/dL 1 26 1 72*   ANION GAP mmol/L 11 10   CALCIUM mg/dL 9 5 9 4   ALBUMIN g/dL  --  3 7   TOTAL BILIRUBIN mg/dL  --  1 00   ALK PHOS U/L  --  121*   ALT U/L  --  21   AST U/L  --  12       Results from last 7 days  Lab Units 11/02/18  1305   INR  1 09               Results from last 7 days  Lab Units 11/03/18  0545 11/02/18  1305   LACTIC ACID mmol/L  --  2 0   PROCALCITONIN ng/ml 0 11  --            * I Have Reviewed All Lab Data Listed Above  * Additional Pertinent Lab Tests Reviewed:  [facility] Admission Reviewed    Imaging:    Imaging Reports Reviewed Today Include: CXR  Imaging Personally Reviewed by Myself Includes:  CXR    Recent Cultures (last 7 days):       Results from last 7 days  Lab Units 11/02/18  1836   INFLUENZA B PCR  None Detected   RSV PCR  None Detected       Last 24 Hours Medication List:     Current Facility-Administered Medications:  acetaminophen 650 mg Oral Q6H PRN Rick BROWN Sandhya, LADY    albuterol 2 5 mg Nebulization Q4H PRN Abhay Osorio, DO    amLODIPine 10 mg Oral Daily Jessie Arthur, LADY    aspirin 81 mg Oral Daily Jessie Arthur, LADY    atorvastatin 10 mg Oral Daily With Dinner Jessie Arthur, LADY    benzonatate 200 mg Oral TID PRN Jessie Arthur, LADY    bromfenac sodium 1 drop Ophthalmic Daily Jessie Arthur, LADY    calcium carbonate 1,000 mg Oral Daily PRN Jessie Arthur, LADY    carvedilol 12 5 mg Oral BID With Meals Jessie Arthur, LADY    cefepime 2,000 mg Intravenous Q12H Jessie Arthur, LADY Last Rate: 2,000 mg (11/03/18 0640)   clopidogrel 75 mg Oral Daily Jessie Arthur, LADY    docusate sodium 100 mg Oral BID Jessie Arthur, LADY    Fesoterodine Fumarate ER 4 mg Oral QPM Jessie Arthur, LADY    fluticasone 1 spray Nasal Daily Jessie Arthur, LADY    fluticasone-vilanterol 1 puff Inhalation Daily Jessie Arthur, LADY    guaiFENesin 1,200 mg Oral Q12H [facility] Jessie Arthur, LADY    heparin (porcine) 5,000 Units Subcutaneous Q8H [facility] Jessie Arthur, LADY    latanoprost 1 drop Both Eyes HS Jessie Arthur, LADY    levalbuterol 1 25 mg Nebulization TID Abhay Osorio, DO    LORazepam 0 25 mg Oral Daily Jessie Arthur, LADY    magnesium hydroxide 30 mL Oral Daily PRN Jessie Arthur, LADY    metroNIDAZOLE 500 mg Intravenous Q8H Jessie Arthur, LADY Last Rate: 500 mg (11/03/18 8230)   nystatin  Topical BID Abhay Telleze, DO    ondansetron 4 mg Intravenous Q6H PRN Jessie Arthur, LADY    pantoprazole 40 mg Oral Early Morning Jessie Arthur PA-C    PARoxetine 20 mg Oral Daily Jessie Arthur PA-C    polyethylene glycol 17 g Oral Daily PRN Jessie Arthur PA-C    prednisoLONE acetate 1 drop Right Eye 4x Daily Jessie Arthur PA-C    psyllium 1 packet Oral Daily Jessie Arthur PA-C    senna 2 tablet Oral HS Jessie Arthur PA-C    sodium chloride 75 mL/hr Intravenous Continuous Jessie Arthur PA-C Last Rate: 75 mL/hr (11/03/18 0954)   sodium chloride 3 mL Nebulization TID Jordyn Gonzalez DO    spironolactone 25 mg Oral Daily Jessie Arthur PA-C    tamsulosin 0 4 mg Oral Daily Jessie Arthur PA-C    tiotropium 18 mcg Inhalation Daily Jessie Arthur PA-C         Today, Patient Was Seen By: Steve Mcdonnell PA-C    ** Please Note: Dictation voice to text software may have been used in the creation of this document   **

## 2018-11-03 NOTE — CONSULTS
Pulmonary Consultation   Loy Arenas  65 y o  male MRN: [id number]  Unit/Bed#: -01 Encounter: [id number]      Reason for consultation:  Aspiration pneumonia, COPD    Requesting physician:  [facility]    Impressions/Recommendations:     · Agree with plan as outlined including cefepime/metronidazole, aspiration precautions and speech therapy evaluation, guaifenesin, continue Spiriva and Advair  · No ipratropium in nebulizer treatments, since secretions may become too dry  The patient is already using Spiriva  · Nothing to offer with regards to the patient's tortuous trachea and tracheomalacia  No need for stent at this time  · The patient would do better with regards to secretion clearance if he were out of bed  Mucomyst in the nebulizer may be helpful  There is no evidence of active bronchospasm  · He denies dyspnea/has no evidence of COPD exacerbation, therefore systemic steroids are not necessary  History of Present Illness   HPI:  Loy Arenas  is a 65 y o  male who is status post CVA with some aspiration risk  He was suspected to have aspirated a piece of food and developed subsequent pneumonia  He was started on cefepime and metronidazole  His past medical history is remarkable for COPD and tracheomalacia  He sees Dr Weston Lugo in the office  He is currently taking Advair and Spiriva and his symptoms are fairly well controlled  His primary problem seems to be chronic cough  He has been treated with benzonatate with modest response  His secretions are somewhat thick and dry  He denies any complaints of shortness of breath or chest pain  Review of systems:  Patient denies headache or vision changes  Weight is stable  Denies sore throat or runny nose  Denies fever, chills or sweats  Denies chest pain or palpitations  Denies nausea, vomiting or diarrhea  Denies lower extremity edema  Denies skin rashes  Denies dysuria or hematuria      All other 12-point review of systems are negative  Historical Information   Past Medical History:   Diagnosis Date    RONAL [facility]acute [facility] injury) ([facility] ) 5/31/2017    Aspiration into respiratory tract     Chest pain 5/31/2017    COPD (chronic obstructive pulmonary disease) (HCC)     Depression     Elevated troponin 5/31/2017    GERD (gastroesophageal reflux disease)     History of CVA (cerebrovascular accident) 4/13/2016    Hyperlipidemia     Hypertension     Lung cancer ([facility] ) 2005    Right, status post lobectomy    Pneumonia     Prostate cancer ([facility] )     Sleep apnea     awaiting sleep study results    Stroke ([place] [address] )     Weakness due to cerebrovascular accident (CVA)      Past Surgical History:   Procedure Laterality Date    COLONOSCOPY      ESOPHAGOGASTRODUODENOSCOPY N/A 4/13/2016    Procedure: ESOPHAGOGASTRODUODENOSCOPY (EGD); Surgeon: Roselia Blanco MD;  [facility]: [facility]; Service:     JOINT REPLACEMENT      knee replacement    LUNG LOBECTOMY      [place] ESOPHAGOGASTRODUODENOSCOPY TRANSORAL DIAGNOSTIC N/A 3/15/2017    Procedure: ESOPHAGOGASTRODUODENOSCOPY (EGD); Surgeon: Roselia Blanco MD;  Location: [facility]; Service: Gastroenterology    TRIGEMINAL NERVE DECOMPRESSION       Family History   Problem Relation Age of Onset    Family history unknown: Yes       Tobacco history:  22 pack years    None since 1977        Meds/Allergies   Current Facility-Administered Medications   Medication Dose Route Frequency    acetaminophen (TYLENOL) tablet 650 mg  650 mg Oral Q6H PRN    albuterol inhalation solution 2 5 mg  2 5 mg Nebulization Q4H PRN    amLODIPine (NORVASC) tablet 10 mg  10 mg Oral Daily    aspirin chewable tablet 81 mg  81 mg Oral Daily    atorvastatin (LIPITOR) tablet 10 mg  10 mg Oral Daily With Dinner    benzonatate (TESSALON PERLES) capsule 200 mg  200 mg Oral TID PRN    bromfenac sodium 0 07 % SOLN 1 drop  1 drop Ophthalmic Daily    calcium carbonate (TUMS) chewable tablet 1,000 mg 1,000 mg Oral Daily PRN    carvedilol (COREG) tablet 12 5 mg  12 5 mg Oral BID With Meals    cefepime (MAXIPIME) 2,000 mg in dextrose 5 % 50 mL IVPB  2,000 mg Intravenous Q12H    clopidogrel (PLAVIX) tablet 75 mg  75 mg Oral Daily    docusate sodium (COLACE) capsule 100 mg  100 mg Oral BID    Fesoterodine Fumarate ER TB24 4 mg  4 mg Oral QPM    fluticasone (FLONASE) 50 mcg/act nasal spray 1 spray  1 spray Nasal Daily    fluticasone-vilanterol (BREO ELLIPTA) 200-25 MCG/INH inhaler 1 puff  1 puff Inhalation Daily    guaiFENesin (MUCINEX) 12 hr tablet 1,200 mg  1,200 mg Oral Q12H [facility]    heparin (porcine) subcutaneous injection 5,000 Units  5,000 Units Subcutaneous Q8H [address]    latanoprost (XALATAN) 0 005 % ophthalmic solution 1 drop  1 drop Both Eyes HS    levalbuterol (XOPENEX) inhalation solution 1 25 mg  1 25 mg Nebulization TID    LORazepam (ATIVAN) tablet 0 25 mg  0 25 mg Oral Daily    magnesium hydroxide (MILK OF MAGNESIA) 400 mg/5 mL oral suspension 30 mL  30 mL Oral Daily PRN    metroNIDAZOLE (FLAGYL) IVPB (premix) 500 mg  500 mg Intravenous Q8H    nystatin (MYCOSTATIN) cream   Topical BID    ondansetron (ZOFRAN) injection 4 mg  4 mg Intravenous Q6H PRN    pantoprazole (PROTONIX) EC tablet 40 mg  40 mg Oral Early Morning    PARoxetine (PAXIL) tablet 20 mg  20 mg Oral Daily    polyethylene glycol (MIRALAX) packet 17 g  17 g Oral Daily PRN    prednisoLONE acetate (PRED FORTE) 1 % ophthalmic suspension 1 drop  1 drop Right Eye 4x Daily    psyllium (METAMUCIL) 1 packet  1 packet Oral Daily    senna (SENOKOT) tablet 17 2 mg  2 tablet Oral HS    sodium chloride 0 9 % infusion  75 mL/hr Intravenous Continuous    sodium chloride 0 9 % inhalation solution 3 mL  3 mL Nebulization TID    spironolactone (ALDACTONE) tablet 25 mg  25 mg Oral Daily    tamsulosin (FLOMAX) capsule 0 4 mg  0 4 mg Oral Daily    tiotropium (SPIRIVA) capsule for inhaler 18 mcg  18 mcg Inhalation Daily     Allergies Allergen Reactions    Penicillins Rash       Vitals: Blood pressure 126/69, pulse 67, temperature 98 1 °F (36 7 °C), temperature source Oral, resp  rate 20, height 6' 5" (1 956 m), weight 102 kg (225 lb 5 oz), SpO2 96 %  , Body mass index is 26 72 kg/m²  No intake or output data in the 24 hours ending 11/03/18 0854    Physical exam:    General Appearance:    Alert, cooperative, no conversational dyspnea or   accessory muscle use, answers questions appropriately       Head/eyes:    Normocephalic, without obvious abnormality, atraumatic,         extraocular muscles intact, no scleral icterus    Nose:    septum midline, mucosa normal, no drainage    or sinus tenderness   Throat:   Dry mucous membranes, thick secretions, no thrush       Lungs:     Coarse breath sounds, rhonchi throughout   Chest Wall:    Scoliosis    Heart:    Regular rate and rhythm, S1 and S2 normal, no murmur, rub   or gallop, diminished tones       Extremities:   Extremities normal, atraumatic, no cyanosis or edema   Skin:   Warm, dry, turgor normal, no rashes or lesions             Labs: ABG: No results found for: PHART, TGA2YAI, PO2ART, GZS8ABM, [id number], BEART, SOURCE      Results from last 7 days  Lab Units 11/03/18  0545 11/02/18  1305   WBC Thousand/uL 11 72* 17 35*   HEMOGLOBIN g/dL 13 8 14 8   HEMATOCRIT % 42 4 45 2   PLATELETS Thousands/uL 129* 156           Results from last 7 days  Lab Units 11/03/18  0545 11/02/18  1305   POTASSIUM mmol/L 3 8 4 1   CHLORIDE mmol/L 104 102   CO2 mmol/L 24 26   BUN mg/dL 22 30*   CREATININE mg/dL 1 26 1 72*   CALCIUM mg/dL 9 5 9 4   ALK PHOS U/L  --  121*   ALT U/L  --  21   AST U/L  --  12       Results from last 7 days  Lab Units 11/02/18  1305   INR  1 09   PTT seconds 42*           NT-BNP     Imaging and other studies: I have personally reviewed pertinent films in PACS the chest x-ray from November 2nd is unchanged from October and August   There is severe scoliosis along with hyperinflation    The CT scan of the chest is remarkable for occur which were of the trachea secondary to scoliosis as well as tracheomalacia several cm above the kenneth          Code Status: Level 1 - Full Code    Thank you for allowing us to participate in the care of your patient      Tamela Javier MD

## 2018-11-03 NOTE — PLAN OF CARE
Problem: SLP ADULT - SWALLOWING, IMPAIRED  Goal: Initial SLP swallow eval performed  Outcome: Completed Date Met: 11/03/18

## 2018-11-04 ENCOUNTER — APPOINTMENT (INPATIENT)
Dept: RADIOLOGY | Facility: HOSPITAL | Age: 78
DRG: 177 | End: 2018-11-04
Payer: MEDICARE

## 2018-11-04 LAB
ANION GAP SERPL CALCULATED.3IONS-SCNC: 11 MMOL/L (ref 4–13)
ATRIAL RATE: 73 BPM
ATRIAL RATE: 74 BPM
BUN SERPL-MCNC: 18 MG/DL (ref 5–25)
CALCIUM SERPL-MCNC: 9.1 MG/DL (ref 8.3–10.1)
CHLORIDE SERPL-SCNC: 104 MMOL/L (ref 100–108)
CO2 SERPL-SCNC: 21 MMOL/L (ref 21–32)
CREAT SERPL-MCNC: 1.11 MG/DL (ref 0.6–1.3)
ERYTHROCYTE [DISTWIDTH] IN BLOOD BY AUTOMATED COUNT: 14.6 % (ref 11.6–15.1)
GFR SERPL CREATININE-BSD FRML MDRD: 63 ML/MIN/1.73SQ M
GLUCOSE SERPL-MCNC: 96 MG/DL (ref 65–140)
HCT VFR BLD AUTO: 39.4 % (ref 36.5–49.3)
HGB BLD-MCNC: 13.1 G/DL (ref 12–17)
L PNEUMO1 AG UR QL IA.RAPID: NEGATIVE
MCH RBC QN AUTO: 29.6 PG (ref 26.8–34.3)
MCHC RBC AUTO-ENTMCNC: 33.2 G/DL (ref 31.4–37.4)
MCV RBC AUTO: 89 FL (ref 82–98)
P AXIS: 187 DEGREES
P AXIS: 194 DEGREES
PLATELET # BLD AUTO: 123 THOUSANDS/UL (ref 149–390)
PMV BLD AUTO: 10.8 FL (ref 8.9–12.7)
POTASSIUM SERPL-SCNC: 3.9 MMOL/L (ref 3.5–5.3)
PR INTERVAL: 204 MS
PR INTERVAL: 210 MS
QRS AXIS: 202 DEGREES
QRS AXIS: 207 DEGREES
QRSD INTERVAL: 100 MS
QRSD INTERVAL: 94 MS
QT INTERVAL: 366 MS
QT INTERVAL: 372 MS
QTC INTERVAL: 406 MS
QTC INTERVAL: 409 MS
RBC # BLD AUTO: 4.43 MILLION/UL (ref 3.88–5.62)
S PNEUM AG UR QL: NEGATIVE
SODIUM SERPL-SCNC: 136 MMOL/L (ref 136–145)
T WAVE AXIS: 102 DEGREES
T WAVE AXIS: 105 DEGREES
VENTRICULAR RATE: 73 BPM
VENTRICULAR RATE: 74 BPM
WBC # BLD AUTO: 9.5 THOUSAND/UL (ref 4.31–10.16)

## 2018-11-04 PROCEDURE — 97116 GAIT TRAINING THERAPY: CPT

## 2018-11-04 PROCEDURE — 94640 AIRWAY INHALATION TREATMENT: CPT

## 2018-11-04 PROCEDURE — 97163 PT EVAL HIGH COMPLEX 45 MIN: CPT

## 2018-11-04 PROCEDURE — 99233 SBSQ HOSP IP/OBS HIGH 50: CPT | Performed by: PHYSICIAN ASSISTANT

## 2018-11-04 PROCEDURE — 92611 MOTION FLUOROSCOPY/SWALLOW: CPT

## 2018-11-04 PROCEDURE — 74230 X-RAY XM SWLNG FUNCJ C+: CPT

## 2018-11-04 PROCEDURE — G8978 MOBILITY CURRENT STATUS: HCPCS

## 2018-11-04 PROCEDURE — G8979 MOBILITY GOAL STATUS: HCPCS

## 2018-11-04 PROCEDURE — 94760 N-INVAS EAR/PLS OXIMETRY 1: CPT

## 2018-11-04 PROCEDURE — 94668 MNPJ CHEST WALL SBSQ: CPT

## 2018-11-04 PROCEDURE — 85027 COMPLETE CBC AUTOMATED: CPT | Performed by: PHYSICIAN ASSISTANT

## 2018-11-04 PROCEDURE — 80048 BASIC METABOLIC PNL TOTAL CA: CPT | Performed by: PHYSICIAN ASSISTANT

## 2018-11-04 PROCEDURE — 93010 ELECTROCARDIOGRAM REPORT: CPT | Performed by: INTERNAL MEDICINE

## 2018-11-04 RX ADMIN — FLUTICASONE PROPIONATE 1 SPRAY: 50 SPRAY, METERED NASAL at 08:18

## 2018-11-04 RX ADMIN — HEPARIN SODIUM 5000 UNITS: 5000 INJECTION, SOLUTION INTRAVENOUS; SUBCUTANEOUS at 23:03

## 2018-11-04 RX ADMIN — HEPARIN SODIUM 5000 UNITS: 5000 INJECTION, SOLUTION INTRAVENOUS; SUBCUTANEOUS at 17:30

## 2018-11-04 RX ADMIN — HEPARIN SODIUM 5000 UNITS: 5000 INJECTION, SOLUTION INTRAVENOUS; SUBCUTANEOUS at 05:59

## 2018-11-04 RX ADMIN — ISODIUM CHLORIDE 3 ML: 0.03 SOLUTION RESPIRATORY (INHALATION) at 20:50

## 2018-11-04 RX ADMIN — PREDNISOLONE ACETATE 1 DROP: 10 SUSPENSION/ DROPS OPHTHALMIC at 08:17

## 2018-11-04 RX ADMIN — TIOTROPIUM BROMIDE 18 MCG: 18 CAPSULE ORAL; RESPIRATORY (INHALATION) at 08:19

## 2018-11-04 RX ADMIN — METRONIDAZOLE 500 MG: 500 INJECTION, SOLUTION INTRAVENOUS at 08:43

## 2018-11-04 RX ADMIN — SPIRONOLACTONE 25 MG: 25 TABLET, FILM COATED ORAL at 08:45

## 2018-11-04 RX ADMIN — ATORVASTATIN CALCIUM 10 MG: 10 TABLET, FILM COATED ORAL at 17:30

## 2018-11-04 RX ADMIN — CEFEPIME HYDROCHLORIDE 2000 MG: 2 INJECTION, POWDER, FOR SOLUTION INTRAVENOUS at 18:38

## 2018-11-04 RX ADMIN — NYSTATIN: 100000 CREAM TOPICAL at 08:18

## 2018-11-04 RX ADMIN — CARVEDILOL 12.5 MG: 12.5 TABLET, FILM COATED ORAL at 17:30

## 2018-11-04 RX ADMIN — METRONIDAZOLE 500 MG: 500 INJECTION, SOLUTION INTRAVENOUS at 17:31

## 2018-11-04 RX ADMIN — CEFEPIME HYDROCHLORIDE 2000 MG: 2 INJECTION, POWDER, FOR SOLUTION INTRAVENOUS at 05:59

## 2018-11-04 RX ADMIN — LORAZEPAM 0.25 MG: 0.5 TABLET ORAL at 08:15

## 2018-11-04 RX ADMIN — DOCUSATE SODIUM 100 MG: 100 CAPSULE, LIQUID FILLED ORAL at 08:16

## 2018-11-04 RX ADMIN — METRONIDAZOLE 500 MG: 500 INJECTION, SOLUTION INTRAVENOUS at 02:36

## 2018-11-04 RX ADMIN — PAROXETINE HYDROCHLORIDE 20 MG: 20 TABLET, FILM COATED ORAL at 08:15

## 2018-11-04 RX ADMIN — ISODIUM CHLORIDE 3 ML: 0.03 SOLUTION RESPIRATORY (INHALATION) at 09:00

## 2018-11-04 RX ADMIN — PSYLLIUM HUSK 1 PACKET: 3.4 POWDER ORAL at 08:47

## 2018-11-04 RX ADMIN — ISODIUM CHLORIDE 3 ML: 0.03 SOLUTION RESPIRATORY (INHALATION) at 14:49

## 2018-11-04 RX ADMIN — CARVEDILOL 12.5 MG: 12.5 TABLET, FILM COATED ORAL at 08:45

## 2018-11-04 RX ADMIN — LATANOPROST 1 DROP: 50 SOLUTION OPHTHALMIC at 23:02

## 2018-11-04 RX ADMIN — PREDNISOLONE ACETATE 1 DROP: 10 SUSPENSION/ DROPS OPHTHALMIC at 11:45

## 2018-11-04 RX ADMIN — GUAIFENESIN 1200 MG: 600 TABLET, EXTENDED RELEASE ORAL at 08:15

## 2018-11-04 RX ADMIN — DOCUSATE SODIUM 100 MG: 100 CAPSULE, LIQUID FILLED ORAL at 17:30

## 2018-11-04 RX ADMIN — LEVALBUTEROL HYDROCHLORIDE 1.25 MG: 1.25 SOLUTION, CONCENTRATE RESPIRATORY (INHALATION) at 09:00

## 2018-11-04 RX ADMIN — PREDNISOLONE ACETATE 1 DROP: 10 SUSPENSION/ DROPS OPHTHALMIC at 17:31

## 2018-11-04 RX ADMIN — BENZONATATE 200 MG: 100 CAPSULE ORAL at 08:17

## 2018-11-04 RX ADMIN — ASPIRIN 81 MG 81 MG: 81 TABLET ORAL at 08:14

## 2018-11-04 RX ADMIN — AMLODIPINE BESYLATE 10 MG: 10 TABLET ORAL at 08:45

## 2018-11-04 RX ADMIN — LEVALBUTEROL HYDROCHLORIDE 1.25 MG: 1.25 SOLUTION, CONCENTRATE RESPIRATORY (INHALATION) at 20:50

## 2018-11-04 RX ADMIN — FLUTICASONE FUROATE AND VILANTEROL TRIFENATATE 1 PUFF: 200; 25 POWDER RESPIRATORY (INHALATION) at 08:18

## 2018-11-04 RX ADMIN — LEVALBUTEROL HYDROCHLORIDE 1.25 MG: 1.25 SOLUTION, CONCENTRATE RESPIRATORY (INHALATION) at 14:49

## 2018-11-04 RX ADMIN — PREDNISOLONE ACETATE 1 DROP: 10 SUSPENSION/ DROPS OPHTHALMIC at 23:03

## 2018-11-04 RX ADMIN — TAMSULOSIN HYDROCHLORIDE 0.4 MG: 0.4 CAPSULE ORAL at 08:14

## 2018-11-04 RX ADMIN — PANTOPRAZOLE SODIUM 40 MG: 40 TABLET, DELAYED RELEASE ORAL at 05:59

## 2018-11-04 RX ADMIN — NYSTATIN: 100000 CREAM TOPICAL at 17:31

## 2018-11-04 RX ADMIN — CLOPIDOGREL 75 MG: 75 TABLET, FILM COATED ORAL at 08:16

## 2018-11-04 NOTE — PHYSICAL THERAPY NOTE
PHYSICAL THERAPY TREATMENT NOTE    Patient Name: Brook Hernadez Date: 11/4/2018 11/04/18 4485   Pain Assessment   Pain Assessment No/denies pain   Restrictions/Precautions   Other Precautions Impulsive;Cognitive; Chair Alarm; Bed Alarm; Fall Risk;Aspiration   General   Chart Reviewed Yes   Family/Caregiver Present Yes  (pt's brother entered during session )   Cognition   Overall Cognitive Status Impaired   Arousal/Participation Cooperative   Attention Attends with cues to redirect   Orientation Level Oriented to person;Disoriented to place; Disoriented to time;Disoriented to situation; Other (Comment)  (pt was identified w/ full name, birth date, ID bracelet)   Following Commands Follows one step commands inconsistently   Subjective   Subjective pt agreed to PT intervention  mobility education was provided regarding transfers and roller walker use  Also reviewed breathing technique  education was completed via demonstration and verbal instruction  Bed Mobility   Sit to Supine 2  Maximal assistance   Additional items Assist x 1; Increased time required;Verbal cues;LE management  (for LE positioning, breathing technique)   Transfers   Sit to Stand 2  Maximal assistance   Additional items Assist x 1; Increased time required; Impulsive;Verbal cues  (for hand placement, LE positioning)   Stand to Sit 3  Moderate assistance   Additional items Assist x 1; Impulsive;Verbal cues  (for controlled descent)   Additional Comments during ambulation, pt was incontinent of bowel and had to be cleanced  pt was dependent of personal care  Ambulation/Elevation   Gait pattern Decreased foot clearance;Decreased L stance;Shuffling; Inconsistent sariah   Gait Assistance 3  Moderate assist  (w/ chair follow)   Additional items Assist x 1;Verbal cues; Tactile cues   Assistive Device Rolling walker; Other (Comment)  (left AFO)   Distance 5 feet, 4 feet x2  seated rest breaks x 2 to 3 minutes each  (additional not possible due to fatigue)   Stair Management Assistance Not tested   Balance   Static Sitting Fair   Dynamic Sitting Poor   Static Standing Poor +   Dynamic Standing Poor   Ambulatory (w/ roller walker)   Activity Tolerance   Activity Tolerance Patient limited by fatigue   Nurse Made Aware spoke to Dr Xochitl Green   Assessment   Prognosis Fair   Problem List Decreased strength;Decreased range of motion;Decreased endurance; Impaired balance;Decreased mobility; Decreased cognition;Decreased coordination;Decreased safety awareness   Assessment Therapist provided education to pt for mobility technique including transfers and gait w/ roller walker  Education was provided due to findings from evaluation  Frequent repetition was needed for carryover to be noted  Pt was found to have improvement after education w/ decreased level of assist to maintain safety and increased ambulation distance  Pt continues to be a fall risk  continued inpatient PT tx is indicated to reduce fall risk factors  Goals   Patient Goals go home   STG Expiration Date 11/14/18   Short Term Goal #1 pt will: Increase bilateral LE strength 1/2 grade to facilitate independent mobility, Perform all bed mobility tasks w/ supervision to decrease fall risk factors, Perform all transfers w/ minx1 to improve independence, Ambulate 150 ft  with roller walker w/ minx1 w/o LOB to expedite safe return home, Increase all balance 1 grade to decrease risk for falls, Complete exercise program w/ less than 25% input from therapist during session to increase overall activity tolerance, Tolerate 3 hr OOB to faciliate upright tolerance and Improve Barthel Index score to 50 or greater to facilitate independence   Treatment Day 1   Plan   Treatment/Interventions Functional transfer training;LE strengthening/ROM; Therapeutic exercise; Endurance training;Cognitive reorientation;Patient/family training;Equipment eval/education; Bed mobility;Gait training  (PT to see when stair training is appropriate )   Progress Progressing toward goals   PT Frequency 5x/wk   Recommendation   Recommendation Post acute IP rehab;OT consult   Equipment Recommended Other (Comment)  (roller walker)     Skilled inpatient PT recommended while in hospital to progress pt toward treatment goals      Maricel Rios, PT

## 2018-11-04 NOTE — SOCIAL WORK
LOS 2   Not a bundle; Not a readmission  Cm contacted pt's daughter and left a message in order to discuss DCP  Therapy is recommending rehab  Pt has been to [facility] in the past   Cm awaiting a call back from daughter in order to assist with planning for DC

## 2018-11-04 NOTE — PROGRESS NOTES
Progress Note - Elza Lucio  1940, 65 y o  male MRN: [id number]    Unit/Bed#: -01 Encounter: [id number]    Primary Care Provider: Khalif Godorich DO   Date and time admitted to hospital: 11/2/2018 12:30 PM    * Aspiration pneumonia ([facility] )   Assessment & Plan    · POA  Dx yesterday via outpatient CXR  Hx of chronic aspiration with recent choking episode while eating about 1 week ago  Given hx of COPD and poor functional status 2/2 CVA deficits, he would be considered high risk CAP/HCAP (thought has not been hospitalized in the last 3 months  · Continue Cefepime/flagyl  · Respiratory protocol  · Incentive spirometer  · Nebs-- avoid ipratropium/consider adding mucomyst per pulmonary  · Anti-tussives/mucolytics  · Trend labs/vitals  · Pulmonary following along-- appreciate input      Chronic obstructive pulmonary disease with acute lower respiratory infection ([facility] )   Assessment & Plan    · Patient without wheezing  · Continue home medications/inhalers/nebulizers     Dysphagia   Assessment & Plan    · With chronic microaspiration  · SLP eval: recommend Dysphagia 2 with NTL  · Repeat VBS: Patient presents with mild-moderate oropharyngeal dysphagia marked by incomplete mastication of solids, segmented transfer of soft, premature spillage of soft and thins, delayed reflexive swallow  There is penetration to the level of the vocal cords with consecutive cup sips of nectar and there is penetration and aspiration with single tsp of thins  There is prompt sensory response to penetration/aspiration       Tracheomalacia   Assessment & Plan    · Follows with Dr Aguila Park  · No further interventions/treatments at this time      History of cerebrovascular accident (CVA) with residual deficit   Assessment & Plan    · Supportive care/braces  · PT/OT eval prior to d/c to assess needs     GERD (gastroesophageal reflux disease)   Assessment & Plan    · Continue PPI     Chronic cough   Assessment & Plan    · Continue tessalon, mucinex     RONAL (acute kidney injury) ([facility] )   Assessment & Plan    · Baseline cr appears to be around 1 1; today back to baseline after overnight IVF hydration       VTE Pharmacologic Prophylaxis:   Pharmacologic: Heparin  Mechanical VTE Prophylaxis in Place: Yes    Patient Centered Rounds: I have performed bedside rounds with nursing staff today  Discussions with Specialists or Other Care Team Provider: pulm, CM    Education and Discussions with Family / Patient: patient, daughter    Time Spent for Care: 30 minutes  More than 50% of total time spent on counseling and coordination of care as described above  Current Length of Stay: 2 day(s)    Current Patient Status: Inpatient   Certification Statement: The patient will continue to require additional inpatient hospital stay due to PT/OT eval    Discharge Plan: d/c home likely in 24-48 hours    Code Status: Level 1 - Full Code      Subjective:   Patient still coughing, though not as much compared to yesterday  No chest pain  RN reports incontinence this morning  Objective:     Vitals:   Temp (24hrs), Av 2 °F (36 8 °C), Min:97 7 °F (36 5 °C), Max:98 7 °F (37 1 °C)    Temp:  [97 7 °F (36 5 °C)-98 7 °F (37 1 °C)] 98 2 °F (36 8 °C)  HR:  [67-70] 70  Resp:  [18-20] 20  BP: (118-125)/(56-63) 123/56  SpO2:  [93 %-98 %] 98 %  Body mass index is 26 72 kg/m²  Input and Output Summary (last 24 hours): Intake/Output Summary (Last 24 hours) at 18 1204  Last data filed at 18 0930   Gross per 24 hour   Intake              240 ml   Output              700 ml   Net             -460 ml       Physical Exam:     Physical Exam   Constitutional: He is oriented to person, place, and time  He appears well-developed and well-nourished  No distress  HENT:   Head: Normocephalic and atraumatic  Mouth/Throat: No oropharyngeal exudate  Eyes: Pupils are equal, round, and reactive to light  Conjunctivae and EOM are normal  No scleral icterus  Neck: No JVD present  Cardiovascular: Normal rate and regular rhythm  Exam reveals no gallop and no friction rub  No murmur heard  Pulmonary/Chest: Effort normal  He has no wheezes  He has no rales  Coarse breath sounds   Abdominal: Soft  Bowel sounds are normal  He exhibits no distension  There is no tenderness  There is no rebound  Musculoskeletal: He exhibits no edema or tenderness  Neurological: He is alert and oriented to person, place, and time  He displays normal reflexes  No cranial nerve deficit  He exhibits normal muscle tone  Skin: Skin is warm and dry  No rash noted  He is not diaphoretic  No erythema  Psychiatric: He has a normal mood and affect  His behavior is normal        Additional Data:     Labs:      Results from last 7 days  Lab Units 11/04/18  0544  11/02/18  1305   WBC Thousand/uL 9 50  < > 17 35*   HEMOGLOBIN g/dL 13 1  < > 14 8   HEMATOCRIT % 39 4  < > 45 2   PLATELETS Thousands/uL 123*  < > 156   NEUTROS PCT %  --   --  87*   LYMPHS PCT %  --   --  5*   MONOS PCT %  --   --  8   EOS PCT %  --   --  0   < > = values in this interval not displayed  Results from last 7 days  Lab Units 11/04/18  0544  11/02/18  1305   POTASSIUM mmol/L 3 9  < > 4 1   CHLORIDE mmol/L 104  < > 102   CO2 mmol/L 21  < > 26   BUN mg/dL 18  < > 30*   CREATININE mg/dL 1 11  < > 1 72*   ANION GAP mmol/L 11  < > 10   CALCIUM mg/dL 9 1  < > 9 4   ALBUMIN g/dL  --   --  3 7   TOTAL BILIRUBIN mg/dL  --   --  1 00   ALK PHOS U/L  --   --  121*   ALT U/L  --   --  21   AST U/L  --   --  12   < > = values in this interval not displayed  Results from last 7 days  Lab Units 11/02/18  1305   INR  1 09               Results from last 7 days  Lab Units 11/03/18  0545 11/02/18  1305   LACTIC ACID mmol/L  --  2 0   PROCALCITONIN ng/ml 0 11  --            * I Have Reviewed All Lab Data Listed Above  * Additional Pertinent Lab Tests Reviewed:  [facility]    Imaging:    Imaging Reports Reviewed Today Include: CXR, VBS  Imaging Personally Reviewed by Myself Includes:  CXR, VBS    Recent Cultures (last 7 days):       Results from last 7 days  Lab Units 11/03/18  1628 11/02/18  1836 11/02/18  1338 11/02/18  1305   BLOOD CULTURE   --   --  No Growth at 24 hrs  No Growth at 24 hrs     INFLUENZA B PCR   --  None Detected  --   --    RSV PCR   --  None Detected  --   --    LEGIONELLA URINARY ANTIGEN  Negative  --   --   --        Last 24 Hours Medication List:     Current Facility-Administered Medications:  acetaminophen 650 mg Oral Q6H PRN Jessie Arthur PA-C    albuterol 2 5 mg Nebulization Q4H PRN Jerry Ching DO    amLODIPine 10 mg Oral Daily Jessie Arthur PA-C    aspirin 81 mg Oral Daily Jessie Arthur PA-C    atorvastatin 10 mg Oral Daily With [employer] KEVIN Arthur PA-C    benzonatate 200 mg Oral TID PRN Jessie Arthur PA-C    bromfenac sodium 1 drop Ophthalmic Daily Jessie Arthur PA-C    calcium carbonate 1,000 mg Oral Daily PRN Jessie Arthur PA-C    carvedilol 12 5 mg Oral BID With Meals Jessie Arthur PA-C    cefepime 2,000 mg Intravenous Q12H Jessie Arthur PA-C Last Rate: 2,000 mg (11/04/18 0559)   clopidogrel 75 mg Oral Daily Jessie Arthur PA-C    docusate sodium 100 mg Oral BID Jessie Arthur PA-C    Fesoterodine Fumarate ER 4 mg Oral QPM Jessie Arthur PA-C    fluticasone 1 spray Nasal Daily Jessie Arthur PA-C    fluticasone-vilanterol 1 puff Inhalation Daily Jessie Arthur PA-C    guaiFENesin 1,200 mg Oral Q12H [facility] Jessie Arthur PA-C    heparin (porcine) 5,000 Units Subcutaneous Q8H [facility] Jessie Arthur PA-C    latanoprost 1 drop Both Eyes HS Jessie Arthur PA-C    levalbuterol 1 25 mg Nebulization TID Jerry Ching DO    LORazepam 0 25 mg Oral Daily Jessie Arthur PA-C    magnesium hydroxide 30 mL Oral Daily PRN Jessie Arthur, LADY    metroNIDAZOLE 500 mg Intravenous Q8H Jessie Arthur PA-C Last Rate: 500 mg (11/04/18 0843)   nystatin  Topical BID Montse Yazan, DO    ondansetron 4 mg Intravenous Q6H PRN Jessie Arthur PA-C    pantoprazole 40 mg Oral Early Morning Jessie Arthur PA-C    PARoxetine 20 mg Oral Daily Jessie Arthur PA-C    polyethylene glycol 17 g Oral Daily PRN Jessie Arthur PA-C    prednisoLONE acetate 1 drop Right Eye 4x Daily Jessie Arthur PA-C    psyllium 1 packet Oral Daily Jessie Arthur PA-C    senna 2 tablet Oral HS Jessie Arthur PA-C    sodium chloride 3 mL Nebulization TID Montse Yazan, DO    spironolactone 25 mg Oral Daily Jessie Arthur PA-C    tamsulosin 0 4 mg Oral Daily Jessie Arthur PA-C    tiotropium 18 mcg Inhalation Daily Jessie Arthur PA-C         Today, Patient Was Seen By: Rah Dunlap PA-C    ** Please Note: Dictation voice to text software may have been used in the creation of this document   **

## 2018-11-04 NOTE — PHYSICAL THERAPY NOTE
PHYSICAL THERAPY EVALUATION NOTE    Patient Name: Gladstone Habermann  Today's Date: 11/4/2018  AGE:   65 y o  Mrn:   [id number]  ADMIT DX:  Delirium [R41 0]  RONAL (acute kidney injury) ([facility] ) [N17 9]  Altered mental status, unspecified [R41 82]  Sepsis ([facility] ) [A41 9]  Pneumonia of right lower lobe due to infectious organism ([place] [address] ) [J18 1]    Past Medical History:   Diagnosis Date    RONAL (acute kidney injury) ([facility] ) 5/31/2017    Aspiration into respiratory tract     Chest pain 5/31/2017    COPD (chronic obstructive pulmonary disease) ([place] [address] )     Depression     Elevated troponin 5/31/2017    GERD (gastroesophageal reflux disease)     History of CVA (cerebrovascular accident) 4/13/2016    Hyperlipidemia     Hypertension     Lung cancer ([place] [address] ) 2005    Right, status post lobectomy    Pneumonia     Prostate cancer ([place] [address] )     Sleep apnea     awaiting sleep study results    Stroke ([place] [address] )     Weakness due to cerebrovascular accident (CVA)      Length Of Stay: 2  PHYSICAL THERAPY EVALUATION :    11/04/18 1355   Pain Assessment   Pain Assessment No/denies pain   Home Living   Type of [address] [place] [address] Two level; Able to live on main level with bedroom/bathroom; Other (Comment)  ([address])   Additional Comments lives w/ daughter  ambulated w/ roller walker and left AFO  also uses wheelchair for mobility  independent w/ ADLs  no falls in last 6 months  uncertain of accuracy of history due to pt's cognition  Prior Function   Comments pt seen supine in bed  agreed to PT eval  denied pain or dizziness  pt needed frequent redirection for task focus  pt was unable to retain reorientation information  Restrictions/Precautions   Other Precautions Impulsive;Cognitive; Chair Alarm; Bed Alarm; Fall Risk;Aspiration   General   Additional Pertinent History room air resting pulse ox 95% and 85 BPM, active 94% and 91 BPM    Family/Caregiver Present Yes  (pt's brother entered during session )   Cognition   Overall Cognitive Status Impaired   Arousal/Participation Cooperative   Orientation Level Oriented to person;Disoriented to place; Disoriented to time;Disoriented to situation; Other (Comment)  (pt was identified w/ full name, birth date, ID bracelet)   Following Commands Follows one step commands inconsistently   RUE Assessment   RUE Assessment WFL  (4-/5, shoulder 3+/5)   LUE Assessment   LUE Assessment X  (3/5, shoulder 3-/5)   RLE Assessment   RLE Assessment WFL  (4-/5, ankle 3/5)   LLE Assessment   LLE Assessment X  (3-/5, ankle dorsiflexion 2-/5 plantarflexion 2+/5)   Coordination   Movements are Fluid and Coordinated 0   Coordination and Movement Description impaired coordination all extremities, worse on left  Light Touch   RLE Light Touch Grossly intact   LLE Light Touch Grossly intact   Bed Mobility   Supine to Sit 2  Maximal assistance   Additional items Assist x 1;HOB elevated; Bedrails; Increased time required; Impulsive;Verbal cues;LE management  (for railing use, breathing technique)   Transfers   Sit to Stand 2  Maximal assistance   Additional items Assist x 1; Increased time required; Impulsive;Verbal cues  (for hand placement, LE positioning)   Stand to Sit 2  Maximal assistance   Additional items Assist x 1; Impulsive;Verbal cues  (for hand placement, controlled descent)   Ambulation/Elevation   Gait pattern Forward Flexion;Decreased foot clearance;Decreased L stance;Shuffling; Inconsistent sariah   Gait Assistance 2  Maximal assist  (w/ chair follow)   Additional items Assist x 1;Verbal cues; Tactile cues  (for walker positioning, posture, full step length)   Assistive Device Rolling walker; Other (Comment)  (left AFO - pt was dependent for donning and doffing)   Distance 3 feet  (additional not possible due to fatigue)   Stair Management Assistance Not tested  (due to limited ambulation tolerance, poor safety awareness)   Balance   Static Sitting Fair   Dynamic Sitting Poor   Static Standing Poor   Dynamic Standing Poor -   Ambulatory Poor -  (w/ roller walker)   Activity Tolerance   Activity Tolerance Patient limited by fatigue   Nurse Made Aware spoke to Dr Mic Park   Assessment   Prognosis Fair   Problem List Decreased strength;Decreased range of motion;Decreased endurance; Impaired balance;Decreased mobility; Decreased cognition;Decreased coordination;Decreased safety awareness   Assessment Pt's daughter reports that approximately a week and half ago pt choked while eating a potato during dinner  Since that happened, pt had increased cough with sputum production that has changed from clear sputum to yellow sputum  Dx: aspiration pneumonia, COPD, dysphagia, and RONAL  order placed for PT eval and tx, w/ activity order of up w/ A  pt presents w/ comorbidities of COPD, HTN, CVA, hyperlipidemia, lung cancer w/ lobectomy, pneumonia, and right TKR and personal factors of advanced age, mobilizing w/ assistive device, stair(s) to enter home, decreased cognition, depression, inability to perform IADLs and inability to perform ADLs  pt presents w/ weakness, decreased ROM, decreased endurance, impaired cognition, impaired balance, gait deviations, impaired coordination, decreased safety awareness and fall risk  these impairments are evident in findings from physical examination (weakness, decreased ROM and impaired coordination), mobility assessment (need for mod to max assist w/ all phases of mobility when usually mobilizing independently, tolerance to only 3 feet of ambulation and need for cueing for mobility and breathing technique), and Barthel Index: 25/100  pt needed input for task focus and mobility technique/safety  pt is at risk for falls due to physical and safety awareness deficits   pt's clinical presentation is unstable/unpredictable (evident in need for assist w/ all phases of mobility when usually mobilizing independently, tolerance to only 3 feet of ambulation and need for input for task focus and mobility technique/safety w/ poor carryover of education received)  pt needs inpatient PT tx to improve mobility deficits  discharge recommendation is for inpatient rehab to reduce fall risk and maximize level of functional independence  Pt would benefit from OT consult to address cognition and safety awareness  Goals   Patient Goals go home   STG Expiration Date 11/14/18   Short Term Goal #1 pt will: Increase bilateral LE strength 1/2 grade to facilitate independent mobility, Perform all bed mobility tasks w/ supervision to decrease fall risk factors, Perform all transfers w/ minx1 to improve independence, Ambulate 150 ft  with roller walker w/ minx1 w/o LOB to expedite safe return home, Increase all balance 1 grade to decrease risk for falls, Complete exercise program w/ less than 25% input from therapist during session to increase overall activity tolerance, Tolerate 3 hr OOB to faciliate upright tolerance and Improve Barthel Index score to 50 or greater to facilitate independence   Plan   Treatment/Interventions Functional transfer training;LE strengthening/ROM; Therapeutic exercise; Endurance training;Cognitive reorientation;Patient/family training;Equipment eval/education; Bed mobility;Gait training  (PT to see when stair training is appropriate )   PT Frequency 5x/wk   Recommendation   Recommendation Post acute IP rehab;OT consult   Equipment Recommended Other (Comment)  (roller walker)   Barthel Index   Feeding 5   Bathing 0   Grooming Score 0   Dressing Score 0   Bladder Score 5   Bowels Score 5   Toilet Use Score 5   Transfers (Bed/Chair) Score 5   Mobility (Level Surface) Score 0   Stairs Score 0   Barthel Index Score 25     Skilled PT recommended while in hospital and upon DC to progress pt toward treatment goals       Amanda Freeman, PT

## 2018-11-04 NOTE — PLAN OF CARE
Problem: PHYSICAL THERAPY ADULT  Goal: Performs mobility at highest level of function for planned discharge setting  See evaluation for individualized goals  Treatment/Interventions: Functional transfer training, LE strengthening/ROM, Therapeutic exercise, Endurance training, Cognitive reorientation, Patient/family training, Equipment eval/education, Bed mobility, Gait training (PT to see when stair training is appropriate )  Equipment Recommended: Other (Comment) (roller walker)       See flowsheet documentation for full assessment, interventions and recommendations  Outcome: Progressing  Prognosis: Fair  Problem List: Decreased strength, Decreased range of motion, Decreased endurance, Impaired balance, Decreased mobility, Decreased cognition, Decreased coordination, Decreased safety awareness  Assessment: Therapist provided education to pt for mobility technique including transfers and gait w/ roller walker  Education was provided due to findings from evaluation  Frequent repetition was needed for carryover to be noted  Pt was found to have improvement after education w/ decreased level of assist to maintain safety and increased ambulation distance  Pt continues to be a fall risk  continued inpatient PT tx is indicated to reduce fall risk factors  Recommendation: Post acute IP rehab, OT consult          See flowsheet documentation for full assessment

## 2018-11-04 NOTE — PLAN OF CARE
Problem: DISCHARGE PLANNING - CARE MANAGEMENT  Goal: Discharge to post-acute care or home with appropriate resources  INTERVENTIONS:  - Conduct assessment to determine patient/family and health care team treatment goals, and need for post-acute services based on payer coverage, community resources, and patient preferences, and barriers to discharge  - Address psychosocial, clinical, and financial barriers to discharge as identified in assessment in conjunction with the patient/family and health care team  - Arrange appropriate level of post-acute services according to patient's   needs and preference and payer coverage in collaboration with the physician and health care team  - Communicate with and update the patient/family, physician, and health care team regarding progress on the discharge plan  - Arrange appropriate transportation to post-acute venues   Outcome: Progressing  LOS 2  Not a bundle; Not a readmission  CM reviewed discharge planning process including the following: identifying caregivers at home, preference for d/c planning needs, [facility] Meds to Bed program, availability of treatment team to discuss questions or concerns patient and/or family may have regarding diagnosis, plan of care, old or new medications and discharge planning   CM will continue to follow for care coordination and update assessment as necessary  Cm contacted daughter as therapy has seen pt and is recommending rehab  Daughter states pt lives with her and she has aides that come in to help him  She would prefer to take pt home with [facility] as they [facility] been receiving [facility]  She states he uses a RW and has a WC for the community  He lives on one floor  He has been to rehab in the past ([facility])  He has a hx of Major Depressive D/O however he is stable on medication at this time  No hx of D&A  He is able to afford his medications  No POA/AD      Cm discussed Medicare's 30 day window with daughter in the event she feels she is not able to manage pt once he is home  Referral has been made to [facility] for [address]  Cm will continue to follow through [place]      11/04/18 1251   Discharge Planning   Goal Length of Stay 3   [address]   Type of Current Residence Private residence   [address]   Type of [address] [facility] Referral Provided   [address] requested: Occupational Therapy; Physical Therapy   Home Health Agency Name: Other (please enter name in comment)  ([facility])   [address] Provider: PCP   Home Health Services Needed: Evaluate Functional Status and Safety;Strengthening/Theraputic Exercises to Improve Function   Homebound Criteria Met: Requires the Assistance of Another Person for Safe Ambulation or to Leave the Home   Supporting Clincal Findings: Limited Endurance   Discharge Communications   Discharge planning discussed with: daughter   Freedom of Choice Yes   Does the patient need discharge transport arranged?  No   CM Handoff Comments 11/4:  [facility]      11/04/18 2038   Patient Information   Mental Status Alert   Primary Caregiver Family   Support System Immediate family   Legal [facility] Proxy Appointed No   Activities of Daily Living Prior to Admission   Functional Status Minimum assistance   Assistive Device Walker   Living Arrangement House;Lives with someone   Ambulation Independent   Means of Transportation   Means of Transport to Appts: Family

## 2018-11-04 NOTE — SPEECH THERAPY NOTE
Speech Language/Pathology  Speech-Language Pathology Videofluoroscopic Swallow Study      Patient Name: Jennyfer Wiley  Today's Date: 11/4/2018     Problem List  Patient Active Problem List   Diagnosis    Essential hypertension    Lung cancer ([address] )    Dysphagia    History of cerebrovascular accident (CVA) with residual deficit    GERD (gastroesophageal reflux disease)    Left-sided weakness    Hyperlipidemia    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    Tracheomalacia    Chronic obstructive pulmonary disease with acute lower respiratory infection ([address] )    RONAL (acute kidney injury) ([address] )    Ambulatory dysfunction    Right lower lobe pneumonia ([address] )    Acute respiratory failure with hypoxia ([address] )    Chronic cough    Other emphysema ([address] )    Aspiration pneumonia ([address] )       Past Medical History  Past Medical History:   Diagnosis Date    RONAL (acute kidney injury) ([address] ) 5/31/2017    Aspiration into respiratory tract     Chest pain 5/31/2017    COPD (chronic obstructive pulmonary disease) (HCC)     Depression     Elevated troponin 5/31/2017    GERD (gastroesophageal reflux disease)     History of CVA (cerebrovascular accident) 4/13/2016    Hyperlipidemia     Hypertension     Lung cancer ([address] ) 2005    Right, status post lobectomy    Pneumonia     Prostate cancer ([address] )     Sleep apnea     awaiting sleep study results    St[facility]     Weakness due to cerebrovascular accident (CVA)        Past Surgical History  Past Surgical History:   Procedure Laterality Date    COLONOSCOPY      ESOPHAGOGASTRODUODENOSCOPY N/A 4/13/2016    Procedure: ESOPHAGOGASTRODUODENOSCOPY (EGD); Surgeon: Alma Chan MD;  [facility]: [facility]; Service:     JOINT REPLACEMENT      knee replacement    LUNG LOBECTOMY      ND ESOPHAGOGASTRODUODENOSCOPY TRANSORAL DIAGNOSTIC N/A 3/15/2017    Procedure: ESOPHAGOGASTRODUODENOSCOPY (EGD); Surgeon: Alma Chan MD;  [facility]: [facility];   Service: Gastroenterology    TRIGEMINAL NERVE DECOMPRESSION           General Information;  Pt is a 65 y o  male with a PMH remarkable for  Increased coughing and sputum production  Current concerns for dysphagia include inconsistent coughing with PO intake  VBS was recommended to assess oropharyngeal stage swallowing skills at this time  Pt was viewed in lateral position and was given trials of pureed, soft moist  and solid food ([place] [employer] cookie, bagel) as well as honey (moderately thick), nectar (mildly thick), thin liquid  Oral stage; Functional labial seal for retrieval from cup/spoon  Incomplete mastication for the breakdown of hardsolids resulting in expectoration from oral cavity  Complete mastication of soft  Segmented AP transfer of soft  Complete velar elevation and excursion for closure of nasopharynx  Pharyngeal stage; There is premature spillage of soft from segmented transfer and thins  There is mildly delayed reflexive swallow with all PO trials  Laryngeal elevation complete with adequate epiglottic ROM  There is penetration to the level of the vocal cords with consecutive sips of nectar thick  There is penetration and aspiration with single spoon sips of thins  There is prompt sensory response to both penetration and aspiration  There is no penetration with honey thick or single sup sips of nectar thick liquids  There is no pharyngeal stasis seen  Strategies and Efficacy: Single cup sips of nectar thick    Aspiration Response and Efficacy:  Prompt effective sensory response    Esophageal stage;  Esophageal screening was completed  Thre is mildly reduced opening of the UES  Assessment Summary; Patient presents with mild-moderate oropharyngeal dysphagia marked by incomplete mastication of solids, segmented transfer of soft, premature spillage of soft and thins, delayed reflexive swallow    There is penetration to the level of the vocal cords with consecutive cup sips of nectar and there is penetration and aspiration with single tsp of thins  There is prompt sensory response to penetration/aspiration  Recommendations: mechanically altered/level 2 diet and nectar thick liquids     Recommended Form of Meds: whole with liquid and whole with puree     Aspiration precautions and compensatory swallowing strategies: upright posture and single cup sips of nectars only    Results Reviewed with: patient and RN     Consider referral to: n/a    Treatment Recommended: dysphagia therapy while inpatient and  post d/c     Frequency of treatment: 3-5 x/week    Patient Stated Goal:    Dysphagia Goals per SLP:  1  pt will tolerate level 2 with nectar thick liquids without s/s of aspiration throughout a meal   2 pt will demonstrate accurate use of single cup sip strategy with 80% accuracy given no cues  Pt/Family Education: initiated  Pt and caregivers would benefit from/require continued education

## 2018-11-04 NOTE — SOCIAL WORK
LOS 2   Not a bundle; Not a readmission  CM reviewed discharge planning process including the following: identifying caregivers at home, preference for d/c planning needs, [facility] Meds to Bed program, availability of treatment team to discuss questions or concerns patient and/or family may have regarding diagnosis, plan of care, old or new medications and discharge planning   CM will continue to follow for care coordination and update assessment as necessary  Cm contacted daughter as therapy has seen pt and is recommending rehab  Daughter states pt lives with her and she has aides that come in to help him  She would prefer to take pt home with [facility] as they have been receiving services from [facility]  She states he uses a RW and has a WC for the community  He lives on one floor  He has been to rehab in the past ([facility])  He has a hx of Major Depressive D/O however he is stable on medication at this time  No hx of D&A  He is able to afford his medications  No POA/AD  Cm discussed Medicare's 30 day window with daughter in the event she feels she is not able to manage pt once he is home  Referral has been made to [facility] for [address]  Cm will continue to follow through [place]      11/04/18 1251   Discharge Planning   Goal Length of Stay 3   [address] [place] [address]   Type of Current Residence Private residence   [address] [facility] Yes   Type of [address] [place] [address] Referral Provided   [address] requested: Occupational Therapy; Physical Therapy   Home Health Agency Name: Other (please enter name in comment)  ([facility])   [address] Provider: PCP   Home Health Services Needed: Evaluate Functional Status and Safety;Strengthening/Theraputic Exercises to Improve Function   Homebound Criteria Met: Requires the Assistance of Another Person for Safe Ambulation or to Leave the Home   Supporting Clincal Findings: Limited Endurance   Discharge Communications   Discharge planning discussed with: daughter   Freedom of Choice Yes   Does the patient need discharge transport arranged?  No   CM Handoff Comments 11/4:  home with dtr and [facility]      11/04/18 9259   Patient Information   Mental Status Alert   Primary Caregiver Family   Support System Immediate family   Legal [facility] Proxy Appointed No   Activities of Daily Living Prior to Admission   Functional Status Minimum assistance   Assistive Device Walker   Living Arrangement House;Lives with someone   Ambulation Independent   Means of Transportation   Means of Transport to Appts: Family

## 2018-11-04 NOTE — PLAN OF CARE
Problem: PHYSICAL THERAPY ADULT  Goal: Performs mobility at highest level of function for planned discharge setting  See evaluation for individualized goals  Treatment/Interventions: Functional transfer training, LE strengthening/ROM, Therapeutic exercise, Endurance training, Cognitive reorientation, Patient/family training, Equipment eval/education, Bed mobility, Gait training (PT to see when stair training is appropriate )  Equipment Recommended: Other (Comment) (roller walker)       See flowsheet documentation for full assessment, interventions and recommendations  Prognosis: Fair  Problem List: Decreased strength, Decreased range of motion, Decreased endurance, Impaired balance, Decreased mobility, Decreased cognition, Decreased coordination, Decreased safety awareness  Assessment: Pt's daughter reports that approximately a week and half ago pt choked while eating a potato during dinner  Since that happened, pt had increased cough with sputum production that has changed from clear sputum to yellow sputum  Dx: aspiration pneumonia, COPD, dysphagia, and RONAL  order placed for PT eval and tx, w/ activity order of up w/ A  pt presents w/ comorbidities of COPD, HTN, CVA, hyperlipidemia, lung cancer w/ lobectomy, pneumonia, and right TKR and personal factors of advanced age, mobilizing w/ assistive device, stair(s) to enter home, decreased cognition, depression, inability to perform IADLs and inability to perform ADLs  pt presents w/ weakness, decreased ROM, decreased endurance, impaired cognition, impaired balance, gait deviations, impaired coordination, decreased safety awareness and fall risk   these impairments are evident in findings from physical examination (weakness, decreased ROM and impaired coordination), mobility assessment (need for mod to max assist w/ all phases of mobility when usually mobilizing independently, tolerance to only 3 feet of ambulation and need for cueing for mobility and breathing technique), and Barthel Index: 25/100  pt needed input for task focus and mobility technique/safety  pt is at risk for falls due to physical and safety awareness deficits  pt's clinical presentation is unstable/unpredictable (evident in need for assist w/ all phases of mobility when usually mobilizing independently, tolerance to only 3 feet of ambulation and need for input for task focus and mobility technique/safety w/ poor carryover of education received)  pt needs inpatient PT tx to improve mobility deficits  discharge recommendation is for inpatient rehab to reduce fall risk and maximize level of functional independence  Pt would benefit from OT consult to address cognition and safety awareness  Recommendation: Post acute IP rehab, OT consult          See flowsheet documentation for full assessment

## 2018-11-04 NOTE — PLAN OF CARE
Problem: DISCHARGE PLANNING - CARE MANAGEMENT  Goal: Discharge to post-acute care or home with appropriate resources  INTERVENTIONS:  - Conduct assessment to determine patient/family and health care team treatment goals, and need for post-acute services based on payer coverage, community resources, and patient preferences, and barriers to discharge  - Address psychosocial, clinical, and financial barriers to discharge as identified in assessment in conjunction with the patient/family and health care team  - Arrange appropriate level of post-acute services according to patient's   needs and preference and payer coverage in collaboration with the physician and health care team  - Communicate with and update the patient/family, physician, and health care team regarding progress on the discharge plan  - Arrange appropriate transportation to post-acute venues  Outcome: Progressing  LOS 2  Not a bundle; Not a readmission  Cm contacted pt's daughter and left a message in order to discuss DCP  Therapy is recommending rehab  Pt has been to [facility] in the past   Cm awaiting a call back from daughter in order to assist with planning for DC

## 2018-11-04 NOTE — PLAN OF CARE
Problem: Potential for Falls  Goal: Patient will remain free of falls  INTERVENTIONS:  - Assess patient frequently for physical needs  -  Identify cognitive and physical deficits and behaviors that affect risk of falls  -  [place] fall precautions as indicated by assessment   - Educate patient/family on patient safety including physical limitations  - Instruct patient to call for assistance with activity based on assessment  - Modify environment to reduce risk of injury  - Consider OT/PT consult to assist with strengthening/mobility   Outcome: Progressing      Problem: Prexisting or High Potential for Compromised Skin Integrity  Goal: Skin integrity is maintained or improved  INTERVENTIONS:  - Identify patients at risk for skin breakdown  - Assess and monitor skin integrity  - Assess and monitor nutrition and hydration status  - Monitor labs (i e  albumin)  - Assess for incontinence   - Turn and reposition patient  - Assist with mobility/ambulation  - Relieve pressure over bony prominences  - Avoid friction and shearing  - Provide appropriate hygiene as needed including keeping skin clean and dry  - Evaluate need for skin moisturizer/barrier cream  - Collaborate with interdisciplinary team (i e  Nutrition, Rehabilitation, etc )   - Patient/family teaching   Outcome: Progressing      Problem: Nutrition/Hydration-ADULT  Goal: Nutrient/Hydration intake appropriate for improving, restoring or maintaining nutritional needs  Monitor and assess patient's nutrition/hydration status for malnutrition (ex- brittle hair, bruises, dry skin, pale skin and conjunctiva, muscle wasting, smooth red tongue, and disorientation)  Collaborate with interdisciplinary team and initiate plan and interventions as ordered  Monitor patient's weight and dietary intake as ordered or per policy  Utilize nutrition screening tool and intervene per policy   Determine patient's food preferences and provide high-protein, high-caloric foods as appropriate       INTERVENTIONS:  - Monitor oral intake, urinary output, labs, and treatment plans  - Assess nutrition and hydration status and recommend course of action  - Evaluate amount of meals eaten  - Assist patient with eating if necessary   - Allow adequate time for meals  - Recommend/ encourage appropriate diets, oral nutritional supplements, and vitamin/mineral supplements  - Order, calculate, and assess calorie counts as needed  - Recommend, monitor, and adjust tube feedings and TPN/PPN based on assessed needs  - Assess need for intravenous fluids  - Provide specific nutrition/hydration education as appropriate  - Include patient/family/caregiver in decisions related to nutrition   Outcome: Progressing

## 2018-11-04 NOTE — PLAN OF CARE
Problem: SLP ADULT - SWALLOWING, IMPAIRED  Goal: Advance to least restrictive diet without signs or symptoms of aspiration for planned discharge setting  See evaluation for individualized goals  Outcome: Progressing  1  pt will tolerate level 2 with nectar thick liquids without s/s of aspiration throughout a meal   2 pt will demonstrate accurate use of single cup sip strategy with 80% accuracy given no cues

## 2018-11-05 VITALS
SYSTOLIC BLOOD PRESSURE: 131 MMHG | OXYGEN SATURATION: 95 % | DIASTOLIC BLOOD PRESSURE: 61 MMHG | RESPIRATION RATE: 20 BRPM | BODY MASS INDEX: 26.6 KG/M2 | WEIGHT: 225.31 LBS | HEIGHT: 77 IN | TEMPERATURE: 97.6 F | HEART RATE: 58 BPM

## 2018-11-05 LAB
ANION GAP SERPL CALCULATED.3IONS-SCNC: 9 MMOL/L (ref 4–13)
BUN SERPL-MCNC: 18 MG/DL (ref 5–25)
CALCIUM SERPL-MCNC: 9.3 MG/DL (ref 8.3–10.1)
CHLORIDE SERPL-SCNC: 103 MMOL/L (ref 100–108)
CO2 SERPL-SCNC: 24 MMOL/L (ref 21–32)
CREAT SERPL-MCNC: 1.23 MG/DL (ref 0.6–1.3)
ERYTHROCYTE [DISTWIDTH] IN BLOOD BY AUTOMATED COUNT: 14.3 % (ref 11.6–15.1)
GFR SERPL CREATININE-BSD FRML MDRD: 56 ML/MIN/1.73SQ M
GLUCOSE SERPL-MCNC: 99 MG/DL (ref 65–140)
HCT VFR BLD AUTO: 41.4 % (ref 36.5–49.3)
HGB BLD-MCNC: 13.8 G/DL (ref 12–17)
MCH RBC QN AUTO: 29.9 PG (ref 26.8–34.3)
MCHC RBC AUTO-ENTMCNC: 33.3 G/DL (ref 31.4–37.4)
MCV RBC AUTO: 90 FL (ref 82–98)
PLATELET # BLD AUTO: 142 THOUSANDS/UL (ref 149–390)
PMV BLD AUTO: 11.3 FL (ref 8.9–12.7)
POTASSIUM SERPL-SCNC: 3.9 MMOL/L (ref 3.5–5.3)
RBC # BLD AUTO: 4.62 MILLION/UL (ref 3.88–5.62)
SODIUM SERPL-SCNC: 136 MMOL/L (ref 136–145)
WBC # BLD AUTO: 8.38 THOUSAND/UL (ref 4.31–10.16)

## 2018-11-05 PROCEDURE — 85027 COMPLETE CBC AUTOMATED: CPT | Performed by: PHYSICIAN ASSISTANT

## 2018-11-05 PROCEDURE — 99232 SBSQ HOSP IP/OBS MODERATE 35: CPT | Performed by: INTERNAL MEDICINE

## 2018-11-05 PROCEDURE — 92526 ORAL FUNCTION THERAPY: CPT

## 2018-11-05 PROCEDURE — 94760 N-INVAS EAR/PLS OXIMETRY 1: CPT

## 2018-11-05 PROCEDURE — 80048 BASIC METABOLIC PNL TOTAL CA: CPT | Performed by: PHYSICIAN ASSISTANT

## 2018-11-05 PROCEDURE — 99239 HOSP IP/OBS DSCHRG MGMT >30: CPT | Performed by: PHYSICIAN ASSISTANT

## 2018-11-05 PROCEDURE — 94640 AIRWAY INHALATION TREATMENT: CPT

## 2018-11-05 PROCEDURE — 94664 DEMO&/EVAL PT USE INHALER: CPT

## 2018-11-05 RX ORDER — DOXYCYCLINE HYCLATE 100 MG/1
100 CAPSULE ORAL EVERY 12 HOURS SCHEDULED
Qty: 4 CAPSULE | Refills: 0
Start: 2018-11-05 | End: 2018-11-07

## 2018-11-05 RX ORDER — GUAIFENESIN/DEXTROMETHORPHAN 100-10MG/5
5 SYRUP ORAL 3 TIMES DAILY PRN
Qty: 118 ML | Refills: 0 | Status: SHIPPED | OUTPATIENT
Start: 2018-11-05 | End: 2019-02-19 | Stop reason: SDDI

## 2018-11-05 RX ORDER — CLINDAMYCIN HYDROCHLORIDE 300 MG/1
300 CAPSULE ORAL 4 TIMES DAILY
Qty: 16 CAPSULE | Refills: 0 | Status: SHIPPED | OUTPATIENT
Start: 2018-11-05 | End: 2018-11-05 | Stop reason: HOSPADM

## 2018-11-05 RX ADMIN — BENZONATATE 200 MG: 100 CAPSULE ORAL at 03:43

## 2018-11-05 RX ADMIN — LEVALBUTEROL HYDROCHLORIDE 1.25 MG: 1.25 SOLUTION, CONCENTRATE RESPIRATORY (INHALATION) at 09:15

## 2018-11-05 RX ADMIN — ASPIRIN 81 MG 81 MG: 81 TABLET ORAL at 08:25

## 2018-11-05 RX ADMIN — TAMSULOSIN HYDROCHLORIDE 0.4 MG: 0.4 CAPSULE ORAL at 08:25

## 2018-11-05 RX ADMIN — CARVEDILOL 12.5 MG: 12.5 TABLET, FILM COATED ORAL at 07:48

## 2018-11-05 RX ADMIN — FLUTICASONE PROPIONATE 1 SPRAY: 50 SPRAY, METERED NASAL at 08:26

## 2018-11-05 RX ADMIN — SPIRONOLACTONE 25 MG: 25 TABLET, FILM COATED ORAL at 08:25

## 2018-11-05 RX ADMIN — CEFEPIME HYDROCHLORIDE 2000 MG: 2 INJECTION, POWDER, FOR SOLUTION INTRAVENOUS at 07:47

## 2018-11-05 RX ADMIN — CLOPIDOGREL 75 MG: 75 TABLET, FILM COATED ORAL at 08:25

## 2018-11-05 RX ADMIN — ISODIUM CHLORIDE 3 ML: 0.03 SOLUTION RESPIRATORY (INHALATION) at 09:14

## 2018-11-05 RX ADMIN — AMLODIPINE BESYLATE 10 MG: 10 TABLET ORAL at 08:25

## 2018-11-05 RX ADMIN — METRONIDAZOLE 500 MG: 500 INJECTION, SOLUTION INTRAVENOUS at 01:33

## 2018-11-05 RX ADMIN — PAROXETINE HYDROCHLORIDE 20 MG: 20 TABLET, FILM COATED ORAL at 08:25

## 2018-11-05 RX ADMIN — PANTOPRAZOLE SODIUM 40 MG: 40 TABLET, DELAYED RELEASE ORAL at 07:47

## 2018-11-05 RX ADMIN — METRONIDAZOLE 500 MG: 500 INJECTION, SOLUTION INTRAVENOUS at 08:37

## 2018-11-05 RX ADMIN — TIOTROPIUM BROMIDE 18 MCG: 18 CAPSULE ORAL; RESPIRATORY (INHALATION) at 08:26

## 2018-11-05 RX ADMIN — PREDNISOLONE ACETATE 1 DROP: 10 SUSPENSION/ DROPS OPHTHALMIC at 08:26

## 2018-11-05 RX ADMIN — PREDNISOLONE ACETATE 1 DROP: 10 SUSPENSION/ DROPS OPHTHALMIC at 11:58

## 2018-11-05 RX ADMIN — LORAZEPAM 0.25 MG: 0.5 TABLET ORAL at 08:25

## 2018-11-05 RX ADMIN — FLUTICASONE FUROATE AND VILANTEROL TRIFENATATE 1 PUFF: 200; 25 POWDER RESPIRATORY (INHALATION) at 08:26

## 2018-11-05 RX ADMIN — HEPARIN SODIUM 5000 UNITS: 5000 INJECTION, SOLUTION INTRAVENOUS; SUBCUTANEOUS at 07:48

## 2018-11-05 RX ADMIN — NYSTATIN 1 APPLICATION: 100000 CREAM TOPICAL at 08:25

## 2018-11-05 NOTE — UTILIZATION REVIEW
Initial Clinical Review    Admission: Date/Time/Statement: 11/2/18 @ 1357     Orders Placed This Encounter   Procedures    Inpatient Admission (expected length of stay for this patient is greater than two midnights)     Standing Status:   Standing     Number of Occurrences:   1     Order Specific Question:   Admitting Physician     Answer:   Danny Hagen [[id number]]     Order Specific Question:   Level of Care     Answer:   Med Surg [[id number]]     Order Specific Question:   Estimated length of stay     Answer:   More than 2 Midnights     Order Specific Question:   Certification     Answer:   I certify that inpatient services are medically necessary for this patient for a duration of greater than two midnights  See H&P and MD Progress Notes for additional information about the patient's course of treatment  ED: Date/Time/Mode of Arrival:   ED Arrival Information     Expected Arrival Acuity Means of Arrival Escorted By Service Admission Type    - 11/2/2018 12:05 Urgent - - General Medicine Urgent    Arrival Complaint    altered mental status          Chief Complaint:   Chief Complaint   Patient presents with    Altered Mental Status     pt dx yesterday with pneumonia and given antibiotics, pt started having visual hallucinations and altered mental status       History of Illness: The patient has had increased respiratory symptoms since choking on of small piece of potato about 1 week ago  The patient and his wife were in contact with the pulmonary office who had recommended doxycycline which they just obtained today  However, with patient's symptoms persistently worsening, they decided to come to the hospital for further evaluation  The patient has been having some hallucinations      ED Vital Signs:   ED Triage Vitals [11/02/18 1214]   Temperature Pulse Respirations Blood Pressure SpO2   98 °F (36 7 °C) 83 18 112/78 93 %      Temp Source Heart Rate Source Patient Position - Orthostatic VS BP Location FiO2 (%) Oral Monitor Sitting Left arm --      Pain Score       No Pain        Wt Readings from Last 1 Encounters:   11/02/18 102 kg (225 lb 5 oz)       Vital Signs (abnormal):none    Abnormal Labs/Diagnostic Test Results:   UA trace ketones  Bun 30  Creatinine 1 72  Direct bilirubin 0 24  Alkaline phosphatase 121  Wbc 17 35    CxR- no infiltrates seen     ED Treatment: blood cultures    Medication Administration from 11/02/2018 1205 to 11/02/2018 1618       Date/Time Order Dose Route Action Comments     11/02/2018 1534 sodium chloride 0 9 % bolus 500 mL 0 mL Intravenous Stopped      11/02/2018 1421 sodium chloride 0 9 % bolus 500 mL 500 mL Intravenous New Bag      11/02/2018 1535 ceftriaxone (ROCEPHIN) 1 g/50 mL in dextrose IVPB 0 mg Intravenous Stopped      11/02/2018 1336 ceftriaxone (ROCEPHIN) 1 g/50 mL in dextrose IVPB 1,000 mg Intravenous New Bag      11/02/2018 1500 azithromycin (ZITHROMAX) 500 mg in sodium chloride 0 9 % 250 mL IVPB 500 mg Intravenous New Bag           Past Medical/Surgical History:   Past Medical History:   Diagnosis Date    [facility] 5/31/2017    Aspiration into respiratory tract     Chest pain 5/31/2017    COPD (chronic obstructive pulmonary disease) (HCC)     Depression     Elevated troponin 5/31/2017    GERD (gastroesophageal reflux disease)     History of CVA (cerebrovascular accident) 4/13/2016    Hyperlipidemia     Hypertension     Lung [facility] ([facility] ) 2005    Pneumonia     Pro[facility] ([facility] )     Sleep apnea     Stroke ([facility] )     Weakness due to cerebrovascular accident (CVA)        Admitting Diagnosis: Delirium [R41 0]  RONAL (acute kidney injury) ([facility] ) [N17 9]  Altered mental status, unspecified [R41 82]  Sepsis ([facility] ) [A41 9]  Pneumonia of right lower lobe due to infectious organism ([facility] ) [J18 1]    Age/Sex: 65 y o  male    · Assessment/Plan: Community Acquired vs  Aspiration Pneumonia of Left Lower Lobe with Sepsis POA (change mental status and leukocytosis) -   ? Antibiotic - Cefepime / Flagyl  Follow up cultures and attempt to de-escalate medications if possible within the next 48 hours  ? Mucolytic - Mucinex  ? Speech swallowing evaluation  ? Oxygen   ? Follow up cultures  ? Incentive spirometer  ? Supportive care  · COPD without Acute Exacerbation - Home meds  Monitor coughing symptoms and if he is coughing a lot, then increase antitussive therapy  · Chronic Tracheomalacia with chronic microaspiration / Dysphagia - pulmonary follow up  Sees Dr Shanta Murphy in outpatient setting  Will use diet from last discharge which includes nectar thick liquids  Will have speech therapy recheck patient  · Acute Kidney Injury - Will given IV fluids gently  Avoid nephrotoxins    Will repeat BMP in am   Monitor I/O as best as possible        Admission Orders:  11/2/2018  1357 INPATIENT   Scheduled Meds:   Current Facility-Administered Medications:  acetaminophen 650 mg Oral Q6H PRN    albuterol 2 5 mg Nebulization Q4H PRN    amLODIPine 10 mg Oral Daily    aspirin 81 mg Oral Daily    atorvastatin 10 mg Oral Daily With Dinner    benzonatate 200 mg Oral TID PRN    bromfenac sodium 1 drop Ophthalmic Daily    calcium carbonate 1,000 mg Oral Daily PRN    carvedilol 12 5 mg Oral BID With Meals    cefepime 2,000 mg Intravenous Q12H Last Rate: 2,000 mg (11/05/18 0747)   clopidogrel 75 mg Oral Daily    docusate sodium 100 mg Oral BID    Fesoterodine Fumarate ER 4 mg Oral QPM    fluticasone 1 spray Nasal Daily    fluticasone-vilanterol 1 puff Inhalation Daily    guaiFENesin 1,200 mg Oral Q12H LISA    heparin (porcine) 5,000 Units Subcutaneous Q8H [address]    latanoprost 1 drop Both Eyes HS    levalbuterol 1 25 mg Nebulization TID    LORazepam 0 25 mg Oral Daily    magnesium hydroxide 30 mL Oral Daily PRN    metroNIDAZOLE 500 mg Intravenous Q8H Last Rate: 500 mg (11/05/18 0837)   nystatin  Topical BID    ondansetron 4 mg Intravenous Q6H PRN    pantoprazole 40 mg Oral Early Morning    PARoxetine 20 mg Oral Daily    polyethylene glycol 17 g Oral Daily PRN    prednisoLONE acetate 1 drop Right Eye 4x Daily    psyllium 1 packet Oral Daily    senna 2 tablet Oral HS    sodium chloride 3 mL Nebulization TID    spironolactone 25 mg Oral Daily    tamsulosin 0 4 mg Oral Daily    tiotropium 18 mcg Inhalation Daily      Continuous Infusions:   sodium chloride 0 9 % infusion  Rate: 75 mL/hr Dose: 75 mL/hr  Freq: Continuous Route: IV  Last Dose: Stopped (11/04/18 1220)    PRN Meds: not used    scds  Aspiration precautions     Consult pulmonary  PT/OT/SPEECH

## 2018-11-05 NOTE — ASSESSMENT & PLAN NOTE
· POA  Dx yesterday via outpatient CXR  Hx of chronic aspiration with recent choking episode while eating about 1 week ago  Given hx of COPD and poor functional status 2/2 CVA deficits, he would be considered high risk CAP/HCAP (thought has not been hospitalized in the last 3 months  Antigens negative  PCT negative   Satting well on room air  · Stable for discharge home   · Continue with Doxycycline Q12 for 2 more days   · Tessalon Robitussin PRN  · Follow up with Dr Beverly Guevara

## 2018-11-05 NOTE — SPEECH THERAPY NOTE
Speech Language/Pathology    Speech/Language Pathology Progress Note    Patient Name: Lehman Fothergill  Today's Date: 11/5/2018       Subjective:  Pt sleeping upon entering the room with breakfast tray at bedside  Pt easily awoken and willing to eat his eggs with some encouragement  Pt continues to have baseline deep wet cough prior to PO intake due to respiratory status  Objective:  Pt on dysphagia 2/-mechanical diet with NTL, but received scrambled eggs for breakfast, which is more level 3 appropriate with NTL  Pt with decreased mastication with scrambled eggs and decreased bolus formation with occasional dry cough during the swallow  Pt took NTL via single cup sips only with adequate control  No overt s/s of aspiration noted with NTL  Reviewed results of VBS with patient  Pt understood that he is to only take single cup sips of NTL w/o use of straw  When asked to state the recommendations were, pt was able to accurately report  Assessment:  Pt continues to be more appropriate for dysphagia 2- mechanical textures  Pt does not have appropriate oral processing and bolus formation for level 3 textures  Will remain on NTL due to aspiration with thins seen on VBS 11/6/18       Plan/Recommendations:  - NTL via single cup sips only   - NO STRAWS  -Continue dysphagia 2-mechanically altered

## 2018-11-05 NOTE — DISCHARGE SUMMARY
[facility]  Discharge- Lupis Oro  1940, 65 y o  male MRN: [id number]    Unit/Bed#: -01 Encounter: [id number]    Primary Care Provider: Lidia Espinal DO   Date and time admitted to hospital: 11/2/2018 12:30 PM        * Aspiration pneumonia ([facility] )   Assessment & Plan    · POA  Dx yesterday via outpatient CXR  Hx of chronic aspiration with recent choking episode while eating about 1 week ago  Given hx of COPD and poor functional status 2/2 CVA deficits, he would be considered high risk CAP/HCAP (thought has not been hospitalized in the last 3 months  Antigens negative  PCT negative  Satting well on room air  · Stable for discharge home   · Continue with Doxycycline Q12 for 2 more days   · TesDarius blackwellitussin PRN  · Follow up with Dr Mike Lockwood      RONAL (acute kidney injury) [facility])   Assessment & Plan    · Baseline creatinine appears to be around 1 1  Resolved   · Encourage PO intake     Chronic obstructive pulmonary disease with acute lower respiratory infection ([facility] )   Assessment & Plan    · Patient without wheezing  · Continue home medications/inhalers/nebulizers     History of cerebrovascular accident (CVA) with residual deficit   Assessment & Plan    · Supportive care/braces  PT/OT recommending rehab, however patient lives with daughter and she wishes to take the patient home   · D/C home with home health care     Dysphagia   Assessment & Plan    · With chronic microaspiration   Repeat VBS reviewed  · SLP eval: recommend Dysphagia 2 with NTL     GERD (gastroesophageal reflux disease)   Assessment & Plan    · Continue PPI     Tracheomalacia   Assessment & Plan    · Follows with Dr Mike Lockwood  · No further interventions/treatments at this time        Discharging Physician / Practitioner: Alex Weston PA-C  PCP: Lidia Espinal DO  Admission Date:   Admission Orders     Ordered        11/02/18 1357  Inpatient Admission (expected length of stay for this patient is greater than two midnights)  Once             Discharge Date: 11/05/18    Resolved Problems  Date Reviewed: 11/5/2018    None          Consultations During Hospital Stay:  · Pulmonary Disease - Dr Ernesto Manzo  · PT, OT, Speech    Procedures Performed:     · CXR PA and Lateral   · VBS    Significant Findings / Test Results:     · CXR PA and Lateral - No infiltrates seen   · VBS - See Speech note for further details     Incidental Findings:   · None     Test Results Pending at Discharge (will require follow up): · None     Outpatient Tests Requested:  · None    Complications:  None    Reason for Admission: Aspiration Pneumonia, RONAL     Hospital Course:     Shaniqua Gleason  is a 65 y o  male patient who originally presented to the hospital on 11/2/2018 due to suspected aspiration pneumonia  The patient had presented to the ER after failing outpatient treatment for suspected aspiration event after coughing on a potato  On admission to the ER the infiltrate seen on outpatient chest x-ray was not confidently seen, but given the patient's symptoms and history of dysphagia secondary to prior CVA he was admitted and started on antibiotics  A pulmonary consultation was requested given they had been managing the same complaint as an outpatient  Additionally the patient was found to have an RONAL on admission therefore he was hydrated and nephrotoxic medications were held  The patient's work up for pneumonia was relatively bland given his PCT and urinary antigens were negative  A sputum culture was unable to be collected  His symptoms improved with supportive care  Speech re-evaluated the patient and recommended level 2 diet with nectar thick liquids to avoid further aspiration events  His RONAL improved with hydration  PT and OT recommended that the patient go to SNF however the daughter preferred to take the patient home and to continue home rehab with home health aids   He was instructed to take 2 more days of the Doxycycline that had already been prescribed as an outpatient as per pulmonary recommendations  Please see above list of diagnoses and related plan for additional information  Condition at Discharge: stable     Discharge Day Visit / Exam:     Subjective:  Patient has no complaints day of discharge  He reports that he was coughing the night before, but this has spontaneously resolved  He denies shortness of breath  Denies fevers or chills  He is excited to go home  Vitals: Blood Pressure: 131/61 (11/05/18 0700)  Pulse: 58 (11/05/18 0700)  Temperature: 97 6 °F (36 4 °C) (11/05/18 0700)  Temp Source: Axillary (11/05/18 0700)  Respirations: 20 (11/05/18 0700)  Height: 6' 5" (195 6 cm) (11/02/18 1732)  Weight - Scale: 102 kg (225 lb 5 oz) (11/02/18 1732)  SpO2: 95 % (11/05/18 0916)  Exam:   Physical Exam   Constitutional: He is oriented to person, place, and time  Vital signs are normal  He appears well-developed and well-nourished  Non-toxic appearance  No distress  HENT:   Head: Normocephalic and atraumatic  Mouth/Throat: Mucous membranes are not dry  Eyes: Pupils are equal, round, and reactive to light  Conjunctivae and EOM are normal    Neck: Neck supple  Cardiovascular: Normal rate, regular rhythm, S1 normal, S2 normal, normal heart sounds and intact distal pulses  Exam reveals no S3 and no S4  No murmur heard  Pulmonary/Chest: Effort normal and breath sounds normal  No accessory muscle usage  No respiratory distress  He has no decreased breath sounds  He has no wheezes  He has no rhonchi  He has no rales  He exhibits no tenderness  Abdominal: Soft  Bowel sounds are normal  He exhibits no distension and no mass  There is no tenderness  There is no rigidity, no rebound and no guarding  Neurological: He is alert and oriented to person, place, and time  He is not disoriented  GCS eye subscore is 4  GCS verbal subscore is 5  GCS motor subscore is 6  Skin: Skin is warm and dry          Discussion with Family: Discussed with daughter over phone     Discharge instructions/Information to patient and family:   See after visit summary for information provided to patient and family  Provisions for Follow-Up Care:  See after visit summary for information related to follow-up care and any pertinent home health orders  Disposition:     Home with VNA Services (Reminder: Complete face to face encounter)    For Discharges to [facility] SNF:   · Not Applicable to this Patient - Not Applicable to this Patient    Planned Readmission: None     Discharge Statement:  I spent 45 minutes discharging the patient  This time was spent on the day of discharge  I had direct contact with the patient on the day of discharge  Greater than 50% of the total time was spent examining patient, answering all patient questions, arranging and discussing plan of care with patient as well as directly providing post-discharge instructions  Additional time then spent on discharge activities  Discharge Medications:  See after visit summary for reconciled discharge medications provided to patient and family        ** Please Note: This note has been constructed using a voice recognition system **

## 2018-11-05 NOTE — PROGRESS NOTES
Progress Note - Pulmonary   Dutch Valentin  65 y o  male MRN: [id number]  Unit/Bed#: -01 Encounter: [id number]    Assessment/Plan:    1  Pulmonary insufficiency secondary to possible recurrent aspiration pneumonia s/p CVA years ago      *  Patient reports he is at respiratory baseline on RA      *  Will encourage pulmonary tolieting, flutter, and keep O2>88%        2  Aspiration Pneumonia      *  CXR- negative/  BC- negative/ Procalcitonin- negative      *  Patient remains afebrile, WBC is unremarkable      * Will continue antibiotic management per primary team, would recommend to contine doxycycline for 2 more days      * Will need follow up CXR in 4-6 weeks    3  COPD of unknown severity without acute exacerbation      *  Continue home regimen Advair and Spiriva daily  Proventil & Albuterol NebsPRN    4  Dysphagia with history of aspiration      *  Chronic microaspiration      *  Repeated VBS on 11/4/18 - recommended  By speech Dysphagia 2, nectar thick    Follow up appointment 11/20/18 with Dr Alexy Black at 11:00am, at [facility]    Chief Complaint:    "My breathing feels a lot better, but I am still coughing"    Subjective:    Juan Francisco Heaton was sitting in bed and reported how his breathing had improved, however: he still has a productive cough which dissipating  He was able to recall that he was in a hospital, the correct year, and his home address  He was unable to tell me what city this hospital is located or the president  He reports that he feels his respiratory status is back to baseline, and he is having a difficult time to produce sputum  Patient denies fever, chills, SOB, hemoptysis, chest pain, and palpations  Objective:    Vitals: Blood pressure 131/61, pulse 58, temperature 97 6 °F (36 4 °C), temperature source Axillary, resp  rate 20, height 6' 5" (1 956 m), weight 102 kg (225 lb 5 oz), SpO2 95 %  RA,Body mass index is 26 72 kg/m²        Intake/Output Summary (Last 24 hours) at 11/05/18 [address] at 11/05/18 0756   Gross per 24 hour   Intake              120 ml   Output              375 ml   Net             -255 ml       Invasive Devices     Peripheral Intravenous Line            Peripheral IV 11/04/18 Right Forearm 1 day                Physical Exam:   Physical Exam   Constitutional: Vital signs are normal  He appears well-nourished  Neck: No JVD present  Trouble swallowing   Cardiovascular: Normal rate and regular rhythm  Exam reveals no gallop and no friction rub  No murmur heard  Pulmonary/Chest: No accessory muscle usage  No respiratory distress  He has decreased breath sounds  He has wheezes in the right middle field, the right lower field and the left lower field  He has rhonchi in the right middle field, the right lower field, the left middle field and the left lower field  He has no rales  Abdominal: Soft  Bowel sounds are normal    Musculoskeletal: He exhibits deformity  He exhibits no edema  Wheel chair bound, contracture of left arm    Lymphadenopathy:     He has no cervical adenopathy  Neurological: He is alert  Forgetful, A&Ox1-2   Skin: Skin is warm and dry  Psychiatric: He has a normal mood and affect  His behavior is normal        Labs: I have personally reviewed pertinent lab results       Platelets 836    Imaging and other studies: I have personally reviewed pertinent films in PACS     No acute cardiopulmonary disease

## 2018-11-05 NOTE — PROGRESS NOTES
Addendum to discharge summary: Patient did have metabolic encephalopathy POA suspect due to possible aspiration pneumonia/pneumonitis  Also need to consider hospital induced delirium  Either way, this has resolved  No longer hallucinating or agitated  Sepsis was documented in H&P, but not POA  This has been ruled out  Lc Whitley PA-C

## 2018-11-05 NOTE — ASSESSMENT & PLAN NOTE
· POA  Dx yesterday via outpatient CXR  Hx of chronic aspiration with recent choking episode while eating about 1 week ago  Given hx of COPD and poor functional status 2/2 CVA deficits, he would be considered high risk CAP/HCAP (thought has not been hospitalized in the last 3 months  Antigens negative  PCT negative   Satting well on room air  · Stable for discharge home   · Continue with Clindamycin QID x 4 days for 7 days total  · Chana Zamansin PRN  · Follow up with Dr Maya Failing

## 2018-11-05 NOTE — ASSESSMENT & PLAN NOTE
· Supportive care/braces   PT/OT recommending rehab, however patient lives with daughter and she wishes to take the patient home   · D/C home with home health care

## 2018-11-06 NOTE — PHYSICIAN ADVISOR
Current patient class: Inpatient  The patient is currently on Hospital Day: 4 at [facility]      The patient was admitted to the hospital at [phone] on 11/2/18 for the following diagnosis:  Delirium [R41 0]  RONAL (acute kidney injury) ([facility] ) [N17 9]  Altered mental status, unspecified [R41 82]  Sepsis ([facility] ) [A41 9]  Pneumonia of right lower lobe due to infectious organism ([facility] ) [J18 1]       There is documentation in the medical record of an expected length of stay of at least 2 midnights  The patient is therefore expected to satisfy the 2 midnight benchmark and given the 2 midnight presumption is appropriate for INPATIENT ADMISSION  Given this expectation of a satisfying stay, CMS instructs us that the patient is most often appropriate for inpatient admission under part A provided medical necessity is documented in the chart  After review of the relevant documentation, labs, vital signs and test results, the patient is appropriate for INPATIENT ADMISSION  Admission to the hospital as an inpatient is a complex decision making process which requires the practitioner to consider the patients presenting complaint, history and physical examination and all relevant testing  With this in mind, in this case, the patient was deemed appropriate for INPATIENT ADMISSION  After review of the documentation and testing available at the time of the admission I concur with this clinical determination of medical necessity  Rationale is as follows: The patient is a 65 yrs old Male who presented to the ED at 11/2/2018 12:30 PM with a chief complaint of Altered Mental Status (pt dx yesterday with pneumonia and given antibiotics, pt started having visual hallucinations and altered mental status)     Given the need for further hospitalization, and along with the documentation of medical necessity present in the chart, the patient is appropriate for inpatient admission    The patient is expected to satisfy the 2 midnight benchmark, and will require further acute medical care  The patient does have comorbid conditions which increases the risk for significant adverse outcome  Given this the patient is appropriate for inpatient admission  The patients vitals on arrival were ED Triage Vitals [11/02/18 1214]   Temperature Pulse Respirations Blood Pressure SpO2   98 °F (36 7 °C) 83 18 112/78 93 %      Temp Source Heart Rate Source Patient Position - Orthostatic VS BP Location FiO2 (%)   Oral Monitor Sitting Left arm --      Pain Score       No Pain           Past Medical History:   Diagnosis Date    RONAL (acute kidney injury) ([facility] ) 5/31/2017    Aspiration into respiratory tract     Chest pain 5/31/2017    COPD (chronic obstructive pulmonary disease) (HCC)     Depression     Elevated troponin 5/31/2017    GERD (gastroesophageal reflux disease)     History of CVA (cerebrovascular accident) 4/13/2016    Hyperlipidemia     Hypertension     Lung cancer ([facility] ) 2005    Right, status post lobectomy    Pneumonia     Prostate cancer ([facility] )     Sleep apnea     awaiting sleep study results    Stroke ([facility] )     Weakness due to cerebrovascular accident (CVA)      Past Surgical History:   Procedure Laterality Date    COLONOSCOPY      ESOPHAGOGASTRODUODENOSCOPY N/A 4/13/2016    Procedure: ESOPHAGOGASTRODUODENOSCOPY (EGD); Surgeon: Edilma Rocha MD;  [facility]; Service:     JOINT REPLACEMENT      knee replacement    LUNG LOBECTOMY      [place] ESOPHAGOGASTRODUODENOSCOPY TRANSORAL DIAGNOSTIC N/A 3/15/2017    Procedure: ESOPHAGOGASTRODUODENOSCOPY (EGD); Surgeon: Edilma Rocha MD;  [facility];   Service: Gastroenterology    TRIGEMINAL NERVE DECOMPRESSION             Consults have been placed to:   IP CONSULT TO PULMONOLOGY    Vitals:    11/04/18 2050 11/04/18 2304 11/05/18 0700 11/05/18 0916   BP:  121/65 131/61    BP Location:  Left arm Right arm    Pulse:  62 58    Resp:  20 20    Temp:  97 8 °F (36 6 °C) 97 6 °F (36 4 °C)    TempSrc:  Oral Axillary    SpO2: 96% 94% 94% 95%   Weight:       Height:           Most recent labs:    Recent Labs      11/05/18   0549   WBC  8 38   HGB  13 8   HCT  41 4   PLT  142*   K  3 9   CALCIUM  9 3   BUN  18   CREATININE  1 23       Scheduled Meds:  Continuous Infusions:  No current facility-administered medications for this encounter  PRN Meds:      Surgical procedures (if appropriate):

## 2018-11-07 LAB
BACTERIA BLD CULT: NORMAL
BACTERIA BLD CULT: NORMAL

## 2018-11-20 ENCOUNTER — OFFICE VISIT (OUTPATIENT)
Dept: PULMONOLOGY | Facility: CLINIC | Age: 78
End: 2018-11-20
Payer: MEDICARE

## 2018-11-20 VITALS
RESPIRATION RATE: 16 BRPM | OXYGEN SATURATION: 97 % | BODY MASS INDEX: 26.57 KG/M2 | TEMPERATURE: 96.9 F | HEIGHT: 77 IN | SYSTOLIC BLOOD PRESSURE: 118 MMHG | HEART RATE: 74 BPM | WEIGHT: 225 LBS | DIASTOLIC BLOOD PRESSURE: 64 MMHG

## 2018-11-20 DIAGNOSIS — J69.0 ASPIRATION PNEUMONIA DUE TO REGURGITATED FOOD, UNSPECIFIED LATERALITY, UNSPECIFIED PART OF LUNG (HCC): ICD-10-CM

## 2018-11-20 DIAGNOSIS — R05.3 CHRONIC COUGH: ICD-10-CM

## 2018-11-20 DIAGNOSIS — R13.10 DYSPHAGIA, UNSPECIFIED TYPE: Chronic | ICD-10-CM

## 2018-11-20 DIAGNOSIS — J44.0 CHRONIC OBSTRUCTIVE PULMONARY DISEASE WITH ACUTE LOWER RESPIRATORY INFECTION (HCC): Primary | ICD-10-CM

## 2018-11-20 DIAGNOSIS — I69.30 HISTORY OF CEREBROVASCULAR ACCIDENT (CVA) WITH RESIDUAL DEFICIT: Chronic | ICD-10-CM

## 2018-11-20 DIAGNOSIS — J39.8 TRACHEOMALACIA: Chronic | ICD-10-CM

## 2018-11-20 PROBLEM — J96.01 ACUTE RESPIRATORY FAILURE WITH HYPOXIA (HCC): Status: RESOLVED | Noted: 2018-07-15 | Resolved: 2018-11-20

## 2018-11-20 PROCEDURE — 99215 OFFICE O/P EST HI 40 MIN: CPT | Performed by: INTERNAL MEDICINE

## 2018-11-20 RX ORDER — BUDESONIDE 0.5 MG/2ML
0.5 INHALANT ORAL 2 TIMES DAILY
Qty: 360 ML | Refills: 0 | Status: SHIPPED | OUTPATIENT
Start: 2018-11-20 | End: 2019-03-15 | Stop reason: SDUPTHER

## 2018-11-20 NOTE — PATIENT INSTRUCTIONS
· Stop advair and spiriva  · Start pulmicort nebs twice daily  · Continue duonebs four times daily  · Use flutter valve for secretion clearance  · Continue to work with speech therapy

## 2018-11-20 NOTE — PROGRESS NOTES
Pulmonary Follow Up Note   Brittani Lindo  65 y o  male MRN: [id number]  11/20/2018      Assessment:  1  Recurrent aspiration pneumonia/tracheobronchitis  · Radiographic resolution  · Preventative measures as listed  · Continue speech therapy assistance    2  Chronic dysphagia  · Thickens all liquids  · Dysphagia and aspiration precautions    3  Chronic Cough  · Secondary to above, barking nature related to tracheomalacia and reassurance offered    4  COPD with tracheomalacia  · Stop spiriva and advair now  · Continue duonebs for times daily  · Start trial of pulmicort nebs BID  · Flutter valve use four times daily  · Flu vaccine obtained this year per daughter, previously obtained prevnar/pneumovax per daughter    11  GERD - continue PPI    Plan:    Diagnoses and all orders for this visit:    Chronic obstructive pulmonary disease with acute lower respiratory infection ([facility] )  -     budesonide (PULMICORT) 0 5 mg/2 mL nebulizer solution; Take 1 vial (0 5 mg total) by nebulization 2 (two) times a day for 90 days Rinse mouth after use  Aspiration pneumonia due to regurgitated food, unspecified laterality, unspecified part of lung ([facility] )    History of cerebrovascular accident (CVA) with residual deficit    Chronic cough  -     budesonide (PULMICORT) 0 5 mg/2 mL nebulizer solution; Take 1 vial (0 5 mg total) by nebulization 2 (two) times a day for 90 days Rinse mouth after use  Tracheomalacia    Dysphagia, unspecified type        Return in about 3 months (around 2/20/2019) for Recheck  History of Present Illness   HPI:  Brittani Lindo  is a 65 y o  male who Mr Justine Downey is a 67year old man with history of prior CVA with resultant left hemiparesis, lung cancer post RLL lobectomy, possible COPD, likely tracheomalacia, and chronic cough  He has had multiple admissions for hypoxic respiratory failure an bronchospasm felt secondary to aspiration tracheobronchitis    He presents today for follow up with his daughter  He had admission in Nov for concern for aspiration pneumonia  He was on doxycycline as outpatient and returned to [facility] to complete course  He had repeat VBS demonstrating dysphagia and he is working with speech therapy  He reports continued coughing periods and is using duonebs, advair and spriiva  He is unsure if inhalers are effective but feels improved with nebulizers and flutter valve use  He denied fevers, no purulent sputum production, no pleurisy  Review of Systems   Constitutional: Negative for activity change, chills, fever and unexpected weight change  HENT: Positive for trouble swallowing  Negative for congestion, postnasal drip, rhinorrhea and voice change  Eyes: Negative for pain, redness and visual disturbance  Respiratory: Positive for cough  Negative for chest tightness, shortness of breath and wheezing  Cardiovascular: Negative for chest pain, palpitations and leg swelling  Gastrointestinal: Negative for abdominal distention, abdominal pain, diarrhea, nausea and vomiting  Endocrine: Negative for cold intolerance and heat intolerance  Musculoskeletal: Negative for arthralgias and myalgias  Skin: Negative for rash and wound  Neurological: Positive for weakness  Negative for dizziness, syncope and light-headedness  Hematological: Negative for adenopathy  Psychiatric/Behavioral: Negative for confusion and sleep disturbance         Historical Information   Past Medical History:   Diagnosis Date    RONAL (acute kidney injury) ([facility] ) 5/31/2017    Aspiration into respiratory tract     Chest pain 5/31/2017    COPD [facility]ronic obstructive [facility] disease) [facility]     Depression     Elevated troponin 5/31/2017    GERD (gastroesophageal reflux disease)     History of CVA (cerebrovascular accident) 4/13/2016    Hyperlipidemia     Hypertension     [facility] ) 2005    Right, status post lobectomy    Pneumonia     Prostate [facility]     Sleep apnea awaiting sleep study results    Stroke [facility])     Weakness due to cerebrovascular accident (CVA)      Past Surgical History:   Procedure Laterality Date    COLONOSCOPY      ESOPHAGOGASTRODUODENOSCOPY N/A 4/13/2016    Procedure: ESOPHAGOGASTRODUODENOSCOPY (EGD); Surgeon: Sahil Pinto MD;  [facility]: [facility]; Service:     JOINT REPLACEMENT      knee replacement    LUNG LOBECTOMY      TN ESOPHAGOGASTRODUODENOSCOPY TRANSORAL DIAGNOSTIC N/A 3/15/2017    Procedure: ESOPHAGOGASTRODUODENOSCOPY (EGD); Surgeon: Sahil Pinto MD;  [facility]: [facility];   Service: Gastroenterology    TRIGEMINAL NERVE DECOMPRESSION       Family History   Problem Relation Age of Onset    Family history unknown: Yes         Meds/Allergies     Current Outpatient Prescriptions:     acetaminophen (TYLENOL) 325 mg tablet, Take 2 tablets by mouth every 6 (six) hours as needed for fever, Disp: 30 tablet, Rfl: 0    albuterol (PROVENTIL HFA,VENTOLIN HFA) 90 mcg/act inhaler, Inhale 2 puffs 4 (four) times a day, Disp: 2 Inhaler, Rfl: 0    amLODIPine (NORVASC) 10 mg tablet, Take 1 tablet by mouth daily, Disp: , Rfl:     ASPIRIN 81 PO, Take 1 tablet by mouth every other day  , Disp: , Rfl:     atorvastatin (LIPITOR) 10 mg tablet, Take 1 tablet by mouth, Disp: , Rfl:     benzonatate (TESSALON) 200 MG capsule, Take 1 capsule (200 mg total) by mouth 3 (three) times a day as needed for cough, Disp: 20 capsule, Rfl: 0    bromfenac sodium (PROLENSA) 0 07 % SOLN, Apply 1 drop to eye daily, Disp: , Rfl:     carvedilol (COREG) 12 5 mg tablet, Take 1 tablet by mouth 2 (two) times a day with meals, Disp: 60 tablet, Rfl: 0    clopidogrel (PLAVIX) 75 mg tablet, Take 1 tablet by mouth daily, Disp: 30 tablet, Rfl: 0    dextromethorphan-guaiFENesin (ROBITUSSIN DM)  mg/5 mL syrup, Take 5 mL by mouth 3 (three) times a day as needed for cough, Disp: 118 mL, Rfl: 0    docusate sodium (COLACE) 100 mg capsule, Take 1 capsule (100 mg total) by mouth 2 (two) times a day, Disp: 10 capsule, Rfl: 0    Fesoterodine Fumarate ER (TOVIAZ) 8 MG TB24, Take 4 mg by mouth every evening  , Disp: , Rfl:     fluticasone (FLONASE) 50 mcg/act nasal spray, 1-2 sprays into each nostril daily, Disp: , Rfl:     ipratropium-albuterol (DUO-NEB) 0 5-2 5 mg/3 mL nebulizer solution, Take 3 mL by nebulization 4 (four) times a day as needed for wheezing or shortness of breath, Disp: , Rfl:     latanoprost (XALATAN) 0 005 % ophthalmic solution, Administer 1 drop to both eyes daily at bedtime, Disp: , Rfl:     levocetirizine (XYZAL) 5 MG tablet, Take 1 tablet by mouth every evening, Disp: 30 tablet, Rfl: 0    magnesium hydroxide (MILK OF MAGNESIA) 400 mg/5 mL oral suspension, Take 30 mL by mouth daily as needed for constipation, Disp: , Rfl:     meclizine (ANTIVERT) 25 mg tablet, Take 1 tablet by mouth 3 (three) times a day as needed for dizziness, Disp: 30 tablet, Rfl: 0    omeprazole (PriLOSEC) 40 MG capsule, Take 1 capsule by mouth daily, Disp: , Rfl:     PARoxetine (PAXIL) 20 mg tablet, Take 1 tablet by mouth daily, Disp: , Rfl:     polyethylene glycol (MIRALAX) 17 g packet, Take 17 g by mouth daily as needed, Disp: , Rfl:     prednisoLONE acetate (PRED FORTE) 1 % ophthalmic suspension, Administer 1 drop to the right eye 4 (four) times a day, Disp: , Rfl:     psyllium (METAMUCIL) 0 52 g capsule, Take 0 52 g by mouth daily, Disp: , Rfl:     senna (SENOKOT) 8 6 MG tablet, Take 2 tablets by mouth daily at bedtime, Disp: , Rfl:     spironolactone (ALDACTONE) 25 mg tablet, Take 1 tablet (25 mg total) by mouth daily, Disp: 30 tablet, Rfl: 0    tamsulosin (FLOMAX) 0 4 mg, Take 1 capsule by mouth daily, Disp: , Rfl:     budesonide (PULMICORT) 0 5 mg/2 mL nebulizer solution, Take 1 vial (0 5 mg total) by nebulization 2 (two) times a day for 90 days Rinse mouth after use , Disp: 360 mL, Rfl: 0    LORazepam (ATIVAN) 0 5 mg tablet, Take 0 5 tablets (0 25 mg total) by mouth daily for 10 days Daily @@ 0900, Disp: 10 tablet, Rfl: 5  Allergies   Allergen Reactions    Penicillins Rash       Vitals: Blood pressure 118/64, pulse 74, temperature (!) 96 9 °F (36 1 °C), temperature source Tympanic, resp  rate 16, height 6' 5" (1 956 m), weight 102 kg (225 lb), SpO2 97 %  Body mass index is 26 68 kg/m²  Oxygen Therapy  SpO2: 97 % (room air)      Physical Exam  Physical Exam   Constitutional: He is oriented to person, place, and time  He appears well-developed and well-nourished  No distress  Elderly male in wheelchair, mild chronic dysarthria   HENT:   Head: Normocephalic and atraumatic  Nose: Nose normal    Mouth/Throat: Oropharynx is clear and moist  No oropharyngeal exudate  Tongue midline, no thrush, no lesions identified   Eyes: Pupils are equal, round, and reactive to light  Conjunctivae are normal  Right eye exhibits no discharge  Left eye exhibits no discharge  No scleral icterus  Neck: Neck supple  No JVD present  No tracheal deviation present  Cardiovascular: Normal rate, regular rhythm and normal heart sounds  No murmur heard  Pulmonary/Chest: Effort normal and breath sounds normal  No stridor  No respiratory distress  He has no wheezes  He has no rales  Normal BS with tidal breathing, forced cough with barking sound secondary to tracheal collapse noted, no stridor, no wheeze, no rales   Abdominal: Soft  Bowel sounds are normal  He exhibits no distension  There is no tenderness  Musculoskeletal: He exhibits no edema or deformity  Lymphadenopathy:     He has no cervical adenopathy  Neurological: He is alert and oriented to person, place, and time  Chronic left sided weakness and mild contractures left upper extremity   Skin: Skin is warm and dry  No rash noted  Psychiatric: He has a normal mood and affect  His behavior is normal        Labs: I have personally reviewed pertinent lab results    Lab Results   Component Value Date    WBC 8 38 11/05/2018    HGB 13 8 11/05/2018    HCT 41 4 11/05/2018    MCV 90 11/05/2018     (L) 11/05/2018     Lab Results   Component Value Date    CALCIUM 9 3 11/05/2018    K 3 9 11/05/2018    CO2 24 11/05/2018     11/05/2018    BUN 18 11/05/2018    CREATININE 1 23 11/05/2018     No results found for: IGE  Lab Results   Component Value Date    ALT 21 11/02/2018    AST 12 11/02/2018    ALKPHOS 121 (H) 11/02/2018     CXR 11/2/18 - resolved left basilar infiltrates, noted scoliosis, no focal infiltrates, no effusions, no PTX    11/2018 - VBS (+) dysphagia with penetration on thin liquids    Prior Studies  Chest X-ray 8/23/17 - CXR - images personally reviewed - noted right sided post-operative changes and mild volume loss, no acute infiltrates, no masses, no lesions appreciated  CT Scan 4/2017 - CT chest images reviewed with 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  Cardiac Testing 7/2017 TTE EF 55%, normal RV  Eura Clotilde Allred DO, [facility]

## 2018-11-28 ENCOUNTER — TELEPHONE (OUTPATIENT)
Dept: PULMONOLOGY | Facility: CLINIC | Age: 78
End: 2018-11-28

## 2018-11-28 NOTE — TELEPHONE ENCOUNTER
Daughter Brandie Catalan calling saying Navid Chi woke her up this morning because he was short of breath  She checked his pulse ox and it was 86 on RA  He was laying flat in bed  She then gave him a nebulizer treatment and put 2L of oxygen on him  It took about 5 minutes for him to recover and come up to 92%  He wanted to stay laying in bed and about an hour later he finally got up  Brandie Catalan says he brought up "2 huge gobs of mucous"  He was then ranging between 88-95%  He has been on the 2L of oxygen since this morning with his pulse ox staying between 95-96%  She says he does have the chills  His temp was 97 9  HR- 89  Respirations- 24, which is better than earlier  She said he is just so wiped out from the coughing this morning  She already gave him 2 or 3 nebulizer treatments today  Denies any wheezing  Navid Chi does not think the Budesonide is helping him and wants to go back on the Spiriva and Advair  Brandie Catalan is asking for prednisone for him and advice on what to do  Please advise

## 2018-11-29 ENCOUNTER — APPOINTMENT (EMERGENCY)
Dept: RADIOLOGY | Facility: HOSPITAL | Age: 78
DRG: 177 | End: 2018-11-29
Payer: MEDICARE

## 2018-11-29 ENCOUNTER — HOSPITAL ENCOUNTER (INPATIENT)
Facility: HOSPITAL | Age: 78
LOS: 3 days | Discharge: HOME HEALTH | DRG: 177 | End: 2018-12-02
Attending: EMERGENCY MEDICINE | Admitting: INTERNAL MEDICINE
Payer: MEDICARE

## 2018-11-29 DIAGNOSIS — J44.1 COPD WITH ACUTE EXACERBATION (HCC): Primary | ICD-10-CM

## 2018-11-29 PROBLEM — N40.0 BPH (BENIGN PROSTATIC HYPERPLASIA): Status: ACTIVE | Noted: 2018-11-29

## 2018-11-29 PROBLEM — G47.33 OSA (OBSTRUCTIVE SLEEP APNEA): Status: ACTIVE | Noted: 2018-11-29

## 2018-11-29 LAB
ALBUMIN SERPL BCP-MCNC: 3.3 G/DL (ref 3.5–5)
ALP SERPL-CCNC: 94 U/L (ref 46–116)
ALT SERPL W P-5'-P-CCNC: 16 U/L (ref 12–78)
ANION GAP SERPL CALCULATED.3IONS-SCNC: 9 MMOL/L (ref 4–13)
APTT PPP: 41 SECONDS (ref 26–38)
AST SERPL W P-5'-P-CCNC: 17 U/L (ref 5–45)
BASOPHILS # BLD AUTO: 0.03 THOUSANDS/ΜL (ref 0–0.1)
BASOPHILS NFR BLD AUTO: 0 % (ref 0–1)
BILIRUB SERPL-MCNC: 1 MG/DL (ref 0.2–1)
BUN SERPL-MCNC: 24 MG/DL (ref 5–25)
CALCIUM SERPL-MCNC: 9.8 MG/DL (ref 8.3–10.1)
CHLORIDE SERPL-SCNC: 100 MMOL/L (ref 100–108)
CO2 SERPL-SCNC: 26 MMOL/L (ref 21–32)
CREAT SERPL-MCNC: 1.41 MG/DL (ref 0.6–1.3)
EOSINOPHIL # BLD AUTO: 0.04 THOUSAND/ΜL (ref 0–0.61)
EOSINOPHIL NFR BLD AUTO: 0 % (ref 0–6)
ERYTHROCYTE [DISTWIDTH] IN BLOOD BY AUTOMATED COUNT: 14.3 % (ref 11.6–15.1)
GFR SERPL CREATININE-BSD FRML MDRD: 47 ML/MIN/1.73SQ M
GLUCOSE SERPL-MCNC: 122 MG/DL (ref 65–140)
HCT VFR BLD AUTO: 43.3 % (ref 36.5–49.3)
HGB BLD-MCNC: 14.2 G/DL (ref 12–17)
IMM GRANULOCYTES # BLD AUTO: 0.03 THOUSAND/UL (ref 0–0.2)
IMM GRANULOCYTES NFR BLD AUTO: 0 % (ref 0–2)
INR PPP: 1.11 (ref 0.86–1.17)
LACTATE SERPL-SCNC: 1.1 MMOL/L (ref 0.5–2)
LYMPHOCYTES # BLD AUTO: 0.69 THOUSANDS/ΜL (ref 0.6–4.47)
LYMPHOCYTES NFR BLD AUTO: 6 % (ref 14–44)
MCH RBC QN AUTO: 29.9 PG (ref 26.8–34.3)
MCHC RBC AUTO-ENTMCNC: 32.8 G/DL (ref 31.4–37.4)
MCV RBC AUTO: 91 FL (ref 82–98)
MONOCYTES # BLD AUTO: 1.43 THOUSAND/ΜL (ref 0.17–1.22)
MONOCYTES NFR BLD AUTO: 12 % (ref 4–12)
NEUTROPHILS # BLD AUTO: 10.24 THOUSANDS/ΜL (ref 1.85–7.62)
NEUTS SEG NFR BLD AUTO: 82 % (ref 43–75)
NRBC BLD AUTO-RTO: 0 /100 WBCS
NT-PROBNP SERPL-MCNC: 369 PG/ML
PLATELET # BLD AUTO: 136 THOUSANDS/UL (ref 149–390)
PMV BLD AUTO: 11.7 FL (ref 8.9–12.7)
POTASSIUM SERPL-SCNC: 3.9 MMOL/L (ref 3.5–5.3)
PROT SERPL-MCNC: 7.7 G/DL (ref 6.4–8.2)
PROTHROMBIN TIME: 14 SECONDS (ref 11.8–14.2)
RBC # BLD AUTO: 4.75 MILLION/UL (ref 3.88–5.62)
SODIUM SERPL-SCNC: 135 MMOL/L (ref 136–145)
TROPONIN I SERPL-MCNC: <0.02 NG/ML
WBC # BLD AUTO: 12.46 THOUSAND/UL (ref 4.31–10.16)

## 2018-11-29 PROCEDURE — 94640 AIRWAY INHALATION TREATMENT: CPT

## 2018-11-29 PROCEDURE — 83880 ASSAY OF NATRIURETIC PEPTIDE: CPT | Performed by: EMERGENCY MEDICINE

## 2018-11-29 PROCEDURE — 93005 ELECTROCARDIOGRAM TRACING: CPT

## 2018-11-29 PROCEDURE — 87040 BLOOD CULTURE FOR BACTERIA: CPT | Performed by: EMERGENCY MEDICINE

## 2018-11-29 PROCEDURE — 84484 ASSAY OF TROPONIN QUANT: CPT | Performed by: EMERGENCY MEDICINE

## 2018-11-29 PROCEDURE — 85610 PROTHROMBIN TIME: CPT | Performed by: EMERGENCY MEDICINE

## 2018-11-29 PROCEDURE — 83605 ASSAY OF LACTIC ACID: CPT | Performed by: EMERGENCY MEDICINE

## 2018-11-29 PROCEDURE — 85730 THROMBOPLASTIN TIME PARTIAL: CPT | Performed by: EMERGENCY MEDICINE

## 2018-11-29 PROCEDURE — 96374 THER/PROPH/DIAG INJ IV PUSH: CPT

## 2018-11-29 PROCEDURE — 80053 COMPREHEN METABOLIC PANEL: CPT | Performed by: EMERGENCY MEDICINE

## 2018-11-29 PROCEDURE — 85025 COMPLETE CBC W/AUTO DIFF WBC: CPT | Performed by: EMERGENCY MEDICINE

## 2018-11-29 PROCEDURE — 36415 COLL VENOUS BLD VENIPUNCTURE: CPT | Performed by: EMERGENCY MEDICINE

## 2018-11-29 PROCEDURE — 71046 X-RAY EXAM CHEST 2 VIEWS: CPT

## 2018-11-29 PROCEDURE — 99223 1ST HOSP IP/OBS HIGH 75: CPT | Performed by: INTERNAL MEDICINE

## 2018-11-29 PROCEDURE — 99285 EMERGENCY DEPT VISIT HI MDM: CPT

## 2018-11-29 RX ORDER — SODIUM CHLORIDE FOR INHALATION 0.9 %
3 VIAL, NEBULIZER (ML) INHALATION
Status: DISCONTINUED | OUTPATIENT
Start: 2018-11-29 | End: 2018-11-30

## 2018-11-29 RX ORDER — SENNOSIDES 8.6 MG
2 TABLET ORAL
Status: DISCONTINUED | OUTPATIENT
Start: 2018-11-29 | End: 2018-12-02 | Stop reason: HOSPADM

## 2018-11-29 RX ORDER — POLYETHYLENE GLYCOL 3350 17 G/17G
17 POWDER, FOR SOLUTION ORAL DAILY PRN
Status: DISCONTINUED | OUTPATIENT
Start: 2018-11-29 | End: 2018-12-02 | Stop reason: HOSPADM

## 2018-11-29 RX ORDER — CARVEDILOL 12.5 MG/1
12.5 TABLET ORAL 2 TIMES DAILY WITH MEALS
Status: DISCONTINUED | OUTPATIENT
Start: 2018-11-30 | End: 2018-12-02 | Stop reason: HOSPADM

## 2018-11-29 RX ORDER — ACETAMINOPHEN 325 MG/1
650 TABLET ORAL EVERY 6 HOURS PRN
Status: DISCONTINUED | OUTPATIENT
Start: 2018-11-29 | End: 2018-12-02 | Stop reason: HOSPADM

## 2018-11-29 RX ORDER — DOCUSATE SODIUM 100 MG/1
100 CAPSULE, LIQUID FILLED ORAL 2 TIMES DAILY
Status: DISCONTINUED | OUTPATIENT
Start: 2018-11-29 | End: 2018-12-02 | Stop reason: HOSPADM

## 2018-11-29 RX ORDER — OXYBUTYNIN CHLORIDE 5 MG/1
5 TABLET, EXTENDED RELEASE ORAL DAILY
Status: DISCONTINUED | OUTPATIENT
Start: 2018-11-30 | End: 2018-12-02 | Stop reason: HOSPADM

## 2018-11-29 RX ORDER — PANTOPRAZOLE SODIUM 40 MG/1
40 TABLET, DELAYED RELEASE ORAL
Status: DISCONTINUED | OUTPATIENT
Start: 2018-11-30 | End: 2018-12-02 | Stop reason: HOSPADM

## 2018-11-29 RX ORDER — GUAIFENESIN/DEXTROMETHORPHAN 100-10MG/5
5 SYRUP ORAL 3 TIMES DAILY PRN
Status: DISCONTINUED | OUTPATIENT
Start: 2018-11-29 | End: 2018-12-02 | Stop reason: HOSPADM

## 2018-11-29 RX ORDER — HYDROCODONE POLISTIREX AND CHLORPHENIRAMINE POLISTIREX 10; 8 MG/5ML; MG/5ML
5 SUSPENSION, EXTENDED RELEASE ORAL
Status: DISCONTINUED | OUTPATIENT
Start: 2018-11-29 | End: 2018-12-02 | Stop reason: HOSPADM

## 2018-11-29 RX ORDER — ATORVASTATIN CALCIUM 10 MG/1
10 TABLET, FILM COATED ORAL
Status: DISCONTINUED | OUTPATIENT
Start: 2018-11-30 | End: 2018-12-02 | Stop reason: HOSPADM

## 2018-11-29 RX ORDER — METHYLPREDNISOLONE SODIUM SUCCINATE 125 MG/2ML
125 INJECTION, POWDER, LYOPHILIZED, FOR SOLUTION INTRAMUSCULAR; INTRAVENOUS ONCE
Status: COMPLETED | OUTPATIENT
Start: 2018-11-29 | End: 2018-11-29

## 2018-11-29 RX ORDER — MECLIZINE HYDROCHLORIDE 25 MG/1
25 TABLET ORAL 3 TIMES DAILY PRN
Status: DISCONTINUED | OUTPATIENT
Start: 2018-11-29 | End: 2018-12-02 | Stop reason: HOSPADM

## 2018-11-29 RX ORDER — FLUTICASONE PROPIONATE 50 MCG
1 SPRAY, SUSPENSION (ML) NASAL DAILY
Status: DISCONTINUED | OUTPATIENT
Start: 2018-11-30 | End: 2018-12-02 | Stop reason: HOSPADM

## 2018-11-29 RX ORDER — METHYLPREDNISOLONE SODIUM SUCCINATE 40 MG/ML
40 INJECTION, POWDER, LYOPHILIZED, FOR SOLUTION INTRAMUSCULAR; INTRAVENOUS EVERY 8 HOURS
Status: DISCONTINUED | OUTPATIENT
Start: 2018-11-30 | End: 2018-11-30

## 2018-11-29 RX ORDER — TAMSULOSIN HYDROCHLORIDE 0.4 MG/1
0.4 CAPSULE ORAL DAILY
Status: DISCONTINUED | OUTPATIENT
Start: 2018-11-30 | End: 2018-12-02 | Stop reason: HOSPADM

## 2018-11-29 RX ORDER — LATANOPROST 50 UG/ML
1 SOLUTION/ DROPS OPHTHALMIC
Status: DISCONTINUED | OUTPATIENT
Start: 2018-11-29 | End: 2018-12-02 | Stop reason: HOSPADM

## 2018-11-29 RX ORDER — BENZONATATE 100 MG/1
200 CAPSULE ORAL 3 TIMES DAILY
Status: DISCONTINUED | OUTPATIENT
Start: 2018-11-29 | End: 2018-12-02 | Stop reason: HOSPADM

## 2018-11-29 RX ORDER — ALBUTEROL SULFATE 90 UG/1
2 AEROSOL, METERED RESPIRATORY (INHALATION) 4 TIMES DAILY
Status: DISCONTINUED | OUTPATIENT
Start: 2018-11-29 | End: 2018-11-30

## 2018-11-29 RX ORDER — PAROXETINE HYDROCHLORIDE 20 MG/1
20 TABLET, FILM COATED ORAL DAILY
Status: DISCONTINUED | OUTPATIENT
Start: 2018-11-30 | End: 2018-12-02 | Stop reason: HOSPADM

## 2018-11-29 RX ORDER — PREDNISOLONE ACETATE 10 MG/ML
1 SUSPENSION/ DROPS OPHTHALMIC 4 TIMES DAILY
Status: DISCONTINUED | OUTPATIENT
Start: 2018-11-29 | End: 2018-12-02 | Stop reason: HOSPADM

## 2018-11-29 RX ORDER — ASPIRIN 81 MG/1
81 TABLET ORAL DAILY
Status: DISCONTINUED | OUTPATIENT
Start: 2018-11-30 | End: 2018-12-02 | Stop reason: HOSPADM

## 2018-11-29 RX ORDER — AMLODIPINE BESYLATE 10 MG/1
10 TABLET ORAL DAILY
Status: DISCONTINUED | OUTPATIENT
Start: 2018-11-30 | End: 2018-12-02 | Stop reason: HOSPADM

## 2018-11-29 RX ORDER — CLOPIDOGREL BISULFATE 75 MG/1
75 TABLET ORAL DAILY
Status: DISCONTINUED | OUTPATIENT
Start: 2018-11-30 | End: 2018-12-02 | Stop reason: HOSPADM

## 2018-11-29 RX ORDER — SODIUM CHLORIDE 9 MG/ML
75 INJECTION, SOLUTION INTRAVENOUS ONCE
Status: COMPLETED | OUTPATIENT
Start: 2018-11-29 | End: 2018-11-30

## 2018-11-29 RX ORDER — LEVALBUTEROL 1.25 MG/.5ML
1.25 SOLUTION, CONCENTRATE RESPIRATORY (INHALATION)
Status: DISCONTINUED | OUTPATIENT
Start: 2018-11-29 | End: 2018-11-30

## 2018-11-29 RX ADMIN — IPRATROPIUM BROMIDE 0.5 MG: 0.5 SOLUTION RESPIRATORY (INHALATION) at 17:40

## 2018-11-29 RX ADMIN — METHYLPREDNISOLONE SODIUM SUCCINATE 125 MG: 125 INJECTION, POWDER, FOR SOLUTION INTRAMUSCULAR; INTRAVENOUS at 18:59

## 2018-11-29 RX ADMIN — ALBUTEROL SULFATE 5 MG: 2.5 SOLUTION RESPIRATORY (INHALATION) at 17:40

## 2018-11-29 NOTE — ED PROVIDER NOTES
History  Chief Complaint   Patient presents with    Shortness of Breath     pt c o increased sob and cough since thanksgiving   since yesterday morning pt has had a pulse ox in the 80s  History provided by:  Patient   used: No    77 y/ male with hx of COPD, prior lung cancer with right lobectomy presented with worsening dyspnea x 1 week associated with new cough  Doesn't usually use O2  Has home pulse ox and daughter reported it had been in the [de-identified] since yesterday  Mild tachypnea and scattered wheezes on exam  Improved work of breathing with supplemental O2  Plan nebs, solumedrol, labs, CXR, and ultimate admission  Prior to Admission Medications   Prescriptions Last Dose Informant Patient Reported? Taking? ASPIRIN 81 PO   Yes No   Sig: Take 1 tablet by mouth every other day     Fesoterodine Fumarate ER (TOVIAZ) 8 MG TB24  Outside Facility (Specify) Yes No   Sig: Take 4 mg by mouth every evening     LORazepam (ATIVAN) 0 5 mg tablet   No No   Sig: Take 0 5 tablets (0 25 mg total) by mouth daily for 10 days Daily @@ 0900   PARoxetine (PAXIL) 20 mg tablet   Yes No   Sig: Take 1 tablet by mouth daily   acetaminophen (TYLENOL) 325 mg tablet   No No   Sig: Take 2 tablets by mouth every 6 (six) hours as needed for fever   albuterol (PROVENTIL HFA,VENTOLIN HFA) 90 mcg/act inhaler   No No   Sig: Inhale 2 puffs 4 (four) times a day   amLODIPine (NORVASC) 10 mg tablet   Yes No   Sig: Take 1 tablet by mouth daily   atorvastatin (LIPITOR) 10 mg tablet   Yes No   Sig: Take 1 tablet by mouth   benzonatate (TESSALON) 200 MG capsule   No No   Sig: Take 1 capsule (200 mg total) by mouth 3 (three) times a day as needed for cough   bromfenac sodium (PROLENSA) 0 07 % SOLN   Yes No   Sig: Apply 1 drop to eye daily   budesonide (PULMICORT) 0 5 mg/2 mL nebulizer solution   No No   Sig: Take 1 vial (0 5 mg total) by nebulization 2 (two) times a day for 90 days Rinse mouth after use     carvedilol (COREG) 12 5 mg tablet   No No   Sig: Take 1 tablet by mouth 2 (two) times a day with meals   clopidogrel (PLAVIX) 75 mg tablet   No No   Sig: Take 1 tablet by mouth daily   dextromethorphan-guaiFENesin (ROBITUSSIN DM)  mg/5 mL syrup   No No   Sig: Take 5 mL by mouth 3 (three) times a day as needed for cough   docusate sodium (COLACE) 100 mg capsule   No No   Sig: Take 1 capsule (100 mg total) by mouth 2 (two) times a day   fluticasone (FLONASE) 50 mcg/act nasal spray   Yes No   Si-2 sprays into each nostril daily   ipratropium-albuterol (DUO-NEB) 0 5-2 5 mg/3 mL nebulizer solution   Yes No   Sig: Take 3 mL by nebulization 4 (four) times a day as needed for wheezing or shortness of breath   latanoprost (XALATAN) 0 005 % ophthalmic solution   Yes No   Sig: Administer 1 drop to both eyes daily at bedtime   levocetirizine (XYZAL) 5 MG tablet   No No   Sig: Take 1 tablet by mouth every evening   magnesium hydroxide (MILK OF MAGNESIA) 400 mg/5 mL oral suspension   Yes No   Sig: Take 30 mL by mouth daily as needed for constipation   meclizine (ANTIVERT) 25 mg tablet   No No   Sig: Take 1 tablet by mouth 3 (three) times a day as needed for dizziness   omeprazole (PriLOSEC) 40 MG capsule   Yes No   Sig: Take 1 capsule by mouth daily   polyethylene glycol (MIRALAX) 17 g packet   Yes No   Sig: Take 17 g by mouth daily as needed   prednisoLONE acetate (PRED FORTE) 1 % ophthalmic suspension   Yes No   Sig: Administer 1 drop to the right eye 4 (four) times a day   psyllium (METAMUCIL) 0 52 g capsule   Yes No   Sig: Take 0 52 g by mouth daily   senna (SENOKOT) 8 6 MG tablet   Yes No   Sig: Take 2 tablets by mouth daily at bedtime   spironolactone (ALDACTONE) 25 mg tablet   No No   Sig: Take 1 tablet (25 mg total) by mouth daily   tamsulosin (FLOMAX) 0 4 mg   Yes No   Sig: Take 1 capsule by mouth daily      Facility-Administered Medications: None       Past Medical History:   Diagnosis Date    [facility] ) 5/31/2017    Aspiration into respiratory tract     Chest pain 5/31/2017    COPD (chronic obstructive pulmonary disease) (HCC)     Depression     Elevated troponin 5/31/2017    GERD (gastroesophageal reflux disease)     History of CVA (cerebrovascular accident) 4/13/2016    Hyperlipidemia     Hypertension     Lung cancer ([facility] ) 2005    Right, status post lobectomy    Pneumonia     Prostate cancer ([facility] )     Sleep apnea     awaiting sleep study results    Stroke ([facility] )     Tracheomalacia     Weakness due to cerebrovascular accident (CVA)        Past Surgical History:   Procedure Laterality Date    COLONOSCOPY      ESOPHAGOGASTRODUODENOSCOPY N/A 4/13/2016    Procedure: ESOPHAGOGASTRODUODENOSCOPY (EGD); Surgeon: Neeraj Dunlap MD;  [facility]; Service:     JOINT REPLACEMENT      knee replacement    LUNG LOBECTOMY      [place] ESOPHAGOGASTRODUODENOSCOPY TRANSORAL DIAGNOSTIC N/A 3/15/2017    Procedure: ESOPHAGOGASTRODUODENOSCOPY (EGD); Surgeon: Neeraj Dunlap MD;  [facility]: [facility]; Service: Gastroenterology    TRIGEMINAL NERVE DECOMPRESSION         Family History   Problem Relation Age of Onset    Family history unknown: Yes     I have reviewed and agree with the history as documented  Social History   Substance Use Topics    Smoking status: Former Smoker     Packs/day: 1 00     Years: 22 00     Types: Cigarettes     Start date: 1955     Quit date: 1977    Smokeless tobacco: Never Used    Alcohol use No        Review of Systems   Constitutional: Negative for activity change, appetite change and fever  Respiratory: Positive for cough and shortness of breath  Negative for chest tightness  Cardiovascular: Negative for chest pain and leg swelling  Neurological: Negative for weakness and headaches  All other systems reviewed and are negative  Physical Exam  Physical Exam   Constitutional: He is oriented to person, place, and time  He appears well-developed and well-nourished  HENT:   Head: Normocephalic  Mouth/Throat: Oropharynx is clear and moist    Neck: Normal range of motion  No JVD present  Cardiovascular: Normal rate and regular rhythm  Pulmonary/Chest: He exhibits no tenderness  Mild tachypnea  Scattered wheezes  Abdominal: Soft  He exhibits no distension  Musculoskeletal: Normal range of motion  He exhibits no edema  Neurological: He is alert and oriented to person, place, and time  Skin: Skin is warm and dry  No pallor  Nursing note and vitals reviewed        Vital Signs  ED Triage Vitals   Temperature Pulse Respirations Blood Pressure SpO2   11/29/18 1722 11/29/18 1722 11/29/18 1722 11/29/18 1722 11/29/18 1722   98 9 °F (37 2 °C) 81 22 124/58 93 %      Temp Source Heart Rate Source Patient Position - Orthostatic VS BP Location FiO2 (%)   11/29/18 1722 11/29/18 1722 11/29/18 2348 11/29/18 1955 --   Oral Monitor Lying Left arm       Pain Score       11/29/18 1722       No Pain           Vitals:    12/01/18 1835 12/01/18 2253 12/02/18 0752 12/02/18 1623   BP: 121/65 134/67 118/64 128/65   Pulse: 72 63 55 64   Patient Position - Orthostatic VS: Sitting Lying Lying Lying       Visual Acuity  Visual Acuity      Most Recent Value   L Pupil Size (mm)  3   R Pupil Size (mm)  3   L Pupil Shape  Round   R Pupil Shape  Round          ED Medications  Medications   albuterol inhalation solution 5 mg (5 mg Nebulization Given 11/29/18 1740)   ipratropium (ATROVENT) 0 02 % inhalation solution 0 5 mg (0 5 mg Nebulization Given 11/29/18 1740)   methylPREDNISolone sodium succinate (Solu-MEDROL) injection 125 mg (125 mg Intravenous Given 11/29/18 1859)   sodium chloride 0 9 % infusion (75 mL/hr Intravenous New Bag 11/30/18 0040)       Diagnostic Studies  Results Reviewed     Procedure Component Value Units Date/Time    Blood culture #1 [[id number]] Collected:  11/29/18 1801    Lab Status:  Final result Specimen:  Blood from Hand, Left Updated:  12/04/18 2201     Blood Culture No Growth After 5 Days  Blood culture #2 [[id number]] Collected:  11/29/18 1743    Lab Status:  Final result Specimen:  Blood from Arm, Right Updated:  12/04/18 2201     Blood Culture No Growth After 5 Days  CBC and differential [[id number]]  (Abnormal) Collected:  11/29/18 1743    Lab Status:  Final result Specimen:  Blood from Arm, Right Updated:  11/29/18 1851     WBC 12 46 (H) Thousand/uL      RBC 4 75 Million/uL      Hemoglobin 14 2 g/dL      Hematocrit 43 3 %      MCV 91 fL      MCH 29 9 pg      MCHC 32 8 g/dL      RDW 14 3 %      MPV 11 7 fL      Platelets 960 (L) Thousands/uL      nRBC 0 /100 WBCs      Neutrophils Relative 82 (H) %      Immat GRANS % 0 %      Lymphocytes Relative 6 (L) %      Monocytes Relative 12 %      Eosinophils Relative 0 %      Basophils Relative 0 %      Neutrophils Absolute 10 24 (H) Thousands/µL      Immature Grans Absolute 0 03 Thousand/uL      Lymphocytes Absolute 0 69 Thousands/µL      Monocytes Absolute 1 43 (H) Thousand/µL      Eosinophils Absolute 0 04 Thousand/µL      Basophils Absolute 0 03 Thousands/µL     B-type natriuretic peptide [[id number]]  (Normal) Collected:  11/29/18 1743    Lab Status:  Final result Specimen:  Blood from Arm, Right Updated:  11/29/18 1819     NT-proBNP 369 pg/mL     Lactic acid, plasma [[id number]]  (Normal) Collected:  11/29/18 1743    Lab Status:  Final result Specimen:  Blood from Arm, Right Updated:  11/29/18 1815     LACTIC ACID 1 1 mmol/L     Narrative:         Result may be elevated if tourniquet was used during collection      Troponin I [[id number]]  (Normal) Collected:  11/29/18 1743    Lab Status:  Final result Specimen:  Blood from Arm, Right Updated:  11/29/18 1815     Troponin I <0 02 ng/mL     Comprehensive metabolic panel [[id number]]  (Abnormal) Collected:  11/29/18 1743    Lab Status:  Final result Specimen:  Blood from Arm, Right Updated:  11/29/18 1812     Sodium 135 (L) mmol/L      Potassium 3 9 mmol/L      Chloride 100 mmol/L      CO2 26 mmol/L      ANION GAP 9 mmol/L      BUN 24 mg/dL      Creatinine 1 41 (H) mg/dL      Glucose 122 mg/dL      Calcium 9 8 mg/dL      AST 17 U/L      ALT 16 U/L      Alkaline Phosphatase 94 U/L      Total Protein 7 7 g/dL      Albumin 3 3 (L) g/dL      Total Bilirubin 1 00 mg/dL      eGFR 47 ml/min/1 73sq m     Narrative:         National Kidney Disease Education Program recommendations are as follows:  GFR calculation is accurate only with a steady state creatinine  Chronic Kidney disease less than 60 ml/min/1 73 sq  meters  Kidney failure less than 15 ml/min/1 73 sq  meters  Protime-INR [[id number]]  (Normal) Collected:  11/29/18 1743    Lab Status:  Final result Specimen:  Blood from Arm, Right Updated:  11/29/18 1808     Protime 14 0 seconds      INR 1 11    APTT [[id number]]  (Abnormal) Collected:  11/29/18 1743    Lab Status:  Final result Specimen:  Blood from Arm, Right Updated:  11/29/18 1808     PTT 41 (H) seconds                  XR chest portable   Final Result by Vipul Graham DO (12/01 1026)      No active disease  Workstation performed: [id number]         XR chest 2 views   ED Interpretation by Obdulio Dunlap MD (11/29 1844)   No acute changes  Final Result by Ganga Birmingham DO (11/30 2057)   Developing right lower lobe infiltrate, most compatible with pneumonia  The study was marked in [facility] for immediate notification        Workstation performed: [id number]                    Procedures  ECG 12 Lead Documentation  Date/Time: 11/29/2018 6:41 PM  Performed by: Ramesh Boroks  Authorized by: Ramesh Brooks     Indications / Diagnosis:  Dyspnea  ECG reviewed by me, the ED Provider: yes    Patient location:  ED  Previous ECG:     Previous ECG:  Compared to current    Comparison ECG info:  78    Similarity:  No change  Rate:     ECG rate:  73  Rhythm:     Rhythm: sinus rhythm    Ectopy:     Ectopy: PVCs    QRS:     QRS axis:  Normal  Conduction:     Conduction: normal    ST segments:     ST segments:  Normal  T waves:     T waves: non-specific             Phone Contacts  ED Phone Contact    ED Course                               MDM  Number of Diagnoses or Management Options  COPD with acute exacerbation [facility]): new and requires workup  Diagnosis management comments: 78 y/o male with COPD and worsening dyspnea x 1 week with hypoxia at home  Some improvement on nebs  Still has O2 requirement  No infiltrate seen on CXR  Plan admission for COPD exacerbation with new O2 requirement  Amount and/or Complexity of Data Reviewed  Clinical lab tests: ordered and reviewed  Tests in the radiology section of CPT®: ordered and reviewed  Discuss the patient with other providers: yes  Independent visualization of images, tracings, or specimens: yes    Patient Progress  Patient progress: improved    CritCare Time    Disposition  Final diagnoses:   COPD with acute exacerbation ([facility] )     Time reflects when diagnosis was documented in both MDM as applicable and the Disposition within this note     Time User Action Codes Description Comment    11/29/2018  7:02 PM Mariano Fisher [J44 1] COPD with acute exacerbation ([facility] )     11/29/2018 10:18 PM Mariya Purdy [J44 1] COPD with acute exacerbation [facility])       ED Disposition     ED Disposition Condition Comment    Admit  Case was discussed with Dr Beatrice Robbins and the patient's admission status was agreed to be Admission Status: inpatient status to the service of Dr Beatrice Robbins  Follow-up Information     Follow up With Specialties Details Why Contact Jr Ng, DO Pulmonary Disease, Pulmonology Follow up on 2/19/2019 Your appointment is at 10:00 a m  Lynsey Mclaughlin Please arrive 15 minutes early to complete the check-in process   [address]  Follow up  [address] [place], [place] [phone]            Discharge Medication List as of 12/2/2018  4:38 PM      START taking these medications    Details   predniSONE 20 mg tablet Take 2 tablets (40 mg total) by mouth daily for 3 doses, Starting Mon 12/3/2018, Until u 12/6/2018, Normal         CONTINUE these medications which have NOT CHANGED    Details   acetaminophen (TYLENOL) 325 mg tablet Take 2 tablets by mouth every 6 (six) hours as needed for fever, Starting Mon 6/19/2017, Print      albuterol (PROVENTIL HFA,VENTOLIN HFA) 90 mcg/act inhaler Inhale 2 puffs 4 (four) times a day, Starting Mon 6/19/2017, Print      amLODIPine (NORVASC) 10 mg tablet Take 1 tablet by mouth daily, Starting Tue 10/10/2017, Historical Med      ASPIRIN 81 PO Take 1 tablet by mouth every other day  , Starting Tue 10/10/2017, Historical Med      atorvastatin (LIPITOR) 10 mg tablet Take 1 tablet by mouth, Historical Med      benzonatate (TESSALON) 200 MG capsule Take 1 capsule (200 mg total) by mouth 3 (three) times a day as needed for cough, Starting Mon 10/29/2018, Normal      bromfenac sodium (PROLENSA) 0 07 % SOLN Apply 1 drop to eye daily, Historical Med      budesonide (PULMICORT) 0 5 mg/2 mL nebulizer solution Take 1 vial (0 5 mg total) by nebulization 2 (two) times a day for 90 days Rinse mouth after use , Starting Tue 11/20/2018, Until Mon 2/18/2019, Normal      carvedilol (COREG) 12 5 mg tablet Take 1 tablet by mouth 2 (two) times a day with meals, Starting Mon 8/21/2017, Print      clopidogrel (PLAVIX) 75 mg tablet Take 1 tablet by mouth daily, Starting Mon 6/19/2017, Print      dextromethorphan-guaiFENesin (ROBITUSSIN DM)  mg/5 mL syrup Take 5 mL by mouth 3 (three) times a day as needed for cough, Starting Mon 11/5/2018, Print      docusate sodium (COLACE) 100 mg capsule Take 1 capsule (100 mg total) by mouth 2 (two) times a day, Starting Sun 2/18/2018, Print      Fesoterodine Fumarate ER (TOVIAZ) 8 MG TB24 Take 4 mg by mouth every evening  , Historical Med      fluticasone (FLONASE) 50 mcg/act nasal spray 1-2 sprays into each nostril daily, Starting Wed 8/30/2017, Historical Med      ipratropium-albuterol (DUO-NEB) 0 5-2 5 mg/3 mL nebulizer solution Take 3 mL by nebulization 4 (four) times a day as needed for wheezing or shortness of breath, Historical Med      latanoprost (XALATAN) 0 005 % ophthalmic solution Administer 1 drop to both eyes daily at bedtime, Historical Med      levocetirizine (XYZAL) 5 MG tablet Take 1 tablet by mouth every evening, Starting Mon 6/19/2017, Print      LORazepam (ATIVAN) 0 5 mg tablet Take 0 5 tablets (0 25 mg total) by mouth daily for 10 days Daily @@ 0900, Starting Wed 8/22/2018, Until Fri 11/2/2018, Print      magnesium hydroxide (MILK OF MAGNESIA) 400 mg/5 mL oral suspension Take 30 mL by mouth daily as needed for constipation, Historical Med      meclizine (ANTIVERT) 25 mg tablet Take 1 tablet by mouth 3 (three) times a day as needed for dizziness, Starting Mon 6/19/2017, Print      omeprazole (PriLOSEC) 40 MG capsule Take 1 capsule by mouth daily, Starting Wed 8/30/2017, Historical Med      PARoxetine (PAXIL) 20 mg tablet Take 1 tablet by mouth daily, Historical Med      polyethylene glycol (MIRALAX) 17 g packet Take 17 g by mouth daily as needed, Historical Med      prednisoLONE acetate (PRED FORTE) 1 % ophthalmic suspension Administer 1 drop to the right eye 4 (four) times a day, Historical Med      psyllium (METAMUCIL) 0 52 g capsule Take 0 52 g by mouth daily, Historical Med      senna (SENOKOT) 8 6 MG tablet Take 2 tablets by mouth daily at bedtime, Historical Med      spironolactone (ALDACTONE) 25 mg tablet Take 1 tablet (25 mg total) by mouth daily, Starting Sun 2/4/2018, No Print      tamsulosin (FLOMAX) 0 4 mg Take 1 capsule by mouth daily, Historical Med             Outpatient Discharge Orders  Ambulatory Referral to Home Health   Standing Status: Future  Standing Exp   Date: 06/02/19     Discharge Diet     Activity as tolerated     Call provider for:  difficulty breathing, headache or visual disturbances     Call provider for:  severe uncontrolled pain     Call provider for:  persistent dizziness or light-headedness         ED Provider  Electronically Signed by           Sofi Banuelos MD  12/17/18 4180

## 2018-11-29 NOTE — ED NOTES
Patient transported to [facility] Konstantin Cifuentes RN  11/29/18 Ayanna Rajput

## 2018-11-30 LAB
ANION GAP SERPL CALCULATED.3IONS-SCNC: 9 MMOL/L (ref 4–13)
ATRIAL RATE: 78 BPM
BUN SERPL-MCNC: 29 MG/DL (ref 5–25)
CALCIUM SERPL-MCNC: 9.6 MG/DL (ref 8.3–10.1)
CHLORIDE SERPL-SCNC: 102 MMOL/L (ref 100–108)
CO2 SERPL-SCNC: 27 MMOL/L (ref 21–32)
CREAT SERPL-MCNC: 1.47 MG/DL (ref 0.6–1.3)
GFR SERPL CREATININE-BSD FRML MDRD: 45 ML/MIN/1.73SQ M
GLUCOSE SERPL-MCNC: 160 MG/DL (ref 65–140)
P AXIS: 91 DEGREES
POTASSIUM SERPL-SCNC: 4.1 MMOL/L (ref 3.5–5.3)
PR INTERVAL: 194 MS
PROCALCITONIN SERPL-MCNC: 0.19 NG/ML
PROCALCITONIN SERPL-MCNC: 0.19 NG/ML
QRS AXIS: 31 DEGREES
QRSD INTERVAL: 90 MS
QT INTERVAL: 356 MS
QTC INTERVAL: 405 MS
SODIUM SERPL-SCNC: 138 MMOL/L (ref 136–145)
T WAVE AXIS: 59 DEGREES
VENTRICULAR RATE: 78 BPM

## 2018-11-30 PROCEDURE — 84145 PROCALCITONIN (PCT): CPT | Performed by: INTERNAL MEDICINE

## 2018-11-30 PROCEDURE — 99223 1ST HOSP IP/OBS HIGH 75: CPT | Performed by: INTERNAL MEDICINE

## 2018-11-30 PROCEDURE — 92610 EVALUATE SWALLOWING FUNCTION: CPT

## 2018-11-30 PROCEDURE — 94640 AIRWAY INHALATION TREATMENT: CPT

## 2018-11-30 PROCEDURE — 80048 BASIC METABOLIC PNL TOTAL CA: CPT | Performed by: INTERNAL MEDICINE

## 2018-11-30 PROCEDURE — 93010 ELECTROCARDIOGRAM REPORT: CPT | Performed by: INTERNAL MEDICINE

## 2018-11-30 PROCEDURE — 94664 DEMO&/EVAL PT USE INHALER: CPT

## 2018-11-30 PROCEDURE — 94760 N-INVAS EAR/PLS OXIMETRY 1: CPT

## 2018-11-30 PROCEDURE — 99232 SBSQ HOSP IP/OBS MODERATE 35: CPT | Performed by: INTERNAL MEDICINE

## 2018-11-30 RX ORDER — PREDNISONE 20 MG/1
40 TABLET ORAL DAILY
Status: DISCONTINUED | OUTPATIENT
Start: 2018-12-01 | End: 2018-12-02 | Stop reason: HOSPADM

## 2018-11-30 RX ORDER — BUDESONIDE 0.5 MG/2ML
0.5 INHALANT ORAL
Status: DISCONTINUED | OUTPATIENT
Start: 2018-11-30 | End: 2018-12-02 | Stop reason: HOSPADM

## 2018-11-30 RX ORDER — LEVOFLOXACIN 5 MG/ML
750 INJECTION, SOLUTION INTRAVENOUS EVERY 24 HOURS
Status: DISCONTINUED | OUTPATIENT
Start: 2018-11-30 | End: 2018-11-30

## 2018-11-30 RX ORDER — LEVALBUTEROL 1.25 MG/.5ML
1.25 SOLUTION, CONCENTRATE RESPIRATORY (INHALATION)
Status: DISCONTINUED | OUTPATIENT
Start: 2018-11-30 | End: 2018-12-02 | Stop reason: HOSPADM

## 2018-11-30 RX ORDER — LEVALBUTEROL 1.25 MG/.5ML
1.25 SOLUTION, CONCENTRATE RESPIRATORY (INHALATION)
Status: DISCONTINUED | OUTPATIENT
Start: 2018-11-30 | End: 2018-11-30

## 2018-11-30 RX ADMIN — BENZONATATE 200 MG: 100 CAPSULE ORAL at 01:13

## 2018-11-30 RX ADMIN — LEVALBUTEROL HYDROCHLORIDE 1.25 MG: 1.25 SOLUTION, CONCENTRATE RESPIRATORY (INHALATION) at 08:00

## 2018-11-30 RX ADMIN — LEVOFLOXACIN 750 MG: 5 INJECTION, SOLUTION INTRAVENOUS at 09:49

## 2018-11-30 RX ADMIN — METHYLPREDNISOLONE SODIUM SUCCINATE 40 MG: 40 INJECTION, POWDER, FOR SOLUTION INTRAMUSCULAR; INTRAVENOUS at 03:59

## 2018-11-30 RX ADMIN — BENZONATATE 200 MG: 100 CAPSULE ORAL at 17:01

## 2018-11-30 RX ADMIN — HYDROCODONE POLISTIREX AND CHLORPHENIRAMINE POLISTIREX 5 ML: 10; 8 SUSPENSION, EXTENDED RELEASE ORAL at 01:13

## 2018-11-30 RX ADMIN — BENZONATATE 200 MG: 100 CAPSULE ORAL at 09:49

## 2018-11-30 RX ADMIN — PSYLLIUM HUSK 1 PACKET: 3.4 POWDER ORAL at 09:53

## 2018-11-30 RX ADMIN — BUDESONIDE 0.5 MG: 0.5 INHALANT RESPIRATORY (INHALATION) at 13:24

## 2018-11-30 RX ADMIN — LEVALBUTEROL HYDROCHLORIDE 1.25 MG: 1.25 SOLUTION, CONCENTRATE RESPIRATORY (INHALATION) at 13:24

## 2018-11-30 RX ADMIN — ENOXAPARIN SODIUM 40 MG: 40 INJECTION SUBCUTANEOUS at 09:49

## 2018-11-30 RX ADMIN — LATANOPROST 1 DROP: 50 SOLUTION/ DROPS OPHTHALMIC at 01:21

## 2018-11-30 RX ADMIN — PREDNISOLONE ACETATE 1 DROP: 10 SUSPENSION/ DROPS OPHTHALMIC at 01:21

## 2018-11-30 RX ADMIN — OXYBUTYNIN CHLORIDE 5 MG: 5 TABLET, EXTENDED RELEASE ORAL at 09:50

## 2018-11-30 RX ADMIN — ALBUTEROL SULFATE 2 PUFF: 90 AEROSOL, METERED RESPIRATORY (INHALATION) at 00:33

## 2018-11-30 RX ADMIN — ASPIRIN 81 MG: 81 TABLET, COATED ORAL at 09:50

## 2018-11-30 RX ADMIN — SENNOSIDES 17.2 MG: 8.6 TABLET, FILM COATED ORAL at 21:36

## 2018-11-30 RX ADMIN — CLOPIDOGREL 75 MG: 75 TABLET, FILM COATED ORAL at 09:50

## 2018-11-30 RX ADMIN — DOCUSATE SODIUM 100 MG: 100 CAPSULE, LIQUID FILLED ORAL at 09:50

## 2018-11-30 RX ADMIN — BENZONATATE 200 MG: 100 CAPSULE ORAL at 21:36

## 2018-11-30 RX ADMIN — FLUTICASONE PROPIONATE 1 SPRAY: 50 SPRAY, METERED NASAL at 09:53

## 2018-11-30 RX ADMIN — IPRATROPIUM BROMIDE 0.5 MG: 0.5 SOLUTION RESPIRATORY (INHALATION) at 08:00

## 2018-11-30 RX ADMIN — DOCUSATE SODIUM 100 MG: 100 CAPSULE, LIQUID FILLED ORAL at 17:01

## 2018-11-30 RX ADMIN — PANTOPRAZOLE SODIUM 40 MG: 40 TABLET, DELAYED RELEASE ORAL at 05:06

## 2018-11-30 RX ADMIN — BUDESONIDE 0.5 MG: 0.5 INHALANT RESPIRATORY (INHALATION) at 20:03

## 2018-11-30 RX ADMIN — TAMSULOSIN HYDROCHLORIDE 0.4 MG: 0.4 CAPSULE ORAL at 09:53

## 2018-11-30 RX ADMIN — CARVEDILOL 12.5 MG: 12.5 TABLET, FILM COATED ORAL at 09:50

## 2018-11-30 RX ADMIN — IPRATROPIUM BROMIDE 0.5 MG: 0.5 SOLUTION RESPIRATORY (INHALATION) at 20:03

## 2018-11-30 RX ADMIN — PREDNISOLONE ACETATE 1 DROP: 10 SUSPENSION/ DROPS OPHTHALMIC at 09:53

## 2018-11-30 RX ADMIN — LEVALBUTEROL HYDROCHLORIDE 1.25 MG: 1.25 SOLUTION, CONCENTRATE RESPIRATORY (INHALATION) at 20:03

## 2018-11-30 RX ADMIN — ATORVASTATIN CALCIUM 10 MG: 10 TABLET, FILM COATED ORAL at 17:01

## 2018-11-30 RX ADMIN — SENNOSIDES 17.2 MG: 8.6 TABLET, FILM COATED ORAL at 01:14

## 2018-11-30 RX ADMIN — AMLODIPINE BESYLATE 10 MG: 10 TABLET ORAL at 09:50

## 2018-11-30 RX ADMIN — PAROXETINE HYDROCHLORIDE 20 MG: 20 TABLET, FILM COATED ORAL at 09:50

## 2018-11-30 RX ADMIN — HYDROCODONE POLISTIREX AND CHLORPHENIRAMINE POLISTIREX 5 ML: 10; 8 SUSPENSION, EXTENDED RELEASE ORAL at 21:36

## 2018-11-30 RX ADMIN — IPRATROPIUM BROMIDE 0.5 MG: 0.5 SOLUTION RESPIRATORY (INHALATION) at 13:24

## 2018-11-30 RX ADMIN — DOCUSATE SODIUM 100 MG: 100 CAPSULE, LIQUID FILLED ORAL at 01:20

## 2018-11-30 RX ADMIN — SODIUM CHLORIDE 75 ML/HR: 0.9 INJECTION, SOLUTION INTRAVENOUS at 00:40

## 2018-11-30 NOTE — UTILIZATION REVIEW
Initial Clinical Review    Admission: Date/Time/Statement: 11/29/18 @ 1902     Orders Placed This Encounter   Procedures    Inpatient Admission (expected length of stay for this patient is greater than two midnights)     Standing Status:   Standing     Number of Occurrences:   1     Order Specific Question:   Admitting Physician     Answer:   Adam Rowe [[id number]]     Order Specific Question:   Level of Care     Answer:   Med Surg [[id number]]     Order Specific Question:   Estimated length of stay     Answer:   More than 2 Midnights     Order Specific Question:   Certification     Answer:   I certify that inpatient services are medically necessary for this patient for a duration of greater than two midnights  See H&P and MD Progress Notes for additional information about the patient's course of treatment  ED: Date/Time/Mode of Arrival:   ED Arrival Information     Expected Arrival Acuity Means of Arrival Escorted By Service Admission Type    - 11/29/2018 17:15 Urgent Ambulance [facility] General Medicine Urgent    Arrival Complaint    -          Chief Complaint:   Chief Complaint   Patient presents with    Shortness of Breath     pt c o increased sob and cough since thanksgiving   since yesterday morning pt has had a pulse ox in the 80s  History of Illness: 65 y o  male who presents with worsening cough and shortness of breath  The patient is noted to be somewhat vague historian but is able to give a reasonable history  The patient states that he saw his pulmonologist about a week ago and 1 of his medications were switched  On review of the record it appears this medication is budesonide  Nonetheless patient states that he took the medication for few days but felt that it was making his coughing worse of then he stopped the medication by Monday  The patient stated that despite stopping the medication he continued to have worsening cough  The cough was productive of a clear sputum  The patient does have a chronic cough but this seemed to be more frequent  The patient stated that he felt cold many times but denied any obvious fever  The patient states that the during the last couple of days his voice was noted to be weaker as though he would losing his voice  Yesterday he did not feel well at all and states that he did not sleep well the night prior due to the coughing  The patient states that today his daughter was checking his pulse oximetry using a pulse oximeter that they have at home and he notes his oxygen saturations were down as low as 83%  They attempted to use oxygen that he had remaining at home describing as an old tank, but when this ran out they decided to come to the emergency department for evaluation        ED Vital Signs:   ED Triage Vitals   Temperature Pulse Respirations Blood Pressure SpO2   11/29/18 1722 11/29/18 1722 11/29/18 1722 11/29/18 1722 11/29/18 1722   98 9 °F (37 2 °C) 81 22 124/58 93 %      Temp Source Heart Rate Source Patient Position - Orthostatic VS BP Location FiO2 (%)   11/29/18 1722 11/29/18 1722 11/29/18 2348 11/29/18 1955 --   Oral Monitor Lying Left arm       Pain Score       11/29/18 1722       No Pain        Wt Readings from Last 1 Encounters:   11/29/18 92 5 kg (203 lb 14 8 oz)       Vital Signs (abnormal): low sat 89%    Abnormal Labs/Diagnostic Test Results:   Wbc 12 46  Na 135  Bun 24  Creatinine 1 41  Albumin 3 3  CxR- Developing right lower lobe infiltrate, most compatible with pneumonia    ED Treatment: oxygen 3 liters  Blood cultures     Medication Administration from 11/29/2018 1715 to 11/29/2018 1943       Date/Time Order Dose Route Action Comments     11/29/2018 1740 albuterol inhalation solution 5 mg 5 mg Nebulization Given      11/29/2018 1740 ipratropium (ATROVENT) 0 02 % inhalation solution 0 5 mg 0 5 mg Nebulization Given      11/29/2018 1859 methylPREDNISolone sodium succinate (Solu-MEDROL) injection 125 mg 125 mg Intravenous Given           Past Medical/Surgical History:   Past Medical History:   Diagnosis Date    RONAL (acute kidney injury) ([facility] ) 5/31/2017    Aspiration into respiratory tract     Chest pain 5/31/2017    COPD (chronic obstructive pulmonary disease) (HCC)     Depression     Elevated troponin 5/31/2017    GERD (gastroesophageal reflux disease)     History of CVA (cerebrovascular accident) 4/13/2016    Hyperlipidemia     Hypertension     Lung cancer ([place] [address] ) 2005    Pneumonia     Prostate cancer ([place] [address] )     Sleep apnea     Stroke ([place] [address] )     Tracheomalacia     Weakness due to cerebrovascular accident (CVA)        Admitting Diagnosis: Shortness of breath [R06 02]  COPD with acute exacerbation ([place] [address] ) [J44 1]    Age/Sex: 65 y o  male    · Assessment/Plan: Acute Hypoxic Respiratory Failure due to COPD with Acute Exacerbation -   ? Steroid - Solumedrol 40 mg IV q8h  ? Nebulizer - Xopenex and atrovent tid  ? Antitussives - tessalon perles will be ordered  Will also try tussionex at bedtime  Continue robitussin DM PRN still  ? Antibiotic - Not currently indicated as no pneumonia on chest xray, no fever, and no change in sputum quality  Will trend CBCD and will get procalcitonin and ERCP  ? Mucolytic - not currently needed  Reports phlegm to be thin and clear  ? Inhalers - home inhalers  ? Pulmonary consultation  Sees Dr Darnell Knutson as outpatient and feels that an outpatient medication adjustment (addition of budesonide) made his breathing worse  ? Oxygenation / ventilation - will monitor on oxygen and wean as tolerated  Patient not recently prescribed oxygen but had a remaining tank at home which is now gone  May need to potentially qualify for oxygen      Plan for Additional Problems:   · Possible RONAL - Baseline creatinine is around 1 1 - 1 3  Will hold diuretic for a day and monitor renal function  BMP in am   Monitor I/O and weights  Gentle IVF 75 ml/hr x 1 bag only    ·  Essential Hypertension - continue home medications  · Obstructive Sleep Apnea - oxygen at night  Not wanting CPAP here  · Oropharyngeal Dysphagia - per last speech therapy recommendation, will have patient on dysphagia 2 diet with NTL  With persistent coughing, rule out aspiration events  Speech swallow re-evaluation  · History of Lung Cancer - supportive care  Pulmonary eval   · GERD - protonix in place of prilosec  · BPH - flomax  Monitor urine outputs  Other - review and confirm home med list with daughter    Admission Orders:  11/29/2018  1903 INPATIENT   Scheduled Meds:   Current Facility-Administered Medications:  acetaminophen 650 mg Oral Q6H PRN    amLODIPine 10 mg Oral Daily    aspirin 81 mg Oral Daily    atorvastatin 10 mg Oral Daily With Dinner    benzonatate 200 mg Oral TID    carvedilol 12 5 mg Oral BID With Meals    clopidogrel 75 mg Oral Daily    dextromethorphan-guaiFENesin 5 mL Oral TID PRN    docusate sodium 100 mg Oral BID    enoxaparin 40 mg Subcutaneous Daily    fluticasone 1 spray Each Nare Daily    hydrocodone-chlorpheniramine polistirex 5 mL Oral HS    ipratropium 0 5 mg Nebulization TID    latanoprost 1 drop Both Eyes HS    levalbuterol 1 25 mg Nebulization TID    levofloxacin 750 mg Intravenous Q24H Last Rate: 750 mg (11/30/18 0949)   meclizine 25 mg Oral TID PRN    methylPREDNISolone sodium succinate 40 mg Intravenous Q8H    oxybutynin 5 mg Oral Daily    pantoprazole 40 mg Oral Early Morning    PARoxetine 20 mg Oral Daily    polyethylene glycol 17 g Oral Daily PRN    prednisoLONE acetate 1 drop Right Eye 4x Daily    psyllium 1 packet Oral Daily    senna 2 tablet Oral HS    tamsulosin 0 4 mg Oral Daily      Continuous Infusions:    PRN Meds: not used     Respiratory protocol - oxygen 3 liters     scds  Pulse oximetry while ambulating   Consult pulmonary  speech

## 2018-11-30 NOTE — PROGRESS NOTES
[facility] Progress Note  Patient: Lehman Fothergill  65 y o  male   MRN: [id number]  PCP: Levar Edouard DO  Unit/Bed#: -Tim Encounter: [id number]  Date Of Visit: 11/30/18    Assessment:    Principal Problem:    Acute respiratory failure with hypoxia ([facility] )  Active Problems:    Essential hypertension    Oropharyngeal dysphagia    GERD (gastroesophageal reflux disease)    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    RONAL (acute kidney injury) ([facility] )    Chronic cough    Aspiration pneumonia ([facility] )    COPD with acute exacerbation (HCC)    BPH (benign prostatic hyperplasia)    LIAM (obstructive sleep apnea)      Plan:    · Acute Hypoxic Respiratory Failure -   · Recurrent Aspiration Events Suspected -   · Seen by speech therapy who recommended dysphagia 1 diet with NTL  Additional speech swallow follow ups  · Pulmonary has suggested that he consider PEG tube if further episodes continue even with more restrictive diet  · Monitoring off antibiotics  · Supportive care  · COPD with Possible Acute Exacerbation -   · Steroid - Decreased by pulmonary to prednisone 40 mg PO daily x 5 days  · Nebulizer - Xopenex and atrovent tid  · Antitussives - tessalon perles will be ordered  Will also try tussionex at bedtime  Continue robitussin DM PRN still  Monitor cough symptoms  · Antibiotic - Not currently indicated  · Mucolytic - not currently needed  Reports phlegm to be thin and clear  · Inhalers - home inhalers  · Oxygenation / ventilation - will monitor on oxygen and wean as tolerated  Patient not recently prescribed oxygen but had a remaining tank at home which is now gone  May need to potentially qualify for oxygen  · Possible RONAL - Baseline creatinine is around 1 1 - 1 3  Will hold diuretic again and monitor renal function  BMP in am   Monitor I/O and weights  ·  Essential Hypertension - continue home medications  · Obstructive Sleep Apnea - oxygen at night    Not wanting CPAP here  · Oropharyngeal Dysphagia - see above for diet information  · History of Lung Cancer - supportive care  Pulmonary eval   · GERD - protonix in place of prilosec  · BPH - flomax  Monitor urine outputs  VTE Pharmacologic Prophylaxis:   Pharmacologic: Enoxaparin (Lovenox)  Mechanical VTE Prophylaxis in Place: Yes    Patient Centered Rounds: I have performed bedside rounds with nursing staff today  Discussions with Specialists or Other Care Team Provider: Dr Dorenda Angelucci  Education and Discussions with Family / Patient: attempted to update daughter Kim  Will try again tomorrow  Time Spent for Care: 30 minutes  More than 50% of total time spent on counseling and coordination of care as described above  Current Length of Stay: 1 day(s)    Current Patient Status: Inpatient   Certification Statement: The patient will continue to require additional inpatient hospital stay due to evaluation and treatment above  Discharge Plan / Estimated Discharge Date: to be determined  Code Status: Level 1 - Full Code      Subjective: The patient has no acute complaints  He states that he feels better today than he has the past 2 days  The patient states that he has not any longer coughing up phlegm and the cough is more of a dry cough now  The patient denies any chest pain or palpitations  He has no dizziness or lightheadedness  No fevers or chills  Objective:     Vitals:   Temp (24hrs), Av 5 °F (36 4 °C), Min:97 4 °F (36 3 °C), Max:97 8 °F (36 6 °C)    Temp:  [97 4 °F (36 3 °C)-97 8 °F (36 6 °C)] 97 5 °F (36 4 °C)  HR:  [56-76] 65  Resp:  [18-20] 18  BP: (107-113)/(52-64) 107/64  SpO2:  [89 %-96 %] 95 %  Body mass index is 24 18 kg/m²  Input and Output Summary (last 24 hours):     No intake or output data in the 24 hours ending 18 1246    Physical Exam:     Physical Exam   Constitutional: He is oriented to person, place, and time  No distress     HENT:   Mouth/Throat: Oropharynx is clear and moist  No oropharyngeal exudate  Eyes: Pupils are equal, round, and reactive to light  Cardiovascular: Normal rate and regular rhythm  No murmur heard  Pulmonary/Chest: No respiratory distress  Coarse breath sounds  However he has no wheezing currently  Breathing pattern is unlabored  The cough is now dryer sounding  Abdominal: Soft  Bowel sounds are normal  He exhibits no distension  There is no tenderness  Musculoskeletal: He exhibits no edema or tenderness  Neurological: He is alert and oriented to person, place, and time  Skin: Skin is warm and dry  Vitals reviewed  Additional Data:     Labs:      Results from last 7 days  Lab Units 11/29/18  1743   WBC Thousand/uL 12 46*   HEMOGLOBIN g/dL 14 2   HEMATOCRIT % 43 3   PLATELETS Thousands/uL 136*   NEUTROS PCT % 82*   LYMPHS PCT % 6*   MONOS PCT % 12   EOS PCT % 0       Results from last 7 days  Lab Units 11/30/18  0459 11/29/18  1743   POTASSIUM mmol/L 4 1 3 9   CHLORIDE mmol/L 102 100   CO2 mmol/L 27 26   BUN mg/dL 29* 24   CREATININE mg/dL 1 47* 1 41*   CALCIUM mg/dL 9 6 9 8   ALK PHOS U/L  --  94   ALT U/L  --  16   AST U/L  --  17       Results from last 7 days  Lab Units 11/29/18  1743   INR  1 11       * I Have Reviewed All Lab Data Listed Above  * Additional Pertinent Lab Tests Reviewed:  [facility] Admission Reviewed    Imaging:    Imaging Reports Reviewed Today Include: None  Imaging Personally Reviewed by Myself Includes:  None    Recent Cultures (last 7 days):           Last 24 Hours Medication List:     Current Facility-Administered Medications:  acetaminophen 650 mg Oral Q6H PRN Elizabeth Idler, DO   amLODIPine 10 mg Oral Daily Elizabeth Idler, DO   aspirin 81 mg Oral Daily Elizabeth Idler, DO   atorvastatin 10 mg Oral Daily With [employer], DO   benzonatate 200 mg Oral TID Elizabeth Idler, DO   budesonide 0 5 mg Nebulization Q12H DIANA Galeano   carvedilol 12 5 mg Oral BID With Meals Gweneth Glaze, DO   clopidogrel 75 mg Oral Daily Gweneth Glaze, DO   dextromethorphan-guaiFENesin 5 mL Oral TID PRN Gweneth Glaze, DO   docusate sodium 100 mg Oral BID Gweneth Glaze, DO   enoxaparin 40 mg Subcutaneous Daily Gweneth Glaze, DO   fluticasone 1 spray Each Nare Daily Gweneth Glaze, DO   hydrocodone-chlorpheniramine polistirex 5 mL Oral HS Abe Retana, DO   ipratropium 0 5 mg Nebulization Q6H Maryanne Kiss, CRNP   latanoprost 1 drop Both Eyes HS Gweneth Glaze, DO   levalbuterol 1 25 mg Nebulization Q6H Maryanne Kiss, CRNP   meclizine 25 mg Oral TID PRN Gweneth Glaze, DO   oxybutynin 5 mg Oral Daily Gweneth Glaze, DO   pantoprazole 40 mg Oral Early Morning Gweneth Glaze, DO   PARoxetine 20 mg Oral Daily Gweneth Glaze, DO   polyethylene glycol 17 g Oral Daily PRN Gweneth Glaze, DO   prednisoLONE acetate 1 drop Right Eye 4x Daily Abe Retana DO   [START ON 12/1/2018] predniSONE 40 mg Oral Daily Maryanne Kiss, WENNP   psyllium 1 packet Oral Daily Gweneth Glaze, DO   senna 2 tablet Oral HS Gweneth Glaze, DO   tamsulosin 0 4 mg Oral Daily Gweneth Glaze, DO        Today, Patient Was Seen By: Moni Peterson DO    ** Please Note: This note has been constructed using a voice recognition system   **

## 2018-11-30 NOTE — SPEECH THERAPY NOTE
Speech-Language Pathology Bedside Swallow Evaluation        Patient Name: Adebayo Sage  Today's Date: 11/30/2018     Problem List  Patient Active Problem List   Diagnosis    Essential hypertension    Lung cancer ([address] )    Oropharyngeal dysphagia    History of cerebrovascular accident (CVA) with residual deficit    GERD (gastroesophageal reflux disease)    Left-sided weakness    Hyperlipidemia    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    Tracheomalacia    Chronic obstructive pulmonary disease with acute lower respiratory infection ([address] )    RONAL (acute kidney injury) ([address] )    Ambulatory dysfunction    Right lower lobe pneumonia ([address] )    Acute respiratory failure with hypoxia ([address] )    Chronic cough    Other emphysema ([address] )    Aspiration pneumonia ([address] )    [facility] with [facility]    BPH (benign prostatic hyperplasia)    LIAM (obstructive sleep apnea)       Past Medical History  Past Medical History:   Diagnosis Date    RONAL (acute kidney injury) ([address] ) 5/31/2017    Aspiration into respiratory tract     Chest pain 5/31/2017    COPD (chronic obstructive pulmonary disease) (HCC)     Depression     Elevated troponin 5/31/2017    GERD (gastroesophageal reflux disease)     History of CVA (cerebrovascular accident) 4/13/2016    Hyperlipidemia     Hypertension     Lung cancer ([address] ) 2005    Right, status post lobectomy    Pneumonia     Pro[facility]     Sleep apnea     awaiting sleep study results    Stroke ([facility]     Tracheomalacia     Weakness due to cerebrovascular accident (CVA)        Past Surgical History  Past Surgical History:   Procedure Laterality Date    COLONOSCOPY      ESOPHAGOGASTRODUODENOSCOPY N/A 4/13/2016    Procedure: ESOPHAGOGASTRODUODENOSCOPY (EGD); Surgeon: Gladis Coombs MD;  [facility];   Service:     JOINT REPLACEMENT      knee replacement    LUNG LOBECTOMY      MT ESOPHAGOGASTRODUODENOSCOPY TRANSORAL DIAGNOSTIC N/A 3/15/2017    Procedure: ESOPHAGOGASTRODUODENOSCOPY (EGD); Surgeon: Renee Vizcarra MD;  Location: [facility]; Service: Gastroenterology    TRIGEMINAL NERVE DECOMPRESSION         Summary    Pt presents with Mild-moderate oropharyngeal dysphagia w/ history of aspiration risk w/ successive sips of NTL and thin liquids  No overt s/s aspiration noted during evaluation at this time  Recommendations:   Diet: mechanically altered/level 2 diet and nectar thick liquids   Meds: whole with puree   Frequent Oral care: 4x/day-however pt refuses  Aspiration precautions and compensatory swallowing strategies: upright posture, only feed when fully alert, slow rate of feeding, small bites/sips and no straws  Other Recommendations/ considerations: will follow up for diet tolerance  Current Medical Status  Pt is a 65 y o  male who presented to [address] [place] [address] with acute respiratory failure  Pt presents w/ worsening SOB; pt had a recent change in medication which seemed to make his cough worse, so pt stopped taking the medication  Pt stated his cough seems to be more frequent than normal w/ clear sputum production  Past medical history:   Please see H&P for details    Special Studies:  CXR: 11/29 developing RLL infiltrate, most compatible w/ pneumonia  VBS 11/4/18 mild-moderate oropharyngeal dysphagia marked by incomplete mastication of solids, segmented transfer of soft, premature spillage of soft and thins, delayed reflexive swallow  There is penetration to the level of the vocal cords with consecutive cup sips of nectar and there is penetration and aspiration with single tsp of thins  There is prompt sensory response to penetration/aspiration      Social/Education/Vocational Hx:  Pt lives with family    Swallow Information   Current Risks for Dysphagia & Aspiration: known history of dysphagia and known history of aspiration; dtr at bedside stated pt had a choking episode about a month ago w/ cut up pcs of potatoes  Current Symptoms/Concerns: change in respiratory status  Current Diet: mechanically altered/level 2 diet and nectar thick liquids   Baseline Diet: mechanically altered/level 2 diet and nectar thick liquids    Baseline Assessment   Behavior/Cognition: alert  Speech/Language Status: able to participate in conversation and able to follow commands- pt able to recall/state appropriate thickness of liquids, diet level, and strategies suggested from last admission  Patient Positioning: upright in bed     Swallow Mechanism Exam   Facial: left facial droop  Labial: decreased ROM left side  Lingual: WFL  Velum: unable to visualize  Mandible: adequate ROM  Dentition: limited dentition  Vocal quality:clear/adequate   Volitional Cough: strong/productive   Respiratory: NC    Consistencies Assessed and Performance   Consistencies Administered: pt seen at breakfast scrambled eggs, NTL by cup  Oral Stage: pt was able to feed self; slow but adequate mastication/manipulation  Mild oral residue noted  Pt independently took single cup sips of NTL  Pharyngeal Stage: swallows mildly delayed but complete  No coughing, or throat clearing noted; voice remained clear  Esophageal Concerns: none reported      Results Reviewed with: patient, RN, CRNP and family   Dysphagia Goals: pt will tolerate [facility] soft diet  with nectar thick liquids without s/s of aspiration x72 hours across all meals      Speech Therapy Prognosis   Prognosis: good and fair    Prognosis Considerations: age, medical status, prior medical history, cognitive status, respiratory status and therapeutic potential

## 2018-11-30 NOTE — PLAN OF CARE
Problem: SLP ADULT - SWALLOWING, IMPAIRED  Goal: Initial SLP swallow eval performed  Outcome: Completed Date Met: 11/30/18

## 2018-11-30 NOTE — H&P
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    RONAL (acute kidney injury) ([place] [address] )    Chronic cough    Aspiration pneumonia ([place] [address] )    COPD with acute exacerbation ([place] [address] )    BPH (benign prostatic hyperplasia)    Plan for the Primary Problem(s):  · Acute Hypoxic Respiratory Failure due to COPD with Acute Exacerbation -   · Steroid - Solumedrol 40 mg IV q8h  · Nebulizer - Xopenex and atrovent tid  · Antitussives - tessalon perles will be ordered  Will also try tussionex at bedtime  Continue robitussin DM PRN still  · Antibiotic - Not currently indicated as no pneumonia on chest xray, no fever, and no change in sputum quality  Will trend CBCD and will get procalcitonin and ERCP  · Mucolytic - not currently needed  Reports phlegm to be thin and clear  · Inhalers - home inhalers  · Pulmonary consultation  Sees Dr Lilian Mustafa as outpatient and feels that an outpatient medication adjustment (addition of budesonide) made his breathing worse  · Oxygenation / ventilation - will monitor on oxygen and wean as tolerated  Patient not recently prescribed oxygen but had a remaining tank at home which is now gone  May need to potentially qualify for oxygen  Plan for Additional Problems:   · Possible RONAL - Baseline creatinine is around 1 1 - 1 3  Will hold diuretic for a day and monitor renal function  BMP in am   Monitor I/O and weights  Gentle IVF 75 ml/hr x 1 bag only  ·  Essential Hypertension - continue home medications    · Obstructive Sleep Apnea - oxygen at night  Not wanting CPAP here  · Oropharyngeal Dysphagia - per last speech therapy recommendation, will have patient on dysphagia 2 diet with NTL  With persistent coughing, rule out aspiration events  Speech swallow re-evaluation  · History of Lung Cancer - supportive care  Pulmonary eval   · GERD - protonix in place of prilosec  · BPH - flomax  Monitor urine outputs  · Other - review and confirm home med list with daughter  VTE Prophylaxis: Enoxaparin (Lovenox)  / sequential compression device   Code Status: Level 1 Full Code  POLST: There is no POLST form on file for this patient (pre-hospital)    Anticipated Length of Stay:  Patient will be admitted on an Inpatient basis with an anticipated length of stay of  > 2 midnights  Justification for Hospital Stay: evaluation and treatment for above medical conditions  Total Time for Visit, including Counseling / Coordination of Care: 45 minutes  Greater than 50% of this total time spent on direct patient counseling and coordination of care  Chief Complaint:   Cough with shortness of breath  History of Present Illness:    Louise Vásquez  is a 65 y o  male who presents with worsening cough and shortness of breath  The patient is noted to be somewhat vague historian but is able to give a reasonable history  The patient states that he saw his pulmonologist about a week ago and 1 of his medications were switched  On review of the record it appears this medication is budesonide  Nonetheless patient states that he took the medication for few days but felt that it was making his coughing worse of then he stopped the medication by Monday  The patient stated that despite stopping the medication he continued to have worsening cough  The cough was productive of a clear sputum  The patient does have a chronic cough but this seemed to be more frequent  The patient stated that he felt cold many times but denied any obvious fever    The patient states that the during the last couple of days his voice was noted to be weaker as though he would losing his voice  Yesterday he did not feel well at all and states that he did not sleep well the night prior due to the coughing  The patient states that today his daughter was checking his pulse oximetry using a pulse oximeter that they have at home and he notes his oxygen saturations were down as low as 83%  They attempted to use oxygen that he had remaining at home describing as an old tank, but when this ran out they decided to come to the emergency department for evaluation  Review of Systems:    Review of Systems   Constitutional: Positive for chills and fatigue  Negative for activity change, appetite change and fever  HENT: Positive for congestion (in the chest) and voice change  Negative for rhinorrhea, sinus pain, sinus pressure and sneezing  Eyes: Negative  Respiratory: Positive for cough, shortness of breath and wheezing  Cardiovascular: Negative for chest pain, palpitations and leg swelling  Gastrointestinal: Negative for abdominal pain, constipation, diarrhea, nausea and vomiting  Endocrine: Negative  Genitourinary: Negative  Musculoskeletal: Negative  Negative for arthralgias, back pain and myalgias  Skin: Negative  Allergic/Immunologic: Negative  Neurological: Negative for dizziness, syncope, light-headedness, numbness and headaches  Hematological: Negative  Psychiatric/Behavioral: Negative          Past Medical and Surgical History:     Past Medical History:   Diagnosis Date    [facility] ) 5/31/2017    Aspiration into respiratory tract     Chest pain 5/31/2017    COPD (chronic obstructive pulmonary disease) ([facility])     Depression     Elevated troponin 5/31/2017    GERD (gastroesophageal reflux disease)     History of CVA (cerebrovascular accident) 4/13/2016    Hyperlipidemia     Hypertension     Lung cancer ([facility] ) 2005 Right, status post lobectomy    Pneumonia     Pro[facility] ([facility] )     Sleep apnea     awaiting sleep study results    Stroke ([facility] )     Tracheomalacia     Weakness due to cerebrovascular accident (CVA)        Past Surgical History:   Procedure Laterality Date    COLONOSCOPY      ESOPHAGOGASTRODUODENOSCOPY N/A 4/13/2016    Procedure: ESOPHAGOGASTRODUODENOSCOPY (EGD); Surgeon: Roselia Blanco MD;  [facility]: [facility]; Service:     JOINT REPLACEMENT      knee replacement    LUNG LOBECTOMY      DC ESOPHAGOGASTRODUODENOSCOPY TRANSORAL DIAGNOSTIC N/A 3/15/2017    Procedure: ESOPHAGOGASTRODUODENOSCOPY (EGD); Surgeon: Roselia Blanco MD;  Location: [facility]; Service: Gastroenterology    TRIGEMINAL NERVE DECOMPRESSION         Meds/Allergies:    Prior to Admission medications    Medication Sig Start Date End Date Taking? Authorizing Provider   acetaminophen (TYLENOL) 325 mg tablet Take 2 tablets by mouth every 6 (six) hours as needed for fever 6/19/17   Rebecca Perez MD   albuterol (PROVENTIL HFA,VENTOLIN HFA) 90 mcg/act inhaler Inhale 2 puffs 4 (four) times a day 6/19/17   Rebecca Perez MD   amLODIPine (NORVASC) 10 mg tablet Take 1 tablet by mouth daily 10/10/17   Historical Provider, MD   ASPIRIN 81 PO Take 1 tablet by mouth every other day   10/10/17   Historical Provider, MD   atorvastatin (LIPITOR) 10 mg tablet Take 1 tablet by mouth    Historical Provider, MD   benzonatate (TESSALON) 200 MG capsule Take 1 capsule (200 mg total) by mouth 3 (three) times a day as needed for cough 10/29/18   DIANA Galeano   bromfenac sodium (PROLENSA) 0 07 % SOLN Apply 1 drop to eye daily    Historical Provider, MD   budesonide (PULMICORT) 0 5 mg/2 mL nebulizer solution Take 1 vial (0 5 mg total) by nebulization 2 (two) times a day for 90 days Rinse mouth after use   11/20/18 2/18/19  Kim Rivera DO   carvedilol (COREG) 12 5 mg tablet Take 1 tablet by mouth 2 (two) times a day with meals 8/21/17   Redd Morgan Cande Wharton MD   clopidogrel (PLAVIX) 75 mg tablet Take 1 tablet by mouth daily 6/19/17   Opal Sahu MD   dextromethorphan-guaiFENesin (ROBITUSSIN DM)  mg/5 mL syrup Take 5 mL by mouth 3 (three) times a day as needed for cough 11/5/18   Lc Acuña PA-C   docusate sodium (COLACE) 100 mg capsule Take 1 capsule (100 mg total) by mouth 2 (two) times a day 2/18/18   Leonie José MD   Fesoterodine Fumarate ER (TOVIAZ) 8 MG TB24 Take 4 mg by mouth every evening      Historical Provider, MD   fluticasone (FLONASE) 50 mcg/act nasal spray 1-2 sprays into each nostril daily 8/30/17   Historical Provider, MD   ipratropium-albuterol (DUO-NEB) 0 5-2 5 mg/3 mL nebulizer solution Take 3 mL by nebulization 4 (four) times a day as needed for wheezing or shortness of breath    Historical Provider, MD   latanoprost (XALATAN) 0 005 % ophthalmic solution Administer 1 drop to both eyes daily at bedtime    Historical Provider, MD   levocetirizine (XYZAL) 5 MG tablet Take 1 tablet by mouth every evening 6/19/17   Opal Sahu MD   LORazepam (ATIVAN) 0 5 mg tablet Take 0 5 tablets (0 25 mg total) by mouth daily for 10 days Daily @@ 0900 8/22/18 11/2/18  Memo Murdock PA-C   magnesium hydroxide (MILK OF MAGNESIA) 400 mg/5 mL oral suspension Take 30 mL by mouth daily as needed for constipation    Historical Provider, MD   meclizine (ANTIVERT) 25 mg tablet Take 1 tablet by mouth 3 (three) times a day as needed for dizziness 6/19/17   Opal Sahu MD   omeprazole (PriLOSEC) 40 MG capsule Take 1 capsule by mouth daily 8/30/17   Historical Provider, MD   PARoxetine (PAXIL) 20 mg tablet Take 1 tablet by mouth daily    Historical Provider, MD   polyethylene glycol (MIRALAX) 17 g packet Take 17 g by mouth daily as needed    Historical Provider, MD   prednisoLONE acetate (PRED FORTE) 1 % ophthalmic suspension Administer 1 drop to the right eye 4 (four) times a day    Historical Provider, MD   psyllium (METAMUCIL) 0 52 g capsule Take 0 52 g by mouth daily    Historical Provider, MD   senna (SENOKOT) 8 6 MG tablet Take 2 tablets by mouth daily at bedtime    Historical Provider, MD   spironolactone (ALDACTONE) 25 mg tablet Take 1 tablet (25 mg total) by mouth daily 2/4/18   DIANA Giron   tamsulosin (FLOMAX) 0 4 mg Take 1 capsule by mouth daily    Historical Provider, MD     Patient does not know medication  best list available is list from pulmonary outpaitent visit 9 days ago  this list was used  Will need to confirm with daughter, Oleksandr Mckeon tomorrow  Allergies: Allergies   Allergen Reactions    Penicillins Rash       Social History:     Marital Status:      Substance Use History:   History   Alcohol Use No     History   Smoking Status    Former Smoker    Packs/day: 1 00    Years: 22 00    Types: Cigarettes    Start date: 1955    Quit date: 1977   Smokeless Tobacco    Never Used     History   Drug Use No       Family History:    non-contributory    Physical Exam:     Vitals:   Blood Pressure: 113/57 (11/29/18 1955)  Pulse: 76 (11/29/18 1955)  Temperature: 97 8 °F (36 6 °C) (11/29/18 1955)  Temp Source: Oral (11/29/18 1955)  Respirations: 20 (11/29/18 1955)  Height: 6' 5" (195 6 cm) (11/29/18 1955)  Weight - Scale: 92 5 kg (203 lb 14 8 oz) (11/29/18 1955)  SpO2: (!) 89 % (11/29/18 1955)    Physical Exam   Constitutional: He is oriented to person, place, and time  No distress  HENT:   Mouth/Throat: Oropharynx is clear and moist  No oropharyngeal exudate  Eyes: Pupils are equal, round, and reactive to light  Conjunctivae are normal    Neck: Neck supple  No JVD present  Cardiovascular: Normal rate and regular rhythm  No murmur heard  Pulmonary/Chest: Effort normal  He has no wheezes  He has no rales  Severely diminshed breath sounds bilaterally  Occasional cough noted which is nonproductive  Voice is not particuarly hoarse currently  No wheezing on my exam currently  Abdominal: Soft   Bowel sounds are normal  He exhibits no distension  There is no tenderness  Musculoskeletal: He exhibits no edema or tenderness  Neurological: He is alert and oriented to person, place, and time  Vitals reviewed  Additional Data:     Lab Results: I have personally reviewed pertinent reports  Results from last 7 days  Lab Units 11/29/18  1743   WBC Thousand/uL 12 46*   HEMOGLOBIN g/dL 14 2   HEMATOCRIT % 43 3   PLATELETS Thousands/uL 136*   NEUTROS PCT % 82*   LYMPHS PCT % 6*   MONOS PCT % 12   EOS PCT % 0       Results from last 7 days  Lab Units 11/29/18  1743   POTASSIUM mmol/L 3 9   CHLORIDE mmol/L 100   CO2 mmol/L 26   BUN mg/dL 24   CREATININE mg/dL 1 41*   CALCIUM mg/dL 9 8   ALK PHOS U/L 94   ALT U/L 16   AST U/L 17       Results from last 7 days  Lab Units 11/29/18  1743   INR  1 11       Imaging: I have personally reviewed pertinent reports  Xr Chest 2 Views    Result Date: 11/2/2018  Narrative: CHEST INDICATION:   Cough  Pneumonia  Patient with a side effect of medication  Visual hallucinations  COMPARISON:  10/31/2018 EXAM PERFORMED/VIEWS:  XR CHEST PA & LATERAL FINDINGS: Cardiomediastinal silhouette appears unremarkable  Infiltrate at the left base seen on the recent prior study is no longer confidently identified  Currently, the lungs appear clear  No pleural fluid or pneumothorax  Right rib cage deformity stable from prior  Scoliosis stable as well  Impression: No infiltrates are seen Workstation performed: [id number]     Xr Chest Pa & Lateral    Result Date: 11/1/2018  Narrative: CHEST INDICATION:   R05: Cough  COMPARISON:  8/21/2018 chest x-ray EXAM PERFORMED/VIEWS:  XR CHEST PA & LATERAL Images: 2 FINDINGS: Patchy opacity has developed at the lateral left lung base suspicious for early infiltrate, not previously evident  Cardiomediastinal silhouette appears unremarkable, although somewhat distorted by severe scoliosis  The lungs are otherwise clear    No pneumothorax or pleural effusion  Persistent severe lower thoracic dextroscoliosis  Right 7th and possibly 8th rib resections  Impression: Interval development of suspected infiltrate lateral left lung base Severe scoliosis Workstation performed: [id number]     Allscripts / Epic Records Reviewed: Yes     ** Please Note: This note has been constructed using a voice recognition system   **

## 2018-11-30 NOTE — SPEECH THERAPY NOTE
Speech Language/Pathology    Speech/Language Pathology Progress Note    Patient Name: Karilyn Fabry  Today's Date: 11/30/2018       Called by nsg that dtr wanted to speak to me- pt had a choking episode after I left his room  Spoke w/ dtr, she wasn't sure what pt was eating, ? Eggs  Discussed that the scrambled eggs are actually non- compliant w/  Dysphagia 2 diet, however given episodes of choking at home on finely chopped food, should we consider pureed diet for now, especially w/ pt's current resp compromise  Pt was not thrilled w/ idea of pureed diet, but agreed to try it over the weekend and we can re-eval for mech soft on mon if pt's overall medical status is improved; dtr agreeable  Spoke w/ RN to downgrade diet to puree w/ nectar thick liquids  Will cont to follow

## 2018-11-30 NOTE — CONSULTS
Consultation - Pulmonary Medicine   Adebayo Sage  65 y o  male MRN: [id number]  Unit/Bed#: -01 Encounter: [id number]      Assessment/Plan:    Acute on chronic respiratory insufficiency, multifactorial due to below   Use oxygen as needed to keep saturations greater than or equal to 88%  Out of bed as tolerated  Continue pulmonary hygiene with flutter valve  Abnormal chest x-ray:  Right lower lobe infiltrate in the setting of recurrent aspiration due to chronic dysphagia   Discontinue antibiotics and monitor closely off  Monitor temperatures and WBCs  Check procalcitonin and CXR in AM    Will need repeat imaging in 4-6 weeks  Dysphagia   Aspiration precautions and modified diet in the setting of dysphagia  Speech therapy at bedside for evaluation  Last BS was unremarkable with appropriate diet  I discussed the possible future need of PEG tube with Denise Davidson and his daughter today should aspiration continue to be a problem  He is unsure of what his wishes would be, but will contemplate this possibility  COPD without clear acute exacerbation   Discontinue Solu-Medrol and placed on prednisone 40 milligrams daily  Anticipate five day burst depending on course  Continue Xopenex/Atrovent nebulized every 6 hours with b i d  Pulmicort  At discharge, inhaled regimen should be DuoNeb nebulized 4 times daily with b i d  Pulmicort  Chronic cough due to above, as well as tracheomalacia   Management as above  Continue Tessalon and Tussionex  Outpatient follow-up and repeat imaging pending hospital course  Discussed with primary team     History of Present Illness   Physician Requesting Consult: Jasper Paul DO  Reason for Consult / Principal Problem:  COPD with acute exacerbation  Hx and PE limited by:  None  Chief Complaint:  I was weak    HPI: Adebayo Sage  is a 65 y o    male who presented to [address] [place] [address] with complaints of generalized weakness, cough, and shortness of breath  History is obtained from [place] and from his daughter at bedside  He reports that since his last hospitalization for aspiration pneumonia, he has been doing fairly well  On Wednesday morning, he awoke with significant coughing episode that caused severe shortness of breath  They report that his cough is fairly debilitating and makes things difficult for him  His cough is productive of white/gray mucus  He denies hemoptysis or purulent sputum production  He denies fevers, chills, or night sweats  He denies chest pain or tightness  He reports shortness of breath has been worse lately, which limits his activities  He denies any other complaints  From pulmonary standpoint, [place] follows with Dr Yolanda Paige  He maintained on Advair, Spiriva, and albuterol as needed until his last office appointment with Dr Yolanda Paige on November 20th  He was transitioned to Duonebs 4 times daily and Pulmicort twice daily  He also maintains on a modified diet and aspiration precautions due to dysphagia  He is able to verbalize the instructions he has been given by speech therapy and is compliant with recommendations  He is up-to-date on vaccines  Inpatient consult to Pulmonology  Consult performed by: Freddie Desir ordered by: Keesha Shultz        Review of Systems   All other systems reviewed and are negative  A full 12-point review of systems was completed and is negative except for those outlined in the HPI      Historical Information   Past Medical History:   Diagnosis Date    RONAL [facility]acute kidney [facility]) ([facility] ) 5/31/2017    Aspiration into respiratory tract     Chest pain 5/31/2017    COPD (chronic obstructive pulmonary disease) (HCC)     Depression     Elevated troponin 5/31/2017    GERD (gastroesophageal reflux disease)     History of CVA (cerebrovascular accident) 4/13/2016    Hyperlipidemia     Hypertension     Lung cancer ([facility] ) 2005    Right, status post lobectomy    Pneumonia     Prostate cancer ([facility] [address] )     Sleep apnea     awaiting sleep study results    [facility]     Tracheomalacia     Weakness due to cerebrovascular accident (CVA)      Past Surgical History:   Procedure Laterality Date    COLONOSCOPY      ESOPHAGOGASTRODUODENOSCOPY N/A 4/13/2016    Procedure: ESOPHAGOGASTRODUODENOSCOPY (EGD); Surgeon: Dion Gipson MD;  [facility]; Service:     JOINT REPLACEMENT      knee replacement    LUNG LOBECTOMY      TN ESOPHAGOGASTRODUODENOSCOPY TRANSORAL DIAGNOSTIC N/A 3/15/2017    Procedure: ESOPHAGOGASTRODUODENOSCOPY (EGD); Surgeon: Dion Gipson MD;  [facility]; Service: Gastroenterology    TRIGEMINAL NERVE DECOMPRESSION       Social History   History   Alcohol Use No     History   Drug Use No     History   Smoking Status    Former Smoker    Packs/day: 1 00    Years: 22 00    Types: Cigarettes    Start date: 5    Quit date: 1977   Smokeless Tobacco    Never Used     Occupational History: Retired  Lives with family      Family History:   Family History   Problem Relation Age of Onset    Family history unknown: Yes       Meds/Allergies   all current active meds have been reviewed, pertinent pulmonary meds have been reviewed, current meds:   Current Facility-Administered Medications   Medication Dose Route Frequency    acetaminophen (TYLENOL) tablet 650 mg  650 mg Oral Q6H PRN    amLODIPine (NORVASC) tablet 10 mg  10 mg Oral Daily    aspirin (ECOTRIN LOW STRENGTH) EC tablet 81 mg  81 mg Oral Daily    atorvastatin (LIPITOR) tablet 10 mg  10 mg Oral Daily With Dinner    benzonatate (TESSALON PERLES) capsule 200 mg  200 mg Oral TID    carvedilol (COREG) tablet 12 5 mg  12 5 mg Oral BID With Meals    clopidogrel (PLAVIX) tablet 75 mg  75 mg Oral Daily    dextromethorphan-guaiFENesin (ROBITUSSIN DM)  mg/5 mL oral syrup 5 mL  5 mL Oral TID PRN    docusate sodium (COLACE) capsule 100 mg  100 mg Oral BID    enoxaparin (LOVENOX) subcutaneous injection 40 mg  40 mg Subcutaneous Daily    fluticasone (FLONASE) 50 mcg/act nasal spray 1 spray  1 spray Each Nare Daily    hydrocodone-chlorpheniramine polistirex (TUSSIONEX) 10-8 mg/5 mL ER suspension 5 mL  5 mL Oral HS    ipratropium (ATROVENT) 0 02 % inhalation solution 0 5 mg  0 5 mg Nebulization TID    latanoprost (XALATAN) 0 005 % ophthalmic solution 1 drop  1 drop Both Eyes HS    levalbuterol (XOPENEX) inhalation solution 1 25 mg  1 25 mg Nebulization TID    levofloxacin (LEVAQUIN) IVPB (premix) 750 mg  750 mg Intravenous Q24H    meclizine (ANTIVERT) tablet 25 mg  25 mg Oral TID PRN    methylPREDNISolone sodium succinate (Solu-MEDROL) injection 40 mg  40 mg Intravenous Q8H    oxybutynin (DITROPAN-XL) 24 hr tablet 5 mg  5 mg Oral Daily    pantoprazole (PROTONIX) EC tablet 40 mg  40 mg Oral Early Morning    PARoxetine (PAXIL) tablet 20 mg  20 mg Oral Daily    polyethylene glycol (MIRALAX) packet 17 g  17 g Oral Daily PRN    prednisoLONE acetate (PRED FORTE) 1 % ophthalmic suspension 1 drop  1 drop Right Eye 4x Daily    psyllium (METAMUCIL) 1 packet  1 packet Oral Daily    senna (SENOKOT) tablet 17 2 mg  2 tablet Oral HS    tamsulosin (FLOMAX) capsule 0 4 mg  0 4 mg Oral Daily    and PTA meds:   Prior to Admission Medications   Prescriptions Last Dose Informant Patient Reported? Taking?    ASPIRIN 81 PO   Yes No   Sig: Take 1 tablet by mouth every other day     Fesoterodine Fumarate ER (TOVIAZ) 8 MG TB24  Outside Facility (Specify) Yes No   Sig: Take 4 mg by mouth every evening     LORazepam (ATIVAN) 0 5 mg tablet   No No   Sig: Take 0 5 tablets (0 25 mg total) by mouth daily for 10 days Daily @@ 0900   PARoxetine (PAXIL) 20 mg tablet   Yes No   Sig: Take 1 tablet by mouth daily   acetaminophen (TYLENOL) 325 mg tablet   No No   Sig: Take 2 tablets by mouth every 6 (six) hours as needed for fever   albuterol (PROVENTIL HFA,VENTOLIN HFA) 90 mcg/act inhaler   No No   Sig: Inhale 2 puffs 4 (four) times a day   amLODIPine (NORVASC) 10 mg tablet   Yes No   Sig: Take 1 tablet by mouth daily   atorvastatin (LIPITOR) 10 mg tablet   Yes No   Sig: Take 1 tablet by mouth   benzonatate (TESSALON) 200 MG capsule   No No   Sig: Take 1 capsule (200 mg total) by mouth 3 (three) times a day as needed for cough   bromfenac sodium (PROLENSA) 0 07 % SOLN   Yes No   Sig: Apply 1 drop to eye daily   budesonide (PULMICORT) 0 5 mg/2 mL nebulizer solution   No No   Sig: Take 1 vial (0 5 mg total) by nebulization 2 (two) times a day for 90 days Rinse mouth after use     carvedilol (COREG) 12 5 mg tablet   No No   Sig: Take 1 tablet by mouth 2 (two) times a day with meals   clopidogrel (PLAVIX) 75 mg tablet   No No   Sig: Take 1 tablet by mouth daily   dextromethorphan-guaiFENesin (ROBITUSSIN DM)  mg/5 mL syrup   No No   Sig: Take 5 mL by mouth 3 (three) times a day as needed for cough   docusate sodium (COLACE) 100 mg capsule   No No   Sig: Take 1 capsule (100 mg total) by mouth 2 (two) times a day   fluticasone (FLONASE) 50 mcg/act nasal spray   Yes No   Si-2 sprays into each nostril daily   ipratropium-albuterol (DUO-NEB) 0 5-2 5 mg/3 mL nebulizer solution   Yes No   Sig: Take 3 mL by nebulization 4 (four) times a day as needed for wheezing or shortness of breath   latanoprost (XALATAN) 0 005 % ophthalmic solution   Yes No   Sig: Administer 1 drop to both eyes daily at bedtime   levocetirizine (XYZAL) 5 MG tablet   No No   Sig: Take 1 tablet by mouth every evening   magnesium hydroxide (MILK OF MAGNESIA) 400 mg/5 mL oral suspension   Yes No   Sig: Take 30 mL by mouth daily as needed for constipation   meclizine (ANTIVERT) 25 mg tablet   No No   Sig: Take 1 tablet by mouth 3 (three) times a day as needed for dizziness   omeprazole (PriLOSEC) 40 MG capsule   Yes No   Sig: Take 1 capsule by mouth daily   polyethylene glycol (MIRALAX) 17 g packet   Yes No   Sig: Take 17 g by mouth daily as needed prednisoLONE acetate (PRED FORTE) 1 % ophthalmic suspension   Yes No   Sig: Administer 1 drop to the right eye 4 (four) times a day   psyllium (METAMUCIL) 0 52 g capsule   Yes No   Sig: Take 0 52 g by mouth daily   senna (SENOKOT) 8 6 MG tablet   Yes No   Sig: Take 2 tablets by mouth daily at bedtime   spironolactone (ALDACTONE) 25 mg tablet   No No   Sig: Take 1 tablet (25 mg total) by mouth daily   tamsulosin (FLOMAX) 0 4 mg   Yes No   Sig: Take 1 capsule by mouth daily      Facility-Administered Medications: None       Allergies   Allergen Reactions    Penicillins Rash       Objective   Vitals: Blood pressure 113/63, pulse 64, temperature (!) 97 4 °F (36 3 °C), temperature source Oral, resp  rate 18, height 6' 5" (1 956 m), weight 92 5 kg (203 lb 14 8 oz), SpO2 96 %  2L NC,Body mass index is 24 18 kg/m²  No intake or output data in the 24 hours ending 11/30/18 1147  Invasive Devices     Peripheral Intravenous Line            Peripheral IV 11/29/18 Right Antecubital less than 1 day    Peripheral IV 11/29/18 Right Hand less than 1 day                Physical Exam   Constitutional: He is oriented to person, place, and time  He appears well-developed and well-nourished  He is cooperative  Non-toxic appearance  No distress  HENT:   Head: Normocephalic and atraumatic  Mouth/Throat: No oropharyngeal exudate  Eyes: EOM are normal  No scleral icterus  Neck: Neck supple  No JVD present  No tracheal deviation present  Cardiovascular: Normal rate, regular rhythm, S1 normal and S2 normal   Exam reveals no gallop and no friction rub  No murmur heard  Pulmonary/Chest: Effort normal  No accessory muscle usage or stridor  No tachypnea  No respiratory distress  He has no decreased breath sounds  He has no wheezes  He has no rhonchi  He has rales in the right lower field and the left lower field  He exhibits no tenderness  Abdominal: Soft  Bowel sounds are normal  He exhibits no distension   There is no tenderness  There is no rebound and no guarding  Musculoskeletal: He exhibits no edema or tenderness  Neurological: He is alert and oriented to person, place, and time  He has normal strength  GCS eye subscore is 4  GCS verbal subscore is 5  GCS motor subscore is 6  Skin: Skin is warm and dry  No abrasion, no ecchymosis, no lesion and no rash noted  He is not diaphoretic  No cyanosis or erythema  Nails show no clubbing  Psychiatric: He has a normal mood and affect  His speech is normal and behavior is normal    Vitals reviewed  Lab Results:   CBC:   Lab Results   Component Value Date    WBC 12 46 (H) 11/29/2018    HGB 14 2 11/29/2018    HCT 43 3 11/29/2018    MCV 91 11/29/2018     (L) 11/29/2018    MCH 29 9 11/29/2018    MCHC 32 8 11/29/2018    RDW 14 3 11/29/2018    MPV 11 7 11/29/2018    NRBC 0 11/29/2018   , CMP:   Lab Results   Component Value Date    SODIUM 138 11/30/2018    K 4 1 11/30/2018     11/30/2018    CO2 27 11/30/2018    BUN 29 (H) 11/30/2018    CREATININE 1 47 (H) 11/30/2018    CALCIUM 9 6 11/30/2018    AST 17 11/29/2018    ALT 16 11/29/2018    ALKPHOS 94 11/29/2018    EGFR 45 11/30/2018     Blood cultures x2 pending    Procalcitonin 0 19 x 2        Sputum culture not sent    Imaging Studies: I have personally reviewed pertinent reports   , I have personally reviewed pertinent films in PACS and Chest x-ray shows right lower lobe infiltrate not present on x-ray done on 11/02    EKG, Pathology, and Other Studies: None    Pulmonary Results (PFTs, PSG): None    VTE Prophylaxis: Sequential compression device (Venodyne)  and Enoxaparin (Lovenox)    Code Status: Level 1 - Full Code    None    Portions of the record may have been created with voice recognition software  Occasional wrong word or "sound a like" substitutions may have occurred due to the inherent limitations of voice recognition software    Read the chart carefully and recognize, using context, where substitutions have occurred

## 2018-11-30 NOTE — PLAN OF CARE
DISCHARGE PLANNING     Discharge to home or other facility with appropriate resources Progressing        INFECTION - ADULT     Absence or prevention of progression during hospitalization Progressing        Knowledge Deficit     Patient/family/caregiver demonstrates understanding of disease process, treatment plan, medications, and discharge instructions Progressing        PAIN - ADULT     Verbalizes/displays adequate comfort level or baseline comfort level Progressing        Potential for Falls     Patient will remain free of falls Progressing        Prexisting or High Potential for Compromised Skin Integrity     Skin integrity is maintained or improved Progressing        RESPIRATORY - ADULT     Achieves optimal ventilation and oxygenation Progressing        SAFETY ADULT     Maintain or return to baseline ADL function Progressing     Maintain or return mobility status to optimal level Progressing        SKIN/TISSUE INTEGRITY - ADULT     Incision(s), wounds(s) or drain site(s) healing without S/S of infection Progressing

## 2018-12-01 ENCOUNTER — APPOINTMENT (INPATIENT)
Dept: RADIOLOGY | Facility: HOSPITAL | Age: 78
DRG: 177 | End: 2018-12-01
Payer: MEDICARE

## 2018-12-01 LAB
ANION GAP SERPL CALCULATED.3IONS-SCNC: 11 MMOL/L (ref 4–13)
BASOPHILS # BLD AUTO: 0.01 THOUSANDS/ΜL (ref 0–0.1)
BASOPHILS NFR BLD AUTO: 0 % (ref 0–1)
BUN SERPL-MCNC: 42 MG/DL (ref 5–25)
CALCIUM SERPL-MCNC: 9.8 MG/DL (ref 8.3–10.1)
CHLORIDE SERPL-SCNC: 104 MMOL/L (ref 100–108)
CO2 SERPL-SCNC: 25 MMOL/L (ref 21–32)
CREAT SERPL-MCNC: 1.38 MG/DL (ref 0.6–1.3)
EOSINOPHIL # BLD AUTO: 0 THOUSAND/ΜL (ref 0–0.61)
EOSINOPHIL NFR BLD AUTO: 0 % (ref 0–6)
ERYTHROCYTE [DISTWIDTH] IN BLOOD BY AUTOMATED COUNT: 13.9 % (ref 11.6–15.1)
GFR SERPL CREATININE-BSD FRML MDRD: 49 ML/MIN/1.73SQ M
GLUCOSE SERPL-MCNC: 117 MG/DL (ref 65–140)
HCT VFR BLD AUTO: 39.8 % (ref 36.5–49.3)
HGB BLD-MCNC: 13 G/DL (ref 12–17)
IMM GRANULOCYTES # BLD AUTO: 0.04 THOUSAND/UL (ref 0–0.2)
IMM GRANULOCYTES NFR BLD AUTO: 0 % (ref 0–2)
LYMPHOCYTES # BLD AUTO: 0.78 THOUSANDS/ΜL (ref 0.6–4.47)
LYMPHOCYTES NFR BLD AUTO: 7 % (ref 14–44)
MCH RBC QN AUTO: 29.4 PG (ref 26.8–34.3)
MCHC RBC AUTO-ENTMCNC: 32.7 G/DL (ref 31.4–37.4)
MCV RBC AUTO: 90 FL (ref 82–98)
MONOCYTES # BLD AUTO: 0.92 THOUSAND/ΜL (ref 0.17–1.22)
MONOCYTES NFR BLD AUTO: 8 % (ref 4–12)
NEUTROPHILS # BLD AUTO: 9.57 THOUSANDS/ΜL (ref 1.85–7.62)
NEUTS SEG NFR BLD AUTO: 85 % (ref 43–75)
NRBC BLD AUTO-RTO: 0 /100 WBCS
PLATELET # BLD AUTO: 158 THOUSANDS/UL (ref 149–390)
PMV BLD AUTO: 11.1 FL (ref 8.9–12.7)
POTASSIUM SERPL-SCNC: 4 MMOL/L (ref 3.5–5.3)
PROCALCITONIN SERPL-MCNC: 0.07 NG/ML
RBC # BLD AUTO: 4.42 MILLION/UL (ref 3.88–5.62)
SODIUM SERPL-SCNC: 140 MMOL/L (ref 136–145)
WBC # BLD AUTO: 11.32 THOUSAND/UL (ref 4.31–10.16)

## 2018-12-01 PROCEDURE — 94760 N-INVAS EAR/PLS OXIMETRY 1: CPT

## 2018-12-01 PROCEDURE — 94640 AIRWAY INHALATION TREATMENT: CPT

## 2018-12-01 PROCEDURE — 71045 X-RAY EXAM CHEST 1 VIEW: CPT

## 2018-12-01 PROCEDURE — 85025 COMPLETE CBC W/AUTO DIFF WBC: CPT | Performed by: INTERNAL MEDICINE

## 2018-12-01 PROCEDURE — 99232 SBSQ HOSP IP/OBS MODERATE 35: CPT | Performed by: NURSE PRACTITIONER

## 2018-12-01 PROCEDURE — 99232 SBSQ HOSP IP/OBS MODERATE 35: CPT | Performed by: INTERNAL MEDICINE

## 2018-12-01 PROCEDURE — 84145 PROCALCITONIN (PCT): CPT | Performed by: NURSE PRACTITIONER

## 2018-12-01 PROCEDURE — 80048 BASIC METABOLIC PNL TOTAL CA: CPT | Performed by: INTERNAL MEDICINE

## 2018-12-01 RX ADMIN — HYDROCODONE POLISTIREX AND CHLORPHENIRAMINE POLISTIREX 5 ML: 10; 8 SUSPENSION, EXTENDED RELEASE ORAL at 21:22

## 2018-12-01 RX ADMIN — OXYBUTYNIN CHLORIDE 5 MG: 5 TABLET, EXTENDED RELEASE ORAL at 09:26

## 2018-12-01 RX ADMIN — GUAIFENESIN AND DEXTROMETHORPHAN 5 ML: 100; 10 SYRUP ORAL at 12:13

## 2018-12-01 RX ADMIN — IPRATROPIUM BROMIDE 0.5 MG: 0.5 SOLUTION RESPIRATORY (INHALATION) at 14:19

## 2018-12-01 RX ADMIN — BUDESONIDE 0.5 MG: 0.5 INHALANT RESPIRATORY (INHALATION) at 20:10

## 2018-12-01 RX ADMIN — LEVALBUTEROL HYDROCHLORIDE 1.25 MG: 1.25 SOLUTION, CONCENTRATE RESPIRATORY (INHALATION) at 20:10

## 2018-12-01 RX ADMIN — PSYLLIUM HUSK 1 PACKET: 3.4 POWDER ORAL at 09:32

## 2018-12-01 RX ADMIN — AMLODIPINE BESYLATE 10 MG: 10 TABLET ORAL at 09:26

## 2018-12-01 RX ADMIN — PAROXETINE HYDROCHLORIDE 20 MG: 20 TABLET, FILM COATED ORAL at 09:26

## 2018-12-01 RX ADMIN — BUDESONIDE 0.5 MG: 0.5 INHALANT RESPIRATORY (INHALATION) at 08:39

## 2018-12-01 RX ADMIN — LEVALBUTEROL HYDROCHLORIDE 1.25 MG: 1.25 SOLUTION, CONCENTRATE RESPIRATORY (INHALATION) at 02:43

## 2018-12-01 RX ADMIN — DOCUSATE SODIUM 100 MG: 100 CAPSULE, LIQUID FILLED ORAL at 09:25

## 2018-12-01 RX ADMIN — CARVEDILOL 12.5 MG: 12.5 TABLET, FILM COATED ORAL at 09:25

## 2018-12-01 RX ADMIN — TAMSULOSIN HYDROCHLORIDE 0.4 MG: 0.4 CAPSULE ORAL at 09:25

## 2018-12-01 RX ADMIN — IPRATROPIUM BROMIDE 0.5 MG: 0.5 SOLUTION RESPIRATORY (INHALATION) at 02:43

## 2018-12-01 RX ADMIN — FLUTICASONE PROPIONATE 1 SPRAY: 50 SPRAY, METERED NASAL at 09:32

## 2018-12-01 RX ADMIN — LATANOPROST 1 DROP: 50 SOLUTION/ DROPS OPHTHALMIC at 21:22

## 2018-12-01 RX ADMIN — CLOPIDOGREL 75 MG: 75 TABLET, FILM COATED ORAL at 09:26

## 2018-12-01 RX ADMIN — PREDNISOLONE ACETATE 1 DROP: 10 SUSPENSION/ DROPS OPHTHALMIC at 21:47

## 2018-12-01 RX ADMIN — DOCUSATE SODIUM 100 MG: 100 CAPSULE, LIQUID FILLED ORAL at 17:04

## 2018-12-01 RX ADMIN — BENZONATATE 200 MG: 100 CAPSULE ORAL at 09:26

## 2018-12-01 RX ADMIN — IPRATROPIUM BROMIDE 0.5 MG: 0.5 SOLUTION RESPIRATORY (INHALATION) at 08:39

## 2018-12-01 RX ADMIN — IPRATROPIUM BROMIDE 0.5 MG: 0.5 SOLUTION RESPIRATORY (INHALATION) at 20:10

## 2018-12-01 RX ADMIN — PREDNISOLONE ACETATE 1 DROP: 10 SUSPENSION/ DROPS OPHTHALMIC at 17:06

## 2018-12-01 RX ADMIN — ATORVASTATIN CALCIUM 10 MG: 10 TABLET, FILM COATED ORAL at 18:35

## 2018-12-01 RX ADMIN — BENZONATATE 200 MG: 100 CAPSULE ORAL at 20:50

## 2018-12-01 RX ADMIN — CARVEDILOL 12.5 MG: 12.5 TABLET, FILM COATED ORAL at 18:35

## 2018-12-01 RX ADMIN — LEVALBUTEROL HYDROCHLORIDE 1.25 MG: 1.25 SOLUTION, CONCENTRATE RESPIRATORY (INHALATION) at 08:39

## 2018-12-01 RX ADMIN — ENOXAPARIN SODIUM 40 MG: 40 INJECTION SUBCUTANEOUS at 09:26

## 2018-12-01 RX ADMIN — PREDNISONE 40 MG: 20 TABLET ORAL at 09:25

## 2018-12-01 RX ADMIN — ASPIRIN 81 MG: 81 TABLET, COATED ORAL at 09:26

## 2018-12-01 RX ADMIN — PANTOPRAZOLE SODIUM 40 MG: 40 TABLET, DELAYED RELEASE ORAL at 05:22

## 2018-12-01 RX ADMIN — LEVALBUTEROL HYDROCHLORIDE 1.25 MG: 1.25 SOLUTION, CONCENTRATE RESPIRATORY (INHALATION) at 14:19

## 2018-12-01 RX ADMIN — BENZONATATE 200 MG: 100 CAPSULE ORAL at 17:04

## 2018-12-01 NOTE — PROGRESS NOTES
[facility] Medicine Progress Note  Patient: Darlyn Colindres  65 y o  male   MRN: [id number]  PCP: Mary Bauer DO  Unit/Bed#: -Tim Encounter: [id number]  Date Of Visit: 12/01/18    Assessment:    Principal Problem:    Acute respiratory failure with hypoxia ([facility] )  Active Problems:    Essential hypertension    Oropharyngeal dysphagia    GERD (gastroesophageal reflux disease)    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    RONAL (acute kidney injury) ([facility] )    Chronic cough    Aspiration pneumonia ([facility] )    COPD with acute exacerbation (HCC)    BPH (benign prostatic hyperplasia)    LIAM (obstructive sleep apnea)      Plan:    · Acute Hypoxic Respiratory Failure -   · Recurrent Aspiration Events Suspected -   · Seen by speech therapy who recommended dysphagia 1 diet with NTL on 11/30/2018  Additional speech swallow follow ups  · Pulmonary has suggested that he consider PEG tube if further episodes continue even with more restrictive diet  · Monitoring off antibiotics  · Supportive care  · COPD with Possible Acute Exacerbation -   · Steroid - Decreased by pulmonary to prednisone 40 mg PO daily x 5 days  · Nebulizer - Xopenex and atrovent tid  · Antitussives - tessalon perles will be ordered  Will also try tussionex at bedtime  Continue robitussin DM PRN still  Monitor cough symptoms  · Antibiotic - Not currently indicated  · Mucolytic - not currently needed  Reports phlegm to be thin and clear  · Inhalers - home inhalers  · Oxygenation / ventilation - will monitor on oxygen and wean as tolerated  Patient not recently prescribed oxygen but had a remaining tank at home which is now gone  May need to potentially qualify for oxygen  · Possible RONAL - Baseline creatinine is around 1 1 - 1 3  Continue holding diuretic therapy  BMP in am   Monitor I/O and weights  ·  Essential Hypertension - continue home medications  · Obstructive Sleep Apnea - oxygen at night    Not wanting CPAP here   · Oropharyngeal Dysphagia - see above for diet information  · History of Lung Cancer - supportive care  Pulmonary eval   · GERD - protonix in place of prilosec  · BPH - flomax  Monitor urine outputs  VTE Pharmacologic Prophylaxis:   Pharmacologic: Enoxaparin (Lovenox)  Mechanical VTE Prophylaxis in Place: Yes    Patient Centered Rounds: I have performed bedside rounds with nursing staff today  Discussions with Specialists or Other Care Team Provider: AP with Pulmonary team today  Education and Discussions with Family / Patient: Patient only  Time Spent for Care: 30 minutes  More than 50% of total time spent on counseling and coordination of care as described above  Current Length of Stay: 2 day(s)    Current Patient Status: Inpatient   Certification Statement: The patient will continue to require additional inpatient hospital stay due to evaluation and treatment above  Discharge Plan / Estimated Discharge Date: within next 48 hours? Code Status: Level 1 - Full Code      Subjective: The patient has no acute complaints currently  He continues to feel that he is doing better in terms of dyspnea and degree of coughing  The patient has not been bringing up as much  He likes the current pureed diet  No acid reflux  Objective:     Vitals:   Temp (24hrs), Av 5 °F (36 4 °C), Min:97 3 °F (36 3 °C), Max:97 7 °F (36 5 °C)    Temp:  [97 3 °F (36 3 °C)-97 7 °F (36 5 °C)] 97 5 °F (36 4 °C)  HR:  [58-73] 58  Resp:  [18-20] 20  BP: (107-128)/(62-68) 128/62  SpO2:  [94 %-97 %] 95 %  Body mass index is 24 18 kg/m²  Input and Output Summary (last 24 hours): Intake/Output Summary (Last 24 hours) at 18 1457  Last data filed at 18 1301   Gross per 24 hour   Intake                0 ml   Output              300 ml   Net             -300 ml       Physical Exam:     Physical Exam   Constitutional: He is oriented to person, place, and time  No distress     HENT: Mouth/Throat: Oropharynx is clear and moist  No oropharyngeal exudate  Eyes: Pupils are equal, round, and reactive to light  Cardiovascular: Normal rate and regular rhythm  No murmur heard  Pulmonary/Chest: No respiratory distress  Slightly coarse breath sounds  No wheezing, rhonchi, or rales currently  No material noted in the [employer] container   Abdominal: Soft  Bowel sounds are normal  He exhibits no distension  There is no tenderness  Musculoskeletal: He exhibits no edema or tenderness  Neurological: He is alert and oriented to person, place, and time  Motor strength 5/5 to all extremities  He does require assistance for sitting up though  Skin: Skin is warm and dry  Vitals reviewed  Additional Data:     Labs:      Results from last 7 days  Lab Units 12/01/18  0539   WBC Thousand/uL 11 32*   HEMOGLOBIN g/dL 13 0   HEMATOCRIT % 39 8   PLATELETS Thousands/uL 158   NEUTROS PCT % 85*   LYMPHS PCT % 7*   MONOS PCT % 8   EOS PCT % 0       Results from last 7 days  Lab Units 12/01/18  0539  11/29/18  1743   POTASSIUM mmol/L 4 0  < > 3 9   CHLORIDE mmol/L 104  < > 100   CO2 mmol/L 25  < > 26   BUN mg/dL 42*  < > 24   CREATININE mg/dL 1 38*  < > 1 41*   CALCIUM mg/dL 9 8  < > 9 8   ALK PHOS U/L  --   --  94   ALT U/L  --   --  16   AST U/L  --   --  17   < > = values in this interval not displayed  Results from last 7 days  Lab Units 11/29/18  1743   INR  1 11       * I Have Reviewed All Lab Data Listed Above  * Additional Pertinent Lab Tests Reviewed: All Labs Within Last 24 Hours Reviewed    Imaging:    Imaging Reports Reviewed Today Include: None  Imaging Personally Reviewed by Myself Includes:  None    Recent Cultures (last 7 days):       Results from last 7 days  Lab Units 11/29/18  1801 11/29/18  1743   BLOOD CULTURE  No Growth at 24 hrs  No Growth at 24 hrs         Last 24 Hours Medication List:     Current Facility-Administered Medications:  acetaminophen 650 mg Oral Q6H PRN Araseli Boyd, DO   amLODIPine 10 mg Oral Daily Araseli Boyd, DO   aspirin 81 mg Oral Daily Araseli Boyd, DO   atorvastatin 10 mg Oral Daily With [employer], DO   benzonatate 200 mg Oral TID Araseli Boyd, DO   budesonide 0 5 mg Nebulization Q12H Darwyn Martyr, CRNP   carvedilol 12 5 mg Oral BID With Meals Araseli Boyd, DO   clopidogrel 75 mg Oral Daily Araseli Boyd, DO   dextromethorphan-guaiFENesin 5 mL Oral TID PRN Araseli Boyd, DO   docusate sodium 100 mg Oral BID Araseli Boyd, DO   enoxaparin 40 mg Subcutaneous Daily Araseli Boyd, DO   fluticasone 1 spray Each Nare Daily Araseli Boyd, DO   hydrocodone-chlorpheniramine polistirex 5 mL Oral HS Abe Vegas Riser, DO   ipratropium 0 5 mg Nebulization Q6H Darwyn Martyr, CRNP   latanoprost 1 drop Both Eyes HS Araseli Boyd, DO   levalbuterol 1 25 mg Nebulization Q6H Darwyn Martyr, CRNP   meclizine 25 mg Oral TID PRN Araseli Boyd, DO   oxybutynin 5 mg Oral Daily Araseli Boyd, DO   pantoprazole 40 mg Oral Early Morning Araseli Boyd, DO   PARoxetine 20 mg Oral Daily Araseli Boyd,    polyethylene glycol 17 g Oral Daily PRN Araseli Boyd, DO   prednisoLONE acetate 1 drop Right Eye 4x Daily Araseli Boyd, DO   predniSONE 40 mg Oral Daily Yara Cárdenas, CRNP   psyllium 1 packet Oral Daily Araseli Boyd, DO   senna 2 tablet Oral HS Araseli Boyd, DO   tamsulosin 0 4 mg Oral Daily Araseli Boyd, DO        Today, Patient Was Seen By: Araseli Boyd DO    ** Please Note: This note has been constructed using a voice recognition system   **

## 2018-12-01 NOTE — PROGRESS NOTES
Progress Note - Pulmonary   Sterling Philip  65 y o  male MRN: [id number]  Unit/Bed#: -01 Encounter: [id number]      Assessment/Plan:    1  Acute on chronic hypoxic respiratory failure  1  Titrate oxygen therapy to maintain SpO2 greater than or equal to 88%  2  Pulmonary toilet as able:  Incentive spirometry, flutter valve, aspiration precautions  2  Abnormal chest x-ray, right lower lobe infiltrate likely secondary recurrent aspiration with chronic dysphagia  1  Continue with aspiration precautions  2  Again the need for possible PEG tube in the future was discussed, will continue to consider and discussed with his family  3  CXR improvement right lower lobe consolidation versus pneumonitis  4  procalcitonin pending, if procalcitonin is not elevated continue to monitor off antibiotics-has remained afebrile, mild leukocytosis likely secondary to steroid administration  3  Chronic obstructive pulmonary disease without acute exacerbation  1  Today is day 1 of 5 day course of prednisone  2  Continue Xopenex/Atrovent q 6 hours, Pulmicort b i d   3  Discharge regimen should consist of:  DuoNeb 4 times daily and Pulmicort b i d   4  Chronic cough secondary to 3  And tracheomalacia  1  Continue with Tessalon and Tussionex  2  Continue with management above    Outpatient follow-up pending hospital course    Subjective:     Tapan Arthur was seen lying in bed upon entering the room  He denies acute overnight events  He continues to report a dry nonproductive cough irritated with movement  Denies:  Chest pain, pain inspiration, fevers, chills, night sweats, nausea, vomiting diarrhea, headache, dizziness or hemoptysis  Objective:         Vitals: Blood pressure 128/62, pulse 58, temperature 97 5 °F (36 4 °C), temperature source Oral, resp  rate 20, height 6' 5" (1 956 m), weight 92 5 kg (203 lb 14 8 oz), SpO2 95 %  , 2 L nasal cannula, Body mass index is 24 18 kg/m²        Intake/Output Summary (Last 24 hours) at 12/01/18 0913  Last data filed at 11/30/18 2301   Gross per 24 hour   Intake                0 ml   Output              300 ml   Net             -300 ml         Physical Exam  Gen: Awake, alert, oriented x 3, no acute distress  HEENT: Mucous membranes moist, no oral lesions, no thrush  NECK: No accessory muscle use, JVP not elevated  Cardiac: Regular, single S1, single S2, no murmurs, no rubs, no gallops  Lungs:  Lung sounds clear bilaterally, no wheezes rhonchi or rales appreciated  Abdomen: Morbidly obese, normoactive bowel sounds, soft nontender, nondistended, no rebound or rigidity, no guarding  Extremities: no cyanosis, no clubbing, no edema    Labs: I have personally reviewed pertinent lab results  , CBC:   Lab Results   Component Value Date    WBC 11 32 (H) 12/01/2018    HGB 13 0 12/01/2018    HCT 39 8 12/01/2018    MCV 90 12/01/2018     12/01/2018    MCH 29 4 12/01/2018    MCHC 32 7 12/01/2018    RDW 13 9 12/01/2018    MPV 11 1 12/01/2018    NRBC 0 12/01/2018   , CMP:   Lab Results   Component Value Date    SODIUM 140 12/01/2018    K 4 0 12/01/2018     12/01/2018    CO2 25 12/01/2018    BUN 42 (H) 12/01/2018    CREATININE 1 38 (H) 12/01/2018    CALCIUM 9 8 12/01/2018    EGFR 49 12/01/2018     Imaging and other studies: I have personally reviewed pertinent reports     and I have personally reviewed pertinent films in PACS  Chest x-ray 12/01/2018:  Improvement in right lower lobe consolidation/pneumonitis    Narciso De La Cruz Courts

## 2018-12-02 VITALS
HEIGHT: 77 IN | WEIGHT: 203.93 LBS | TEMPERATURE: 97.7 F | HEART RATE: 64 BPM | DIASTOLIC BLOOD PRESSURE: 65 MMHG | SYSTOLIC BLOOD PRESSURE: 128 MMHG | OXYGEN SATURATION: 92 % | RESPIRATION RATE: 20 BRPM | BODY MASS INDEX: 24.08 KG/M2

## 2018-12-02 LAB
ANION GAP SERPL CALCULATED.3IONS-SCNC: 7 MMOL/L (ref 4–13)
BUN SERPL-MCNC: 41 MG/DL (ref 5–25)
CALCIUM SERPL-MCNC: 9.3 MG/DL (ref 8.3–10.1)
CHLORIDE SERPL-SCNC: 108 MMOL/L (ref 100–108)
CO2 SERPL-SCNC: 26 MMOL/L (ref 21–32)
CREAT SERPL-MCNC: 1.25 MG/DL (ref 0.6–1.3)
GFR SERPL CREATININE-BSD FRML MDRD: 55 ML/MIN/1.73SQ M
GLUCOSE SERPL-MCNC: 110 MG/DL (ref 65–140)
POTASSIUM SERPL-SCNC: 4 MMOL/L (ref 3.5–5.3)
SODIUM SERPL-SCNC: 141 MMOL/L (ref 136–145)

## 2018-12-02 PROCEDURE — 94640 AIRWAY INHALATION TREATMENT: CPT

## 2018-12-02 PROCEDURE — 80048 BASIC METABOLIC PNL TOTAL CA: CPT | Performed by: INTERNAL MEDICINE

## 2018-12-02 PROCEDURE — 99239 HOSP IP/OBS DSCHRG MGMT >30: CPT | Performed by: PHYSICIAN ASSISTANT

## 2018-12-02 PROCEDURE — 99232 SBSQ HOSP IP/OBS MODERATE 35: CPT | Performed by: INTERNAL MEDICINE

## 2018-12-02 PROCEDURE — 94760 N-INVAS EAR/PLS OXIMETRY 1: CPT

## 2018-12-02 RX ORDER — PREDNISONE 20 MG/1
40 TABLET ORAL DAILY
Qty: 6 TABLET | Refills: 0 | Status: SHIPPED | OUTPATIENT
Start: 2018-12-03 | End: 2018-12-06

## 2018-12-02 RX ADMIN — AMLODIPINE BESYLATE 10 MG: 10 TABLET ORAL at 09:41

## 2018-12-02 RX ADMIN — LEVALBUTEROL HYDROCHLORIDE 1.25 MG: 1.25 SOLUTION, CONCENTRATE RESPIRATORY (INHALATION) at 01:47

## 2018-12-02 RX ADMIN — PREDNISOLONE ACETATE 1 DROP: 10 SUSPENSION/ DROPS OPHTHALMIC at 09:47

## 2018-12-02 RX ADMIN — CLOPIDOGREL 75 MG: 75 TABLET, FILM COATED ORAL at 09:40

## 2018-12-02 RX ADMIN — PSYLLIUM HUSK 1 PACKET: 3.4 POWDER ORAL at 09:46

## 2018-12-02 RX ADMIN — ENOXAPARIN SODIUM 40 MG: 40 INJECTION SUBCUTANEOUS at 09:41

## 2018-12-02 RX ADMIN — ASPIRIN 81 MG: 81 TABLET, COATED ORAL at 09:41

## 2018-12-02 RX ADMIN — PREDNISOLONE ACETATE 1 DROP: 10 SUSPENSION/ DROPS OPHTHALMIC at 12:28

## 2018-12-02 RX ADMIN — FLUTICASONE PROPIONATE 1 SPRAY: 50 SPRAY, METERED NASAL at 09:46

## 2018-12-02 RX ADMIN — PAROXETINE HYDROCHLORIDE 20 MG: 20 TABLET, FILM COATED ORAL at 09:41

## 2018-12-02 RX ADMIN — OXYBUTYNIN CHLORIDE 5 MG: 5 TABLET, EXTENDED RELEASE ORAL at 09:41

## 2018-12-02 RX ADMIN — IPRATROPIUM BROMIDE 0.5 MG: 0.5 SOLUTION RESPIRATORY (INHALATION) at 01:47

## 2018-12-02 RX ADMIN — IPRATROPIUM BROMIDE 0.5 MG: 0.5 SOLUTION RESPIRATORY (INHALATION) at 08:20

## 2018-12-02 RX ADMIN — BENZONATATE 200 MG: 100 CAPSULE ORAL at 09:41

## 2018-12-02 RX ADMIN — LEVALBUTEROL HYDROCHLORIDE 1.25 MG: 1.25 SOLUTION, CONCENTRATE RESPIRATORY (INHALATION) at 14:28

## 2018-12-02 RX ADMIN — TAMSULOSIN HYDROCHLORIDE 0.4 MG: 0.4 CAPSULE ORAL at 09:41

## 2018-12-02 RX ADMIN — PANTOPRAZOLE SODIUM 40 MG: 40 TABLET, DELAYED RELEASE ORAL at 05:51

## 2018-12-02 RX ADMIN — PREDNISONE 40 MG: 20 TABLET ORAL at 09:41

## 2018-12-02 RX ADMIN — BUDESONIDE 0.5 MG: 0.5 INHALANT RESPIRATORY (INHALATION) at 08:22

## 2018-12-02 RX ADMIN — DOCUSATE SODIUM 100 MG: 100 CAPSULE, LIQUID FILLED ORAL at 09:41

## 2018-12-02 RX ADMIN — LEVALBUTEROL HYDROCHLORIDE 1.25 MG: 1.25 SOLUTION, CONCENTRATE RESPIRATORY (INHALATION) at 08:20

## 2018-12-02 RX ADMIN — IPRATROPIUM BROMIDE 0.5 MG: 0.5 SOLUTION RESPIRATORY (INHALATION) at 14:28

## 2018-12-02 NOTE — PROGRESS NOTES
Progress Note - Pulmonary   Eliu Drought  65 y o  male MRN: [id number]  Unit/Bed#: -01 Encounter: [id number]      Assessment/Plan:  1  Acute on chronic hypoxic respiratory failure-acute component resolved, currently oxygenating well on room air  1  Monitor oxygen saturation titrate oxygen therapy to maintain SpO2 greater than or equal to 88%  2  Pulmonary toilet as tolerated:  Incentive spirometry, cough deep breathe, chest PT  2  Abnormal chest x-ray with right lower lobe infiltrate likely secondary to recurrent aspiration chronic dysphagia  1  Continue strict aspiration precautions, reports significant decreasing coughing and choking while eating with new pureed diet  2  Continue diet per speech therapy  3  Chest x-ray yesterday was negative for acute disease likely initial chest x-ray changes were pneumonitis secondary to recurrent aspiration  4  Procalcitonin negative  3  Chronic obstructive pulmonary disease without acute exacerbation  1  Day 2 of 5 of prednisone  2  Continue Xopenex/Atrovent q 6 hours and Pulmicort b i d   3  At discharge continue:  DuoNeb q i d  And Pulmicort b i d   4  Cough secondary to #2  And tracheomalacia  1  Continue Tessalon and Tussionex      Outpatient Pulmonary follow-up per discharge instructions    Appropriate for discharge from pulmonary standpoint, we will sign off please call with any questions or concerns    Subjective:     Navid Chi was seen lying in bed upon entering the room  He denies acute overnight events reports that he slept very well and is feeling significantly better today  He denies significant coughing or choking with his new diet and is currently oxygenating well on room air  He denies:  Chest pain, pain inspiration, fevers, chills, night sweats, nausea vomiting diarrhea, headache or hemoptysis  Objective:         Vitals: Blood pressure 118/64, pulse 55, temperature 98 5 °F (36 9 °C), temperature source Oral, resp   rate 20, height 6' 5" (1 956 m), weight 92 5 kg (203 lb 14 8 oz), SpO2 94 % , RA, Body mass index is 24 18 kg/m²  Intake/Output Summary (Last 24 hours) at 12/02/18 0903  Last data filed at 12/01/18 2043   Gross per 24 hour   Intake              240 ml   Output              196 ml   Net               44 ml         Physical Exam  Gen: Awake, alert, oriented x 3, no acute distress  HEENT: Mucous membranes moist, no oral lesions, no thrush  NECK: No accessory muscle use, JVP not elevated  Cardiac: Regular, single S1, single S2, no murmurs, no rubs, no gallops  Lungs:  Obese, Lung sounds clear bilaterally, no wheezes rhonchi or rales appreciated  Abdomen: normoactive bowel sounds, soft nontender, nondistended, no rebound or rigidity, no guarding  Extremities: no cyanosis, no clubbing, no edema    Labs: I have personally reviewed pertinent lab results  , CBC: No results found for: WBC, HGB, HCT, MCV, PLT, ADJUSTEDWBC, MCH, MCHC, RDW, MPV, NRBC, CMP:   Lab Results   Component Value Date    SODIUM 141 12/02/2018    K 4 0 12/02/2018     12/02/2018    CO2 26 12/02/2018    BUN 41 (H) 12/02/2018    CREATININE 1 25 12/02/2018    CALCIUM 9 3 12/02/2018    EGFR 55 12/02/2018     Imaging and other studies: None      Narciso Nash

## 2018-12-02 NOTE — NURSING NOTE
Patient left via wheelchair with daughter  No questions, belongings and paperwork went over with patient and daughter  Prescription sent to home pharmacy for

## 2018-12-02 NOTE — SOCIAL WORK
CM called [facility] to continue with current services of SN/PT/OT/Speech therapy  Waiting for a callback from [facility] nurse

## 2018-12-02 NOTE — DISCHARGE SUMMARY
Discharge Summary - [address] [facility]    Patient Information: Yaritza Mcgee  65 y o  male MRN: [id number]  Unit/Bed#: -01 Encounter: [id number]    Discharging Physician / Practitioner: Rah Dunlap PA-C  PCP: Shivani Wright DO  Admission Date: 11/29/2018  Discharge Date: 12/02/18    Reason for Admission: cough with shortness of breath    Discharge Diagnoses:     Principal Problem:    Acute respiratory failure with hypoxia ([facility] )  Active Problems:    Aspiration pneumonia ([facility] [address] )    Oropharyngeal dysphagia    Essential hypertension    GERD (gastroesophageal reflux disease)    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    RONAL (acute kidney injury) ([facility] [address] )    Chronic cough    COPD with acute exacerbation (HCC)    BPH (benign prostatic hyperplasia)    LIAM (obstructive sleep apnea)  Resolved Problems:    * No resolved hospital problems  *      Consultations During Hospital Stay:  · Pulmonology-- Dr Gerhard Post    Procedures Performed:   · CXR PA/Lat (11/29)  · CXR portable (12/1)    Significant Findings:   · CXR PA/Lat (11/29)  · Developing right lower lobe infiltrate, most compatible with pneumonia  · CXR portable (12/1)  · No active disease    Incidental Findings:   · none     Test Results Pending at Discharge (will require follow up):   · none     Outpatient Tests Requested:  · none    Complications:  none    Hospital Course:     Yaritza Mcgee  is a 65 y o  male patient who originally presented to the hospital on 11/29/2018 due to cough with SOB  Patient's daughter concerned about another episode of aspiration  CXR performed upon admission revealed what appeared to be a RLL infiltrate  He was also to have hypoxia  We felt that patient's sx were more consistent with COPD exacerbation vs  PNA  Antibiotics were held, patient was started on respiratory protocol, and steroids were initiated  He was evaluated by pulmonology the following day   Pulmonology strongly felt that patient's sx were related to recurrent aspiration-- SLP was to evaluate patient  They agreed with short steroid course an aggressive pulmonary toilet  SLP evaluated patient who down-graded diet to Dysphagia level 1 with NTL  Patient tolerated this well, and with additional pulmonary toilet patient's cough and SOB improved  Patient will return home with home PT/OT/SLP/VNA  He will complete 3 more days of a short steroid taper  He will continued QID duonebs and BID pulmicort  This was discussed with patient's daughter via phone and she was in agreement with this plan  He will follow up with pulmonary in February as scheduled  Condition at Discharge: fair     Discharge Day Visit / Exam:     Subjective:  Patient feel well today  Coughing improved, breathing improved; tolerated modified diet without cough/choking  Vitals: Blood Pressure: 118/64 (12/02/18 0752)  Pulse: 55 (12/02/18 0752)  Temperature: 98 5 °F (36 9 °C) (12/02/18 0752)  Temp Source: Oral (12/02/18 0752)  Respirations: 20 (12/02/18 0752)  Height: 6' 5" (195 6 cm) (11/29/18 1955)  Weight - Scale: 92 5 kg (203 lb 14 8 oz) (11/29/18 1955)  SpO2: 94 % (12/02/18 4408)  Exam:   Physical Exam   Constitutional: He is oriented to person, place, and time  No distress  HENT:   Head: Normocephalic and atraumatic  Mouth/Throat: No oropharyngeal exudate  Eyes: Pupils are equal, round, and reactive to light  Conjunctivae and EOM are normal  No scleral icterus  Neck: No JVD present  Cardiovascular: Normal rate and regular rhythm  Exam reveals no gallop and no friction rub  No murmur heard  Pulmonary/Chest: Effort normal  No respiratory distress  He has no wheezes  Some scattered rhonchi, but overall clear   Abdominal: Soft  Bowel sounds are normal  He exhibits no distension  There is no tenderness  There is no rebound  Musculoskeletal: He exhibits no edema or tenderness  Neurological: He is alert and oriented to person, place, and time   He displays normal reflexes  No cranial nerve deficit  He exhibits abnormal muscle tone (baseline deficits from prior CVA)  Skin: Skin is warm and dry  No rash noted  He is not diaphoretic  No erythema  Psychiatric: He has a normal mood and affect  His behavior is normal        Discharge instructions/Information to patient and family:   See after visit summary for information provided to patient and family  Provisions for Follow-Up Care:  See after visit summary for information related to follow-up care and any pertinent home health orders  Disposition:     Home with VNA Services (Reminder: Complete face to face encounter)    For Discharges to [facility] SNF:   · Not Applicable to this Patient - Not Applicable to this Patient    Planned Readmission: none     Discharge Statement:  I spent 45 minutes discharging the patient  This time was spent on the day of discharge  I had direct contact with the patient on the day of discharge  Greater than 50% of the total time was spent examining patient, answering all patient questions, arranging and discussing plan of care with patient as well as directly providing post-discharge instructions  Additional time then spent on discharge activities  Discharge Medications:  See after visit summary for reconciled discharge medications provided to patient and family  ** Please Note: Dragon 360 Dictation voice to text software may have been used in the creation of this document   **

## 2018-12-02 NOTE — DISCHARGE INSTRUCTIONS
Aspiration Pneumonia   WHAT YOU NEED TO KNOW:   What is aspiration pneumonia? Aspiration pneumonia is a lung infection that develops after you aspirate (inhale) food, liquid, or vomit into your lungs  You can also aspirate food or liquid from your stomach that backs up into your esophagus  If you are not able to cough up the aspirated material, bacteria can grow in your lungs and cause an infection  What increases my risk for aspiration pneumonia? Your risk is highest if you are older than 75 or live in a nursing home or long-term care center  You may become less active as you age, or you may be bedridden  You may not be able to swallow or cough well  The following also increase your risk for aspiration pneumonia:  · The muscles that help you swallow are weakened by stroke, Alzheimer disease, or other diseases    · A weakened immune system caused by diabetes, COPD, heart failure, or other health problems    · Smoking cigarettes    · Use of a feeding tube or ventilator that allows bacteria to travel to your lungs    · Surgery or radiation to treat cancer of the head or neck    · Poor oral hygiene, teeth that are missing, or dentures    · Alcoholism or IV drug use  What are the signs and symptoms of aspiration pneumonia? · Fever     · Cough, which may or may not bring up mucus     · Sputum (spit) that is pink or frothy    · Bluish skin around your mouth or your fingertips    · Trouble swallowing    · Shortness of breath, rapid breathing, or noisy breathing     · Chest pain or a rapid heartbeat    · Confusion, fatigue, or changes in alertness     · Voice changes such as gurgling and hoarseness     · Loss of appetite or weight loss  How is aspiration pneumonia diagnosed? It is common to aspirate but not know it  Your healthcare provider may diagnose aspiration pneumonia if you have symptoms and a history of swallowing problems  He or she will ask about your symptoms and when they started   He or she will look inside your mouth and down your throat, and listen to your heart and lungs  Your healthcare provider will ask you to speak and cough while he or she listens  Tell him or her about any health problems you have and any medicines you use  You may need any of the following tests:  · Blood tests  are used to find out if your white blood cell count is high  This can be a sign of infection  · A barium swallow  may show if you have long-term swallowing problems  Your healthcare provider will watch you swallow different foods and liquids  You may be asked to drink a thick liquid called barium while healthcare providers take x-rays of your throat, esophagus, and lungs  · A sputum culture  may be tested for bacteria that can cause pneumonia  Your healthcare provider may ask you to cough mucus into a cup, or he or she may suction mucus from your throat  · X-ray or CT  pictures may show lung damage or an infection, such as swelling and fluid in your lungs  You may be given contrast liquid before the CT scan so your healthcare provider can see the pictures better  Tell your healthcare provider if you have ever had an allergic reaction to contrast liquid  How is aspiration pneumonia treated? You may need any of the following:  · Antibiotics  are given to treat pneumonia caused by bacteria  You may be given antibiotics as pills or through your IV  · Steroids  are given to reduce swelling in your lungs  · You may need extra oxygen  if your blood oxygen level is lower than it should be  You may get oxygen through a mask placed over your nose and mouth or through small tubes placed in your nostrils  Ask your healthcare provider before you take off the mask or oxygen tubing  What can I do to prevent or manage aspiration pneumonia? · Go to speech therapy as directed  A speech therapist can teach you exercises to strengthen the muscles you use to swallow  · Sit up while you eat    If you are bedridden, keep the head of your bed slightly up (at about a 30° to 45° angle) while you eat  Take small bites, eat slowly, and swallow with your chin down  · Eat soft foods and drink thickened liquids  A dietitian can teach you how to thicken your liquids so you have less trouble swallowing  Drink liquids through a straw or sip them from a spoon  Ask your dietitian what kinds of foods you should eat  He or she may suggest soft foods such as cooked cereal, pasta, well-cooked fruits and vegetables, and scrambled eggs  Your dietitian may also suggest moist, tender meats that are cut into small pieces  · Care for your teeth and mouth  Mouth care can help kill harmful bacteria in your mouth so you do not aspirate them  While you are sitting up, brush your teeth for 2 minutes daily after breakfast and again after dinner  Also brush your tongue  If you do not have teeth, gently brush your gums with a soft toothbrush  Dentures should be removed and cleaned with an electric toothbrush and water after breakfast and dinner  Soak dentures overnight in a cleaning solution  Visit a dentist regularly to have your teeth and gums cleaned  · Limit or avoid taking sedatives  These medicines increase the risk of aspiration because they dry out your mouth and make you drowsy  If possible, avoid taking antihistamine medicines because they also make your mouth dry  · Do not smoke  Nicotine and other chemicals in cigarettes and cigars can cause lung damage  Ask your healthcare provider for information if you currently smoke and need help to quit  E-cigarettes or smokeless tobacco still contain nicotine  Talk to your healthcare provider before you use these products  When should I seek immediate care? · You have chest pain  · You are confused or cannot think clearly  · You have more trouble breathing, or your breathing seems faster than normal   When should I contact my healthcare provider? · You have a fever  · Your symptoms are not better after 2 or 3 days of treatment  · You have questions or concerns about your condition or care  CARE AGREEMENT:   You have the right to help plan your care  Learn about your health condition and how it may be treated  Discuss treatment options with your caregivers to decide what care you want to receive  You always have the right to refuse treatment  The above information is an  only  It is not intended as medical advice for individual conditions or treatments  Talk to your doctor, nurse or pharmacist before following any medical regimen to see if it is safe and effective for you  © 2017 [address]  Information is for End User's use only and may not be sold, redistributed or otherwise used for commercial purposes  All illustrations and images included in CareNotes® are the copyrighted property of [employer]  or [employer] Garcia  Chronic Dysphagia   WHAT YOU SHOULD KNOW:   Chronic dysphagia is trouble swallowing  It occurs when you have trouble moving food or liquid down your esophagus to your stomach  It may occur when you eat, drink, or any time you try to swallow  INSTRUCTIONS:   Follow up with your healthcare provider as directed:  Write down your questions so you remember to ask them during your visits  Diet changes:  Diet changes may reduce choking problems  Your healthcare provider may show you how to thicken liquids or soften foods to make them easier to swallow  Swallowing therapy:  Swallowing therapy can teach you different ways of swallowing by using different head and body positions  You may be taught exercises to strengthen the muscles that help you swallow  Contact your healthcare provider if:   · You lose weight without trying  · Your signs and symptoms get worse, or you have new signs or symptoms      · You have signs or symptoms of dehydration, such as increased thirst, dark yellow urine, or little or no urine     · You get colds often  · You have questions or concerns about your condition or care  Return to the emergency department if:   · You cannot eat or drink liquids at all  · You have chest pain  · You have shortness of breath  © 2014 [address] is for End User's use only and may not be sold, redistributed or otherwise used for commercial purposes  All illustrations and images included in CareNotes® are the copyrighted property of [employer]  or Rolo Garcia  The above information is an  only  It is not intended as medical advice for individual conditions or treatments  Talk to your doctor, nurse or pharmacist before following any medical regimen to see if it is safe and effective for you

## 2018-12-02 NOTE — SOCIAL WORK
[facility] triage nurse contacted CM  Nurse from [facility] will contact admission and have them contact CM

## 2018-12-04 LAB
BACTERIA BLD CULT: NORMAL
BACTERIA BLD CULT: NORMAL

## 2018-12-27 DIAGNOSIS — R05.9 COUGH: ICD-10-CM

## 2018-12-27 RX ORDER — BENZONATATE 200 MG/1
200 CAPSULE ORAL 3 TIMES DAILY PRN
Qty: 90 CAPSULE | Refills: 5 | Status: SHIPPED | OUTPATIENT
Start: 2018-12-27 | End: 2019-08-18 | Stop reason: SDUPTHER

## 2019-01-27 ENCOUNTER — APPOINTMENT (EMERGENCY)
Dept: RADIOLOGY | Facility: HOSPITAL | Age: 79
DRG: 189 | End: 2019-01-27
Payer: MEDICARE

## 2019-01-27 ENCOUNTER — HOSPITAL ENCOUNTER (INPATIENT)
Facility: HOSPITAL | Age: 79
LOS: 5 days | Discharge: NURSING, REHAB OR LONG-TERM CARE | DRG: 189 | End: 2019-02-02
Attending: EMERGENCY MEDICINE | Admitting: HOSPITALIST
Payer: MEDICARE

## 2019-01-27 DIAGNOSIS — R41.0 DELIRIUM: ICD-10-CM

## 2019-01-27 DIAGNOSIS — R09.02 HYPOXIA: ICD-10-CM

## 2019-01-27 DIAGNOSIS — J44.1 COPD WITH ACUTE EXACERBATION (HCC): Chronic | ICD-10-CM

## 2019-01-27 DIAGNOSIS — R91.8 LUNG MASS: Primary | ICD-10-CM

## 2019-01-27 PROCEDURE — 99285 EMERGENCY DEPT VISIT HI MDM: CPT

## 2019-01-27 PROCEDURE — 84484 ASSAY OF TROPONIN QUANT: CPT | Performed by: EMERGENCY MEDICINE

## 2019-01-27 PROCEDURE — 80053 COMPREHEN METABOLIC PANEL: CPT | Performed by: EMERGENCY MEDICINE

## 2019-01-27 PROCEDURE — 1123F ACP DISCUSS/DSCN MKR DOCD: CPT | Performed by: INTERNAL MEDICINE

## 2019-01-27 PROCEDURE — 85730 THROMBOPLASTIN TIME PARTIAL: CPT | Performed by: EMERGENCY MEDICINE

## 2019-01-27 PROCEDURE — 83880 ASSAY OF NATRIURETIC PEPTIDE: CPT | Performed by: EMERGENCY MEDICINE

## 2019-01-27 PROCEDURE — 87040 BLOOD CULTURE FOR BACTERIA: CPT | Performed by: EMERGENCY MEDICINE

## 2019-01-27 PROCEDURE — 83605 ASSAY OF LACTIC ACID: CPT | Performed by: EMERGENCY MEDICINE

## 2019-01-27 PROCEDURE — 71046 X-RAY EXAM CHEST 2 VIEWS: CPT

## 2019-01-27 PROCEDURE — 85610 PROTHROMBIN TIME: CPT | Performed by: EMERGENCY MEDICINE

## 2019-01-27 PROCEDURE — 93005 ELECTROCARDIOGRAM TRACING: CPT

## 2019-01-27 PROCEDURE — 36415 COLL VENOUS BLD VENIPUNCTURE: CPT | Performed by: EMERGENCY MEDICINE

## 2019-01-28 ENCOUNTER — APPOINTMENT (INPATIENT)
Dept: RADIOLOGY | Facility: HOSPITAL | Age: 79
DRG: 189 | End: 2019-01-28
Payer: MEDICARE

## 2019-01-28 ENCOUNTER — APPOINTMENT (EMERGENCY)
Dept: CT IMAGING | Facility: HOSPITAL | Age: 79
DRG: 189 | End: 2019-01-28
Payer: MEDICARE

## 2019-01-28 PROBLEM — R82.71 BACTERIURIA: Status: ACTIVE | Noted: 2019-01-28

## 2019-01-28 LAB
ALBUMIN SERPL BCP-MCNC: 4 G/DL (ref 3.5–5)
ALP SERPL-CCNC: 109 U/L (ref 46–116)
ALT SERPL W P-5'-P-CCNC: 27 U/L (ref 12–78)
ANION GAP SERPL CALCULATED.3IONS-SCNC: 9 MMOL/L (ref 4–13)
APTT PPP: 28 SECONDS (ref 26–38)
AST SERPL W P-5'-P-CCNC: 45 U/L (ref 5–45)
BACTERIA UR QL AUTO: ABNORMAL /HPF
BASE EXCESS BLDA CALC-SCNC: -1.1 MMOL/L
BASOPHILS # BLD AUTO: 0.03 THOUSANDS/ΜL (ref 0–0.1)
BASOPHILS # BLD AUTO: 0.05 THOUSANDS/ΜL (ref 0–0.1)
BASOPHILS NFR BLD AUTO: 0 % (ref 0–1)
BASOPHILS NFR BLD AUTO: 0 % (ref 0–1)
BILIRUB SERPL-MCNC: 0.8 MG/DL (ref 0.2–1)
BILIRUB UR QL STRIP: NEGATIVE
BUN SERPL-MCNC: 26 MG/DL (ref 5–25)
CALCIUM SERPL-MCNC: 9.8 MG/DL (ref 8.3–10.1)
CHLORIDE SERPL-SCNC: 102 MMOL/L (ref 100–108)
CLARITY UR: ABNORMAL
CO2 SERPL-SCNC: 28 MMOL/L (ref 21–32)
COLOR UR: YELLOW
CREAT SERPL-MCNC: 1.34 MG/DL (ref 0.6–1.3)
EOSINOPHIL # BLD AUTO: 0.29 THOUSAND/ΜL (ref 0–0.61)
EOSINOPHIL # BLD AUTO: 0.29 THOUSAND/ΜL (ref 0–0.61)
EOSINOPHIL NFR BLD AUTO: 2 % (ref 0–6)
EOSINOPHIL NFR BLD AUTO: 3 % (ref 0–6)
ERYTHROCYTE [DISTWIDTH] IN BLOOD BY AUTOMATED COUNT: 13.9 % (ref 11.6–15.1)
ERYTHROCYTE [DISTWIDTH] IN BLOOD BY AUTOMATED COUNT: 14 % (ref 11.6–15.1)
GFR SERPL CREATININE-BSD FRML MDRD: 50 ML/MIN/1.73SQ M
GLUCOSE SERPL-MCNC: 98 MG/DL (ref 65–140)
GLUCOSE UR STRIP-MCNC: NEGATIVE MG/DL
HCO3 BLDA-SCNC: 21.6 MMOL/L (ref 22–28)
HCT VFR BLD AUTO: 44 % (ref 36.5–49.3)
HCT VFR BLD AUTO: 46 % (ref 36.5–49.3)
HGB BLD-MCNC: 14.5 G/DL (ref 12–17)
HGB BLD-MCNC: 15.4 G/DL (ref 12–17)
HGB UR QL STRIP.AUTO: ABNORMAL
IMM GRANULOCYTES # BLD AUTO: 0.03 THOUSAND/UL (ref 0–0.2)
IMM GRANULOCYTES # BLD AUTO: 0.03 THOUSAND/UL (ref 0–0.2)
IMM GRANULOCYTES NFR BLD AUTO: 0 % (ref 0–2)
IMM GRANULOCYTES NFR BLD AUTO: 0 % (ref 0–2)
INR PPP: 0.99 (ref 0.86–1.17)
KETONES UR STRIP-MCNC: NEGATIVE MG/DL
LACTATE SERPL-SCNC: 1.7 MMOL/L (ref 0.5–2)
LEUKOCYTE ESTERASE UR QL STRIP: ABNORMAL
LYMPHOCYTES # BLD AUTO: 1.1 THOUSANDS/ΜL (ref 0.6–4.47)
LYMPHOCYTES # BLD AUTO: 1.48 THOUSANDS/ΜL (ref 0.6–4.47)
LYMPHOCYTES NFR BLD AUTO: 16 % (ref 14–44)
LYMPHOCYTES NFR BLD AUTO: 8 % (ref 14–44)
MCH RBC QN AUTO: 29.8 PG (ref 26.8–34.3)
MCH RBC QN AUTO: 30.3 PG (ref 26.8–34.3)
MCHC RBC AUTO-ENTMCNC: 33 G/DL (ref 31.4–37.4)
MCHC RBC AUTO-ENTMCNC: 33.5 G/DL (ref 31.4–37.4)
MCV RBC AUTO: 90 FL (ref 82–98)
MCV RBC AUTO: 90 FL (ref 82–98)
MONOCYTES # BLD AUTO: 1.08 THOUSAND/ΜL (ref 0.17–1.22)
MONOCYTES # BLD AUTO: 1.15 THOUSAND/ΜL (ref 0.17–1.22)
MONOCYTES NFR BLD AUTO: 12 % (ref 4–12)
MONOCYTES NFR BLD AUTO: 8 % (ref 4–12)
MUCOUS THREADS UR QL AUTO: ABNORMAL
NEUTROPHILS # BLD AUTO: 10.49 THOUSANDS/ΜL (ref 1.85–7.62)
NEUTROPHILS # BLD AUTO: 6.3 THOUSANDS/ΜL (ref 1.85–7.62)
NEUTS SEG NFR BLD AUTO: 69 % (ref 43–75)
NEUTS SEG NFR BLD AUTO: 82 % (ref 43–75)
NITRITE UR QL STRIP: NEGATIVE
NON-SQ EPI CELLS URNS QL MICRO: ABNORMAL /HPF
NRBC BLD AUTO-RTO: 0 /100 WBCS
NRBC BLD AUTO-RTO: 0 /100 WBCS
NT-PROBNP SERPL-MCNC: 180 PG/ML
O2 CT BLDA-SCNC: 19.1 ML/DL (ref 16–23)
OXYHGB MFR BLDA: 93.7 % (ref 94–97)
PCO2 BLDA: 30.7 MM HG (ref 36–44)
PH BLDA: 7.46 [PH] (ref 7.35–7.45)
PH UR STRIP.AUTO: 5.5 [PH] (ref 4.5–8)
PLATELET # BLD AUTO: 134 THOUSANDS/UL (ref 149–390)
PLATELET # BLD AUTO: 139 THOUSANDS/UL (ref 149–390)
PMV BLD AUTO: 11.1 FL (ref 8.9–12.7)
PMV BLD AUTO: 11.3 FL (ref 8.9–12.7)
PO2 BLDA: 71.1 MM HG (ref 75–129)
POTASSIUM SERPL-SCNC: 5.2 MMOL/L (ref 3.5–5.3)
PROCALCITONIN SERPL-MCNC: 0.06 NG/ML
PROT SERPL-MCNC: 8.3 G/DL (ref 6.4–8.2)
PROT UR STRIP-MCNC: ABNORMAL MG/DL
PROTHROMBIN TIME: 12.8 SECONDS (ref 11.8–14.2)
RBC # BLD AUTO: 4.87 MILLION/UL (ref 3.88–5.62)
RBC # BLD AUTO: 5.09 MILLION/UL (ref 3.88–5.62)
RBC #/AREA URNS AUTO: ABNORMAL /HPF
SODIUM SERPL-SCNC: 139 MMOL/L (ref 136–145)
SP GR UR STRIP.AUTO: >=1.03 (ref 1–1.03)
TROPONIN I SERPL-MCNC: <0.02 NG/ML
UROBILINOGEN UR QL STRIP.AUTO: 0.2 E.U./DL
WBC # BLD AUTO: 13.04 THOUSAND/UL (ref 4.31–10.16)
WBC # BLD AUTO: 9.28 THOUSAND/UL (ref 4.31–10.16)
WBC #/AREA URNS AUTO: ABNORMAL /HPF
WBC CLUMPS # UR AUTO: ABNORMAL /UL

## 2019-01-28 PROCEDURE — 71045 X-RAY EXAM CHEST 1 VIEW: CPT

## 2019-01-28 PROCEDURE — 85025 COMPLETE CBC W/AUTO DIFF WBC: CPT | Performed by: PHYSICIAN ASSISTANT

## 2019-01-28 PROCEDURE — 99223 1ST HOSP IP/OBS HIGH 75: CPT | Performed by: INTERNAL MEDICINE

## 2019-01-28 PROCEDURE — 94760 N-INVAS EAR/PLS OXIMETRY 1: CPT

## 2019-01-28 PROCEDURE — 71275 CT ANGIOGRAPHY CHEST: CPT

## 2019-01-28 PROCEDURE — 36415 COLL VENOUS BLD VENIPUNCTURE: CPT | Performed by: EMERGENCY MEDICINE

## 2019-01-28 PROCEDURE — 87186 SC STD MICRODIL/AGAR DIL: CPT | Performed by: EMERGENCY MEDICINE

## 2019-01-28 PROCEDURE — 94669 MECHANICAL CHEST WALL OSCILL: CPT

## 2019-01-28 PROCEDURE — 94664 DEMO&/EVAL PT USE INHALER: CPT

## 2019-01-28 PROCEDURE — 87086 URINE CULTURE/COLONY COUNT: CPT | Performed by: EMERGENCY MEDICINE

## 2019-01-28 PROCEDURE — 84145 PROCALCITONIN (PCT): CPT | Performed by: PHYSICIAN ASSISTANT

## 2019-01-28 PROCEDURE — 82805 BLOOD GASES W/O2 SATURATION: CPT | Performed by: INTERNAL MEDICINE

## 2019-01-28 PROCEDURE — 96365 THER/PROPH/DIAG IV INF INIT: CPT

## 2019-01-28 PROCEDURE — 81001 URINALYSIS AUTO W/SCOPE: CPT | Performed by: EMERGENCY MEDICINE

## 2019-01-28 PROCEDURE — 94640 AIRWAY INHALATION TREATMENT: CPT

## 2019-01-28 PROCEDURE — 85025 COMPLETE CBC W/AUTO DIFF WBC: CPT | Performed by: EMERGENCY MEDICINE

## 2019-01-28 PROCEDURE — 94668 MNPJ CHEST WALL SBSQ: CPT

## 2019-01-28 PROCEDURE — 87077 CULTURE AEROBIC IDENTIFY: CPT | Performed by: EMERGENCY MEDICINE

## 2019-01-28 PROCEDURE — 36600 WITHDRAWAL OF ARTERIAL BLOOD: CPT

## 2019-01-28 RX ORDER — DOCUSATE SODIUM 100 MG/1
100 CAPSULE, LIQUID FILLED ORAL 2 TIMES DAILY
Status: DISCONTINUED | OUTPATIENT
Start: 2019-01-28 | End: 2019-02-02 | Stop reason: HOSPADM

## 2019-01-28 RX ORDER — ACETAMINOPHEN 325 MG/1
650 TABLET ORAL EVERY 6 HOURS PRN
Status: DISCONTINUED | OUTPATIENT
Start: 2019-01-28 | End: 2019-01-28 | Stop reason: SDUPTHER

## 2019-01-28 RX ORDER — CLOPIDOGREL BISULFATE 75 MG/1
75 TABLET ORAL DAILY
Status: DISCONTINUED | OUTPATIENT
Start: 2019-01-28 | End: 2019-02-02 | Stop reason: HOSPADM

## 2019-01-28 RX ORDER — POLYETHYLENE GLYCOL 3350 17 G/17G
17 POWDER, FOR SOLUTION ORAL DAILY PRN
Status: DISCONTINUED | OUTPATIENT
Start: 2019-01-28 | End: 2019-02-02 | Stop reason: HOSPADM

## 2019-01-28 RX ORDER — ACETAMINOPHEN 325 MG/1
650 TABLET ORAL EVERY 6 HOURS PRN
Status: DISCONTINUED | OUTPATIENT
Start: 2019-01-28 | End: 2019-02-02 | Stop reason: HOSPADM

## 2019-01-28 RX ORDER — ALBUTEROL SULFATE 90 UG/1
2 AEROSOL, METERED RESPIRATORY (INHALATION) EVERY 4 HOURS PRN
Status: DISCONTINUED | OUTPATIENT
Start: 2019-01-28 | End: 2019-02-02 | Stop reason: HOSPADM

## 2019-01-28 RX ORDER — FLUTICASONE PROPIONATE 50 MCG
2 SPRAY, SUSPENSION (ML) NASAL DAILY
Status: DISCONTINUED | OUTPATIENT
Start: 2019-01-28 | End: 2019-02-02 | Stop reason: HOSPADM

## 2019-01-28 RX ORDER — LATANOPROST 50 UG/ML
1 SOLUTION/ DROPS OPHTHALMIC
Status: DISCONTINUED | OUTPATIENT
Start: 2019-01-28 | End: 2019-02-02 | Stop reason: HOSPADM

## 2019-01-28 RX ORDER — GUAIFENESIN/DEXTROMETHORPHAN 100-10MG/5
5 SYRUP ORAL 3 TIMES DAILY PRN
Status: DISCONTINUED | OUTPATIENT
Start: 2019-01-28 | End: 2019-02-02 | Stop reason: HOSPADM

## 2019-01-28 RX ORDER — ALBUTEROL SULFATE 90 UG/1
2 AEROSOL, METERED RESPIRATORY (INHALATION) 4 TIMES DAILY
Status: DISCONTINUED | OUTPATIENT
Start: 2019-01-28 | End: 2019-01-28

## 2019-01-28 RX ORDER — CARVEDILOL 12.5 MG/1
12.5 TABLET ORAL 2 TIMES DAILY WITH MEALS
Status: DISCONTINUED | OUTPATIENT
Start: 2019-01-28 | End: 2019-02-02 | Stop reason: HOSPADM

## 2019-01-28 RX ORDER — METHYLPREDNISOLONE SODIUM SUCCINATE 40 MG/ML
40 INJECTION, POWDER, LYOPHILIZED, FOR SOLUTION INTRAMUSCULAR; INTRAVENOUS EVERY 12 HOURS SCHEDULED
Status: DISCONTINUED | OUTPATIENT
Start: 2019-01-28 | End: 2019-01-29

## 2019-01-28 RX ORDER — SODIUM CHLORIDE/ALOE VERA
1 GEL (GRAM) NASAL
Status: DISCONTINUED | OUTPATIENT
Start: 2019-01-28 | End: 2019-02-02 | Stop reason: HOSPADM

## 2019-01-28 RX ORDER — OXYBUTYNIN CHLORIDE 5 MG/1
5 TABLET, EXTENDED RELEASE ORAL DAILY
Status: DISCONTINUED | OUTPATIENT
Start: 2019-01-28 | End: 2019-02-02 | Stop reason: HOSPADM

## 2019-01-28 RX ORDER — IPRATROPIUM BROMIDE AND ALBUTEROL SULFATE 2.5; .5 MG/3ML; MG/3ML
3 SOLUTION RESPIRATORY (INHALATION) 4 TIMES DAILY PRN
Status: DISCONTINUED | OUTPATIENT
Start: 2019-01-28 | End: 2019-01-28

## 2019-01-28 RX ORDER — SPIRONOLACTONE 25 MG/1
25 TABLET ORAL DAILY
Status: DISCONTINUED | OUTPATIENT
Start: 2019-01-28 | End: 2019-02-02 | Stop reason: HOSPADM

## 2019-01-28 RX ORDER — ASPIRIN 81 MG/1
81 TABLET, CHEWABLE ORAL DAILY
Status: DISCONTINUED | OUTPATIENT
Start: 2019-01-28 | End: 2019-02-02 | Stop reason: HOSPADM

## 2019-01-28 RX ORDER — LEVALBUTEROL 1.25 MG/.5ML
1.25 SOLUTION, CONCENTRATE RESPIRATORY (INHALATION)
Status: DISCONTINUED | OUTPATIENT
Start: 2019-01-28 | End: 2019-01-31

## 2019-01-28 RX ORDER — BENZONATATE 100 MG/1
200 CAPSULE ORAL 3 TIMES DAILY PRN
Status: DISCONTINUED | OUTPATIENT
Start: 2019-01-28 | End: 2019-02-02 | Stop reason: HOSPADM

## 2019-01-28 RX ORDER — ATORVASTATIN CALCIUM 10 MG/1
10 TABLET, FILM COATED ORAL
Status: DISCONTINUED | OUTPATIENT
Start: 2019-01-28 | End: 2019-02-02 | Stop reason: HOSPADM

## 2019-01-28 RX ORDER — PAROXETINE HYDROCHLORIDE 20 MG/1
20 TABLET, FILM COATED ORAL DAILY
Status: DISCONTINUED | OUTPATIENT
Start: 2019-01-28 | End: 2019-02-02 | Stop reason: HOSPADM

## 2019-01-28 RX ORDER — PANTOPRAZOLE SODIUM 40 MG/1
40 TABLET, DELAYED RELEASE ORAL
Status: DISCONTINUED | OUTPATIENT
Start: 2019-01-28 | End: 2019-02-02 | Stop reason: HOSPADM

## 2019-01-28 RX ORDER — ALBUTEROL SULFATE 2.5 MG/3ML
2.5 SOLUTION RESPIRATORY (INHALATION) ONCE
Status: COMPLETED | OUTPATIENT
Start: 2019-01-28 | End: 2019-01-28

## 2019-01-28 RX ORDER — BUDESONIDE 0.5 MG/2ML
0.5 INHALANT ORAL 2 TIMES DAILY
Status: DISCONTINUED | OUTPATIENT
Start: 2019-01-28 | End: 2019-02-02 | Stop reason: HOSPADM

## 2019-01-28 RX ORDER — PREDNISOLONE ACETATE 10 MG/ML
1 SUSPENSION/ DROPS OPHTHALMIC 4 TIMES DAILY
Status: DISCONTINUED | OUTPATIENT
Start: 2019-01-28 | End: 2019-01-28 | Stop reason: CLARIF

## 2019-01-28 RX ORDER — ACETYLCYSTEINE 200 MG/ML
3 SOLUTION ORAL; RESPIRATORY (INHALATION)
Status: DISCONTINUED | OUTPATIENT
Start: 2019-01-28 | End: 2019-01-29

## 2019-01-28 RX ORDER — TAMSULOSIN HYDROCHLORIDE 0.4 MG/1
0.4 CAPSULE ORAL DAILY
Status: DISCONTINUED | OUTPATIENT
Start: 2019-01-28 | End: 2019-02-02 | Stop reason: HOSPADM

## 2019-01-28 RX ORDER — ALBUTEROL SULFATE 2.5 MG/3ML
SOLUTION RESPIRATORY (INHALATION)
Status: DISPENSED
Start: 2019-01-28 | End: 2019-01-29

## 2019-01-28 RX ORDER — AMLODIPINE BESYLATE 10 MG/1
10 TABLET ORAL DAILY
Status: DISCONTINUED | OUTPATIENT
Start: 2019-01-28 | End: 2019-02-02 | Stop reason: HOSPADM

## 2019-01-28 RX ORDER — ACETYLCYSTEINE 200 MG/ML
3 SOLUTION ORAL; RESPIRATORY (INHALATION) ONCE
Status: COMPLETED | OUTPATIENT
Start: 2019-01-28 | End: 2019-01-28

## 2019-01-28 RX ORDER — HEPARIN SODIUM 5000 [USP'U]/ML
5000 INJECTION, SOLUTION INTRAVENOUS; SUBCUTANEOUS EVERY 8 HOURS SCHEDULED
Status: DISCONTINUED | OUTPATIENT
Start: 2019-01-28 | End: 2019-02-02 | Stop reason: HOSPADM

## 2019-01-28 RX ORDER — ALBUTEROL SULFATE 2.5 MG/3ML
SOLUTION RESPIRATORY (INHALATION)
Status: COMPLETED
Start: 2019-01-28 | End: 2019-01-28

## 2019-01-28 RX ORDER — ONDANSETRON 2 MG/ML
4 INJECTION INTRAMUSCULAR; INTRAVENOUS EVERY 8 HOURS PRN
Status: DISCONTINUED | OUTPATIENT
Start: 2019-01-28 | End: 2019-02-02 | Stop reason: HOSPADM

## 2019-01-28 RX ORDER — MECLIZINE HYDROCHLORIDE 25 MG/1
25 TABLET ORAL 3 TIMES DAILY PRN
Status: DISCONTINUED | OUTPATIENT
Start: 2019-01-28 | End: 2019-02-02 | Stop reason: HOSPADM

## 2019-01-28 RX ADMIN — DOCUSATE SODIUM 100 MG: 100 CAPSULE, LIQUID FILLED ORAL at 17:19

## 2019-01-28 RX ADMIN — ACETYLCYSTEINE 600 MG: 200 SOLUTION ORAL; RESPIRATORY (INHALATION) at 12:30

## 2019-01-28 RX ADMIN — LEVALBUTEROL HYDROCHLORIDE 1.25 MG: 1.25 SOLUTION, CONCENTRATE RESPIRATORY (INHALATION) at 07:40

## 2019-01-28 RX ADMIN — LATANOPROST 1 DROP: 50 SOLUTION OPHTHALMIC at 21:38

## 2019-01-28 RX ADMIN — ALBUTEROL SULFATE 2 PUFF: 90 AEROSOL, METERED RESPIRATORY (INHALATION) at 08:57

## 2019-01-28 RX ADMIN — IOHEXOL 100 ML: 350 INJECTION, SOLUTION INTRAVENOUS at 02:28

## 2019-01-28 RX ADMIN — CARVEDILOL 12.5 MG: 12.5 TABLET, FILM COATED ORAL at 16:07

## 2019-01-28 RX ADMIN — CEFEPIME 2000 MG: 2 INJECTION, POWDER, FOR SOLUTION INTRAVENOUS at 02:25

## 2019-01-28 RX ADMIN — OXYBUTYNIN CHLORIDE 5 MG: 5 TABLET, EXTENDED RELEASE ORAL at 08:51

## 2019-01-28 RX ADMIN — AMLODIPINE BESYLATE 10 MG: 10 TABLET ORAL at 08:53

## 2019-01-28 RX ADMIN — PAROXETINE HYDROCHLORIDE 20 MG: 20 TABLET, FILM COATED ORAL at 08:53

## 2019-01-28 RX ADMIN — LEVALBUTEROL HYDROCHLORIDE 1.25 MG: 1.25 SOLUTION, CONCENTRATE RESPIRATORY (INHALATION) at 19:14

## 2019-01-28 RX ADMIN — BUDESONIDE 0.5 MG: 0.5 INHALANT RESPIRATORY (INHALATION) at 19:16

## 2019-01-28 RX ADMIN — ATORVASTATIN CALCIUM 10 MG: 10 TABLET, FILM COATED ORAL at 16:07

## 2019-01-28 RX ADMIN — HEPARIN SODIUM 5000 UNITS: 5000 INJECTION, SOLUTION INTRAVENOUS; SUBCUTANEOUS at 13:32

## 2019-01-28 RX ADMIN — DOCUSATE SODIUM 100 MG: 100 CAPSULE, LIQUID FILLED ORAL at 08:53

## 2019-01-28 RX ADMIN — METHYLPREDNISOLONE SODIUM SUCCINATE 40 MG: 40 INJECTION, POWDER, FOR SOLUTION INTRAMUSCULAR; INTRAVENOUS at 09:53

## 2019-01-28 RX ADMIN — TAMSULOSIN HYDROCHLORIDE 0.4 MG: 0.4 CAPSULE ORAL at 08:55

## 2019-01-28 RX ADMIN — PREDNISOLONE ACETATE 1 DROP: 10 SUSPENSION/ DROPS OPHTHALMIC at 11:54

## 2019-01-28 RX ADMIN — ACETYLCYSTEINE 600 MG: 200 SOLUTION ORAL; RESPIRATORY (INHALATION) at 19:14

## 2019-01-28 RX ADMIN — IPRATROPIUM BROMIDE 0.5 MG: 0.5 SOLUTION RESPIRATORY (INHALATION) at 07:40

## 2019-01-28 RX ADMIN — GUAIFENESIN AND DEXTROMETHORPHAN 5 ML: 100; 10 SYRUP ORAL at 21:37

## 2019-01-28 RX ADMIN — METHYLPREDNISOLONE SODIUM SUCCINATE 40 MG: 40 INJECTION, POWDER, FOR SOLUTION INTRAMUSCULAR; INTRAVENOUS at 21:41

## 2019-01-28 RX ADMIN — ALBUTEROL SULFATE 2.5 MG: 2.5 SOLUTION RESPIRATORY (INHALATION) at 12:29

## 2019-01-28 RX ADMIN — BUDESONIDE 0.5 MG: 0.5 INHALANT RESPIRATORY (INHALATION) at 07:40

## 2019-01-28 RX ADMIN — ASPIRIN 81 MG 81 MG: 81 TABLET ORAL at 08:53

## 2019-01-28 RX ADMIN — HEPARIN SODIUM 5000 UNITS: 5000 INJECTION, SOLUTION INTRAVENOUS; SUBCUTANEOUS at 05:25

## 2019-01-28 RX ADMIN — CEFTRIAXONE 1000 MG: 1 INJECTION, POWDER, FOR SOLUTION INTRAMUSCULAR; INTRAVENOUS at 08:59

## 2019-01-28 RX ADMIN — PANTOPRAZOLE SODIUM 40 MG: 40 TABLET, DELAYED RELEASE ORAL at 05:25

## 2019-01-28 RX ADMIN — SPIRONOLACTONE 25 MG: 25 TABLET, FILM COATED ORAL at 08:51

## 2019-01-28 RX ADMIN — PREDNISOLONE ACETATE 1 DROP: 10 SUSPENSION/ DROPS OPHTHALMIC at 08:59

## 2019-01-28 RX ADMIN — IPRATROPIUM BROMIDE 0.5 MG: 0.5 SOLUTION RESPIRATORY (INHALATION) at 19:14

## 2019-01-28 RX ADMIN — CLOPIDOGREL BISULFATE 75 MG: 75 TABLET ORAL at 08:51

## 2019-01-28 RX ADMIN — Medication 1 APPLICATION: at 16:10

## 2019-01-28 RX ADMIN — HEPARIN SODIUM 5000 UNITS: 5000 INJECTION, SOLUTION INTRAVENOUS; SUBCUTANEOUS at 21:39

## 2019-01-28 RX ADMIN — CARVEDILOL 12.5 MG: 12.5 TABLET, FILM COATED ORAL at 08:53

## 2019-01-28 NOTE — ASSESSMENT & PLAN NOTE
· Asymptomatic  · UA positive for leuks, protein and blood  · Innumerable WBC and moderate bacteria  · CBC with WBC 13, afebrile   · Lactic acid negative, blood cx pending  · Given Cefepime in the ED  · Will continue on IV Rocephin, f/u urine cx  · Dc abx if no growth

## 2019-01-28 NOTE — RESPIRATORY THERAPY NOTE
RT Protocol Note  Michelle Khan  77 y o  male MRN: [id number]  Unit/Bed#: -01 Encounter: [id number]    Assessment    Principal Problem:    Acute respiratory failure with hypoxia [facility])  Active Problems:    Essential hypertension    Lung cancer ([facility] )    Oropharyngeal dysphagia    Prostate cancer ([facility] )    Hyperlipidemia    COPD with acute exacerbation ([facility] )    Bacteriuria      Home Pulmonary Medications:  Duoneb  Pulmicort       Past Medical History:   Diagnosis Date    RONAL (acute kidney injury) ([facility] ) 5/31/2017    Aspiration into respiratory tract     Chest pain 5/31/2017    COPD (chronic obstructive pulmonary disease) (HCC)     Depression     Elevated troponin 5/31/2017    GERD (gastroesophageal reflux disease)     History of CVA (cerebrovascular accident) 4/13/2016    Hyperlipidemia     Hypertension     Lung cancer ([place] [address] ) 2005    Right, status post lobectomy    Pneumonia     Prostate cancer ([place] [address] )     Sleep apnea     awaiting sleep study results    Stroke ([place] [address] )     Tracheomalacia     Weakness due to cerebrovascular accident (CVA)      Social History     Social History    Marital status:      Spouse name: N/A    Number of children: N/A    Years of education: N/A     Social History Main Topics    Smoking status: Former Smoker     Packs/day: 1 00     Years: 22 00     Types: Cigarettes     Start date: 1955     Quit date: 1977    Smokeless tobacco: Never Used    Alcohol use No    Drug use: No    Sexual activity: Not Asked     Other Topics Concern    None     Social History Narrative    None       Subjective         Objective    Physical Exam:   Assessment Type: (P) Assess only  General Appearance: (P) Alert, Awake  Respiratory Pattern: (P) Dyspnea at rest  Chest Assessment: (P) Chest expansion symmetrical  Bilateral Breath Sounds: (P) Diminished  Cough: (P) Non-productive    Vitals:  Blood pressure 143/66, pulse 75, temperature 98 6 °F (37 °C), temperature source Oral, resp   rate 20, height 6' 5" (1 956 m), weight 95 2 kg (209 lb 14 1 oz), SpO2 90 %  Imaging and other studies: I have personally reviewed pertinent reports  Plan    Respiratory Plan: (P) Home Bronchodilator Patient pathway        Resp Comments: (P) Patient examined per RT protocol  Upon my arrival to room, patient wearing 4L NC and SPO2 85%  NC increased to 6L, patient SPO2 now 91%  Breath sounds diminished bilaterally  Patient denies any dyspea at rest but complains of dyspea on exertion  Patient states he takes DuoNeb 4 times daily and Pulmicort two times daily at home  Will order Q6 XOP/ATR and BID Pulm per home regiment

## 2019-01-28 NOTE — UTILIZATION REVIEW
Network Utilization Review Department  Phone: [phone]; Fax [phone]  [email or web address]  ATTENTION: Please call with any questions or concerns to [phone]  and carefully listen to the prompts so that you are directed to the right person  Send all requests for admission clinical reviews, approved or denied determinations and any other requests to fax [phone]  All voicemails are confidential   Initial Clinical Review    Admission: Date/Time/Statement: inpatient 1/28/19 @ 0320   Orders Placed This Encounter   Procedures    Inpatient Admission (expected length of stay for this patient is greater than two midnights)     Standing Status:   Standing     Number of Occurrences:   1     Order Specific Question:   Admitting Physician     Answer:   Julienne Vazquez     Order Specific Question:   Level of Care     Answer:   Med Surg [16]     Order Specific Question:   Estimated length of stay     Answer:   More than 2 Midnights     Order Specific Question:   Certification     Answer:   I certify that inpatient services are medically necessary for this patient for a duration of greater than two midnights  See H&P and MD Progress Notes for additional information about the patient's course of treatment  ED: Date/Time/Mode of Arrival:   ED Arrival Information     Expected Arrival Acuity Means of Arrival Escorted By Service Admission Type    - 1/27/2019 23:07 Emergent Wheelchair Family Member Critical Care/ICU Emergency    Arrival Complaint    Low Pulse Ox        Chief Complaint:   Chief Complaint   Patient presents with    Shortness of Breath     Pt reports to the ED with c/o difficutly breathing and a cough for the past day     History of Illness:  77 y o  Male presents with cough, shortness of breath with hypoxia on presentation  Two night ago he attest he wass awake all night coughing, feeling short of breath   He finally fell asleep with his daughter awaking him later in the day  He states when he woke, he had return of the symptoms again  States chest hurt with coughing  He shares that he has had a cough for awhile but it has worsened  ED Vital Signs:   ED Triage Vitals   Temperature Pulse Respirations Blood Pressure SpO2   01/27/19 2322 01/27/19 2319 01/27/19 2319 01/27/19 2319 01/27/19 2319   98 4 °F (36 9 °C) 90 (!) 24 146/81 90 %      Temp Source Heart Rate Source Patient Position - Orthostatic VS BP Location FiO2 (%)   01/27/19 2322 01/27/19 2319 01/27/19 2319 01/27/19 2319 01/28/19 0824   Rectal Monitor Sitting Right arm 99      Pain Score       01/27/19 2319       No Pain        Wt Readings from Last 1 Encounters:   01/28/19 93 8 kg (206 lb 12 7 oz)     Vital Signs (abnormal): 1/27/2019 resp : 24;  1/28/2019 Resp: 24, 26, 29  SpO2=83, 89%     Pertinent Labs/Diagnostic Test Results: 1/27/2019  BUN 26     Creatinine 1 34     Total Protein 8 3     Wbc 13 04, platelet 025,     2/94/7998 urinalysis:  Color, UA Yellow    Clarity, UA Cloudy    Specific Gravity, UA >=1 030    pH, UA 5 5    Leukocytes, UA Moderate     Nitrite, UA Negative    Protein, UA Trace     Glucose, UA Negative    Ketones, UA Negative    Urobilinogen, UA 0 2    Bilirubin, UA Negative    Blood, UA Large       pH, Arterial 7 465     pCO2, Arterial 30 7     pO2, Arterial 71 1     HCO3, Arterial 21 6     Base Excess, Arterial -1 1    O2 Content, Arterial 19 1    O2 HGB,Arterial  93 7     Platelet 330,   CT CHEST: Moderately enlarged lymph nodes versus right hilar mass   Follow-up with pulmonary is recommended   Tissue sampling can be obtained to rule out malignancy  CHEST XR: Further opacification and volume loss, right lower lung field, most concerning for atelectasis which could be related to mucous plugging   Probable small right effusion      ED Treatment:   Medication Administration from 01/27/2019 2307 to 01/28/2019 0406       Date/Time Order Dose Route Action Action by Comments     01/28/2019 0303 cefepime (MAXIPIME) 2 g/50 mL dextrose IVPB 0 mg Intravenous Stopped Rony Rodriguez RN      01/28/2019 0225 cefepime (MAXIPIME) 2 g/50 mL dextrose IVPB 2,000 mg Intravenous He Tong RN         Past Medical/Surgical History:    Active Ambulatory Problems     Diagnosis Date Noted    Essential hypertension     [facility] )     Oropharyngeal dysphagia 04/13/2016    History of cerebrovascular accident (CVA) with residual deficit 04/13/2016    GERD (gastroesophageal reflux disease)     Left-sided weakness 04/11/2017    Prostate cancer ([facility] )     Hyperlipidemia     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     Tracheomalacia 10/10/2017    Chronic obstructive pulmonary disease with acute lower respiratory infection ([facility] ) 02/01/2018    RONAL (acute kidney injury) ([facility] ) 02/02/2018    Ambulatory dysfunction 02/14/2018    Right lower lobe pneumonia ([facility] ) 07/15/2018    Acute respiratory failure with hypoxia (HCC) 07/15/2018    Chronic cough 08/22/2018    Other emphysema ([facility] ) 08/22/2018    Aspiration pneumonia ([facility] ) 11/02/2018    COPD with acute exacerbation ([facility] ) 11/29/2018    BPH (benign prostatic hyperplasia) 11/29/2018    LIAM (obstructive sleep apnea) 11/29/2018     Resolved Ambulatory Problems     Diagnosis Date Noted    Dehydration 04/12/2016    Vomiting 04/12/2016    Acute renal failure (HCC) 04/12/2016    SOB (shortness of breath) 04/09/2017    Chronic kidney disease, stage 3 ([facility] ) 04/12/2017    Chest pain 05/31/2017    Elevated troponin 05/31/2017    Bronchitis 05/31/2017    Bronchospasm 06/09/2017    Debility 08/04/2017    Chronic cough 02/01/2018    Hemoptysis 02/02/2018    SIRS due to infectious process without acute organ dysfunction ([facility] ) 02/14/2018     Past Medical History:   Diagnosis Date    RONAL (acute kidney injury) ([facility] ) 5/31/2017    Aspiration into respiratory tract     Chest pain 5/31/2017    COPD (chronic obstructive pulmonary disease) ([facility] )  Depression     Elevated troponin 5/31/2017    GERD (gastroesophageal reflux disease)     History of CVA (cerebrovascular accident) 4/13/2016    Hyperlipidemia     Hypertension     Lung cancer ([facility] ) 2005    Pneumonia     Prostate cancer ([facility] )     Sleep apnea     Stroke ([facility] )     Tracheomalacia     Weakness due to cerebrovascular accident (CVA)      Admitting Diagnosis: Shortness of breath [R06 02]  Lung mass [R91 8]  Hypoxia [R09 02]  Age/Sex: 77 y o  male     Assessment/Plan: 1/28/2019 attending note:  Acute respiratory failure with hypoxia (HCC)   Assessment & Plan     · Presents c/o intermittent desats  ? On arrival, desats to high 80's despite nasal cannula  ? BNP negative, trop negative  ? CMP unremarkable, renal function at baseline  ? CT without infiltrate, afebrile, WBC 13  § Lactic acid negative, BCx2 drawn   § Leukocytosis is in the setting of bacteriuria  · Suspect 2/2 compression of right main bronchus 2/2 right hilar mass   ? CT shows mass measuring up to 4 5 x 4 cm,  § "Moderately enlarged lymph nodes versus right hilar mass"  ? Sampling recommended to r/o malignancy  · Will consult Pulm for possible bronch   · Obtain procal, monitor CBC  · Supportive care until tissue sample obtained   Bacteriuria   Assessment & Plan     · Asymptomatic  · UA positive for leuks, protein and blood  ? Innumerable WBC and moderate bacteria  ? CBC with WBC 13, afebrile   ? Lactic acid negative, blood cx pending  · Given Cefepime in the ED  ? Will continue on IV Rocephin, f/u urine cx  · Dc abx if no growth    Lung cancer ([place] [address] )   Assessment & Plan     · Sp RLL lobectomy in 2002 at [facility]  ?  Reports radiation   Hyperlipidemia   Assessment & Plan     · Continue statin   Prostate cancer ([facility] )   Assessment & Plan     · Reports seed placement same day as lobectomy    Essential hypertension   Assessment & Plan     · Controlled, continue Coreg, amlodipine, spironolactone   COPD with acute exacerbation ([address] )   Assessment & Plan     · No wheezing on exam  · Continue DuoNeb and Pulmicort, prn albuterol    Oropharyngeal dysphagia   Assessment & Plan     · Discharged 12/2 after episodes of aspiration PNA  · Continue dysphagia diet level 1, nectar thick liquids          Admission Orders:  48 hr telemetry  Peripheral IV  Npo  Nursing dysphagia assess before diet  Cardio-pulmonary monitoring  HFNC  Hi frequency chest wall oscillation  Sputum culture & gram stain        Scheduled Meds:   Current Facility-Administered Medications:  acetaminophen 650 mg Oral Q6H PRN Gina Beards, PA-C    acetylcysteine 3 mL Nebulization Q8H ALEENA Rivera-CLIFFORD    albuterol        albuterol 2 puff Inhalation Q4H PRN DIANA Cohn    amLODIPine 10 mg Oral Daily ALEENA Chou-CLIFFORD    aspirin 81 mg Oral Daily ALEENA Chou-CLIFFORD    atorvastatin 10 mg Oral Daily With [employer], PA-C    benzonatate 200 mg Oral TID PRN Gina Bearmichael, PA-C    budesonide 0 5 mg Nebulization BID Gina Beards, PA-C    carvedilol 12 5 mg Oral BID With Meals Gina January, PA-C    cefTRIAXone 1,000 mg Intravenous Q24H Gina Sin, PA-C Last Rate: 1,000 mg (01/28/19 0859)   clopidogrel 75 mg Oral Daily Gina Beards, PA-C    dextromethorphan-guaiFENesin 5 mL Oral TID PRN Gina Beards, PA-C    docusate sodium 100 mg Oral BID Gina Beards, PA-C    fluticasone 2 spray Nasal Daily Gina Beards, PA-C    heparin (porcine) 5,000 Units Subcutaneous Q8H [address] Phyllis Nino PA-C    ipratropium 0 5 mg Nebulization Q6H Reagan Ferreira MD    latanoprost 1 drop Both Eyes HS Gina January, LADY    levalbuterol 1 25 mg Nebulization Q6H Reagan Ferreira MD    meclizine 25 mg Oral TID PRN Gina January, PA-CLIFFORD    methylPREDNISolone sodium succinate 40 mg Intravenous Q12H [address] DIANA Cohn    ondansetron 4 mg Intravenous Q8H PRN Gina Bearmichael, PA-CLIFFORD    oxybutynin 5 mg Oral Daily Lajune Carrel, PA-C    pantoprazole 40 mg Oral Early Morning Lajune Carrel, PA-C    PARoxetine 20 mg Oral Daily Lajune Carrel, PA-C    polyethylene glycol 17 g Oral Daily PRN Lajune Carrel, PA-C    spironolactone 25 mg Oral Daily Lajune Carrel, PA-C    tamsulosin 0 4 mg Oral Daily Lajune Carrel, PA-C      Continuous Infusions:    PRN Meds:   acetaminophen    albuterol    benzonatate    dextromethorphan-guaiFENesin    meclizine    ondansetron    polyethylene glycol

## 2019-01-28 NOTE — ED PROVIDER NOTES
History  Chief Complaint   Patient presents with    Shortness of Breath     Pt reports to the ED with c/o difficutly breathing and a cough for the past day     HPI     Patient is a 77year old male who presents with hypoxia today  He also notes cough  No fevers, chills, sweats  No nausea, vomiting, diarrhea, abdominal pain, dysuria, hematuria, lightheadedness, dizziness  On exam, lungs are clear although he has a very coarse sounding cough  He is hypoxic down to 90 on 5 L which is not normal for him  Medical decision makin-year-old male, history of aspiration pneumonia, will rule this out, if chest x-ray normal, consider other causes of his hypoxia  Prior to Admission Medications   Prescriptions Last Dose Informant Patient Reported? Taking?    ASPIRIN 81 PO   Yes Yes   Sig: Take 1 tablet by mouth every other day     Fesoterodine Fumarate ER (TOVIAZ) 8 MG TB24  Outside Facility (Specify) Yes Yes   Sig: Take 4 mg by mouth every evening     LORazepam (ATIVAN) 0 5 mg tablet   No No   Sig: Take 0 5 tablets (0 25 mg total) by mouth daily for 10 days Daily @@ 0900   PARoxetine (PAXIL) 20 mg tablet   Yes Yes   Sig: Take 1 tablet by mouth daily   acetaminophen (TYLENOL) 325 mg tablet   No Yes   Sig: Take 2 tablets by mouth every 6 (six) hours as needed for fever   albuterol (PROVENTIL HFA,VENTOLIN HFA) 90 mcg/act inhaler   No Yes   Sig: Inhale 2 puffs 4 (four) times a day   amLODIPine (NORVASC) 10 mg tablet   Yes Yes   Sig: Take 1 tablet by mouth daily   atorvastatin (LIPITOR) 10 mg tablet   Yes Yes   Sig: Take 1 tablet by mouth   benzonatate (TESSALON) 200 MG capsule   No Yes   Sig: Take 1 capsule (200 mg total) by mouth 3 (three) times a day as needed for cough   bromfenac sodium (PROLENSA) 0 07 % SOLN   Yes Yes   Sig: Apply 1 drop to eye daily   budesonide (PULMICORT) 0 5 mg/2 mL nebulizer solution   No Yes   Sig: Take 1 vial (0 5 mg total) by nebulization 2 (two) times a day for 90 days Rinse mouth after use     carvedilol (COREG) 12 5 mg tablet   No Yes   Sig: Take 1 tablet by mouth 2 (two) times a day with meals   clopidogrel (PLAVIX) 75 mg tablet   No Yes   Sig: Take 1 tablet by mouth daily   dextromethorphan-guaiFENesin (ROBITUSSIN DM)  mg/5 mL syrup   No Yes   Sig: Take 5 mL by mouth 3 (three) times a day as needed for cough   docusate sodium (COLACE) 100 mg capsule   No Yes   Sig: Take 1 capsule (100 mg total) by mouth 2 (two) times a day   fluticasone (FLONASE) 50 mcg/act nasal spray   Yes Yes   Si-2 sprays into each nostril daily   ipratropium-albuterol (DUO-NEB) 0 5-2 5 mg/3 mL nebulizer solution   Yes Yes   Sig: Take 3 mL by nebulization 4 (four) times a day as needed for wheezing or shortness of breath   latanoprost (XALATAN) 0 005 % ophthalmic solution   Yes Yes   Sig: Administer 1 drop to both eyes daily at bedtime   levocetirizine (XYZAL) 5 MG tablet   No Yes   Sig: Take 1 tablet by mouth every evening   magnesium hydroxide (MILK OF MAGNESIA) 400 mg/5 mL oral suspension   Yes Yes   Sig: Take 30 mL by mouth daily as needed for constipation   meclizine (ANTIVERT) 25 mg tablet   No Yes   Sig: Take 1 tablet by mouth 3 (three) times a day as needed for dizziness   omeprazole (PriLOSEC) 40 MG capsule   Yes Yes   Sig: Take 1 capsule by mouth daily   polyethylene glycol (MIRALAX) 17 g packet   Yes Yes   Sig: Take 17 g by mouth daily as needed   prednisoLONE acetate (PRED FORTE) 1 % ophthalmic suspension   Yes Yes   Sig: Administer 1 drop to the right eye 4 (four) times a day   psyllium (METAMUCIL) 0 52 g capsule   Yes Yes   Sig: Take 0 52 g by mouth daily   senna (SENOKOT) 8 6 MG tablet   Yes Yes   Sig: Take 2 tablets by mouth daily at bedtime   spironolactone (ALDACTONE) 25 mg tablet   No Yes   Sig: Take 1 tablet (25 mg total) by mouth daily   tamsulosin (FLOMAX) 0 4 mg   Yes Yes   Sig: Take 1 capsule by mouth daily      Facility-Administered Medications: None       Past Medical History: Diagnosis Date    [facility]ONAL [facility]acute kidney injury) ([facility] ) 5/31/2017    Aspiration into respiratory tract     Chest pain 5/31/2017    COPD (chronic obstructive pulmonary disease) (HCC)     Depression     Elevated troponin 5/31/2017    GERD (gastroesophageal reflux disease)     History of CVA (cerebrovascular accident) 4/13/2016    Hyperlipidemia     Hypertension     Lung cancer ([facility] ) 2005    Right, status post lobectomy    Pneumonia     Prostate cancer ([facility] )     Sleep apnea     awaiting sleep study results    Stroke ([place] [address] )     Tracheomalacia     Weakness due to cerebrovascular accident (CVA)        Past Surgical History:   Procedure Laterality Date    COLONOSCOPY      ESOPHAGOGASTRODUODENOSCOPY N/A 4/13/2016    Procedure: ESOPHAGOGASTRODUODENOSCOPY (EGD); Surgeon: Kalia Gordon MD;  [facility]; Service:     JOINT REPLACEMENT      knee replacement    LUNG LOBECTOMY      UT ESOPHAGOGASTRODUODENOSCOPY TRANSORAL DIAGNOSTIC N/A 3/15/2017    Procedure: ESOPHAGOGASTRODUODENOSCOPY (EGD); Surgeon: Kalia Gordon MD;  [facility]; Service: Gastroenterology    TRIGEMINAL NERVE DECOMPRESSION         Family History   Problem Relation Age of Onset    Family history unknown: Yes     I have reviewed and agree with the history as documented  Social History   Substance Use Topics    Smoking status: Former Smoker     Packs/day: 1 00     Years: 22 00     Types: Cigarettes     Start date: 1955     Quit date: 1977    Smokeless tobacco: Never Used    Alcohol use No        Review of Systems   Respiratory: Positive for cough and shortness of breath  Negative for chest tightness  Cardiovascular: Negative for chest pain  All other systems reviewed and are negative  Physical Exam  Physical Exam   Constitutional: He is oriented to person, place, and time  He appears well-developed and well-nourished  HENT:   Head: Normocephalic and atraumatic     Eyes: Pupils are equal, round, and reactive to light  EOM are normal    Neck: Normal range of motion  Neck supple  Cardiovascular: Normal rate, regular rhythm and normal heart sounds  No murmur heard  Pulmonary/Chest: Effort normal and breath sounds normal  No respiratory distress  He has no wheezes  tachypnea but nad   Abdominal: Soft  Bowel sounds are normal  He exhibits no distension  There is no tenderness  Musculoskeletal: Normal range of motion  He exhibits no edema or tenderness  Neurological: He is alert and oriented to person, place, and time  No cranial nerve deficit  Coordination normal    Skin: Skin is warm and dry  He is not diaphoretic  No erythema  Psychiatric: He has a normal mood and affect  His behavior is normal    Nursing note and vitals reviewed        Vital Signs  ED Triage Vitals   Temperature Pulse Respirations Blood Pressure SpO2   01/27/19 2322 01/27/19 2319 01/27/19 2319 01/27/19 2319 01/27/19 2319   98 4 °F (36 9 °C) 90 (!) 24 146/81 90 %      Temp Source Heart Rate Source Patient Position - Orthostatic VS BP Location FiO2 (%)   01/27/19 2322 01/27/19 2319 01/27/19 2319 01/27/19 2319 --   Rectal Monitor Sitting Right arm       Pain Score       01/27/19 2319       No Pain           Vitals:    01/28/19 0157 01/28/19 0200 01/28/19 0300 01/28/19 0435   BP: 145/65 145/65 121/57 143/66   Pulse: 83 84 80 75   Patient Position - Orthostatic VS: Lying Lying Lying Lying       Visual Acuity  Visual Acuity      Most Recent Value   L Pupil Size (mm)  3   R Pupil Size (mm)  3          ED Medications  Medications   albuterol (PROVENTIL HFA,VENTOLIN HFA) inhaler 2 puff (not administered)   amLODIPine (NORVASC) tablet 10 mg (not administered)   aspirin chewable tablet 81 mg (not administered)   budesonide (PULMICORT) inhalation solution 0 5 mg (not administered)   carvedilol (COREG) tablet 12 5 mg (not administered)   clopidogrel (PLAVIX) tablet 75 mg (not administered)   docusate sodium (COLACE) capsule 100 mg (not administered) oxybutynin (DITROPAN-XL) 24 hr tablet 5 mg (not administered)   fluticasone (FLONASE) 50 mcg/act nasal spray 2 spray (not administered)   latanoprost (XALATAN) 0 005 % ophthalmic solution 1 drop (not administered)   pantoprazole (PROTONIX) EC tablet 40 mg (40 mg Oral Given 1/28/19 0525)   PARoxetine (PAXIL) tablet 20 mg (not administered)   prednisoLONE acetate (PRED FORTE) 1 % ophthalmic suspension 1 drop (not administered)   spironolactone (ALDACTONE) tablet 25 mg (not administered)   tamsulosin (FLOMAX) capsule 0 4 mg (not administered)   benzonatate (TESSALON PERLES) capsule 200 mg (not administered)   dextromethorphan-guaiFENesin (ROBITUSSIN DM)  mg/5 mL oral syrup 5 mL (not administered)   ipratropium-albuterol (DUO-NEB) 0 5-2 5 mg/3 mL inhalation solution 3 mL (not administered)   meclizine (ANTIVERT) tablet 25 mg (not administered)   polyethylene glycol (MIRALAX) packet 17 g (not administered)   atorvastatin (LIPITOR) tablet 10 mg (not administered)   ondansetron (ZOFRAN) injection 4 mg (not administered)   heparin (porcine) subcutaneous injection 5,000 Units (5,000 Units Subcutaneous Given 1/28/19 0525)   acetaminophen (TYLENOL) tablet 650 mg (not administered)   cefTRIAXone (ROCEPHIN) 1,000 mg in dextrose 5 % 50 mL IVPB (not administered)   levalbuterol (XOPENEX) inhalation solution 1 25 mg (not administered)   ipratropium (ATROVENT) 0 02 % inhalation solution 0 5 mg (not administered)   cefepime (MAXIPIME) 2 g/50 mL dextrose IVPB (0 mg Intravenous Stopped 1/28/19 0300)   iohexol (OMNIPAQUE) 350 MG/ML injection (MULTI-DOSE) 100 mL (100 mL Intravenous Given 1/28/19 0228)       Diagnostic Studies  Results Reviewed     Procedure Component Value Units Date/Time    Urine Microscopic [[id number]]  (Abnormal) Collected:  01/28/19 0050    Lab Status:  Final result Specimen:  Urine from Urine, Straight Cath Updated:  01/28/19 0106     RBC, UA 30-50 (A) /hpf      WBC, UA Innumerable (A) /hpf      Epithelial Cells None Seen /hpf      Bacteria, UA Moderate (A) /hpf      WBC Clumps Occasional     MUCUS THREADS Occasional (A)    Urine culture [[id number]] Collected:  01/28/19 0050    Lab Status:   In process Specimen:  Urine from Urine, Straight Cath Updated:  01/28/19 0106    UA w Reflex to Microscopic w Reflex to Culture [[id number]]  (Abnormal) Collected:  01/28/19 0050    Lab Status:  Final result Specimen:  Urine from Urine, Straight Cath Updated:  01/28/19 0058     Color, UA Yellow     Clarity, UA Cloudy     Specific Gravity, UA >=1 030     pH, UA 5 5     Leukocytes, UA Moderate (A)     Nitrite, UA Negative     Protein, UA Trace (A) mg/dl      Glucose, UA Negative mg/dl      Ketones, UA Negative mg/dl      Urobilinogen, UA 0 2 E U /dl      Bilirubin, UA Negative     Blood, UA Large (A)    CBC and differential [[id number]]  (Abnormal) Collected:  01/28/19 0050    Lab Status:  Final result Specimen:  Blood from Arm, Right Updated:  01/28/19 0058     WBC 13 04 (H) Thousand/uL      RBC 5 09 Million/uL      Hemoglobin 15 4 g/dL      Hematocrit 46 0 %      MCV 90 fL      MCH 30 3 pg      MCHC 33 5 g/dL      RDW 13 9 %      MPV 11 3 fL      Platelets 860 (L) Thousands/uL      nRBC 0 /100 WBCs      Neutrophils Relative 82 (H) %      Immat GRANS % 0 %      Lymphocytes Relative 8 (L) %      Monocytes Relative 8 %      Eosinophils Relative 2 %      Basophils Relative 0 %      Neutrophils Absolute 10 49 (H) Thousands/µL      Immature Grans Absolute 0 03 Thousand/uL      Lymphocytes Absolute 1 10 Thousands/µL      Monocytes Absolute 1 08 Thousand/µL      Eosinophils Absolute 0 29 Thousand/µL      Basophils Absolute 0 05 Thousands/µL     Comprehensive metabolic panel [[id number]]  (Abnormal) Collected:  01/27/19 0277    Lab Status:  Final result Specimen:  Blood from Arm, Left Updated:  01/28/19 0041     Sodium 139 mmol/L      Potassium 5 2 mmol/L      Chloride 102 mmol/L      CO2 28 mmol/L      ANION GAP 9 mmol/L      BUN 26 (H) mg/dL Creatinine 1 34 (H) mg/dL      Glucose 98 mg/dL      Calcium 9 8 mg/dL      AST 45 U/L      ALT 27 U/L      Alkaline Phosphatase 109 U/L      Total Protein 8 3 (H) g/dL      Albumin 4 0 g/dL      Total Bilirubin 0 80 mg/dL      eGFR 50 ml/min/1 73sq m     Narrative:         National Kidney Disease Education Program recommendations are as follows:  GFR calculation is accurate only with a steady state creatinine  Chronic Kidney disease less than 60 ml/min/1 73 sq  meters  Kidney failure less than 15 ml/min/1 73 sq  meters  B-type natriuretic peptide [[id number]]  (Normal) Collected:  01/27/19 2354    Lab Status:  Final result Specimen:  Blood from Arm, Left Updated:  01/28/19 0028     NT-proBNP 180 pg/mL     Lactic acid, plasma [[id number]]  (Normal) Collected:  01/27/19 2354    Lab Status:  Final result Specimen:  Blood from Arm, Left Updated:  01/28/19 0024     LACTIC ACID 1 7 mmol/L     Narrative:         Result may be elevated if tourniquet was used during collection  Troponin I [[id number]]  (Normal) Collected:  01/27/19 2354    Lab Status:  Final result Specimen:  Blood from Arm, Left Updated:  01/28/19 0022     Troponin I <0 02 ng/mL     Protime-INR [[id number]]  (Normal) Collected:  01/27/19 2354    Lab Status:  Final result Specimen:  Blood from Arm, Left Updated:  01/28/19 0014     Protime 12 8 seconds      INR 0 99    APTT [[id number]]  (Normal) Collected:  01/27/19 2354    Lab Status:  Final result Specimen:  Blood from Arm, Left Updated:  01/28/19 0014     PTT 28 seconds     Blood culture #1 [[id number]] Collected:  01/27/19 2340    Lab Status: In process Specimen:  Blood from Arm, Left Updated:  01/28/19 0001    Blood culture #2 [[id number]] Collected:  01/27/19 2353    Lab Status:   In process Specimen:  Blood from Arm, Right Updated:  01/28/19 0000                 CTA ED chest PE study   Final Result by Erick Pickering DO (01/28 0248)      No evidence of central pulmonary embolus      Moderately enlarged lymph nodes versus right hilar mass  Follow-up with pulmonary is recommended  Tissue sampling can be obtained to rule out malignancy             I personally discussed this study with Lucas Rico on 1/28/2019 at 2:45 AM                            Workstation performed: [id number]         XR chest 2 views    (Results Pending)              Procedures  Procedures       Phone Contacts  ED Phone Contact    ED Course  ED Course as of Jan 28 0613   Esme Sample Jan 27, 2019   2341 Ekg rate 90, sinus, narrow qrs, no stemi    Mon Jan 28, 2019   0254 PULMONARY ARTERIAL TREE:  No evidence of central pulmonary embolus    LUNGS:  Postoperative changes in the right lung related to prior right upper lobectomy is seen   No focal airspace opacity   Significant narrowing of the right mainstem bronchus is seen  PLEURA:  Unremarkable  HEART/GREAT VESSELS:  Unremarkable for patient's age  MEDIASTINUM AND DALIA: Moderately enlarged versus right hilar mass measuring up to 4 5 x 4 cm  CHEST WALL AND LOWER NECK:   Unremarkable  VISUALIZED STRUCTURES IN THE UPPER ABDOMEN:  Unremarkable  OSSEOUS STRUCTURES:  Moderate scoliosis of the spine is seen   Moderate degenerative changes in the spine is seen  MDM  CritCare Time    Disposition  Final diagnoses:   Lung mass   Hypoxia     Time reflects when diagnosis was documented in both MDM as applicable and the Disposition within this note     Time User Action Codes Description Comment    1/28/2019  3:19 AM Dony Rivers Add [R91 8] Lung mass     1/28/2019  3:19 AM Dony Rivers Add [R09 02] Hypoxia       ED Disposition     ED Disposition Condition Comment    Admit  Case was discussed with maria e and the patient's admission status was agreed to be Admission Status: inpatient status to the service of Dr Sharon Patricio           Follow-up Information    None         Current Discharge Medication List      CONTINUE these medications which have NOT CHANGED Details   acetaminophen (TYLENOL) 325 mg tablet Take 2 tablets by mouth every 6 (six) hours as needed for fever  Qty: 30 tablet, Refills: 0      albuterol (PROVENTIL HFA,VENTOLIN HFA) 90 mcg/act inhaler Inhale 2 puffs 4 (four) times a day  Qty: 2 Inhaler, Refills: 0      amLODIPine (NORVASC) 10 mg tablet Take 1 tablet by mouth daily      ASPIRIN 81 PO Take 1 tablet by mouth every other day        atorvastatin (LIPITOR) 10 mg tablet Take 1 tablet by mouth      benzonatate (TESSALON) 200 MG capsule Take 1 capsule (200 mg total) by mouth 3 (three) times a day as needed for cough  Qty: 90 capsule, Refills: 5    Associated Diagnoses: Cough      bromfenac sodium (PROLENSA) 0 07 % SOLN Apply 1 drop to eye daily      budesonide (PULMICORT) 0 5 mg/2 mL nebulizer solution Take 1 vial (0 5 mg total) by nebulization 2 (two) times a day for 90 days Rinse mouth after use  Qty: 360 mL, Refills: 0    Associated Diagnoses: Chronic obstructive pulmonary disease with acute lower respiratory infection ([facility] ); Chronic cough      carvedilol (COREG) 12 5 mg tablet Take 1 tablet by mouth 2 (two) times a day with meals  Qty: 60 tablet, Refills: 0      clopidogrel (PLAVIX) 75 mg tablet Take 1 tablet by mouth daily  Qty: 30 tablet, Refills: 0      dextromethorphan-guaiFENesin (ROBITUSSIN DM)  mg/5 mL syrup Take 5 mL by mouth 3 (three) times a day as needed for cough  Qty: 118 mL, Refills: 0    Associated Diagnoses: Pneumonia of right lower lobe due to infectious organism ([address] );  Sepsis ([facility] )      docusate sodium (COLACE) 100 mg capsule Take 1 capsule (100 mg total) by mouth 2 (two) times a day  Qty: 10 capsule, Refills: 0    Associated Diagnoses: SIRS due to infectious process without acute organ dysfunction (HCC)      Fesoterodine Fumarate ER (TOVIAZ) 8 MG TB24 Take 4 mg by mouth every evening        fluticasone (FLONASE) 50 mcg/act nasal spray 1-2 sprays into each nostril daily      ipratropium-albuterol (DUO-NEB) 0 5-2 5 mg/3 mL nebulizer solution Take 3 mL by nebulization 4 (four) times a day as needed for wheezing or shortness of breath      latanoprost (XALATAN) 0 005 % ophthalmic solution Administer 1 drop to both eyes daily at bedtime      levocetirizine (XYZAL) 5 MG tablet Take 1 tablet by mouth every evening  Qty: 30 tablet, Refills: 0      magnesium hydroxide (MILK OF MAGNESIA) 400 mg/5 mL oral suspension Take 30 mL by mouth daily as needed for constipation      meclizine (ANTIVERT) 25 mg tablet Take 1 tablet by mouth 3 (three) times a day as needed for dizziness  Qty: 30 tablet, Refills: 0      omeprazole (PriLOSEC) 40 MG capsule Take 1 capsule by mouth daily      PARoxetine (PAXIL) 20 mg tablet Take 1 tablet by mouth daily      polyethylene glycol (MIRALAX) 17 g packet Take 17 g by mouth daily as needed      prednisoLONE acetate (PRED FORTE) 1 % ophthalmic suspension Administer 1 drop to the right eye 4 (four) times a day      psyllium (METAMUCIL) 0 52 g capsule Take 0 52 g by mouth daily      senna (SENOKOT) 8 6 MG tablet Take 2 tablets by mouth daily at bedtime      spironolactone (ALDACTONE) 25 mg tablet Take 1 tablet (25 mg total) by mouth daily  Qty: 30 tablet, Refills: 0    Associated Diagnoses: Chronic kidney disease, stage 3 (HCC)      tamsulosin (FLOMAX) 0 4 mg Take 1 capsule by mouth daily      LORazepam (ATIVAN) 0 5 mg tablet Take 0 5 tablets (0 25 mg total) by mouth daily for 10 days Daily @@ 0900  Qty: 10 tablet, Refills: 5    Associated Diagnoses: Severe episode of recurrent major depressive disorder, without psychotic features ([facility] )           No discharge procedures on file      ED Provider  Electronically Signed by           Katrina Alfred MD  01/28/19 7218

## 2019-01-28 NOTE — ASSESSMENT & PLAN NOTE
· Discharged 12/2 after episodes of aspiration PNA  · Continue dysphagia diet level 1, nectar thick liquids

## 2019-01-28 NOTE — PROGRESS NOTES
During rounding, vital signs were done on patient  O2 stats were at 79% on 6L NC  Pt complaining of SOB  Simple mask applied  O2 stats went up to 85-90%  Admitting SLIM PA notified and came to evaluate the pt  Respiratory called  CXR and high flow ordered  Pulm came to evaluate the pt  Awaiting orders to transfer pt to ICU  Will continue to monitor

## 2019-01-28 NOTE — ASSESSMENT & PLAN NOTE
· Presents c/o intermittent desats  · On arrival, desats to high 80's despite nasal cannula  · BNP negative, trop negative  · CMP unremarkable, renal function at baseline  · CT without infiltrate, afebrile, WBC 13  · Lactic acid negative, BCx2 drawn   · Leukocytosis is in the setting of bacteriuria  · Suspect 2/2 compression of right main bronchus 2/2 right hilar mass   · CT shows mass measuring up to 4 5 x 4 cm,  · "Moderately enlarged lymph nodes versus right hilar mass"  · Sampling recommended to r/o malignancy  · Will consult Pulm for possible bronch   · Obtain procal, monitor CBC  · Supportive care until tissue sample obtained

## 2019-01-28 NOTE — H&P
H&P- Isabella Grissom  1940, 77 y o  male MRN: [id number]    Unit/Bed#: -01 Encounter: [id number]    Primary Care Provider: Kenia Ba DO   Date and time admitted to hospital: 1/27/2019 11:14 PM    * Acute respiratory failure with hypoxia (HCC)   Assessment & Plan    · Presents c/o intermittent desats  · On arrival, desats to high 80's despite nasal cannula  · BNP negative, trop negative  · CMP unremarkable, renal function at baseline  · CT without infiltrate, afebrile, WBC 13  · Lactic acid negative, BCx2 drawn   · Leukocytosis is in the setting of bacteriuria  · Suspect 2/2 compression of right main bronchus 2/2 right hilar mass   · CT shows mass measuring up to 4 5 x 4 cm,  · "Moderately enlarged lymph nodes versus right hilar mass"  · Sampling recommended to r/o malignancy  · Will consult Pulm for possible bronch   · Obtain procal, monitor CBC  · Supportive care until tissue sample obtained     Bacteriuria   Assessment & Plan    · Asymptomatic  · UA positive for leuks, protein and blood  · Innumerable WBC and moderate bacteria  · CBC with WBC 13, afebrile   · Lactic acid negative, blood cx pending  · Given Cefepime in the ED  · Will continue on IV Rocephin, f/u urine cx  · Dc abx if no growth      Lung cancer ([facility] [address] )   Assessment & Plan    · Sp RLL lobectomy in 2002 at [facility]  · Reports radiation     Hyperlipidemia   Assessment & Plan    · Continue statin     Prostate cancer [facility])   Assessment & Plan    · Reports seed placement same day as lobectomy      Essential hypertension   Assessment & Plan    · Controlled, continue Coreg, amlodipine, spironolactone     COPD with acute exacerbation ([address] )   Assessment & Plan    · No wheezing on exam  · Continue DuoNeb and Pulmicort, prn albuterol      Oropharyngeal dysphagia   Assessment & Plan    · Discharged 12/2 after episodes of aspiration PNA  · Continue dysphagia diet level 1, nectar thick liquids       VTE Prophylaxis: Heparin  / sequential compression device   Code Status: FULL  POLST: POLST form is not discussed and not completed at this time  Discussion with family: none    Anticipated Length of Stay:  Patient will be admitted on an Inpatient basis with an anticipated length of stay of  > 2 midnights  Justification for Hospital Stay: per plan above    Total Time for Visit, including Counseling / Coordination of Care: 30 minutes  Greater than 50% of this total time spent on direct patient counseling and coordination of care  Chief Complaint:   cough    History of Present Illness:    Shaniqua Gleason  is a 77 y o  male with a PMHx of prostate ca, HTN, HLD, COPD, lung ca who presents with cough  Patient states that two night ago he was awake all night coughing and felt he could not catch his breath  He says when he finally slept, he slept in until noon when his daughter woke him up as she was concerned he was sleeping all day  He says he woke up and had slow return of his symptoms again, coughing in spasms and "feeling like he could not breath"  He says his chest did hurt with this coughing  He says he does not feel short of breath in the periods between spasm  He says he has a had a cough for a while but that it has been worse lately  He denies any fever, chills, sputum  Denies congestion, headache or dizziness  Denies n/v/d or abd pain  He denies any hematuria, dysuria, urgency, frequency and denies back pain  He denies any unintentional weight loss and reports strong appetite  Denies fatigue, weakness, night sweats  Review of Systems:    Review of Systems   Constitutional: Negative  HENT: Negative  Eyes: Negative  Respiratory: Positive for cough and shortness of breath  Cardiovascular: Negative  Gastrointestinal: Negative  Endocrine: Negative  Genitourinary: Negative  Musculoskeletal: Negative  Skin: Negative  Allergic/Immunologic: Negative  Neurological: Negative  Hematological: Negative  Psychiatric/Behavioral: Negative  Past Medical and Surgical History:     Past Medical History:   Diagnosis Date    RONAL (acute kidney injury) ([facility] [address] ) 5/31/2017    Aspiration into respiratory tract     Chest pain 5/31/2017    COPD (chronic obstructive pulmonary disease) (HCC)     Depression     Elevated troponin 5/31/2017    GERD (gastroesophageal reflux disease)     History of CVA (cerebrovascular accident) 4/13/2016    Hyperlipidemia     Hypertension     Lung cancer ([facility] ) 2005    Right, status post lobectomy    Pneumonia     Prostate cancer ([facility] )     Sleep apnea     awaiting sleep study results    Stroke ([place] [id number] )     Tracheomalacia     Weakness due to cerebrovascular accident (CVA)        Past Surgical History:   Procedure Laterality Date    COLONOSCOPY      ESOPHAGOGASTRODUODENOSCOPY N/A 4/13/2016    Procedure: ESOPHAGOGASTRODUODENOSCOPY (EGD); Surgeon: Kenn Simental MD;  [facility]; Service:     JOINT REPLACEMENT      knee replacement    LUNG LOBECTOMY      SD ESOPHAGOGASTRODUODENOSCOPY TRANSORAL DIAGNOSTIC N/A 3/15/2017    Procedure: ESOPHAGOGASTRODUODENOSCOPY (EGD); Surgeon: Kenn Simental MD;  Location: [facility]; Service: Gastroenterology    TRIGEMINAL NERVE DECOMPRESSION         Meds/Allergies:    Prior to Admission medications    Medication Sig Start Date End Date Taking?  Authorizing Provider   acetaminophen (TYLENOL) 325 mg tablet Take 2 tablets by mouth every 6 (six) hours as needed for fever 6/19/17  Yes Jorge Gleason MD   albuterol (PROVENTIL HFA,VENTOLIN HFA) 90 mcg/act inhaler Inhale 2 puffs 4 (four) times a day 6/19/17  Yes Jorge Gleason MD   amLODIPine (NORVASC) 10 mg tablet Take 1 tablet by mouth daily 10/10/17  Yes Historical Provider, MD   ASPIRIN 81 PO Take 1 tablet by mouth every other day   10/10/17  Yes Historical Provider, MD   atorvastatin (LIPITOR) 10 mg tablet Take 1 tablet by mouth   Yes Historical Provider, MD benzonatate (TESSALON) 200 MG capsule Take 1 capsule (200 mg total) by mouth 3 (three) times a day as needed for cough 12/27/18  Yes Marko Mason, DO   bromfenac sodium (PROLENSA) 0 07 % SOLN Apply 1 drop to eye daily   Yes Historical Provider, MD   budesonide (PULMICORT) 0 5 mg/2 mL nebulizer solution Take 1 vial (0 5 mg total) by nebulization 2 (two) times a day for 90 days Rinse mouth after use   11/20/18 2/18/19 Yes Trey lAlred,    carvedilol (COREG) 12 5 mg tablet Take 1 tablet by mouth 2 (two) times a day with meals 8/21/17  Yes Opal Sahu MD   clopidogrel (PLAVIX) 75 mg tablet Take 1 tablet by mouth daily 6/19/17  Yes Opal Sahu MD   dextromethorphan-guaiFENesin (ROBITUSSIN DM)  mg/5 mL syrup Take 5 mL by mouth 3 (three) times a day as needed for cough 11/5/18  Yes Lc Acuña PA-C   docusate sodium (COLACE) 100 mg capsule Take 1 capsule (100 mg total) by mouth 2 (two) times a day 2/18/18  Yes Leonie José MD   Fesoterodine Fumarate ER (TOVIAZ) 8 MG TB24 Take 4 mg by mouth every evening     Yes Historical Provider, MD   fluticasone (FLONASE) 50 mcg/act nasal spray 1-2 sprays into each nostril daily 8/30/17  Yes Historical Provider, MD   ipratropium-albuterol (DUO-NEB) 0 5-2 5 mg/3 mL nebulizer solution Take 3 mL by nebulization 4 (four) times a day as needed for wheezing or shortness of breath   Yes Historical Provider, MD   latanoprost (XALATAN) 0 005 % ophthalmic solution Administer 1 drop to both eyes daily at bedtime   Yes Historical Provider, MD   levocetirizine (XYZAL) 5 MG tablet Take 1 tablet by mouth every evening 6/19/17  Yes Opal Sahu MD   magnesium hydroxide (MILK OF MAGNESIA) 400 mg/5 mL oral suspension Take 30 mL by mouth daily as needed for constipation   Yes Historical Provider, MD   meclizine (ANTIVERT) 25 mg tablet Take 1 tablet by mouth 3 (three) times a day as needed for dizziness 6/19/17  Yes Opal Sahu MD   omeprazole (PriLOSEC) 40 MG capsule Take 1 capsule by mouth daily 8/30/17  Yes Historical Provider, MD   PARoxetine (PAXIL) 20 mg tablet Take 1 tablet by mouth daily   Yes Historical Provider, MD   polyethylene glycol (MIRALAX) 17 g packet Take 17 g by mouth daily as needed   Yes Historical Provider, MD   prednisoLONE acetate (PRED FORTE) 1 % ophthalmic suspension Administer 1 drop to the right eye 4 (four) times a day   Yes Historical Provider, MD   psyllium (METAMUCIL) 0 52 g capsule Take 0 52 g by mouth daily   Yes Historical Provider, MD   senna (SENOKOT) 8 6 MG tablet Take 2 tablets by mouth daily at bedtime   Yes Historical Provider, MD   spironolactone (ALDACTONE) 25 mg tablet Take 1 tablet (25 mg total) by mouth daily 2/4/18  Yes DIANA Dangelo   tamsulosin (FLOMAX) 0 4 mg Take 1 capsule by mouth daily   Yes Historical Provider, MD   LORazepam (ATIVAN) 0 5 mg tablet Take 0 5 tablets (0 25 mg total) by mouth daily for 10 days Daily @@ 0900 8/22/18 11/2/18  Haley Joshua PA-C     I have reviewed home medications with patient personally  Allergies: Allergies   Allergen Reactions    Penicillins Rash       Social History:     Marital Status:     Occupation: retired  Patient Pre-hospital Living Situation: home with daughter  Patient Pre-hospital Level of Mobility: not discussed  Patient Pre-hospital Diet Restrictions: Dysphagia lvl1, NTL  Substance Use History:   History   Alcohol Use No     History   Smoking Status    Former Smoker    Packs/day: 1 00    Years: 22 00    Types: Cigarettes    Start date: 5    Quit date: 1977   Smokeless Tobacco    Never Used     History   Drug Use No       Family History:    non-contributory    Physical Exam:     Vitals:   Blood Pressure: 143/66 (01/28/19 0435)  Pulse: 75 (01/28/19 0435)  Temperature: 98 6 °F (37 °C) (01/28/19 0435)  Temp Source: Oral (01/28/19 0435)  Respirations: 20 (01/28/19 0435)  Height: 6' 5" (195 6 cm) (01/28/19 0435)  Weight - Scale: 95 2 kg (209 lb 14 1 oz) (01/28/19 1455)  SpO2: 90 % (01/28/19 0435)    Physical Exam   Constitutional: He appears well-developed and well-nourished  No distress  HENT:   Head: Normocephalic  Mouth/Throat: Oropharynx is clear and moist    Cardiovascular: Normal rate, regular rhythm, normal heart sounds and intact distal pulses  Exam reveals no gallop and no friction rub  No murmur heard  Pulmonary/Chest: Effort normal  No respiratory distress  He has no wheezes  He has no rales  He exhibits no tenderness  Absent breath sounds at right base   Abdominal: Soft  Bowel sounds are normal  He exhibits no distension and no mass  There is no tenderness  There is no rebound and no guarding  Musculoskeletal: He exhibits no edema or tenderness  Neurological: He is alert  Skin: Skin is warm and dry  No rash noted  He is not diaphoretic  No erythema  No pallor  Psychiatric: He has a normal mood and affect  His behavior is normal    Nursing note and vitals reviewed  Additional Data:     Lab Results: I have personally reviewed pertinent reports  Results from last 7 days  Lab Units 01/28/19  0050   WBC Thousand/uL 13 04*   HEMOGLOBIN g/dL 15 4   HEMATOCRIT % 46 0   PLATELETS Thousands/uL 139*   NEUTROS PCT % 82*   LYMPHS PCT % 8*   MONOS PCT % 8   EOS PCT % 2       Results from last 7 days  Lab Units 01/27/19  2354   SODIUM mmol/L 139   POTASSIUM mmol/L 5 2   CHLORIDE mmol/L 102   CO2 mmol/L 28   BUN mg/dL 26*   CREATININE mg/dL 1 34*   ANION GAP mmol/L 9   CALCIUM mg/dL 9 8   ALBUMIN g/dL 4 0   TOTAL BILIRUBIN mg/dL 0 80   ALK PHOS U/L 109   ALT U/L 27   AST U/L 45   GLUCOSE RANDOM mg/dL 98       Results from last 7 days  Lab Units 01/27/19  2354   INR  0 99               Results from last 7 days  Lab Units 01/27/19  2354   LACTIC ACID mmol/L 1 7       Imaging: I have personally reviewed pertinent reports        CTA ED chest PE study   Final Result by Angie Nichols DO (01/28 0248)      No evidence of central pulmonary embolus Moderately enlarged lymph nodes versus right hilar mass  Follow-up with pulmonary is recommended  Tissue sampling can be obtained to rule out malignancy             I personally discussed this study with Magaly Hua on 1/28/2019 at 2:45 AM                            Workstation performed: [id number]         XR chest 2 views    (Results Pending)       EKG, Pathology, and Other Studies Reviewed on Admission:   · EKG: SR with occasional PVCs    Allscripts / Epic Records Reviewed: Yes     ** Please Note: This note has been constructed using a voice recognition system   **

## 2019-01-28 NOTE — CONSULTS
Consultation - Pulmonary Medicine - Transfer to [address] [place] [address],[facility], [address][id number]  77 y o  male MRN: [id number]  Unit/Bed#: -01 Encounter: [id number]      Assessment/Plan:    Acute hypoxic respiratory failure, multifactorial due to below   [place] as oxygen at home, but does not require it outside of acute illness  Titrate FiO2 to maintain saturations greater than or equal to 88%  Aggressive pulmonary hygiene with nebulizers and vest therapy/NT suctioning as needed  Add flutter valve and incentive spirometer  Further plan as below  Abnormal chest x-ray/CT chest with right-sided atelectasis/partial opacification of the right hemithorax suspect due to mucus plugging +/- right hilar mass versus adenopathy measuring approximately 4 centimeters with severe narrowing of right mainstem bronchus   Pulmonary hygiene as listed  No indication for antibiotics from pulmonary standpoint at this time  Follow-up procalcitonin  Send sputum culture if able  Once able to stabilize oxygenation, may need diagnostic and therapeutic bronchoscopy for further delineation of right mainstem bronchus narrowing and mucus plugging  Chronic cough, multifactorial due to dysphagia with chronic aspiration and tracheobronchomalacia   Continue aspiration precautions and modified diet (dysphagia level one nectar thick) once able to take p o  NPO status for now  Continue Tessalon and Tussionex once able to take p o  COPD of unknown severity with acute exacerbation   Wheezing on exam    Continue Xopenex/Atrovent every 6 hours with twice daily Pulmicort  Start Solu-Medrol 40 milligrams IV b i d  now  CKD3 with bacteriuria   Follow-up creatinine  Continue ceftriaxone and follow up urine culture  If negative, discontinue antibiotics  History of lung cancer   Status post right lower lobe lobectomy in 2002 at [facility]  History of CVA   Continue aspirin and Plavix     Left-sided weakness, dysarthria, and facial droop are chronic and at baseline  Hyperlipidemia   Continue statin  Hypertension, controlled   Continue Coreg, amlodipine, and spironolactone  Thrombocytopenia   Chronic and stable  Discussed with Internal Medicine at bedside  Will transfer to medical critical care service in the ICU for closer monitoring  Once able to optimize oxygenation, may need bronchoscopy if unable to dislodge mucus plugging and for further evaluation of right mainstem bronchus narrowing  Dr Bryce Kuo discussed case with patient's daughter who reiterated full code status  Discussed with nursing and respiratory therapy  Case discussed with Dr Vito Patel  Agrees with plan as above  Case then discussed with patient's daughter at bedside once she arrived  All questions answered  History of Present Illness   Physician Requesting Consult: Katy Soni MD  Reason for Consult / Principal Problem: Lung mass  Hx and PE limited by: None  Chief Complaint:  I was coughing up a lot of mucus    HPI: Wally Wood  is a 77 y o   male who presented to [address] [place] [address] with complaints of cough  I was called to the bedside urgently for evaluation of Kan Husk due to increased oxygen requirements and abnormal chest x-ray  He was transitioned from regular nasal cannula to non-rebreather mask to 100% high-flow nasal cannula 50 liter flow  At present, he appears comfortable and is able to give history  He reports that he started with a cough over the last 1 to 2 days  He noted copious white mucus production and worsening cough  He did not have any hemoptysis or purulent sputum production  He was also having shortness of breath due to intractable cough  He was having some chest tightness with coughing, but no anterior chest pain  He denies any fevers, chills, or night sweats  He denies any leg pain or swelling  He denies any nausea, vomiting, or diarrhea    He denies any hematuria, dysuria, or other urinary symptoms  He reports he has been eating fairly well and has had minimal difficulty with swallowing per his report  He denies any other complaints  From a pulmonary standpoint, Vel Butler follows with Dr Francisco Brown  He maintains on DuoNeb nebulized 4 times daily with twice daily Pulmicort  He also uses Tessalon and Tussionex  He does have oxygen at home, but does not wear it consistently  His daughter reports that his saturations are always 95% without it unless he is acutely ill  Inpatient consult to Pulmonology  Consult performed by: Chris Ruiz ordered by: Abisai Lugo        Review of Systems   All other systems reviewed and are negative  A full 12-point review of systems was completed and is negative except for those outlined in the HPI  Historical Information   Past Medical History:   Diagnosis Date    RONAL (acute kidney injury) ([facility] ) 5/31/2017    Aspiration into respiratory tract     Chest pain 5/31/2017    COPD (chronic obstructive pulmonary disease) (HCC)     Depression     Elevated troponin 5/31/2017    GERD (gastroesophageal reflux disease)     History of CVA (cerebrovascular accident) 4/13/2016    Hyperlipidemia     Hypertension     Lung cancer ([facility] ) 2005    Right, status post lobectomy    Pneumonia     Prostate cancer ([facility] )     Sleep apnea     awaiting sleep study results    Stroke ([facility] )     Tracheomalacia     Weakness due to cerebrovascular accident (CVA)      Past Surgical History:   Procedure Laterality Date    COLONOSCOPY      ESOPHAGOGASTRODUODENOSCOPY N/A 4/13/2016    Procedure: ESOPHAGOGASTRODUODENOSCOPY (EGD); Surgeon: Rosey Kellogg MD;  [facility]; Service:     JOINT REPLACEMENT      knee replacement    LUNG LOBECTOMY      FL ESOPHAGOGASTRODUODENOSCOPY TRANSORAL DIAGNOSTIC N/A 3/15/2017    Procedure: ESOPHAGOGASTRODUODENOSCOPY (EGD);   Surgeon: Rosey Kellogg MD;  [facility]: [facility]; Service: Gastroenterology    TRIGEMINAL NERVE DECOMPRESSION       Social History   History   Alcohol Use No     History   Drug Use No     History   Smoking Status    Former Smoker    Packs/day: 1 00    Years: 22 00    Types: Cigarettes    Start date: 5    Quit date: 1977   Smokeless Tobacco    Never Used     Occupational History: Retired  Lives with family      Family History:   Family History   Problem Relation Age of Onset    Family history unknown: Yes       Meds/Allergies   all current active meds have been reviewed, pertinent pulmonary meds have been reviewed, current meds:   Current Facility-Administered Medications   Medication Dose Route Frequency    acetaminophen (TYLENOL) tablet 650 mg  650 mg Oral Q6H PRN    albuterol (PROVENTIL HFA,VENTOLIN HFA) inhaler 2 puff  2 puff Inhalation 4x Daily    amLODIPine (NORVASC) tablet 10 mg  10 mg Oral Daily    aspirin chewable tablet 81 mg  81 mg Oral Daily    atorvastatin (LIPITOR) tablet 10 mg  10 mg Oral Daily With Dinner    benzonatate (TESSALON PERLES) capsule 200 mg  200 mg Oral TID PRN    budesonide (PULMICORT) inhalation solution 0 5 mg  0 5 mg Nebulization BID    carvedilol (COREG) tablet 12 5 mg  12 5 mg Oral BID With Meals    cefTRIAXone (ROCEPHIN) 1,000 mg in dextrose 5 % 50 mL IVPB  1,000 mg Intravenous Q24H    clopidogrel (PLAVIX) tablet 75 mg  75 mg Oral Daily    dextromethorphan-guaiFENesin (ROBITUSSIN DM)  mg/5 mL oral syrup 5 mL  5 mL Oral TID PRN    docusate sodium (COLACE) capsule 100 mg  100 mg Oral BID    fluticasone (FLONASE) 50 mcg/act nasal spray 2 spray  2 spray Nasal Daily    heparin (porcine) subcutaneous injection 5,000 Units  5,000 Units Subcutaneous Q8H [address]    ipratropium (ATROVENT) 0 02 % inhalation solution 0 5 mg  0 5 mg Nebulization Q6H    ipratropium-albuterol (DUO-NEB) 0 5-2 5 mg/3 mL inhalation solution 3 mL  3 mL Nebulization 4x Daily PRN    latanoprost (XALATAN) 0 005 % ophthalmic solution 1 drop 1 drop Both Eyes HS    levalbuterol (XOPENEX) inhalation solution 1 25 mg  1 25 mg Nebulization Q6H    meclizine (ANTIVERT) tablet 25 mg  25 mg Oral TID PRN    ondansetron (ZOFRAN) injection 4 mg  4 mg Intravenous Q8H PRN    oxybutynin (DITROPAN-XL) 24 hr tablet 5 mg  5 mg Oral Daily    pantoprazole (PROTONIX) EC tablet 40 mg  40 mg Oral Early Morning    PARoxetine (PAXIL) tablet 20 mg  20 mg Oral Daily    polyethylene glycol (MIRALAX) packet 17 g  17 g Oral Daily PRN    prednisoLONE acetate (PRED FORTE) 1 % ophthalmic suspension 1 drop  1 drop Right Eye 4x Daily    spironolactone (ALDACTONE) tablet 25 mg  25 mg Oral Daily    tamsulosin (FLOMAX) capsule 0 4 mg  0 4 mg Oral Daily    and PTA meds:   Prior to Admission Medications   Prescriptions Last Dose Informant Patient Reported? Taking?    ASPIRIN 81 PO   Yes Yes   Sig: Take 1 tablet by mouth every other day     Fesoterodine Fumarate ER (TOVIAZ) 8 MG TB24  Outside Facility (Specify) Yes Yes   Sig: Take 4 mg by mouth every evening     LORazepam (ATIVAN) 0 5 mg tablet   No No   Sig: Take 0 5 tablets (0 25 mg total) by mouth daily for 10 days Daily @@ 0900   PARoxetine (PAXIL) 20 mg tablet   Yes Yes   Sig: Take 1 tablet by mouth daily   acetaminophen (TYLENOL) 325 mg tablet   No Yes   Sig: Take 2 tablets by mouth every 6 (six) hours as needed for fever   albuterol (PROVENTIL HFA,VENTOLIN HFA) 90 mcg/act inhaler   No Yes   Sig: Inhale 2 puffs 4 (four) times a day   amLODIPine (NORVASC) 10 mg tablet   Yes Yes   Sig: Take 1 tablet by mouth daily   atorvastatin (LIPITOR) 10 mg tablet   Yes Yes   Sig: Take 1 tablet by mouth   benzonatate (TESSALON) 200 MG capsule   No Yes   Sig: Take 1 capsule (200 mg total) by mouth 3 (three) times a day as needed for cough   bromfenac sodium (PROLENSA) 0 07 % SOLN   Yes Yes   Sig: Apply 1 drop to eye daily   budesonide (PULMICORT) 0 5 mg/2 mL nebulizer solution   No Yes   Sig: Take 1 vial (0 5 mg total) by nebulization 2 (two) times a day for 90 days Rinse mouth after use     carvedilol (COREG) 12 5 mg tablet   No Yes   Sig: Take 1 tablet by mouth 2 (two) times a day with meals   clopidogrel (PLAVIX) 75 mg tablet   No Yes   Sig: Take 1 tablet by mouth daily   dextromethorphan-guaiFENesin (ROBITUSSIN DM)  mg/5 mL syrup   No Yes   Sig: Take 5 mL by mouth 3 (three) times a day as needed for cough   docusate sodium (COLACE) 100 mg capsule   No Yes   Sig: Take 1 capsule (100 mg total) by mouth 2 (two) times a day   fluticasone (FLONASE) 50 mcg/act nasal spray   Yes Yes   Si-2 sprays into each nostril daily   ipratropium-albuterol (DUO-NEB) 0 5-2 5 mg/3 mL nebulizer solution   Yes Yes   Sig: Take 3 mL by nebulization 4 (four) times a day as needed for wheezing or shortness of breath   latanoprost (XALATAN) 0 005 % ophthalmic solution   Yes Yes   Sig: Administer 1 drop to both eyes daily at bedtime   levocetirizine (XYZAL) 5 MG tablet   No Yes   Sig: Take 1 tablet by mouth every evening   magnesium hydroxide (MILK OF MAGNESIA) 400 mg/5 mL oral suspension   Yes Yes   Sig: Take 30 mL by mouth daily as needed for constipation   meclizine (ANTIVERT) 25 mg tablet   No Yes   Sig: Take 1 tablet by mouth 3 (three) times a day as needed for dizziness   omeprazole (PriLOSEC) 40 MG capsule   Yes Yes   Sig: Take 1 capsule by mouth daily   polyethylene glycol (MIRALAX) 17 g packet   Yes Yes   Sig: Take 17 g by mouth daily as needed   prednisoLONE acetate (PRED FORTE) 1 % ophthalmic suspension   Yes Yes   Sig: Administer 1 drop to the right eye 4 (four) times a day   psyllium (METAMUCIL) 0 52 g capsule   Yes Yes   Sig: Take 0 52 g by mouth daily   senna (SENOKOT) 8 6 MG tablet   Yes Yes   Sig: Take 2 tablets by mouth daily at bedtime   spironolactone (ALDACTONE) 25 mg tablet   No Yes   Sig: Take 1 tablet (25 mg total) by mouth daily   tamsulosin (FLOMAX) 0 4 mg   Yes Yes   Sig: Take 1 capsule by mouth daily      Facility-Administered Medications: None       Allergies   Allergen Reactions    Penicillins Rash       Objective   Vitals: Blood pressure 117/56, pulse 86, temperature 97 7 °F (36 5 °C), temperature source Oral, resp  rate 20, height 6' 5" (1 956 m), weight 95 2 kg (209 lb 14 1 oz), SpO2 92 %  100% 50L HFNC,Body mass index is 24 89 kg/m²  Intake/Output Summary (Last 24 hours) at 01/28/19 0841  Last data filed at 01/28/19 0501   Gross per 24 hour   Intake               50 ml   Output              400 ml   Net             -350 ml     Invasive Devices     Peripheral Intravenous Line            Peripheral IV 01/27/19 Right Antecubital less than 1 day                Physical Exam   Constitutional: He is oriented to person, place, and time  He appears well-developed and well-nourished  He is cooperative  Non-toxic appearance  He has a sickly appearance  He appears ill  No distress  Nasal cannula in place  HENT:   Head: Normocephalic and atraumatic  Mouth/Throat: No oropharyngeal exudate  Eyes: EOM are normal  No scleral icterus  Neck: Neck supple  No JVD present  No tracheal deviation present  Cardiovascular: Regular rhythm, S1 normal and S2 normal   Tachycardia present  Exam reveals no gallop and no friction rub  No murmur heard  Pulses:       Dorsalis pedis pulses are 2+ on the right side, and 2+ on the left side  Pulmonary/Chest: Effort normal  No accessory muscle usage or stridor  No tachypnea  No respiratory distress  He has decreased breath sounds in the right middle field and the right lower field  He has wheezes  He has no rhonchi  He has no rales  He exhibits no tenderness  Abdominal: Soft  Bowel sounds are normal  He exhibits no distension  There is no tenderness  There is no rebound and no guarding  Musculoskeletal: He exhibits no edema or tenderness  Neurological: He is alert and oriented to person, place, and time  GCS eye subscore is 4  GCS verbal subscore is 5  GCS motor subscore is 6     Chronic left-sided weakness post-CVA  Skin: Skin is warm and dry  No ecchymosis and no rash noted  He is not diaphoretic  No cyanosis  Nails show no clubbing  Psychiatric: He has a normal mood and affect  His behavior is normal    Mild dysarthria at baseline  Left facial droop at baseline  Vitals reviewed  Lab Results:   CBC:   Lab Results   Component Value Date    WBC 9 28 01/28/2019    HGB 14 5 01/28/2019    HCT 44 0 01/28/2019    MCV 90 01/28/2019     (L) 01/28/2019    MCH 29 8 01/28/2019    MCHC 33 0 01/28/2019    RDW 14 0 01/28/2019    MPV 11 1 01/28/2019    NRBC 0 01/28/2019   , CMP:   Lab Results   Component Value Date    SODIUM 139 01/27/2019    K 5 2 01/27/2019     01/27/2019    CO2 28 01/27/2019    BUN 26 (H) 01/27/2019    CREATININE 1 34 (H) 01/27/2019    CALCIUM 9 8 01/27/2019    AST 45 01/27/2019    ALT 27 01/27/2019    ALKPHOS 109 01/27/2019    EGFR 50 01/27/2019         Lactic acid 1 7    Procalcitonin pending    ABG pending    Troponin less than 0 02    PTT 28    INR 0 99    Blood cultures x2 pending    Imaging Studies: I have personally reviewed pertinent reports  and I have personally reviewed pertinent films in PACS   Chest x-ray done yesterday shows no acute infiltrate, but does show chronic dextroscoliosis and right rib resection   Chest x-ray done this morning shows right-sided volume loss an opacification of the right hemithorax concerning for atelectasis due to mucus plugging with possible small right pleural effusion  Left hemithorax is unchanged  CTA chest done yesterday shows no PE, but does show right hilar adenopathy versus mass with significant narrowing of the right mainstem bronchus  EKG, Pathology, and Other Studies: None    Pulmonary Results (PFTs, PSG): None    VTE Prophylaxis: Sequential compression device (Venodyne)  and Heparin    Code Status: Level 1 - Full Code    None    Portions of the record may have been created with voice recognition software  Occasional wrong word or "sound a like" substitutions may have occurred due to the inherent limitations of voice recognition software  Read the chart carefully and recognize, using context, where substitutions have occurred

## 2019-01-29 ENCOUNTER — APPOINTMENT (INPATIENT)
Dept: RADIOLOGY | Facility: HOSPITAL | Age: 79
DRG: 189 | End: 2019-01-29
Payer: MEDICARE

## 2019-01-29 PROBLEM — N40.0 BPH (BENIGN PROSTATIC HYPERPLASIA): Chronic | Status: ACTIVE | Noted: 2018-11-29

## 2019-01-29 PROBLEM — D69.6 THROMBOCYTOPENIA (HCC): Chronic | Status: ACTIVE | Noted: 2019-01-29

## 2019-01-29 PROBLEM — R05.3 CHRONIC COUGH: Chronic | Status: ACTIVE | Noted: 2018-08-22

## 2019-01-29 PROBLEM — J69.0 ASPIRATION PNEUMONIA (HCC): Status: RESOLVED | Noted: 2018-11-02 | Resolved: 2019-01-29

## 2019-01-29 PROBLEM — J44.1 COPD WITH ACUTE EXACERBATION (HCC): Chronic | Status: ACTIVE | Noted: 2018-11-29

## 2019-01-29 PROBLEM — J44.0 CHRONIC OBSTRUCTIVE PULMONARY DISEASE WITH ACUTE LOWER RESPIRATORY INFECTION (HCC): Chronic | Status: ACTIVE | Noted: 2018-02-01

## 2019-01-29 PROBLEM — J43.8 OTHER EMPHYSEMA (HCC): Chronic | Status: ACTIVE | Noted: 2018-08-22

## 2019-01-29 PROBLEM — J18.9 RIGHT LOWER LOBE PNEUMONIA: Status: RESOLVED | Noted: 2018-07-15 | Resolved: 2019-01-29

## 2019-01-29 PROBLEM — R53.1 LEFT-SIDED WEAKNESS: Chronic | Status: ACTIVE | Noted: 2017-04-11

## 2019-01-29 PROBLEM — G47.33 OSA (OBSTRUCTIVE SLEEP APNEA): Chronic | Status: ACTIVE | Noted: 2018-11-29

## 2019-01-29 PROBLEM — D69.6 THROMBOCYTOPENIA (HCC): Status: ACTIVE | Noted: 2019-01-29

## 2019-01-29 PROBLEM — N17.9 AKI (ACUTE KIDNEY INJURY) (HCC): Status: RESOLVED | Noted: 2018-02-02 | Resolved: 2019-01-29

## 2019-01-29 LAB
ANION GAP SERPL CALCULATED.3IONS-SCNC: 12 MMOL/L (ref 4–13)
ATRIAL RATE: 96 BPM
B PARAPERT DNA SPEC QL NAA+PROBE: NOT DETECTED
B PERT DNA SPEC QL NAA+PROBE: NOT DETECTED
BASE EXCESS BLDA CALC-SCNC: -1 MMOL/L (ref -2–3)
BASOPHILS # BLD MANUAL: 0 THOUSAND/UL (ref 0–0.1)
BASOPHILS NFR MAR MANUAL: 0 % (ref 0–1)
BUN SERPL-MCNC: 25 MG/DL (ref 5–25)
CA-I BLD-SCNC: 1.27 MMOL/L (ref 1.12–1.32)
CALCIUM SERPL-MCNC: 9.4 MG/DL (ref 8.3–10.1)
CHLORIDE SERPL-SCNC: 103 MMOL/L (ref 100–108)
CO2 SERPL-SCNC: 24 MMOL/L (ref 21–32)
CREAT SERPL-MCNC: 1.14 MG/DL (ref 0.6–1.3)
EOSINOPHIL # BLD MANUAL: 0 THOUSAND/UL (ref 0–0.4)
EOSINOPHIL NFR BLD MANUAL: 0 % (ref 0–6)
ERYTHROCYTE [DISTWIDTH] IN BLOOD BY AUTOMATED COUNT: 13.9 % (ref 11.6–15.1)
FIO2 GAS DIL.REBREATH: 21 L
GFR SERPL CREATININE-BSD FRML MDRD: 61 ML/MIN/1.73SQ M
GLUCOSE SERPL-MCNC: 122 MG/DL (ref 65–140)
GLUCOSE SERPL-MCNC: 124 MG/DL (ref 65–140)
GLUCOSE SERPL-MCNC: 128 MG/DL (ref 65–140)
GLUCOSE SERPL-MCNC: 135 MG/DL (ref 65–140)
GLUCOSE SERPL-MCNC: 141 MG/DL (ref 65–140)
HCO3 BLDA-SCNC: 22.1 MMOL/L (ref 22–28)
HCT VFR BLD AUTO: 43.8 % (ref 36.5–49.3)
HCT VFR BLD CALC: 40 % (ref 36.5–49.3)
HGB BLD-MCNC: 14.6 G/DL (ref 12–17)
HGB BLDA-MCNC: 13.6 G/DL (ref 12–17)
LYMPHOCYTES # BLD AUTO: 1.06 THOUSAND/UL (ref 0.6–4.47)
LYMPHOCYTES # BLD AUTO: 9 % (ref 14–44)
MAGNESIUM SERPL-MCNC: 1.8 MG/DL (ref 1.6–2.6)
MCH RBC QN AUTO: 30 PG (ref 26.8–34.3)
MCHC RBC AUTO-ENTMCNC: 33.3 G/DL (ref 31.4–37.4)
MCV RBC AUTO: 90 FL (ref 82–98)
MONOCYTES # BLD AUTO: 0.35 THOUSAND/UL (ref 0–1.22)
MONOCYTES NFR BLD: 3 % (ref 4–12)
NEUTROPHILS # BLD MANUAL: 10.41 THOUSAND/UL (ref 1.85–7.62)
NEUTS BAND NFR BLD MANUAL: 3 % (ref 0–8)
NEUTS SEG NFR BLD AUTO: 85 % (ref 43–75)
NRBC BLD AUTO-RTO: 0 /100 WBCS
P AXIS: 75 DEGREES
PCO2 BLD: 23 MMOL/L (ref 21–32)
PCO2 BLD: 30.2 MM HG (ref 36–44)
PH BLD: 7.47 [PH] (ref 7.35–7.45)
PLATELET # BLD AUTO: 136 THOUSANDS/UL (ref 149–390)
PLATELET BLD QL SMEAR: ADEQUATE
PMV BLD AUTO: 11.5 FL (ref 8.9–12.7)
PO2 BLD: 58 MM HG (ref 75–129)
POTASSIUM BLD-SCNC: 3.8 MMOL/L (ref 3.5–5.3)
POTASSIUM SERPL-SCNC: 4 MMOL/L (ref 3.5–5.3)
PR INTERVAL: 220 MS
QRS AXIS: 46 DEGREES
QRSD INTERVAL: 94 MS
QT INTERVAL: 358 MS
QTC INTERVAL: 437 MS
RBC # BLD AUTO: 4.86 MILLION/UL (ref 3.88–5.62)
SAO2 % BLD FROM PO2: 92 % (ref 95–98)
SODIUM BLD-SCNC: 141 MMOL/L (ref 136–145)
SODIUM SERPL-SCNC: 139 MMOL/L (ref 136–145)
SPECIMEN SOURCE: ABNORMAL
T WAVE AXIS: 81 DEGREES
TOTAL CELLS COUNTED SPEC: 100
VENTRICULAR RATE: 90 BPM
WBC # BLD AUTO: 11.83 THOUSAND/UL (ref 4.31–10.16)

## 2019-01-29 PROCEDURE — 87205 SMEAR GRAM STAIN: CPT | Performed by: NURSE PRACTITIONER

## 2019-01-29 PROCEDURE — 85014 HEMATOCRIT: CPT

## 2019-01-29 PROCEDURE — 94640 AIRWAY INHALATION TREATMENT: CPT

## 2019-01-29 PROCEDURE — 82803 BLOOD GASES ANY COMBINATION: CPT

## 2019-01-29 PROCEDURE — 94644 CONT INHLJ TX 1ST HOUR: CPT

## 2019-01-29 PROCEDURE — 80048 BASIC METABOLIC PNL TOTAL CA: CPT | Performed by: NURSE PRACTITIONER

## 2019-01-29 PROCEDURE — 85027 COMPLETE CBC AUTOMATED: CPT | Performed by: NURSE PRACTITIONER

## 2019-01-29 PROCEDURE — 87801 DETECT AGNT MULT DNA AMPLI: CPT | Performed by: PHYSICIAN ASSISTANT

## 2019-01-29 PROCEDURE — 85007 BL SMEAR W/DIFF WBC COUNT: CPT | Performed by: NURSE PRACTITIONER

## 2019-01-29 PROCEDURE — 94760 N-INVAS EAR/PLS OXIMETRY 1: CPT

## 2019-01-29 PROCEDURE — 94668 MNPJ CHEST WALL SBSQ: CPT

## 2019-01-29 PROCEDURE — 99232 SBSQ HOSP IP/OBS MODERATE 35: CPT | Performed by: HOSPITALIST

## 2019-01-29 PROCEDURE — 93010 ELECTROCARDIOGRAM REPORT: CPT | Performed by: INTERNAL MEDICINE

## 2019-01-29 PROCEDURE — 87070 CULTURE OTHR SPECIMN AEROBIC: CPT | Performed by: NURSE PRACTITIONER

## 2019-01-29 PROCEDURE — 99233 SBSQ HOSP IP/OBS HIGH 50: CPT | Performed by: INTERNAL MEDICINE

## 2019-01-29 PROCEDURE — 36600 WITHDRAWAL OF ARTERIAL BLOOD: CPT

## 2019-01-29 PROCEDURE — 84132 ASSAY OF SERUM POTASSIUM: CPT

## 2019-01-29 PROCEDURE — 83735 ASSAY OF MAGNESIUM: CPT | Performed by: NURSE PRACTITIONER

## 2019-01-29 PROCEDURE — 84295 ASSAY OF SERUM SODIUM: CPT

## 2019-01-29 PROCEDURE — 87081 CULTURE SCREEN ONLY: CPT | Performed by: PHYSICIAN ASSISTANT

## 2019-01-29 PROCEDURE — 94660 CPAP INITIATION&MGMT: CPT

## 2019-01-29 PROCEDURE — 82330 ASSAY OF CALCIUM: CPT

## 2019-01-29 PROCEDURE — 82948 REAGENT STRIP/BLOOD GLUCOSE: CPT

## 2019-01-29 PROCEDURE — 82947 ASSAY GLUCOSE BLOOD QUANT: CPT

## 2019-01-29 PROCEDURE — 71045 X-RAY EXAM CHEST 1 VIEW: CPT

## 2019-01-29 PROCEDURE — 94669 MECHANICAL CHEST WALL OSCILL: CPT

## 2019-01-29 RX ORDER — GUAIFENESIN 600 MG
600 TABLET, EXTENDED RELEASE 12 HR ORAL EVERY 12 HOURS SCHEDULED
Status: DISCONTINUED | OUTPATIENT
Start: 2019-01-29 | End: 2019-02-02 | Stop reason: HOSPADM

## 2019-01-29 RX ORDER — METHYLPREDNISOLONE SODIUM SUCCINATE 40 MG/ML
40 INJECTION, POWDER, LYOPHILIZED, FOR SOLUTION INTRAMUSCULAR; INTRAVENOUS DAILY
Status: DISCONTINUED | OUTPATIENT
Start: 2019-01-30 | End: 2019-01-30

## 2019-01-29 RX ORDER — ACETYLCYSTEINE 200 MG/ML
3 SOLUTION ORAL; RESPIRATORY (INHALATION) ONCE
Status: COMPLETED | OUTPATIENT
Start: 2019-01-29 | End: 2019-01-29

## 2019-01-29 RX ORDER — SENNOSIDES 8.6 MG
2 TABLET ORAL
Status: DISCONTINUED | OUTPATIENT
Start: 2019-01-29 | End: 2019-02-02 | Stop reason: HOSPADM

## 2019-01-29 RX ADMIN — FLUTICASONE PROPIONATE 2 SPRAY: 50 SPRAY, METERED NASAL at 08:39

## 2019-01-29 RX ADMIN — SPIRONOLACTONE 25 MG: 25 TABLET, FILM COATED ORAL at 08:35

## 2019-01-29 RX ADMIN — GUAIFENESIN 600 MG: 600 TABLET, EXTENDED RELEASE ORAL at 08:35

## 2019-01-29 RX ADMIN — DOCUSATE SODIUM 100 MG: 100 CAPSULE, LIQUID FILLED ORAL at 08:35

## 2019-01-29 RX ADMIN — BUDESONIDE 0.5 MG: 0.5 INHALANT RESPIRATORY (INHALATION) at 07:34

## 2019-01-29 RX ADMIN — IPRATROPIUM BROMIDE 0.5 MG: 0.5 SOLUTION RESPIRATORY (INHALATION) at 20:09

## 2019-01-29 RX ADMIN — IPRATROPIUM BROMIDE 0.5 MG: 0.5 SOLUTION RESPIRATORY (INHALATION) at 03:02

## 2019-01-29 RX ADMIN — IPRATROPIUM BROMIDE 0.5 MG: 0.5 SOLUTION RESPIRATORY (INHALATION) at 13:29

## 2019-01-29 RX ADMIN — HEPARIN SODIUM 5000 UNITS: 5000 INJECTION, SOLUTION INTRAVENOUS; SUBCUTANEOUS at 13:55

## 2019-01-29 RX ADMIN — METHYLPREDNISOLONE SODIUM SUCCINATE 40 MG: 40 INJECTION, POWDER, FOR SOLUTION INTRAMUSCULAR; INTRAVENOUS at 08:35

## 2019-01-29 RX ADMIN — IPRATROPIUM BROMIDE 0.5 MG: 0.5 SOLUTION RESPIRATORY (INHALATION) at 07:34

## 2019-01-29 RX ADMIN — AMLODIPINE BESYLATE 10 MG: 10 TABLET ORAL at 08:33

## 2019-01-29 RX ADMIN — PANTOPRAZOLE SODIUM 40 MG: 40 TABLET, DELAYED RELEASE ORAL at 05:11

## 2019-01-29 RX ADMIN — TAMSULOSIN HYDROCHLORIDE 0.4 MG: 0.4 CAPSULE ORAL at 08:33

## 2019-01-29 RX ADMIN — LEVALBUTEROL HYDROCHLORIDE 1.25 MG: 1.25 SOLUTION, CONCENTRATE RESPIRATORY (INHALATION) at 03:02

## 2019-01-29 RX ADMIN — ASPIRIN 81 MG 81 MG: 81 TABLET ORAL at 08:35

## 2019-01-29 RX ADMIN — PAROXETINE HYDROCHLORIDE 20 MG: 20 TABLET, FILM COATED ORAL at 08:35

## 2019-01-29 RX ADMIN — LATANOPROST 1 DROP: 50 SOLUTION OPHTHALMIC at 21:29

## 2019-01-29 RX ADMIN — OXYBUTYNIN CHLORIDE 5 MG: 5 TABLET, EXTENDED RELEASE ORAL at 08:38

## 2019-01-29 RX ADMIN — HEPARIN SODIUM 5000 UNITS: 5000 INJECTION, SOLUTION INTRAVENOUS; SUBCUTANEOUS at 05:11

## 2019-01-29 RX ADMIN — CARVEDILOL 12.5 MG: 12.5 TABLET, FILM COATED ORAL at 08:34

## 2019-01-29 RX ADMIN — ACETYLCYSTEINE 600 MG: 200 SOLUTION ORAL; RESPIRATORY (INHALATION) at 07:34

## 2019-01-29 RX ADMIN — GUAIFENESIN 600 MG: 600 TABLET, EXTENDED RELEASE ORAL at 21:08

## 2019-01-29 RX ADMIN — LEVALBUTEROL HYDROCHLORIDE 1.25 MG: 1.25 SOLUTION, CONCENTRATE RESPIRATORY (INHALATION) at 13:29

## 2019-01-29 RX ADMIN — BUDESONIDE 0.5 MG: 0.5 INHALANT RESPIRATORY (INHALATION) at 20:09

## 2019-01-29 RX ADMIN — HEPARIN SODIUM 5000 UNITS: 5000 INJECTION, SOLUTION INTRAVENOUS; SUBCUTANEOUS at 21:08

## 2019-01-29 RX ADMIN — LEVALBUTEROL HYDROCHLORIDE 1.25 MG: 1.25 SOLUTION, CONCENTRATE RESPIRATORY (INHALATION) at 07:34

## 2019-01-29 RX ADMIN — ATORVASTATIN CALCIUM 10 MG: 10 TABLET, FILM COATED ORAL at 15:39

## 2019-01-29 RX ADMIN — LEVALBUTEROL HYDROCHLORIDE 1.25 MG: 1.25 SOLUTION, CONCENTRATE RESPIRATORY (INHALATION) at 20:09

## 2019-01-29 RX ADMIN — CLOPIDOGREL BISULFATE 75 MG: 75 TABLET ORAL at 08:34

## 2019-01-29 RX ADMIN — STANDARDIZED SENNA CONCENTRATE 17.2 MG: 8.6 TABLET ORAL at 21:08

## 2019-01-29 RX ADMIN — CARVEDILOL 12.5 MG: 12.5 TABLET, FILM COATED ORAL at 15:39

## 2019-01-29 NOTE — PROGRESS NOTES
Transfer Note - ICU Transfer to SD/MS tele   Issac Martinez  77 y o  male MRN: [id number]  [facility]   Unit/Bed#:  Encounter: [id number]    Code Status: Level 1 - Full Code    Reason for ICU adm:  Acute respiratory failure with hypoxia    Active problems:   Principal Problem:    Acute respiratory failure with hypoxia ([facility] )  Active Problems:    Essential hypertension    Lung cancer ([place] [address] )    Oropharyngeal dysphagia    History of cerebrovascular accident (CVA) with residual deficit    GERD (gastroesophageal reflux disease)    Prostate cancer ([place] [address] )    Hyperlipidemia    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    COPD with acute exacerbation ([place] [address] )    Bacteriuria    Thrombocytopenia ([place] [address] )  Resolved Problems:    * No resolved hospital problems  *      Consultants: pulmonary     History of Present Illness:  Per DIANA Rubin:  Issac Martinez  is a 77 y o   male who presented to [address] [place] [address] with complaints of cough  I was called to the bedside urgently for evaluation of [place] due to increased oxygen requirements and abnormal chest x-ray  He was transitioned from regular nasal cannula to non-rebreather mask to 100% high-flow nasal cannula 50 liter flow  At present, he appears comfortable and is able to give history  He reports that he started with a cough over the last 1 to 2 days  He noted copious white mucus production and worsening cough  He did not have any hemoptysis or purulent sputum production  He was also having shortness of breath due to intractable cough  He was having some chest tightness with coughing, but no anterior chest pain  He denies any fevers, chills, or night sweats  He denies any leg pain or swelling  He denies any nausea, vomiting, or diarrhea  He denies any hematuria, dysuria, or other urinary symptoms    He reports he has been eating fairly well and has had minimal difficulty with swallowing per his report  He denies any other complaints      From a pulmonary standpoint, Zac follows with Dr Shanta Murphy  He maintains on DuoNeb nebulized 4 times daily with twice daily Pulmicort  He also uses Tessalon and Tussionex  He does have oxygen at home, but does not wear it consistently  His daughter reports that his saturations are always 95% without it unless he is acutely ill  Summary of clinical course:  Transfer to the ICU setting for acute hypoxic respiratory failure due to right lung atelectasis in the setting of poor pulmonary toileting, tracheobronchomalacia a mucous plugging  Received aggressive pulmonary toileting with chest physiotherapy  and mucolytic  Patient transition from high-flow nasal cannula to nasal cannula  Pulmonary continue to follow upon transfer, recommended continue high-frequency chest wall oscillation  Patient wears 2 L to 3 L nasal cannula at home p r n  Currently all critical care needs addressed at this time  Patient able to transition to step-down  level 2  Recent or scheduled procedures: None     Outstanding/pending diagnostics: None     Cultures:  No growth to date        Mobilization Plan:  OOB as tolerated     Nutrition Plan: [place] diet pureed with nectar thick liquids secondary to previous CVA with residual    Discharge Plan:   Case Management consulted for assistance with disposition     Specific Diagnosis Plan:  · Acute hypoxic respiratory failure due to right lung atelectasis in the setting of poor pulmonary toilet, tracheobronchomalacia and mucous plugging  - patient has improved with chest physiotherapy and mucolytics  pulmonology following     · COPD of unknown severity with acute exacerbation  - continue Xopenex and Atrovent, agree with IV steroids    Spoke with Dr Endy Quiles  regarding transfer  Please call ext [phone] with any questions or concerns  Portions of the record may have been created with voice recognition software    Occasional wrong word or "sound a like" substitutions may have occurred due to the inherent limitations of voice recognition software  Read the chart carefully and recognize, using context, where substitutions have occurred      Karyle Mills, [address]

## 2019-01-29 NOTE — PROGRESS NOTES
Progress Note - Critical Care   Gladstone Habermann  77 y o  male MRN: [id number]  Unit/Bed#:  Encounter: [id number]    Attending Physician: Ling Baxter DO      ______________________________________________________________________  Assessment and Plan:   Principal Problem:    Acute respiratory failure with hypoxia [facility])  Active Problems:    Essential hypertension    Lung cancer ([facility] )    Oropharyngeal dysphagia    History of cerebrovascular accident (CVA) with residual deficit    GERD (gastroesophageal reflux disease)    Prostate cancer ([facility] )    Hyperlipidemia    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    COPD with acute exacerbation (HCC)    Bacteriuria    Thrombocytopenia ([facility] )  Resolved Problems:    * No resolved hospital problems   *    Neuro:  Depression/hx CVA w left hemiplegia/dysarthria/facial droop  · Continue home Paxil/ASA/plavix    CV:  Hypertension/hyperlipidemia  · Continue home statin/Coreg/Norvasc/spironolactone    Pulm:  Acute hypoxic respiratory failure 2/2 mucus plugging/abnormal CT of the chest with right hilar f ullness r/o mass/Lung CA s/p  right upper lobe lobectomy 2002/p r n   2-3 liters of oxygen/COPD w AE  · Wean high-flow nasal cannula for oxygen saturation greater than 88 percent  · Complete Mucomyst and transition to Mucinex  · Repeat CTC in 4 wks, may need PET with bronch/EBUS  · Cont steroids consider decreasing to daily  · Xopenex/atrovent/pulmicort  · Add flutter/IS to vest therapy  · Consider azithromycin for tracheobronchitis  · F/u am repeat CXR given decreased BS on right    GI:  GERD  · Home protonix    : CKD 3 baseline creat 1 2-1 3/asymptomatic bacteruria/hx prostate ca  · At baseline   · f/u am bmp  · Pt asymptomatic will not treat for UTI    F/E/N: f/u am bmp    ID: leukocytosis resolved on sterids  · Pct 0 06    Heme: chronic thrombocytopenia  · At baseline    Endo: add SSI on steroids     Msk/Skin: OOB PT    Disposition: wean hfnc to NC, likely downgrade    Code Status: Level 1 - Full Code    Counseling / Coordination of Care  Total time spent today 35 minutes  Greater than 50% of total time was spent with the patient and / or family counseling and / or coordination of care  A description of the counseling / coordination of care: plan of care  ______________________________________________________________________    Chief Complaint: c/o continued cough dry high pitched  Denies cp/sob  Slept ok    24 Hour Events: HFNC weaned to 40/40    Review of Systems   Constitutional: Negative  Respiratory: Positive for cough  Negative for shortness of breath  Cardiovascular: Negative       ______________________________________________________________________    Physical Exam:   Physical Exam   Constitutional: He is oriented to person, place, and time  No distress  HENT:   Head: Normocephalic and atraumatic  Mouth/Throat: Oropharynx is clear and moist    Eyes: Pupils are equal, round, and reactive to light  No scleral icterus  Neck: Neck supple  No JVD present  Cardiovascular: Normal rate, regular rhythm, normal heart sounds and intact distal pulses  Exam reveals no gallop and no friction rub  No murmur heard  Pulmonary/Chest: He has decreased breath sounds in the right middle field and the right lower field  He has rhonchi in the right upper field  Abdominal: Soft  Bowel sounds are normal  He exhibits no distension  There is no tenderness  Musculoskeletal: Normal range of motion  He exhibits no edema  Neurological: He is alert and oriented to person, place, and time  Left facial droop mild lue weakness   Skin: Skin is warm and dry  No erythema  No pallor       ______________________________________________________________________  Vitals:    01/28/19 2319 01/28/19 2324 01/29/19 0302 01/29/19 0438   BP:  130/61 132/67    BP Location:  Left arm Left arm    Pulse:  61 73    Resp:  20 (!) 25    Temp:  97 5 °F (36 4 °C) (!) 97 4 °F (36 3 °C)    TempSrc:  Oral Oral    SpO2: 95% 96% 96%    Weight:    93 kg (205 lb 0 4 oz)   Height:           Temperature:   Temp (24hrs), Av °F (36 7 °C), Min:97 4 °F (36 3 °C), Max:98 5 °F (36 9 °C)    Current Temperature: (!) 97 4 °F (36 3 °C)  Weights:   IBW: 89 1 kg    Body mass index is 24 31 kg/m²  Weight (last 2 days)     Date/Time   Weight    19 0438  93 (205 03)    19 0918  93 8 (206 79)    19 0435  95 2 (209 88)    19 2321  95 4 (210 32)            Hemodynamic Monitoring:  N/A     Non-Invasive/Invasive Ventilation Settings:  Respiratory    Lab Data (Last 4 hours)    None         O2/Vent Data (Last 4 hours)       030          Non-Invasive Ventilation Mode HFNC                 Lab Results   Component Value Date    PHART 7 465 (H) 2019    BSX3YVU 30 7 (L) 2019    PO2ART 71 1 (L) 2019    TKO7MCZ 21 6 (L) 2019    BEART -1 1 2019     SpO2: SpO2: 96 %  Intake and Outputs:  I/O        0701 -  0700  07 -  0700    IV Piggyback 50     Total Intake(mL/kg) 50 (0 5)     Urine (mL/kg/hr) 400 400 (0 2)    Total Output 400 400    Net -350 -400                Nutrition:        Diet Orders            Start     Ordered    19 014  Diet Cardiovascular; Cardiac  Diet effective now     Question Answer Comment   Diet Type Cardiovascular    Cardiac Cardiac    Special Instructions Aspiration precautions    RD to adjust diet per protocol?  No        19 0140    19 0652  Room Service  Once     Question:  Type of Service  Answer:  Room Service - Appropriate with Assistance    19 0651          Labs:     Results from last 7 days  Lab Units 19  0419 19  0613 19  0050   WBC Thousand/uL 11 83* 9 28 13 04*   HEMOGLOBIN g/dL 14 6 14 5 15 4   HEMATOCRIT % 43 8 44 0 46 0   PLATELETS Thousands/uL 136* 134* 139*   NEUTROS PCT %  --  69 82*   MONOS PCT %  --  12 8       Results from last 7 days  Lab Units 19  4976 SODIUM mmol/L 139   POTASSIUM mmol/L 5 2   CHLORIDE mmol/L 102   CO2 mmol/L 28   ANION GAP mmol/L 9   BUN mg/dL 26*   CREATININE mg/dL 1 34*   CALCIUM mg/dL 9 8   ALT U/L 27   AST U/L 45   ALK PHOS U/L 109   ALBUMIN g/dL 4 0   TOTAL BILIRUBIN mg/dL 0 80            Results from last 7 days  Lab Units 01/27/19  2354   INR  0 99   PTT seconds 28       Results from last 7 days  Lab Units 01/27/19  2354   TROPONIN I ng/mL <0 02       Results from last 7 days  Lab Units 01/27/19  2354   LACTIC ACID mmol/L 1 7     ABG:  Lab Results   Component Value Date    PHART 7 465 (H) 01/28/2019    HLL3CCQ 30 7 (L) 01/28/2019    PO2ART 71 1 (L) 01/28/2019    CZM0FON 21 6 (L) 01/28/2019    BEART -1 1 01/28/2019    SOURCE Radial, Right 07/30/2017     VBG:      Results from last 7 days  Lab Units 01/28/19  0613   PROCALCITONIN ng/ml 0 06     No results found for: [facility]   Imaging:  I have personally reviewed pertinent reports  and I have personally reviewed pertinent films in PACS  EKG: tele reviewed    Micro: Allergies:    Allergies   Allergen Reactions    Penicillins Rash     Medications:   Scheduled Meds:    Current Facility-Administered Medications:  acetaminophen 650 mg Oral Q6H PRN Dionicio Shadow, PA-C    albuterol 2 puff Inhalation Q4H PRN Juanda Newgeorge, CRNP    amLODIPine 10 mg Oral Daily Dionicio Shadow, PA-C    aspirin 81 mg Oral Daily Dionicio Shadow, PA-C    atorvastatin 10 mg Oral Daily With [employer], PA-C    benzonatate 200 mg Oral TID PRN Dionicio Shadow, PA-C    budesonide 0 5 mg Nebulization BID Dionicio Shadow, PA-C    carvedilol 12 5 mg Oral BID With Meals Dionicio Shadow, PA-C    cefTRIAXone 1,000 mg Intravenous Q24H Dionicio Shadow, PA-C Last Rate: 1,000 mg (01/28/19 0859)   clopidogrel 75 mg Oral Daily Dionicio Shadow, PA-C    dextromethorphan-guaiFENesin 5 mL Oral TID PRN Dionicio Shadow, PA-C    docusate sodium 100 mg Oral BID Dionicio Shadow, PA-C fluticasone 2 spray Nasal Daily Chaz Burn, PA-C    guaiFENesin 600 mg Oral Q12H [facility] DIANA Ha    heparin (porcine) 5,000 Units Subcutaneous Q8H [facility], PADEBO    insulin lispro 1-5 Units Subcutaneous HS DIANA Ha    insulin lispro 1-6 Units Subcutaneous TID AC DIANA Ha    ipratropium 0 5 mg Nebulization Q6H Golden Muir MD    latanoprost 1 drop Both Eyes HS [facility], LADY    levalbuterol 1 25 mg Nebulization Q6H Golden Muir MD    meclizine 25 mg Oral TID PRN Chaz Burn, LADY    methylPREDNISolone sodium succinate 40 mg Intravenous Q12H [facility] Doraine Meigs, CRNP    ondansetron 4 mg Intravenous Q8H PRN Chaz Burn, LADY    oxybutynin 5 mg Oral Daily Phyllis Nino PA-C    pantoprazole 40 mg Oral Early Morning Chaz Burn, PADEBO    PARoxetine 20 mg Oral Daily [facility], LADY    polyethylene glycol 17 g Oral Daily PRN Chaz Burn, LADY    sodium chloride 1 application Nasal U8X PRN Jet Bienenstock, LADY    spironolactone 25 mg Oral Daily Chaz Burn, LADY    tamsulosin 0 4 mg Oral Daily Phyllis Nino PA-C      Continuous Infusions:   PRN Meds:    acetaminophen 650 mg Q6H PRN   albuterol 2 puff Q4H PRN   benzonatate 200 mg TID PRN   dextromethorphan-guaiFENesin 5 mL TID PRN   meclizine 25 mg TID PRN   ondansetron 4 mg Q8H PRN   polyethylene glycol 17 g Daily PRN   sodium chloride 1 application O5V PRN     VTE Pharmacologic Prophylaxis: Heparin  VTE Mechanical Prophylaxis: sequential compression device  Invasive lines and devices: Invasive Devices     Peripheral Intravenous Line            Peripheral IV 01/27/19 Right Antecubital 1 day    Peripheral IV 01/29/19 Right Forearm less than 1 day                     Portions of the record may have been created with voice recognition software    Occasional wrong word or "sound a like" substitutions may have occurred due to the inherent limitations of voice recognition software  Read the chart carefully and recognize, using context, where substitutions have occurred      DIANA Estrella

## 2019-01-29 NOTE — PLAN OF CARE
Problem: RESPIRATORY - ADULT  Goal: Achieves optimal ventilation and oxygenation  INTERVENTIONS:  - Assess for changes in respiratory status  - Assess for changes in mentation and behavior  - Position to facilitate oxygenation and minimize respiratory effort  - Oxygen administration by appropriate delivery method based on oxygen saturation (per order) or ABGs  - Initiate smoking cessation education as indicated  - Encourage broncho-pulmonary hygiene including cough, deep breathe, Incentive Spirometry  - Assess the need for suctioning and aspirate as needed  - Assess and instruct to report SOB or any respiratory difficulty  - Respiratory Therapy support as indicated   Outcome: Progressing      Problem: CARDIOVASCULAR - ADULT  Goal: Maintains optimal cardiac output and hemodynamic stability  INTERVENTIONS:  - Monitor I/O, vital signs and rhythm  - Monitor for S/S and trends of decreased cardiac output i e  bleeding, hypotension  - Administer and titrate ordered vasoactive medications to optimize hemodynamic stability  - Assess quality of pulses, skin color and temperature  - Assess for signs of decreased coronary artery perfusion - ex   Angina  - Instruct patient to report change in severity of symptoms  Outcome: Progressing    Goal: Absence of cardiac dysrhythmias or at baseline rhythm  INTERVENTIONS:  - Continuous cardiac monitoring, monitor vital signs, obtain 12 lead EKG if indicated  - Administer antiarrhythmic and heart rate control medications as ordered  - Monitor electrolytes and administer replacement therapy as ordered  Outcome: Progressing      Problem: SKIN/TISSUE INTEGRITY - ADULT  Goal: Skin integrity remains intact  INTERVENTIONS  - Identify patients at risk for skin breakdown  - Assess and monitor skin integrity  - Assess and monitor nutrition and hydration status  - Monitor labs (i e  albumin)  - Assess for incontinence   - Turn and reposition patient  - Assist with mobility/ambulation  - Relieve pressure over bony prominences  - Avoid friction and shearing  - Provide appropriate hygiene as needed including keeping skin clean and dry  - Evaluate need for skin moisturizer/barrier cream  - Collaborate with interdisciplinary team (i e  Nutrition, Rehabilitation, etc )   - Patient/family teaching  Outcome: Progressing

## 2019-01-30 LAB
ANION GAP SERPL CALCULATED.3IONS-SCNC: 12 MMOL/L (ref 4–13)
BACTERIA UR CULT: ABNORMAL
BUN SERPL-MCNC: 33 MG/DL (ref 5–25)
CALCIUM SERPL-MCNC: 9.6 MG/DL (ref 8.3–10.1)
CHLORIDE SERPL-SCNC: 106 MMOL/L (ref 100–108)
CO2 SERPL-SCNC: 25 MMOL/L (ref 21–32)
CREAT SERPL-MCNC: 1.26 MG/DL (ref 0.6–1.3)
ERYTHROCYTE [DISTWIDTH] IN BLOOD BY AUTOMATED COUNT: 14.3 % (ref 11.6–15.1)
GFR SERPL CREATININE-BSD FRML MDRD: 54 ML/MIN/1.73SQ M
GLUCOSE SERPL-MCNC: 109 MG/DL (ref 65–140)
GLUCOSE SERPL-MCNC: 115 MG/DL (ref 65–140)
GLUCOSE SERPL-MCNC: 143 MG/DL (ref 65–140)
GLUCOSE SERPL-MCNC: 94 MG/DL (ref 65–140)
GLUCOSE SERPL-MCNC: 99 MG/DL (ref 65–140)
HCT VFR BLD AUTO: 42.5 % (ref 36.5–49.3)
HGB BLD-MCNC: 14.2 G/DL (ref 12–17)
MAGNESIUM SERPL-MCNC: 1.7 MG/DL (ref 1.6–2.6)
MCH RBC QN AUTO: 29.6 PG (ref 26.8–34.3)
MCHC RBC AUTO-ENTMCNC: 33.4 G/DL (ref 31.4–37.4)
MCV RBC AUTO: 89 FL (ref 82–98)
PHOSPHATE SERPL-MCNC: 2.5 MG/DL (ref 2.3–4.1)
PLATELET # BLD AUTO: 141 THOUSANDS/UL (ref 149–390)
PMV BLD AUTO: 11.1 FL (ref 8.9–12.7)
POTASSIUM SERPL-SCNC: 3.8 MMOL/L (ref 3.5–5.3)
RBC # BLD AUTO: 4.79 MILLION/UL (ref 3.88–5.62)
SODIUM SERPL-SCNC: 143 MMOL/L (ref 136–145)
WBC # BLD AUTO: 16.7 THOUSAND/UL (ref 4.31–10.16)

## 2019-01-30 PROCEDURE — 94760 N-INVAS EAR/PLS OXIMETRY 1: CPT

## 2019-01-30 PROCEDURE — 84100 ASSAY OF PHOSPHORUS: CPT | Performed by: HOSPITALIST

## 2019-01-30 PROCEDURE — 94640 AIRWAY INHALATION TREATMENT: CPT

## 2019-01-30 PROCEDURE — 99232 SBSQ HOSP IP/OBS MODERATE 35: CPT | Performed by: HOSPITALIST

## 2019-01-30 PROCEDURE — 94668 MNPJ CHEST WALL SBSQ: CPT

## 2019-01-30 PROCEDURE — 83735 ASSAY OF MAGNESIUM: CPT | Performed by: HOSPITALIST

## 2019-01-30 PROCEDURE — 80048 BASIC METABOLIC PNL TOTAL CA: CPT | Performed by: HOSPITALIST

## 2019-01-30 PROCEDURE — 82948 REAGENT STRIP/BLOOD GLUCOSE: CPT

## 2019-01-30 PROCEDURE — 94669 MECHANICAL CHEST WALL OSCILL: CPT

## 2019-01-30 PROCEDURE — 99232 SBSQ HOSP IP/OBS MODERATE 35: CPT | Performed by: INTERNAL MEDICINE

## 2019-01-30 PROCEDURE — 85027 COMPLETE CBC AUTOMATED: CPT | Performed by: HOSPITALIST

## 2019-01-30 RX ORDER — MAGNESIUM SULFATE HEPTAHYDRATE 40 MG/ML
2 INJECTION, SOLUTION INTRAVENOUS ONCE
Status: COMPLETED | OUTPATIENT
Start: 2019-01-30 | End: 2019-01-30

## 2019-01-30 RX ORDER — PREDNISONE 20 MG/1
40 TABLET ORAL DAILY
Status: DISCONTINUED | OUTPATIENT
Start: 2019-01-31 | End: 2019-02-02 | Stop reason: HOSPADM

## 2019-01-30 RX ORDER — QUETIAPINE FUMARATE 25 MG/1
25 TABLET, FILM COATED ORAL
Status: DISCONTINUED | OUTPATIENT
Start: 2019-01-30 | End: 2019-02-02 | Stop reason: HOSPADM

## 2019-01-30 RX ADMIN — IPRATROPIUM BROMIDE 0.5 MG: 0.5 SOLUTION RESPIRATORY (INHALATION) at 01:02

## 2019-01-30 RX ADMIN — IPRATROPIUM BROMIDE 0.5 MG: 0.5 SOLUTION RESPIRATORY (INHALATION) at 19:44

## 2019-01-30 RX ADMIN — ASPIRIN 81 MG 81 MG: 81 TABLET ORAL at 09:05

## 2019-01-30 RX ADMIN — GUAIFENESIN 600 MG: 600 TABLET, EXTENDED RELEASE ORAL at 20:10

## 2019-01-30 RX ADMIN — FLUTICASONE PROPIONATE 2 SPRAY: 50 SPRAY, METERED NASAL at 09:10

## 2019-01-30 RX ADMIN — DOCUSATE SODIUM 100 MG: 100 CAPSULE, LIQUID FILLED ORAL at 17:35

## 2019-01-30 RX ADMIN — PSYLLIUM HUSK 1 PACKET: 3.4 POWDER ORAL at 09:12

## 2019-01-30 RX ADMIN — ATORVASTATIN CALCIUM 10 MG: 10 TABLET, FILM COATED ORAL at 16:37

## 2019-01-30 RX ADMIN — HEPARIN SODIUM 5000 UNITS: 5000 INJECTION, SOLUTION INTRAVENOUS; SUBCUTANEOUS at 13:11

## 2019-01-30 RX ADMIN — IPRATROPIUM BROMIDE 0.5 MG: 0.5 SOLUTION RESPIRATORY (INHALATION) at 13:53

## 2019-01-30 RX ADMIN — LEVALBUTEROL HYDROCHLORIDE 1.25 MG: 1.25 SOLUTION, CONCENTRATE RESPIRATORY (INHALATION) at 01:03

## 2019-01-30 RX ADMIN — CARVEDILOL 12.5 MG: 12.5 TABLET, FILM COATED ORAL at 16:37

## 2019-01-30 RX ADMIN — LEVALBUTEROL HYDROCHLORIDE 1.25 MG: 1.25 SOLUTION, CONCENTRATE RESPIRATORY (INHALATION) at 19:44

## 2019-01-30 RX ADMIN — CARVEDILOL 12.5 MG: 12.5 TABLET, FILM COATED ORAL at 09:06

## 2019-01-30 RX ADMIN — MAGNESIUM SULFATE HEPTAHYDRATE 2 G: 40 INJECTION, SOLUTION INTRAVENOUS at 05:23

## 2019-01-30 RX ADMIN — METHYLPREDNISOLONE SODIUM SUCCINATE 40 MG: 40 INJECTION, POWDER, FOR SOLUTION INTRAMUSCULAR; INTRAVENOUS at 09:06

## 2019-01-30 RX ADMIN — DOCUSATE SODIUM 100 MG: 100 CAPSULE, LIQUID FILLED ORAL at 09:05

## 2019-01-30 RX ADMIN — LATANOPROST 1 DROP: 50 SOLUTION OPHTHALMIC at 21:03

## 2019-01-30 RX ADMIN — QUETIAPINE FUMARATE 25 MG: 25 TABLET ORAL at 21:03

## 2019-01-30 RX ADMIN — STANDARDIZED SENNA CONCENTRATE 17.2 MG: 8.6 TABLET ORAL at 21:02

## 2019-01-30 RX ADMIN — TAMSULOSIN HYDROCHLORIDE 0.4 MG: 0.4 CAPSULE ORAL at 09:05

## 2019-01-30 RX ADMIN — IPRATROPIUM BROMIDE 0.5 MG: 0.5 SOLUTION RESPIRATORY (INHALATION) at 07:54

## 2019-01-30 RX ADMIN — GUAIFENESIN 600 MG: 600 TABLET, EXTENDED RELEASE ORAL at 09:05

## 2019-01-30 RX ADMIN — HEPARIN SODIUM 5000 UNITS: 5000 INJECTION, SOLUTION INTRAVENOUS; SUBCUTANEOUS at 21:03

## 2019-01-30 RX ADMIN — OXYBUTYNIN CHLORIDE 5 MG: 5 TABLET, EXTENDED RELEASE ORAL at 09:10

## 2019-01-30 RX ADMIN — BUDESONIDE 0.5 MG: 0.5 INHALANT RESPIRATORY (INHALATION) at 19:44

## 2019-01-30 RX ADMIN — AMLODIPINE BESYLATE 10 MG: 10 TABLET ORAL at 09:06

## 2019-01-30 RX ADMIN — CLOPIDOGREL BISULFATE 75 MG: 75 TABLET ORAL at 09:05

## 2019-01-30 RX ADMIN — LEVALBUTEROL HYDROCHLORIDE 1.25 MG: 1.25 SOLUTION, CONCENTRATE RESPIRATORY (INHALATION) at 07:53

## 2019-01-30 RX ADMIN — SPIRONOLACTONE 25 MG: 25 TABLET, FILM COATED ORAL at 09:05

## 2019-01-30 RX ADMIN — BUDESONIDE 0.5 MG: 0.5 INHALANT RESPIRATORY (INHALATION) at 07:53

## 2019-01-30 RX ADMIN — PANTOPRAZOLE SODIUM 40 MG: 40 TABLET, DELAYED RELEASE ORAL at 05:34

## 2019-01-30 RX ADMIN — LEVALBUTEROL HYDROCHLORIDE 1.25 MG: 1.25 SOLUTION, CONCENTRATE RESPIRATORY (INHALATION) at 13:53

## 2019-01-30 RX ADMIN — HEPARIN SODIUM 5000 UNITS: 5000 INJECTION, SOLUTION INTRAVENOUS; SUBCUTANEOUS at 05:20

## 2019-01-30 RX ADMIN — PAROXETINE HYDROCHLORIDE 20 MG: 20 TABLET, FILM COATED ORAL at 09:05

## 2019-01-30 NOTE — PROGRESS NOTES
Progress Note - Pulmonary   Wil Connelly  77 y o  male MRN: [id number]  Unit/Bed#:  Encounter: [id number]    Assessment/Plan:    1  Acute hypoxic respiratory failure secondary to right-sided atelectasis in the setting poor pulmonary toilet, tracheobronchomalacia, and mucus plugging  1  Currently requiring 3 L nasal cannula  2  Continued to titrate to maintain oxygen saturations greater than equal to 88%  3  Aggressive pulmonary toilet:  Incentive spirometry and flutter valve q 1 hour, vest therapy, out of bed as tolerated  4  Baseline oxygen requirements are 2-3 L at home as needed  2  Abnormal chest CT right-sided atelectasis likely due to mucus plugging and right hilar fullness likely due to hilar versus adenopathy  1  Repeat chest x-ray 01/29/2019 shows improved aeration on right side with stable postoperative changes status post right lower lobe lobectomy in 2002  2  Continue aggressive pulmonary hygiene as above  3  White cell count trending upward on steroid therapy, procalcitonin 0 06  4  Recommend repeat chest CT in 4 weeks with possible PET scan and for bronchoscopy with endobronchial ultrasound no signs of improvement  3  COPD of unknown severity with acute exacerbation  1  Continue Xopenex Atrovent nebs Q 6 hr  2  Continue Pulmicort nebs b i d   3  Would discontinue Solu-Medrol today  Start prednisone taper tomorrow at 40 mg daily for 3 days then decrease by 10 mg every 3 days after  4  Chronic cough secondary to dysphagia with chronic aspiration and tracheobronchomalacia  1  Continue aspiration precautions and modified diet  2  Continue Tessalon p r n  And Mucinex b i d   5  History of lung cancer  1  S/p right lower lobe lobectomy in 2002 at [facility] in [facility]  6  CKD 3 with bacteriuria   1  Patient is acutely confused today  Awake, alert, and oriented to person but not time or place  2  WBC 16 7 trending up, patient on steroids  3   Urine culture shows staphylococcus species  4  Would consider antibiotic therapy, possibly ceftriaxone      Subjective:    Patient was seen and examined today  Upon entering the room patient is seen lying in bed in no acute distress  Patient states that he has continued to cough but is no longer able to expectorate any sputum today  Previously his cough was productive of white sputum  He reports using a flutter valve regularly and having vest therapy today  Denies shortness of breath, chest pain, had fevers, chills, headaches, dizziness, nausea, vomiting, abdominal pain, or leg swelling  Objective:    Vitals: Blood pressure 134/63, pulse 80, temperature 97 8 °F (36 6 °C), temperature source Oral, resp  rate 20, height 6' 5" (1 956 m), weight 93 kg (205 lb 0 4 oz), SpO2 91 %  3L NC,Body mass index is 24 31 kg/m²  Intake/Output Summary (Last 24 hours) at 01/30/19 0834  Last data filed at 01/30/19 1222   Gross per 24 hour   Intake                0 ml   Output              250 ml   Net             -250 ml       Invasive Devices     Peripheral Intravenous Line            Peripheral IV 01/27/19 Right Antecubital 2 days    Peripheral IV 01/29/19 Right Forearm 1 day                Physical Exam:     Physical Exam   Constitutional: He appears well-developed and well-nourished  No distress  HENT:   Head: Normocephalic and atraumatic  Mouth/Throat: No oropharyngeal exudate  Neck: Neck supple  No JVD present  No tracheal deviation present  Cardiovascular: Normal rate, regular rhythm, normal heart sounds and intact distal pulses  Exam reveals no friction rub  No murmur heard  Pulmonary/Chest: Effort normal  No respiratory distress  He has decreased breath sounds in the right lower field  He has no wheezes  He has rhonchi in the right upper field and the right middle field  He has no rales  Abdominal: Soft  Bowel sounds are normal  He exhibits no distension  There is no tenderness     Musculoskeletal: He exhibits no edema, tenderness or deformity  Lymphadenopathy:     He has no cervical adenopathy  Neurological: He is alert  He is disoriented (oriented to person but not time or place)  Chronic left facial droop   Skin: Skin is warm and dry  No rash noted  He is not diaphoretic  No erythema  Labs: I have personally reviewed pertinent lab results  , ABG: No results found for: PHART, MTW2KZN, PO2ART, PTX7GLK, Z7JTLHCB, BEART, SOURCE, BNP: No results found for: BNP, CBC:   Lab Results   Component Value Date    WBC 16 70 (H) 01/30/2019    HGB 14 2 01/30/2019    HCT 42 5 01/30/2019    MCV 89 01/30/2019     (L) 01/30/2019    MCH 29 6 01/30/2019    MCHC 33 4 01/30/2019    RDW 14 3 01/30/2019    MPV 11 1 01/30/2019   , CMP:   Lab Results   Component Value Date    SODIUM 143 01/30/2019    K 3 8 01/30/2019     01/30/2019    CO2 25 01/30/2019    CO2 23 01/29/2019    BUN 33 (H) 01/30/2019    CREATININE 1 26 01/30/2019    GLUCOSE 124 01/29/2019    CALCIUM 9 6 01/30/2019    EGFR 54 01/30/2019   , PT/INR: No results found for: PT, INR, Troponin: No results found for: TROPONINI    Imaging and other studies: I have personally reviewed pertinent reports     and I have personally reviewed pertinent films in PACS     CXR 1/29/19  Much improved aeration on the right side when compared to chest x-ray done on the morning of 01/28/2019  Stable postoperative changes in the right hemithorax  Left lung appears clear

## 2019-01-30 NOTE — ASSESSMENT & PLAN NOTE
· Asymptomatic  · UA positive for leuks, protein and blood  · Ucx >100K Staph   · Given Cefepime in the E  · abx dc'ed in ICU; continue to monitor for fever

## 2019-01-31 LAB
ANION GAP SERPL CALCULATED.3IONS-SCNC: 10 MMOL/L (ref 4–13)
BACTERIA SPT RESP CULT: NORMAL
BASOPHILS # BLD AUTO: 0.01 THOUSANDS/ΜL (ref 0–0.1)
BASOPHILS NFR BLD AUTO: 0 % (ref 0–1)
BUN SERPL-MCNC: 33 MG/DL (ref 5–25)
CALCIUM SERPL-MCNC: 9.3 MG/DL (ref 8.3–10.1)
CHLORIDE SERPL-SCNC: 107 MMOL/L (ref 100–108)
CO2 SERPL-SCNC: 25 MMOL/L (ref 21–32)
CREAT SERPL-MCNC: 1.1 MG/DL (ref 0.6–1.3)
EOSINOPHIL # BLD AUTO: 0.01 THOUSAND/ΜL (ref 0–0.61)
EOSINOPHIL NFR BLD AUTO: 0 % (ref 0–6)
ERYTHROCYTE [DISTWIDTH] IN BLOOD BY AUTOMATED COUNT: 14.2 % (ref 11.6–15.1)
GFR SERPL CREATININE-BSD FRML MDRD: 64 ML/MIN/1.73SQ M
GLUCOSE SERPL-MCNC: 101 MG/DL (ref 65–140)
GLUCOSE SERPL-MCNC: 138 MG/DL (ref 65–140)
GLUCOSE SERPL-MCNC: 82 MG/DL (ref 65–140)
GLUCOSE SERPL-MCNC: 91 MG/DL (ref 65–140)
GLUCOSE SERPL-MCNC: 93 MG/DL (ref 65–140)
GRAM STN SPEC: NORMAL
HCT VFR BLD AUTO: 41.8 % (ref 36.5–49.3)
HGB BLD-MCNC: 13.8 G/DL (ref 12–17)
IMM GRANULOCYTES # BLD AUTO: 0.04 THOUSAND/UL (ref 0–0.2)
IMM GRANULOCYTES NFR BLD AUTO: 0 % (ref 0–2)
LYMPHOCYTES # BLD AUTO: 0.82 THOUSANDS/ΜL (ref 0.6–4.47)
LYMPHOCYTES NFR BLD AUTO: 7 % (ref 14–44)
MCH RBC QN AUTO: 29.7 PG (ref 26.8–34.3)
MCHC RBC AUTO-ENTMCNC: 33 G/DL (ref 31.4–37.4)
MCV RBC AUTO: 90 FL (ref 82–98)
MONOCYTES # BLD AUTO: 1.15 THOUSAND/ΜL (ref 0.17–1.22)
MONOCYTES NFR BLD AUTO: 10 % (ref 4–12)
MRSA NOSE QL CULT: NORMAL
NEUTROPHILS # BLD AUTO: 9.56 THOUSANDS/ΜL (ref 1.85–7.62)
NEUTS SEG NFR BLD AUTO: 83 % (ref 43–75)
NRBC BLD AUTO-RTO: 0 /100 WBCS
PLATELET # BLD AUTO: 142 THOUSANDS/UL (ref 149–390)
PMV BLD AUTO: 11.7 FL (ref 8.9–12.7)
POTASSIUM SERPL-SCNC: 3.8 MMOL/L (ref 3.5–5.3)
RBC # BLD AUTO: 4.64 MILLION/UL (ref 3.88–5.62)
SODIUM SERPL-SCNC: 142 MMOL/L (ref 136–145)
WBC # BLD AUTO: 11.59 THOUSAND/UL (ref 4.31–10.16)

## 2019-01-31 PROCEDURE — 94640 AIRWAY INHALATION TREATMENT: CPT

## 2019-01-31 PROCEDURE — 80048 BASIC METABOLIC PNL TOTAL CA: CPT | Performed by: HOSPITALIST

## 2019-01-31 PROCEDURE — 99232 SBSQ HOSP IP/OBS MODERATE 35: CPT | Performed by: INTERNAL MEDICINE

## 2019-01-31 PROCEDURE — 94668 MNPJ CHEST WALL SBSQ: CPT

## 2019-01-31 PROCEDURE — 85025 COMPLETE CBC W/AUTO DIFF WBC: CPT | Performed by: HOSPITALIST

## 2019-01-31 PROCEDURE — 94760 N-INVAS EAR/PLS OXIMETRY 1: CPT

## 2019-01-31 PROCEDURE — 94669 MECHANICAL CHEST WALL OSCILL: CPT

## 2019-01-31 PROCEDURE — 99232 SBSQ HOSP IP/OBS MODERATE 35: CPT | Performed by: HOSPITALIST

## 2019-01-31 PROCEDURE — 82948 REAGENT STRIP/BLOOD GLUCOSE: CPT

## 2019-01-31 RX ORDER — LEVALBUTEROL 1.25 MG/.5ML
1.25 SOLUTION, CONCENTRATE RESPIRATORY (INHALATION)
Status: DISCONTINUED | OUTPATIENT
Start: 2019-01-31 | End: 2019-02-02 | Stop reason: HOSPADM

## 2019-01-31 RX ADMIN — AMLODIPINE BESYLATE 10 MG: 10 TABLET ORAL at 08:15

## 2019-01-31 RX ADMIN — IPRATROPIUM BROMIDE 0.5 MG: 0.5 SOLUTION RESPIRATORY (INHALATION) at 19:25

## 2019-01-31 RX ADMIN — GUAIFENESIN 600 MG: 600 TABLET, EXTENDED RELEASE ORAL at 22:31

## 2019-01-31 RX ADMIN — DOCUSATE SODIUM 100 MG: 100 CAPSULE, LIQUID FILLED ORAL at 08:18

## 2019-01-31 RX ADMIN — PREDNISONE 40 MG: 20 TABLET ORAL at 08:26

## 2019-01-31 RX ADMIN — CARVEDILOL 12.5 MG: 12.5 TABLET, FILM COATED ORAL at 17:14

## 2019-01-31 RX ADMIN — HEPARIN SODIUM 5000 UNITS: 5000 INJECTION, SOLUTION INTRAVENOUS; SUBCUTANEOUS at 06:22

## 2019-01-31 RX ADMIN — BUDESONIDE 0.5 MG: 0.5 INHALANT RESPIRATORY (INHALATION) at 07:20

## 2019-01-31 RX ADMIN — HEPARIN SODIUM 5000 UNITS: 5000 INJECTION, SOLUTION INTRAVENOUS; SUBCUTANEOUS at 22:30

## 2019-01-31 RX ADMIN — LEVALBUTEROL HYDROCHLORIDE 1.25 MG: 1.25 SOLUTION, CONCENTRATE RESPIRATORY (INHALATION) at 01:07

## 2019-01-31 RX ADMIN — IPRATROPIUM BROMIDE 0.5 MG: 0.5 SOLUTION RESPIRATORY (INHALATION) at 07:20

## 2019-01-31 RX ADMIN — PSYLLIUM HUSK 1 PACKET: 3.4 POWDER ORAL at 08:20

## 2019-01-31 RX ADMIN — SPIRONOLACTONE 25 MG: 25 TABLET, FILM COATED ORAL at 08:16

## 2019-01-31 RX ADMIN — HEPARIN SODIUM 5000 UNITS: 5000 INJECTION, SOLUTION INTRAVENOUS; SUBCUTANEOUS at 17:02

## 2019-01-31 RX ADMIN — DOCUSATE SODIUM 100 MG: 100 CAPSULE, LIQUID FILLED ORAL at 17:14

## 2019-01-31 RX ADMIN — OXYBUTYNIN CHLORIDE 5 MG: 5 TABLET, EXTENDED RELEASE ORAL at 08:17

## 2019-01-31 RX ADMIN — STANDARDIZED SENNA CONCENTRATE 17.2 MG: 8.6 TABLET ORAL at 22:30

## 2019-01-31 RX ADMIN — FLUTICASONE PROPIONATE 2 SPRAY: 50 SPRAY, METERED NASAL at 08:19

## 2019-01-31 RX ADMIN — LEVALBUTEROL HYDROCHLORIDE 1.25 MG: 1.25 SOLUTION, CONCENTRATE RESPIRATORY (INHALATION) at 19:25

## 2019-01-31 RX ADMIN — PAROXETINE HYDROCHLORIDE 20 MG: 20 TABLET, FILM COATED ORAL at 08:18

## 2019-01-31 RX ADMIN — LEVALBUTEROL HYDROCHLORIDE 1.25 MG: 1.25 SOLUTION, CONCENTRATE RESPIRATORY (INHALATION) at 07:20

## 2019-01-31 RX ADMIN — GUAIFENESIN 600 MG: 600 TABLET, EXTENDED RELEASE ORAL at 08:16

## 2019-01-31 RX ADMIN — QUETIAPINE FUMARATE 25 MG: 25 TABLET ORAL at 22:31

## 2019-01-31 RX ADMIN — LEVALBUTEROL HYDROCHLORIDE 1.25 MG: 1.25 SOLUTION, CONCENTRATE RESPIRATORY (INHALATION) at 13:07

## 2019-01-31 RX ADMIN — ASPIRIN 81 MG 81 MG: 81 TABLET ORAL at 08:16

## 2019-01-31 RX ADMIN — CLOPIDOGREL BISULFATE 75 MG: 75 TABLET ORAL at 08:16

## 2019-01-31 RX ADMIN — IPRATROPIUM BROMIDE 0.5 MG: 0.5 SOLUTION RESPIRATORY (INHALATION) at 01:07

## 2019-01-31 RX ADMIN — ATORVASTATIN CALCIUM 10 MG: 10 TABLET, FILM COATED ORAL at 17:14

## 2019-01-31 RX ADMIN — IPRATROPIUM BROMIDE 0.5 MG: 0.5 SOLUTION RESPIRATORY (INHALATION) at 13:07

## 2019-01-31 RX ADMIN — TAMSULOSIN HYDROCHLORIDE 0.4 MG: 0.4 CAPSULE ORAL at 08:16

## 2019-01-31 RX ADMIN — LATANOPROST 1 DROP: 50 SOLUTION OPHTHALMIC at 22:35

## 2019-01-31 RX ADMIN — BUDESONIDE 0.5 MG: 0.5 INHALANT RESPIRATORY (INHALATION) at 19:25

## 2019-01-31 NOTE — UTILIZATION REVIEW
Network Utilization Review Department  Phone: [phone]; Fax [phone]  [email or web address]  ATTENTION: Please call with any questions or concerns to [phone]  and carefully listen to the prompts so that you are directed to the right person  Send all requests for admission clinical reviews, approved or denied determinations and any other requests to fax [phone]  All voicemails are confidential   Continued Stay Review    Date: 1/31/2019    Vital Signs: /62 (BP Location: Left arm)   Pulse 86   Temp (!) 97 3 °F (36 3 °C) (Oral)   Resp 18   Ht 6' 5" (1 956 m)   Wt 93 kg (205 lb 0 4 oz)   SpO2 97%   BMI 24 31 kg/m²      Assessment/Plan: 1/31/2019 attending note:  Assessment & Plan     · Presents c/o intermittent desats  ? On arrival, desats to high 80's despite nasal cannula  ? Required ICU transition due mucus plugging   · Continue chest PT, mucolytics etc  · Appreciate pulm input       COPD with acute exacerbation (HCC)   Assessment & Plan     · Continue DuoNeb and Pulmicort, prn albuterol   · methylpred--> prednisone       Lung cancer (HCC)   Assessment & Plan     · Sp RLL lobectomy in 2002 at [facility]  ? Reports radiation      Oropharyngeal dysphagia   Assessment & Plan     · Discharged 12/2 after episodes of aspiration PNA  · Continue dysphagia diet level 1, nectar thick liquids      Prostate cancer (HCC)   Assessment & Plan     · Reports seed placement same day as lobectomy       History of cerebrovascular accident (CVA) with residual deficit   Assessment & Plan     Noted       Bacteriuria   Assessment & Plan     · Asymptomatic; some confusion and symptoms of delirium  · UA positive for leuks, protein and blood  ? Ucx >100K Staph   · Given Cefepime in the ED--> monitoring off abx without fevers      1  1/31/2019 pulmonary note: titrate O2 for sats >=88%  Baseline requirements are 2-3L at home as needed nasal cannula   Cont aggressive pulmonary hygiene  COPD of unknown severity with acute exacerbation  1  Continue Xopenex Atrovent nebs Q 6 hr  2   Continue Pulmicort nebs b i d   3  Prednisone 40 mg today and decrease by 10 mg every three day after Discharge meds: DuoNebs QID, Pulmicort nebs BID       Medications:   Scheduled Meds:   Current Facility-Administered Medications:  acetaminophen 650 mg Oral Q6H PRN Gurwinder Morrell PA-C   albuterol 2 puff Inhalation Q4H PRN DIANA Valdivia   amLODIPine 10 mg Oral Daily Gurwinder Morrell PA-C   asp[facility]in [facility]   atorvastatin 10 mg Oral Daily With Conrado Cluster, LADY   benzonatate 200 mg Oral TID PRN Gurwinder Morrell PA-C   budesonide 0 5 mg Nebulization BID Gurwinder Morrell PA-C   carvedilol 12 5 mg Oral BID With Meals Gurwinder Morrell PA-C   clopidogrel 75 mg Oral Daily Gurwinder Morrell PA-C   dextromethorphan-guaiFENesin 5 mL Oral TID PRN Gurwinder Morrell PA-C   docusate sodium 100 mg Oral BID Gurwinder Morrell PA-C   fluticasone 2 spray Nasal Daily Gurwinder Morrell PA-C   guaiFENesin 600 mg Oral Q12H [facility] DIANA Ha   heparin (porcine) 5,000 Units Subcutaneous Q8H [facility] LADY Mcarthur   insulin lispro 1-5 Units Subcutaneous HS DIANA Ha   insulin lispro 1-6 Units Subcutaneous TID AC DIANA Ha   ipratropium 0 5 mg Nebulization Q6H René Adams MD   latanoprost 1 drop Both Eyes HS Gurwinder Morrell PA-C   levalbuterol 1 25 mg Nebulization Q6H René Adams MD   meclizine 25 mg Oral TID PRN Gurwinder Morrell PA-C   ondansetron 4 mg Intravenous Q8H PRN Gurwinder Morrell PA-C   oxybutynin 5 mg Oral Daily Phyllis Nino PA-C   pantoprazole 40 mg Oral Early Morning Gurwinder Morrell PA-C   PARoxetine 20 mg Oral Daily Phyllis Mcarthur PA-C   polyethylene glycol 17 g Oral Daily ENEDINA Morrell PA-C   predniSONE 40 mg Oral Daily Barron Stone PA-C   psyllium 1 packet Oral Daily Cathalene Roll, CRNP   QUEtiapine 25 mg Oral HS Lyubov Zayas MD   senna 2 tablet Oral HS Cathalene Roll, CRNP   sodium chloride 1 application Nasal G9H PRN Dunia Andrade PA-C   spironolactone 25 mg Oral Daily Guillermo Labrum, LADY   tamsulosin 0 4 mg Oral Daily Guillermo Labrum, LADY     Continuous Infusions:    PRN Meds:   acetaminophen    albuterol    benzonatate    dextromethorphan-guaiFENesin    meclizine    ondansetron    polyethylene glycol    sodium chloride  Pertinent Labs/Diagnostic Results: 1/31/2019 wbc 11 59, plt 142, bun 33,   Age/Sex: 77 y o  male     Discharge Plan: tbd

## 2019-01-31 NOTE — PROGRESS NOTES
Progress Note - Pulmonary   Dutch Valentin  77 y o  male MRN: [id number]  Unit/Bed#:  Encounter: [id number]    Assessment/Plan:    1  Acute hypoxic respiratory failure secondary to right-sided atelectasis in the setting poor pulmonary toilet, tracheobronchomalacia, and mucus plugging- resolved  1  Currently on room air and saturating well at 93-94%  2  Continued to titrate to maintain oxygen saturations greater than equal to 88%  3  Aggressive pulmonary toilet:  Incentive spirometry and flutter valve q 1 hour, vest therapy, out of bed as tolerated  4  Baseline oxygen requirements are 2-3 L at home as needed  2  Abnormal chest CT right-sided atelectasis likely due to mucus plugging and right hilar fullness likely due to hilar versus adenopathy  1  Repeat chest x-ray 01/29/2019 shows improved aeration on right side with stable postoperative changes status post right lower lobe lobectomy in 2002  2  Continue aggressive pulmonary hygiene as above  3  White cell count trending down (on steroid therapy), procalcitonin 0 06  4  Recommend repeat chest CT in 4 weeks with possible PET scan and for bronchoscopy with endobronchial ultrasound no signs of improvement  3  COPD of unknown severity with acute exacerbation  1  Continue Xopenex Atrovent nebs Q 6 hr  2  Continue Pulmicort nebs b i d   3  Prednisone 40 mg today and decrease by 10 mg every three day after   4  Discharge meds: DuoNebs QID, Pulmicort nebs BID   4  Chronic cough secondary to dysphagia with chronic aspiration and tracheobronchomalacia  1  Continue aspiration precautions and modified diet  2  Continue Tessalon p r n  And Mucinex b i d   5  History of lung cancer  1  S/p right lower lobe lobectomy in 2002 at [place] [facility]  6  CKD 3 with bacteriuria   1  Currently asymptomatic UTI  2  Afebrile, WBC 11 59 trending down, patient on steroids  3  Urine culture shows staphylococcus species  4   Continue to monitor off antibiotics at this time    Outpatient follow-up per discharge instructions  Chief Complaint:    "I'm good "    Subjective:    Patient was seen and examined today  According to patient the city appropriate chair about to take his morning medications  He states he feels good from a respiratory standpoint  Presently denies shortness of breath, chest pain, hemoptysis, fevers, chills, night sweats, headaches, nausea, vomiting, abdominal pain  Objective:    Vitals: Blood pressure 111/60, pulse (!) 52, temperature 97 7 °F (36 5 °C), temperature source Oral, resp  rate 18, height 6' 5" (1 956 m), weight 93 kg (205 lb 0 4 oz), SpO2 93 %  RA,Body mass index is 24 31 kg/m²  Intake/Output Summary (Last 24 hours) at 01/31/19 1000  Last data filed at 01/31/19 0533   Gross per 24 hour   Intake                0 ml   Output              267 ml   Net             -267 ml       Invasive Devices     Peripheral Intravenous Line            Peripheral IV 01/29/19 Right Forearm 2 days    Peripheral IV 01/31/19 Right Arm less than 1 day                Physical Exam:     Physical Exam   Constitutional: He is oriented to person, place, and time  He appears well-developed and well-nourished  No distress  HENT:   Head: Normocephalic and atraumatic  Mouth/Throat: No oropharyngeal exudate  Eyes: Pupils are equal, round, and reactive to light  EOM are normal    Neck: Neck supple  No JVD present  No tracheal deviation present  Cardiovascular: Normal rate, regular rhythm, normal heart sounds and intact distal pulses  Exam reveals no gallop and no friction rub  No murmur heard  Pulmonary/Chest: Effort normal  No respiratory distress  He has decreased breath sounds in the right lower field  He has wheezes (scarce wheeze)  He has no rhonchi  He has no rales  He exhibits no tenderness  Abdominal: Soft  Bowel sounds are normal  He exhibits no distension  There is no tenderness  Musculoskeletal: Normal range of motion   He exhibits no edema, tenderness or deformity  Lymphadenopathy:     He has no cervical adenopathy  Neurological: He is alert and oriented to person, place, and time  Skin: Skin is warm and dry  No rash noted  He is not diaphoretic  No erythema  Psychiatric: He has a normal mood and affect  His behavior is normal  Judgment and thought content normal        Labs: I have personally reviewed pertinent lab results  , ABG: No results found for: PHART, NUV5PEM, PO2ART, QFX6APA, N5KZIWYJ, BEART, SOURCE, BNP: No results found for: BNP, CBC:   Lab Results   Component Value Date    WBC 11 59 (H) 01/31/2019    HGB 13 8 01/31/2019    HCT 41 8 01/31/2019    MCV 90 01/31/2019     (L) 01/31/2019    MCH 29 7 01/31/2019    MCHC 33 0 01/31/2019    RDW 14 2 01/31/2019    MPV 11 7 01/31/2019    NRBC 0 01/31/2019   , CMP:   Lab Results   Component Value Date    SODIUM 142 01/31/2019    K 3 8 01/31/2019     01/31/2019    CO2 25 01/31/2019    BUN 33 (H) 01/31/2019    CREATININE 1 10 01/31/2019    CALCIUM 9 3 01/31/2019    EGFR 64 01/31/2019   , PT/INR: No results found for: PT, INR, Troponin: No results found for: TROPONINI    Imaging and other studies: None to review today

## 2019-01-31 NOTE — ASSESSMENT & PLAN NOTE
· Presents c/o intermittent desats  · On arrival, desats to high 80's despite nasal cannula  · Required ICU transition due mucus plugging   · Continue chest PT, mucolytics etc  · Appreciate pulm input

## 2019-01-31 NOTE — PLAN OF CARE
CARDIOVASCULAR - ADULT     Maintains optimal cardiac output and hemodynamic stability Progressing     Absence of cardiac dysrhythmias or at baseline rhythm Progressing        Potential for Falls     Patient will remain free of falls Progressing        Prexisting or High Potential for Compromised Skin Integrity     Skin integrity is maintained or improved Progressing        RESPIRATORY - ADULT     Achieves optimal ventilation and oxygenation Progressing        SKIN/TISSUE INTEGRITY - ADULT     Skin integrity remains intact Progressing

## 2019-01-31 NOTE — PROGRESS NOTES
Progress Note - Tally Primrose  1940, 77 y o  male MRN: [id number]    Unit/Bed#:  Encounter: [id number]    Primary Care Provider: Kay Liang DO   Date and time admitted to hospital: 1/27/2019 11:14 PM    * Acute respiratory failure with hypoxia (HCC)   Assessment & Plan    · Presents c/o intermittent desats  · On arrival, desats to high 80's despite nasal cannula  · Required ICU transition due mucus plugging   · Continue chest PT, mucolytics etc  · Appreciate pulm input      COPD with acute exacerbation (HCC)   Assessment & Plan    · Continue DuoNeb and Pulmicort, prn albuterol   · methylpred--> prednisone      Lung cancer ([facility] [address] )   Assessment & Plan    · Sp RLL lobectomy in 2002 at [facility]  · Reports radiation     Oropharyngeal dysphagia   Assessment & Plan    · Discharged 12/2 after episodes of aspiration PNA  · Continue dysphagia diet level 1, nectar thick liquids     Prostate cancer ([facility] [address] )   Assessment & Plan    · Reports seed placement same day as lobectomy      History of cerebrovascular accident (CVA) with residual deficit   Assessment & Plan    Noted      Bacteriuria   Assessment & Plan    · Asymptomatic; some confusion and symptoms of delirium  · UA positive for leuks, protein and blood  · Ucx >100K Staph   · Given Cefepime in the ED--> monitoring off abx without fevers          VTE Pharmacologic Prophylaxis:   Pharmacologic: Heparin  Mechanical VTE Prophylaxis in Place: Yes    Patient Centered Rounds: I have performed bedside rounds with nursing staff today  Discussions with Specialists or Other Care Team Provider: none    Education and Discussions with Family / Patient: patient      Time Spent for Care: 30 minutes  More than 50% of total time spent on counseling and coordination of care as described above      Current Length of Stay: 3 day(s)    Current Patient Status: Inpatient   Certification Statement: The patient will continue to require additional inpatient hospital stay due to COPD exacerbation c/b dysphagia and difficulty managing secretions  Discharge Plan / Estimated Discharge Date: TBD based on clinical course; hopeful dc in 24-48hours     Code Status: Level 1 - Full Code    Subjective:   Pt is feeling better today  Not confused, but admits that he has felt confused at times  Moving bowels  No dysuria    Objective:     Vitals:   Temp (24hrs), Av 8 °F (36 6 °C), Min:97 3 °F (36 3 °C), Max:98 4 °F (36 9 °C)    Temp:  [97 3 °F (36 3 °C)-98 4 °F (36 9 °C)] 97 3 °F (36 3 °C)  HR:  [52-86] 86  Resp:  [13-20] 18  BP: (111-141)/(59-66) 112/62  SpO2:  [92 %-99 %] 99 %  Body mass index is 24 31 kg/m²  Input and Output Summary (last 24 hours): Intake/Output Summary (Last 24 hours) at 19 1127  Last data filed at 19 0533   Gross per 24 hour   Intake                0 ml   Output              267 ml   Net             -267 ml       Physical Exam:     Physical Exam   Constitutional: He is oriented to person, place, and time  HENT:   Head: Normocephalic and atraumatic  Cardiovascular: Normal rate and regular rhythm  Pulmonary/Chest: Effort normal  No respiratory distress  He has no rales  Low pitched wheezes in apices  Abdominal: Soft  Bowel sounds are normal  He exhibits no distension  There is no tenderness  There is no rebound  Musculoskeletal: He exhibits no edema  Neurological: He is alert and oriented to person, place, and time  No cranial nerve deficit  Skin: Skin is warm and dry  No rash noted  He is not diaphoretic  No erythema  Psychiatric: He has a normal mood and affect  His behavior is normal    Nursing note and vitals reviewed          Additional Data:     Labs:      Results from last 7 days  Lab Units 19  0505   WBC Thousand/uL 11 59*   HEMOGLOBIN g/dL 13 8   HEMATOCRIT % 41 8   PLATELETS Thousands/uL 142*   NEUTROS PCT % 83*   LYMPHS PCT % 7*   MONOS PCT % 10   EOS PCT % 0       Results from last 7 days  Lab Units 01/31/19  0505  01/29/19  2233  01/27/19  2354   POTASSIUM mmol/L 3 8  < >  --   < > 5 2   CHLORIDE mmol/L 107  < >  --   < > 102   CO2 mmol/L 25  < >  --   < > 28   CO2, I-STAT mmol/L  --   --  23  --   --    BUN mg/dL 33*  < >  --   < > 26*   CREATININE mg/dL 1 10  < >  --   < > 1 34*   CALCIUM mg/dL 9 3  < >  --   < > 9 8   ALK PHOS U/L  --   --   --   --  109   ALT U/L  --   --   --   --  27   AST U/L  --   --   --   --  45   GLUCOSE, ISTAT mg/dl  --   --  124  --   --    < > = values in this interval not displayed  Results from last 7 days  Lab Units 01/27/19  2354   INR  0 99       * I Have Reviewed All Lab Data Listed Above  * Additional Pertinent Lab Tests Reviewed: All Labs Within Last 24 Hours Reviewed    Imaging:    Imaging Reports Reviewed Today Include:   Imaging Personally Reviewed by Myself Includes:      Recent Cultures (last 7 days):       Results from last 7 days  Lab Units 01/29/19  0456 01/28/19  0050 01/27/19  2353 01/27/19  2340   BLOOD CULTURE   --   --  No Growth at 72 hrs  No Growth at 72 hrs     SPUTUM CULTURE  4+ Growth of   --   --   --    GRAM STAIN RESULT  Rare Polys  4+ Gram negative rods  3+ Gram positive cocci in pairs, chains and clusters  1+ Gram positive rods  --   --   --    URINE CULTURE   --  >100,000 cfu/ml Staphylococcus aureus*  --   --        Last 24 Hours Medication List:     Current Facility-Administered Medications:  acetaminophen 650 mg Oral Q6H PRN Gurwinder Morrell PA-C   albuterol 2 puff Inhalation Q4H PRN DIANA Valdivia   amLODIPine 10 mg Oral Daily Gurwinder Morrell PA-C   aspirin 81 mg Oral Daily Phyllis Rajat Mcarthur PA-C   atorvastatin 10 mg Oral Daily With [employer]   benzonatate 200 mg Oral TID PRN Gurwinder Morrell PA-C   budesonide 0 5 mg Nebulization BID Gurwinder Morrell PA-C   carvedilol 12 5 mg Oral BID With Meals Gurwinder Morrell PA-C   clopidogrel 75 mg Oral Daily Gurwnider Morrell PA-C dextromethorphan-guaiFENesin 5 mL Oral TID PRN Landess Kail, LADY   docusate sodium 100 mg Oral BID Landess Kail, LADY   fluticasone 2 spray Nasal Daily Landess Kail, LADY   guaiFENesin 600 mg Oral Q12H [address] DIANA Ha   heparin (porcine) 5,000 Units Subcutaneous Q8H [address] Phyllis Kelley PA-C   insulin lispro 1-5 Units Subcutaneous HS DIANA Ha   insulin lispro 1-6 Units Subcutaneous TID AC DIANA Ha   ipratropium 0 5 mg Nebulization Q6H Marty Reinoso MD   latanoprost 1 drop Both Eyes HS Phyllis Kelley PA-C   levalbuterol 1 25 mg Nebulization Q6H Marty Reinoso MD   meclizine 25 mg Oral TID PRN Landess Kail, LADY   ondansetron 4 mg Intravenous Q8H PRN Landess Kail, LADY   oxybutynin 5 mg Oral Daily Phyllis Nino PA-C   pantoprazole 40 mg Oral Early Morning Landess Kail, LADY   PARoxetine 20 mg Oral Daily Phyllis Nino PA-C   polyethylene glycol 17 g Oral Daily PRN Landess Kail, LADY   predniSONE 40 mg Oral Daily Barron Stone PA-C   psyllium 1 packet Oral Daily DIANA Alexander   QUEtiapine 25 mg Oral HS Santo Bamberger, MD   senna 2 tablet Oral HS DIANA Rust   sodium chloride 1 application Nasal O5X PRN Emiliano Deepak, LADY   spironolactone 25 mg Oral Daily Landess Kail, LADY   tamsulosin 0 4 mg Oral Daily Landess Kail, LADY        Today, Patient Was Seen By: Santo Bamberger, MD    ** Please Note: This note has been constructed using a voice recognition system   **

## 2019-01-31 NOTE — ASSESSMENT & PLAN NOTE
· Asymptomatic; some confusion and symptoms of delirium  · UA positive for leuks, protein and blood  · Ucx >100K Staph   · Given Cefepime in the ED--> monitoring off abx without fevers

## 2019-02-01 LAB
ANION GAP SERPL CALCULATED.3IONS-SCNC: 12 MMOL/L (ref 4–13)
BASOPHILS # BLD AUTO: 0.01 THOUSANDS/ΜL (ref 0–0.1)
BASOPHILS NFR BLD AUTO: 0 % (ref 0–1)
BUN SERPL-MCNC: 31 MG/DL (ref 5–25)
CALCIUM SERPL-MCNC: 9.4 MG/DL (ref 8.3–10.1)
CHLORIDE SERPL-SCNC: 106 MMOL/L (ref 100–108)
CO2 SERPL-SCNC: 23 MMOL/L (ref 21–32)
CREAT SERPL-MCNC: 1.13 MG/DL (ref 0.6–1.3)
EOSINOPHIL # BLD AUTO: 0.13 THOUSAND/ΜL (ref 0–0.61)
EOSINOPHIL NFR BLD AUTO: 1 % (ref 0–6)
ERYTHROCYTE [DISTWIDTH] IN BLOOD BY AUTOMATED COUNT: 13.9 % (ref 11.6–15.1)
GFR SERPL CREATININE-BSD FRML MDRD: 61 ML/MIN/1.73SQ M
GLUCOSE SERPL-MCNC: 119 MG/DL (ref 65–140)
GLUCOSE SERPL-MCNC: 121 MG/DL (ref 65–140)
GLUCOSE SERPL-MCNC: 163 MG/DL (ref 65–140)
GLUCOSE SERPL-MCNC: 81 MG/DL (ref 65–140)
GLUCOSE SERPL-MCNC: 93 MG/DL (ref 65–140)
HCT VFR BLD AUTO: 43.9 % (ref 36.5–49.3)
HGB BLD-MCNC: 14.4 G/DL (ref 12–17)
IMM GRANULOCYTES # BLD AUTO: 0.03 THOUSAND/UL (ref 0–0.2)
IMM GRANULOCYTES NFR BLD AUTO: 0 % (ref 0–2)
LYMPHOCYTES # BLD AUTO: 1.21 THOUSANDS/ΜL (ref 0.6–4.47)
LYMPHOCYTES NFR BLD AUTO: 13 % (ref 14–44)
MCH RBC QN AUTO: 29.9 PG (ref 26.8–34.3)
MCHC RBC AUTO-ENTMCNC: 32.8 G/DL (ref 31.4–37.4)
MCV RBC AUTO: 91 FL (ref 82–98)
MONOCYTES # BLD AUTO: 1.08 THOUSAND/ΜL (ref 0.17–1.22)
MONOCYTES NFR BLD AUTO: 12 % (ref 4–12)
NEUTROPHILS # BLD AUTO: 6.85 THOUSANDS/ΜL (ref 1.85–7.62)
NEUTS SEG NFR BLD AUTO: 74 % (ref 43–75)
NRBC BLD AUTO-RTO: 0 /100 WBCS
PLATELET # BLD AUTO: 147 THOUSANDS/UL (ref 149–390)
PMV BLD AUTO: 11.5 FL (ref 8.9–12.7)
POTASSIUM SERPL-SCNC: 4 MMOL/L (ref 3.5–5.3)
RBC # BLD AUTO: 4.81 MILLION/UL (ref 3.88–5.62)
SODIUM SERPL-SCNC: 141 MMOL/L (ref 136–145)
WBC # BLD AUTO: 9.31 THOUSAND/UL (ref 4.31–10.16)

## 2019-02-01 PROCEDURE — 94760 N-INVAS EAR/PLS OXIMETRY 1: CPT

## 2019-02-01 PROCEDURE — 94669 MECHANICAL CHEST WALL OSCILL: CPT

## 2019-02-01 PROCEDURE — 97163 PT EVAL HIGH COMPLEX 45 MIN: CPT

## 2019-02-01 PROCEDURE — 94640 AIRWAY INHALATION TREATMENT: CPT

## 2019-02-01 PROCEDURE — 99232 SBSQ HOSP IP/OBS MODERATE 35: CPT | Performed by: HOSPITALIST

## 2019-02-01 PROCEDURE — 97116 GAIT TRAINING THERAPY: CPT

## 2019-02-01 PROCEDURE — G8978 MOBILITY CURRENT STATUS: HCPCS

## 2019-02-01 PROCEDURE — 94668 MNPJ CHEST WALL SBSQ: CPT

## 2019-02-01 PROCEDURE — 80048 BASIC METABOLIC PNL TOTAL CA: CPT | Performed by: HOSPITALIST

## 2019-02-01 PROCEDURE — 99232 SBSQ HOSP IP/OBS MODERATE 35: CPT | Performed by: INTERNAL MEDICINE

## 2019-02-01 PROCEDURE — G8979 MOBILITY GOAL STATUS: HCPCS

## 2019-02-01 PROCEDURE — 85025 COMPLETE CBC W/AUTO DIFF WBC: CPT | Performed by: HOSPITALIST

## 2019-02-01 PROCEDURE — 94664 DEMO&/EVAL PT USE INHALER: CPT

## 2019-02-01 PROCEDURE — 97110 THERAPEUTIC EXERCISES: CPT

## 2019-02-01 PROCEDURE — 82948 REAGENT STRIP/BLOOD GLUCOSE: CPT

## 2019-02-01 RX ADMIN — GUAIFENESIN 600 MG: 600 TABLET, EXTENDED RELEASE ORAL at 08:03

## 2019-02-01 RX ADMIN — PANTOPRAZOLE SODIUM 40 MG: 40 TABLET, DELAYED RELEASE ORAL at 05:30

## 2019-02-01 RX ADMIN — CARVEDILOL 12.5 MG: 12.5 TABLET, FILM COATED ORAL at 08:06

## 2019-02-01 RX ADMIN — HEPARIN SODIUM 5000 UNITS: 5000 INJECTION, SOLUTION INTRAVENOUS; SUBCUTANEOUS at 22:05

## 2019-02-01 RX ADMIN — PSYLLIUM HUSK 1 PACKET: 3.4 POWDER ORAL at 08:04

## 2019-02-01 RX ADMIN — TAMSULOSIN HYDROCHLORIDE 0.4 MG: 0.4 CAPSULE ORAL at 08:03

## 2019-02-01 RX ADMIN — IPRATROPIUM BROMIDE 0.5 MG: 0.5 SOLUTION RESPIRATORY (INHALATION) at 14:15

## 2019-02-01 RX ADMIN — DOCUSATE SODIUM 100 MG: 100 CAPSULE, LIQUID FILLED ORAL at 17:18

## 2019-02-01 RX ADMIN — BUDESONIDE 0.5 MG: 0.5 INHALANT RESPIRATORY (INHALATION) at 19:33

## 2019-02-01 RX ADMIN — INSULIN LISPRO 1 UNITS: 100 INJECTION, SOLUTION INTRAVENOUS; SUBCUTANEOUS at 17:20

## 2019-02-01 RX ADMIN — IPRATROPIUM BROMIDE 0.5 MG: 0.5 SOLUTION RESPIRATORY (INHALATION) at 19:33

## 2019-02-01 RX ADMIN — PREDNISONE 40 MG: 20 TABLET ORAL at 08:03

## 2019-02-01 RX ADMIN — GUAIFENESIN 600 MG: 600 TABLET, EXTENDED RELEASE ORAL at 22:04

## 2019-02-01 RX ADMIN — ATORVASTATIN CALCIUM 10 MG: 10 TABLET, FILM COATED ORAL at 17:16

## 2019-02-01 RX ADMIN — PAROXETINE HYDROCHLORIDE 20 MG: 20 TABLET, FILM COATED ORAL at 08:03

## 2019-02-01 RX ADMIN — QUETIAPINE FUMARATE 25 MG: 25 TABLET ORAL at 22:05

## 2019-02-01 RX ADMIN — LEVALBUTEROL HYDROCHLORIDE 1.25 MG: 1.25 SOLUTION, CONCENTRATE RESPIRATORY (INHALATION) at 14:15

## 2019-02-01 RX ADMIN — CARVEDILOL 12.5 MG: 12.5 TABLET, FILM COATED ORAL at 17:16

## 2019-02-01 RX ADMIN — CLOPIDOGREL BISULFATE 75 MG: 75 TABLET ORAL at 08:02

## 2019-02-01 RX ADMIN — ASPIRIN 81 MG 81 MG: 81 TABLET ORAL at 08:03

## 2019-02-01 RX ADMIN — HEPARIN SODIUM 5000 UNITS: 5000 INJECTION, SOLUTION INTRAVENOUS; SUBCUTANEOUS at 14:08

## 2019-02-01 RX ADMIN — DOCUSATE SODIUM 100 MG: 100 CAPSULE, LIQUID FILLED ORAL at 08:04

## 2019-02-01 RX ADMIN — BENZONATATE 200 MG: 100 CAPSULE ORAL at 22:09

## 2019-02-01 RX ADMIN — SPIRONOLACTONE 25 MG: 25 TABLET, FILM COATED ORAL at 08:07

## 2019-02-01 RX ADMIN — FLUTICASONE PROPIONATE 2 SPRAY: 50 SPRAY, METERED NASAL at 08:05

## 2019-02-01 RX ADMIN — LEVALBUTEROL HYDROCHLORIDE 1.25 MG: 1.25 SOLUTION, CONCENTRATE RESPIRATORY (INHALATION) at 08:13

## 2019-02-01 RX ADMIN — BUDESONIDE 0.5 MG: 0.5 INHALANT RESPIRATORY (INHALATION) at 08:13

## 2019-02-01 RX ADMIN — IPRATROPIUM BROMIDE 0.5 MG: 0.5 SOLUTION RESPIRATORY (INHALATION) at 08:13

## 2019-02-01 RX ADMIN — LATANOPROST 1 DROP: 50 SOLUTION OPHTHALMIC at 22:06

## 2019-02-01 RX ADMIN — HEPARIN SODIUM 5000 UNITS: 5000 INJECTION, SOLUTION INTRAVENOUS; SUBCUTANEOUS at 05:30

## 2019-02-01 RX ADMIN — STANDARDIZED SENNA CONCENTRATE 17.2 MG: 8.6 TABLET ORAL at 22:05

## 2019-02-01 RX ADMIN — LEVALBUTEROL HYDROCHLORIDE 1.25 MG: 1.25 SOLUTION, CONCENTRATE RESPIRATORY (INHALATION) at 19:33

## 2019-02-01 RX ADMIN — AMLODIPINE BESYLATE 10 MG: 10 TABLET ORAL at 08:07

## 2019-02-01 RX ADMIN — OXYBUTYNIN CHLORIDE 5 MG: 5 TABLET, EXTENDED RELEASE ORAL at 08:03

## 2019-02-01 NOTE — PHYSICAL THERAPY NOTE
PHYSICAL THERAPY EVALUATION NOTE    Patient Name: Zohaib Frankel  Today's Date: 2/1/2019  AGE:   77 y o   Mrn:   [id number]  ADMIT DX:  Shortness of breath [R06 02]  Lung mass [R91 8]  Hypoxia [R09 02]    Past Medical History:   Diagnosis Date    [facility] ([facility] ) 5/31/2017    Aspiration into respiratory tract     Chest pain 5/31/2017    COPD (chronic obstructive pulmonary disease) ([place] [address] )     Depression     Elevated troponin 5/31/2017    GERD (gastroesophageal reflux disease)     History of CVA (cerebrovascular accident) 4/13/2016    Hyperlipidemia     Hypertension     Lung cancer ([place] [address] ) 2005    Right, status post lobectomy    Pneumonia     Prostate cancer ([place] [address] )     Sleep apnea     awaiting sleep study results    Stroke ([place] [address] )     Tracheomalacia     Weakness due to cerebrovascular accident (CVA)      Length Of Stay: 4  PHYSICAL THERAPY EVALUATION :   02/01/19 1043   Pain Assessment   Pain Assessment No/denies pain   Home Living   Type of [address] One level; Other (Comment)  (3 OSVALDO w/ railing)   Additional Comments lives w/ daughter  independent w/ ADLs  daughter drives  ambulates w/ roller walker and left AFO  uses wheelchair for community mobility  no falls in last 6 months  Prior Function   Comments pt seen supine in bed  agreed to participate in PT eval  denied pain or dizziness  notes occasional shortness of breath  pt wants to putter around the house and get back to driving  pt needed occasional cues for task focus  Restrictions/Precautions   Other Precautions Chair Alarm; Bed Alarm; Fall Risk   General   Additional Pertinent History room air resting pulse ox 95% and 63 BPM, active 90% and 75 BPM    Family/Caregiver Present No   Cognition   Arousal/Participation Cooperative   Orientation Level Oriented to person;Oriented to place;Oriented to time; Other (Comment)  (pt was identified w/ full name, birth date)   Following Commands Follows one step commands with increased time or repetition   RUE Assessment   RUE Assessment WFL  (4-/5, shoulder 3/5)   LUE Assessment   LUE Assessment X  (3/5, shoulder 2+/5)   RLE Assessment   RLE Assessment WFL  (4-/5)   LLE Assessment   LLE Assessment X  (3/5, ankle dorsiflexion 2+/5 plantarflexion 3-/5)   Coordination   Movements are Fluid and Coordinated 0   Coordination and Movement Description impaired coordination all extremities   Sensation X  (light touch impaired L LE)   Bed Mobility   Supine to Sit 2  Maximal assistance   Additional items Assist x 1;HOB elevated; Bedrails; Increased time required;Verbal cues;LE management   Additional Comments pt was dependent for donning left AFO and shoes  Transfers   Sit to Stand 2  Maximal assistance   Additional items Assist x 1; Increased time required;Verbal cues  (for hand placement)   Stand to Sit 3  Moderate assistance   Additional items Assist x 1; Impulsive;Verbal cues  (for body positioning, hand placement, controlled descent)   Ambulation/Elevation   Gait pattern Decreased L stance;Shuffling; Ataxia; Inconsistent sariah; Foward flexed   Gait Assistance 2  Maximal assist  (w/ chair follow)   Additional items Assist x 1;Verbal cues; Tactile cues  (for walker positioning, posture, breathing technique)   Assistive Device Rolling walker; Other (Comment)  (left AFO)   Distance 4 feet  (additional not possible due to fatigue)   Stair Management Assistance Not tested  (due to limited ambuilation tolerance)   Balance   Static Sitting Fair   Dynamic Sitting Poor   Static Standing Poor   Dynamic Standing Poor -   Ambulatory Poor -  (w/ roller walker)   Activity Tolerance   Activity Tolerance Patient limited by fatigue   Nurse Made Aware spoke to Gayathri NSG   Assessment   Prognosis Fair   Problem List Decreased strength;Decreased range of motion;Decreased endurance; Impaired balance;Decreased mobility; Decreased coordination;Decreased cognition;Decreased safety awareness; Impaired sensation;Obesity; Decreased skin integrity   Assessment Pt states that two night ago he was awake all night coughing and felt he could not catch his breath  Dx: acute hypoxic respiratory failure, acute COPD exacerbation, and bacteruria  order placed for PT eval and tx  pt presents w/ comorbidities of HTN, hyperlipidemia, COPD, aspiration, CVA, pneumonia, TKR, and lung cancer and personal factors of advanced age, mobilizing w/ assistive device, stair(s) to enter home and depression  pt presents w/ weakness, decreased ROM, decreased endurance, impaired balance, gait deviations, altered sensation, impaired coordination, decreased safety awareness, fall risk and impaired skin integrity  these impairments are evident in findings from physical examination (weakness, decreased ROM, altered sensation, impaired coordination and impaired skin integrity), mobility assessment (need for mod to max assist w/ all phases of mobility when usually mobilizing independently, tolerance to only 4 feet of ambulation and need for cueing for mobility and breathing technique), and Barthel Index: 35/100  pt needed input for task focus and mobility technique/safety  pt is at risk for falls due to physical and safety awareness deficits  pt's clinical presentation is unstable/unpredictable (evident in need for assist w/ all phases of mobility when usually mobilizing independently, tolerance to only 4 feet of ambulation and need for input for task focus and mobility technique/safety w/ inconsistent carryover of education received)  pt needs inpatient PT tx to improve mobility deficits  discharge recommendation is for inpatient rehab to reduce fall risk and maximize level of functional independence  Goals   Patient Goals putter around the house, get back to driving   STG Expiration Date 02/11/19   Short Term Goal #1 pt will:  Increase bilateral LE strength 1/2 grade to facilitate independent mobility, Perform all bed mobility tasks w/ supervision to decrease fall risk factors, Perform all transfers w/ minx1 to improve independence, Ambulate 120 ft  with roller walker and left AFO w/ minx1 w/o LOB to expedite ability to perform household tasks, Increase all balance 1/2 grade to decrease risk for falls, Complete exercise program independently to improve overall activity tolerance, Tolerate 3 hr OOB to faciliate upright tolerance, Improve Barthel Index score to 35 or greater to facilitate independence, Navigate 3 stairs w/ modx1 with bilateral handrails to facilitate return to previous living environment   Plan   Treatment/Interventions Functional transfer training;LE strengthening/ROM; Elevations; Therapeutic exercise; Endurance training;Cognitive reorientation;Patient/family training;Equipment eval/education; Bed mobility;Gait training   PT Frequency 5x/wk   Recommendation   Recommendation Short-term skilled PT   Equipment Recommended Other (Comment)  (roller walker, left AFO)   Barthel Index   Feeding 10   Bathing 0   Grooming Score 0   Dressing Score 0   Bladder Score 5   Bowels Score 10   Toilet Use Score 5   Transfers (Bed/Chair) Score 5   Mobility (Level Surface) Score 0   Stairs Score 0   Barthel Index Score 35     Skilled PT recommended while in hospital and upon DC to progress pt toward treatment goals       Danii Og, PT

## 2019-02-01 NOTE — PROGRESS NOTES
Progress Note - Zonia Rodney  1940, 77 y o  male MRN: [id number]    Unit/Bed#: -01 Encounter: [id number]    Primary Care Provider: Mercedes Parks DO   Date and time admitted to hospital: 1/27/2019 11:14 PM    * Acute respiratory failure with hypoxia (HCC)   Assessment & Plan    · Presents c/o intermittent desats  · On arrival, desats to high 80's despite nasal cannula  · Required ICU transition due mucus plugging   · Continue chest PT, mucolytics etc  · Appreciate pulm input      COPD with acute exacerbation (HCC)   Assessment & Plan    · Continue DuoNeb and Pulmicort, prn albuterol   · methylpred--> prednisone      Lung cancer ([facility] [address] )   Assessment & Plan    · Sp RLL lobectomy in 2002 at [facility]  · Reports radiation     Oropharyngeal dysphagia   Assessment & Plan    · Discharged 12/2 after episodes of aspiration PNA  · Continue dysphagia diet level 1, nectar thick liquids     Prostate cancer ([facility] [address] )   Assessment & Plan    · Reports seed placement same day as lobectomy      History of cerebrovascular accident (CVA) with residual deficit   Assessment & Plan    Noted      Bacteriuria   Assessment & Plan    · Asymptomatic; some confusion and symptoms of delirium  · UA positive for leuks, protein and blood  · Ucx >100K Staph   · Given Cefepime in the ED--> monitoring off abx without fevers          VTE Pharmacologic Prophylaxis:   Pharmacologic: Heparin  Mechanical VTE Prophylaxis in Place: Yes    Patient Centered Rounds: I have performed bedside rounds with nursing staff today  Discussions with Specialists or Other Care Team Provider: pulmonology     Education and Discussions with Family / Patient: patient     Time Spent for Care: 30 minutes  More than 50% of total time spent on counseling and coordination of care as described above      Current Length of Stay: 4 day(s)    Current Patient Status: Inpatient   Certification Statement: The patient will continue to require additional inpatient hospital stay due to awaiting safe discharge plan  Discharge Plan / Estimated Discharge Date: TBD based on clinical course      Code Status: Level 1 - Full Code      Subjective:   Feels well  Had some SOB overnight with a lot of coughing but was able to bring up large amount of phlegm  No other complaints  Objective:     Vitals:   Temp (24hrs), Av 2 °F (36 8 °C), Min:97 3 °F (36 3 °C), Max:98 9 °F (37 2 °C)    Temp:  [97 3 °F (36 3 °C)-98 9 °F (37 2 °C)] 97 3 °F (36 3 °C)  HR:  [60-71] 61  Resp:  [18-20] 20  BP: (113-144)/(57-71) 121/63  SpO2:  [92 %-95 %] 94 %  Body mass index is 24 6 kg/m²  Input and Output Summary (last 24 hours):     No intake or output data in the 24 hours ending 19 1556    Physical Exam:     Physical Exam   Constitutional: He is oriented to person, place, and time  No distress  HENT:   Head: Normocephalic and atraumatic  Cardiovascular: Normal rate and regular rhythm  No murmur heard  Pulmonary/Chest: Effort normal and breath sounds normal  No respiratory distress  He has no wheezes  Abdominal: Soft  Bowel sounds are normal  He exhibits no distension  There is no tenderness  Musculoskeletal: He exhibits no edema  Neurological: He is alert and oriented to person, place, and time  Skin: Skin is warm and dry  He is not diaphoretic  No erythema  Psychiatric: He has a normal mood and affect  His behavior is normal    Nursing note and vitals reviewed      Additional Data:     Labs:      Results from last 7 days  Lab Units 19  0548   WBC Thousand/uL 9 31   HEMOGLOBIN g/dL 14 4   HEMATOCRIT % 43 9   PLATELETS Thousands/uL 147*   NEUTROS PCT % 74   LYMPHS PCT % 13*   MONOS PCT % 12   EOS PCT % 1       Results from last 7 days  Lab Units 19  0548  19  2233  19  2354   POTASSIUM mmol/L 4 0  < >  --   < > 5 2   CHLORIDE mmol/L 106  < >  --   < > 102   CO2 mmol/L 23  < >  --   < > 28   CO2, I-STAT mmol/L  --   --  23  --   --    BUN mg/dL 31*  < >  --   < > 26*   CREATININE mg/dL 1 13  < >  --   < > 1 34*   CALCIUM mg/dL 9 4  < >  --   < > 9 8   ALK PHOS U/L  --   --   --   --  109   ALT U/L  --   --   --   --  27   AST U/L  --   --   --   --  45   GLUCOSE, ISTAT mg/dl  --   --  124  --   --    < > = values in this interval not displayed  Results from last 7 days  Lab Units 01/27/19  2354   INR  0 99       * I Have Reviewed All Lab Data Listed Above  * Additional Pertinent Lab Tests Reviewed: All Labs Within Last 24 Hours Reviewed    Imaging:    Imaging Reports Reviewed Today Include:   Imaging Personally Reviewed by Myself Includes:      Recent Cultures (last 7 days):       Results from last 7 days  Lab Units 01/29/19  0456 01/28/19  0050 01/27/19  2353 01/27/19  2340   BLOOD CULTURE   --   --  No Growth After 4 Days  No Growth After 4 Days     SPUTUM CULTURE  4+ Growth of   --   --   --    GRAM STAIN RESULT  Rare Polys  4+ Gram negative rods  3+ Gram positive cocci in pairs, chains and clusters  1+ Gram positive rods  --   --   --    URINE CULTURE   --  >100,000 cfu/ml Staphylococcus aureus*  --   --        Last 24 Hours Medication List:     Current Facility-Administered Medications:  acetaminophen 650 mg Oral Q6H PRN Niko Sep, PA-C   albuterol 2 puff Inhalation Q4H PRN DIANA Marroquin   amLODIPine 10 mg Oral Daily Niko Sep, PA-C   aspirin 81 mg Oral Daily Phyllis Ramachandran PAPamellaC   atorvastatin 10 mg Oral Daily With [place], PA-C   benzonatate 200 mg Oral TID PRN Niko Sep, PA-C   budesonide 0 5 mg Nebulization BID Niko Sep, PA-C   carvedilol 12 5 mg Oral BID With Meals Niko Sep, PA-C   clopidogrel 75 mg Oral Daily Niko Sep, PA-C   dextromethorphan-guaiFENesin 5 mL Oral TID PRN Niko Sep, PA-C   docusate sodium 100 mg Oral BID Niko Sep, PA-C   fluticasone 2 spray Nasal Daily Niko Sep, PA-C   guaiFENesin 600 mg Oral Q12H [facility] Myah Rajput DIANA Sanchez   heparin (porcine) 5,000 Units Subcutaneous Q8H [address] Phyllis Nino PA-C   insulin lispro 1-5 Units Subcutaneous HS DIANA Ha   insulin lispro 1-6 Units Subcutaneous TID AC DIANA Ha   ipratropium 0 5 mg Nebulization TID Shasha Amos MD   latanoprost 1 drop Both Eyes HS Keven Hart PA-C   levalbuterol 1 25 mg Nebulization TID Shasha Amos MD   meclizine 25 mg Oral TID PRN Keven Hart PA-C   ondansetron 4 mg Intravenous Q8H PRN Keven Hart PA-C   oxybutynin 5 mg Oral Daily Phyllis Nino PA-C   pantoprazole 40 mg Oral Early Morning Keven Hart PA-C   PARoxetine 20 mg Oral Daily Phyllis Nino PA-C   polyethylene glycol 17 g Oral Daily PRN Keven Hart PA-C   predniSONE 40 mg Oral Daily Barron Stone PA-C   psyllium 1 packet Oral Daily DIANA Scott   QUEtiapine 25 mg Oral HS Shasha Amos MD   senna 2 tablet Oral HS DIANA Rust   sodium chloride 1 application Nasal T3S PRN Tiki Barragan PA-C   spironolactone 25 mg Oral Daily Keven Hart PA-C   tamsulosin 0 4 mg Oral Daily Keven Hart PA-C        Today, Patient Was Seen By: Shasha Amos MD    ** Please Note: This note has been constructed using a voice recognition system   **

## 2019-02-01 NOTE — PLAN OF CARE
Problem: PHYSICAL THERAPY ADULT  Goal: Performs mobility at highest level of function for planned discharge setting  See evaluation for individualized goals  Treatment/Interventions: Functional transfer training, LE strengthening/ROM, Elevations, Therapeutic exercise, Endurance training, Cognitive reorientation, Patient/family training, Equipment eval/education, Bed mobility, Gait training  Equipment Recommended: Other (Comment) (roller walker, left AFO)       See flowsheet documentation for full assessment, interventions and recommendations  Outcome: Progressing  Prognosis: Fair  Problem List: Decreased strength, Decreased range of motion, Decreased endurance, Impaired balance, Decreased mobility, Decreased coordination, Decreased cognition, Decreased safety awareness, Impaired sensation, Obesity, Decreased skin integrity  Assessment: Therapist provided education to pt for mobility technique including transfers and roller walker use  Education was provided due to findings from evaluation  Repetition was required for carryover of education to be noted  Pt was noted to have improvement in functional status after education w/ decreased level of assist necessary to maintain safety and increased ambulation tolerance  Pt continues to be a fall risk  Also introduced LE exercises to address physical and endurance impairments noted during eval  Pt needed occasional input after introduction to maintain technique and complete all reps  Handout was provided to expedite understanding of technique  Pt needed short rest breaks due to fatigue  continued inpatient PT tx is indicated to reduce fall risk factors and progress mobility training as appropriate  Recommendation: Short-term skilled PT          See flowsheet documentation for full assessment

## 2019-02-01 NOTE — SOCIAL WORK
LOS 4; patient is not a bundle, patient is not a 30 day readmission  CM called and left message with patient's daughter Oleksandr Mckeon, also called and spoke with patient's daughter Kadi Mann  Daughter Kadi Mann states the following:  Patient lives with his daughter Oleksandr Mckeon who is a nurse, she works night shift  Patient needs light assistance with his ADLs, has both a walker and wheelchair at home, is usually in the wheelchair related to fear of falls  Patient has oxygen at home at baseline, daughter states that he only uses it sporadically when needed  Patient has been to rehab several times, including [facility]  Patient has also used Home Health with [facility] for nursing, PT/OT and speech; patient was recently discharged from these home services  Patient is able to afford his medications and is a retired   Denies hx of MH or D&A  Daughter states that patient is not confused at baseline; they notice that only when he has a UTI will he show signs of confusion      Brian Mckeon plans to transport  CM reviewed discharge planning process including the following: identifying caregivers at home, preference for d/c planning needs, availability of treatment team to discuss questions or concerns patient and/or family may have regarding diagnosis, plan of care, old or new medications and discharge planning   CM will continue to follow for care coordination and update assessment as necessary

## 2019-02-01 NOTE — PROGRESS NOTES
Progress Note - Pulmonary   Arlene Sanchez  77 y o  male MRN: [id number]  Unit/Bed#: -01 Encounter: [id number]    Assessment/Plan:    1  Acute hypoxic respiratory failure secondary to right-sided atelectasis in the setting poor pulmonary toilet, tracheobronchomalacia, and mucus plugging- resolved  1  Currently room air saturating well at 93%  2  Continued to titrate to maintain oxygen saturations greater than equal to 88%  3  Aggressive pulmonary toilet:  Incentive spirometry and flutter valve q 1 hour, vest therapy, out of bed as tolerated   4  Baseline oxygen requirements are 2-3 L at home as needed  2  Abnormal chest CT right-sided atelectasis likely due to mucus plugging and right hilar fullness likely due to hilar versus adenopathy  1  Repeat chest x-ray 01/29/2019 shows improved aeration on right side with stable postoperative changes status post right lower lobe lobectomy in 2002  2  Continue aggressive pulmonary hygiene as above  3  White cell count within normal, procalcitonin within normal limits  4  Recommend repeat chest CT in 4 weeks with possible PET scan and for bronchoscopy with endobronchial ultrasound if no signs of improvement  3  COPD of unknown severity with acute exacerbation  1  Continue Xopenex Atrovent nebs Q 6 hr  2  Continue Pulmicort nebs b i d   3  Continue prednisone taper  4  Discharge meds: DuoNebs QID, Pulmicort nebs BID   4  Chronic cough secondary to dysphagia with chronic aspiration and tracheobronchomalacia  1  Continue aspiration precautions and modified diet  2  Continue Tessalon p r n  And Mucinex b i d   5  History of lung cancer  1  S/p right lower lobe lobectomy in 2002 at [facility]  6  CKD 3 with bacteriuria   1  Treatment per primary team     Outpatient follow-up per discharge instructions  CT chest in 4 weeks  Discussed with nursing       Chief Complaint:    "Tired from coughing "    Subjective:    Patient was seen and examined today  Patient states he is tired and reports was coughing overnight  He is unable to expectorate any sputum and feels as though it is getting stuck half way  When he is able to expectorate, it is white  However once he received Tessalon around midnight last night he felt much better and was able to sleep through the rest of the night  Denies shortness of breath, chest pain, hemoptysis, fevers, chills, night sweats, headaches, nausea, vomiting, abdominal pain  Objective:    Vitals: Blood pressure 138/71, pulse 61, temperature 97 8 °F (36 6 °C), temperature source Oral, resp  rate 20, height 6' 5" (1 956 m), weight 94 1 kg (207 lb 7 3 oz), SpO2 93 %  RA,Body mass index is 24 6 kg/m²  Intake/Output Summary (Last 24 hours) at 02/01/19 1147  Last data filed at 01/31/19 1401   Gross per 24 hour   Intake              360 ml   Output              100 ml   Net              260 ml       Invasive Devices     Peripheral Intravenous Line            Peripheral IV 01/29/19 Right Forearm 3 days    Peripheral IV 01/31/19 Right Arm 1 day                Physical Exam:     Physical Exam   Constitutional: He is oriented to person, place, and time  He appears well-developed and well-nourished  No distress  HENT:   Head: Normocephalic and atraumatic  Mouth/Throat: Oropharynx is clear and moist  No oropharyngeal exudate  Eyes: Pupils are equal, round, and reactive to light  EOM are normal    Neck: Neck supple  No JVD present  No tracheal deviation present  Cardiovascular: Normal rate, regular rhythm, normal heart sounds and intact distal pulses  Exam reveals no gallop and no friction rub  No murmur heard  Pulmonary/Chest: Effort normal  No respiratory distress  He has decreased breath sounds in the right middle field and the right lower field  He has no wheezes  He has rhonchi (scarce rhonci in right mid lung field)  He exhibits no tenderness  Abdominal: Soft  Bowel sounds are normal  He exhibits no distension  There is no tenderness  Musculoskeletal: Normal range of motion  He exhibits no edema, tenderness or deformity  Lymphadenopathy:     He has no cervical adenopathy  Neurological: He is alert and oriented to person, place, and time  No cranial nerve deficit  He exhibits normal muscle tone  Skin: Skin is warm and dry  No rash noted  He is not diaphoretic  No erythema  Psychiatric: He has a normal mood and affect  His behavior is normal  Judgment and thought content normal        Labs: I have personally reviewed pertinent lab results  , BNP: No results found for: BNP, CBC:   Lab Results   Component Value Date    WBC 9 31 02/01/2019    HGB 14 4 02/01/2019    HCT 43 9 02/01/2019    MCV 91 02/01/2019     (L) 02/01/2019    MCH 29 9 02/01/2019    MCHC 32 8 02/01/2019    RDW 13 9 02/01/2019    MPV 11 5 02/01/2019    NRBC 0 02/01/2019   , CMP:   Lab Results   Component Value Date    SODIUM 141 02/01/2019    K 4 0 02/01/2019     02/01/2019    CO2 23 02/01/2019    BUN 31 (H) 02/01/2019    CREATININE 1 13 02/01/2019    CALCIUM 9 4 02/01/2019    EGFR 61 02/01/2019   , PT/INR: No results found for: PT, INR    Imaging and other studies: None to review today

## 2019-02-01 NOTE — PLAN OF CARE
Problem: DISCHARGE PLANNING - CARE MANAGEMENT  Goal: Discharge to post-acute care or home with appropriate resources  INTERVENTIONS:  - Conduct assessment to determine patient/family and health care team treatment goals, and need for post-acute services based on payer coverage, community resources, and patient preferences, and barriers to discharge  - Address psychosocial, clinical, and financial barriers to discharge as identified in assessment in conjunction with the patient/family and health care team  - Arrange appropriate level of post-acute services according to patients   needs and preference and payer coverage in collaboration with the physician and health care team  - Communicate with and update the patient/family, physician, and health care team regarding progress on the discharge plan  - Arrange appropriate transportation to post-acute venues   Outcome: Progressing  CM received notification from [facility] that patient was client of theirs for PT, nursing and speech, report that they will accept patient with resumption of care if patient/family are agreeable  CM is still awaiting callback from daughter Lainey Roach ([phone]), left two messages

## 2019-02-01 NOTE — PLAN OF CARE
Problem: PHYSICAL THERAPY ADULT  Goal: Performs mobility at highest level of function for planned discharge setting  See evaluation for individualized goals  Treatment/Interventions: Functional transfer training, LE strengthening/ROM, Elevations, Therapeutic exercise, Endurance training, Cognitive reorientation, Patient/family training, Equipment eval/education, Bed mobility, Gait training  Equipment Recommended: Other (Comment) (roller walker, left AFO)       See flowsheet documentation for full assessment, interventions and recommendations  Prognosis: Fair  Problem List: Decreased strength, Decreased range of motion, Decreased endurance, Impaired balance, Decreased mobility, Decreased coordination, Decreased cognition, Decreased safety awareness, Impaired sensation, Obesity, Decreased skin integrity  Assessment: Pt states that two night ago he was awake all night coughing and felt he could not catch his breath  Dx: acute hypoxic respiratory failure, acute COPD exacerbation, and bacteruria  order placed for PT eval and tx  pt presents w/ comorbidities of HTN, hyperlipidemia, COPD, aspiration, CVA, pneumonia, TKR, and lung cancer and personal factors of advanced age, mobilizing w/ assistive device, stair(s) to enter home and depression  pt presents w/ weakness, decreased ROM, decreased endurance, impaired balance, gait deviations, altered sensation, impaired coordination, decreased safety awareness, fall risk and impaired skin integrity  these impairments are evident in findings from physical examination (weakness, decreased ROM, altered sensation, impaired coordination and impaired skin integrity), mobility assessment (need for mod to max assist w/ all phases of mobility when usually mobilizing independently, tolerance to only 4 feet of ambulation and need for cueing for mobility and breathing technique), and Barthel Index: 35/100  pt needed input for task focus and mobility technique/safety   pt is at risk for falls due to physical and safety awareness deficits  pt's clinical presentation is unstable/unpredictable (evident in need for assist w/ all phases of mobility when usually mobilizing independently, tolerance to only 4 feet of ambulation and need for input for task focus and mobility technique/safety w/ inconsistent carryover of education received)  pt needs inpatient PT tx to improve mobility deficits  discharge recommendation is for inpatient rehab to reduce fall risk and maximize level of functional independence  Recommendation: Short-term skilled PT          See flowsheet documentation for full assessment

## 2019-02-01 NOTE — PLAN OF CARE
CARDIOVASCULAR - ADULT     Maintains optimal cardiac output and hemodynamic stability Progressing     Absence of cardiac dysrhythmias or at baseline rhythm Progressing        DISCHARGE PLANNING - CARE MANAGEMENT     Discharge to post-acute care or home with appropriate resources Progressing        Potential for Falls     Patient will remain free of falls Progressing        Prexisting or High Potential for Compromised Skin Integrity     Skin integrity is maintained or improved Progressing        RESPIRATORY - ADULT     Achieves optimal ventilation and oxygenation Progressing        SKIN/TISSUE INTEGRITY - ADULT     Skin integrity remains intact Progressing

## 2019-02-01 NOTE — SOCIAL WORK
CM received notification from [facility] that patient was client of theirs for PT, nursing and speech, report that they will accept patient with resumption of care if patient/family are agreeable  CM is still awaiting callback from daughter Jacoby Phillips ([phone]), left two messages

## 2019-02-01 NOTE — PHYSICAL THERAPY NOTE
PHYSICAL THERAPY TREATMENT NOTE    Patient Name: Bar Morales  Today's Date: 2/1/2019 02/01/19 1110   Pain Assessment   Pain Assessment No/denies pain   Restrictions/Precautions   Other Precautions Chair Alarm; Bed Alarm; Fall Risk   General   Chart Reviewed Yes   Family/Caregiver Present No   Cognition   Arousal/Participation Cooperative   Attention Attends with cues to redirect   Orientation Level Oriented to person;Oriented to place;Oriented to time; Other (Comment)  (pt was identified w/ full name, birth date)   Following Commands Follows one step commands with increased time or repetition   Subjective   Subjective pt agreed to PT intervention  mobility education was provided regarding transfers and roller walker use  Also reviewed breathing technique  education was completed via demonstration and verbal instruction  Transfers   Sit to Stand 3  Moderate assistance   Additional items Assist x 1; Increased time required;Verbal cues  (for hand placement)   Stand to Sit 3  Moderate assistance   Additional items Assist x 1;Verbal cues  (for hand placement, controlled descent)   Additional Comments pt completed standing hip flexion w/ roller walker and maxx1 w/ R LE and modx1 w/ L LE 3x bilaterally  additional not possible due to fatigue  Ambulation/Elevation   Gait pattern Decreased L stance;Shuffling; Foward flexed   Gait Assistance 3  Moderate assist  (w/ chair follow)   Additional items Assist x 1;Verbal cues; Tactile cues  (for walker placement)   Assistive Device Rolling walker; Other (Comment)  (left AFO)   Distance 7, 10 feet w/ seated rest break x 4 minutes  (additional not possible due to fatigue)   Stair Management Assistance (see Additional Comments above)   Balance   Static Sitting Fair   Dynamic Sitting Poor   Static Standing Poor   Dynamic Standing Poor   Ambulatory Poor  (w/ roller walker)   Activity Tolerance Activity Tolerance Patient limited by fatigue   Nurse Made Aware spoke to [facility]   Equipment Use   Comments right ankle pumps 30  heel slides, quad sets, and hip abduction 10 each  Assessment   Prognosis Fair   Problem List Decreased strength;Decreased range of motion;Decreased endurance; Impaired balance;Decreased mobility; Decreased coordination;Decreased cognition;Decreased safety awareness; Impaired sensation;Obesity; Decreased skin integrity   Assessment Therapist provided education to pt for mobility technique including transfers and roller walker use  Education was provided due to findings from evaluation  Repetition was required for carryover of education to be noted  Pt was noted to have improvement in functional status after education w/ decreased level of assist necessary to maintain safety and increased ambulation tolerance  Pt continues to be a fall risk  Also introduced LE exercises to address physical and endurance impairments noted during eval  Pt needed occasional input after introduction to maintain technique and complete all reps  Handout was provided to expedite understanding of technique  Pt needed short rest breaks due to fatigue  continued inpatient PT tx is indicated to reduce fall risk factors and progress mobility training as appropriate  Goals   Patient Goals putter around the house, get back to driving   STG Expiration Date 02/11/19   Short Term Goal #1 pt will:  Increase bilateral LE strength 1/2 grade to facilitate independent mobility, Perform all bed mobility tasks w/ supervision to decrease fall risk factors, Perform all transfers w/ minx1 to improve independence, Ambulate 120 ft  with roller walker and left AFO w/ minx1 w/o LOB to expedite ability to perform household tasks, Increase all balance 1/2 grade to decrease risk for falls, Complete exercise program independently to improve overall activity tolerance, Tolerate 3 hr OOB to faciliate upright tolerance, Improve Barthel Index score to 35 or greater to facilitate independence, Navigate 3 stairs w/ modx1 with bilateral handrails to facilitate return to previous living environment   Treatment Day 1   Plan   Treatment/Interventions Functional transfer training;LE strengthening/ROM; Elevations; Therapeutic exercise; Endurance training;Cognitive reorientation;Patient/family training;Equipment eval/education; Bed mobility;Gait training   Progress Progressing toward goals   PT Frequency 5x/wk   Recommendation   Recommendation Short-term skilled PT   Equipment Recommended Other (Comment)  (roller walker, left AFO)     Skilled inpatient PT recommended while in hospital to progress pt toward treatment goals      Tanmay May, PT

## 2019-02-01 NOTE — PLAN OF CARE
Problem: DISCHARGE PLANNING - CARE MANAGEMENT  Goal: Discharge to post-acute care or home with appropriate resources  INTERVENTIONS:  - Conduct assessment to determine patient/family and health care team treatment goals, and need for post-acute services based on payer coverage, community resources, and patient preferences, and barriers to discharge  - Address psychosocial, clinical, and financial barriers to discharge as identified in assessment in conjunction with the patient/family and health care team  - Arrange appropriate level of post-acute services according to patients   needs and preference and payer coverage in collaboration with the physician and health care team  - Communicate with and update the patient/family, physician, and health care team regarding progress on the discharge plan  - Arrange appropriate transportation to post-acute venues   Outcome: Progressing  CM called panchito Alvarez (with whom patient lives) and left a third message, still awaiting callback, to discuss discharge planning  In the meantime, CM called patient's other daughter Yessica Arredondo who lives in [place]  CM discussed the PT recommendation for short term rehab  Panchito Arredondo agreeable and requests referrals to both  Acute Rehab and [facility], since patient has been to both facilities in the past    Referrals placed through [facility]  Awaiting responses and awaiting callback from panchito Alvarez re: whether Kim wishes to take patient back home with [facility] or whether the decision will be rehab  Panchito Arredondo states that patient is never home alone: Kim works nights so is home during the day, her  is home with patient at night  CM will continue to follow and assist through discharge

## 2019-02-01 NOTE — SOCIAL WORK
CM called daughter Jeremias Garcia (with whom patient lives) and left a third message, still awaiting callback, to discuss discharge planning  In the meantime, CM called patient's other daughter Fabián  who lives in [place]  CM discussed the PT recommendation for short term rehab  Daughter Fabián Nance agreeable and requests referrals to both  Acute Rehab and [facility], since patient has been to both facilities in the past    Referrals placed through [facility]  Awaiting responses and awaiting callback from daughter Jeremias Garcia re: whether Jeremias Garcia wishes to take patient back home with [facility] or whether the decision will be rehab  Brian Nance states that patient is never home alone: Jeremias Garcia works nights so is home during the day, her  is home with patient at night  CM will continue to follow and assist through discharge

## 2019-02-02 VITALS
RESPIRATION RATE: 18 BRPM | WEIGHT: 207.45 LBS | HEART RATE: 58 BPM | DIASTOLIC BLOOD PRESSURE: 58 MMHG | BODY MASS INDEX: 24.5 KG/M2 | SYSTOLIC BLOOD PRESSURE: 112 MMHG | TEMPERATURE: 97.5 F | HEIGHT: 77 IN | OXYGEN SATURATION: 95 %

## 2019-02-02 PROBLEM — R41.0 DELIRIUM: Status: ACTIVE | Noted: 2019-02-02

## 2019-02-02 LAB
ANION GAP SERPL CALCULATED.3IONS-SCNC: 9 MMOL/L (ref 4–13)
BACTERIA BLD CULT: NORMAL
BACTERIA BLD CULT: NORMAL
BASOPHILS # BLD AUTO: 0.02 THOUSANDS/ΜL (ref 0–0.1)
BASOPHILS NFR BLD AUTO: 0 % (ref 0–1)
BUN SERPL-MCNC: 34 MG/DL (ref 5–25)
CALCIUM SERPL-MCNC: 9.1 MG/DL (ref 8.3–10.1)
CHLORIDE SERPL-SCNC: 105 MMOL/L (ref 100–108)
CO2 SERPL-SCNC: 25 MMOL/L (ref 21–32)
CREAT SERPL-MCNC: 1.19 MG/DL (ref 0.6–1.3)
EOSINOPHIL # BLD AUTO: 0.13 THOUSAND/ΜL (ref 0–0.61)
EOSINOPHIL NFR BLD AUTO: 2 % (ref 0–6)
ERYTHROCYTE [DISTWIDTH] IN BLOOD BY AUTOMATED COUNT: 14 % (ref 11.6–15.1)
GFR SERPL CREATININE-BSD FRML MDRD: 58 ML/MIN/1.73SQ M
GLUCOSE SERPL-MCNC: 101 MG/DL (ref 65–140)
GLUCOSE SERPL-MCNC: 88 MG/DL (ref 65–140)
GLUCOSE SERPL-MCNC: 96 MG/DL (ref 65–140)
HCT VFR BLD AUTO: 40.6 % (ref 36.5–49.3)
HGB BLD-MCNC: 13.5 G/DL (ref 12–17)
IMM GRANULOCYTES # BLD AUTO: 0.04 THOUSAND/UL (ref 0–0.2)
IMM GRANULOCYTES NFR BLD AUTO: 1 % (ref 0–2)
LYMPHOCYTES # BLD AUTO: 1.36 THOUSANDS/ΜL (ref 0.6–4.47)
LYMPHOCYTES NFR BLD AUTO: 16 % (ref 14–44)
MCH RBC QN AUTO: 30 PG (ref 26.8–34.3)
MCHC RBC AUTO-ENTMCNC: 33.3 G/DL (ref 31.4–37.4)
MCV RBC AUTO: 90 FL (ref 82–98)
MONOCYTES # BLD AUTO: 0.89 THOUSAND/ΜL (ref 0.17–1.22)
MONOCYTES NFR BLD AUTO: 10 % (ref 4–12)
NEUTROPHILS # BLD AUTO: 6.13 THOUSANDS/ΜL (ref 1.85–7.62)
NEUTS SEG NFR BLD AUTO: 71 % (ref 43–75)
NRBC BLD AUTO-RTO: 0 /100 WBCS
PLATELET # BLD AUTO: 148 THOUSANDS/UL (ref 149–390)
PMV BLD AUTO: 11.3 FL (ref 8.9–12.7)
POTASSIUM SERPL-SCNC: 3.9 MMOL/L (ref 3.5–5.3)
RBC # BLD AUTO: 4.5 MILLION/UL (ref 3.88–5.62)
SODIUM SERPL-SCNC: 139 MMOL/L (ref 136–145)
WBC # BLD AUTO: 8.57 THOUSAND/UL (ref 4.31–10.16)

## 2019-02-02 PROCEDURE — 94668 MNPJ CHEST WALL SBSQ: CPT

## 2019-02-02 PROCEDURE — 94640 AIRWAY INHALATION TREATMENT: CPT

## 2019-02-02 PROCEDURE — 82948 REAGENT STRIP/BLOOD GLUCOSE: CPT

## 2019-02-02 PROCEDURE — 80048 BASIC METABOLIC PNL TOTAL CA: CPT | Performed by: HOSPITALIST

## 2019-02-02 PROCEDURE — 94669 MECHANICAL CHEST WALL OSCILL: CPT

## 2019-02-02 PROCEDURE — 99239 HOSP IP/OBS DSCHRG MGMT >30: CPT | Performed by: HOSPITALIST

## 2019-02-02 PROCEDURE — 94760 N-INVAS EAR/PLS OXIMETRY 1: CPT

## 2019-02-02 PROCEDURE — 85025 COMPLETE CBC W/AUTO DIFF WBC: CPT | Performed by: HOSPITALIST

## 2019-02-02 RX ORDER — QUETIAPINE FUMARATE 25 MG/1
25 TABLET, FILM COATED ORAL
Qty: 30 TABLET | Refills: 0 | Status: SHIPPED | OUTPATIENT
Start: 2019-02-02 | End: 2019-02-19 | Stop reason: ALTCHOICE

## 2019-02-02 RX ORDER — PREDNISONE 10 MG/1
TABLET ORAL
Qty: 18 TABLET | Refills: 0 | Status: SHIPPED | OUTPATIENT
Start: 2019-02-02 | End: 2019-02-19 | Stop reason: ALTCHOICE

## 2019-02-02 RX ORDER — LEVALBUTEROL 1.25 MG/.5ML
1.25 SOLUTION, CONCENTRATE RESPIRATORY (INHALATION)
Qty: 45 ML | Refills: 0 | Status: SHIPPED | OUTPATIENT
Start: 2019-02-02 | End: 2019-02-19 | Stop reason: ALTCHOICE

## 2019-02-02 RX ORDER — GUAIFENESIN 600 MG
600 TABLET, EXTENDED RELEASE 12 HR ORAL EVERY 12 HOURS SCHEDULED
Qty: 60 TABLET | Refills: 0 | Status: SHIPPED | OUTPATIENT
Start: 2019-02-02 | End: 2019-02-19 | Stop reason: ALTCHOICE

## 2019-02-02 RX ADMIN — CLOPIDOGREL BISULFATE 75 MG: 75 TABLET ORAL at 08:13

## 2019-02-02 RX ADMIN — CARVEDILOL 12.5 MG: 12.5 TABLET, FILM COATED ORAL at 08:14

## 2019-02-02 RX ADMIN — PANTOPRAZOLE SODIUM 40 MG: 40 TABLET, DELAYED RELEASE ORAL at 06:21

## 2019-02-02 RX ADMIN — PSYLLIUM HUSK 1 PACKET: 3.4 POWDER ORAL at 08:15

## 2019-02-02 RX ADMIN — LEVALBUTEROL HYDROCHLORIDE 1.25 MG: 1.25 SOLUTION, CONCENTRATE RESPIRATORY (INHALATION) at 13:16

## 2019-02-02 RX ADMIN — IPRATROPIUM BROMIDE 0.5 MG: 0.5 SOLUTION RESPIRATORY (INHALATION) at 13:16

## 2019-02-02 RX ADMIN — LEVALBUTEROL HYDROCHLORIDE 1.25 MG: 1.25 SOLUTION, CONCENTRATE RESPIRATORY (INHALATION) at 08:17

## 2019-02-02 RX ADMIN — AMLODIPINE BESYLATE 10 MG: 10 TABLET ORAL at 08:14

## 2019-02-02 RX ADMIN — FLUTICASONE PROPIONATE 2 SPRAY: 50 SPRAY, METERED NASAL at 08:14

## 2019-02-02 RX ADMIN — HEPARIN SODIUM 5000 UNITS: 5000 INJECTION, SOLUTION INTRAVENOUS; SUBCUTANEOUS at 06:21

## 2019-02-02 RX ADMIN — BENZONATATE 200 MG: 100 CAPSULE ORAL at 08:14

## 2019-02-02 RX ADMIN — IPRATROPIUM BROMIDE 0.5 MG: 0.5 SOLUTION RESPIRATORY (INHALATION) at 08:17

## 2019-02-02 RX ADMIN — OXYBUTYNIN CHLORIDE 5 MG: 5 TABLET, EXTENDED RELEASE ORAL at 08:13

## 2019-02-02 RX ADMIN — PAROXETINE HYDROCHLORIDE 20 MG: 20 TABLET, FILM COATED ORAL at 08:14

## 2019-02-02 RX ADMIN — TAMSULOSIN HYDROCHLORIDE 0.4 MG: 0.4 CAPSULE ORAL at 08:13

## 2019-02-02 RX ADMIN — BUDESONIDE 0.5 MG: 0.5 INHALANT RESPIRATORY (INHALATION) at 08:17

## 2019-02-02 RX ADMIN — GUAIFENESIN 600 MG: 600 TABLET, EXTENDED RELEASE ORAL at 08:13

## 2019-02-02 RX ADMIN — DOCUSATE SODIUM 100 MG: 100 CAPSULE, LIQUID FILLED ORAL at 08:13

## 2019-02-02 RX ADMIN — PREDNISONE 40 MG: 20 TABLET ORAL at 08:14

## 2019-02-02 RX ADMIN — ASPIRIN 81 MG 81 MG: 81 TABLET ORAL at 08:14

## 2019-02-02 RX ADMIN — SPIRONOLACTONE 25 MG: 25 TABLET, FILM COATED ORAL at 08:14

## 2019-02-02 NOTE — SOCIAL WORK
CM spoke with patient's daughter Oleskandr Mckeon who stated she spoke with patient and they have agreed that patient needs to go to [facility]  CM followed up with referrals and [facility] is unable to accept him but [facility] is able to offer a bed  CM reviewed this with daughter Oleksandr Mckeon and is happy with this placement  CM discussed transportation and she is unable to transport him due to having to go to work  She is agreeable to a WCV being set up and understands the out of pocket cost      CM requested a [facility] with [facility] with [facility] and was provided a 2:30 PM time with [facility]  A [employer] truck with be coming and billed as a WCV  IMM was reviewed with patient and daughter when DCP and time were reviewed  Nursing, [facility], patient, family, and [facility] were all updated on DCP and time  CM reviewed the availability of treatment team to discuss questions or concerns patient and/or family may have regarding  understanding medications and recognizing signs and symptoms once discharged  CM also encouraged patient to follow up with all recommended appointments after discharge  Patient advised of importance for patient and family to participate in managing patients medical well being

## 2019-02-02 NOTE — PLAN OF CARE
Problem: DISCHARGE PLANNING - CARE MANAGEMENT  Goal: Discharge to post-acute care or home with appropriate resources  INTERVENTIONS:  - Conduct assessment to determine patient/family and health care team treatment goals, and need for post-acute services based on payer coverage, community resources, and patient preferences, and barriers to discharge  - Address psychosocial, clinical, and financial barriers to discharge as identified in assessment in conjunction with the patient/family and health care team  - Arrange appropriate level of post-acute services according to patients   needs and preference and payer coverage in collaboration with the physician and health care team  - Communicate with and update the patient/family, physician, and health care team regarding progress on the discharge plan  - Arrange appropriate transportation to post-acute venues   Outcome: Completed Date Met: 02/02/19  CM spoke with patient's daughter Lexis Boyle who stated she spoke with patient and they have agreed that patient needs to go to [facility]  CM followed up with referrals and [facility] but [facility] is able to offer a bed  CM reviewed this with daughter Lexis Boyle and is happy with this placement  CM discussed transportation and she is unable to transport him due to having to go to work  She is agreeable to a WCV being set up and understands the out of pocket cost      CM requested a [facility]CV with [facility] with [facility] and was provided a 2:30 PM time with [facility]  A [facility] truck with be coming and billed as a WCV  IMM was reviewed with patient and daughter when DCP and time were reviewed  Nursing, [facility], patient, family, and [facility] were all updated on DCP and time  CM reviewed the availability of treatment team to discuss questions or concerns patient and/or family may have regarding  understanding medications and recognizing signs and symptoms once discharged    CM also encouraged patient to follow up with all recommended appointments after discharge  Patient advised of importance for patient and family to participate in managing patients medical well being

## 2019-02-02 NOTE — DISCHARGE SUMMARY
Transition of Care Discharge Summary - [facility]    Patient Information: Carmel Patel  77 y o  male MRN: [id number]  Unit/Bed#: -01 Encounter: [id number]    Discharging Physician / Practitioner: Waldo Villavicencio MD  PCP: Lacey Abreu DO  Admission Date: 1/27/2019  Discharge Date: 02/02/19    Disposition:      Short Term Rehab or SNF at [facility] to [facility] SNF:   · [address] Texted SNF Physician    Reason for Admission: acute respiratory failure     Discharge Diagnoses:     Principal Problem:    Acute respiratory failure with hypoxia ([facility] [address] )  Active Problems:    COPD with acute exacerbation ([facility] )    Lung cancer ([facility] [address] )    Oropharyngeal dysphagia    Prostate cancer ([facility] )    History of cerebrovascular accident (CVA) with residual deficit    Bacteriuria    Essential hypertension    GERD (gastroesophageal reflux disease)    Hyperlipidemia    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    Delirium  Resolved Problems:    * No resolved hospital problems  *      Consultations During Hospital Stay:  · Pulmonology     Procedures Performed:     · Chest PT    CTA chest: (1/28/19)  IMPRESSION:  No evidence of central pulmonary embolus  Moderately enlarged lymph nodes versus right hilar mass  Follow-up with pulmonary is recommended  Tissue sampling can be obtained to rule out malignancy    CXR: (1/28/19)  IMPRESSION:     Further opacification and volume loss, right lower lung field, most concerning for atelectasis which could be related to mucous plugging  Probable small right effusion      Medication Adjustments and Discharge Medications:  · Summary of Medication Adjustments made as a result of this hospitalization:  · Continue duonebs TID-QID   · Cont pulmicort nebs BID   · Prednisone taper: start 30mg tomorrow and reduce by 10mg every 3days   · Medication Dosing Tapers - Please refer to Discharge Medication List for details on any medication dosing tapers (if applicable to patient)  · Medications being temporarily held (include recommended restart time):   · Discharge Medication List: See after visit summary for reconciled discharge medications  Wound Care Recommendations:  When applicable, please see wound care section of After Visit Summary  Diet Recommendations at Discharge:  Diet -        Diet Orders            Start     Ordered    01/29/19 1109  Diet Cardiovascular; Cardiac; Dysphagia 1-Pureed; Nectar Thick Liquid  Diet effective now     Question Answer Comment   Diet Type Cardiovascular    Cardiac Cardiac    Other Restriction(s): Dysphagia 1-Pureed    Liquid Modifier Nectar Thick Liquid    Special Instructions Aspiration precautions    RD to adjust diet per protocol? Yes        01/29/19 1109    01/28/19 0652  Room Service  Once     Question:  Type of Service  Answer:  Room Service - Appropriate with Assistance    01/28/19 0651        Fluid Restriction - No Fluid Restriction at Discharge  Instructions for any Catheters / Lines Present at Discharge (including removal date, if applicable):     Significant Findings / Test Results:     · See above     Incidental Findings:     R hilar fullness on Chest CT--> will need repeat study in 4 weeks    Test Results Pending at Discharge (will require follow up):   · none     Outpatient Tests Requested:  · F/u CT chest in 4 weeks with pulmonology    Complications:      Hospital Course:     Loy Arenas  is a 77 y o  male patient history of COPD, oropharyngeal dysphagia who originally presented to the hospital on 1/27/2019 due to worsening shortness of breath  Patient initially started on treatment for COPD exacerbation however his breathing acutely worsened shortly after admission  A chest x-ray done at that time showed right-sided atelectasis with suggestion of mucous plugging    Patient required high-flow nasal cannula and was started on aggressive chest PT with vest therapy and Mucomyst nebulizer  A repeat chest x-ray showed resolution of the atelectasis with these therapies  Pt was gradually weaned from IV steroids to oral prednisone, but was continued on chest pt to ensure continued clearance of his secretions  He had no further decompensations while admitted  He should continue on chest pt, scheduled duonebs and pulmicort, as well as prednisone taper  Of note, pt's Ucx collected on admission grew S  Aureus  Pt was asymptomatic and this was not treated as UTI  Condition at Discharge: fair     Discharge Day Visit / Exam:     Subjective:  Feels well  Has wheeze on r sided, but not SOB  Is still bringing up thick phlegm after chest pt  Vitals: Blood Pressure: 112/58 (02/02/19 0741)  Pulse: 58 (02/02/19 0741)  Temperature: 97 5 °F (36 4 °C) (02/02/19 0741)  Temp Source: Oral (02/02/19 0741)  Respirations: 18 (02/02/19 0741)  Height: 6' 5" (195 6 cm) (01/31/19 1645)  Weight - Scale: 94 1 kg (207 lb 7 3 oz) (01/31/19 1645)  SpO2: 95 % (02/02/19 1318)  Exam:   Physical Exam   Constitutional: No distress  HENT:   Head: Normocephalic and atraumatic  Mouth/Throat: No oropharyngeal exudate  Eyes: Conjunctivae are normal  No scleral icterus  Cardiovascular: Normal rate and regular rhythm  No murmur heard  Pulmonary/Chest: Effort normal  No respiratory distress  He has wheezes (mainly R sided today )  Abdominal: Soft  Bowel sounds are normal  He exhibits no distension  There is no tenderness  Neurological: He is alert  Skin: Skin is warm and dry  He is not diaphoretic  Nursing note and vitals reviewed  Goals of Care Discussions:  · Code Status at Discharge: Level 1 - Full Code  · Were there any Goals of Care Discussions during Hospitalization?: No  · Results of any General Goals of Care Discussions: n/a   · POLST Completed: No   · If POLST Completed, Summary of POLST Agreement Provided Here: n/a    · OK to Rehospitalize if Needed?  Yes    Discharge instructions/Information to patient and family:   See after visit summary section titled Discharge Instructions for information provided to patient and family  Planned Readmission: none      Discharge Statement:  I spent 40 minutes discharging the patient  This time was spent on the day of discharge  I had direct contact with the patient on the day of discharge  Greater than 50% of the total time was spent examining patient, answering all patient questions, arranging and discussing plan of care with patient as well as directly providing post-discharge instructions  Additional time then spent on discharge activities      ** Please Note: This note has been constructed using a voice recognition system **

## 2019-02-02 NOTE — PLAN OF CARE
DISCHARGE PLANNING - CARE MANAGEMENT     Discharge to post-acute care or home with appropriate resources Progressing        Potential for Falls     Patient will remain free of falls Progressing        Prexisting or High Potential for Compromised Skin Integrity     Skin integrity is maintained or improved Progressing        RESPIRATORY - ADULT     Achieves optimal ventilation and oxygenation Progressing        SKIN/TISSUE INTEGRITY - ADULT     Skin integrity remains intact Progressing          CARDIOVASCULAR - ADULT     Maintains optimal cardiac output and hemodynamic stability Adequate for Discharge     Absence of cardiac dysrhythmias or at baseline rhythm Adequate for Discharge

## 2019-02-11 ENCOUNTER — TELEPHONE (OUTPATIENT)
Dept: PULMONOLOGY | Facility: CLINIC | Age: 79
End: 2019-02-11

## 2019-02-11 NOTE — TELEPHONE ENCOUNTER
Pt's daughter  would like to know how long does he stay on prednisone 10 mgs and she is also requesting  ct scan with contrast instead   The last ct scan he had they found a mass and it was because his ct scan was ordered with contrast  Thanks

## 2019-02-19 ENCOUNTER — OFFICE VISIT (OUTPATIENT)
Dept: PULMONOLOGY | Facility: CLINIC | Age: 79
End: 2019-02-19
Payer: MEDICARE

## 2019-02-19 VITALS
WEIGHT: 197 LBS | SYSTOLIC BLOOD PRESSURE: 116 MMHG | HEART RATE: 81 BPM | OXYGEN SATURATION: 98 % | BODY MASS INDEX: 23.26 KG/M2 | HEIGHT: 77 IN | TEMPERATURE: 96.6 F | DIASTOLIC BLOOD PRESSURE: 76 MMHG

## 2019-02-19 DIAGNOSIS — J39.8 TRACHEOMALACIA: Primary | Chronic | ICD-10-CM

## 2019-02-19 DIAGNOSIS — R13.12 OROPHARYNGEAL DYSPHAGIA: Chronic | ICD-10-CM

## 2019-02-19 DIAGNOSIS — J43.8 OTHER EMPHYSEMA (HCC): Chronic | ICD-10-CM

## 2019-02-19 DIAGNOSIS — R93.89 ABNORMAL CHEST CT: ICD-10-CM

## 2019-02-19 DIAGNOSIS — R63.4 WEIGHT LOSS: ICD-10-CM

## 2019-02-19 PROCEDURE — 99215 OFFICE O/P EST HI 40 MIN: CPT | Performed by: INTERNAL MEDICINE

## 2019-02-19 RX ORDER — PREDNISONE 10 MG/1
5 TABLET ORAL DAILY
Qty: 60 TABLET | Refills: 1 | Status: SHIPPED | OUTPATIENT
Start: 2019-02-19 | End: 2019-10-22 | Stop reason: SDUPTHER

## 2019-02-19 RX ORDER — AZITHROMYCIN 250 MG/1
500 TABLET, FILM COATED ORAL EVERY 24 HOURS
COMMUNITY
End: 2019-04-19

## 2019-02-19 RX ORDER — LORAZEPAM 0.5 MG/1
0.25 TABLET ORAL DAILY
Status: ON HOLD | COMMUNITY
End: 2019-11-18 | Stop reason: SDUPTHER

## 2019-02-19 NOTE — PATIENT INSTRUCTIONS
· Start prednisone 5mg daily  · Continue duonebs 3-4 times a day, continue flutter valve use  · Continue pulmicort neb twice daily  · Get CT chest in March 2019  · Monitor weight

## 2019-02-19 NOTE — PROGRESS NOTES
Pulmonary Follow Up Note   Brittani Lindo  77 y o  male MRN: [id number]  2/19/2019      Assessment:  1  Recurrent aspiration pneumonia/tracheobronchitis  · Preventative measures as listed  · Continue speech therapy assistance     2  Chronic dysphagia  · Thickens all liquids  · Dysphagia and aspiration precautions     3  Chronic Cough  · Overall improved     4  COPD with tracheomalacia  · Continue duonebs three to four times daily  · Continue pulmicort nebs BID  · Add trial of prednisone 5mg daily  · Flutter valve use four times daily  · Flu vaccine obtained this year per daughter, previously obtained prevnar/pneumovax per daughter     5  GERD - continue PPI    6  Abnormal CT Chest  · Noted history of lung cancer post RLL lobectomy  · Repeat CT chest with contrast for right hilar adenopathy at 4-6 week interval - BMP ordered prior to contrast administration  · Based upon results - if persists - may need EBUS evaluation for tissue sampling and CT/PET scan    7  Weight loss - encouraged high calorie foods/snacks, boost/ensure, monitor weight closely at next visit      Plan:    Diagnoses and all orders for this visit:    Tracheomalacia  -     predniSONE 10 mg tablet; Take 0 5 tablets (5 mg total) by mouth daily    Other emphysema (HCC)  -     predniSONE 10 mg tablet; Take 0 5 tablets (5 mg total) by mouth daily    Oropharyngeal dysphagia    Abnormal chest CT  -     CT chest with contrast; Future  -     Basic metabolic panel; Future    Weight loss    Other orders  -     LORazepam (ATIVAN) 0 5 mg tablet; Take 0 5 mg by mouth every 8 (eight) hours as needed for anxiety  -     azithromycin (ZITHROMAX) 250 mg tablet; Take 500 mg by mouth every 24 hours        Return in about 8 weeks (around 4/19/2019) for Recheck      History of Present Illness   HPI:  Brittani Lindo  is a 77 y o  male who with history of prior CVA with resultant left hemiparesis, lung cancer post RLL lobectomy, possible COPD, likely tracheomalacia, and chronic cough  He has had multiple admissions for hypoxic respiratory failure an bronchospasm felt secondary to aspiration tracheobronchitis  He presents today for follow up with his daughter  He has has further admissions for pneumonia felt secondary to likely aspiration, last in early Jan-Feb 2019  During this admission there was noted concern for possible right hilar mass vs adenopathy, he did require ICU admission for right lung atelectasis, aggressive bronchial hygiene and HFNC  He states he has felt improved since discharge  He has scant intermittent yellow sputum production  He had URI symptoms and was started on azithro by PCP  He has been off prednisone for 4 days and also noted some increased cough since cessation  He denied hemoptysis, no gross aspiration events noted, no fevers or rigors, no pleurisy  His daughter has noted 25lb weight loss since Nov and feel his appetite is good but she notes he has cut back sweet foods and snacks significantly  Review of Systems   Constitutional: Positive for unexpected weight change  Negative for activity change, fatigue and fever  HENT: Positive for sore throat  Negative for postnasal drip, rhinorrhea, trouble swallowing and voice change  Eyes: Negative for visual disturbance  Respiratory: Positive for cough and shortness of breath  Negative for chest tightness and wheezing  Cardiovascular: Negative for chest pain, palpitations and leg swelling  Gastrointestinal: Negative for abdominal pain, diarrhea, nausea and vomiting  Endocrine: Negative for cold intolerance and heat intolerance  Musculoskeletal: Positive for arthralgias and gait problem  Neurological: Negative for syncope and light-headedness  Hematological: Negative for adenopathy  Psychiatric/Behavioral: Negative for sleep disturbance         Historical Information   Past Medical History:   Diagnosis Date    [facility] ) 5/31/2017    Aspiration into respiratory tract     Chest pain 5/31/2017    COPD (chronic obstructive pulmonary disease) (HCC)     Depression     Elevated troponin 5/31/2017    GERD (gastroesophageal reflux disease)     History of CVA (cerebrovascular accident) 4/13/2016    Hyperlipidemia     Hypertension     Lung cancer ([facility] ) 2005    Right, status post lobectomy    Pneumonia     Prostate cancer ([facility] )     Sleep apnea     awaiting sleep study results    Stroke ([facility] )     Tracheomalacia     Weakness due to cerebrovascular accident (CVA)      Past Surgical History:   Procedure Laterality Date    COLONOSCOPY      ESOPHAGOGASTRODUODENOSCOPY N/A 4/13/2016    Procedure: ESOPHAGOGASTRODUODENOSCOPY (EGD); Surgeon: Sherron Marie MD;  [facility]; Service:     JOINT REPLACEMENT      knee replacement    LUNG LOBECTOMY      PA ESOPHAGOGASTRODUODENOSCOPY TRANSORAL DIAGNOSTIC N/A 3/15/2017    Procedure: ESOPHAGOGASTRODUODENOSCOPY (EGD); Surgeon: Sherron Marie MD;  [facility];   Service: Gastroenterology    TRIGEMINAL NERVE DECOMPRESSION       Family History   Family history unknown: Yes         Meds/Allergies     Current Outpatient Medications:     acetaminophen (TYLENOL) 325 mg tablet, Take 2 tablets by mouth every 6 (six) hours as needed for fever, Disp: 30 tablet, Rfl: 0    albuterol (PROVENTIL HFA,VENTOLIN HFA) 90 mcg/act inhaler, Inhale 2 puffs 4 (four) times a day, Disp: 2 Inhaler, Rfl: 0    amLODIPine (NORVASC) 10 mg tablet, Take 1 tablet by mouth daily, Disp: , Rfl:     ASPIRIN 81 PO, Take 1 tablet by mouth every other day  , Disp: , Rfl:     atorvastatin (LIPITOR) 10 mg tablet, Take 1 tablet by mouth, Disp: , Rfl:     azithromycin (ZITHROMAX) 250 mg tablet, Take 500 mg by mouth every 24 hours, Disp: , Rfl:     benzonatate (TESSALON) 200 MG capsule, Take 1 capsule (200 mg total) by mouth 3 (three) times a day as needed for cough, Disp: 90 capsule, Rfl: 5    budesonide (PULMICORT) 0 5 mg/2 mL nebulizer solution, Take 1 vial (0 5 mg total) by nebulization 2 (two) times a day for 90 days Rinse mouth after use , Disp: 360 mL, Rfl: 0    carvedilol (COREG) 12 5 mg tablet, Take 1 tablet by mouth 2 (two) times a day with meals, Disp: 60 tablet, Rfl: 0    clopidogrel (PLAVIX) 75 mg tablet, Take 1 tablet by mouth daily, Disp: 30 tablet, Rfl: 0    docusate sodium (COLACE) 100 mg capsule, Take 1 capsule (100 mg total) by mouth 2 (two) times a day, Disp: 10 capsule, Rfl: 0    Fesoterodine Fumarate ER (TOVIAZ) 8 MG TB24, Take 4 mg by mouth every evening  , Disp: , Rfl:     fluticasone (FLONASE) 50 mcg/act nasal spray, 1-2 sprays into each nostril daily, Disp: , Rfl:     ipratropium-albuterol (DUO-NEB) 0 5-2 5 mg/3 mL nebulizer solution, Take 3 mL by nebulization 4 (four) times a day as needed for wheezing or shortness of breath, Disp: , Rfl:     latanoprost (XALATAN) 0 005 % ophthalmic solution, Administer 1 drop to both eyes daily at bedtime, Disp: , Rfl:     levocetirizine (XYZAL) 5 MG tablet, Take 1 tablet by mouth every evening, Disp: 30 tablet, Rfl: 0    LORazepam (ATIVAN) 0 5 mg tablet, Take 0 5 mg by mouth every 8 (eight) hours as needed for anxiety, Disp: , Rfl:     magnesium hydroxide (MILK OF MAGNESIA) 400 mg/5 mL oral suspension, Take 30 mL by mouth daily as needed for constipation, Disp: , Rfl:     omeprazole (PriLOSEC) 40 MG capsule, Take 1 capsule by mouth daily, Disp: , Rfl:     PARoxetine (PAXIL) 20 mg tablet, Take 1 tablet by mouth daily, Disp: , Rfl:     polyethylene glycol (MIRALAX) 17 g packet, Take 17 g by mouth daily as needed, Disp: , Rfl:     psyllium (METAMUCIL) 0 52 g capsule, Take 0 52 g by mouth daily, Disp: , Rfl:     senna (SENOKOT) 8 6 MG tablet, Take 2 tablets by mouth daily at bedtime, Disp: , Rfl:     spironolactone (ALDACTONE) 25 mg tablet, Take 1 tablet (25 mg total) by mouth daily, Disp: 30 tablet, Rfl: 0    tamsulosin (FLOMAX) 0 4 mg, Take 1 capsule by mouth daily, Disp: , Rfl:     predniSONE 10 mg tablet, Take 0 5 tablets (5 mg total) by mouth daily, Disp: 60 tablet, Rfl: 1  Allergies   Allergen Reactions    Penicillins Rash       Vitals: Blood pressure 116/76, pulse 81, temperature (!) 96 6 °F (35 9 °C), temperature source Tympanic, height 6' 5" (1 956 m), weight 89 4 kg (197 lb), SpO2 98 %  Body mass index is 23 36 kg/m²  Oxygen Therapy  SpO2: 98 %  Oxygen Therapy: None (Room air)      Physical Exam  Physical Exam   Constitutional: He is oriented to person, place, and time  He appears well-developed and well-nourished  No distress  Elderly man in wheelchair, conversant and interactive   HENT:   Head: Normocephalic and atraumatic  Right Ear: External ear normal    Left Ear: External ear normal    Mouth/Throat: No oropharyngeal exudate  Mild nasal turbinate erythema, no purulent drainage  Posterior pharyngeal cobblestoning and dryness, no exudates, no thrush   Eyes: Pupils are equal, round, and reactive to light  Conjunctivae are normal  Right eye exhibits no discharge  Left eye exhibits no discharge  No scleral icterus  Neck: Neck supple  No JVD present  No tracheal deviation present  Cardiovascular: Normal rate, regular rhythm and normal heart sounds  No murmur heard  Pulmonary/Chest: Effort normal and breath sounds normal  No stridor  No respiratory distress  He has no wheezes  He has no rales  Mildly diminished BS, no wheeze, no rales, no barking cough noted   Abdominal: Soft  Bowel sounds are normal  He exhibits no distension  There is no tenderness  Musculoskeletal: He exhibits no edema or deformity  LLE brace in place, no sig edema appreciated   Lymphadenopathy:     He has no cervical adenopathy  Neurological: He is alert and oriented to person, place, and time  Skin: Skin is warm and dry  No rash noted  Psychiatric: He has a normal mood and affect  His behavior is normal    Vitals reviewed  Labs:  I have personally reviewed pertinent lab results  Lab Results   Component Value Date    WBC 8 57 02/02/2019    HGB 13 5 02/02/2019    HCT 40 6 02/02/2019    MCV 90 02/02/2019     (L) 02/02/2019     Lab Results   Component Value Date    GLUCOSE 124 01/29/2019    CALCIUM 9 1 02/02/2019    K 3 9 02/02/2019    CO2 25 02/02/2019     02/02/2019    BUN 34 (H) 02/02/2019    CREATININE 1 19 02/02/2019     No results found for: IGE  Lab Results   Component Value Date    ALT 27 01/27/2019    AST 45 01/27/2019    ALKPHOS 109 01/27/2019       Sputum Cx 1/29/19 - 4+ MRF  Pertussis PCR neg    RADIOGRAPHS: images personally reviewed  CXR 1/29 - noted right hemithorax volume loss, kyphoscoliosis, improved overall aeration of right hemithorax, no PTX    CT angio 1/28/19 - no PE, noted right hilar adenopathy vs early mass lesion - favoring adenopathy based upon appearance, no PTX, no sig effusion, post surgical changes on right    11/2018 - VBS (+) dysphagia with penetration on thin liquids     Prior Studies  Chest X-ray 8/23/17 - CXR - images personally reviewed - noted right sided post-operative changes and mild volume loss, no acute infiltrates, no masses, no lesions appreciated  CT Scan 4/2017 - CT chest images reviewed with 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  Cardiac Testing 7/2017 TTE EF 55%, normal RV        Trey Allred DO, [facility]

## 2019-02-23 NOTE — PLAN OF CARE
Problem: DISCHARGE PLANNING - CARE MANAGEMENT  Goal: Discharge to post-acute care or home with appropriate resources  INTERVENTIONS:  - Conduct assessment to determine patient/family and health care team treatment goals, and need for post-acute services based on payer coverage, community resources, and patient preferences, and barriers to discharge  - Address psychosocial, clinical, and financial barriers to discharge as identified in assessment in conjunction with the patient/family and health care team  - Arrange appropriate level of post-acute services according to patient's   needs and preference and payer coverage in collaboration with the physician and health care team  - Communicate with and update the patient/family, physician, and health care team regarding progress on the discharge plan  - Arrange appropriate transportation to post-acute venues  Outcome: Adequate for Discharge
Color consistent with ethnicity/race, warm, dry intact, resilient.

## 2019-03-04 ENCOUNTER — HOSPITAL ENCOUNTER (OUTPATIENT)
Dept: CT IMAGING | Facility: HOSPITAL | Age: 79
Discharge: HOME | End: 2019-03-04
Attending: INTERNAL MEDICINE
Payer: MEDICARE

## 2019-03-04 DIAGNOSIS — R93.89 ABNORMAL CHEST CT: ICD-10-CM

## 2019-03-04 PROCEDURE — 71260 CT THORAX DX C+: CPT

## 2019-03-04 RX ADMIN — IOHEXOL 85 ML: 350 INJECTION, SOLUTION INTRAVENOUS at 17:18

## 2019-03-15 DIAGNOSIS — R05.3 CHRONIC COUGH: ICD-10-CM

## 2019-03-15 DIAGNOSIS — J44.0 CHRONIC OBSTRUCTIVE PULMONARY DISEASE WITH ACUTE LOWER RESPIRATORY INFECTION (HCC): ICD-10-CM

## 2019-03-15 RX ORDER — BUDESONIDE 0.5 MG/2ML
0.5 INHALANT ORAL 2 TIMES DAILY
Qty: 360 ML | Refills: 3 | Status: SHIPPED | OUTPATIENT
Start: 2019-03-15 | End: 2019-12-30 | Stop reason: SDUPTHER

## 2019-04-15 RX ORDER — HYDROCODONE BITARTRATE AND ACETAMINOPHEN 5; 325 MG/1; MG/1
TABLET ORAL
Refills: 0 | COMMUNITY
Start: 2019-03-11 | End: 2019-08-26 | Stop reason: ALTCHOICE

## 2019-04-19 ENCOUNTER — OFFICE VISIT (OUTPATIENT)
Dept: PULMONOLOGY | Facility: CLINIC | Age: 79
End: 2019-04-19
Payer: MEDICARE

## 2019-04-19 VITALS
HEIGHT: 77 IN | BODY MASS INDEX: 23.27 KG/M2 | DIASTOLIC BLOOD PRESSURE: 72 MMHG | WEIGHT: 197.09 LBS | RESPIRATION RATE: 18 BRPM | OXYGEN SATURATION: 97 % | HEART RATE: 79 BPM | SYSTOLIC BLOOD PRESSURE: 118 MMHG | TEMPERATURE: 97 F

## 2019-04-19 DIAGNOSIS — R13.12 OROPHARYNGEAL DYSPHAGIA: Primary | Chronic | ICD-10-CM

## 2019-04-19 DIAGNOSIS — R93.89 ABNORMAL CT OF THE CHEST: ICD-10-CM

## 2019-04-19 DIAGNOSIS — J39.8 TRACHEOMALACIA: Chronic | ICD-10-CM

## 2019-04-19 DIAGNOSIS — J44.9 CHRONIC OBSTRUCTIVE PULMONARY DISEASE, UNSPECIFIED COPD TYPE (HCC): ICD-10-CM

## 2019-04-19 DIAGNOSIS — R05.3 CHRONIC COUGH: Chronic | ICD-10-CM

## 2019-04-19 PROBLEM — J96.01 ACUTE RESPIRATORY FAILURE WITH HYPOXIA (HCC): Status: RESOLVED | Noted: 2018-07-15 | Resolved: 2019-04-19

## 2019-04-19 PROCEDURE — 99215 OFFICE O/P EST HI 40 MIN: CPT | Performed by: INTERNAL MEDICINE

## 2019-04-19 RX ORDER — CLINDAMYCIN HYDROCHLORIDE 300 MG/1
300 CAPSULE ORAL EVERY 6 HOURS
COMMUNITY
End: 2019-08-26 | Stop reason: ALTCHOICE

## 2019-04-30 ENCOUNTER — HOSPITAL ENCOUNTER (OUTPATIENT)
Dept: RADIOLOGY | Facility: HOSPITAL | Age: 79
Discharge: HOME | End: 2019-04-30
Attending: INTERNAL MEDICINE
Payer: MEDICARE

## 2019-04-30 DIAGNOSIS — R13.12 OROPHARYNGEAL DYSPHAGIA: ICD-10-CM

## 2019-04-30 PROCEDURE — G8997 SWALLOW GOAL STATUS: HCPCS

## 2019-04-30 PROCEDURE — 92611 MOTION FLUOROSCOPY/SWALLOW: CPT

## 2019-04-30 PROCEDURE — G8996 SWALLOW CURRENT STATUS: HCPCS

## 2019-04-30 PROCEDURE — 74230 X-RAY XM SWLNG FUNCJ C+: CPT

## 2019-04-30 PROCEDURE — G8998 SWALLOW D/C STATUS: HCPCS

## 2019-07-10 ENCOUNTER — TELEPHONE (OUTPATIENT)
Dept: PULMONOLOGY | Facility: CLINIC | Age: 79
End: 2019-07-10

## 2019-07-10 NOTE — TELEPHONE ENCOUNTER
Orvan Prom, from speech was out to see Blair Dupree yesterday  She says he is having increased difficulty swallowing for the past 5 days and it is getting progressively worse  Mirian Steiner was working with him yesterday on different strategies but they are not working  Zac's daughter text Mirian Steiner today and told her Blair Dupree cannot even get down his honey liquids today  He has been coughing a lot the last 4 days  They are concerned about PNA  Call can be made to daughter

## 2019-07-11 NOTE — TELEPHONE ENCOUNTER
Attempted to call Zac's daughter Rita Woods to discuss  No answer and no ability to leave message  Also attempted to call Little Membreno

## 2019-08-09 ENCOUNTER — TELEPHONE (OUTPATIENT)
Dept: DERMATOLOGY | Facility: CLINIC | Age: 79
End: 2019-08-09

## 2019-08-12 ENCOUNTER — TELEPHONE (OUTPATIENT)
Dept: DERMATOLOGY | Facility: CLINIC | Age: 79
End: 2019-08-12

## 2019-08-18 DIAGNOSIS — R05.9 COUGH: ICD-10-CM

## 2019-08-19 RX ORDER — BENZONATATE 200 MG/1
CAPSULE ORAL
Qty: 90 CAPSULE | Refills: 5 | Status: SHIPPED | OUTPATIENT
Start: 2019-08-19 | End: 2020-01-15 | Stop reason: SDUPTHER

## 2019-08-25 NOTE — PROGRESS NOTES
Pulmonary Follow Up Note   Roel Weaver  77 y o  male MRN: [id number]  8/26/2019      Assessment:  1  Recurrent aspiration pneumonia/tracheobronchitis  · Improved with Speech Therapy compliance  · Monitor for progression     2  Chronic dysphagia  · Honey thickened liquids per VBS  · Completed home speech therapy  · Will hold on plans for PEG tube at this time given stability  · Obtain home suction machine     3  Chronic Cough  · Overall improved     4  COPD with tracheomalacia  · Continue duonebs three to four times daily  · Continue pulmicort nebs BID  · Continue prednisone 5mg daily  · Flutter valve use four times daily  · Flu vaccine yearly previously obtained prevnar/pneumovax per daughter     5  GERD - continue PPI     6  Abnormal CT Chest  · Adenopathy resolved  · TIB/Nodular infiltrates likely related to recurrent aspiration events, will hold on repeat imaging at this time and patient/daughter agreed        7  Weight loss - now stable    8  Questionable signs of dementia - will place Geriatrics consultation for further evaluation    Plan:    Diagnoses and all orders for this visit:    Chronic obstructive pulmonary disease, unspecified COPD type ([facility] )  -     Ambulatory referral to Geriatrics; Future    History of cerebrovascular accident (CVA) with residual deficit  -     Ambulatory referral to Geriatrics; Future    Chronic cough    Oropharyngeal dysphagia  -     Suction Machine  -     Ambulatory referral to Geriatrics; Future        Return in about 4 months (around 12/26/2019) for Recheck  History of Present Illness   HPI:  Roel Weaver  is a 77 y o  male who has history of prior CVA with resultant left hemiparesis, lung cancer post RLL lobectomy, possible COPD, likely tracheomalacia, and chronic cough   He has had multiple admissions for hypoxic respiratory failure an bronchospasm felt secondary to aspiration tracheobronchitis   He has has further admissions for pneumonia felt secondary to likely aspiration, last in early Jan-Feb 2019  He was last seen by me in April 2019 and plans made to complete abx course, check VBS, and to consider PEG tube placement based upon course      He has overall demonstrated improvement  He reports he will cough with eating/drinking if not having enough ThickIt use  His daughter reports intermittent noncompliance with swallow recommendations and is worried about some dementia symptoms  He denied large volume aspiration, no hemoptysis, no pleurisy  Weights have been stable  He has been on respite care and will return to his daughter's house in early September  Review of Systems   Constitutional: Negative for activity change, fatigue, fever and unexpected weight change  HENT: Positive for trouble swallowing  Negative for postnasal drip, sore throat and voice change  Eyes: Negative for visual disturbance  Respiratory: Positive for cough and choking  Negative for chest tightness, shortness of breath and wheezing  Cardiovascular: Negative for chest pain, palpitations and leg swelling  Gastrointestinal: Negative for abdominal pain, nausea and vomiting  Endocrine: Negative for cold intolerance and heat intolerance  Musculoskeletal: Negative for arthralgias and myalgias  Skin: Negative for rash  Allergic/Immunologic: Negative for immunocompromised state  Neurological: Negative for dizziness and headaches  Hematological: Negative for adenopathy  Psychiatric/Behavioral: Negative for sleep disturbance  The patient is not nervous/anxious          Historical Information   Past Medical History:   Diagnosis Date    [facility] ) 5/31/2017    Aspiration into respiratory tract     Chest pain 5/31/2017    [facility]     Depression     Elevated troponin 5/31/2017    GERD (gastroesophageal reflux disease)     History of CVA (cerebrovascular accident) 4/13/2016    Hyperlipidemia     Hypertension     Lung cancer ([facility] ) 2005    Right, status post lobectomy    Pneumonia     Prostate cancer ([facility] )     Sleep apnea     awaiting sleep study results    Stroke ([facility] )     Tracheomalacia     Weakness due to cerebrovascular accident (CVA)      Past Surgical History:   Procedure Laterality Date    COLONOSCOPY      ESOPHAGOGASTRODUODENOSCOPY N/A 4/13/2016    Procedure: ESOPHAGOGASTRODUODENOSCOPY (EGD); Surgeon: Cristhian Lynn MD;  [facility]; Service:     JOINT REPLACEMENT      knee replacement    LUNG LOBECTOMY      IN ESOPHAGOGASTRODUODENOSCOPY TRANSORAL DIAGNOSTIC N/A 3/15/2017    Procedure: ESOPHAGOGASTRODUODENOSCOPY (EGD); Surgeon: Cristhian Lynn MD;  [facility];   Service: Gastroenterology    TRIGEMINAL NERVE DECOMPRESSION       Family History   Family history unknown: Yes         Meds/Allergies     Current Outpatient Medications:     acetaminophen (TYLENOL) 325 mg tablet, Take 2 tablets by mouth every 6 (six) hours as needed for fever, Disp: 30 tablet, Rfl: 0    albuterol (PROVENTIL HFA,VENTOLIN HFA) 90 mcg/act inhaler, Inhale 2 puffs 4 (four) times a day, Disp: 2 Inhaler, Rfl: 0    amLODIPine (NORVASC) 10 mg tablet, Take 1 tablet by mouth daily, Disp: , Rfl:     ASPIRIN 81 PO, Take 1 tablet by mouth every other day  , Disp: , Rfl:     atorvastatin (LIPITOR) 10 mg tablet, Take 1 tablet by mouth, Disp: , Rfl:     benzonatate (TESSALON) 200 MG capsule, TAKE 1 CAPSULE BY MOUTH THREE TIMES A DAY AS NEEDED FOR COUGH, Disp: 90 capsule, Rfl: 5    budesonide (PULMICORT) 0 5 mg/2 mL nebulizer solution, Take 1 vial (0 5 mg total) by nebulization 2 (two) times a day Rinse mouth after use , Disp: 360 mL, Rfl: 3    carvedilol (COREG) 12 5 mg tablet, Take 1 tablet by mouth 2 (two) times a day with meals, Disp: 60 tablet, Rfl: 0    clopidogrel (PLAVIX) 75 mg tablet, Take 1 tablet by mouth daily, Disp: 30 tablet, Rfl: 0    docusate sodium (COLACE) 100 mg capsule, Take 1 capsule (100 mg total) by mouth 2 (two) times a day, Disp: 10 capsule, Rfl: 0    Fesoterodine Fumarate ER (TOVIAZ) 8 MG TB24, Take 4 mg by mouth every evening  , Disp: , Rfl:     ipratropium-albuterol (DUO-NEB) 0 5-2 5 mg/3 mL nebulizer solution, Take 3 mL by nebulization 4 (four) times a day as needed for wheezing or shortness of breath, Disp: , Rfl:     latanoprost (XALATAN) 0 005 % ophthalmic solution, Administer 1 drop to both eyes daily at bedtime, Disp: , Rfl:     levocetirizine (XYZAL) 5 MG tablet, Take 1 tablet by mouth every evening, Disp: 30 tablet, Rfl: 0    LORazepam (ATIVAN) 0 5 mg tablet, Take 0 5 mg by mouth every 8 (eight) hours as needed for anxiety, Disp: , Rfl:     omeprazole (PriLOSEC) 40 MG capsule, Take 1 capsule by mouth daily, Disp: , Rfl:     PARoxetine (PAXIL) 20 mg tablet, Take 1 tablet by mouth daily, Disp: , Rfl:     polyethylene glycol (MIRALAX) 17 g packet, Take 17 g by mouth daily as needed, Disp: , Rfl:     predniSONE 10 mg tablet, Take 0 5 tablets (5 mg total) by mouth daily, Disp: 60 tablet, Rfl: 1    psyllium (METAMUCIL) 0 52 g capsule, Take 0 52 g by mouth daily, Disp: , Rfl:     senna (SENOKOT) 8 6 MG tablet, Take 2 tablets by mouth daily at bedtime, Disp: , Rfl:     spironolactone (ALDACTONE) 25 mg tablet, Take 1 tablet (25 mg total) by mouth daily, Disp: 30 tablet, Rfl: 0    tamsulosin (FLOMAX) 0 4 mg, Take 1 capsule by mouth daily, Disp: , Rfl:   Allergies   Allergen Reactions    Penicillins Rash       Vitals: Blood pressure 142/76, pulse 69, temperature 97 6 °F (36 4 °C), temperature source Tympanic, height 6' 2" (1 88 m), weight 98 kg (216 lb), SpO2 96 %  Body mass index is 27 73 kg/m²  Oxygen Therapy  SpO2: 96 %  Oxygen Therapy: None (Room air)      Physical Exam  Physical Exam   Constitutional: He is oriented to person, place, and time  He appears well-developed and well-nourished  No distress  HENT:   Head: Normocephalic and atraumatic     Right Ear: External ear normal    Left Ear: External ear normal    Nose: Nose normal    Mouth/Throat: No oropharyngeal exudate  No thrush   Eyes: Pupils are equal, round, and reactive to light  Conjunctivae are normal  Right eye exhibits no discharge  Left eye exhibits no discharge  No scleral icterus  Neck: Neck supple  No JVD present  No tracheal deviation present  No stridor   Cardiovascular: Normal rate, regular rhythm and normal heart sounds  No murmur heard  Pulmonary/Chest: Effort normal and breath sounds normal  No stridor  No respiratory distress  He has no wheezes  He has no rales  Clear, no wheeze, no sig cough   Abdominal: Soft  Bowel sounds are normal  He exhibits no distension  There is no tenderness  Musculoskeletal: He exhibits no edema or deformity  Lymphadenopathy:     He has no cervical adenopathy  Neurological: He is alert and oriented to person, place, and time  Skin: Skin is warm and dry  No rash noted  Psychiatric: He has a normal mood and affect  His behavior is normal    Vitals reviewed  Labs: I have personally reviewed pertinent lab results  Lab Results   Component Value Date    WBC 8 57 02/02/2019    HGB 13 5 02/02/2019    HCT 40 6 02/02/2019    MCV 90 02/02/2019     (L) 02/02/2019     Lab Results   Component Value Date    GLUCOSE 124 01/29/2019    CALCIUM 9 1 02/02/2019    K 3 9 02/02/2019    CO2 25 02/02/2019     02/02/2019    BUN 34 (H) 02/02/2019    CREATININE 1 19 02/02/2019     No results found for: IGE  Lab Results   Component Value Date    ALT 27 01/27/2019    AST 45 01/27/2019    ALKPHOS 109 01/27/2019       Sputum Cx 1/29/19 - 4+ MRF  Pertussis PCR neg     RADIOGRAPHS:   VBS 4/30/19 - "Moderate oral/mild-moderate pharyngeal dysphagia w/ decreased oral control of liquids and delayed swallow initiation resulting in aspiration before the swallow   Oral prep and chin tuck of thick liquids by tsp reduced aspiration risk"    CT Chest 3/4/19 - improved bronchial/tracheal wall thickening, resolved hilar adenopathy, no sig mediastinal adenopathy, RML and RLL mild nodular infiltrate/TIB infiltrate noted, no sig effusions     CXR 1/29 - noted right hemithorax volume loss, kyphoscoliosis, improved overall aeration of right hemithorax, no PTX     CT angio 1/28/19 - no PE, noted right hilar adenopathy vs early mass lesion - favoring adenopathy based upon appearance, no PTX, no sig effusion, post surgical changes on right     11/2018 - VBS (+) dysphagia with penetration on thin liquids     Prior Studies  Chest X-ray 8/23/17 - CXR - images personally reviewed - noted right sided post-operative changes and mild volume loss, no acute infiltrates, no masses, no lesions appreciated  CT Scan 4/2017 - CT chest images reviewed with 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  Cardiac Testing 7/2017 TTE EF 55%, normal RV    Trey Allred DO, [facility]

## 2019-08-26 ENCOUNTER — OFFICE VISIT (OUTPATIENT)
Dept: PULMONOLOGY | Facility: CLINIC | Age: 79
End: 2019-08-26
Payer: MEDICARE

## 2019-08-26 ENCOUNTER — DOCUMENTATION (OUTPATIENT)
Dept: PULMONOLOGY | Facility: CLINIC | Age: 79
End: 2019-08-26

## 2019-08-26 VITALS
OXYGEN SATURATION: 96 % | HEART RATE: 69 BPM | HEIGHT: 74 IN | SYSTOLIC BLOOD PRESSURE: 142 MMHG | BODY MASS INDEX: 27.72 KG/M2 | WEIGHT: 216 LBS | TEMPERATURE: 97.6 F | DIASTOLIC BLOOD PRESSURE: 76 MMHG

## 2019-08-26 DIAGNOSIS — R13.12 OROPHARYNGEAL DYSPHAGIA: Chronic | ICD-10-CM

## 2019-08-26 DIAGNOSIS — R05.3 CHRONIC COUGH: Chronic | ICD-10-CM

## 2019-08-26 DIAGNOSIS — I69.30 HISTORY OF CEREBROVASCULAR ACCIDENT (CVA) WITH RESIDUAL DEFICIT: Chronic | ICD-10-CM

## 2019-08-26 DIAGNOSIS — J44.9 CHRONIC OBSTRUCTIVE PULMONARY DISEASE, UNSPECIFIED COPD TYPE (HCC): Primary | ICD-10-CM

## 2019-08-26 PROCEDURE — 99214 OFFICE O/P EST MOD 30 MIN: CPT | Performed by: INTERNAL MEDICINE

## 2019-08-26 NOTE — PROGRESS NOTES
I called Geriatrics they will call patient to set up the appointment   Also faxed order to [facility] for suction machine

## 2019-08-27 ENCOUNTER — TELEPHONE (OUTPATIENT)
Dept: GERIATRICS | Age: 79
End: 2019-08-27

## 2019-08-27 NOTE — TELEPHONE ENCOUNTER
[address]  [phone]  Telephone Intake     Referral Source: Morteza Tatum,        Patients PCP: Reggie Bean DO  PCP phone number: [phone] (and relationship to patient): Karina Pringle @ [address] [facility]      (relationship to patient): Clarissa-daughter   Phone Number: [phone]   Is caller POA? no    Reason for referral: Polypharm, determine mental status    Others residing with patient: Adult children    Has the patient ever been formally tested for memory/dementia? No    Has the patient ever been diagnosed with dementia? No    Has the patient been seen by a Neurologist? Yes     What is the goal of visit?  Polypharm, determine mental status and ability to manage his own medication     First Visit: 10/01/19    Conference Visit: 10/22/19    Provider:  Sonjia Hammans, MD

## 2019-09-11 ENCOUNTER — OFFICE VISIT (OUTPATIENT)
Dept: DERMATOLOGY | Facility: CLINIC | Age: 79
End: 2019-09-11
Payer: MEDICARE

## 2019-09-11 VITALS — TEMPERATURE: 98.2 F | WEIGHT: 216 LBS | HEIGHT: 74 IN | BODY MASS INDEX: 27.72 KG/M2

## 2019-09-11 DIAGNOSIS — D48.5 NEOPLASM OF UNCERTAIN BEHAVIOR OF SKIN: ICD-10-CM

## 2019-09-11 DIAGNOSIS — L82.1 SEBORRHEIC KERATOSIS: Primary | ICD-10-CM

## 2019-09-11 PROCEDURE — 88305 TISSUE EXAM BY PATHOLOGIST: CPT | Performed by: PATHOLOGY

## 2019-09-11 PROCEDURE — 11103 TANGNTL BX SKIN EA SEP/ADDL: CPT | Performed by: DERMATOLOGY

## 2019-09-11 PROCEDURE — 99214 OFFICE O/P EST MOD 30 MIN: CPT | Performed by: DERMATOLOGY

## 2019-09-11 PROCEDURE — 11102 TANGNTL BX SKIN SINGLE LES: CPT | Performed by: DERMATOLOGY

## 2019-09-11 PROCEDURE — 1124F ACP DISCUSS-NO DSCNMKR DOCD: CPT | Performed by: DERMATOLOGY

## 2019-09-11 NOTE — PROGRESS NOTES
[address] [facility] Note     Patient Name: Ten Rangel  Encounter Date: 09/11/2019    Today's Chief Concerns:   Concern #1:  Lesion on shoulder   Concern #2: skin check       Past Medical History:  Have you ever had or currently have any of the following medical conditions or treatments? · HIV/AIDS: No  · Hepatitis B: No  · Hepatitis C: No   · Diabetes: No  · Tuberculosis: No  · Biologic Therapy/Chemotherapy: No  · Organ or Bone Marrow Transplantation: No  · Radiation Treatment: YES, gold seeds  · Cancer (If Yes, which types)- YES, prostate and lung   · Hypertension: yes   · Stroke: yes   · CVA: Yes      Have you ever had any of the following skin conditions? · Melanoma? (If Yes, please provide more detail)- No  · Basal Cell Carcinoma: No  · Squamous Cell Carcinoma: No   · Skin cancer: Yes  · Sebaceous Cell Carcinoma: No  · Merkel Cell Carcinoma: No  · Angiosarcoma: No  · Blistering Sunburns: No  · Eczema: No  · Psoriasis: No    Social History:    What is your current Smoking Status? Former smoker    What is/was your primary occupation? What are your hobbies/past-times? Family history:  Do any of your "first degree relatives" (parent, brother, sister, or child) have any of the following conditions? · Melanoma? (If Yes, which relatives?) No  · Eczema: No  · Asthma: No  · Hay Fever/Seasonal Allergies: No  · Psoriasis: No  · Arthritis: No  · Thyroid Problems: No  · Lupus/Connective Tissue Disease: No  · Diabetes: No  · Stroke: No  · Blood Clots: No  · IBD/Crohn's/Ulcerative Colitis: No  · Vitiligo: No  · Scarring/Keloids: No  · Severe Acne: No  · Pancreatic Cancer: No  · Other known Skin Condition? If Yes, what condition and which relatives?   YES, father    Current Medications:    Current Outpatient Medications:     acetaminophen (TYLENOL) 325 mg tablet, Take 2 tablets by mouth every 6 (six) hours as needed for fever, Disp: 30 tablet, Rfl: 0    albuterol (Angela Wade HFA) 90 mcg/act inhaler, Inhale 2 puffs 4 (four) times a day, Disp: 2 Inhaler, Rfl: 0    amLODIPine (NORVASC) 10 mg tablet, Take 1 tablet by mouth daily, Disp: , Rfl:     ASPIRIN 81 PO, Take 1 tablet by mouth every other day  , Disp: , Rfl:     atorvastatin (LIPITOR) 10 mg tablet, Take 1 tablet by mouth, Disp: , Rfl:     benzonatate (TESSALON) 200 MG capsule, TAKE 1 CAPSULE BY MOUTH THREE TIMES A DAY AS NEEDED FOR COUGH, Disp: 90 capsule, Rfl: 5    budesonide (PULMICORT) 0 5 mg/2 mL nebulizer solution, Take 1 vial (0 5 mg total) by nebulization 2 (two) times a day Rinse mouth after use , Disp: 360 mL, Rfl: 3    carvedilol (COREG) 12 5 mg tablet, Take 1 tablet by mouth 2 (two) times a day with meals, Disp: 60 tablet, Rfl: 0    clopidogrel (PLAVIX) 75 mg tablet, Take 1 tablet by mouth daily, Disp: 30 tablet, Rfl: 0    docusate sodium (COLACE) 100 mg capsule, Take 1 capsule (100 mg total) by mouth 2 (two) times a day, Disp: 10 capsule, Rfl: 0    Fesoterodine Fumarate ER (TOVIAZ) 8 MG TB24, Take 4 mg by mouth every evening  , Disp: , Rfl:     ipratropium-albuterol (DUO-NEB) 0 5-2 5 mg/3 mL nebulizer solution, Take 3 mL by nebulization 4 (four) times a day as needed for wheezing or shortness of breath, Disp: , Rfl:     latanoprost (XALATAN) 0 005 % ophthalmic solution, Administer 1 drop to both eyes daily at bedtime, Disp: , Rfl:     levocetirizine (XYZAL) 5 MG tablet, Take 1 tablet by mouth every evening, Disp: 30 tablet, Rfl: 0    LORazepam (ATIVAN) 0 5 mg tablet, Take 0 5 mg by mouth every 8 (eight) hours as needed for anxiety, Disp: , Rfl:     omeprazole (PriLOSEC) 40 MG capsule, Take 1 capsule by mouth daily, Disp: , Rfl:     PARoxetine (PAXIL) 20 mg tablet, Take 1 tablet by mouth daily, Disp: , Rfl:     polyethylene glycol (MIRALAX) 17 g packet, Take 17 g by mouth daily as needed, Disp: , Rfl:     predniSONE 10 mg tablet, Take 0 5 tablets (5 mg total) by mouth daily, Disp: 60 tablet, Rfl: 1   psyllium (METAMUCIL) 0 52 g capsule, Take 0 52 g by mouth daily, Disp: , Rfl:     senna (SENOKOT) 8 6 MG tablet, Take 2 tablets by mouth daily at bedtime, Disp: , Rfl:     spironolactone (ALDACTONE) 25 mg tablet, Take 1 tablet (25 mg total) by mouth daily, Disp: 30 tablet, Rfl: 0    tamsulosin (FLOMAX) 0 4 mg, Take 1 capsule by mouth daily, Disp: , Rfl:     Specific Alerts:    Have you been seen by a [facility] Dermatologist in the last 3 years? No    Are you pregnant or planning to become pregnant? N/A    Are you currently or planning to be nursing or breast feeding? N/A    Allergies   Allergen Reactions    Penicillins Rash       May we call your Preferred Phone number to discuss your specific medical information? YES    May we leave a detailed message that includes your specific medical information? YES    Have you traveled outside of the [facility] in the past 3 months? No    Do you currently have a pacemaker or defibrillator? No    Do you have any artificial heart valves, joints, plates, screws, rods, stents, pins, etc? YES, right knee   - If Yes, were any placed within the last 2 years? Do you require any medications prior to a surgical procedure? No   - If Yes, for which procedure? - If Yes, what medications to you require? Are you taking any medications that cause you to bleed more easily ("blood thinners") YES, plavix 75mg    Have you ever experienced a rapid heartbeat with epinephrine? No    Have you ever been treated with "gold" (gold sodium thiomalate) therapy? No    [facility] can help with wrinkles, "laugh lines," facial volume loss, "double chin," "love handles," age spots, and more  Are you interested in learning today about some of the skin enhancement procedures that we offer? (If Yes, please provide more detail) No    Review of Systems:  Have you recently had or currently have any of the following?     · Fever or chills: No  · Night Sweats: No  · Headaches: No  · Weight Gain: {No  · Weight Loss: No  · Blurry Vision: No  · Nausea: No  · Vomiting: No  · Diarrhea: No  · Blood in Stool: No  · Abdominal Pain: No  · Itchy Skin: No  · Painful Joints: No  · Swollen Joints: No  · Muscle Pain: No  · Irregular Mole: No  · Sun Burn: No  · Dry Skin: No  · Skin Color Changes: No  · Scar or Keloid: No  · Cold Sores/Fever Blisters: No  · Bacterial Infections/MRSA: No  · Anxiety: No  · Depression: No  · Suicidal or Homicidal Thoughts: No      PHYSICAL EXAM:      Was a chaperone (Derm Clinical Assistant) present for the entirety of the Physical Exam? YES    Did the Dermatology Team specifically ask and  the patient on the importance of a Full Skin Exam to be sure that nothing is missed clinically?  YES    Did the patient request or accept a Full Skin Exam?  YES    Did the patient specifically refuse to have the areas "under-the-bra" examined by the Dermatologist? No    Did the patient specifically refuse to have the areas "under-the-underwear" examined by the Dermatologist? No      CONSTITUTIONAL:   Vitals:    09/11/19 1103   Temp: 98 2 °F (36 8 °C)   Weight: 98 kg (216 lb)   Height: 6' 2" (1 88 m)           PSYCH: Normal mood and affect  EYES: Normal conjunctiva  ENT: Normal lips and oral mucosa  CARDIOVASCULAR: No edema  RESPIRATORY: Normal respirations  HEME/LYMPH/IMMUNO:  No regional lymphadenopathy except as noted below in [address] (SKIN)  Hair, Scalp, Ears, Face Normal except as noted below in Assessment   Neck, Cervical Chain Nodes Normal except as noted below in Assessment   Right Arm/Hand/Fingers Normal except as noted below in Assessment   Left Arm/Hand/Fingers Normal except as noted below in Assessment   Chest/Breasts/Axillae Viewed areas Normal except as noted below in Assessment   Abdomen, Umbilicus Normal except as noted below in Assessment   Right Leg, Foot, Toes Normal except as noted below in Assessment   Left Leg, Foot, Toes Normal except as noted below in Assessment        ASSESSMENT AND PLAN BY DIAGNOSIS:    History of Present Condition:     Duration:  How long has this been an issue for you?    o  may    Location Affected:  Where on the body is this affecting you?    o  right shou;cam   Quality:  Is there any bleeding, pain, itch, burning/irritation, or redness associated with the skin lesion? o  bleeding    Severity:  Describe any bleeding, pain, itch, burning/irritation, or redness on a scale of 1 to 10 (with 10 being the worst)  o     Timing:  Does this condition seem to be there pretty constantly or do you notice it more at specific times throughout the day? o  consistent    Context:  Have you ever noticed that this condition seems to be associated with specific activities you do?    o  denies   Modifying Factors:    o Anything that seems to make the condition worse?    -  denies  o What have you tried to do to make the condition better?    -  denies   Associated Signs and Symptoms:  Does this skin lesion seem to be associated with any of the following:  o  DERM ASSOCIATED SIGNS AND SYMPTOMS: Redness, Bleeding and Crusting     1  SEBORRHEIC KERATOSIS; NON-INFLAMED    Physical Exam:   Anatomic Location Affected:  Behind left ear and back   Morphological Description:  Flat and raised, waxy, smooth to warty textured, yellow to brownish-grey to dark brown to blackish, discrete, "stuck-on" appearing papules   Pertinent Positives:   Pertinent Negatives: Additional History of Present Condition:  Patient reports new bumps on the skin  Denies itch, burn, pain, bleeding or ulceration  Present constantly; nothing seems to make it worse or better  No prior treatment        Assessment and Plan:  Based on a thorough discussion of this condition and the management approach to it (including a comprehensive discussion of the known risks, side effects and potential benefits of treatment), the patient (family) agrees to implement the following specific plan:   Monitor for changes    Seborrheic Keratosis  A seborrheic keratosis is a harmless warty spot that appears during adult life as a common sign of skin aging  Seborrheic keratoses can arise on any area of skin, covered or uncovered, with the usual exception of the palms and soles  They do not arise from mucous membranes  Seborrheic keratoses can have highly variable appearance  Seborrheic keratoses are extremely common  It has been estimated that over 90% of adults over the age of 61 years have one or more of them  They occur in males and females of all races, typically beginning to erupt in the 35s or 45s  They are uncommon under the age of 21 years  The precise cause of seborrhoeic keratoses is not known  Seborrhoeic keratoses are considered degenerative in nature  As time goes by, seborrheic keratoses tend to become more numerous  Some people inherit a tendency to develop a very large number of them; some people may have hundreds of them  The name "seborrheic keratosis" is misleading, because these lesions are not limited to a seborrhoeic distribution (scalp, mid-face, chest, upper back), nor are they formed from sebaceous glands, nor are they associated with sebum -- which is greasy  Seborrheic keratosis may also be called "SK," "Seb K," "basal cell papilloma," "senile wart," or "barnacle "      Researchers have noted:   Eruptive seborrhoeic keratoses can follow sunburn or dermatitis   Skin friction may be the reason they appear in body folds   Viral cause (e g , human papillomavirus) seems unlikely   Stable and clonal mutations or activation of FRFR3, PIK3CA, GWEN, AKT1 and EGFR genes are found in seborrhoeic keratoses   Seborrhoeic keratosis can arise from solar lentigo   FRFR3 mutations also arise in solar lentigines   These mutations are associated with increased age and location on the head and neck, suggesting a role of ultraviolet radiation in these lesions   Seborrheic keratoses do not harbour tumour suppressor gene mutations   Epidermal growth factor receptor inhibitors, which are used to treat some cancers, often result in an increase in verrucal (warty) keratoses  There is no easy way to remove multiple lesions on a single occasion  Unless a specific lesion is "inflamed" and is causing pain or stinging/burning or is bleeding, most insurance companies do not authorize treatment  2  NEOPLASM OF UNCERTAIN BEHAVIOR OF SKIN    Physical Exam:   (Anatomic Location); (Size and Morphological Description); (Differential Diagnosis):  o Specimen A: Right lateral posterior shoulder 1 8 cm pink ulcerated round plaque   o Specimen B: Right lateral thigh 1 4 cm scaly papule on top of 2 5 cm pink scaly plaque   Pertinent Positives:   Pertinent Negatives: Additional History of Present Condition:  Patient states he had previous skin cancer  Assessment and Plan:   Discussed this patient requires a full skin exam within the next 1-3 months given the amount of sun damage he has; family agrees to reschedule     I have discussed with the patient that a sample of skin via a "skin biopsy would be potentially helpful to further make a specific diagnosis under the microscope   Based on a thorough discussion of this condition and the management approach to it (including a comprehensive discussion of the known risks, side effects and potential benefits of treatment), the patient (family) agrees to implement the following specific plan:    o Procedure:  Skin Biopsy  After a thorough discussion of treatment options and risk/benefits/alternatives (including but not limited to local pain, scarring, dyspigmentation, blistering, possible superinfection, and inability to confirm a diagnosis via histopathology), verbal and written consent were obtained and portion of the rash was biopsied for tissue sample    See below for consent that was obtained from patient and subsequent Procedure Note  PROCEDURE SHAVE BIOPSY NOTE:     Performing Physician: Quintin Saleh   Anatomic Location; Clinical Description with size (cm); Pre-Op Diagnosis:  o Specimen A: Right lateral posterior shoulder 1 8 cm pink ulcerated round plaque   Post-op diagnosis: Same      Local anesthesia: 1% xylocaine with epi       Topical anesthesia: None     Hemostasis: Electrocautery     PROCEDURE SHAVE BIOPSY NOTE:     Performing Physician: Quintin Saleh   Anatomic Location; Clinical Description with size (cm); Pre-Op Diagnosis:  o Specimen B: Right lateral thigh 1 4 cm scaly papule on top of 2 5 cm pink scaly plaque   Post-op diagnosis: Same      Local anesthesia: 1% xylocaine with epi       Topical anesthesia: None     Hemostasis: Electrocautery     After obtaining informed consent  at which time there was a discussion about the purpose of biopsy  and low risks of infection and bleeding  The area was prepped and draped in the usual fashion  Anesthesia was obtained with 1% lidocaine with epinephrine  A shave biopsy to an appropriate sampling depth was obtained with a sterile blade (such as a 15-blade or DermaBlade)  The resulting wound was covered with surgical ointment and bandaged appropriately  The patient tolerated the procedure well without complications and was without signs of functional compromise  Specimen has been sent for review by Dermatopathology  Standard post-procedure care has been explained and has been included in written form within the patient's copy of Informed Consent  INFORMED CONSENT DISCUSSION AND POST-OPERATIVE INSTRUCTIONS FOR PATIENT    I   RATIONALE FOR PROCEDURE  I understand that a skin biopsy allows the Dermatologist to test a lesion or rash under the microscope to obtain a diagnosis  It usually involves numbing the area with numbing medication and removing a small piece of skin; sometimes the area will be closed with sutures   In this specific procedure, sutures are not usually needed  If any sutures are placed, then they are usually need to be removed in 2 weeks or less  I understand that my Dermatologist recommends that a skin "shave" biopsy be performed today  A local anesthetic, similar to the kind that a dentist uses when filling a cavity, will be injected with a very small needle into the skin area to be sampled  The injected skin and tissue underneath "will go to sleep and become numb so no pain should be felt afterwards  An instrument shaped like a tiny "razor blade" (shave biopsy instrument) will be used to cut a small piece of tissue and skin from the area so that a sample of tissue can be taken and examined more closely under the microscope  A slight amount of bleeding will occur, but it will be stopped with direct pressure and a pressure bandage and any other appropriate methods  I understands that a scar will form where the wound was created  Surgical ointment will be applied to help protect the wound  Sutures are not usually needed  II   RISKS AND POTENTIAL COMPLICATIONS   I understand the risks and potential complications of a skin biopsy include but are not limited to the following:   Bleeding   Infection   Pain   Scar/keloid   Skin discoloration   Incomplete Removal   Recurrence   Nerve Damage/Numbness/Loss of Function   Allergic Reaction to Anesthesia   Biopsies are diagnostic procedures and based on findings additional treatment or evaluation may be required   Loss or destruction of specimen resulting in no additional findings    My Dermatologist has explained to me the nature of the condition, the nature of the procedure, and the benefits to be reasonably expected compared with alternative approaches  My Dermatologist has discussed the likelihood of major risks or complications of this procedure including the specific risks listed above, such as bleeding, infection, and scarring/keloid    I understand that a scar is expected after this procedure  I understand that my physician cannot predict if the scar will form a "keloid," which extends beyond the borders of the wound that is created  A keloid is a thick, painful, and bumpy scar  A keloid can be difficult to treat, as it does not always respond well to therapy, which includes injecting cortisone directly into the keloid every few weeks  While this usually reduces the pain and size of the scar, it does not eliminate it  I understand that photographs may be taken before and after the procedure  These will be maintained as part of the medical providers confidential records and may not be made available to me  I further authorize the medical provider to use the photographs for teaching purposes or to illustrate scientific papers, books, or lectures if in his/her judgment, medical research, education, or science may benefit from its use  I have had an opportunity to fully inquire about the risks and benefits of this procedure and its alternatives  I have been given ample time and opportunity to ask questions and to seek a second opinion if I wished to do so  I acknowledge that there have specifically been no guarantees as to the cosmetic results from the procedure  I am aware that with any procedure there is always the possibility of an unexpected complication  III  POST-PROCEDURAL CARE (WHAT YOU WILL NEED TO DO "AFTER THE BIOPSY" TO OPTIMIZE HEALING)     Keep the area clean and dry  Try NOT to remove the bandage or get it wet for the first 24 hours   Gently clean the area and apply surgical ointment (such as Vaseline petrolatum ointment, which is available "over the counter" and not a prescription) to the biopsy site for up to 2 weeks straight  This acts to protect the wound from the outside world   Sutures are not usually placed in this procedure    If any sutures were placed, return for suture removal as instructed (generally 1 week for the face, 2 weeks for the body)   Take Acetaminophen (Tylenol) for discomfort, if no contraindications  Ibuprofen or aspirin could make bleeding worse   Call our office immediately for signs of infection: fever, chills, increased redness, warmth, tenderness, discomfort/pain, or pus or foul smell coming from the wound  WHAT TO DO IF THERE IS ANY BLEEDING? If a small amount of bleeding is noticed, place a clean cloth over the area and apply firm pressure for ten minutes  Check the wound after 10 minutes of direct pressure  If bleeding persists, try one more time for an additional 10 minutes of direct pressure on the area  If the bleeding becomes heavier or does not stop after the second attempt, or if you have any other questions about this procedure, then please call your [facility]'s Dermatologist by calling [phone] (SKIN)  I hereby acknowledge that I have reviewed and verified the site with my Dermatologist and have requested and authorized my Dermatologist to proceed with the procedure        Scribe Attestation    I,:   Agenda am acting as a scribe while in the presence of the attending physician :        I,:   Fatmata Vásquez MD personally performed the services described in this documentation    as scribed in my presence :

## 2019-09-11 NOTE — PATIENT INSTRUCTIONS
SKIN SHAVE BIOPSY  Rationale for Procedure  A skin shave biopsy allows the dermatologist to further examine a lesion or rash under the microscope to potentially obtain a more specific diagnosis  It usually involves numbing the area with numbing medication and removing a small piece of skin  Usually, the area will be closed with sutures at the end of the procedure  Sutures are not usually needed  Description of Procedure  We would like to perform a skin shave biopsy today  A local anesthetic, similar to the kind that a dentist uses when filling a cavity, will be injected with a very small needle into the skin area to be sampled  The injected skin and tissue underneath should go to sleep and become numbed so that no further pain should be felt  An instrument shaped like a tiny "razor blade" (i e , the shave biopsy instrument) will be used to cut a small round piece of skin and tissue from the area so that the sample may be taken and examined more closely under the microscope  A slight amount of bleeding will occur, but it is usually stopped with direct pressure, chemical cautery, and/or a pressure bandage; rarely, electrocautery and other means of intervention may be necessary to help stop the bleeding  Sutures are not usually needed  Surgical Vaseline-type ointment will also applied after the procedure to help create a barrier between the wound and the outside world      Risks and Potential Complications  While the advantage of a skin shave biopsy is that it allows us to potentially examine the skin more closely under the microscope, there are some risks and potential complications that include but are not limited to the following:  - Bleeding  - Infection  - Pain  - Scar/keloid  - Skin discoloration  - Incomplete removal of the lesion or rash being sampled (in other words, this procedure is intended as a sampling and is not considered a definitive treatment)  - Recurrence of the lesion or rash being sampled  - Nerve Damage/Numbness/Loss of Function  - Allergic Reaction to Anesthesia  - Biopsies are diagnostic procedures and, based on findings, additional treatment or evaluation may be required (in other words, this procedure is intended as a sampling and is not considered a definitive treatment)  - Loss or destruction of the sample specimen could result in no additional findings  - The person at the microscope may not be able to provide additional information other than what we already know or suspect  What You Will Need to Do After the Procedure  1  Keep the area clean and dry  Try NOT to remove the bandage for the first 24 hours  2  Gently clean the area and apply Vaseline ointment (this is over the counter and not a prescription) to the biopsy site for up to 2 weeks  3  Generally, sutures are not needed  If any sutures were placed, return for suture removal as instructed (generally 1 week for the face, 2 weeks for the body)  4  Take Acetaminophen (Tylenol) for discomfort, if no contraindications  Do NOT take Ibuprofen or aspirin unless specifically told to do so by your Dermatologist because these medications can make bleeding worse  5  Call our office immediately for signs of infection: fever, chills, increased redness, warmth, tenderness, discomfort/pain, or pus or foul smell coming from the wound  If a small amount of bleeding is noticed, place a clean cloth over the area and apply firm pressure for ten minutes  Check the wound ONLY after 10 minutes of direct pressure; do not cheat and sneak a peak, as that does not count  If bleeding persists after 10 minutes of legitimate direct pressure, then try one more round of direct pressure for an additional 10 minutes to the area  Should the bleeding become heavier or not stop after the second attempt, call [facility] directly at [phone] (SKIN) or, if after hours, go to your local Emergency Room/Emergency Department

## 2019-09-18 ENCOUNTER — TELEPHONE (OUTPATIENT)
Dept: DERMATOLOGY | Facility: CLINIC | Age: 79
End: 2019-09-18

## 2019-09-18 NOTE — RESULT ENCOUNTER NOTE
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 and present at the peripheral border and base of biopsy  B  Skin, Right lateral thigh, shave biopsy:  - Seborrheic keratosis, inflamed and hyperkeratotic                Nature of Lesion/Condition:     Final Diagnosis  A  Skin, Right lateral posterior shoulder, shave biopsy: - MALIGNANT  - Ulcerated invasive squamous cell carcinoma, 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, 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 and present at the peripheral border and base of biopsy  B  Skin, Right lateral thigh, shave biopsy: - BENIGN  - Seborrheic keratosis, inflamed and hyperkeratotic            Provider has personally called patient and relayed results and plan:  yes    Plan:  I called patient and relayed results to his daughter  SPECIMEN A is a malignant squamous cell cancer; this patient should be scheduled with Dr Farhat Pineda in his next available surgery clinic slot  SPECIMEN B is benign and requires no further treatment      Tissue Exam: [id number]   Order: [id number]   Status:  Final result   Visible to patient:  No (Not Released) Dx:  Neoplasm of uncertain behavior of skin   Component   Case Report  Surgical Pathology Report                         Case: [id number]                                   Authorizing Provider: Hayes Haynes MD     Collected:           09/11/2019 6672              Ordering Location:     [facility]      Received:            09/11/2019 [address] [place] [address]                                                                Pathologist:           Favio Bustillos MD                                                             Specimens:   A) - Skin, Other, Right lateral posterior shoulder                                                  B) - Skin, Other, Right lateral thigh                                                    Final Diagnosis  A  Skin, Right lateral posterior shoulder, shave biopsy:  - Ulcerated invasive squamous cell carcinoma, 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, 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 and present at the peripheral border and base of biopsy  B  Skin, Right lateral thigh, shave biopsy:  - Seborrheic keratosis, inflamed and hyperkeratotic          Interpretation performed at [facility], [address] [place] [address]      Electronically signed by Favio Bustillos MD on 9/17/2019 at  2:40 PM  Additional Information   All controls performed with the immunohistochemical stains reported above reacted appropriately  These tests were developed and their performance characteristics determined by [facility] [address] [place] [address] or [address] [place] [address]  They may not be cleared or approved by the [place]  Food and Drug Administration  The FDA has determined that such clearance or approval is not necessary  These tests are used for clinical purposes  They should not be regarded as investigational or for research  This laboratory has been approved by CLIA 88, designated as a high-complexity laboratory and is qualified to perform these tests  Gross Description   A  The specimen is received in formalin, labeled with the patient's name and hospital number, and is designated "right lateral posterior shoulder"  The specimen consists of a 2 0 x 1 3 x 0 4 cm shave biopsy of tan white skin    The epithelial surface exhibits a raised papule white in color measuring 1 2 x 1 1 x 0 2 cm and in the middle of the raised papule there is a pigmented macule measuring 1 0 x 1 0 cm  The epithelial surface is inked red and the margin of resection is inked green  The specimen is serially sectioned revealing tan white grossly unremarkable cut surfaces  The specimen is entirely submitted between sponges, 4 cassettes      B  The specimen is received in formalin, labeled with the patient's name and hospital number, and is designated "right lateral thigh"  The specimen consists of a 1 5 x 1 1 x 0 3 cm shave biopsy of tan white skin  The epithelial surface is hair-bearing and exhibits a raised bosselated papule measuring 1 3 x 0 7 x 0 2 cm  The epithelial surface is inked red and the margin of resection is inked green  The specimen is serially sectioned revealing tan white grossly unremarkable cut surfaces  The specimen is entirely submitted between sponges, 2 cassettes                           Note: The estimated total formalin fixation time based upon information provided by the submitting clinician and the standard processing schedule is under 72 hours   Peggy   Clinical Information   Specimen A: skin, shave biopsy; Right lateral posterior shoulder; 77year old male with a 1 8 cm pink ulcerated round plaque     Specimen B: skin, shave biopsy; Right lateral thigh; 77year old male with a 1 4 cm scaly papule on top of 2 5 cm pink scaly plaque  Resulting Agency [facility]      Specimen Collected: 09/11/19  4:42 PM Last Resulted: 09/17/19  2:40 PM

## 2019-09-18 NOTE — TELEPHONE ENCOUNTER
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 and present at the peripheral border and base of biopsy  B  Skin, Right lateral thigh, shave biopsy:  - Seborrheic keratosis, inflamed and hyperkeratotic                Nature of Lesion/Condition:     Final Diagnosis  A  Skin, Right lateral posterior shoulder, shave biopsy: - MALIGNANT  - Ulcerated invasive squamous cell carcinoma, 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, 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 and present at the peripheral border and base of biopsy  B  Skin, Right lateral thigh, shave biopsy: - BENIGN  - Seborrheic keratosis, inflamed and hyperkeratotic            Provider has personally called patient and relayed results and plan:  yes    Plan:  I called patient and relayed results to his daughter  SPECIMEN A is a malignant squamous cell cancer; this patient should be scheduled with Dr Emely Norris in his next available surgery clinic slot  SPECIMEN B is benign and requires no further treatment      Tissue Exam: [id number]   Order: [id number]   Status:  Final result   Visible to patient:  No (Not Released) Dx:  Neoplasm of uncertain behavior of skin   Component   Case Report  Surgical Pathology Report                         Case: [id number]                                   Authorizing Provider: Pierce Hurtado MD     Collected:           09/11/2019 1641              Ordering Location:     [facility]      Received:            09/11/2019 1642                                     [address]                                                                Pathologist:           Raina Ruiz MD                                                             Specimens:   A) - Skin, Other, Right lateral posterior shoulder                                                  B) - Skin, Other, Right lateral thigh                                                    Final Diagnosis  A  Skin, Right lateral posterior shoulder, shave biopsy:  - Ulcerated invasive squamous cell carcinoma, 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, 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 and present at the peripheral border and base of biopsy  B  Skin, Right lateral thigh, shave biopsy:  - Seborrheic keratosis, inflamed and hyperkeratotic          Interpretation performed at [place] [address]      Electronically signed by Raina Ruiz MD on 9/17/2019 at  2:40 PM  Additional Information   All controls performed with the immunohistochemical stains reported above reacted appropriately  These tests were developed and their performance characteristics determined by [facility] [address] [place] [address] or [facility]  They may not be cleared or approved by the [place]  Food and Drug Administration  The FDA has determined that such clearance or approval is not necessary  These tests are used for clinical purposes  They should not be regarded as investigational or for research  This laboratory has been approved by IA 88, designated as a high-complexity laboratory and is qualified to perform these tests  Gross Description   A  The specimen is received in formalin, labeled with the patient's name and hospital number, and is designated "right lateral posterior shoulder"  The specimen consists of a 2 0 x 1 3 x 0 4 cm shave biopsy of tan white skin    The epithelial surface exhibits a raised papule white in color measuring 1 2 x 1 1 x 0 2 cm and in the middle of the raised papule there is a pigmented macule measuring 1 0 x 1 0 cm  The epithelial surface is inked red and the margin of resection is inked green  The specimen is serially sectioned revealing tan white grossly unremarkable cut surfaces  The specimen is entirely submitted between sponges, 4 cassettes      B  The specimen is received in formalin, labeled with the patient's name and hospital number, and is designated "right lateral thigh"  The specimen consists of a 1 5 x 1 1 x 0 3 cm shave biopsy of tan white skin  The epithelial surface is hair-bearing and exhibits a raised bosselated papule measuring 1 3 x 0 7 x 0 2 cm  The epithelial surface is inked red and the margin of resection is inked green  The specimen is serially sectioned revealing tan white grossly unremarkable cut surfaces  The specimen is entirely submitted between sponges, 2 cassettes                           Note: The estimated total formalin fixation time based upon information provided by the submitting clinician and the standard processing schedule is under 72 hours  Peggy   Clinical Information   Specimen A: skin, shave biopsy; Right lateral posterior shoulder; 77year old male with a 1 8 cm pink ulcerated round plaque     Specimen B: skin, shave biopsy; Right lateral thigh; 77year old male with a 1 4 cm scaly papule on top of 2 5 cm pink scaly plaque  Resulting Agency [facility]      Specimen Collected: 09/11/19  4:42 PM Last Resulted: 09/17/19  2:40 PM          Telephone call to patient, spoke to daughter Floresita Krishnan   Pt scheduled on 10/22/2019 with Dr Stefan Frazier for excision

## 2019-10-16 ENCOUNTER — OFFICE VISIT (OUTPATIENT)
Dept: DERMATOLOGY | Facility: CLINIC | Age: 79
End: 2019-10-16
Payer: MEDICARE

## 2019-10-16 VITALS — WEIGHT: 222 LBS | TEMPERATURE: 98.7 F | HEIGHT: 74 IN | BODY MASS INDEX: 28.49 KG/M2

## 2019-10-16 DIAGNOSIS — L57.0 ACTINIC KERATOSIS: ICD-10-CM

## 2019-10-16 DIAGNOSIS — D48.5 NEOPLASM OF UNCERTAIN BEHAVIOR OF SKIN: ICD-10-CM

## 2019-10-16 DIAGNOSIS — B35.3 TINEA PEDIS OF BOTH FEET: ICD-10-CM

## 2019-10-16 DIAGNOSIS — C44.629 SQUAMOUS CELL CARCINOMA OF LEFT SHOULDER: Primary | ICD-10-CM

## 2019-10-16 DIAGNOSIS — L91.8 ACROCHORDON: ICD-10-CM

## 2019-10-16 PROCEDURE — 11102 TANGNTL BX SKIN SINGLE LES: CPT | Performed by: DERMATOLOGY

## 2019-10-16 PROCEDURE — 17004 DESTROY PREMAL LESIONS 15/>: CPT | Performed by: DERMATOLOGY

## 2019-10-16 PROCEDURE — 99214 OFFICE O/P EST MOD 30 MIN: CPT | Performed by: DERMATOLOGY

## 2019-10-16 PROCEDURE — 88305 TISSUE EXAM BY PATHOLOGIST: CPT | Performed by: PATHOLOGY

## 2019-10-16 RX ORDER — KETOCONAZOLE 20 MG/G
CREAM TOPICAL
Qty: 60 G | Refills: 5 | Status: SHIPPED | OUTPATIENT
Start: 2019-10-16

## 2019-10-16 NOTE — PATIENT INSTRUCTIONS
1  SQUAMOUS CELL CARCINOMA (Biopsy confirmed 09/11/2019 Accession # [id number])    Physical Exam:   Anatomic Location Affected:  Right lateral posterior shoulder    Assessment and Plan:  Based on a thorough discussion of this condition and the management approach to it (including a comprehensive discussion of the known risks, side effects and potential benefits of treatment), the patient (family) agrees to implement the following specific plan:   Excision schedule for 10/22/2019 with Dr Soha Kruse    What is cutaneous squamous cell carcinoma? Cutaneous squamous cell carcinoma (SCC) is a common type of keratinocyte or non-melanoma skin cancer  It is derived from cells within the epidermis that make keratin -- the horny protein that makes up skin, hair and nails  Cutaneous SCC is an invasive disease, referring to cancer cells that have grown beyond the epidermis  SCC can sometimes metastasise and may prove fatal   Intraepidermal carcinoma (cutaneous SCC in situ) and mucosal SCC are considered elsewhere  Who gets cutaneous squamous cell carcinoma? Risk factors for cutaneous SCC include:   Age and sex: SCCs are particularly prevalent in elderly males  However, they also affect females and younger adults   Previous SCC or another form of skin cancer (basal cell carcinoma, melanoma) are a strong predictor for further skin cancers   Actinic keratoses    Outdoor occupation or recreation    Smoking    Fair skin, blue eyes and blond or red hair    Previous cutaneous injury, thermal burn, disease (eg cutaneous lupus, epidermolysis bullosa, leg ulcer)    Inherited syndromes: SCC is a particular problem for families with xeroderma pigmentosum and albinism    Other risk factors include ionising radiation, exposure to arsenic, and immune suppression due to disease (eg chronic lymphocytic leukaemia) or medicines  Organ transplant recipients have a massively increased risk of developing SCC       What causes cutaneous squamous cell carcinoma? More than 90% of cases of SCC are associated with numerous DNA mutations in multiple somatic genes  Mutations in the p53 tumour suppressor gene are caused by exposure to ultraviolet radiation (UV), especially UVB (known as signature 7)  Other signature mutations relate to cigarette smoking, ageing and immune suppression (eg, to drugs such as azathioprine)  Mutations in signalling pathways affect the epidermal growth factor receptor, GWEN, Fyn, and [id number] signalling  Beta-genus human papillomaviruses (wart virus) are thought to play a role in SCC arising in immune-suppressed populations  ?-HPV and HPV subtypes 5, 8, 17, 20, 24, and 38 have also been associated with an increased risk of cutaneous SCC in immunocompetent individuals  What are the clinical features of cutaneous squamous cell carcinoma? Cutaneous SCCs present as enlarging scaly or crusted lumps  They usually arise within pre-existing actinic keratosis or intraepidermal carcinoma   They grow over weeks to months    They may ulcerate    They are often tender or painful    Located on sun-exposed sites, particularly the face, lips, ears, hands, forearms and lower legs    Size varies from a few millimetres to several centimetres in diameter  Types of cutaneous squamous cell carcinoma  Distinct clinical types of invasive cutaneous SCC include:   Cutaneous horn -- the horn is due to excessive production of keratin    Keratoacanthoma (KA) -- a rapidly growing keratinising nodule that may resolve without treatment    Carcinoma cuniculatum (verrucous carcinoma), a slow-growing, warty tumour on the sole of the foot      Multiple eruptive SCC/KA-like lesions arising in syndromes, such as multiple self-healing squamous epitheliomas of Ellis-Smith and Grzybowski syndrome  The pathologist may classify a tumour as well differentiated, moderately well differentiated, poorly differentiated or anaplastic cutaneous SCC  There are other variants  Classification of squamous cell carcinoma by risk  Cutaneous SCC is classified as low-risk or high-risk, depending on the chance of tumour recurrence and metastasis  Characteristics of high-risk SCC include:  High-risk cutaneous squamous cell carcinoma has the following characteristics:   Diameter greater than or equal to 2 cm    Location on the ear, vermilion of the lip, central face, hands, feet, genitalia    Arising in elderly or immune suppressed patient    Histological thickness greater than 2 mm, poorly differentiated histology, or with the invasion of the subcutaneous tissue, nerves and blood vessels  Metastatic SCC is found in regional lymph nodes (80%), lungs, liver, brain, bones and skin  Staging cutaneous squamous cell carcinoma  In 2011, the [address] [place] [address] Ave on Cancer (CC) published a new staging systemic for cutaneous SCC for the 7th Edition of the AJCC manual  This evaluates the dimensions of the original primary tumour (T) and its metastases to lymph nodes (N)  Tumour staging for cutaneous SCC  TX: Th Primary tumour cannot be assessed  T0: No evidence of a primary tumour  Tis: Carcinoma in situ  T1: Tumour ? 2cm without high-risk features  T2: Tumour ? 2cm; or; Tumour ? 2 cm with high-risk features  T3: Tumour with the invasion of maxilla, mandible, orbit or temporal bone  T4: Tumour with the invasion of axial or appendicular skeleton or perineural invasion of skull base    Diogo staging for cutaneous SCC  NX: Regional lymph nodes cannot be assessed  N0: No regional lymph node metastasis  N1: Metastasis in one local lymph node ? 3cm  N2: Metastasis in one local lymph node ? 3cm; or; Metastasis in >1 local lymph node ? 6cm  N3: Metastasis in lymph node ? 6cm    How is squamous cell carcinoma diagnosed? Diagnosis of cutaneous SCC is based on clinical features   The diagnosis and histological subtype are confirmed pathologically by diagnostic biopsy or following excision  See squamous cell carcinoma - pathology  Patients with high-risk SCC may also undergo staging investigations to determine whether it has spread to lymph nodes or elsewhere  These may include:   Imaging using ultrasound scan, X-rays, CT scans, MRI scans    Lymph node or other tissue biopsies    What is the treatment for cutaneous squamous cell carcinoma? Cutaneous SCC is nearly always treated surgically  Most cases are excised with a 3-10 mm margin of normal tissue around a visible tumour  A flap or skin graft may be needed to repair the defect  Other methods of removal include:   Shave, curettage, and electrocautery for low-risk tumours on trunk and limbs    Aggressive cryotherapy for very small, thin, low-risk tumours    Mohs micrographic surgery for large facial lesions with indistinct margins or recurrent tumours    Radiotherapy for an inoperable tumour, patients unsuitable for surgery, or as adjuvant    What is the treatment for advanced or metastatic squamous cell carcinoma? Locally advanced primary, recurrent or metastatic SCC requires multidisciplinary consultation  Often a combination of treatments is used   Surgery    Radiotherapy    Cemiplimab    Experimental targeted therapy using epidermal growth factor receptor inhibitors    How can cutaneous squamous cell carcinoma be prevented? There is a great deal of evidence to show that very careful sun protection at any time of life reduces the number of SCCs  This is particularly important in ageing, sun-damaged, fair skin; in patients that are immune suppressed; and in those who already have actinic keratoses or previous SCC     Stay indoors or under the shade in the middle of the day    Wear covering clothing    Apply high protection factor SPF50+ broad-spectrum sunscreens generously to exposed skin if outdoors    Avoid indoor tanning (sun beds, solaria)    Oral nicotinamide (vitamin B3) in a dose of 500 mg twice daily may reduce the number and severity of SCCs in people at high risk  Patients with multiple squamous cell carcinomas may be prescribed an oral retinoid (acitretin or isotretinoin)  These reduce the number of tumours but have some nuisance side effects  What is the outlook for cutaneous squamous cell carcinoma? Most SCCs are cured by treatment  A cure is most likely if treatment is undertaken when the lesion is small  The risk of recurrence or disease-associated death is greater for tumours that are > 20 mm in diameter and/or > 2 mm in thickness at the time of surgical excision  About 50% of people at high risk of SCC develop a second one within 5 years of the first  They are also at increased risk of other skin cancers, especially melanoma  Regular self-skin examinations and long-term annual skin checks by an experienced health professional are recommended  2 TINEA PEDIS ("ATHLETE'S FOOT")    Physical Exam:   Anatomic Location Affected:  Bilateral feet    Assessment and Plan:  Based on a thorough discussion of this condition and the management approach to it (including a comprehensive discussion of the known risks, side effects and potential benefits of treatment), the patient (family) agrees to implement the following specific plan:   Ketoconazole cream three times a day for 4 weeks    Tinea Pedis  Tinea pedis is a fungal infection of the foot and is in fact the most common fungal infection  Tinea pedis is caused by dermatophyte fungi with the three most common being Trichophyton (T ) rubrum, T  interdigitale and Epidermophyton floccosum  Tinea pedis most commonly involves the interdigital spaces, known as "athlete's foot " Other typical sites include the toenails, groin, and palms of the hands  There are four major manifestations of tinea pedis including chronic hyperkeratotic, chronic intertriginous, acute ulcerative and vesicobullous   Signs and symptoms include:    Itchiness, redness, and scaling between the toes   Scales covering the soles and sides of the feet   Blisters over the inner aspect of the feet    It is particularly common in hot, tropical, and urban environments where sweating in the feet facilitate fungal growth  Risk factors for development include:   Occlusive footwear   Excessive swearing   Diabetes or other underlying immunosuppression    Poor peripheral circulation     The diagnosis of tinea pedis can usually be made via good history and physical exam due to its characteristic clinical features  Diagnosis can be confirmed by examining skin scrapings under the microscope  Cultures are occasionally done but may not be necessary if fungi are seen under microscopy  Other diagnoses to consider if patients do not respond to therapy include psoriasis, contact dermatitis, and eczema  Tinea pedis can be treated with topical antifungal drugs applied to affected areas on a repeated basis (usually 2 twice a day) for 2 to 4 weeks  Common topical medications include topical ketoconazole, allylamines, butenafine, ciclopirox, and tolnaftate  In cases that do not respond to topical therapy, oral antifungal agents may be used which include terbinafine, itraconazole, fluconazole and griseofulvin  These oral agents are also used to treat tinea capitis (fungal infection of the scalp) and onychomycosis (fungal infection of the nails)  Those with pre-existing liver problems are usually screened for liver function prior to starting oral terbinafine  Tinea pedis can be prevented by making sure feet are clean and dry with protective footwear worn in communal facilities  Other recommendations are:   Using drying foot powders when wearing occlusive shoes    Thoroughly dry shoes and boots prior to wearing them    Making sure to clean contaminated bathroom floors with bleach    Treatment of family members and other close contacts      3   ACTINIC KERATOSIS    Physical Exam:   Anatomic Location Affected:  Scalp, face, back, right arm and left helix    Assessment and Plan:  Based on a thorough discussion of this condition and the management approach to it (including a comprehensive discussion of the known risks, side effects and potential benefits of treatment), the patient (family) agrees to implement the following specific plan:     Defer treatment all together, understanding the potential risks of malignant transformation (we counseled against this)   Requests medical treatment with a "field area" agent   Requests Photodynamic Therapy (PDT) to be scheduled  Actinic keratoses are very common on sites repeatedly exposed to the sun, especially the backs of the hands and the face, most often affecting the ears, nose, cheeks, upper lip, vermilion of the lower lip, temples, forehead and balding scalp  In severely chronically sun-damaged individuals, they may also be found on the upper trunk, upper and lower limbs, and dorsum of feet  We discussed the theoretical premalignant (pre-cancerous) nature and etiology of these growths  We discussed the prevailing notion that actinic keratoses are a reflection of abnormal skin cell development due to DNA damage by short wavelength UVB  They are more likely to appear if the immune function is poor, due to aging, recent sun exposure, predisposing disease or certain drugs  We discussed that the main concern is that actinic keratoses may predispose to squamous cell carcinoma  It is rare for a solitary actinic keratosis to evolve to squamous cell carcinoma (SCC), but the risk of SCC occurring at some stage in a patient with more than 10 actinic keratoses is thought to be about 10 to 15%  A tender, thickened, ulcerated or enlarging actinic keratosis is suspicious of SCC  Actinic keratoses may be prevented by strict sun protection   If already present, keratoses may improve with a very high sun protection factor (50+) broad-spectrum sunscreen applied at least daily to affected areas, year-round  We recommend that UPF-rated clothing and hats and sunglasses be worn whenever possible and that a sunscreen-moisturizer combination product such as Neutrogena Daily Defense be applied at least three times a day  We performed a thorough discussion of treatment options and specific risk/benefits/alternatives including but not limited to medical field treatment with medications such as the following:     Topical field area medications such as 5-fluorouracil or Aldara (specifically, the trouble with long-term compliance, blistering and local skin reaction versus the convenience of at-home therapy and that field therapy gets what is not yet seen)   Cryotherapy (specifically, local pain, scarring, dyspigmentation, blistering, possible superinfection, and treats only what we see versus directed treatment today)   Photodynamic therapy (specifically, local pain, scarring, dyspigmentation, blistering, possible superinfection, need to schedule for a later date, and time spent in the office versus field therapy that gets what is not yet seen)  PROCEDURE:  DESTRUCTION OF PRE-MALIGNANT LESIONS  After a thorough discussion of treatment options and risk/benefits/alternatives (including but not limited to local pain, scarring, dyspigmentation, blistering, and possible superinfection), verbal and written consent were obtained and the aforementioned lesions were treated on with cryotherapy using liquid nitrogen x 1 cycle for 5-10 seconds   TOTAL NUMBER of 15 pre-malignant lesions were treated today on the ANATOMIC LOCATION: Scalp, face, back, right arm and left helix  The patient tolerated the procedure well, and after-care instructions were provided  Liquid nitrogen was applied for 10-12 seconds to the skin lesion and the expected blistering or scabbing reaction explained  Do not pick at the area   Patient reminded to expect hypopigmented scars from the procedure  Return if lesion fails to fully resolve  4  NEOPLASM OF UNCERTAIN BEHAVIOR OF SKIN    Physical Exam:   (Anatomic Location); (Size and Morphological Description); (Differential Diagnosis):  o Right medial lower leg; 6 cm annular double scaling plaque; Porokeratosis Rule out Basal cell carcinoma      Assessment and Plan:   I have discussed with the patient that a sample of skin via a "skin biopsy would be potentially helpful to further make a specific diagnosis under the microscope   Based on a thorough discussion of this condition and the management approach to it (including a comprehensive discussion of the known risks, side effects and potential benefits of treatment), the patient (family) agrees to implement the following specific plan:    o Procedure:  Skin Biopsy  After a thorough discussion of treatment options and risk/benefits/alternatives (including but not limited to local pain, scarring, dyspigmentation, blistering, possible superinfection, and inability to confirm a diagnosis via histopathology), verbal and written consent were obtained and portion of the rash was biopsied for tissue sample  See below for consent that was obtained from patient and subsequent Procedure Note  PROCEDURE SHAVE BIOPSY NOTE:     Performing Physician: Cecy Reis    INFORMED CONSENT DISCUSSION AND POST-OPERATIVE INSTRUCTIONS FOR PATIENT    I   RATIONALE FOR PROCEDURE  I understand that a skin biopsy allows the Dermatologist to test a lesion or rash under the microscope to obtain a diagnosis  It usually involves numbing the area with numbing medication and removing a small piece of skin; sometimes the area will be closed with sutures  In this specific procedure, sutures are not usually needed  If any sutures are placed, then they are usually need to be removed in 2 weeks or less  I understand that my Dermatologist recommends that a skin "shave" biopsy be performed today    A local anesthetic, similar to the kind that a dentist uses when filling a cavity, will be injected with a very small needle into the skin area to be sampled  The injected skin and tissue underneath "will go to sleep and become numb so no pain should be felt afterwards  An instrument shaped like a tiny "razor blade" (shave biopsy instrument) will be used to cut a small piece of tissue and skin from the area so that a sample of tissue can be taken and examined more closely under the microscope  A slight amount of bleeding will occur, but it will be stopped with direct pressure and a pressure bandage and any other appropriate methods  I understands that a scar will form where the wound was created  Surgical ointment will be applied to help protect the wound  Sutures are not usually needed  II   RISKS AND POTENTIAL COMPLICATIONS   I understand the risks and potential complications of a skin biopsy include but are not limited to the following:   Bleeding   Infection   Pain   Scar/keloid   Skin discoloration   Incomplete Removal   Recurrence   Nerve Damage/Numbness/Loss of Function   Allergic Reaction to Anesthesia   Biopsies are diagnostic procedures and based on findings additional treatment or evaluation may be required   Loss or destruction of specimen resulting in no additional findings    My Dermatologist has explained to me the nature of the condition, the nature of the procedure, and the benefits to be reasonably expected compared with alternative approaches  My Dermatologist has discussed the likelihood of major risks or complications of this procedure including the specific risks listed above, such as bleeding, infection, and scarring/keloid  I understand that a scar is expected after this procedure  I understand that my physician cannot predict if the scar will form a "keloid," which extends beyond the borders of the wound that is created  A keloid is a thick, painful, and bumpy scar    A keloid can be difficult to treat, as it does not always respond well to therapy, which includes injecting cortisone directly into the keloid every few weeks  While this usually reduces the pain and size of the scar, it does not eliminate it  I understand that photographs may be taken before and after the procedure  These will be maintained as part of the medical providers confidential records and may not be made available to me  I further authorize the medical provider to use the photographs for teaching purposes or to illustrate scientific papers, books, or lectures if in his/her judgment, medical research, education, or science may benefit from its use  I have had an opportunity to fully inquire about the risks and benefits of this procedure and its alternatives  I have been given ample time and opportunity to ask questions and to seek a second opinion if I wished to do so  I acknowledge that there have specifically been no guarantees as to the cosmetic results from the procedure  I am aware that with any procedure there is always the possibility of an unexpected complication  III  POST-PROCEDURAL CARE (WHAT YOU WILL NEED TO DO "AFTER THE BIOPSY" TO OPTIMIZE HEALING)     Keep the area clean and dry  Try NOT to remove the bandage or get it wet for the first 24 hours   Gently clean the area and apply surgical ointment (such as Vaseline petrolatum ointment, which is available "over the counter" and not a prescription) to the biopsy site for up to 2 weeks straight  This acts to protect the wound from the outside world   Sutures are not usually placed in this procedure  If any sutures were placed, return for suture removal as instructed (generally 1 week for the face, 2 weeks for the body)   Take Acetaminophen (Tylenol) for discomfort, if no contraindications  Ibuprofen or aspirin could make bleeding worse       Call our office immediately for signs of infection: fever, chills, increased redness, warmth, tenderness, discomfort/pain, or pus or foul smell coming from the wound  WHAT TO DO IF THERE IS ANY BLEEDING? If a small amount of bleeding is noticed, place a clean cloth over the area and apply firm pressure for ten minutes  Check the wound after 10 minutes of direct pressure  If bleeding persists, try one more time for an additional 10 minutes of direct pressure on the area  If the bleeding becomes heavier or does not stop after the second attempt, or if you have any other questions about this procedure, then please call your [facility] Dermatologist by calling [phone] (SKIN)  I hereby acknowledge that I have reviewed and verified the site with my Dermatologist and have requested and authorized my Dermatologist to proceed with the procedure  5  ADLEA ("SKIN TAG")    Physical Exam:   Anatomic Location Affected:  Left inguinal crease    Assessment and Plan:  Based on a thorough discussion of this condition and the management approach to it (including a comprehensive discussion of the known risks, side effects and potential benefits of treatment), the patient (family) agrees to implement the following specific plan:    Skin tags are common, soft, harmless skin lesions that are also called, in the appropriate settings, papillomas, fibroepithelial polyps, and soft fibromas  They are made up of loosely arranged collagen fibers and blood vessels surrounded by a thickened or thinned-out epidermis  Skin tags tend to develop in both men and women as we grow older  They are usually found on the skin folds (neck, armpits, groin)  It is not known what specifically causes skin tags    Certain factors, though, do appear to play a role:   Chaffing and irritation from skin rubbing together   High levels of growth factors (as seen, for example, in pregnancy or in acromegaly/gigantism)   Insulin resistance   Human papillomavirus (wart virus)    We discussed that most skin tags do not need to be treated unless they are specifically causing the patient physical distress or limitation or pose a risk for a larger problem such as an infection that forms secondary to excoriation or chronic irritation      We had a thorough discussion of treatment options and specific risks (including that any procedural treatment may not be covered by insurance and would then be the patient's responsibility) and benefits/alternatives including but not limited to the following:   Cryotherapy (freezing)   Shave removal   Surgical excision (snip excision with scissors)   Electrosurgery   Ligation (we do not do this procedure and counseled against it due to risk of tissue necrosis and infection)

## 2019-10-16 NOTE — PROGRESS NOTES
[address] [facility] Note     Patient Name: Joanie Mason  Encounter Date: 10/16/2019    Today's Chief Concerns:   Concern #1:  Skin  Exam, HX of SCC   Concern #2:  Lesion on Right ankle/lower calf      Past Medical History:  Have you ever had or currently have any of the following medical conditions or treatments? · HIV/AIDS: No  · Hepatitis B: No  · Hepatitis C: No   · Diabetes: No  · Tuberculosis: No  · Biologic Therapy/Chemotherapy: No  · Organ or Bone Marrow Transplantation: No  · Radiation Treatment: YES, gold seed 15 years ago  · Cancer (If Yes, which types)- YES, Prostate and lung      Have you ever had any of the following skin conditions? · Melanoma? (If Yes, please provide more detail)- No  · Basal Cell Carcinoma: No  · Squamous Cell Carcinoma: YES  · Sebaceous Cell Carcinoma: No  · Merkel Cell Carcinoma: No  · Angiosarcoma: No  · Blistering Sunburns: No  · Eczema: No  · Psoriasis: No    Social History:    What is your current Smoking Status? Former smoker    What is/was your primary occupation? Retired    What are your hobbies/past-times? Family history:  Do any of your "first degree relatives" (parent, brother, sister, or child) have any of the following conditions? · Melanoma? (If Yes, which relatives?) No  · Eczema: No  · Asthma: No  · Hay Fever/Seasonal Allergies: No  · Psoriasis: No  · Arthritis: No  · Thyroid Problems: No  · Lupus/Connective Tissue Disease: No  · Diabetes: No  · Stroke: No  · Blood Clots: No  · IBD/Crohn's/Ulcerative Colitis: No  · Vitiligo: No  · Scarring/Keloids: No  · Severe Acne: No  · Pancreatic Cancer: No  · Other known Skin Condition? If Yes, what condition and which relatives?   No    Current Medications:    Current Outpatient Medications:     acetaminophen (TYLENOL) 325 mg tablet, Take 2 tablets by mouth every 6 (six) hours as needed for fever, Disp: 30 tablet, Rfl: 0    albuterol (PROVENTIL HFA,VENTOLIN HFA) 90 mcg/act inhaler, Inhale 2 puffs 4 (four) times a day, Disp: 2 Inhaler, Rfl: 0    amLODIPine (NORVASC) 10 mg tablet, Take 1 tablet by mouth daily, Disp: , Rfl:     ASPIRIN 81 PO, Take 1 tablet by mouth every other day  , Disp: , Rfl:     atorvastatin (LIPITOR) 10 mg tablet, Take 1 tablet by mouth, Disp: , Rfl:     benzonatate (TESSALON) 200 MG capsule, TAKE 1 CAPSULE BY MOUTH THREE TIMES A DAY AS NEEDED FOR COUGH, Disp: 90 capsule, Rfl: 5    budesonide (PULMICORT) 0 5 mg/2 mL nebulizer solution, Take 1 vial (0 5 mg total) by nebulization 2 (two) times a day Rinse mouth after use , Disp: 360 mL, Rfl: 3    carvedilol (COREG) 12 5 mg tablet, Take 1 tablet by mouth 2 (two) times a day with meals, Disp: 60 tablet, Rfl: 0    clopidogrel (PLAVIX) 75 mg tablet, Take 1 tablet by mouth daily, Disp: 30 tablet, Rfl: 0    docusate sodium (COLACE) 100 mg capsule, Take 1 capsule (100 mg total) by mouth 2 (two) times a day, Disp: 10 capsule, Rfl: 0    Fesoterodine Fumarate ER (TOVIAZ) 8 MG TB24, Take 4 mg by mouth every evening  , Disp: , Rfl:     ipratropium-albuterol (DUO-NEB) 0 5-2 5 mg/3 mL nebulizer solution, Take 3 mL by nebulization 4 (four) times a day as needed for wheezing or shortness of breath, Disp: , Rfl:     latanoprost (XALATAN) 0 005 % ophthalmic solution, Administer 1 drop to both eyes daily at bedtime, Disp: , Rfl:     levocetirizine (XYZAL) 5 MG tablet, Take 1 tablet by mouth every evening, Disp: 30 tablet, Rfl: 0    LORazepam (ATIVAN) 0 5 mg tablet, Take 0 5 mg by mouth every 8 (eight) hours as needed for anxiety, Disp: , Rfl:     omeprazole (PriLOSEC) 40 MG capsule, Take 1 capsule by mouth daily, Disp: , Rfl:     PARoxetine (PAXIL) 20 mg tablet, Take 1 tablet by mouth daily, Disp: , Rfl:     polyethylene glycol (MIRALAX) 17 g packet, Take 17 g by mouth daily as needed, Disp: , Rfl:     predniSONE 10 mg tablet, Take 0 5 tablets (5 mg total) by mouth daily, Disp: 60 tablet, Rfl: 1    psyllium (METAMUCIL) 0 52 g capsule, Take 0 52 g by mouth daily, Disp: , Rfl:     senna (SENOKOT) 8 6 MG tablet, Take 2 tablets by mouth daily at bedtime, Disp: , Rfl:     spironolactone (ALDACTONE) 25 mg tablet, Take 1 tablet (25 mg total) by mouth daily, Disp: 30 tablet, Rfl: 0    tamsulosin (FLOMAX) 0 4 mg, Take 1 capsule by mouth daily, Disp: , Rfl:     Specific Alerts:    Have you been seen by a [facility]atologist in the last 3 years? YES    Are you pregnant or planning to become pregnant? N/A    Are you currently or planning to be nursing or breast feeding? N/A    Allergies   Allergen Reactions    Penicillins Rash       May we call your Preferred Phone number to discuss your specific medical information? YES    May we leave a detailed message that includes your specific medical information? YES    Have you traveled outside of the [facility] in the past 3 months? No    Do you currently have a pacemaker or defibrillator? No    Do you have any artificial heart valves, joints, plates, screws, rods, stents, pins, etc? YES   - If Yes, were any placed within the last 2 years? Right knee replacement about 10 years ago    Do you require any medications prior to a surgical procedure? No   - If Yes, for which procedure? - If Yes, what medications to you require? Are you taking any medications that cause you to bleed more easily ("blood thinners") YES, Plavix    Have you ever experienced a rapid heartbeat with epinephrine? No    Have you ever been treated with "gold" (gold sodium thiomalate) therapy? No    [facility] can help with wrinkles, "laugh lines," facial volume loss, "double chin," "love handles," age spots, and more  Are you interested in learning today about some of the skin enhancement procedures that we offer? (If Yes, please provide more detail) No    Review of Systems:  Have you recently had or currently have any of the following?     · Fever or chills: No  · Night Sweats: No  · Headaches: No  · Weight Gain: No  · Weight Loss: No  · Blurry Vision: No  · Nausea: No  · Vomiting: No  · Diarrhea: No  · Blood in Stool: No  · Abdominal Pain: No  · Itchy Skin: No  · Painful Joints: No  · Swollen Joints: No  · Muscle Pain: No  · Irregular Mole: No  · Sun Burn: No  · Dry Skin: No  · Skin Color Changes: No  · Scar or Keloid: No  · Cold Sores/Fever Blisters: No  · Bacterial Infections/MRSA: No  · Anxiety: No  · Depression: No  · Suicidal or Homicidal Thoughts: No      PHYSICAL EXAM:      Was a chaperone (Derm Clinical Assistant) present for the entirety of the Physical Exam? YES    Did the Dermatology Team specifically ask and  the patient on the importance of a Full Skin Exam to be sure that nothing is missed clinically?  YES    Did the patient request or accept a Full Skin Exam?  YES    Did the patient specifically refuse to have the areas "under-the-underwear" examined by the Dermatologist? No      CONSTITUTIONAL:   Vitals:    10/16/19 1035   Temp: 98 7 °F (37 1 °C)   TempSrc: Tympanic   Weight: 101 kg (222 lb)   Height: 6' 2" (1 88 m)       Today's Height:   6' 2"  Today's Weight (in kilograms):   101 kg  Today's Temperature:   98 7 f    PSYCH: Normal mood and affect  EYES: Normal conjunctiva  ENT: Normal lips and oral mucosa  CARDIOVASCULAR: No edema  RESPIRATORY: Normal respirations  HEME/LYMPH/IMMUNO:  No regional lymphadenopathy except as noted below in ASSESSMENT AND PLAN BY DIAGNOSIS    FULL ORGAN SYSTEM SKIN EXAM (SKIN)  Hair, Scalp, Ears, Face Normal except as noted below in Assessment   Neck, Cervical Chain Nodes Normal except as noted below in Assessment   Right Arm/Hand/Fingers Normal except as noted below in Assessment   Left Arm/Hand/Fingers Normal except as noted below in Assessment   Chest/Breasts/Axillae Viewed areas Normal except as noted below in Assessment   Abdomen, Umbilicus Normal except as noted below in Assessment   Back/Spine Normal except as noted below in Assessment Groin/Genitalia/Buttocks Viewed areas Normal except as noted below in Assessment   Right Leg, Foot, Toes Normal except as noted below in Assessment   Left Leg, Foot, Toes Normal except as noted below in Assessment        ASSESSMENT AND PLAN BY DIAGNOSIS:    History of Present Condition:     Duration:  How long has this been an issue for you?    o  Years   Location Affected:  Where on the body is this affecting you?    o  Right lower leg   Quality:  Is there any bleeding, pain, itch, burning/irritation, or redness associated with the skin lesion? o  Redness   Severity:  Describe any bleeding, pain, itch, burning/irritation, or redness on a scale of 1 to 10 (with 10 being the worst)  o  1   Timing:  Does this condition seem to be there pretty constantly or do you notice it more at specific times throughout the day?    o  Constantly   Context:  Have you ever noticed that this condition seems to be associated with specific activities you do?    o  Denies   Modifying Factors:    o Anything that seems to make the condition worse?    -  Denies  o What have you tried to do to make the condition better? -  Moisturizer helps   Associated Signs and Symptoms:  Does this skin lesion seem to be associated with any of the followin  SQUAMOUS CELL CARCINOMA (Biopsy confirmed 2019 Accession # [id number])    Physical Exam:   Anatomic Location Affected:  Right lateral posterior shoulder   Morphological Description:  Ulcerated pink hypertrophic plaque   2 5 cm   Pertinent Positives:   Pertinent Negatives:No lymphadenopathy    Additional History of Present Condition:     Assessment and Plan:  Based on a thorough discussion of this condition and the management approach to it (including a comprehensive discussion of the known risks, side effects and potential benefits of treatment), the patient (family) agrees to implement the following specific plan:   Excision schedule for 10/22/2019 with   [facility]ft    What is cutaneous squamous cell carcinoma? Cutaneous squamous cell carcinoma (SCC) is a common type of keratinocyte or non-melanoma skin cancer  It is derived from cells within the epidermis that make keratin -- the horny protein that makes up skin, hair and nails  Cutaneous SCC is an invasive disease, referring to cancer cells that have grown beyond the epidermis  SCC can sometimes metastasise and may prove fatal   Intraepidermal carcinoma (cutaneous SCC in situ) and mucosal SCC are considered elsewhere  Who gets cutaneous squamous cell carcinoma? Risk factors for cutaneous SCC include:   Age and sex: SCCs are particularly prevalent in elderly males  However, they also affect females and younger adults   Previous SCC or another form of skin cancer (basal cell carcinoma, melanoma) are a strong predictor for further skin cancers   Actinic keratoses    Outdoor occupation or recreation    Smoking    Fair skin, blue eyes and blond or red hair    Previous cutaneous injury, thermal burn, disease (eg cutaneous lupus, epidermolysis bullosa, leg ulcer)    Inherited syndromes: SCC is a particular problem for families with xeroderma pigmentosum and albinism    Other risk factors include ionising radiation, exposure to arsenic, and immune suppression due to disease (eg chronic lymphocytic leukaemia) or medicines  Organ transplant recipients have a massively increased risk of developing SCC     What causes cutaneous squamous cell carcinoma? More than 90% of cases of SCC are associated with numerous DNA mutations in multiple somatic genes  Mutations in the p53 tumour suppressor gene are caused by exposure to ultraviolet radiation (UV), especially UVB (known as signature 7)  Other signature mutations relate to cigarette smoking, ageing and immune suppression (eg, to drugs such as azathioprine)   Mutations in signalling pathways affect the epidermal growth factor receptor, GWEN, Fyn, and [id number] signalling  Beta-genus human papillomaviruses (wart virus) are thought to play a role in SCC arising in immune-suppressed populations  ?-HPV and HPV subtypes 5, 8, 17, 20, 24, and 38 have also been associated with an increased risk of cutaneous SCC in immunocompetent individuals  What are the clinical features of cutaneous squamous cell carcinoma? Cutaneous SCCs present as enlarging scaly or crusted lumps  They usually arise within pre-existing actinic keratosis or intraepidermal carcinoma   They grow over weeks to months    They may ulcerate    They are often tender or painful    Located on sun-exposed sites, particularly the face, lips, ears, hands, forearms and lower legs    Size varies from a few millimetres to several centimetres in diameter  Types of cutaneous squamous cell carcinoma  Distinct clinical types of invasive cutaneous SCC include:   Cutaneous horn -- the horn is due to excessive production of keratin    Keratoacanthoma (KA) -- a rapidly growing keratinising nodule that may resolve without treatment    Carcinoma cuniculatum (verrucous carcinoma), a slow-growing, warty tumour on the sole of the foot   Multiple eruptive SCC/KA-like lesions arising in syndromes, such as multiple self-healing squamous epitheliomas of Ellis-Smith and Grzybowski syndrome  The pathologist may classify a tumour as well differentiated, moderately well differentiated, poorly differentiated or anaplastic cutaneous SCC  There are other variants  Classification of squamous cell carcinoma by risk  Cutaneous SCC is classified as low-risk or high-risk, depending on the chance of tumour recurrence and metastasis   Characteristics of high-risk SCC include:  High-risk cutaneous squamous cell carcinoma has the following characteristics:   Diameter greater than or equal to 2 cm    Location on the ear, vermilion of the lip, central face, hands, feet, genitalia    Arising in elderly or immune suppressed patient    Histological thickness greater than 2 mm, poorly differentiated histology, or with the invasion of the subcutaneous tissue, nerves and blood vessels  Metastatic SCC is found in regional lymph nodes (80%), lungs, liver, brain, bones and skin  Staging cutaneous squamous cell carcinoma  In 2011, the [address] [place] [address] Ave on Cancer (CC) published a new staging systemic for cutaneous SCC for the 7th Edition of the AJCC manual  This evaluates the dimensions of the original primary tumour (T) and its metastases to lymph nodes (N)  Tumour staging for cutaneous SCC  TX: Th Primary tumour cannot be assessed  T0: No evidence of a primary tumour  Tis: Carcinoma in situ  T1: Tumour ? 2cm without high-risk features  T2: Tumour ? 2cm; or; Tumour ? 2 cm with high-risk features  T3: Tumour with the invasion of maxilla, mandible, orbit or temporal bone  T4: Tumour with the invasion of axial or appendicular skeleton or perineural invasion of skull base    Diogo staging for cutaneous SCC  NX: Regional lymph nodes cannot be assessed  N0: No regional lymph node metastasis  N1: Metastasis in one local lymph node ? 3cm  N2: Metastasis in one local lymph node ? 3cm; or; Metastasis in >1 local lymph node ? 6cm  N3: Metastasis in lymph node ? 6cm    How is squamous cell carcinoma diagnosed? Diagnosis of cutaneous SCC is based on clinical features  The diagnosis and histological subtype are confirmed pathologically by diagnostic biopsy or following excision  See squamous cell carcinoma - pathology  Patients with high-risk SCC may also undergo staging investigations to determine whether it has spread to lymph nodes or elsewhere  These may include:   Imaging using ultrasound scan, X-rays, CT scans, MRI scans    Lymph node or other tissue biopsies    What is the treatment for cutaneous squamous cell carcinoma? Cutaneous SCC is nearly always treated surgically   Most cases are excised with a 3-10 mm margin of normal tissue around a visible tumour  A flap or skin graft may be needed to repair the defect  Other methods of removal include:   Shave, curettage, and electrocautery for low-risk tumours on trunk and limbs    Aggressive cryotherapy for very small, thin, low-risk tumours    Mohs micrographic surgery for large facial lesions with indistinct margins or recurrent tumours    Radiotherapy for an inoperable tumour, patients unsuitable for surgery, or as adjuvant    What is the treatment for advanced or metastatic squamous cell carcinoma? Locally advanced primary, recurrent or metastatic SCC requires multidisciplinary consultation  Often a combination of treatments is used   Surgery    Radiotherapy    Cemiplimab    Experimental targeted therapy using epidermal growth factor receptor inhibitors    How can cutaneous squamous cell carcinoma be prevented? There is a great deal of evidence to show that very careful sun protection at any time of life reduces the number of SCCs  This is particularly important in ageing, sun-damaged, fair skin; in patients that are immune suppressed; and in those who already have actinic keratoses or previous SCC   Stay indoors or under the shade in the middle of the day    Wear covering clothing    Apply high protection factor SPF50+ broad-spectrum sunscreens generously to exposed skin if outdoors    Avoid indoor tanning (sun beds, solaria)    Oral nicotinamide (vitamin B3) in a dose of 500 mg twice daily may reduce the number and severity of SCCs in people at high risk  Patients with multiple squamous cell carcinomas may be prescribed an oral retinoid (acitretin or isotretinoin)  These reduce the number of tumours but have some nuisance side effects  What is the outlook for cutaneous squamous cell carcinoma? Most SCCs are cured by treatment  A cure is most likely if treatment is undertaken when the lesion is small   The risk of recurrence or disease-associated death is greater for tumours that are > 20 mm in diameter and/or > 2 mm in thickness at the time of surgical excision  About 50% of people at high risk of SCC develop a second one within 5 years of the first  They are also at increased risk of other skin cancers, especially melanoma  Regular self-skin examinations and long-term annual skin checks by an experienced health professional are recommended  2 TINEA PEDIS ("ATHLETE'S FOOT")    Physical Exam:   Anatomic Location Affected:  Bilateral feet   Morphological Description:  Scaly pink plaques   Pertinent Positives:   Pertinent Negatives:No lymphadenopathy    Additional History of Present Condition:      Assessment and Plan:  Based on a thorough discussion of this condition and the management approach to it (including a comprehensive discussion of the known risks, side effects and potential benefits of treatment), the patient (family) agrees to implement the following specific plan:   Ketoconazole cream three times a day for 4 weeks    Tinea Pedis  Tinea pedis is a fungal infection of the foot and is in fact the most common fungal infection  Tinea pedis is caused by dermatophyte fungi with the three most common being Trichophyton (T ) rubrum, T  interdigitale and Epidermophyton floccosum  Tinea pedis most commonly involves the interdigital spaces, known as "athlete's foot " Other typical sites include the toenails, groin, and palms of the hands  There are four major manifestations of tinea pedis including chronic hyperkeratotic, chronic intertriginous, acute ulcerative and vesicobullous  Signs and symptoms include:    Itchiness, redness, and scaling between the toes   Scales covering the soles and sides of the feet   Blisters over the inner aspect of the feet    It is particularly common in hot, tropical, and urban environments where sweating in the feet facilitate fungal growth   Risk factors for development include:   Occlusive footwear   Excessive swearing   Diabetes or other underlying immunosuppression    Poor peripheral circulation     The diagnosis of tinea pedis can usually be made via good history and physical exam due to its characteristic clinical features  Diagnosis can be confirmed by examining skin scrapings under the microscope  Cultures are occasionally done but may not be necessary if fungi are seen under microscopy  Other diagnoses to consider if patients do not respond to therapy include psoriasis, contact dermatitis, and eczema  Tinea pedis can be treated with topical antifungal drugs applied to affected areas on a repeated basis (usually 2 twice a day) for 2 to 4 weeks  Common topical medications include topical ketoconazole, allylamines, butenafine, ciclopirox, and tolnaftate  In cases that do not respond to topical therapy, oral antifungal agents may be used which include terbinafine, itraconazole, fluconazole and griseofulvin  These oral agents are also used to treat tinea capitis (fungal infection of the scalp) and onychomycosis (fungal infection of the nails)  Those with pre-existing liver problems are usually screened for liver function prior to starting oral terbinafine  Tinea pedis can be prevented by making sure feet are clean and dry with protective footwear worn in communal facilities  Other recommendations are:   Using drying foot powders when wearing occlusive shoes    Thoroughly dry shoes and boots prior to wearing them    Making sure to clean contaminated bathroom floors with bleach    Treatment of family members and other close contacts      3   ACTINIC KERATOSIS    Physical Exam:   Anatomic Location Affected:  Scalp, face, back, right arm and left helix   Morphological Description:  Scaling erythematous plaques    Additional History of Present Condition:      Assessment and Plan:  Based on a thorough discussion of this condition and the management approach to it (including a comprehensive discussion of the known risks, side effects and potential benefits of treatment), the patient (family) agrees to implement the following specific plan:     Defer treatment all together, understanding the potential risks of malignant transformation (we counseled against this)   Requests medical treatment with a "field area" agent   Requests Photodynamic Therapy (PDT) to be scheduled  Actinic keratoses are very common on sites repeatedly exposed to the sun, especially the backs of the hands and the face, most often affecting the ears, nose, cheeks, upper lip, vermilion of the lower lip, temples, forehead and balding scalp  In severely chronically sun-damaged individuals, they may also be found on the upper trunk, upper and lower limbs, and dorsum of feet  We discussed the theoretical premalignant (pre-cancerous) nature and etiology of these growths  We discussed the prevailing notion that actinic keratoses are a reflection of abnormal skin cell development due to DNA damage by short wavelength UVB  They are more likely to appear if the immune function is poor, due to aging, recent sun exposure, predisposing disease or certain drugs  We discussed that the main concern is that actinic keratoses may predispose to squamous cell carcinoma  It is rare for a solitary actinic keratosis to evolve to squamous cell carcinoma (SCC), but the risk of SCC occurring at some stage in a patient with more than 10 actinic keratoses is thought to be about 10 to 15%  A tender, thickened, ulcerated or enlarging actinic keratosis is suspicious of SCC  Actinic keratoses may be prevented by strict sun protection  If already present, keratoses may improve with a very high sun protection factor (50+) broad-spectrum sunscreen applied at least daily to affected areas, year-round    We recommend that UPF-rated clothing and hats and sunglasses be worn whenever possible and that a sunscreen-moisturizer combination product such as Neutrogena Daily Defense be applied at least three times a day  We performed a thorough discussion of treatment options and specific risk/benefits/alternatives including but not limited to medical field treatment with medications such as the following:     Topical field area medications such as 5-fluorouracil or Aldara (specifically, the trouble with long-term compliance, blistering and local skin reaction versus the convenience of at-home therapy and that field therapy gets what is not yet seen)   Cryotherapy (specifically, local pain, scarring, dyspigmentation, blistering, possible superinfection, and treats only what we see versus directed treatment today)   Photodynamic therapy (specifically, local pain, scarring, dyspigmentation, blistering, possible superinfection, need to schedule for a later date, and time spent in the office versus field therapy that gets what is not yet seen)  PROCEDURE:  DESTRUCTION OF PRE-MALIGNANT LESIONS  After a thorough discussion of treatment options and risk/benefits/alternatives (including but not limited to local pain, scarring, dyspigmentation, blistering, and possible superinfection), verbal and written consent were obtained and the aforementioned lesions were treated on with cryotherapy using liquid nitrogen x 1 cycle for 5-10 seconds   TOTAL NUMBER of 15 pre-malignant lesions were treated today on the ANATOMIC LOCATION: Scalp, face, back, right arm and left helix  The patient tolerated the procedure well, and after-care instructions were provided        4  NEOPLASM OF UNCERTAIN BEHAVIOR OF SKIN    Physical Exam:   (Anatomic Location); (Size and Morphological Description); (Differential Diagnosis):  o Right medial lower leg; 6 cm annular double scaling plaque; Porokeratosis Rule out Basal cell carcinoma   Pertinent Positives:   Pertinent Negatives:No lymphadenopathy    Additional History of Present Condition:  Per patient this lesion was previously biopsied greater than 20 years ago    Assessment and Plan:   I have discussed with the patient that a sample of skin via a "skin biopsy would be potentially helpful to further make a specific diagnosis under the microscope   Based on a thorough discussion of this condition and the management approach to it (including a comprehensive discussion of the known risks, side effects and potential benefits of treatment), the patient (family) agrees to implement the following specific plan:    o Procedure:  Skin Biopsy  After a thorough discussion of treatment options and risk/benefits/alternatives (including but not limited to local pain, scarring, dyspigmentation, blistering, possible superinfection, and inability to confirm a diagnosis via histopathology), verbal and written consent were obtained and portion of the rash was biopsied for tissue sample  See below for consent that was obtained from patient and subsequent Procedure Note  PROCEDURE SHAVE BIOPSY NOTE:     Performing Physician: Florence Fabian   Anatomic Location; Clinical Description with size (cm); Pre-Op Diagnosis:   o Right medial lower leg; 6 cm annular double scaling plaque; Porokeratosis Rule out Basal cell carcinoma   Post-op diagnosis: Same      Local anesthesia: 1% xylocaine with epi       Topical anesthesia: None     Hemostasis: Aluminum chloride       After obtaining informed consent  at which time there was a discussion about the purpose of biopsy  and low risks of infection and bleeding  The area was prepped and draped in the usual fashion  Anesthesia was obtained with 1% lidocaine with epinephrine  A shave biopsy to an appropriate sampling depth was obtained with a sterile blade (such as a 15-blade or DermaBlade)  The resulting wound was covered with surgical ointment and bandaged appropriately  The patient tolerated the procedure well without complications and was without signs of functional compromise        Specimen has been sent for review by Dermatopathology  Standard post-procedure care has been explained and has been included in written form within the patient's copy of Informed Consent  INFORMED CONSENT DISCUSSION AND POST-OPERATIVE INSTRUCTIONS FOR PATIENT    I   RATIONALE FOR PROCEDURE  I understand that a skin biopsy allows the Dermatologist to test a lesion or rash under the microscope to obtain a diagnosis  It usually involves numbing the area with numbing medication and removing a small piece of skin; sometimes the area will be closed with sutures  In this specific procedure, sutures are not usually needed  If any sutures are placed, then they are usually need to be removed in 2 weeks or less  I understand that my Dermatologist recommends that a skin "shave" biopsy be performed today  A local anesthetic, similar to the kind that a dentist uses when filling a cavity, will be injected with a very small needle into the skin area to be sampled  The injected skin and tissue underneath "will go to sleep and become numb so no pain should be felt afterwards  An instrument shaped like a tiny "razor blade" (shave biopsy instrument) will be used to cut a small piece of tissue and skin from the area so that a sample of tissue can be taken and examined more closely under the microscope  A slight amount of bleeding will occur, but it will be stopped with direct pressure and a pressure bandage and any other appropriate methods  I understands that a scar will form where the wound was created  Surgical ointment will be applied to help protect the wound  Sutures are not usually needed      II   RISKS AND POTENTIAL COMPLICATIONS   I understand the risks and potential complications of a skin biopsy include but are not limited to the following:   Bleeding   Infection   Pain   Scar/keloid   Skin discoloration   Incomplete Removal   Recurrence   Nerve Damage/Numbness/Loss of Function   Allergic Reaction to Anesthesia   Biopsies are diagnostic procedures and based on findings additional treatment or evaluation may be required   Loss or destruction of specimen resulting in no additional findings    My Dermatologist has explained to me the nature of the condition, the nature of the procedure, and the benefits to be reasonably expected compared with alternative approaches  My Dermatologist has discussed the likelihood of major risks or complications of this procedure including the specific risks listed above, such as bleeding, infection, and scarring/keloid  I understand that a scar is expected after this procedure  I understand that my physician cannot predict if the scar will form a "keloid," which extends beyond the borders of the wound that is created  A keloid is a thick, painful, and bumpy scar  A keloid can be difficult to treat, as it does not always respond well to therapy, which includes injecting cortisone directly into the keloid every few weeks  While this usually reduces the pain and size of the scar, it does not eliminate it  I understand that photographs may be taken before and after the procedure  These will be maintained as part of the medical providers confidential records and may not be made available to me  I further authorize the medical provider to use the photographs for teaching purposes or to illustrate scientific papers, books, or lectures if in his/her judgment, medical research, education, or science may benefit from its use  I have had an opportunity to fully inquire about the risks and benefits of this procedure and its alternatives  I have been given ample time and opportunity to ask questions and to seek a second opinion if I wished to do so  I acknowledge that there have specifically been no guarantees as to the cosmetic results from the procedure  I am aware that with any procedure there is always the possibility of an unexpected complication  III  POST-PROCEDURAL CARE (WHAT YOU WILL NEED TO DO "AFTER THE BIOPSY" TO OPTIMIZE HEALING)     Keep the area clean and dry  Try NOT to remove the bandage or get it wet for the first 24 hours   Gently clean the area and apply surgical ointment (such as Vaseline petrolatum ointment, which is available "over the counter" and not a prescription) to the biopsy site for up to 2 weeks straight  This acts to protect the wound from the outside world   Sutures are not usually placed in this procedure  If any sutures were placed, return for suture removal as instructed (generally 1 week for the face, 2 weeks for the body)   Take Acetaminophen (Tylenol) for discomfort, if no contraindications  Ibuprofen or aspirin could make bleeding worse   Call our office immediately for signs of infection: fever, chills, increased redness, warmth, tenderness, discomfort/pain, or pus or foul smell coming from the wound  WHAT TO DO IF THERE IS ANY BLEEDING? If a small amount of bleeding is noticed, place a clean cloth over the area and apply firm pressure for ten minutes  Check the wound after 10 minutes of direct pressure  If bleeding persists, try one more time for an additional 10 minutes of direct pressure on the area  If the bleeding becomes heavier or does not stop after the second attempt, or if you have any other questions about this procedure, then please call your [facility] by calling [phone] (SKIN)  I hereby acknowledge that I have reviewed and verified the site with my Dermatologist and have requested and authorized my Dermatologist to proceed with the procedure        5  ACROCHORDON ("SKIN TAG")    Physical Exam:   Anatomic Location Affected:  Left inguinal crease   Morphological Description:  pedunculated papule   Pertinent Positives:   Pertinent Negatives:No lymphadenopathy    Additional History of Present Condition:      Assessment and Plan:  Based on a thorough discussion of this condition and the management approach to it (including a comprehensive discussion of the known risks, side effects and potential benefits of treatment), the patient (family) agrees to implement the following specific plan:    Skin tags are common, soft, harmless skin lesions that are also called, in the appropriate settings, papillomas, fibroepithelial polyps, and soft fibromas  They are made up of loosely arranged collagen fibers and blood vessels surrounded by a thickened or thinned-out epidermis  Skin tags tend to develop in both men and women as we grow older  They are usually found on the skin folds (neck, armpits, groin)  It is not known what specifically causes skin tags  Certain factors, though, do appear to play a role:   Chaffing and irritation from skin rubbing together   High levels of growth factors (as seen, for example, in pregnancy or in acromegaly/gigantism)   Insulin resistance   Human papillomavirus (wart virus)    We discussed that most skin tags do not need to be treated unless they are specifically causing the patient physical distress or limitation or pose a risk for a larger problem such as an infection that forms secondary to excoriation or chronic irritation      We had a thorough discussion of treatment options and specific risks (including that any procedural treatment may not be covered by insurance and would then be the patient's responsibility) and benefits/alternatives including but not limited to the following:   Cryotherapy (freezing)   Shave removal   Surgical excision (snip excision with scissors)   Electrosurgery   Ligation (we do not do this procedure and counseled against it due to risk of tissue necrosis and infection)    Scribe Attestation    I,:   Maritza José MA am acting as a scribe while in the presence of the attending physician :        I,:   Devaughn Tracy MD personally performed the services described in this documentation    as scribed in my presence :

## 2019-10-18 ENCOUNTER — TELEPHONE (OUTPATIENT)
Dept: DERMATOLOGY | Facility: CLINIC | Age: 79
End: 2019-10-18

## 2019-10-18 NOTE — TELEPHONE ENCOUNTER
Telephone call to patient  LVM in regards last office visit   Pt is to call with any question or concerns

## 2019-10-22 ENCOUNTER — CONSULT (OUTPATIENT)
Dept: GERIATRICS | Age: 79
End: 2019-10-22
Payer: MEDICARE

## 2019-10-22 ENCOUNTER — PROCEDURE VISIT (OUTPATIENT)
Dept: DERMATOLOGY | Facility: CLINIC | Age: 79
End: 2019-10-22
Payer: MEDICARE

## 2019-10-22 VITALS
SYSTOLIC BLOOD PRESSURE: 120 MMHG | DIASTOLIC BLOOD PRESSURE: 68 MMHG | TEMPERATURE: 98.1 F | RESPIRATION RATE: 16 BRPM | OXYGEN SATURATION: 97 % | HEART RATE: 70 BPM

## 2019-10-22 VITALS — BODY MASS INDEX: 28.49 KG/M2 | WEIGHT: 222 LBS | HEIGHT: 74 IN | TEMPERATURE: 98 F

## 2019-10-22 DIAGNOSIS — Z13.21 ENCOUNTER FOR VITAMIN DEFICIENCY SCREENING: ICD-10-CM

## 2019-10-22 DIAGNOSIS — K59.09 OTHER CONSTIPATION: ICD-10-CM

## 2019-10-22 DIAGNOSIS — J39.8 TRACHEOMALACIA: Chronic | ICD-10-CM

## 2019-10-22 DIAGNOSIS — I10 ESSENTIAL HYPERTENSION: Chronic | ICD-10-CM

## 2019-10-22 DIAGNOSIS — Z79.899 POLYPHARMACY: ICD-10-CM

## 2019-10-22 DIAGNOSIS — C44.622 SQUAMOUS CELL CARCINOMA OF RIGHT SHOULDER: Primary | ICD-10-CM

## 2019-10-22 DIAGNOSIS — Z91.81 H/O FALL: ICD-10-CM

## 2019-10-22 DIAGNOSIS — R26.2 AMBULATORY DYSFUNCTION: Chronic | ICD-10-CM

## 2019-10-22 DIAGNOSIS — R13.12 OROPHARYNGEAL DYSPHAGIA: Chronic | ICD-10-CM

## 2019-10-22 DIAGNOSIS — G31.84 MCI (MILD COGNITIVE IMPAIRMENT): Primary | ICD-10-CM

## 2019-10-22 DIAGNOSIS — K21.9 GASTROESOPHAGEAL REFLUX DISEASE WITHOUT ESOPHAGITIS: Chronic | ICD-10-CM

## 2019-10-22 DIAGNOSIS — J44.9 CHRONIC OBSTRUCTIVE PULMONARY DISEASE, UNSPECIFIED COPD TYPE (HCC): ICD-10-CM

## 2019-10-22 DIAGNOSIS — I69.30 HISTORY OF CEREBROVASCULAR ACCIDENT (CVA) WITH RESIDUAL DEFICIT: Chronic | ICD-10-CM

## 2019-10-22 DIAGNOSIS — E78.2 MIXED HYPERLIPIDEMIA: Chronic | ICD-10-CM

## 2019-10-22 DIAGNOSIS — F32.9 MAJOR DEPRESSIVE DISORDER WITHOUT PSYCHOTIC FEATURES: Chronic | ICD-10-CM

## 2019-10-22 DIAGNOSIS — J43.8 OTHER EMPHYSEMA (HCC): Chronic | ICD-10-CM

## 2019-10-22 DIAGNOSIS — F41.9 ANXIETY: ICD-10-CM

## 2019-10-22 PROCEDURE — 12032 INTMD RPR S/A/T/EXT 2.6-7.5: CPT | Performed by: DERMATOLOGY

## 2019-10-22 PROCEDURE — 11602 EXC TR-EXT MAL+MARG 1.1-2 CM: CPT | Performed by: DERMATOLOGY

## 2019-10-22 PROCEDURE — 88305 TISSUE EXAM BY PATHOLOGIST: CPT | Performed by: PATHOLOGY

## 2019-10-22 PROCEDURE — 99205 OFFICE O/P NEW HI 60 MIN: CPT | Performed by: STUDENT IN AN ORGANIZED HEALTH CARE EDUCATION/TRAINING PROGRAM

## 2019-10-22 RX ORDER — PREDNISONE 1 MG/1
5 TABLET ORAL DAILY
Qty: 90 TABLET | Refills: 3 | Status: SHIPPED | OUTPATIENT
Start: 2019-10-22 | End: 2020-01-15 | Stop reason: SDUPTHER

## 2019-10-22 NOTE — PROGRESS NOTES
[address] [facility]  [address]  [phone]   [place]      SOCIAL HISTORY    Patient: Nai Sumner  Date:10/22/2019    Current Living Situation: Lives in a residential home with daughter Romi Gunter (, daughter, son, and dog) and caregiver Ricky White is there nearly daily in the mornings (Saturdays off, every other Sunday)      Source of referral: Referred by pulmonologist   Reason for referral: Family member concerns regarding home safety concerns, specifically medication concerns  When were behaviors/symptoms first noticed? About one year ago    Please provide examples: Speech therapy was coming in and he would only follow directions 1:1 when they were present  He would forget/not be able to retain the directions  Patients main concern(s): None reported  Caregiver main concern(s): Home safety, memory changes      Is respite needed for caregiver(s)? Not at this time      Who is available to provide care in case of illness or crisis (please specify relation to patient and level of care able to provide)? Clarissa's       FAMILY BACKGROUND    Marital status:      Does patient have children? Yes How Many? 2    Where do the children live? One daughter lives with him, other daughter lives outside of [place] (non medical)    Family members assisting patient at home: Romi Gunter and her family    Employment History    Currently Employed:No  Retired: Yes                        Date of MCC? Around 2000    Type of employment: Similar to - worked for [employer]    Educational History    Highest Level of Education: [employer] (No Degree)    [address]: No       [place] Benefits Describe (if applicable): N/A      FINANCIAL    Total Monthly income: About 3,000/month  Source of income: SS and pension    Meet current needs? Yes - owns a home in [address] available for home care?  Yes Funds available for nursing home? Yes     Do you rent or own your home? Own    Relationships    Are any relationships causing problems right now: Possibly Clarissa's - some strain noted there      [address] and Organizations: Starting Wednesdays to do activities with caregiver Angela (haircuts, appointments, getting pills, etc)    Major life events in past two years (ex: USP, death in family, major illness etc ): Moved out of town in [address] [place] [address] which he lived in his whole life, now lives with his daughter Delfina Martinez reports he did very well adjusting to the move    Does the patient currently drive: No, Milla Double drives him in his truck  If not, date stopped driving: About six years ago      [address] [place] [address] which have helped with shopping, meals, bathing, etc : Previously had PT/OT/speech therapy, has a caregiver who assists with bathroom needs  Services that assessment team feels would be beneficial to patient/family: Initially moved into [facility] assisted living, now lives with his daughter      LEGAL    Advanced directives: He does have a living will, and daughter Xochiltnitzane Leader is named as POA (cannot locate paperwork)      HOME SAFETY ASSESSMENT   ENVIRONMENTAL SAFETY      FIRE HAZARDS  Does the residence have smoke alarm? Yes     Does the residence have a fire escape? Yes   Are any of the following present in the residence? Frayed Wires       No   Clutter        No   Incorrect use of open flame     No    FALL HAZARDS  Do any of the following exist in the residence? Poor lighting       No   Loose Rugs       No   Obstacles       No   Uneven floors       No   Slippery floors       No   Unsafe stairs       No  Does the patient use a fall alert? No   If yes, which one? N/A- Doreen Leader does note she has a camera in the living room  HEALTH HAZARDS   Does the residence have any of the following?    Adequate plumbing      Yes    Adequate heat       Yes    Adequate ventilation      Yes   Are there signs of neglect such as the following? Unkempt house      No   Old food in refrigerator     No   Infestation       No    EMERGENCY HEALTH PLAN   Is there a phone that is accessible to the patient or caregiver? Yes   Is the number to police, physician, and 911 easily accessible? Yes   Would the patient be able to call 911 in an emergency? No - would call Yanelis Lay    ENVIRONMENT APPROPRIATENESS  Please note if each is available and accessible to the patient:   Bedroom        Yes   Bathroom        No - he has a commode in his room and uses urinal (has two in case one is full) - limited access to a shower, but is bathed every morning  Kitchen        Yes   Living Room        Yes     Is the patient able to climb stairs if necessary? Yes with an assist- small stairs (few) with a railing  Does the patient use any assistant devices for ambulation? Yes    If yes please list which device required   Wheelchair- is capable of walking with a walker    Does the bathroom have any of the following? Handrails in tub or toilet     No   Raised toilet seat      No   Rubber tub mat      N/A   Hot water       Yes     Can the patient independently do the following?    Shower       No   Dress them selves      Yes "for the most part" - does use help from [facility] for lower body    Pick appropriate clothing     Yes    Eat        Yes    Drink        Yes              FUNCTIONAL ACTIVITIES QUESTIONNAIRE         Informant or Patient: Daughter Yanelis Lay and caregiver Angela    Instructions:  Place a check boy under the column that best describes the patient's ability to perform the tasks listed below:    TASK Completely unable to perform task  (3 points) Requires  Assistance  (2 points) Has difficulty but accomplishes task, or has never done, but the informant feels could do task with difficulty  (1 point) Normal performance, or has never done task, but the informant feels the patient could do the task if necessary  (0 points)   1  Writing checks, paying bills, balancing checkbook  2      2  Assembling tax records, business affairs, or papers  2     3  Shopping alone for clothes, household necessities or groceries  3 bc of physical handicap      4  Playing a game of skill, working on a hobby  2 no hobbies     5  Heating water, making a cup of coffee, turning off the stove    0   6  Preparing a balanced meal   3      7  Keeping track of current events      0   8  Paying attention to, understanding, discussing a TV show, book, or magazine    0   9  Remembering appointments, family occasions, holidays, medications  2     10   Traveling out of the neighborhood, driving, arranging to take buses 3        [facility] ________17________     Adapted and reprinted with permission from Jenae Peters  1982;[phone](3):[phone]

## 2019-10-22 NOTE — PATIENT INSTRUCTIONS
1  SQUAMOUS CELL CARCINOMA    Physical Exam:   Anatomic Location Affected:  Right lateral posterior shoulder    PROCEDURE:  EXCISION WITH INTERMEDIATE LAYERED CLOSURE     Attending: Dr Grover    POSTOP DISCUSSION DISCUSSION AND INSTRUCTIONS FOR PATIENT      Rationale for Procedure  A skin excision allows the dermatologist to remove a lesion  The procedure involves a local numbing medication and removing the entire lesion  Typically, the lesion is being removed because it is atypical, traumatized, or for significant appearance reasons  The area will be open like a brush burn and allowed to heal    There will be no sutures  Tissue is sent to pathologist who will reconfirm diagnosis and verify completeness of lesion removal     Description of Procedure  We would like to perform a skin excision today  A local anesthetic, similar to the kind that a dentist uses when filling a cavity, will be injected with a very small needle into the skin area to be sampled  The injected skin and tissue underneath should go to sleep and become numbed so that no further pain should be felt  A scalpel will be used to cut around the lesion and tissue will be submitted to pathology for examination  Depending on the diagnosis the lesion will be excised with a certain amount of normal skin to help assure completeness of lesion removal   The physician will discuss in advance the anticipated size and extent of removal    Bleeding will occur, but it will stopped with direct pressure, sutures, and electrocautery  Surgical Vaseline-type ointment will also applied after the procedure to help create a barrier between the wound and the outside world  Risks and Potential Complications  The advantage of a skin excision is that it allows us to remove a problem lesion quickly    Although this usually permits the lesion to heal as soon as possible with the least scarring, there are some risks and potential complications that include but are not limited to the following:  - Some bleeding is normal at time of procedure and some bleeding on gauze is normal  the first few days after surgery  Profuse bleeding and bleeding with swelling and pain should be reported as detailed  below  - Infection is uncommon in skin surgery  Infection should be reported and is indicated by pain, redness, and discharge of purulent material   - Some dull to at time sharp pain could occur initially the day after surgery  Persistent pain or increasing pain days after surgery is not expected and should be reported  - Every effort is made to minimize scar, but location, size, and genetics do play a role in scar appearance  A surgical wound does not achieve its optimal appearance until 6 months  There are several treatments available if scarring would be problematic including scar creams, silicone pad, laser and scar revision   - Skin discoloration can occur especially in people of color  Its important to avoid sun on wound in first 6 months after surgery  Treatment is available if pigment is problematic   - Incomplete removal of the lesion or recurrence of lesion can occur and this would then require further treatment and more surgery   - Nerve Damage/Numbness/Loss of Function is very rare, but is most likely to occur if lesion is large or if it is in a high risk location  - Allergic Reaction to lidocaine is rare  More commonly,  epinephrine is used  with the lidocaine  Occasionally, epinephrine (aka adrenalin) may cause a brief  feeling of anxiety or jitteriness  - The person at the microscope  (pathologist) may provide additional information that was unexpected  This unexpected finding could provoke the need for additional treatment or evaluation  What You Will Need to Do After the Procedure  1  Keep the area clean and dry the first day  Try NOT to remove the bandage for the first day    2  Gently clean the area with soap and water and apply Vaseline ointment (this is over the counter and not a prescription) to the excision  site for up to 2 weeks  3  Apply a clean appropriately sized bandage to area  Gauze and paper tape are recommended for sensitive skin  4  Return for suture removal as instructed (generally 1 week for the face, 2 weeks for the body)  5  Take Acetaminophen (Tylenol) for discomfort, if no contraindications  Do NOT take Ibuprofen or aspirin unless specifically told to do so by your Dermatologist because these medications can make bleeding worse  6  Call our office immediately for signs of infection: fever, chills, increased redness, warmth, tenderness, discomfort/pain, or pus or foul smell coming from the wound  If bleeding is noticed, place a clean cloth over the area and apply firm pressure for thirty minutes  Check the wound ONLY after 30 minutes of direct pressure; do not cheat and sneak a peak, as that does not count  If bleeding persists after 30 minutes of legitimate direct pressure, then try one more round of direct pressure for an additional 10 minutes to the area  Should the bleeding become heavier or not stop after the second attempt, call [facility] directly at [phone] (SKIN) or, if after hours, go to your local Emergency Room/Emergency Department

## 2019-10-22 NOTE — PROGRESS NOTES
[facility] Follow Up Note    Patient Name: Nai Sumner    Encounter Date: 10/22/2019    Today's Chief Concerns:   Concern #1:  Excision of SCC right lateral posterior shoulder      Current Medications:    Current Outpatient Medications:     acetaminophen (TYLENOL) 325 mg tablet, Take 2 tablets by mouth every 6 (six) hours as needed for fever, Disp: 30 tablet, Rfl: 0    albuterol (PROVENTIL HFA,VENTOLIN HFA) 90 mcg/act inhaler, Inhale 2 puffs 4 (four) times a day, Disp: 2 Inhaler, Rfl: 0    amLODIPine (NORVASC) 10 mg tablet, Take 1 tablet by mouth daily, Disp: , Rfl:     ASPIRIN 81 PO, Take 1 tablet by mouth every other day  , Disp: , Rfl:     atorvastatin (LIPITOR) 10 mg tablet, Take 1 tablet by mouth, Disp: , Rfl:     benzonatate (TESSALON) 200 MG capsule, TAKE 1 CAPSULE BY MOUTH THREE TIMES A DAY AS NEEDED FOR COUGH, Disp: 90 capsule, Rfl: 5    budesonide (PULMICORT) 0 5 mg/2 mL nebulizer solution, Take 1 vial (0 5 mg total) by nebulization 2 (two) times a day Rinse mouth after use , Disp: 360 mL, Rfl: 3    carvedilol (COREG) 12 5 mg tablet, Take 1 tablet by mouth 2 (two) times a day with meals, Disp: 60 tablet, Rfl: 0    clopidogrel (PLAVIX) 75 mg tablet, Take 1 tablet by mouth daily, Disp: 30 tablet, Rfl: 0    docusate sodium (COLACE) 100 mg capsule, Take 1 capsule (100 mg total) by mouth 2 (two) times a day, Disp: 10 capsule, Rfl: 0    Fesoterodine Fumarate ER (TOVIAZ) 8 MG TB24, Take 4 mg by mouth every evening  , Disp: , Rfl:     ipratropium-albuterol (DUO-NEB) 0 5-2 5 mg/3 mL nebulizer solution, Take 3 mL by nebulization 4 (four) times a day as needed for wheezing or shortness of breath, Disp: , Rfl:     ketoconazole (NIZORAL) 2 % cream, Apply topically to both feet in their entirety (tops, bottoms and between toes) 3 times a day for 4 weeks straight , Disp: 60 g, Rfl: 5    latanoprost (XALATAN) 0 005 % ophthalmic solution, Administer 1 drop to both eyes daily at bedtime, Disp: , Rfl:     levocetirizine (XYZAL) 5 MG tablet, Take 1 tablet by mouth every evening, Disp: 30 tablet, Rfl: 0    LORazepam (ATIVAN) 0 5 mg tablet, Take 0 5 mg by mouth every 8 (eight) hours as needed for anxiety, Disp: , Rfl:     omeprazole (PriLOSEC) 40 MG capsule, Take 1 capsule by mouth daily, Disp: , Rfl:     PARoxetine (PAXIL) 20 mg tablet, Take 1 tablet by mouth daily, Disp: , Rfl:     polyethylene glycol (MIRALAX) 17 g packet, Take 17 g by mouth daily as needed, Disp: , Rfl:     predniSONE 5 mg tablet, Take 1 tablet (5 mg total) by mouth daily, Disp: 90 tablet, Rfl: 3    psyllium (METAMUCIL) 0 52 g capsule, Take 0 52 g by mouth daily, Disp: , Rfl:     senna (SENOKOT) 8 6 MG tablet, Take 2 tablets by mouth daily at bedtime, Disp: , Rfl:     spironolactone (ALDACTONE) 25 mg tablet, Take 1 tablet (25 mg total) by mouth daily, Disp: 30 tablet, Rfl: 0    tamsulosin (FLOMAX) 0 4 mg, Take 1 capsule by mouth daily, Disp: , Rfl:     CONSTITUTIONAL:   Vitals:    10/22/19 1450   Weight: 101 kg (222 lb)   Height: 6' 2" (1 88 m)         Today's Height:   6' 2"  Today's Weight (in kilograms):   101 kg  Today's Temperature:   98 f    Specific Alerts:    Have you been seen by a [facility] in the last 3 years? YES    Are you pregnant or planning to become pregnant? N/A    Are you currently or planning to be nursing or breast feeding? N/A    Allergies   Allergen Reactions    Penicillins Rash       May we call your Preferred Phone number to discuss your specific medical information? YES    May we leave a detailed message that includes your specific medical information? YES    Have you traveled outside of the [facility] in the past 3 months? No    Do you currently have a pacemaker or defibrillator? No    Do you have any artificial heart valves, joints, plates, screws, rods, stents, pins, etc? YES   - If Yes, were any placed within the last 2 years?  No    Do you require any medications prior to a surgical procedure? No   - If Yes, for which procedure? - If Yes, what medications to you require? Are you taking any medications that cause you to bleed more easily ("blood thinners") YES, Plavix    Have you ever experienced a rapid heartbeat with epinephrine? No    Have you ever been treated with "gold" (gold sodium thiomalate) therapy? No    [facility] can help with wrinkles, "laugh lines," facial volume loss, "double chin," "love handles," age spots, and more  Are you interested in learning today about some of the skin enhancement procedures that we offer? (If Yes, please provide more detail) No      FULL ORGAN SYSTEM SKIN EXAM (SKIN)  Hair, Scalp, Ears, Face Normal except as noted below in Assessment                       Back/Spine Normal except as noted below in Assessment       1  SQUAMOUS CELL CARCINOMA    Physical Exam:   Anatomic Location Affected:  Right lateral posterior shoulder(Previous accession # [id number] from 09/11/2019)   Morphological Description:      Assessment and Plan:  Based on a thorough discussion of this condition and the management approach to it (including a comprehensive discussion of the known risks, side effects and potential benefits of treatment), the patient (family) agrees to implement the following specific plan:   Excision today    PROCEDURE:  EXCISION WITH INTERMEDIATE LAYERED CLOSURE     Attending: Dr Grover  Assistant: Lena Villegas    Pre-Op Diagnosis: SCC  Post-Op Diagnosis: Same   Benign versus Malignant Malignant      Lesion Anatomic Location: Right lateral posterior shoulder (Previous Accession Number: [id number])  Pre-op size: 1 5 cm  Size of defect:  1 9cm (with 0 2 centimeter margins)  Final repaired wound length:  4 2 cm    Written and verbal, witnessed informed consent was obtained  I discussed that excision is a method of removing lesions both benign and malignant lesions    A portion of normal skin is often taken to ensure completeness of removal   I reviewed that procedure will include numbing the area,  cutting around and under defect, undermining tissue, and closing the wound with sutures both inside and out  These sutures are usually removed in 7 to 14 days  Risks (bleeding, pain, infection, scarring, recurrence) and benefits discussed  It was discussed with patient that every effort is made to minimize scar, but scarring is influenced also by extrinsic factor such as location, age and genetics  Time Out: performed:  yes  Correct patient: yes  Correct site per Clinic Report: yes  Correct site per Patient Report: yes    LOCAL ANESTHESIA: 1% xylocaine with epi     DESCRIPTION OF PROCEDURE: The patient was brought back into the procedure room, anesthetized locally, prepped and draped in the usual fashion  Using a #15 blade with a scalpel, the lesion was excised in elliptical fashion  The wound was  undermined in the  fascial plane  Hemostasis was achieved with light electrocoagulation  Purpose of undermining was to decrease wound tension and facilitate closure  The wound was closed with subcutaneous sutures as follows:    Deep suture:3-0 Vicryl      Epidermal edge closure was accomplished with superficial sutures as follows:    Superficial suture: 3-0 Ethilon   Superficial suture type: Interrupted     Estimated blood loss less than 3ml  The patient tolerated the procedure well without any complications  The wound was cleaned with sterile saline, dried off, surgical vaseline ointment was applied, and the wound was covered  A pressure dressing was applied for stabilization and light pressure  The patient was given detailed oral and written instructions on postoperative care as detailed in consent  The patient left in good medical condition  POSTOP DISCUSSION DISCUSSION AND INSTRUCTIONS FOR PATIENT      Rationale for Procedure  A skin excision allows the dermatologist to remove a lesion   The procedure involves a local numbing medication and removing the entire lesion  Typically, the lesion is being removed because it is atypical, traumatized, or for significant appearance reasons  The area will be open like a brush burn and allowed to heal    There will be no sutures  Tissue is sent to pathologist who will reconfirm diagnosis and verify completeness of lesion removal     Description of Procedure  We would like to perform a skin excision today  A local anesthetic, similar to the kind that a dentist uses when filling a cavity, will be injected with a very small needle into the skin area to be sampled  The injected skin and tissue underneath should go to sleep and become numbed so that no further pain should be felt  A scalpel will be used to cut around the lesion and tissue will be submitted to pathology for examination  Depending on the diagnosis the lesion will be excised with a certain amount of normal skin to help assure completeness of lesion removal   The physician will discuss in advance the anticipated size and extent of removal    Bleeding will occur, but it will stopped with direct pressure, sutures, and electrocautery  Surgical Vaseline-type ointment will also applied after the procedure to help create a barrier between the wound and the outside world  Risks and Potential Complications  The advantage of a skin excision is that it allows us to remove a problem lesion quickly  Although this usually permits the lesion to heal as soon as possible with the least scarring, there are some risks and potential complications that include but are not limited to the following:  - Some bleeding is normal at time of procedure and some bleeding on gauze is normal  the first few days after surgery  Profuse bleeding and bleeding with swelling and pain should be reported as detailed  below  - Infection is uncommon in skin surgery    Infection should be reported and is indicated by pain, redness, and discharge of purulent material   - Some dull to at time sharp pain could occur initially the day after surgery  Persistent pain or increasing pain days after surgery is not expected and should be reported  - Every effort is made to minimize scar, but location, size, and genetics do play a role in scar appearance  A surgical wound does not achieve its optimal appearance until 6 months  There are several treatments available if scarring would be problematic including scar creams, silicone pad, laser and scar revision   - Skin discoloration can occur especially in people of color  Its important to avoid sun on wound in first 6 months after surgery  Treatment is available if pigment is problematic   - Incomplete removal of the lesion or recurrence of lesion can occur and this would then require further treatment and more surgery   - Nerve Damage/Numbness/Loss of Function is very rare, but is most likely to occur if lesion is large or if it is in a high risk location  - Allergic Reaction to lidocaine is rare  More commonly,  epinephrine is used  with the lidocaine  Occasionally, epinephrine (aka adrenalin) may cause a brief  feeling of anxiety or jitteriness  - The person at the microscope  (pathologist) may provide additional information that was unexpected  This unexpected finding could provoke the need for additional treatment or evaluation  What You Will Need to Do After the Procedure  1  Keep the area clean and dry the first day  Try NOT to remove the bandage for the first day  2  Gently clean the area with soap and water and apply Vaseline ointment (this is over the counter and not a prescription) to the excision  site for up to 2 weeks  3  Apply a clean appropriately sized bandage to area  Gauze and paper tape are recommended for sensitive skin  4  Return for suture removal as instructed (generally 1 week for the face, 2 weeks for the body)    5  Take Acetaminophen (Tylenol) for discomfort, if no contraindications  Do NOT take Ibuprofen or aspirin unless specifically told to do so by your Dermatologist because these medications can make bleeding worse  6  Call our office immediately for signs of infection: fever, chills, increased redness, warmth, tenderness, discomfort/pain, or pus or foul smell coming from the wound  If bleeding is noticed, place a clean cloth over the area and apply firm pressure for thirty minutes  Check the wound ONLY after 30 minutes of direct pressure; do not cheat and sneak a peak, as that does not count  If bleeding persists after 30 minutes of legitimate direct pressure, then try one more round of direct pressure for an additional 10 minutes to the area  Should the bleeding become heavier or not stop after the second attempt, call [facility] directly at [phone] (SKIN) or, if after hours, go to your local Emergency Room/Emergency Department          Scribe Attestation    I,:   Kush Mckeon MA am acting as a scribe while in the presence of the attending physician :        I,:   Maura Ying MD personally performed the services described in this documentation    as scribed in my presence :

## 2019-10-22 NOTE — PROGRESS NOTES
Assessment & Plan:   Sam Neumann was seen today for geriatric evaluation  Diagnoses and all orders for this visit:    MCI (mild cognitive impairment)  -     Vitamin B12; Future  -     Folate; Future  -     MRI brain NeuroQuant wo contrast; Future    Encounter for vitamin deficiency screening  -     Vitamin D 25 hydroxy; Future    Ambulatory dysfunction    H/O fall    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    History of cerebrovascular accident (CVA) with residual deficit    Mixed hyperlipidemia    Essential hypertension    Tracheomalacia    Chronic obstructive pulmonary disease, unspecified COPD type (HCC)    Oropharyngeal dysphagia    Gastroesophageal reflux disease without esophagitis    Other constipation    Polypharmacy    Anxiety      MCI (mild cognitive impairment)  MoCA 17, GDS 5, TUGT deferred as patient is wheelchair-bound  Patient with likely mild cognitive impairment  Reviewed CBC, CMP, TSH  Requisition given to patient for B12, folate  Daughter requesting vitamin-D level as well  Patient returned to complete mindstream  Requisition for MRI brain neuro quant given to patient  Recommend reorientation redirection as needed  Patient encouraged to remain active mentally, physically and socially  Recommend patient participate in cognitively challenging exercises such as cross words and puzzles  Manage chronic conditions per PCP  Maintain Falls precautions  Continue nutritional support, supportive care  Will follow up with care conference      H/O fall  Patient has had 1 fall in the past year    Prior to 1 year ago, patient had multiple falls while still living independently  Will discuss with patient and daughter at care conference about recommendation for physical therapy for strength training, endurance and gait  Maintain Falls precautions  Patient may also benefit from a falls Alert device    Encounter for vitamin deficiency screening  Requisition given to patient for vitamin-D level as requested by daughter  Follow up with PCP as routinely scattered    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  GDS 5/5  Patient currently on paroxetine 20 mg daily  Discussed with daughter about adverse effects of seizures, hyponatremia, SIADH, extrapyramidal symptoms, dizziness, anorexia and agitation especially in the elderly  Would recommend weaning patient off paroxetine and starting Zoloft  Will discuss further at care plan conference  Encourage patient remain active socially, mentally and physically  Patient denied any homicidal/suicidal ideation    History of cerebrovascular accident (CVA) with residual deficit  History of CVA  Continue Plavix 12 5 mg p o  B i d , aspirin 81 mg daily, atorvastatin 10 mg daily  Manage risk factors for CVA  Follow up with PCP for management of chronic conditions    Hyperlipidemia  Continue atorvastatin 10 mg p o   Daily  Recommend adherence to a heart healthy diet  Follow up with PCP for continued monitoring    Essential hypertension  Blood pressure 120/68 in the office  Continue amlodipine 10 mg daily, aspirin 81 mg daily, carvedilol 12 5 mg p o  B i d   Recommend adherence to a heart healthy diet  Follow up with PCP for continued management    Tracheomalacia  Patient currently following with pulmonology  Continue prednisone 5 mg daily, duo nebs 4 Times Daily, budesonide twice daily  Aspiration precautions  Continue honey thick liquid diet  Suctioning as needed  Plans for PEG tube deferred per pulmonology at this time  Follow up with PCP/pulmonology    COPD (chronic obstructive pulmonary disease) ([facility] [address] )  No symptoms of cardiorespiratory distress currently  Continue routine follow-up with pulmonology as scheduled  Continue duo nebs, budesonide, Tessalon Perles, maintenance prednisone      Oropharyngeal dysphagia  Maintain aspiration precautions  Continue honey thick diet after IV BS  Follow up with pulmonology as routinely scheduled    GERD (gastroesophageal reflux disease)  Patient currently on omeprazole 40 mg daily  Discussed with patient and daughter adverse effect of PPI therapy in the elderly significant for increasing renal impairment, increasing cognitive impairment, aspiration pneumonia, increased risk of fractures  Given history of tracheomalacia, chronic dysphagia, will continue PPI therapy at this time    Other constipation  Patient having regular bowel movements on current regimen  Continue Colace 100 mg p o  B i d   Continue MiraLax 17 g p o  Daily p r n  Continue Metamucil daily  Continue Senokot 2 tablets p o  HS  Recommend adherence to a heart healthy, high-fiber diet  Physical activity as tolerated    Polypharmacy  Discussed with daughter multiple medications patient currently is on and increased adverse effects in the elderly  Discussed PPI therapy, antihistamine therapy, use of lorazepam and paroxetine  To address further at care conference    Anxiety  Patient currently is on Ativan 0 5 mg p o  Q 8h  Per daughter, patient is in need of lorazepam as needed is is anxiety often triggers empty become agitated  Discussed side effects of benzodiazepines in the elderly especially in patients of memory loss  Daughter requesting that lorazepam be continued p r n  Will discuss at care conference the option of long-term anxiolytic instead of benzodiazepine use          HPI:  We had the pleasure of evaluating Hardy Flores  who is a 77 y o  male   in Geriatric consultation today  He lives with daughter and her family  Mr Arielle Zarco is in the office with his daughter and caregiver    Memory Issues noticed since 1 year ago    Memory affected:  Ability to comprehend instructions, some short-term memory  Symptoms started:  Gradual  Over time the memory has:  worsened  Memory issue(s) were noted by: family   Patient has difficulties with not following instructions    This is a 70-year-old male with a past medical history of depression, constipation, HLD, HTN, left-sided weakness, prior lung/prostate cancer and tracheomalacia amongst multiple other medical conditions who presents for his initial comprehensive geriatric assessment  Patient currently lives with his daughter and her family and resides on the 1st floor of their home  Wife passed away over 25 years ago after which he lived independently  He has 2 daughters, one of whom he lives with, the other lives in [place] [id number]  Patient is currently  and previously worked as a   He retired at age 60 and later sustained a CVA over 15 years ago  He currently has a caregiver who visits twice daily, he uses a rolling walker to ambulate and is able to go up and down steps  Patient currently follows with Dermatology for a history of squamous cell carcinoma as well as pulmonology for a chronic dysphagia, COPD and tracheomalacia  Per daughter, patient has been noted to become progressively forgetful especially when performing certain tasks such as taking his medications and being compliant with his nebulizer treatments  He is frequently not able to understand instructions despite them being repeated multiple times  Caretaker also does exercises with the patient and take him out for social activities and errands  He has been compliant with a honey thick diet after a VBS study  Prior to living with patient's daughter, patient lived independently then transitioned to [facility]  He is dependent in both ADLs and IADLs though he is able to feed himself  Daughter who is a nurse usually assists in administration of his medications  Patient has fallen once over the past 1 year however daughter admits that prior to moving in with her, he has had multiple falls  Per daughter, patient spends most of his day looking at television  She does admit that he is intermittently anxious for which symptoms have been controlled with as needed lorazepam as anxiety often leads to agitation    No recent change in hearing or vision, passage of urine or stool or sleeping habits  Patient's daughter and caretaker have been attempting to make more Savary honey thick textured foods for patient which he has been able to tolerate  He has no problems operating household appliance such as TV remote, kitchen appliances, computer  He has difficulty finding the right word while speaking: No (used to)  Patient requires repeat information or ask the same question repeatedly: No  Do you drive:  Over 6 years  ago    Have you had any recent accidents, citations or getting lost in familiar places :No  Do you handle your own financial affairs such as balancing your checkbook, paying bills, investments: No  Do have any difficulties with handling your financial affairs: yes  Have you or your family noted any change in your mood or personality:Yes  Are you currently or have you been treated in the past for depression or anxiety: Yes  Have you noticed any gait or balance disorder: Yes  Uses :Walker: rolling  Any hallucination or delusion: No  Fluctuation in alertness: No  Sleep Issues: Yes  Urinary/Stool Incontinence: Yes  Hearing and vision issue: Yes, recent cataract surgery  Do you have POA:No  Do you have a Living will No  Past Medical, surgical, social, medication and allergy history and patients previous records reviewed  Family Review of Behavior [facility]:    pacing  No    agressive/combative behavior  occasionally , requires Ativan    agitated  Yes   wandering  No   resistance to care  No   hoarding/hiding objects  No    suspicious  No  withdrawn NoNo  inappropriate sexual behaviorNo  rummaging/pillaging  No    misplacing/losing objects Yes  personal hygiene problems  No  forgetfulness of actions Yes   temper outbursts  Yes     throwing items No      Family member with dementia and what type? no  Have you had any head trauma Yes  Does patient have history of alcohol abuse No      ROS: Review of Systems   Constitutional: Positive for activity change (Patient is wheelchair-bound)  Negative for chills and fever  HENT: Positive for trouble swallowing (History of chronic dysphagia)  Negative for congestion, ear pain, rhinorrhea and sore throat  Eyes: Negative for discharge  Respiratory: Positive for cough and choking  Negative for chest tightness, shortness of breath, wheezing and stridor  Cardiovascular: Positive for leg swelling (Minimal lower extremity edema)  Negative for chest pain and palpitations  Gastrointestinal: Negative for abdominal pain, constipation, diarrhea, nausea and vomiting  Genitourinary: Positive for urgency (Chronic)  Negative for dysuria and hematuria  Occasional urinary incontinence   Musculoskeletal: Positive for gait problem (Uses rolling walker)  Negative for neck stiffness  Skin: Negative for color change, pallor, rash and wound  Neurological: Positive for speech difficulty (2/2 tracheomalacia), weakness and light-headedness  Negative for dizziness  Psychiatric/Behavioral: Positive for decreased concentration  Negative for agitation, behavioral problems, confusion, dysphoric mood, hallucinations and suicidal ideas  The patient is nervous/anxious (Occasionally)  Allergies:    Allergies   Allergen Reactions    Penicillins Rash       Medications:      Current Outpatient Medications:     acetaminophen (TYLENOL) 325 mg tablet, Take 2 tablets by mouth every 6 (six) hours as needed for fever, Disp: 30 tablet, Rfl: 0    albuterol (PROVENTIL HFA,VENTOLIN HFA) 90 mcg/act inhaler, Inhale 2 puffs 4 (four) times a day, Disp: 2 Inhaler, Rfl: 0    amLODIPine (NORVASC) 10 mg tablet, Take 1 tablet by mouth daily, Disp: , Rfl:     ASPIRIN 81 PO, Take 1 tablet by mouth every other day  , Disp: , Rfl:     atorvastatin (LIPITOR) 10 mg tablet, Take 1 tablet by mouth, Disp: , Rfl:     benzonatate (TESSALON) 200 MG capsule, TAKE 1 CAPSULE BY MOUTH THREE TIMES A DAY AS NEEDED FOR COUGH, Disp: 90 capsule, Rfl: 5   budesonide (PULMICORT) 0 5 mg/2 mL nebulizer solution, Take 1 vial (0 5 mg total) by nebulization 2 (two) times a day Rinse mouth after use , Disp: 360 mL, Rfl: 3    carvedilol (COREG) 12 5 mg tablet, Take 1 tablet by mouth 2 (two) times a day with meals, Disp: 60 tablet, Rfl: 0    clopidogrel (PLAVIX) 75 mg tablet, Take 1 tablet by mouth daily, Disp: 30 tablet, Rfl: 0    docusate sodium (COLACE) 100 mg capsule, Take 1 capsule (100 mg total) by mouth 2 (two) times a day, Disp: 10 capsule, Rfl: 0    Fesoterodine Fumarate ER (TOVIAZ) 8 MG TB24, Take 4 mg by mouth every evening  , Disp: , Rfl:     ipratropium-albuterol (DUO-NEB) 0 5-2 5 mg/3 mL nebulizer solution, Take 3 mL by nebulization 4 (four) times a day as needed for wheezing or shortness of breath, Disp: , Rfl:     ketoconazole (NIZORAL) 2 % cream, Apply topically to both feet in their entirety (tops, bottoms and between toes) 3 times a day for 4 weeks straight , Disp: 60 g, Rfl: 5    latanoprost (XALATAN) 0 005 % ophthalmic solution, Administer 1 drop to both eyes daily at bedtime, Disp: , Rfl:     levocetirizine (XYZAL) 5 MG tablet, Take 1 tablet by mouth every evening, Disp: 30 tablet, Rfl: 0    LORazepam (ATIVAN) 0 5 mg tablet, Take 0 5 mg by mouth every 8 (eight) hours as needed for anxiety, Disp: , Rfl:     omeprazole (PriLOSEC) 40 MG capsule, Take 1 capsule by mouth daily, Disp: , Rfl:     PARoxetine (PAXIL) 20 mg tablet, Take 1 tablet by mouth daily, Disp: , Rfl:     polyethylene glycol (MIRALAX) 17 g packet, Take 17 g by mouth daily as needed, Disp: , Rfl:     predniSONE 5 mg tablet, Take 1 tablet (5 mg total) by mouth daily, Disp: 90 tablet, Rfl: 3    psyllium (METAMUCIL) 0 52 g capsule, Take 0 52 g by mouth daily, Disp: , Rfl:     senna (SENOKOT) 8 6 MG tablet, Take 2 tablets by mouth daily at bedtime, Disp: , Rfl:     spironolactone (ALDACTONE) 25 mg tablet, Take 1 tablet (25 mg total) by mouth daily, Disp: 30 tablet, Rfl: 0   tamsulosin (FLOMAX) 0 4 mg, Take 1 capsule by mouth daily, Disp: , Rfl:     Vitals:  Vitals:    10/22/19 1318   BP: 120/68   Pulse: 70   Resp: 16   Temp: 98 1 °F (36 7 °C)   SpO2: 97%       History:  Past Medical History:   Diagnosis Date    [facility]AL [facility]) ([facility] ) 5/31/2017    Aspiration into respiratory tract     Chest pain 5/31/2017    COPD [facility]chronic obstructive pulmonary disease) ([facility] )     Depression     Elevated troponin 5/31/2017    GERD (gastroesophageal reflux disease)     History of CVA (cerebrovascular accident) 4/13/2016    Hyperlipidemia     Hypertension     Lung cancer ([place] [address] ) 2005    Right, status post lobectomy    Pneumonia     Prostate cancer ([place] [address] )     Sleep apnea     awaiting sleep study results    Squamous cell skin cancer     Stroke ([place] [address] )     Tracheomalacia     Weakness due to cerebrovascular accident (CVA)      Past Surgical History:   Procedure Laterality Date    COLONOSCOPY      ESOPHAGOGASTRODUODENOSCOPY N/A 4/13/2016    Procedure: ESOPHAGOGASTRODUODENOSCOPY (EGD); Surgeon: Magalie Armenta MD;  [facility]; Service:     JOINT REPLACEMENT      knee replacement    LUNG LOBECTOMY      WA ESOPHAGOGASTRODUODENOSCOPY TRANSORAL DIAGNOSTIC N/A 3/15/2017    Procedure: ESOPHAGOGASTRODUODENOSCOPY (EGD); Surgeon: Magalie Armenta MD;  [facility]; Service: Gastroenterology    SKIN BIOPSY      TRIGEMINAL NERVE DECOMPRESSION       Family History   Family history unknown: Yes     Social History     Socioeconomic History    Marital status:       Spouse name: Not on file    Number of children: Not on file    Years of education: Not on file    Highest education level: Not on file   Occupational History    Not on file   Social Needs    Financial resource strain: Not on file    Food insecurity:     Worry: Not on file     Inability: Not on file    Transportation needs:     Medical: Not on file     Non-medical: Not on file   Tobacco Use    Smoking status: Former Smoker     Packs/day: 1 00     Years: 22 00     Pack years: 22 00     Types: Cigarettes     Start date:      Last attempt to quit: 1977     Years since quittin 8    Smokeless tobacco: Never Used   Substance and Sexual Activity    Alcohol use: No    Drug use: No    Sexual activity: Not on file   Lifestyle    Physical activity:     Days per week: Not on file     Minutes per session: Not on file    Stress: Not on file   Relationships    Social connections:     Talks on phone: Not on file     Gets together: Not on file     Attends Voodoo service: Not on file     Active member of club or organization: Not on file     Attends meetings of clubs or organizations: Not on file     Relationship status: Not on file    Intimate partner violence:     Fear of current or ex partner: Not on file     Emotionally abused: Not on file     Physically abused: Not on file     Forced sexual activity: Not on file   Other Topics Concern    Not on file   Social History Narrative    Not on file     Past Surgical History:   Procedure Laterality Date    COLONOSCOPY      ESOPHAGOGASTRODUODENOSCOPY N/A 2016    Procedure: ESOPHAGOGASTRODUODENOSCOPY (EGD); Surgeon: Chet Alarcon MD;  [facility]; Service:     JOINT REPLACEMENT      knee replacement    LUNG LOBECTOMY      DC ESOPHAGOGASTRODUODENOSCOPY TRANSORAL DIAGNOSTIC N/A 3/15/2017    Procedure: ESOPHAGOGASTRODUODENOSCOPY (EGD); Surgeon: Chet Alarcon MD;  [facility]; Service: Gastroenterology    SKIN BIOPSY      TRIGEMINAL NERVE DECOMPRESSION           Physical Exam:   Physical Exam   Constitutional: He is oriented to person, place, and time  He appears well-developed and well-nourished  No distress  Elderly male sitting in wheelchair in no obvious cardiorespiratory distress   HENT:   Head: Normocephalic and atraumatic  Nose: Nose normal    Mouth/Throat: Oropharynx is clear and moist  No oropharyngeal exudate     Eyes: Conjunctivae are normal  Right eye exhibits no discharge  Left eye exhibits no discharge  No scleral icterus  Neck: Normal range of motion  Neck supple  No JVD present  Cardiovascular: Normal rate, regular rhythm and intact distal pulses  Exam reveals no gallop and no friction rub  Murmur (ESM 2/6) heard  Pulmonary/Chest: Effort normal  No stridor  No respiratory distress  He has no wheezes  He has rales  He exhibits no tenderness  Air entry minimally decreased bilaterally  Harsh breath sounds throughout bilateral lung fields   Abdominal: Soft  Bowel sounds are normal  He exhibits no distension and no mass  There is no tenderness  There is no rebound and no guarding  Musculoskeletal: Normal range of motion  He exhibits edema (Minimal lower extremity edema)  He exhibits no tenderness or deformity  Neurological: He is alert and oriented to person, place, and time  He has normal reflexes  Skin: Skin is warm  No rash noted  He is not diaphoretic  No erythema  No pallor  Psychiatric: He has a normal mood and affect  Vitals reviewed

## 2019-10-23 PROBLEM — Z91.81 H/O FALL: Status: ACTIVE | Noted: 2019-10-23

## 2019-10-23 PROBLEM — K59.09 OTHER CONSTIPATION: Status: ACTIVE | Noted: 2019-10-23

## 2019-10-23 PROBLEM — F41.9 ANXIETY: Status: ACTIVE | Noted: 2019-10-23

## 2019-10-23 PROBLEM — Z79.899 POLYPHARMACY: Status: ACTIVE | Noted: 2019-10-23

## 2019-10-24 NOTE — ASSESSMENT & PLAN NOTE
Blood pressure 120/68 in the office  Continue amlodipine 10 mg daily, aspirin 81 mg daily, carvedilol 12 5 mg p o  B i d   Recommend adherence to a heart healthy diet  Follow up with PCP for continued management

## 2019-10-24 NOTE — ASSESSMENT & PLAN NOTE
History of CVA  Continue Plavix 12 5 mg p o  B i d , aspirin 81 mg daily, atorvastatin 10 mg daily  Manage risk factors for CVA  Follow up with PCP for management of chronic conditions

## 2019-10-24 NOTE — ASSESSMENT & PLAN NOTE
Maintain aspiration precautions  Continue honey thick diet after IV BS  Follow up with pulmonology as routinely scheduled

## 2019-10-24 NOTE — ASSESSMENT & PLAN NOTE
Requisition given to patient for vitamin-D level as requested by daughter  Follow up with PCP as routinely scattered

## 2019-10-24 NOTE — ASSESSMENT & PLAN NOTE
Patient currently is on Ativan 0 5 mg p o  Q 8h  Per daughter, patient is in need of lorazepam as needed is is anxiety often triggers empty become agitated  Discussed side effects of benzodiazepines in the elderly especially in patients of memory loss  Daughter requesting that lorazepam be continued p r n    Will discuss at care conference the option of long-term anxiolytic instead of benzodiazepine use

## 2019-10-24 NOTE — ASSESSMENT & PLAN NOTE
GDS 5/5  Patient currently on paroxetine 20 mg daily  Discussed with daughter about adverse effects of seizures, hyponatremia, SIADH, extrapyramidal symptoms, dizziness, anorexia and agitation especially in the elderly  Would recommend weaning patient off paroxetine and starting Zoloft  Will discuss further at care plan conference  Encourage patient remain active socially, mentally and physically  Patient denied any homicidal/suicidal ideation

## 2019-10-24 NOTE — ASSESSMENT & PLAN NOTE
Continue atorvastatin 10 mg p o   Daily  Recommend adherence to a heart healthy diet  Follow up with PCP for continued monitoring

## 2019-10-24 NOTE — ASSESSMENT & PLAN NOTE
No symptoms of cardiorespiratory distress currently  Continue routine follow-up with pulmonology as scheduled  Continue duo nebs, budesonide, Tessalon Perles, maintenance prednisone

## 2019-10-24 NOTE — ASSESSMENT & PLAN NOTE
Patient has had 1 fall in the past year    Prior to 1 year ago, patient had multiple falls while still living independently  Will discuss with patient and daughter at care conference about recommendation for physical therapy for strength training, endurance and gait  Maintain Falls precautions  Patient may also benefit from a falls Alert device

## 2019-10-24 NOTE — ASSESSMENT & PLAN NOTE
Discussed with daughter multiple medications patient currently is on and increased adverse effects in the elderly  Discussed PPI therapy, antihistamine therapy, use of lorazepam and paroxetine    To address further at care conference

## 2019-10-24 NOTE — ASSESSMENT & PLAN NOTE
Patient having regular bowel movements on current regimen  Continue Colace 100 mg p o  B i d   Continue MiraLax 17 g p o  Daily p r n  Continue Metamucil daily  Continue Senokot 2 tablets p o   HS  Recommend adherence to a heart healthy, high-fiber diet  Physical activity as tolerated

## 2019-10-24 NOTE — ASSESSMENT & PLAN NOTE
MoCA 17, GDS 5, TUGT deferred as patient is wheelchair-bound  Patient with likely mild cognitive impairment  Reviewed CBC, CMP, TSH  Requisition given to patient for B12, folate    Daughter requesting vitamin-D level as well  Patient returned to complete mindstream  Requisition for MRI brain neuro quant given to patient  Recommend reorientation redirection as needed  Patient encouraged to remain active mentally, physically and socially  Recommend patient participate in cognitively challenging exercises such as cross words and puzzles  Manage chronic conditions per PCP  Maintain Falls precautions  Continue nutritional support, supportive care  Will follow up with care conference

## 2019-10-24 NOTE — ASSESSMENT & PLAN NOTE
Patient currently following with pulmonology  Continue prednisone 5 mg daily, duo nebs 4 Times Daily, budesonide twice daily  Aspiration precautions  Continue honey thick liquid diet  Suctioning as needed  Plans for PEG tube deferred per pulmonology at this time  Follow up with PCP/pulmonology

## 2019-10-24 NOTE — ASSESSMENT & PLAN NOTE
Patient currently on omeprazole 40 mg daily  Discussed with patient and daughter adverse effect of PPI therapy in the elderly significant for increasing renal impairment, increasing cognitive impairment, aspiration pneumonia, increased risk of fractures  Given history of tracheomalacia, chronic dysphagia, will continue PPI therapy at this time

## 2019-10-25 ENCOUNTER — TELEPHONE (OUTPATIENT)
Dept: DERMATOLOGY | Facility: CLINIC | Age: 79
End: 2019-10-25

## 2019-10-25 NOTE — TELEPHONE ENCOUNTER
Follow Up Call     Pt saw: Willem Patel Were you prescribed any medications:No    o If YES: did you pick them up at the pharmacy? not applicable     Did we do a biopsy? Yes   o If YES: how does the biopsy site look? Healing well, daughter will remove dressing and clean today  Denies any questions or concerns     Would you recommend your family and friends to [facility]?  Yes     How satisfied were you with your visit on a scale of 1-10: 10

## 2019-10-25 NOTE — RESULT ENCOUNTER NOTE
I discussed with daughter that biopsy showed moderately differentiated SCC and margins are Clear    Will review again at followup on 11/5/2019

## 2019-10-29 ENCOUNTER — TELEPHONE (OUTPATIENT)
Dept: PULMONOLOGY | Facility: CLINIC | Age: 79
End: 2019-10-29

## 2019-10-29 NOTE — TELEPHONE ENCOUNTER
Lyndsey Roman from [facility] left message following up on an addendum they faxed on 10/22  They needed something changed in the office note from 8/26    Fax back to [phone]

## 2019-10-31 NOTE — RESULT ENCOUNTER NOTE
DERMATOPATHOLOGY RESULT NOTE    Accession # or Case # (copied from Path Report):  Case: [id number]    Specimen Letter and Anatomic Location (copied from Path Report):  Specimen:    Skin, Other, Right medial lower leg       Histopathological Diagnosis:    Final Diagnosis  A  Skin, Right medial lower leg, shave biopsy:  - Hypertrophic actinic keratosis  - Associated scar and changes suggestive of stasis dermatitis  - Adjacent solar lentigo  - Negative for malignancy on multiple examined levels  Note   No coronoid lamellae diagnostic of porokeratosis are identified  Multiple levels are examined  Clinical correlation is required to ensure adequate sampling  Jett Revering of Lesion/Condition:  INDETERMINATE    Provider has personally called patient and relayed results and plan:  yes    Plan:  Called patient's listed contact and left voicemail about results  Clinically, our group feels this is a porokeratosis; I have askedour dermpath to review this personally  I left my cell phone for the family to call me back with any questions or concerns        Kaylie Preciado:  Please call patient and have him be seen in the next 3 months for a follow-up skin exam     Tissue Exam: [id number]   Order: [id number]   Status:  Final result   Visible to patient:  No (Not Released) Dx:  Neoplasm of uncertain behavior of skin   Component   Case Report  Surgical Pathology Report                         Case: [id number]                                   Authorizing Provider: Shirley Leger MD     Collected:           10/16/2019 1152              Ordering Location:     [facility]      Received:            10/16/2019 1152                                   [address] [place] [address]                                                                Pathologist:           Hallie Mitchell MD                                                             Specimen:    Skin, Other, Right medial lower leg                                                      Final Diagnosis  A  Skin, Right medial lower leg, shave biopsy:  - Hypertrophic actinic keratosis  - Associated scar and changes suggestive of stasis dermatitis  - Adjacent solar lentigo  - Negative for malignancy on multiple examined levels        Interpretation performed at [facility], [address] [place] [address]      Electronically signed by Jennifer Henao MD on 10/24/2019 at  2:02 PM  Note   No coronoid lamellae diagnostic of porokeratosis are identified  Multiple levels are examined  Clinical correlation is required to ensure adequate sampling  Additional Information   All controls performed with the immunohistochemical stains reported above reacted appropriately  These tests were developed and their performance characteristics determined by [facility] or [address] [place] [address]  They may not be cleared or approved by the [place]  Food and Drug Administration  The FDA has determined that such clearance or approval is not necessary  These tests are used for clinical purposes  They should not be regarded as investigational or for research  This laboratory has been approved by [facility], designated as a high-complexity laboratory and is qualified to perform these tests  Gross Description   A  The specimen is received in formalin, labeled with the patient's name and hospital number, and is designated "right medial lower leg"  The specimen consists of 1 tan non-irregular-shaped , partially ragged, shave of skin which measures 1 4 x 1 0 x 0 1 cm  The surface is partially crusty  The margin of resection is inked green and the surface tips are inked red  The specimen is sectioned revealing tan dense to friable cut surfaces  Entirely submitted   Three cassettes      1, 3:  Shaved ends of resection, between sponges  2:  Center bisected (3 pieces), between sponges     Note: The estimated total formalin fixation time based upon information provided by the submitting clinician and the standard processing schedule is under 72 hours  [facility]     Clinical Information   Specimen A: Skin;  Shave biopsy; Right medial lower leg; 77year old male with a 6 cm annular double scaling plaque; DIfferential diagnosis; Porokeratosis rule out Basal cell carcinoma  Resulting Agency [facility]      Specimen Collected: 10/16/19 11:52 AM Last Resulted: 10/24/19  2:02 PM

## 2019-11-05 ENCOUNTER — OFFICE VISIT (OUTPATIENT)
Dept: GERIATRICS | Age: 79
End: 2019-11-05
Payer: MEDICARE

## 2019-11-05 ENCOUNTER — OFFICE VISIT (OUTPATIENT)
Dept: DERMATOLOGY | Facility: CLINIC | Age: 79
End: 2019-11-05

## 2019-11-05 DIAGNOSIS — Z48.02 ENCOUNTER FOR REMOVAL OF SUTURES: Primary | ICD-10-CM

## 2019-11-05 DIAGNOSIS — G31.84 MCI (MILD COGNITIVE IMPAIRMENT): ICD-10-CM

## 2019-11-05 PROCEDURE — RECHECK: Performed by: DERMATOLOGY

## 2019-11-05 PROCEDURE — 96138 PSYCL/NRPSYC TECH 1ST: CPT | Performed by: STUDENT IN AN ORGANIZED HEALTH CARE EDUCATION/TRAINING PROGRAM

## 2019-11-05 NOTE — PROGRESS NOTES
Suture removal  Date/Time: 11/5/2019 2:05 PM  Performed by: Geeta Luther MA  Authorized by: Mirna Nathan MD     Patient location:  Clinic  Consent:     Consent obtained:  Verbal    Consent given by:  Patient    Risks discussed:  Bleeding, pain and wound separation    Alternatives discussed:  No treatment and observation  Universal protocol:     Procedure explained and questions answered to patient or proxy's satisfaction: yes      Relevant documents present and verified: yes      Test results available and properly labeled: yes      Radiology Images displayed and confirmed  If images not available, report reviewed: no      Required blood products, implants, devices, and special equipment available: no      Site/side marked: no      Immediately prior to procedure, a time out was called: no      Patient identity confirmed:  Verbally with patient  Location:     Laterality:  Right    Location:  Upper extremity    Upper extremity location:  Shoulder (lateral)    Shoulder location:  R shoulder  Procedure details: Tools used:  Suture removal kit    Number of sutures removed:  7  Post-procedure details:     Post-removal:  Steri-Strips applied    Patient tolerance of procedure:   Tolerated well, no immediate complications      Scribe Attestation    I,:   Geeta Luther MA am acting as a scribe while in the presence of the attending physician :        I,:   Mirna Nathan MD personally performed the services described in this documentation    as scribed in my presence :

## 2019-11-13 ENCOUNTER — HOSPITAL ENCOUNTER (OUTPATIENT)
Dept: MRI IMAGING | Facility: HOSPITAL | Age: 79
Discharge: HOME | End: 2019-11-13
Payer: MEDICARE

## 2019-11-13 ENCOUNTER — TRANSCRIBE ORDERS (OUTPATIENT)
Dept: LAB | Facility: CLINIC | Age: 79
End: 2019-11-13

## 2019-11-13 ENCOUNTER — APPOINTMENT (OUTPATIENT)
Dept: LAB | Facility: CLINIC | Age: 79
End: 2019-11-13
Payer: MEDICARE

## 2019-11-13 DIAGNOSIS — G31.84 MCI (MILD COGNITIVE IMPAIRMENT): ICD-10-CM

## 2019-11-13 DIAGNOSIS — Z13.21 ENCOUNTER FOR VITAMIN DEFICIENCY SCREENING: ICD-10-CM

## 2019-11-13 LAB
25(OH)D3 SERPL-MCNC: 25.3 NG/ML (ref 30–100)
FOLATE SERPL-MCNC: 11 NG/ML (ref 3.1–17.5)
VIT B12 SERPL-MCNC: 439 PG/ML (ref 100–900)

## 2019-11-13 PROCEDURE — 82607 VITAMIN B-12: CPT

## 2019-11-13 PROCEDURE — 82306 VITAMIN D 25 HYDROXY: CPT

## 2019-11-13 PROCEDURE — 36415 COLL VENOUS BLD VENIPUNCTURE: CPT

## 2019-11-13 PROCEDURE — 70551 MRI BRAIN STEM W/O DYE: CPT

## 2019-11-13 PROCEDURE — 82746 ASSAY OF FOLIC ACID SERUM: CPT

## 2019-11-14 ENCOUNTER — APPOINTMENT (EMERGENCY)
Dept: RADIOLOGY | Facility: HOSPITAL | Age: 79
DRG: 057 | End: 2019-11-14
Payer: MEDICARE

## 2019-11-14 ENCOUNTER — HOSPITAL ENCOUNTER (INPATIENT)
Facility: HOSPITAL | Age: 79
LOS: 4 days | Discharge: NURSING, REHAB OR LONG-TERM CARE | DRG: 057 | End: 2019-11-18
Attending: EMERGENCY MEDICINE | Admitting: INTERNAL MEDICINE
Payer: MEDICARE

## 2019-11-14 DIAGNOSIS — R53.1 LEFT-SIDED WEAKNESS: Chronic | ICD-10-CM

## 2019-11-14 DIAGNOSIS — Z86.73 H/O: CVA (CEREBROVASCULAR ACCIDENT): ICD-10-CM

## 2019-11-14 DIAGNOSIS — I69.30 HISTORY OF CEREBROVASCULAR ACCIDENT (CVA) WITH RESIDUAL DEFICIT: Chronic | ICD-10-CM

## 2019-11-14 DIAGNOSIS — F41.9 ANXIETY DISORDER: ICD-10-CM

## 2019-11-14 DIAGNOSIS — N17.9 AKI (ACUTE KIDNEY INJURY) (HCC): ICD-10-CM

## 2019-11-14 DIAGNOSIS — G45.9 TIA (TRANSIENT ISCHEMIC ATTACK): ICD-10-CM

## 2019-11-14 DIAGNOSIS — R41.82 ALTERED MENTAL STATUS: Primary | ICD-10-CM

## 2019-11-14 PROBLEM — D72.9 NEUTROPHILIC LEUKOCYTOSIS: Status: ACTIVE | Noted: 2019-11-14

## 2019-11-14 LAB
ALBUMIN SERPL BCP-MCNC: 3.6 G/DL (ref 3.5–5)
ALP SERPL-CCNC: 85 U/L (ref 46–116)
ALT SERPL W P-5'-P-CCNC: 16 U/L (ref 12–78)
AMMONIA PLAS-SCNC: 20 UMOL/L (ref 11–35)
ANION GAP SERPL CALCULATED.3IONS-SCNC: 7 MMOL/L (ref 4–13)
AST SERPL W P-5'-P-CCNC: 10 U/L (ref 5–45)
BASE EX.OXY STD BLDV CALC-SCNC: 86.4 % (ref 60–80)
BASE EXCESS BLDV CALC-SCNC: -1.1 MMOL/L
BASOPHILS # BLD AUTO: 0.03 THOUSANDS/ΜL (ref 0–0.1)
BASOPHILS NFR BLD AUTO: 0 % (ref 0–1)
BILIRUB SERPL-MCNC: 0.76 MG/DL (ref 0.2–1)
BILIRUB UR QL STRIP: NEGATIVE
BUN SERPL-MCNC: 26 MG/DL (ref 5–25)
CALCIUM SERPL-MCNC: 9.3 MG/DL (ref 8.3–10.1)
CHLORIDE SERPL-SCNC: 104 MMOL/L (ref 100–108)
CLARITY UR: CLEAR
CO2 SERPL-SCNC: 23 MMOL/L (ref 21–32)
COLOR UR: YELLOW
COLOR, POC: YELLOW
CREAT SERPL-MCNC: 1.25 MG/DL (ref 0.6–1.3)
EOSINOPHIL # BLD AUTO: 0.09 THOUSAND/ΜL (ref 0–0.61)
EOSINOPHIL NFR BLD AUTO: 1 % (ref 0–6)
ERYTHROCYTE [DISTWIDTH] IN BLOOD BY AUTOMATED COUNT: 13.8 % (ref 11.6–15.1)
GFR SERPL CREATININE-BSD FRML MDRD: 54 ML/MIN/1.73SQ M
GLUCOSE SERPL-MCNC: 116 MG/DL (ref 65–140)
GLUCOSE UR STRIP-MCNC: NEGATIVE MG/DL
HCO3 BLDV-SCNC: 23.2 MMOL/L (ref 24–30)
HCT VFR BLD AUTO: 44.3 % (ref 36.5–49.3)
HGB BLD-MCNC: 14.4 G/DL (ref 12–17)
HGB UR QL STRIP.AUTO: NEGATIVE
IMM GRANULOCYTES # BLD AUTO: 0.06 THOUSAND/UL (ref 0–0.2)
IMM GRANULOCYTES NFR BLD AUTO: 0 % (ref 0–2)
KETONES UR STRIP-MCNC: NEGATIVE MG/DL
LEUKOCYTE ESTERASE UR QL STRIP: NEGATIVE
LYMPHOCYTES # BLD AUTO: 0.75 THOUSANDS/ΜL (ref 0.6–4.47)
LYMPHOCYTES NFR BLD AUTO: 5 % (ref 14–44)
MCH RBC QN AUTO: 29.7 PG (ref 26.8–34.3)
MCHC RBC AUTO-ENTMCNC: 32.5 G/DL (ref 31.4–37.4)
MCV RBC AUTO: 91 FL (ref 82–98)
MONOCYTES # BLD AUTO: 1.45 THOUSAND/ΜL (ref 0.17–1.22)
MONOCYTES NFR BLD AUTO: 9 % (ref 4–12)
NEUTROPHILS # BLD AUTO: 14.18 THOUSANDS/ΜL (ref 1.85–7.62)
NEUTS SEG NFR BLD AUTO: 85 % (ref 43–75)
NITRITE UR QL STRIP: NEGATIVE
NRBC BLD AUTO-RTO: 0 /100 WBCS
O2 CT BLDV-SCNC: 18.5 ML/DL
PCO2 BLDV: 37.9 MM HG (ref 42–50)
PH BLDV: 7.41 [PH] (ref 7.3–7.4)
PH UR STRIP.AUTO: 7 [PH] (ref 4.5–8)
PLATELET # BLD AUTO: 116 THOUSANDS/UL (ref 149–390)
PMV BLD AUTO: 11.2 FL (ref 8.9–12.7)
PO2 BLDV: 52.5 MM HG (ref 35–45)
POTASSIUM SERPL-SCNC: 3.7 MMOL/L (ref 3.5–5.3)
PROCALCITONIN SERPL-MCNC: 0.2 NG/ML
PROT SERPL-MCNC: 7.1 G/DL (ref 6.4–8.2)
PROT UR STRIP-MCNC: NEGATIVE MG/DL
RBC # BLD AUTO: 4.85 MILLION/UL (ref 3.88–5.62)
SODIUM SERPL-SCNC: 134 MMOL/L (ref 136–145)
SP GR UR STRIP.AUTO: 1.01 (ref 1–1.03)
TROPONIN I SERPL-MCNC: <0.02 NG/ML
UROBILINOGEN UR QL STRIP.AUTO: 0.2 E.U./DL
WBC # BLD AUTO: 16.56 THOUSAND/UL (ref 4.31–10.16)

## 2019-11-14 PROCEDURE — 84484 ASSAY OF TROPONIN QUANT: CPT | Performed by: EMERGENCY MEDICINE

## 2019-11-14 PROCEDURE — 96360 HYDRATION IV INFUSION INIT: CPT

## 2019-11-14 PROCEDURE — 85025 COMPLETE CBC W/AUTO DIFF WBC: CPT | Performed by: EMERGENCY MEDICINE

## 2019-11-14 PROCEDURE — 87147 CULTURE TYPE IMMUNOLOGIC: CPT | Performed by: INTERNAL MEDICINE

## 2019-11-14 PROCEDURE — 99285 EMERGENCY DEPT VISIT HI MDM: CPT | Performed by: EMERGENCY MEDICINE

## 2019-11-14 PROCEDURE — 82805 BLOOD GASES W/O2 SATURATION: CPT | Performed by: EMERGENCY MEDICINE

## 2019-11-14 PROCEDURE — 82140 ASSAY OF AMMONIA: CPT | Performed by: EMERGENCY MEDICINE

## 2019-11-14 PROCEDURE — 94640 AIRWAY INHALATION TREATMENT: CPT

## 2019-11-14 PROCEDURE — 99285 EMERGENCY DEPT VISIT HI MDM: CPT

## 2019-11-14 PROCEDURE — 84145 PROCALCITONIN (PCT): CPT | Performed by: INTERNAL MEDICINE

## 2019-11-14 PROCEDURE — 80053 COMPREHEN METABOLIC PANEL: CPT | Performed by: EMERGENCY MEDICINE

## 2019-11-14 PROCEDURE — 94760 N-INVAS EAR/PLS OXIMETRY 1: CPT

## 2019-11-14 PROCEDURE — 71046 X-RAY EXAM CHEST 2 VIEWS: CPT

## 2019-11-14 PROCEDURE — 70450 CT HEAD/BRAIN W/O DYE: CPT

## 2019-11-14 PROCEDURE — 87040 BLOOD CULTURE FOR BACTERIA: CPT | Performed by: INTERNAL MEDICINE

## 2019-11-14 PROCEDURE — 81003 URINALYSIS AUTO W/O SCOPE: CPT

## 2019-11-14 PROCEDURE — 93005 ELECTROCARDIOGRAM TRACING: CPT

## 2019-11-14 PROCEDURE — 99223 1ST HOSP IP/OBS HIGH 75: CPT | Performed by: INTERNAL MEDICINE

## 2019-11-14 PROCEDURE — 36415 COLL VENOUS BLD VENIPUNCTURE: CPT

## 2019-11-14 RX ORDER — LORATADINE 10 MG/1
10 TABLET ORAL DAILY
COMMUNITY

## 2019-11-14 RX ORDER — ATORVASTATIN CALCIUM 40 MG/1
40 TABLET, FILM COATED ORAL
Status: DISCONTINUED | OUTPATIENT
Start: 2019-11-14 | End: 2019-11-18 | Stop reason: HOSPADM

## 2019-11-14 RX ORDER — CLOPIDOGREL BISULFATE 75 MG/1
75 TABLET ORAL DAILY
Status: DISCONTINUED | OUTPATIENT
Start: 2019-11-15 | End: 2019-11-18 | Stop reason: HOSPADM

## 2019-11-14 RX ORDER — PANTOPRAZOLE SODIUM 40 MG/1
40 TABLET, DELAYED RELEASE ORAL
Status: DISCONTINUED | OUTPATIENT
Start: 2019-11-15 | End: 2019-11-18 | Stop reason: HOSPADM

## 2019-11-14 RX ORDER — LORAZEPAM 2 MG/ML
0.5 INJECTION INTRAMUSCULAR ONCE AS NEEDED
Status: COMPLETED | OUTPATIENT
Start: 2019-11-14 | End: 2019-11-15

## 2019-11-14 RX ORDER — LORAZEPAM 0.5 MG/1
0.5 TABLET ORAL EVERY 8 HOURS PRN
Status: DISCONTINUED | OUTPATIENT
Start: 2019-11-14 | End: 2019-11-18 | Stop reason: HOSPADM

## 2019-11-14 RX ORDER — BUDESONIDE 0.5 MG/2ML
0.5 INHALANT ORAL
Status: DISCONTINUED | OUTPATIENT
Start: 2019-11-14 | End: 2019-11-18 | Stop reason: HOSPADM

## 2019-11-14 RX ORDER — TAMSULOSIN HYDROCHLORIDE 0.4 MG/1
0.4 CAPSULE ORAL DAILY
Status: DISCONTINUED | OUTPATIENT
Start: 2019-11-15 | End: 2019-11-18 | Stop reason: HOSPADM

## 2019-11-14 RX ORDER — ALBUTEROL SULFATE 2.5 MG/3ML
5 SOLUTION RESPIRATORY (INHALATION) ONCE
Status: COMPLETED | OUTPATIENT
Start: 2019-11-14 | End: 2019-11-14

## 2019-11-14 RX ORDER — SPIRONOLACTONE 25 MG/1
25 TABLET ORAL DAILY
Status: DISCONTINUED | OUTPATIENT
Start: 2019-11-15 | End: 2019-11-18 | Stop reason: HOSPADM

## 2019-11-14 RX ORDER — BENZONATATE 100 MG/1
100 CAPSULE ORAL 3 TIMES DAILY PRN
Status: DISCONTINUED | OUTPATIENT
Start: 2019-11-14 | End: 2019-11-18 | Stop reason: HOSPADM

## 2019-11-14 RX ORDER — IPRATROPIUM BROMIDE AND ALBUTEROL SULFATE 2.5; .5 MG/3ML; MG/3ML
3 SOLUTION RESPIRATORY (INHALATION) 4 TIMES DAILY PRN
Status: DISCONTINUED | OUTPATIENT
Start: 2019-11-14 | End: 2019-11-18 | Stop reason: HOSPADM

## 2019-11-14 RX ORDER — POLYETHYLENE GLYCOL 3350 17 G/17G
17 POWDER, FOR SOLUTION ORAL DAILY PRN
Status: DISCONTINUED | OUTPATIENT
Start: 2019-11-14 | End: 2019-11-18 | Stop reason: HOSPADM

## 2019-11-14 RX ORDER — PREDNISONE 1 MG/1
5 TABLET ORAL DAILY
Status: DISCONTINUED | OUTPATIENT
Start: 2019-11-15 | End: 2019-11-18 | Stop reason: HOSPADM

## 2019-11-14 RX ORDER — DOCUSATE SODIUM 100 MG/1
100 CAPSULE, LIQUID FILLED ORAL 2 TIMES DAILY
Status: DISCONTINUED | OUTPATIENT
Start: 2019-11-14 | End: 2019-11-18 | Stop reason: HOSPADM

## 2019-11-14 RX ORDER — ASPIRIN 81 MG/1
81 TABLET, CHEWABLE ORAL DAILY
Status: DISCONTINUED | OUTPATIENT
Start: 2019-11-15 | End: 2019-11-18 | Stop reason: HOSPADM

## 2019-11-14 RX ORDER — LATANOPROST 50 UG/ML
1 SOLUTION/ DROPS OPHTHALMIC
Status: DISCONTINUED | OUTPATIENT
Start: 2019-11-14 | End: 2019-11-18 | Stop reason: HOSPADM

## 2019-11-14 RX ORDER — CARVEDILOL 12.5 MG/1
12.5 TABLET ORAL 2 TIMES DAILY WITH MEALS
Status: DISCONTINUED | OUTPATIENT
Start: 2019-11-14 | End: 2019-11-18 | Stop reason: HOSPADM

## 2019-11-14 RX ORDER — AMLODIPINE BESYLATE 10 MG/1
10 TABLET ORAL DAILY
Status: DISCONTINUED | OUTPATIENT
Start: 2019-11-15 | End: 2019-11-18 | Stop reason: HOSPADM

## 2019-11-14 RX ORDER — SENNOSIDES 8.6 MG
2 TABLET ORAL
Status: DISCONTINUED | OUTPATIENT
Start: 2019-11-14 | End: 2019-11-18 | Stop reason: HOSPADM

## 2019-11-14 RX ORDER — PAROXETINE HYDROCHLORIDE 20 MG/1
20 TABLET, FILM COATED ORAL DAILY
Status: DISCONTINUED | OUTPATIENT
Start: 2019-11-15 | End: 2019-11-18 | Stop reason: HOSPADM

## 2019-11-14 RX ORDER — ACETAMINOPHEN 325 MG/1
650 TABLET ORAL EVERY 6 HOURS PRN
Status: DISCONTINUED | OUTPATIENT
Start: 2019-11-14 | End: 2019-11-18 | Stop reason: HOSPADM

## 2019-11-14 RX ADMIN — ATORVASTATIN CALCIUM 40 MG: 40 TABLET, FILM COATED ORAL at 18:35

## 2019-11-14 RX ADMIN — SENNOSIDES 17.2 MG: 8.6 TABLET, FILM COATED ORAL at 21:38

## 2019-11-14 RX ADMIN — LATANOPROST 1 DROP: 50 SOLUTION OPHTHALMIC at 21:38

## 2019-11-14 RX ADMIN — CARVEDILOL 12.5 MG: 12.5 TABLET, FILM COATED ORAL at 18:35

## 2019-11-14 RX ADMIN — SODIUM CHLORIDE 1000 ML: 0.9 INJECTION, SOLUTION INTRAVENOUS at 13:04

## 2019-11-14 RX ADMIN — DOCUSATE SODIUM 100 MG: 100 CAPSULE, LIQUID FILLED ORAL at 18:35

## 2019-11-14 RX ADMIN — BUDESONIDE 0.5 MG: 0.5 INHALANT RESPIRATORY (INHALATION) at 19:58

## 2019-11-14 RX ADMIN — IPRATROPIUM BROMIDE AND ALBUTEROL SULFATE 3 ML: 2.5; .5 SOLUTION RESPIRATORY (INHALATION) at 19:58

## 2019-11-14 RX ADMIN — ALBUTEROL SULFATE 5 MG: 2.5 SOLUTION RESPIRATORY (INHALATION) at 13:02

## 2019-11-14 NOTE — ASSESSMENT & PLAN NOTE
Patient woke up with Left upper and lower extremity weakness this morning - lasted for 1-2 hours and resolved on its own  Patient has residual left-sided weakness from previous stroke  Normally he is able to lift his left upper and lower extremity which was unable to do this morning    Currently at baseline in ER  Pt to be admitted for stroke/TIA pathway  Consult Neurology  Echo  ASA plavix statin - home meds - increase lipitor to 40  PT OT francisco javier

## 2019-11-14 NOTE — H&P
H&P- Kaur Craft  1940, 77 y o  male MRN: [id number]    Unit/Bed#: ED 17 Encounter: [id number]    Primary Care Provider: Ander Course, DO   Date and time admitted to hospital: 11/14/2019 10:09 AM        * Acute on chronic left-sided weakness  Assessment & Plan  Patient woke up with Left upper and lower extremity weakness this morning - lasted for 1-2 hours and resolved on its own  Patient has residual left-sided weakness from previous stroke  Normally he is able to lift his left upper and lower extremity which was unable to do this morning  Currently at baseline in ER  Pt to be admitted for stroke/TIA pathway  Consult Neurology  Echo  ASA plavix statin - home meds - increase lipitor to 40  PT OT eval    History of cerebrovascular accident (CVA) with residual deficit  Assessment & Plan  Cont asa, plavix, statin  Increase lipitor to 40    Neutrophilic leukocytosis  Assessment & Plan  WBC 16 on admission  No s/s of infection  Pt with chronic cough  UA unremarkable  CXR unremarkable  Check procal and blood cultures  Hold Abx for now    MCI (mild cognitive impairment)  Assessment & Plan  Follows with Geriatrics OP  Had MRI brain yesterday  Oropharyngeal dysphagia  Assessment & Plan  With chronic cough  On modified diet at home -Pureed with honey thick liquids  Will continue for now  Will request speech and swallow eval     Essential hypertension  Assessment & Plan  Well controlled  Cont home meds      VTE Prophylaxis: Enoxaparin (Lovenox)  / sequential compression device   Code Status: full  POLST: POLST form is not discussed and not completed at this time  Discussion with family: pt and daughter Bernarda    Anticipated Length of Stay:  Patient will be admitted on an Inpatient basis with an anticipated length of stay of  > 2 midnights  Justification for Hospital Stay: above    Total Time for Visit, including Counseling / Coordination of Care: 45 minutes    Greater than 50% of this total time spent on direct patient counseling and coordination of care  Chief Complaint:   Left sided weakness and confusion this morning    History of Present Illness:    Andrei Steele  is a 77 y o  male who presents with worsening left-sided weakness and confusion this morning noted by his caretaker  Patient lives with his daughter who works in transfer cenetr with [employer]  Per patient, when he woke up this morning he was unable to lift his left arm and left leg up  This lasted for approximately 2 hours and resolved on its own  When his caretaker arrived she noticed worsening weakness on the left side and she called patient's daughter  Also pt mentioned that he was feeling "goofy"  Called and spoke to pt's daughter  Per her pt did not know current year, date or month which he normally would know  Patient does have history of CVA with mild left-sided residual weakness  Per daughter, in the past with noted patient had infection he does have to worsening weakness on the left side  Review of Systems:    Review of Systems   Constitutional: Negative for chills and fever  HENT: Negative for congestion and sore throat  Respiratory: Positive for cough  Negative for shortness of breath  Cardiovascular: Negative for chest pain and palpitations  Gastrointestinal: Negative for abdominal pain, nausea and vomiting  Genitourinary: Negative for difficulty urinating, dysuria, frequency and urgency  Musculoskeletal: Negative for arthralgias and myalgias  Skin: Negative for color change and rash  Neurological: Positive for weakness  Negative for dizziness and light-headedness  Psychiatric/Behavioral: Negative for agitation and behavioral problems         Past Medical and Surgical History:     Past Medical History:   Diagnosis Date    [facility]ONAL [facility]acute kidney injury) ([facility] ) 5/31/2017    Aspiration into respiratory tract     Chest pain 5/31/2017    COPD (chronic obstructive pulmonary disease) ([facility] )     Depression     Elevated troponin 5/31/2017    GERD (gastroesophageal reflux disease)     History of CVA (cerebrovascular accident) 4/13/2016    Hyperlipidemia     Hypertension     Lung [facility] ) 2005    Right, status post lobectomy    Pneumonia     [facility]     Sleep apnea     awaiting sleep study results    Squamous cell skin cancer     St[facility] )     Tracheomalacia     Weakness due to cerebrovascular accident (CVA)        Past Surgical History:   Procedure Laterality Date    COLONOSCOPY      ESOPHAGOGASTRODUODENOSCOPY N/A 4/13/2016    Procedure: ESOPHAGOGASTRODUODENOSCOPY (EGD); Surgeon: Kevin Beltran MD;  [facility]; Service:     JOINT REPLACEMENT      knee replacement    LUNG LOBECTOMY      MO ESOPHAGOGASTRODUODENOSCOPY TRANSORAL DIAGNOSTIC N/A 3/15/2017    Procedure: ESOPHAGOGASTRODUODENOSCOPY (EGD); Surgeon: Kevin Beltran MD;  [facility]: [facility]; Service: Gastroenterology    SKIN BIOPSY      TRIGEMINAL NERVE DECOMPRESSION         Meds/Allergies:    Prior to Admission medications    Medication Sig Start Date End Date Taking? Authorizing Provider   acetaminophen (TYLENOL) 325 mg tablet Take 2 tablets by mouth every 6 (six) hours as needed for fever 6/19/17   Sandra Sharp MD   albuterol (PROVENTIL HFA,VENTOLIN HFA) 90 mcg/act inhaler Inhale 2 puffs 4 (four) times a day 6/19/17   Sandra Sharp MD   amLODIPine (NORVASC) 10 mg tablet Take 1 tablet by mouth daily 10/10/17   Historical Provider, MD   ASPIRIN 81 PO Take 1 tablet by mouth every other day   10/10/17   Historical Provider, MD   atorvastatin (LIPITOR) 10 mg tablet Take 1 tablet by mouth    Historical Provider, MD   benzonatate (TESSALON) 200 MG capsule TAKE 1 CAPSULE BY MOUTH THREE TIMES A DAY AS NEEDED FOR COUGH 8/19/19   DIANA Alonzo   budesonide (PULMICORT) 0 5 mg/2 mL nebulizer solution Take 1 vial (0 5 mg total) by nebulization 2 (two) times a day Rinse mouth after use   3/15/19 DIANA Romero   carvedilol (COREG) 12 5 mg tablet Take 1 tablet by mouth 2 (two) times a day with meals 8/21/17   Sanjana Schmitz MD   clopidogrel (PLAVIX) 75 mg tablet Take 1 tablet by mouth daily 6/19/17   Sanjana Schmitz MD   docusate sodium (COLACE) 100 mg capsule Take 1 capsule (100 mg total) by mouth 2 (two) times a day 2/18/18   Crystal Irene MD   Fesoterodine Fumarate ER (TOVIAZ) 8 MG TB24 Take 4 mg by mouth every evening      Historical Provider, MD   ipratropium-albuterol (DUO-NEB) 0 5-2 5 mg/3 mL nebulizer solution Take 3 mL by nebulization 4 (four) times a day as needed for wheezing or shortness of breath    Historical Provider, MD   ketoconazole (NIZORAL) 2 % cream Apply topically to both feet in their entirety (tops, bottoms and between toes) 3 times a day for 4 weeks straight   10/16/19   Shirley Leger MD   latanoprost (XALATAN) 0 005 % ophthalmic solution Administer 1 drop to both eyes daily at bedtime    Historical Provider, MD   levocetirizine (XYZAL) 5 MG tablet Take 1 tablet by mouth every evening 6/19/17   Sanjana Schmitz MD   LORazepam (ATIVAN) 0 5 mg tablet Take 0 5 mg by mouth every 8 (eight) hours as needed for anxiety    Historical Provider, MD   omeprazole (PriLOSEC) 40 MG capsule Take 1 capsule by mouth daily 8/30/17   Historical Provider, MD   PARoxetine (PAXIL) 20 mg tablet Take 1 tablet by mouth daily    Historical Provider, MD   polyethylene glycol (MIRALAX) 17 g packet Take 17 g by mouth daily as needed    Historical Provider, MD   predniSONE 5 mg tablet Take 1 tablet (5 mg total) by mouth daily 10/22/19   Thom CourserLADY   psyllium (METAMUCIL) 0 52 g capsule Take 0 52 g by mouth daily    Historical Provider, MD   senna (SENOKOT) 8 6 MG tablet Take 2 tablets by mouth daily at bedtime    Historical Provider, MD   spironolactone (ALDACTONE) 25 mg tablet Take 1 tablet (25 mg total) by mouth daily 2/4/18   Li Dolphin, CRNP   tamsulosin (FLOMAX) 0 4 mg Take 1 capsule by mouth daily    Historical Provider, MD         Allergies: Allergies   Allergen Reactions    Penicillins Rash       Social History:     Marital Status:    Occupation:   Patient Pre-hospital Living Situation:   Patient Pre-hospital Level of Mobility:   Patient Pre-hospital Diet Restrictions:   Substance Use History:   Social History     Substance and Sexual Activity   Alcohol Use No     Social History     Tobacco Use   Smoking Status Former Smoker    Packs/day: 1 00    Years: 22 00    Pack years: 22 00    Types: Cigarettes    Start date: [facility]    Last attempt to quit:     Years since quittin 8   Smokeless Tobacco Never Used     Social History     Substance and Sexual Activity   Drug Use No       Family History:    Family History   Family history unknown: Yes       Physical Exam:     Vitals:   Blood Pressure: 165/81 (19 1430)  Pulse: 71 (19 1430)  Temperature: 98 9 °F (37 2 °C) (19 1013)  Temp Source: Oral (19 1013)  Respirations: 18 (19 1430)  SpO2: 97 % (19 1430)    Physical Exam    Constitutional: Pt appears comfortable  Not in any acute distress  HENT:   Head: Normocephalic and atraumatic  Eyes: EOM are normal    Neck: Neck supple  Cardiovascular: Normal rate, regular rhythm, normal heart sounds  No murmur heard  Pulmonary/Chest: Effort normal, air entry b/l equal  No respiratory distress  Pt has bl LL faint crackles  Abdominal: Soft  Non-distended, Non-tender  Bowel sounds are normal    Neurological: alert and oriented to self, place and person, knows its 2019 but doesn't know the month  Moving all extremities spontaneously  Mild LUE weakness, no numbness  Speech slurred but baseline per pt  Psychiatric: normal mood and affect  Additional Data:     Lab Results: I have personally reviewed pertinent reports        Results from last 7 days   Lab Units 19  1157   WBC Thousand/uL 16 56*   HEMOGLOBIN g/dL 14 4   HEMATOCRIT % 44 3   PLATELETS Thousands/uL 116*   NEUTROS PCT % 85*   LYMPHS PCT % 5*   MONOS PCT % 9   EOS PCT % 1     Results from last 7 days   Lab Units 11/14/19  1157   SODIUM mmol/L 134*   POTASSIUM mmol/L 3 7   CHLORIDE mmol/L 104   CO2 mmol/L 23   BUN mg/dL 26*   CREATININE mg/dL 1 25   ANION GAP mmol/L 7   CALCIUM mg/dL 9 3   ALBUMIN g/dL 3 6   TOTAL BILIRUBIN mg/dL 0 76   ALK PHOS U/L 85   ALT U/L 16   AST U/L 10   GLUCOSE RANDOM mg/dL 116                       Imaging: I have personally reviewed pertinent reports  CT head without contrast   Final Result by Nisha Clayton MD (11/14 1340)      1  No acute intracranial CT abnormality  2   Sequela of remote infarcts and moderate chronic white matter microangiopathy  Workstation performed: [id number]         XR chest 2 views   Final Result by Db Ozuna MD (11/14 1401)      No acute cardiopulmonary disease  Workstation performed: [id number]             EKG, Pathology, and Other Studies Reviewed on Admission:   · EKG:     Allscripts / Epic Records Reviewed: Yes     ** Please Note: This note has been constructed using a voice recognition system   **

## 2019-11-14 NOTE — PLAN OF CARE
Problem: Potential for Falls  Goal: Patient will remain free of falls  Description  INTERVENTIONS:  - Assess patient frequently for physical needs  -  Identify cognitive and physical deficits and behaviors that affect risk of falls  -  [place] fall precautions as indicated by assessment   - Educate patient/family on patient safety including physical limitations  - Instruct patient to call for assistance with activity based on assessment  - Modify environment to reduce risk of injury  - Consider OT/PT consult to assist with strengthening/mobility  Outcome: Progressing     Problem: Prexisting or High Potential for Compromised Skin Integrity  Goal: Skin integrity is maintained or improved  Description  INTERVENTIONS:  - Identify patients at risk for skin breakdown  - Assess and monitor skin integrity  - Assess and monitor nutrition and hydration status  - Monitor labs   - Assess for incontinence   - Turn and reposition patient  - Assist with mobility/ambulation  - Relieve pressure over bony prominences  - Avoid friction and shearing  - Provide appropriate hygiene as needed including keeping skin clean and dry  - Evaluate need for skin moisturizer/barrier cream  - Collaborate with interdisciplinary team   - Patient/family teaching  - Consider wound care consult   Outcome: Progressing     Problem: Neurological Deficit  Goal: Neurological status is stable or improving  Description  Interventions:  - Monitor and assess patient's level of consciousness, motor function, sensory function, and level of assistance needed for ADLs  - Monitor and report changes from baseline  Collaborate with interdisciplinary team to initiate plan and implement interventions as ordered  - Provide and maintain a safe environment  - Consider seizure precautions  - Consider fall precautions  - Consider aspiration precautions  - Consider bleeding precautions  Outcome: Progressing     Problem:  Activity Intolerance/Impaired Mobility  Goal: Mobility/activity is maintained at optimum level for patient  Description  Interventions:  - Assess and monitor patient  barriers to mobility and need for assistive/adaptive devices  - Assess patient's emotional response to limitations  - Collaborate with interdisciplinary team and initiate plans and interventions as ordered  - Encourage independent activity per ability   - Maintain proper body alignment  - Perform active/passive rom as tolerated/ordered  - Plan activities to conserve energy   - Turn patient as appropriate  Outcome: Progressing     Problem: Communication Impairment  Goal: Ability to express needs and understand communication  Description  Assess patient's communication skills and ability to understand information  Patient will demonstrate use of effective communication techniques, alternative methods of communication and understanding even if not able to speak  - Encourage communication and provide alternate methods of communication as needed  - Collaborate with case management/ for discharge needs  - Include patient/family/caregiver in decisions related to communication  Outcome: Progressing     Problem: Potential for Aspiration  Goal: Non-ventilated patient's risk of aspiration is minimized  Description  Assess and monitor vital signs, respiratory status, and labs (WBC)  Monitor for signs of aspiration (tachypnea, cough, rales, wheezing, cyanosis, fever)  - Assess and monitor patient's ability to swallow  - Place patient up in chair to eat if possible  - HOB up at 90 degrees to eat if unable to get patient up into chair   - Supervise patient during oral intake  - Instruct patient/ family to take small bites  - Instruct patient/ family to take small single sips when taking liquids    - Follow patient-specific strategies generated by speech pathologist   Outcome: Progressing  Goal: Ventilated patient's risk of aspiration is minimized  Description  Assess and monitor vital signs, respiratory status, airway cuff pressure, and labs (WBC)  Monitor for signs of aspiration (tachypnea, cough, rales, wheezing, cyanosis, fever)  - Elevate head of bed 30 degrees if patient has tube feeding   - Monitor tube feeding  Outcome: Progressing     Problem: Nutrition  Goal: Nutrition/Hydration status is improving  Description  Monitor and assess patient's nutrition/hydration status for malnutrition (ex- brittle hair, bruises, dry skin, pale skin and conjunctiva, muscle wasting, smooth red tongue, and disorientation)  Collaborate with interdisciplinary team and initiate plan and interventions as ordered  Monitor patient's weight and dietary intake as ordered or per policy  Utilize nutrition screening tool and intervene per policy  Determine patient's food preferences and provide high-protein, high-caloric foods as appropriate  - Assist patient with eating   - Allow adequate time for meals   - Encourage patient to take dietary supplement as ordered  - Collaborate with clinical nutritionist   - Include patient/family/caregiver in decisions related to nutrition    Outcome: Progressing

## 2019-11-14 NOTE — ASSESSMENT & PLAN NOTE
WBC 16 on admission  No s/s of infection   Pt with chronic cough  UA unremarkable  CXR unremarkable  Check procal and blood cultures  Hold Abx for now

## 2019-11-14 NOTE — ED PROVIDER NOTES
History  Chief Complaint   Patient presents with    Altered Mental Status     per ems daughter called 911 d/t pt having altered mental status  reports hx of stroke with increased left sided weakness when sick     The patient presents for evaluation of altered mental status apparently has felt poorly this morning he had increased weakness in his left arm that lasted approximately 30 minutes he has been less active today according to family members the patient has actually no complaints the present time he states that is arm weakness has since resolved he states he was actually unable to lift it off the bed [facility] earlier today  Patient has had a mild cough with no complaints of shortness of breath he has had no fever or chills as far as he knows  He has had no vomiting or diarrhea no melena or bright red blood per rectum no recent falls  Past medical history includes stroke with some left-sided residual weakness  Patient denies alcohol use he is a former smoker            Prior to Admission Medications   Prescriptions Last Dose Informant Patient Reported? Taking?    ASPIRIN 81 PO  Child Yes No   Sig: Take 1 tablet by mouth every other day     Fesoterodine Fumarate ER (TOVIAZ) 8 MG TB24  Child Yes No   Sig: Take 4 mg by mouth every evening     LORazepam (ATIVAN) 0 5 mg tablet  Child Yes No   Sig: Take 0 5 mg by mouth every 8 (eight) hours as needed for anxiety   PARoxetine (PAXIL) 20 mg tablet  Child Yes No   Sig: Take 1 tablet by mouth daily   acetaminophen (TYLENOL) 325 mg tablet  Child No No   Sig: Take 2 tablets by mouth every 6 (six) hours as needed for fever   albuterol (PROVENTIL HFA,VENTOLIN HFA) 90 mcg/act inhaler  Child No No   Sig: Inhale 2 puffs 4 (four) times a day   amLODIPine (NORVASC) 10 mg tablet  Child Yes No   Sig: Take 1 tablet by mouth daily   atorvastatin (LIPITOR) 10 mg tablet  Child Yes No   Sig: Take 1 tablet by mouth   benzonatate (TESSALON) 200 MG capsule  Child No No   Sig: TAKE 1 CAPSULE BY MOUTH THREE TIMES A DAY AS NEEDED FOR COUGH   budesonide (PULMICORT) 0 5 mg/2 mL nebulizer solution  Child No No   Sig: Take 1 vial (0 5 mg total) by nebulization 2 (two) times a day Rinse mouth after use  carvedilol (COREG) 12 5 mg tablet  Child No No   Sig: Take 1 tablet by mouth 2 (two) times a day with meals   clopidogrel (PLAVIX) 75 mg tablet  Child No No   Sig: Take 1 tablet by mouth daily   docusate sodium (COLACE) 100 mg capsule  Child No No   Sig: Take 1 capsule (100 mg total) by mouth 2 (two) times a day   ipratropium-albuterol (DUO-NEB) 0 5-2 5 mg/3 mL nebulizer solution  Child Yes No   Sig: Take 3 mL by nebulization 4 (four) times a day as needed for wheezing or shortness of breath   ketoconazole (NIZORAL) 2 % cream   No No   Sig: Apply topically to both feet in their entirety (tops, bottoms and between toes) 3 times a day for 4 weeks straight     latanoprost (XALATAN) 0 005 % ophthalmic solution  Child Yes No   Sig: Administer 1 drop to both eyes daily at bedtime   levocetirizine (XYZAL) 5 MG tablet  Child No No   Sig: Take 1 tablet by mouth every evening   omeprazole (PriLOSEC) 40 MG capsule  Child Yes No   Sig: Take 1 capsule by mouth daily   polyethylene glycol (MIRALAX) 17 g packet  Child Yes No   Sig: Take 17 g by mouth daily as needed   predniSONE 5 mg tablet   No No   Sig: Take 1 tablet (5 mg total) by mouth daily   psyllium (METAMUCIL) 0 52 g capsule  Child Yes No   Sig: Take 0 52 g by mouth daily   senna (SENOKOT) 8 6 MG tablet  Child Yes No   Sig: Take 2 tablets by mouth daily at bedtime   spironolactone (ALDACTONE) 25 mg tablet  Child No No   Sig: Take 1 tablet (25 mg total) by mouth daily   tamsulosin (FLOMAX) 0 4 mg  Child Yes No   Sig: Take 1 capsule by mouth daily      Facility-Administered Medications: None       Past Medical History:   Diagnosis Date    [facility] 5/31/2017    Aspiration into respiratory tract     Chest pain 5/31/2017    [facility]ch[facility] obstructive pulmonary disease) ([facility] )     Depression     Elevated troponin 2017    GERD (gastroesophageal reflux disease)     History of CVA (cerebrovascular accident) 2016    Hyperlipidemia     Hypertension     Lung [place] ([place] [address] ) 2005    Right, status post lobectomy    Pneumonia     Prostate cancer ([place] [address] )     Sleep apnea     awaiting sleep study results    Squamous cell skin cancer     Stroke ([place] [address] )     Tracheomalacia     Weakness due to cerebrovascular accident (CVA)        Past Surgical History:   Procedure Laterality Date    COLONOSCOPY      ESOPHAGOGASTRODUODENOSCOPY N/A 2016    Procedure: ESOPHAGOGASTRODUODENOSCOPY (EGD); Surgeon: Karine Sheridan MD;  [facility]; Service:     JOINT REPLACEMENT      knee replacement    LUNG LOBECTOMY      AK ESOPHAGOGASTRODUODENOSCOPY TRANSORAL DIAGNOSTIC N/A 3/15/2017    Procedure: ESOPHAGOGASTRODUODENOSCOPY (EGD); Surgeon: Karine Sheridan MD;  [facility]; Service: Gastroenterology    SKIN BIOPSY      TRIGEMINAL NERVE DECOMPRESSION         Family History   Family history unknown: Yes     I have reviewed and agree with the history as documented  Social History     Tobacco Use    Smoking status: Former Smoker     Packs/day: 1 00     Years: 22 00     Pack years: 22 00     Types: Cigarettes     Start date:      Last attempt to quit:      Years since quittin 8    Smokeless tobacco: Never Used   Substance Use Topics    Alcohol use: No    Drug use: No        Review of Systems   Constitutional: Positive for appetite change and fatigue  Negative for chills, diaphoresis and fever  HENT: Negative for facial swelling, nosebleeds, sore throat, trouble swallowing and voice change  Eyes: Negative for photophobia and visual disturbance  Respiratory: Positive for cough  Negative for chest tightness, shortness of breath and wheezing  Cardiovascular: Negative for chest pain and leg swelling     Gastrointestinal: Negative for abdominal pain, blood in stool, constipation, diarrhea, nausea and vomiting  No melena   Endocrine: Negative for polydipsia and polyuria  Genitourinary: Negative  Negative for decreased urine volume, dysuria, frequency, hematuria and urgency  Musculoskeletal: Positive for arthralgias  Skin: Negative for color change and rash  Allergic/Immunologic: Negative for immunocompromised state  Neurological: Negative for syncope, speech difficulty, weakness, numbness and headaches  Hematological: Does not bruise/bleed easily  Psychiatric/Behavioral: Positive for confusion  Physical Exam  Physical Exam   Constitutional: He is oriented to person, place, and time  He appears well-developed and well-nourished  No distress  HENT:   Head: Atraumatic  Eyes: Conjunctivae and EOM are normal  No scleral icterus  Neck: Neck supple  No JVD present  Carotid bruit is not present  Cardiovascular: Normal rate, regular rhythm, normal heart sounds, intact distal pulses and normal pulses  Exam reveals no gallop and no friction rub  No murmur heard  Pulmonary/Chest: Effort normal and breath sounds normal  No respiratory distress  Abdominal: Soft  Bowel sounds are normal  He exhibits no distension and no mass  There is no tenderness  Musculoskeletal: Normal range of motion  He exhibits no edema or tenderness  Neurological: He is alert and oriented to person, place, and time  He has normal strength  No cranial nerve deficit or sensory deficit  Coordination and gait normal  GCS eye subscore is 4  GCS verbal subscore is 5  GCS motor subscore is 6  Patient has a brace on his left leg has mild weakness in the left lower extremity   Skin: Skin is warm and dry  No rash noted  He is not diaphoretic  Psychiatric: He has a normal mood and affect  His behavior is normal  Judgment and thought content normal    Vitals reviewed        Vital Signs  ED Triage Vitals [11/14/19 1013]   Temperature Pulse Respirations Blood Pressure SpO2   98 9 °F (37 2 °C) 77 22 135/65 96 %      Temp Source Heart Rate Source Patient Position - Orthostatic VS BP Location FiO2 (%)   Oral Monitor Sitting Right arm --      Pain Score       No Pain           Vitals:    11/14/19 1230 11/14/19 1302 11/14/19 1330 11/14/19 1400   BP: 147/58 134/86 150/79 135/69   Pulse: (!) 147 (!) 133 74 70   Patient Position - Orthostatic VS: Sitting Sitting Lying Lying         Visual Acuity  Visual Acuity      Most Recent Value   L Pupil Size (mm)  2   R Pupil Size (mm)  2          ED Medications  Medications   sodium chloride 0 9 % bolus 1,000 mL (1,000 mL Intravenous New Bag 11/14/19 1304)   albuterol inhalation solution 5 mg (5 mg Nebulization Given 11/14/19 1302)       Diagnostic Studies  Results Reviewed     Procedure Component Value Units Date/Time    POCT urinalysis dipstick [[id number]]  (Normal) Resulted:  11/14/19 1414    Lab Status:  Final result Specimen:  Urine Updated:  11/14/19 1414     Color, UA yellow    Urine Macroscopic, POC [[id number]] Collected:  11/14/19 1413    Lab Status:  Final result Specimen:  Urine Updated:  11/14/19 1414     Color, UA Yellow     Clarity, UA Clear     pH, UA 7 0     Leukocytes, UA Negative     Nitrite, UA Negative     Protein, UA Negative mg/dl      Glucose, UA Negative mg/dl      Ketones, UA Negative mg/dl      Urobilinogen, UA 0 2 E U /dl      Bilirubin, UA Negative     Blood, UA Negative     Specific Gravity, UA 1 015    Narrative:       CLINITEK RESULT    Troponin I [[id number]]  (Normal) Collected:  11/14/19 1249    Lab Status:  Final result Specimen:  Blood from Arm, Left Updated:  11/14/19 1354     Troponin I <0 02 ng/mL     Ammonia [[id number]]  (Normal) Collected:  11/14/19 1249    Lab Status:  Final result Specimen:  Blood from Arm, Left Updated:  11/14/19 1315     Ammonia 20 umol/L     Blood gas, venous [[id number]]  (Abnormal) Collected:  11/14/19 1302    Lab Status:  Final result Specimen:  Blood from Arm, Right Updated:  11/14/19 1314     pH, Alfonzo 7 405     pCO2, Alfonzo 37 9 mm Hg      pO2, Alfonzo 52 5 mm Hg      HCO3, Alfonzo 23 2 mmol/L      Base Excess, Alfonzo -1 1 mmol/L      O2 Content, Alfonzo 18 5 ml/dL      O2 HGB, VENOUS 86 4 %     Comprehensive metabolic panel [[id number]]  (Abnormal) Collected:  11/14/19 1157    Lab Status:  Final result Specimen:  Blood from Arm, Right Updated:  11/14/19 1229     Sodium 134 mmol/L      Potassium 3 7 mmol/L      Chloride 104 mmol/L      CO2 23 mmol/L      ANION GAP 7 mmol/L      BUN 26 mg/dL      Creatinine 1 25 mg/dL      Glucose 116 mg/dL      Calcium 9 3 mg/dL      AST 10 U/L      ALT 16 U/L      Alkaline Phosphatase 85 U/L      Total Protein 7 1 g/dL      Albumin 3 6 g/dL      Total Bilirubin 0 76 mg/dL      eGFR 54 ml/min/1 73sq m     Narrative:       [facility] guidelines for Chronic Kidney Disease (CKD):     Stage 1 with normal or high GFR (GFR > 90 mL/min/1 73 square meters)    Stage 2 Mild CKD (GFR = 60-89 mL/min/1 73 square meters)    Stage 3A Moderate CKD (GFR = 45-59 mL/min/1 73 square meters)    Stage 3B Moderate CKD (GFR = 30-44 mL/min/1 73 square meters)    Stage 4 Severe CKD (GFR = 15-29 mL/min/1 73 square meters)    Stage 5 End Stage CKD (GFR <15 mL/min/1 73 square meters)  Note: GFR calculation is accurate only with a steady state creatinine    CBC and differential [[id number]]  (Abnormal) Collected:  11/14/19 1157    Lab Status:  Final result Specimen:  Blood from Arm, Right Updated:  11/14/19 1219     WBC 16 56 Thousand/uL      RBC 4 85 Million/uL      Hemoglobin 14 4 g/dL      Hematocrit 44 3 %      MCV 91 fL      MCH 29 7 pg      MCHC 32 5 g/dL      RDW 13 8 %      MPV 11 2 fL      Platelets 415 Thousands/uL      nRBC 0 /100 WBCs      Neutrophils Relative 85 %      Immat GRANS % 0 %      Lymphocytes Relative 5 %      Monocytes Relative 9 %      Eosinophils Relative 1 %      Basophils Relative 0 %      Neutrophils Absolute 14 18 Thousands/µL      Immature Grans Absolute 0 06 Thousand/uL      Lymphocytes Absolute 0 75 Thousands/µL      Monocytes Absolute 1 45 Thousand/µL      Eosinophils Absolute 0 09 Thousand/µL      Basophils Absolute 0 03 Thousands/µL                  CT head without contrast   Final Result by Tanner Espitia MD (11/14 1340)      1  No acute intracranial CT abnormality  2   Sequela of remote infarcts and moderate chronic white matter microangiopathy  Workstation performed: [id number]         XR chest 2 views   Final Result by April Hoyt MD (11/14 5137)      No acute cardiopulmonary disease  Workstation performed: [id number]                    Procedures  Procedures       ED Course           Identification of Seniors at Risk      Most Recent Value   (ISAR) Identification of Seniors at Risk   Before the illness or injury that brought you to the Emergency, did you need someone to help you on a regular basis? 1 Filed at: 11/14/2019 1014   In the last 24 hours, have you needed more help than usual?  1 Filed at: 11/14/2019 1014   Have you been hospitalized for one or more nights during the past 6 months? 1 Filed at: 11/14/2019 1014   In general, do you see well? 1 Filed at: 11/14/2019 1014   In general, do you have serious problems with your memory? 1 Filed at: 11/14/2019 1014   Do you take more than three different medications every day? 1 Filed at: 11/14/2019 1014   ISAR Score  6 Filed at: 11/14/2019 1014                          MDM  Number of Diagnoses or Management Options  Diagnosis management comments: Differential considerations would include TIA, stroke, CNS bleed, infectious process, electrolyte abnormalities severe anemia  Patient will undergo lab workup urine CT head chest x-ray likely admission for transient ischemic attack      Disposition  Final diagnoses:    Altered mental status   RONAL (acute kidney injury) ([facility] )   TIA (transient ischemic attack)     Time reflects when diagnosis was documented in both MDM as applicable and the Disposition within this note     Time User Action Codes Description Comment    11/14/2019  2:24 PM Juma Pont Add [R41 82] Altered mental status     11/14/2019  2:24 PM Vel Fernandez Add [N17 9] RONAL (acute kidney injury) ([facility] )     11/14/2019  2:24 PM Vel Fernandez Add [G45 9] TIA (transient ischemic attack)       ED Disposition     ED Disposition Condition Date/Time Comment    Admit Stable Thu Nov 14, 2019  2:24 PM       Follow-up Information    None         Patient's Medications   Discharge Prescriptions    No medications on file     No discharge procedures on file      ED Provider  Electronically Signed by           Kelli Rudolph MD  11/14/19 9934

## 2019-11-14 NOTE — SOCIAL WORK
CM consult for ISAR>=3    Pt presents via EMS with altered mental status  CM contacted pt daughter J Carlos Estrella ([phone]) to gather baseline information, discuss role of CM and discuss any needs pt may have prior to d/c  Pt lives with his daughter Bernice Garcia in a 2 SH, 4 [address]VAL[address]  Pt bedroom is on the 1st floor and bathroom is on the 2nd floor  Pt requires assistance with ADLs  Daughter has hired a private duty CNA to assist pt get out of bed and get ready in the mornings  CNA comes to the home 2-3hrs/day, 6/week  Pt primarily uses a wheel chair but uses a rw and requires assistance x1 to go up and down the stairs  Pt has a urinal or bsc on the first floor  Pt daughter is a nurse, she works night shift and is home during the day however pt is left unsupervised at times while she runs errands  Pt last hospitalization was 01/27-02/02/19  Pt was d/c to [facility] for rehab  Pt then stayed at [address] [facility]  Pt received services from [facility] at [address] [facility] and then had home services when he returned to his daughter's house  ([address] from [place])  Pt attends all of his follow up appointments without issue  Pt daughter manages his medications with the use of weekly pill box  Pt is compliant with his medication  There are no concerns paying for medications  Daughter did note that they will be short 1 box of medication that is prescribed by the pulmonologist  CM suggested she contact that office to request sample or new script  Pt [facility] by his pulmonologist and had his initial consult on 10/22/19 and f/u visit on 11/05/19  Pt does not have any difficulty seeing  He had cataracts removed from both eyes in the spring and only requires reading glasses       CM to remain available for d/c needs and recommendations from the care team

## 2019-11-14 NOTE — ASSESSMENT & PLAN NOTE
With chronic cough  On modified diet at home -Pureed with honey thick liquids  Will continue for now    Will request speech and swallow eval

## 2019-11-15 ENCOUNTER — APPOINTMENT (INPATIENT)
Dept: NEUROLOGY | Facility: CLINIC | Age: 79
DRG: 057 | End: 2019-11-15
Payer: MEDICARE

## 2019-11-15 ENCOUNTER — APPOINTMENT (INPATIENT)
Dept: RADIOLOGY | Facility: HOSPITAL | Age: 79
DRG: 057 | End: 2019-11-15
Payer: MEDICARE

## 2019-11-15 LAB
ANION GAP SERPL CALCULATED.3IONS-SCNC: 8 MMOL/L (ref 4–13)
ATRIAL RATE: 76 BPM
BASOPHILS # BLD AUTO: 0.04 THOUSANDS/ΜL (ref 0–0.1)
BASOPHILS NFR BLD AUTO: 0 % (ref 0–1)
BUN SERPL-MCNC: 25 MG/DL (ref 5–25)
CALCIUM SERPL-MCNC: 9.4 MG/DL (ref 8.3–10.1)
CHLORIDE SERPL-SCNC: 106 MMOL/L (ref 100–108)
CHOLEST SERPL-MCNC: 94 MG/DL (ref 50–200)
CO2 SERPL-SCNC: 25 MMOL/L (ref 21–32)
CREAT SERPL-MCNC: 1.17 MG/DL (ref 0.6–1.3)
EOSINOPHIL # BLD AUTO: 0.26 THOUSAND/ΜL (ref 0–0.61)
EOSINOPHIL NFR BLD AUTO: 2 % (ref 0–6)
ERYTHROCYTE [DISTWIDTH] IN BLOOD BY AUTOMATED COUNT: 13.9 % (ref 11.6–15.1)
EST. AVERAGE GLUCOSE BLD GHB EST-MCNC: 103 MG/DL
GFR SERPL CREATININE-BSD FRML MDRD: 59 ML/MIN/1.73SQ M
GLUCOSE SERPL-MCNC: 134 MG/DL (ref 65–140)
HBA1C MFR BLD: 5.2 % (ref 4.2–6.3)
HCT VFR BLD AUTO: 47.7 % (ref 36.5–49.3)
HDLC SERPL-MCNC: 44 MG/DL
HGB BLD-MCNC: 15.3 G/DL (ref 12–17)
IMM GRANULOCYTES # BLD AUTO: 0.05 THOUSAND/UL (ref 0–0.2)
IMM GRANULOCYTES NFR BLD AUTO: 0 % (ref 0–2)
LDLC SERPL CALC-MCNC: 35 MG/DL (ref 0–100)
LYMPHOCYTES # BLD AUTO: 0.8 THOUSANDS/ΜL (ref 0.6–4.47)
LYMPHOCYTES NFR BLD AUTO: 7 % (ref 14–44)
MCH RBC QN AUTO: 29.5 PG (ref 26.8–34.3)
MCHC RBC AUTO-ENTMCNC: 32.1 G/DL (ref 31.4–37.4)
MCV RBC AUTO: 92 FL (ref 82–98)
MONOCYTES # BLD AUTO: 1.27 THOUSAND/ΜL (ref 0.17–1.22)
MONOCYTES NFR BLD AUTO: 11 % (ref 4–12)
NEUTROPHILS # BLD AUTO: 9.35 THOUSANDS/ΜL (ref 1.85–7.62)
NEUTS SEG NFR BLD AUTO: 80 % (ref 43–75)
NRBC BLD AUTO-RTO: 0 /100 WBCS
P AXIS: 122 DEGREES
PLATELET # BLD AUTO: 126 THOUSANDS/UL (ref 149–390)
PMV BLD AUTO: 11.3 FL (ref 8.9–12.7)
POTASSIUM SERPL-SCNC: 4 MMOL/L (ref 3.5–5.3)
PR INTERVAL: 216 MS
PROCALCITONIN SERPL-MCNC: 0.09 NG/ML
QRS AXIS: 12 DEGREES
QRSD INTERVAL: 94 MS
QT INTERVAL: 368 MS
QTC INTERVAL: 414 MS
RBC # BLD AUTO: 5.18 MILLION/UL (ref 3.88–5.62)
SODIUM SERPL-SCNC: 139 MMOL/L (ref 136–145)
T WAVE AXIS: 66 DEGREES
TRIGL SERPL-MCNC: 75 MG/DL
VENTRICULAR RATE: 76 BPM
WBC # BLD AUTO: 11.77 THOUSAND/UL (ref 4.31–10.16)

## 2019-11-15 PROCEDURE — 95816 EEG AWAKE AND DROWSY: CPT

## 2019-11-15 PROCEDURE — 95819 EEG AWAKE AND ASLEEP: CPT | Performed by: PSYCHIATRY & NEUROLOGY

## 2019-11-15 PROCEDURE — A9585 GADOBUTROL INJECTION: HCPCS | Performed by: INTERNAL MEDICINE

## 2019-11-15 PROCEDURE — 70553 MRI BRAIN STEM W/O & W/DYE: CPT

## 2019-11-15 PROCEDURE — G8988 SELF CARE GOAL STATUS: HCPCS

## 2019-11-15 PROCEDURE — 80048 BASIC METABOLIC PNL TOTAL CA: CPT | Performed by: NURSE PRACTITIONER

## 2019-11-15 PROCEDURE — 93010 ELECTROCARDIOGRAM REPORT: CPT | Performed by: INTERNAL MEDICINE

## 2019-11-15 PROCEDURE — 97163 PT EVAL HIGH COMPLEX 45 MIN: CPT

## 2019-11-15 PROCEDURE — 85025 COMPLETE CBC W/AUTO DIFF WBC: CPT | Performed by: NURSE PRACTITIONER

## 2019-11-15 PROCEDURE — 70496 CT ANGIOGRAPHY HEAD: CPT

## 2019-11-15 PROCEDURE — 70498 CT ANGIOGRAPHY NECK: CPT

## 2019-11-15 PROCEDURE — 94760 N-INVAS EAR/PLS OXIMETRY 1: CPT

## 2019-11-15 PROCEDURE — 92610 EVALUATE SWALLOWING FUNCTION: CPT

## 2019-11-15 PROCEDURE — 80061 LIPID PANEL: CPT | Performed by: INTERNAL MEDICINE

## 2019-11-15 PROCEDURE — 94640 AIRWAY INHALATION TREATMENT: CPT

## 2019-11-15 PROCEDURE — G8997 SWALLOW GOAL STATUS: HCPCS

## 2019-11-15 PROCEDURE — 97167 OT EVAL HIGH COMPLEX 60 MIN: CPT

## 2019-11-15 PROCEDURE — 99223 1ST HOSP IP/OBS HIGH 75: CPT | Performed by: PSYCHIATRY & NEUROLOGY

## 2019-11-15 PROCEDURE — 97530 THERAPEUTIC ACTIVITIES: CPT

## 2019-11-15 PROCEDURE — 84145 PROCALCITONIN (PCT): CPT | Performed by: NURSE PRACTITIONER

## 2019-11-15 PROCEDURE — G8979 MOBILITY GOAL STATUS: HCPCS

## 2019-11-15 PROCEDURE — 99232 SBSQ HOSP IP/OBS MODERATE 35: CPT | Performed by: NURSE PRACTITIONER

## 2019-11-15 PROCEDURE — G8987 SELF CARE CURRENT STATUS: HCPCS

## 2019-11-15 PROCEDURE — G8978 MOBILITY CURRENT STATUS: HCPCS

## 2019-11-15 PROCEDURE — G8996 SWALLOW CURRENT STATUS: HCPCS

## 2019-11-15 PROCEDURE — 83036 HEMOGLOBIN GLYCOSYLATED A1C: CPT | Performed by: INTERNAL MEDICINE

## 2019-11-15 RX ADMIN — DOCUSATE SODIUM 100 MG: 100 CAPSULE, LIQUID FILLED ORAL at 10:00

## 2019-11-15 RX ADMIN — CARVEDILOL 12.5 MG: 12.5 TABLET, FILM COATED ORAL at 17:45

## 2019-11-15 RX ADMIN — SPIRONOLACTONE 25 MG: 25 TABLET ORAL at 10:00

## 2019-11-15 RX ADMIN — PAROXETINE 20 MG: 20 TABLET, FILM COATED ORAL at 10:00

## 2019-11-15 RX ADMIN — CLOPIDOGREL BISULFATE 75 MG: 75 TABLET ORAL at 10:00

## 2019-11-15 RX ADMIN — BUDESONIDE 0.5 MG: 0.5 INHALANT RESPIRATORY (INHALATION) at 07:55

## 2019-11-15 RX ADMIN — PANTOPRAZOLE SODIUM 40 MG: 40 TABLET, DELAYED RELEASE ORAL at 05:30

## 2019-11-15 RX ADMIN — AMLODIPINE BESYLATE 10 MG: 10 TABLET ORAL at 10:00

## 2019-11-15 RX ADMIN — BUDESONIDE 0.5 MG: 0.5 INHALANT RESPIRATORY (INHALATION) at 19:22

## 2019-11-15 RX ADMIN — IOHEXOL 85 ML: 350 INJECTION, SOLUTION INTRAVENOUS at 14:40

## 2019-11-15 RX ADMIN — PREDNISONE 5 MG: 5 TABLET ORAL at 10:00

## 2019-11-15 RX ADMIN — ENOXAPARIN SODIUM 40 MG: 40 INJECTION SUBCUTANEOUS at 10:00

## 2019-11-15 RX ADMIN — ATORVASTATIN CALCIUM 40 MG: 40 TABLET, FILM COATED ORAL at 17:45

## 2019-11-15 RX ADMIN — DOCUSATE SODIUM 100 MG: 100 CAPSULE, LIQUID FILLED ORAL at 17:45

## 2019-11-15 RX ADMIN — ASPIRIN 81 MG 81 MG: 81 TABLET ORAL at 10:00

## 2019-11-15 RX ADMIN — CARVEDILOL 12.5 MG: 12.5 TABLET, FILM COATED ORAL at 10:00

## 2019-11-15 RX ADMIN — PSYLLIUM HUSK 1 PACKET: 3.4 POWDER ORAL at 10:00

## 2019-11-15 RX ADMIN — SENNOSIDES 17.2 MG: 8.6 TABLET, FILM COATED ORAL at 21:37

## 2019-11-15 RX ADMIN — LATANOPROST 1 DROP: 50 SOLUTION OPHTHALMIC at 21:37

## 2019-11-15 RX ADMIN — GADOBUTROL 10 ML: 604.72 INJECTION INTRAVENOUS at 08:54

## 2019-11-15 RX ADMIN — TAMSULOSIN HYDROCHLORIDE 0.4 MG: 0.4 CAPSULE ORAL at 10:00

## 2019-11-15 RX ADMIN — LORAZEPAM 0.5 MG: 2 INJECTION INTRAMUSCULAR; INTRAVENOUS at 08:09

## 2019-11-15 NOTE — PHYSICAL THERAPY NOTE
Physical Therapy Evaluation    Patient's Name: Ruth Reddy      Admitting Diagnosis  TIA (transient ischemic attack) [G45 9]  Altered mental status [R41 82]  RONAL (acute kidney injury) ([facility] [address] ) [N17 9]  Left-sided weakness [R53 1]  H/O: CVA (cerebrovascular accident) [Z86 73]    Problem List  Patient Active Problem List   Diagnosis    Essential hypertension    Lung cancer ([facility] [address] )    Oropharyngeal dysphagia    History of cerebrovascular accident (CVA) with residual deficit    GERD (gastroesophageal reflux disease)    Left-sided weakness    Prostate cancer ([facility] [address] )    Hyperlipidemia    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    Tracheomalacia    Ambulatory dysfunction    Chronic cough    Other emphysema ([facility] [address] )    BPH (benign prostatic hyperplasia)    LIAM (obstructive sleep apnea)    Bacteriuria    Thrombocytopenia (HCC)    Delirium    Weight loss    COPD (chronic obstructive pulmonary disease) (HCC)    Abnormal CT of the chest    MCI (mild cognitive impairment)    Encounter for vitamin deficiency screening    H/O fall    Other constipation    Polypharmacy    Anxiety    Acute on chronic left-sided weakness    Neutrophilic leukocytosis       Past Medical History  Past Medical History:   Diagnosis Date    RONAL (acute kidney injury) ([facility] [address] ) 5/31/2017    Aspiration into respiratory tract     Chest pain 5/31/2017    COPD (chronic obstructive pulmonary disease) (HCC)     Depression     Elevated troponin 5/31/2017    GERD (gastroesophageal reflux disease)     History of CVA (cerebrovascular accident) 4/13/2016    Hyperlipidemia     Hypertension     Lung cancer ([facility] [address] ) 2005    Right, status post lobectomy    Pneumonia     Prostate cancer ([facility] [address] )     Sleep apnea     awaiting sleep study results    Squamous cell skin cancer     Stroke ([facility] [address] )     Tracheomalacia     Weakness due to cerebrovascular accident (CVA)        Past Surgical History  Past Surgical History:   Procedure Laterality Date    COLONOSCOPY      ESOPHAGOGASTRODUODENOSCOPY N/A 4/13/2016    Procedure: ESOPHAGOGASTRODUODENOSCOPY (EGD); Surgeon: Dee Roland MD;  [facility]; Service:     JOINT REPLACEMENT      knee replacement    LUNG LOBECTOMY      NH ESOPHAGOGASTRODUODENOSCOPY TRANSORAL DIAGNOSTIC N/A 3/15/2017    Procedure: ESOPHAGOGASTRODUODENOSCOPY (EGD); Surgeon: Dee Roland MD;  [facility]: [facility]; Service: Gastroenterology    SKIN BIOPSY      TRIGEMINAL NERVE DECOMPRESSION            11/15/19 1101   Note Type   Note type Eval only   Pain Assessment   Pain Assessment No/denies pain   Pain Score No Pain   Home Living   Type of Home House   Home Layout Two level;Bed/bath upstairs; Able to live on main level with bedroom/bathroom  (4 OSVALDO; bed on 1st; bath on 2nd)   [address]; Wheelchair-manual  (Primarily uses W/C; uses RW to help w/transfers)   Additional Comments Pt lives in a 2  with 4 OSVALDO; pt currently lives with his daughter; she currently works overnight and manages his meds; daughter's  and kids are usually with him at night   Prior Function   Level of [place] Needs assistance with ADLs and functional mobility; Needs assistance with IADLs   Lives With Daughter   Receives Help From Family   ADL Assistance Needs assistance   IADLs Needs assistance   Falls in the last 6 months 0   Vocational Retired   Comments Pt has a private CNA to assist patient OOB and to get ready; 2-3hrs/day 6 days/wk   Restrictions/Precautions   Braces or Orthoses MAFO   Other Precautions Cognitive; Chair Alarm;Multiple lines;Telemetry; Fall Risk   General   Family/Caregiver Present No   Cognition   Overall Cognitive Status Impaired   Arousal/Participation Cooperative   Orientation Level Oriented to place   Memory Decreased short term memory   Following Commands Follows one step commands with increased time or repetition   Comments Pt able to self-ID; unable to provide accurate birthday, situation, or time   RLE Assessment   RLE Assessment WFL  (grossly 4/5)   LLE Assessment   LLE Assessment   (grossly 3+/5)   Bed Mobility   Rolling R Unable to assess   Rolling L Unable to assess   Supine to Sit Unable to assess   Sit to Supine Unable to assess   Additional Comments Pt received upright in chair to begin session   Transfers   Sit to Stand 2  Maximal assistance   Additional items Assist x 1; Increased time required   Stand to Sit 2  Maximal assistance   Additional items Assist x 1; Increased time required   Additional Comments maintained standing position for ~10sec; pt unable to fully stand on (L) foot   Ambulation/Elevation   Gait pattern Not tested   Gait Assistance Not tested   Assistive Device Rolling walker   Distance 0'; static standing for ~10sec   Balance   Static Sitting Fair   Dynamic Sitting Fair   Static Standing Poor -   Dynamic Standing Poor -   Endurance Deficit   Endurance Deficit Yes   Endurance Deficit Description fatigued quickly   Activity Tolerance   Activity Tolerance Patient limited by fatigue   Nurse Made Aware Yes Nika COULTER   Assessment   Prognosis Fair   Problem List Decreased strength;Decreased endurance; Impaired balance;Decreased mobility; Decreased cognition   Assessment Pt is a 78 y/o male who is seen for high complexity PT evaluation w/acute on chronic (L) sided weakness and altered mental status  Pt [facility] in to [facility] due to above symptoms as well as disorientation per family  MRI (-) for acute ischemia, mass, or hemorrhage  Pt co-morbidities/PMHx consist of h/o CVA w/residual deficit, essential HTN, dysphagia, mild cognitive impairment, and neutrophilic leukocytosis  Pt currently lives in a two story home with his daughter  However, patient reports that her  and children are with him at night  Patient possibly lives with daughter and her family  PTA, patient was dependent with all ADLs and IADLs   Pt requires assistance getting OOB, getting ready, and transferring to wheelchair  Pt has private CNA that assists him 2-3hrs/day 6 days/wk  Occasionally, pt uses RW for transfers and requires A x 1 to negotiate stairs  However, patient primarily utilizes the W/C  Pt reports that he is also home alone at times unsupervised  Patient cleared to be seen by nursing prior to evaluation  At time of evaluation, pt received seated upright in chair  Pt oriented to place, but not time, person, or situation  Prior to mobilization, MAFO placed onto (L) foot to assist with transfer  Pt required max A x 1 to perform sit to stand transfer  However, pt only able to stand for ~10sec  Pt demonstrated posterior lean during standing and was unable to maintain standing under his own power  Pt returned to chair to conclude therapy session with chair alarm re-activated  Pt demonstrates deficits in his balance, strength, coordination, endurance, mobility, and cognition  Note unstable/unpredictable clinical picture due to co-morbidities/PMHx, presentation as a fall risk, and decreased mobility compared to baseline per patient  Recommend that pt receive skilled PT until medically cleared for D/C  Once medically cleared for D/C, recommend the pt be D/C to rehab     Goals   Patient Goals to rest   STG Expiration Date 11/29/19   Short Term Goal #1 In 10-14 days, pt will be able to perform all transfers at min A x 1 to improve his ability to transfer in between various surfaces; pt will be able to perform bed mobility tasks at min A x 1 to improve his ability to get in and OOB; pt will be able to maintain standing balance w/UE support and supervision for ~5min to improve safety in standing; pt will be able to improve LE strength grade by 1/2-1 to improve his ability to ambulate short distances; pt will be able to be independent with wheelchair to improve his ability to navigate the community   PT Treatment Day 0   Plan   Treatment/Interventions Functional transfer training;LE strengthening/ROM; Therapeutic exercise; Endurance training;Bed mobility;Spoke to nursing;OT   PT Frequency   (3-5x/wk)   Recommendation   Recommendation Post acute IP rehab   Equipment Recommended Wheelchair   PT - OK to Discharge Yes  (once medically cleared; to rehab)   Modified McDuffie Scale   Modified McDuffie Scale 5   Barthel Index   Feeding 5   Bathing 0   Grooming Score 0   Dressing Score 0   Bladder Score 5   Bowels Score 10   Toilet Use Score 5   Transfers (Bed/Chair) Score 5   Mobility (Level Surface) Score 0   Stairs Score 0  (did not perform)   Barthel Index Score 30       Ezekiel Homans, SPT

## 2019-11-15 NOTE — QUICK NOTE
Reviewed chart, patient on stroke pathway  -    Attempted to meet with patient to discuss follow up care, stroke education, and discharge planning  Patient meeting with physician at this time  Stroke education binder and my card left at bedside  Will follow up

## 2019-11-15 NOTE — PROGRESS NOTES
Reviewed chart, patient on stroke pathway for possible TIA  -    Met with patient to discuss follow up care, discharge planning, and stroke education  Explained nurse navigator  Patient resides with daughter Adelina Gonzalez and her family  Daughter Adelina Gonzalez assists wit medications, appointments, and transportation  Patient has an aid that comes 5 days a week to assists with ADLs  Provided patient a stroke education binder and my card  Patient with a history of stroke  States he feels comfortable recognizing stroke symptoms  Also competent in stroke types and medications  Educated him on risk factor management and resources  He verbalizes understanding of information  Answered all his questions  No further questions or concerns at this time

## 2019-11-15 NOTE — RESTORATIVE TECHNICIAN NOTE
Restorative Specialist Mobility Note       Activity: Ambulate in room, Bathroom privileges, Chair, Stand at bedside, Dangle(Educated/encouraged pt to ambulate with assistance 3-4 x's/day  Chair alarm on   Pt callbell, phone/tray within reach )     Assistive Device: Other (Comment)(HHA x2 )       Fredy Browning BS, Restorative Technician,

## 2019-11-15 NOTE — PHYSICAL THERAPY NOTE
Physical Therapy Progress Note     11/15/19 1519   Pain Assessment   Pain Assessment No/denies pain   Pain Score No Pain   Restrictions/Precautions   Braces or Orthoses MAFO  (L)   General   Family/Caregiver Present No   Cognition   Overall Cognitive Status Impaired   Arousal/Participation Lethargic   Assessment   Prognosis Fair   Problem List Decreased strength;Decreased endurance; Impaired balance;Decreased mobility; Decreased coordination;Decreased cognition   Assessment Pt was seen for additional visit this afternoon to assist nursing staff with transfer from stretcher to bed via smooth   RN and transport present as well to assist   Repositioned in supine with max assist of two  SCDs and bed alarm active  Pt would benefit from continued physical therapy to maximize functional independence  Goals   Patient Goals None stated due to lethargy   STG Expiration Date 11/29/19   Short Term Goal #1 In 10-14 days, pt will be able to perform all transfers at min A x 1 to improve his ability to transfer in between various surfaces; pt will be able to perform bed mobility tasks at min A x 1 to improve his ability to get in and OOB; pt will be able to maintain standing balance w/UE support and supervision for ~5min to improve safety in standing; pt will be able to improve LE strength grade by 1/2-1 to improve his ability to ambulate short distances; pt will be able to be independent with wheelchair to improve his ability to navigate the community   PT Treatment Day 1   Plan   Treatment/Interventions Functional transfer training;LE strengthening/ROM; Therapeutic exercise; Endurance training;Cognitive reorientation;Patient/family training;Bed mobility;Spoke to nursing   Progress Progressing toward goals   PT Frequency   (3-5x/week)   Recommendation   Recommendation   (Rehab)   Equipment Recommended Wheelchair     Tab Fernandes, PTA

## 2019-11-15 NOTE — SPEECH THERAPY NOTE
Bedside Swallow Evaluation:    Summary:  Pt presents w/ mild oral and moderate pharyngeal dysphagia at bedside with baseline diet of puree and honey thick liquids  Patient has min prolonged a-p transfer time with intermittent large coughing episodes in response to honey thick liquids  Patient has long standing history of dysphagia and is well known to Leonila  He had an outpatient VBS on 4/30/19  Results are as follows: Moderate oral/mild-moderate pharyngeal dysphagia w/ decreased oral control of liquids and delayed swallow initiation resulting in aspiration before the swallow  Oral prep and chin tuck of thick liquids by tsp reduced aspiration risk  Patient continues with a puree diet and honey thick liquids at home, but does not carryover use of taking liquids via tsp and has been drinking via cup  He reports some inconsistencies with how thick liquids are prepped at home, but does report frequent coughing  From H&P: Arron Santana  is a 77 y o  male who presents with worsening left-sided weakness and confusion this morning noted by his caretaker  Patient lives with his daughter who works in transfer cenetr with [employer]  Per patient, when he woke up this morning he was unable to lift his left arm and left leg up  This lasted for approximately 2 hours and resolved on its own  When his caretaker arrived she noticed worsening weakness on the left side and she called patient's daughter  Also pt mentioned that he was feeling "goofy"     Called and spoke to pt's daughter  Per her pt did not know current year, date or month which he normally would know     Patient does have history of CVA with mild left-sided residual weakness  Per daughter, in the past with noted patient had infection he does have to worsening weakness on the left side       Recommendations:  Diet: Puree  Liquid: Honey thick   Meds: Whole in puree   Supervision: Close   Positioning:Upright  Strategies: Liquids via tsp with reminders to use chin tuck  Oral care: As needed   Aspiration precautions  Reflux precautions    Therapy Prognosis: Fair  Prognosis considerations: Detailed history of dysphagia   Frequency: 3-5x week    Consider consult w/:  None at this time     Reason for consult:  R/o aspiration  h/o dysphagia     Precautions:  None     Current diet:  Puree with honey thick liquids   Premorbid diet[de-identified]  Puree with honey thick liquids   Previous VBS:  See above   O2 requirement:  None  Voice/Speech:  Mild dysarthria   Social:  Lives with daughter; has caregivers   Follows commands:  WFL                        Cognitive Status:  Appears adequate  Oral [facility] exam:  Dentition: Edentulous   Labial strength and ROM: Min left side droop at rest  Lingual strength and ROM: WFL  Mandibular strength and ROM: WFL  Velum: WFL  Secretion management: WFL    Items administered:  Puree, honey thick liquid, meds whole in applesauce  Liquids were taken by tsp/cup  Oral stage: Mild  Lip closure: WFL  Mastication: N/A  Bolus formation: WFL  Bolus control: WFL  Transfer: Min prolonged  Oral residue: No  Pocketing: No    Pharyngeal stage: Moderate  Swallow promptness: Appears adequate  Laryngeal rise: Not palpated  Wet voice: No  Throat clear: No  Cough: At times with honey thick liquids   Secondary swallows: No  Audible swallows: No    Esophageal stage:  No s/s reported    Aspiration precautions posted    Results d/w:  Pt, nursing    Goal(s):  Pt will tolerate baseline diet of puree and honey thick liquids w/out s/s aspiration or oral/pharyngeal difficulties

## 2019-11-15 NOTE — CONSULTS
PATIENT NAME: Aggie Hwang  YOB: 1940   MEDICAL RECORD NUMBER: [id number]  HPI: Aggie Hwang  is a 77 y o  male with history of hypertension, mild cognitive impairment, and CVA with residual deficits  Patient currently taking ASA, clopidogrel, and atorvastatin secondary to history of CVA  His residual deficits consist mostly of left-sided motor weakness, facial droop, and dysarthria  Patient states that he woke up yesterday and could not move his left arm and leg  His daughter and home nurse found him in this condition and called the ambulance  The left-sided weakness resolved spontaneously after 1-2 hours and by the time he presented to the ED, he was noted to be back to his baseline  Patient's daughter also notes that patient appeared confused and was disoriented to date during this episode  Patient's daughter claims that patient's baseline neurological deficits have acutely worsened in the past secondary to infections  Today, patient is noted to have left-sided weakness, facial droop, and dysarthria; however, patient claims that these symptoms are chronic and that he is at his baseline  He is noted to have a severe cough, which he claims to be chronic as well  Patient denies fever, chills, nausea, vomiting, and headache        PAST MEDICAL HISTORY  Past Medical History:   Diagnosis Date    RONAL (acute kidney injury) ([facility] ) 5/31/2017    Aspiration into respiratory tract     Chest pain 5/31/2017    [facility]     Depression     Elevated troponin 5/31/2017    GERD (gastroesophageal reflux disease)     History of CVA (cerebrovascular accident) 4/13/2016    Hyperlipidemia     Hypertension     [facility] 2005    Right, status post lobectomy    Pneumonia     Pro[facility]     Sleep apnea     awaiting sleep study results    Squamous cell skin cancer     Stroke [facility]     Tracheomalacia     Weakness due to cerebrovascular accident (CVA)         PAST SURGICAL HISTORY  Past Surgical History:   Procedure Laterality Date    COLONOSCOPY      ESOPHAGOGASTRODUODENOSCOPY N/A 4/13/2016    Procedure: ESOPHAGOGASTRODUODENOSCOPY (EGD); Surgeon: Jesse Bueno MD;  [facility]; Service:     JOINT REPLACEMENT      knee replacement    LUNG LOBECTOMY      MD ESOPHAGOGASTRODUODENOSCOPY TRANSORAL DIAGNOSTIC N/A 3/15/2017    Procedure: ESOPHAGOGASTRODUODENOSCOPY (EGD); Surgeon: Jesse Bueno MD;  [facility];   Service: Gastroenterology    SKIN BIOPSY      TRIGEMINAL NERVE DECOMPRESSION          ALLERGIES: Penicillins     CURRENT MEDICATIONS  Scheduled Meds:  Current Facility-Administered Medications:  acetaminophen 650 mg Oral Q6H PRN Taylor Wilkerson MD   amLODIPine 10 mg Oral Daily Taylor Wilkerson MD   aspirin 81 mg Oral Daily Taylor Wilkerson MD   atorvastatin 40 mg Oral After Placido Romero MD   benzonatate 100 mg Oral TID PRN Taylor Wilkerson MD   budesonide 0 5 mg Nebulization BID Taylor Wilkerson MD   carvedilol 12 5 mg Oral BID With Meals Taylor Wilkerson MD   clopidogrel 75 mg Oral Daily Taylor Wilkerson MD   docusate sodium 100 mg Oral BID Taylor Wilkerson MD   enoxaparin 40 mg Subcutaneous Daily Taylor Wilkerson MD   ipratropium-albuterol 3 mL Nebulization 4x Daily PRN Taylor Wilkerson MD   latanoprost 1 drop Both Eyes HS Taylor Wilkerson MD   LORazepam 0 5 mg Oral Q8H PRN Taylor Wilkerson MD   pantoprazole 40 mg Oral Early Morning Taylor Wilkerson MD   PARoxetine 20 mg Oral Daily Taylor Wilkerson MD   polyethylene glycol 17 g Oral Daily PRN Taylor Wilkerson MD   predniSONE 5 mg Oral Daily Taylor Wilkerson MD   psyllium 1 packet Oral Daily Taylor Wilkerson MD   senna 2 tablet Oral HS Taylor Wilkerson MD   spironolactone 25 mg Oral Daily Taylor Wilkerson MD   tamsulosin 0 4 mg Oral Daily Taylor Wilkerson MD     Continuous Infusions:   PRN Meds:   acetaminophen    benzonatate    ipratropium-albuterol    LORazepam    polyethylene glycol     SOCIAL HISTORY  Patient reports that he quit smoking about 42 years ago  His smoking use included cigarettes  He started smoking about 64 years ago  He has a 22 00 pack-year smoking history  He has never used smokeless tobacco  He reports that he drank alcohol  He reports that he has current or past drug history  His baseline functional status is fair and he requires help from his daughter and home nurse in performing ADLs  FAMILY HISTORY  Family History   Family history unknown: Yes        REVIEW OF SYSTEMS   Constitutional: Negative for fever, chills, diaphoresis, activity change and appetite change  HEENT: Negative for hearing loss, ear pain, facial swelling, neck pain, neck stiffness, tinnitus and ear discharge  Negative for ocular pain, discharge, redness and itching  Respiratory: Negative for apnea, chest tightness, shortness of breath, wheezing and stridor  Positive for cough  Cardiovascular: Negative for chest pain, palpitations and leg swelling  Gastrointestinal: Negative for diarrhea, constipation and abdominal distention  Endocrine: Negative for cold intolerance and heat intolerance  Genitourinary: Negative for dysuria, urgency, frequency, hematuria, flank pain and difficulty urinating  Neurological: Negative for dizziness, seizures, syncope, light-headedness, numbness and headaches  Positive for facial asymmetry, speech difficulty, and left-sided extremity weakness  Hematological: Negative for adenopathy  Does not bruise/bleed easily  Psychiatric/Behavioral: Negative for behavioral problems and agitation  Otherwise complete review of systems is negative aside from what is mentioned above and in the HPI       PHYSICAL EXAMINATION  Temp:  [98 °F (36 7 °C)-98 3 °F (36 8 °C)] 98 3 °F (36 8 °C)  HR:  [68-83] 78  Resp:  [16-22] 18  BP: (110-157)/(66-81) 110/66   General Examination: In no apparent distress, well developed and well nourished, and cooperative   HEENT: Normocephalic, Atraumatic  Moist mucus membranes  Anicteric  CVS: Regular rate and rhythm  S1 S2 noted  No audible murmurs  No carotids bruits  Peripheral pulses palpable throughout   Lungs: Clear to auscultation bilaterally  No rales, rhonchi, wheezing  Abdomen: Bowel sounds positive  Non- tender  Non-distended  No organomegaly  Ext: No edema   Psych: Thought content - No VH/AH  No delusions  Though Process - logical    Skin - No rash    Neurological Examination:   Mental Status: The patient was awake, alert, attentive, oriented to person, place, and time  Recent and remote memory intact to conversation with no evidence of language dysfunction  Satisfactory fund of knowledge  Normal attention span and concentration  Able to name, repeat, describe a complex scene  Cranial Nerves:   I: smell Not tested   II: visual fields Full to confrontation  Pupils equal, round, reactive to light with normal accomodation  III,IV,VI: extraocular muscles EOMI, no nystagmus   V: masseter and pterygoid strength full  Sensation in the V1 through V3 distributions intact to pinprick and light touch bilaterally  VII: Left-sided facial droop when asked to smile  VIII: Audition intact to finger rub bilaterally  IX/X: Uvula midline  Minimal elevation of soft palate  XI: Trapezius and SCM strength 5/5 B/L  XII: Tongue midline with no atrophy or fasciculations with appropriate movement  Motor Examination:   Left-sided pronator drift  Bulk: Normal  No atrophy Tone: Normal  Fasciculations: None        Deltoid Biceps Triceps WE   WF   FF IO     Right        5         5          5         5      5      5   5        Left           4        4          4         4      2      3   3                       IP        Quad   Ham     TA       Gastroc   Right      5            5          5         5                5  Left         4 4         4         2                3         Reflexes:                   Biceps Brachioradialis Triceps Patella Achilles   Right          1               1                   1           1          1           Left            2               2                   2           1          1             Clonus: None    Pathological Reflexes:  Hoffmans: negative  Babinsky: negative      Coordination: Finger-to-nose and rapid alternating movements significantly slower on left-side  Sensory: Normal sensation to light touch, pin prick and vibratory sensation throughout  Gait: Deferred  Labs:  Recent Results (from the past 24 hour(s))   Procalcitonin    Collection Time: 11/14/19  4:08 PM   Result Value Ref Range    Procalcitonin 0 20 <=0 25 ng/ml   Blood culture    Collection Time: 11/14/19  4:08 PM   Result Value Ref Range    Gram Stain Result Gram positive cocci in clusters (A)    Blood culture    Collection Time: 11/14/19  4:09 PM   Result Value Ref Range    Blood Culture Received in Microbiology Lab  Culture in Progress      Lipid Panel with Direct LDL reflex    Collection Time: 11/15/19  5:44 AM   Result Value Ref Range    Cholesterol 94 50 - 200 mg/dL    Triglycerides 75 <=150 mg/dL    HDL, Direct 44 >=40 mg/dL    LDL Calculated 35 0 - 100 mg/dL   Hemoglobin A1c w/EAG Estimation    Collection Time: 11/15/19  5:44 AM   Result Value Ref Range    Hemoglobin A1C 5 2 4 2 - 6 3 %     mg/dl   Procalcitonin    Collection Time: 11/15/19  1:04 PM   Result Value Ref Range    Procalcitonin 0 09 <=0 25 ng/ml   Basic metabolic panel    Collection Time: 11/15/19  1:04 PM   Result Value Ref Range    Sodium 139 136 - 145 mmol/L    Potassium 4 0 3 5 - 5 3 mmol/L    Chloride 106 100 - 108 mmol/L    CO2 25 21 - 32 mmol/L    ANION GAP 8 4 - 13 mmol/L    BUN 25 5 - 25 mg/dL    Creatinine 1 17 0 60 - 1 30 mg/dL    Glucose 134 65 - 140 mg/dL    Calcium 9 4 8 3 - 10 1 mg/dL    eGFR 59 ml/min/1 73sq m   CBC and differential Collection Time: 11/15/19  1:04 PM   Result Value Ref Range    WBC 11 77 (H) 4 31 - 10 16 Thousand/uL    RBC 5 18 3 88 - 5 62 Million/uL    Hemoglobin 15 3 12 0 - 17 0 g/dL    Hematocrit 47 7 36 5 - 49 3 %    MCV 92 82 - 98 fL    MCH 29 5 26 8 - 34 3 pg    MCHC 32 1 31 4 - 37 4 g/dL    RDW 13 9 11 6 - 15 1 %    MPV 11 3 8 9 - 12 7 fL    Platelets 722 (L) 064 - 390 Thousands/uL    nRBC 0 /100 WBCs    Neutrophils Relative 80 (H) 43 - 75 %    Immat GRANS % 0 0 - 2 %    Lymphocytes Relative 7 (L) 14 - 44 %    Monocytes Relative 11 4 - 12 %    Eosinophils Relative 2 0 - 6 %    Basophils Relative 0 0 - 1 %    Neutrophils Absolute 9 35 (H) 1 85 - 7 62 Thousands/µL    Immature Grans Absolute 0 05 0 00 - 0 20 Thousand/uL    Lymphocytes Absolute 0 80 0 60 - 4 47 Thousands/µL    Monocytes Absolute 1 27 (H) 0 17 - 1 22 Thousand/µL    Eosinophils Absolute 0 26 0 00 - 0 61 Thousand/µL    Basophils Absolute 0 04 0 00 - 0 10 Thousands/µL            panel  Results from last 7 days   Lab Units 11/15/19  1304   POTASSIUM mmol/L 4 0   CHLORIDE mmol/L 106   BUN mg/dL 25   CREATININE mg/dL 1 17    Results from last 7 days   Lab Units 11/15/19  1304   HEMATOCRIT % 47 7   HEMOGLOBIN g/dL 15 3   MCH pg 29 5   MCHC g/dL 32 1   MCV fL 92   MPV fL 11 3   PLATELETS Thousands/uL 126*   RDW % 13 9   WBC Thousand/uL 11 77*          Invalid input(s): LABPT Results from last 7 days   Lab Units 11/15/19  1304   SODIUM mmol/L 139   CO2 mmol/L 25   BUN mg/dL 25   CREATININE mg/dL 1 17    Lab Results   Component Value Date    ALT 16 11/14/2019    AST 10 11/14/2019    ALKPHOS 85 11/14/2019    TBILI 0 76 11/14/2019    Results from last 7 days   Lab Units 11/15/19  0544   CHOLESTEROL mg/dL 94   HDL mg/dL 44    Lab Results   Component Value Date    HGBA1C 5 2 11/15/2019    TSH i: No results found for: TSH Imaging: CT head         ASSESSMENT/PLAN  Patient is a 76 y/o male with history of hypertension, mild cognitive impairment, and CVA with residual deficits on ASA, clopidogrel, and atorvastatin secondary to history of CVA, who presented with acute worsening of left-sided upper and lower extremity weakness and altered mental status noted by daughter  Left-sided weakness: Stroke recrudescence vs  TIA  - CT head w/o contrast negative for acute intracranial abnormality   - MRI brain w/ and w/o contrast negative for acute ischemia, mass or hemorrhage  Pertinent findings included stable relatively advanced diffuse chronic microangiopathic change within the cerebral hemispheres, mild change within the brainstem, and multiple old lacunar infarcts  - Acute stroke unlikely given negative workup and spontaneous resolution of symptoms   - TIA possible given transient symptoms and negative imaging   - Stroke recrudescence (transient worsening or reemergence of stroke symptoms after prior stroke) possible given history of CVA and MRI findings  Infection can often be a cause of stroke recrudescence  On arrival, patient had a WBC elevated to 16 6, however, no other signs of infection on arrival or currently  - Repeat CBC and continue to monitor for signs of infection   - Order CTA to r/o acute pathology  - Consider EEG as outpatient

## 2019-11-15 NOTE — SOCIAL WORK
Attempted to meet with pt; pt busy with care  CM called pt's daughter Ed Peace to discuss therapies recommendation for rehab  Ed Peace is agreeable  A post acute care recommendation was made by your care team for STR  Discussed Freedom of Choice with caregiver  List of facilities given to caregiver via phone  caregiver aware the list is custom filtered for them by zip code location and that [facility] post acute providers are designated  Clarissa requested referrals to 1  [facility], [facility] and [facility]  [facility] referrals sent  CM also left SNF list at bedside

## 2019-11-15 NOTE — ASSESSMENT & PLAN NOTE
· Patient woke up with Left upper and lower extremity weakness this morning - lasted for 1-2 hours and resolved on its own  Patient has residual left-sided weakness from previous stroke  Normally he is able to lift his left upper and lower extremity which was unable to do this morning  · Pt was admitted for stroke/TIA pathway  · Neurology was consulted, note pending   · Echo results are pending   · ASA plavix statin - home meds - increase lipitor to 40  · PT recommend that pt receive skilled PT until medically cleared for D/C  Once medically cleared for D/C, recommend the pt be D/C to rehab

## 2019-11-15 NOTE — ASSESSMENT & PLAN NOTE
· Follows with Geriatrics OP  · Had MRI brain on 11/13 showing sequela of remote infarcts and moderate chronic white matter microangiography

## 2019-11-15 NOTE — PLAN OF CARE
Problem: PHYSICAL THERAPY ADULT  Goal: Performs mobility at highest level of function for planned discharge setting  See evaluation for individualized goals  Description  Treatment/Interventions: Functional transfer training, LE strengthening/ROM, Therapeutic exercise, Endurance training, Bed mobility, Spoke to nursing, OT  Equipment Recommended: Wheelchair       See flowsheet documentation for full assessment, interventions and recommendations  Outcome: Progressing  Note:   Prognosis: Fair  Problem List: Decreased strength, Decreased endurance, Impaired balance, Decreased mobility, Decreased coordination, Decreased cognition  Assessment: Pt was seen for additional visit this afternoon to assist nursing staff with transfer from stretcher to bed via smooth   RN and transport present as well to assist   Repositioned in supine with max assist of two  SCDs and bed alarm active  Pt would benefit from continued physical therapy to maximize functional independence  Recommendation: (Rehab)     PT - OK to Discharge: Yes(once medically cleared; to rehab)    See flowsheet documentation for full assessment

## 2019-11-15 NOTE — PLAN OF CARE
Problem: OCCUPATIONAL THERAPY ADULT  Goal: Performs self-care activities at highest level of function for planned discharge setting  See evaluation for individualized goals  Description  Treatment Interventions: ADL retraining, Functional transfer training, UE strengthening/ROM, Endurance training, Cognitive reorientation, Patient/family training, Equipment evaluation/education, Neuromuscular reeducation, Fine motor coordination activities, Compensatory technique education, Activityengagement          See flowsheet documentation for full assessment, interventions and recommendations  Note:   Limitation: Decreased ADL status, Decreased UE ROM, Decreased UE strength, Decreased Safe judgement during ADL, Decreased endurance, Decreased cognition, Decreased sensation, Decreased fine motor control, Decreased self-care trans  Prognosis: Fair  Assessment: Pt is a 77 y o  male who was admitted to [facility] on 11/14/2019 with Acute left-sided weakness   Pt's problem list also includes PMH of HTN, COPD, cancer history and GERD, hld, pneumonia, LIAM, squamous cell CA, anxiety, h/o falls  At baseline pt was receiving assist from family and HHA for adls, mostly w/c bound - ambulates short distances with RW - family manages iadls  Pt lives with dtr in 2 story home with 1st floor setup  Currently pt requires max assist for overall ADLS and mod to max ax  2  for functional mobility/transfers  Pt currently presents with impairments in the following categories -limited home support, difficulty performing ADLS, flat affect, decreased initiation and engagement  and health management  activity tolerance, endurance, standing balance/tolerance, UE strength, UE ROM, FMC, GMC, memory and safety    These impairments, as well as pt's fatigue, abnormal tone, (L) hemiparesis, decreased caregiver support and risk for falls  limit pt's ability to safely engage in all baseline areas of occupation, includingeating, grooming, bathing, dressing, toileting, functional mobility/transfers, social participation  and leisure activities  From OT standpoint, recommend inpt rehab upon D/C  OT will continue to follow to address the below stated goals        OT Discharge Recommendation: Short Term Rehab

## 2019-11-15 NOTE — ASSESSMENT & PLAN NOTE
WBC 16 on admission but have decreased to 11 77  No s/s of infection  Pt with chronic cough  UA unremarkable  CXR unremarkable  Procal 0 20 on 11/14 and 0 09 on 11/15     Hold Abx for now

## 2019-11-15 NOTE — ASSESSMENT & PLAN NOTE
· With chronic cough  · On modified diet at home -Pureed with honey thick liquids  Will continue for now  · Speech evaluation shows mild oral and moderate pharyngeal dysphagia at bedside with baseline diet of puree and honey thick liquids

## 2019-11-15 NOTE — PLAN OF CARE
Problem: PHYSICAL THERAPY ADULT  Goal: Performs mobility at highest level of function for planned discharge setting  See evaluation for individualized goals  Description  Treatment/Interventions: Functional transfer training, LE strengthening/ROM, Therapeutic exercise, Endurance training, Bed mobility, Spoke to nursing, OT  Equipment Recommended: Wheelchair       See flowsheet documentation for full assessment, interventions and recommendations  Note:   Prognosis: Fair  Problem List: Decreased strength, Decreased endurance, Impaired balance, Decreased mobility, Decreased cognition  Assessment: Pt is a 76 y/o male who is seen for high complexity PT evaluation w/acute on chronic (L) sided weakness and altered mental status  Pt [facility] due to above symptoms as well as disorientation per family  MRI (-) for acute ischemia, mass, or hemorrhage  Pt co-morbidities/PMHx consist of h/o CVA w/residual deficit, essential HTN, dysphagia, mild cognitive impairment, and neutrophilic leukocytosis  Pt currently lives in a two story home with his daughter  However, patient reports that her  and children are with him at night  Patient possibly lives with daughter and her family  PTA, patient was dependent with all ADLs and IADLs  Pt requires assistance getting OOB, getting ready, and transferring to wheelchair  Pt has private CNA that assists him 2-3hrs/day 6 days/wk  Occasionally, pt uses RW for transfers and requires A x 1 to negotiate stairs  However, patient primarily utilizes the W/C  Pt reports that he is also home alone at times unsupervised  Patient cleared to be seen by nursing prior to evaluation  At time of evaluation, pt received seated upright in chair  Pt oriented to place, but not time, person, or situation  Prior to mobilization, MAFO placed onto (L) foot to assist with transfer  Pt required max A x 1 to perform sit to stand transfer  However, pt only able to stand for ~10sec   Pt demonstrated posterior lean during standing and was unable to maintain standing under his own power  Pt returned to chair to conclude therapy session with chair alarm re-activated  Pt demonstrates deficits in his balance, strength, coordination, endurance, mobility, and cognition  Note unstable/unpredictable clinical picture due to co-morbidities/PMHx, presentation as a fall risk, and decreased mobility compared to baseline per patient  Recommend that pt receive skilled PT until medically cleared for D/C  Once medically cleared for D/C, recommend the pt be D/C to rehab  Recommendation: (S) Post acute IP rehab     PT - OK to Discharge: Yes(once medically cleared; to rehab)    See flowsheet documentation for full assessment

## 2019-11-15 NOTE — RESPIRATORY THERAPY NOTE
RT Protocol Note  Zhanna Hernandez  77 y o  male MRN: [id number]  Unit/Bed#: [facility] 730-01 Encounter: [id number]    Assessment    Principal Problem:    Acute on chronic left-sided weakness  Active Problems:    Essential hypertension    Oropharyngeal dysphagia    History of cerebrovascular accident (CVA) with residual deficit    MCI (mild cognitive impairment)    Neutrophilic leukocytosis      Home Pulmonary Medications:  Prn duoneb; prn albuterol mdi; budesonide bid       Past Medical History:   Diagnosis Date    [facility] ) 2017    Aspiration into respiratory tract     Chest pain 2017    [facility]) [facility]     Depression     Elevated troponin 2017    GERD (gastroesophageal reflux disease)     History of CVA (cerebrovascular accident) 2016    Hyperlipidemia     Hypertension     Lung cancer ([facility] )     Right, status post lobectomy    Pneumonia     Prostate cancer ([place] [id number] )     Sleep apnea     awaiting sleep study results    Squamous cell skin cancer     Stroke ([place] [address] )     Tracheomalacia     Weakness due to cerebrovascular accident (CVA)      Social History     Socioeconomic History    Marital status:       Spouse name: None    Number of children: None    Years of education: None    Highest education level: None   Occupational History    None   Social Needs    Financial resource strain: None    Food insecurity:     Worry: None     Inability: None    Transportation needs:     Medical: None     Non-medical: None   Tobacco Use    Smoking status: Former Smoker     Packs/day: 1 00     Years: 22 00     Pack years: 22 00     Types: Cigarettes     Start date:      Last attempt to quit: 1977     Years since quittin 8    Smokeless tobacco: Never Used   Substance and Sexual Activity    Alcohol use: Not Currently    Drug use: Not Currently    Sexual activity: None   Lifestyle    Physical activity:     Days per week: None     Minutes per session: None    Stress: None   Relationships    Social connections:     Talks on phone: None     Gets together: None     Attends [employer] service: None     Active member of club or organization: None     Attends meetings of clubs or organizations: None     Relationship status: None    Intimate partner violence:     Fear of current or ex partner: None     Emotionally abused: None     Physically abused: None     Forced sexual activity: None   Other Topics Concern    None   Social History Narrative    None       Subjective         Objective    Physical Exam:   Assessment Type: Assess only  General Appearance: Alert, Awake  Respiratory Pattern: Normal  Chest Assessment: Chest expansion symmetrical  Bilateral Breath Sounds: Diminished, Clear  Cough: None    Vitals:  Blood pressure 142/75, pulse 68, temperature 98 °F (36 7 °C), temperature source Oral, resp  rate 18, SpO2 96 %  Imaging and other studies: I have personally reviewed pertinent reports              Plan    Respiratory Plan: Home Bronchodilator Patient pathway        Resp Comments: (P) assessed per resp protocol; admitted with increasing weakness on left and confusion; has prior CVA with mild residual left-sided weakness;has COPD with trachael malacia; hx, right lobectomy 2/2 lung ca; sees pulmonologist;; has had recurrent aspiration pneumonia; now denies dyspnea; BS  clear; today's CXR shows no acute cardiopulm disease; used prn duoneb at home prn and BID budesonide; will cont same

## 2019-11-15 NOTE — OCCUPATIONAL THERAPY NOTE
[address]  Evaluation     Patient Name: Idalia Flores  Today's Date: 11/15/2019  Problem List  Principal Problem:    Acute on chronic left-sided weakness  Active Problems:    Essential hypertension    Oropharyngeal dysphagia    History of cerebrovascular accident (CVA) with residual deficit    MCI (mild cognitive impairment)    Neutrophilic leukocytosis    Past Medical History  Past Medical History:   Diagnosis Date    [facility] ) 5/31/2017    Aspiration into respiratory tract     Chest pain 5/31/2017    [facility]     Depression     Elevated troponin 5/31/2017    GERD (gastroesophageal reflux disease)     History of CVA (cerebrovascular accident) 4/13/2016    Hyperlipidemia     Hypertension     [facility] ([facility] ) 2005    Right, status post lobectomy    Pneumonia     [facility]     Sleep apnea     awaiting sleep study results    Squamous cell skin cancer     Stroke ([facility] )     Tracheomalacia     Weakness due to cerebrovascular accident (CVA)      Past Surgical History  Past Surgical History:   Procedure Laterality Date    COLONOSCOPY      ESOPHAGOGASTRODUODENOSCOPY N/A 4/13/2016    Procedure: ESOPHAGOGASTRODUODENOSCOPY (EGD); Surgeon: Marlys Whitehead MD;  [facility]; Service:     JOINT REPLACEMENT      knee replacement    LUNG LOBECTOMY      NC ESOPHAGOGASTRODUODENOSCOPY TRANSORAL DIAGNOSTIC N/A 3/15/2017    Procedure: ESOPHAGOGASTRODUODENOSCOPY (EGD); Surgeon: Marlys Whitehead MD;  [facility]; Service: Gastroenterology    SKIN BIOPSY      TRIGEMINAL NERVE DECOMPRESSION           11/15/19 1105   Note Type   Note type Eval/Treat   Restrictions/Precautions   Weight Bearing Precautions Per Order No   Braces or Orthoses MAFO  (LLE)   Other Precautions Cognitive; Chair Alarm   Pain Assessment   Pain Assessment No/denies pain   Home Living   Type of [facility] Two level; Able to live on main level with bedroom/bathroom   [address]; Wheelchair-manual   Prior Function   Level of [place] Needs assistance with IADLs; Needs assistance with ADLs and functional mobility   Lives With Daughter   Receives Help From Family   ADL Assistance Needs assistance   IADLs Needs assistance   Falls in the last 6 months 0   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   Lifestyle   Autonomy receives assist with all aspects of adls, 1* w/c bound, ambulates with RW and assist x 1 short distances only    Reciprocal Relationships supportive family- dtr works as RN ([employer]) - pt has HHA several hrs during the day but reports he is alone for periods of time during the day    Service to Others retired   Intrinsic Gratification  sedentary    Subjective   Subjective offers no c/o    ADL   Eating Assistance 4  Minimal Assistance   Grooming Assistance 4  [facility] 2  Maximal Assistance   [address] 2  [address] [facility] 2  Maximal Assistance    [facility] 2  Maximal [address]  2  Maximal Assistance   Bed Mobility   Additional Comments pt oob in chair    Transfers   Sit to Stand 2  Maximal assistance   Stand to Sit 2  Maximal assistance   Stand pivot Unable to assess   Balance   Static Sitting Fair   Dynamic Sitting Fair -   Static Standing Poor   Dynamic Standing Poor   Activity Tolerance   Activity Tolerance Patient limited by fatigue;Treatment limited secondary to medical complications (Comment)   RUE Assessment   RUE Assessment WFL   LUE Assessment   LUE Assessment X  (premorbid limitations 2* CVA - pt feels LUE weaker )   Hand Function   Gross Motor Coordination Impaired   Fine Motor Coordination Impaired   Cognition   Overall Cognitive Status Impaired   Arousal/Participation Arousable; Cooperative   Attention Attends with cues to redirect   Orientation Level Oriented to person;Oriented to place;Oriented to time;Oriented to situation  (general place/time )   Memory Decreased short term memory;Decreased recall of recent events;Decreased recall of precautions   Following Commands Follows one step commands with increased time or repetition   Assessment   Limitation Decreased ADL status; Decreased UE ROM; Decreased UE strength;Decreased Safe judgement during ADL;Decreased endurance;Decreased cognition;Decreased sensation;Decreased fine motor control;Decreased self-care trans   Prognosis Fair   Assessment Pt is a 77 y o  male who was admitted to [facility] on 11/14/2019 with Acute left-sided weakness   Pt's problem list also includes PMH of HTN, COPD, cancer history and GERD, hld, pneumonia, LIAM, squamous cell CA, anxiety, h/o falls  At baseline pt was receiving assist from family and HHA for adls, mostly w/c bound - ambulates short distances with RW - family manages iadls  Pt lives with dtr in 2 story home with 1st floor setup  Currently pt requires max assist for overall ADLS and mod to max ax  2  for functional mobility/transfers  Pt currently presents with impairments in the following categories -limited home support, difficulty performing ADLS, flat affect, decreased initiation and engagement  and health management  activity tolerance, endurance, standing balance/tolerance, UE strength, UE ROM, FMC, GMC, memory and safety   These impairments, as well as pt's fatigue, abnormal tone, (L) hemiparesis, decreased caregiver support and risk for falls  limit pt's ability to safely engage in all baseline areas of occupation, includingeating, grooming, bathing, dressing, toileting, functional mobility/transfers, social participation  and leisure activities  From OT standpoint, recommend inpt rehab upon D/C  OT will continue to follow to address the below stated goals      Goals   Patient Goals rest   LTG Time Frame 10-14   Long Term Goal #1 refer to established goals below Plan   Treatment Interventions ADL retraining;Functional transfer training;UE strengthening/ROM; Endurance training;Cognitive reorientation;Patient/family training;Equipment evaluation/education; Neuromuscular reeducation; Fine motor coordination activities; Compensatory technique education; Activityengagement   Goal Expiration Date 11/25/19   OT Frequency 3-5x/wk   Recommendation   OT Discharge Recommendation Short Term Rehab   Barthel Index   Feeding 5   Bathing 0   Grooming Score 0   Dressing Score 0   Bladder Score 5   Bowels Score 10   Toilet Use Score 5   Transfers (Bed/Chair) Score 5   Mobility (Level Surface) Score 0   Stairs Score 0   Barthel Index Score 30       OCCUPATIONAL THERAPY GOALS:    *I feeding/grooming after setup   *Mod a adls after setup with use of AE and 1 handed techniques prn  *Mod a toileting and clothing management   *Mod a functional  transfers to/from all surfaces with fair to fair+ dynamic balance and safety for participation in dynamic adls and iadl tasks   *Demonstrate fair+ carryover with safe use of RW and 1 handed techniques during functional tasks   *Assess DME needs   *Increase LUE strength by 1/2 MMT grade and FMC to fair+ for functional use with adls  *Increase activity tolerance to 35-40 minutes for participation in adls and enjoyable activities  *Pt to participate in ongoing functional cognitive assessment with good attention/participation to assist with safe d/c recommendations    [place], OT Spine appears normal, range of motion is not limited, No laxity of bilateral shoulders or elbows. Intact 2+ pulses in bilateral radial and dorsalis pedis arteries. Bruising of the corinne.

## 2019-11-15 NOTE — PROGRESS NOTES
[address] Internal Medicine   Progress Note - Avelino Squires 1940, 77 y o  male MRN: [id number]    Unit/Bed#: [facility] 730-01 Encounter: [id number]    Primary Care Provider: Randa Quiles DO   Date and time admitted to hospital: 11/14/2019 10:09 AM    * Acute on chronic left-sided weakness  Assessment & Plan  · Patient woke up with Left upper and lower extremity weakness this morning - lasted for 1-2 hours and resolved on its own  Patient has residual left-sided weakness from previous stroke  Normally he is able to lift his left upper and lower extremity which was unable to do this morning  · Pt was admitted for stroke/TIA pathway  · Neurology was consulted, note pending   · Echo results are pending   · ASA plavix statin - home meds - increase lipitor to 40  · PT recommend that pt receive skilled PT until medically cleared for D/C  Once medically cleared for D/C, recommend the pt be D/C to rehab  Neutrophilic leukocytosis  Assessment & Plan  WBC 16 on admission but have decreased to 11 77  No s/s of infection  Pt with chronic cough  UA unremarkable  CXR unremarkable  Procal 0 20 on 11/14 and 0 09 on 11/15  Hold Abx for now    MCI (mild cognitive impairment)  Assessment & Plan  · Follows with Geriatrics OP  · Had MRI brain on 11/13 showing sequela of remote infarcts and moderate chronic white matter microangiography  History of cerebrovascular accident (CVA) with residual deficit  Assessment & Plan  · Continue asa, plavix, statin  Increase lipitor to 40    Oropharyngeal dysphagia  Assessment & Plan  · With chronic cough  · On modified diet at home -Pureed with honey thick liquids  Will continue for now  · Speech evaluation shows mild oral and moderate pharyngeal dysphagia at bedside with baseline diet of puree and honey thick liquids       Essential hypertension  Assessment & Plan  · Well controlled  · Continue home meds    VTE Pharmacologic Prophylaxis:   Pharmacologic: Enoxaparin (Lovenox)  Mechanical VTE Prophylaxis in Place: No    Patient Centered Rounds: I have performed bedside rounds with nursing staff today  Discussions with Specialists or Other Care Team Provider: Nery Meeks with speech therapy, and Satnam King from physical therapy  Education and Discussions with Family / Patient: No    Time Spent for Care: 30 minutes  More than 50% of total time spent on counseling and coordination of care as described above  Current Length of Stay: 1 day(s)    Current Patient Status: Inpatient   Certification Statement: The patient will continue to require additional inpatient hospital stay due to further monitoring of symptoms due to possible stroke    Discharge Plan: Unknown     Code Status: Level 1 - Full Code      Subjective:   Patient came into the hospital due to his daughter feeling that his eyes appeared abnormal and was having increased left sided weakness  Patient has a positive past history of a CVA that affect his left side, speech, swallowing and facial musculature  Patient reports that he is having decreased muscle strength bilaterally of his chest and arms  Objective:     Vitals:   Temp (24hrs), Av 1 °F (36 7 °C), Min:98 °F (36 7 °C), Max:98 3 °F (36 8 °C)    Temp:  [98 °F (36 7 °C)-98 3 °F (36 8 °C)] 98 3 °F (36 8 °C)  HR:  [68-83] 78  Resp:  [16-22] 18  BP: (110-157)/(66-81) 110/66  SpO2:  [96 %-98 %] 96 %  There is no height or weight on file to calculate BMI  Input and Output Summary (last 24 hours): Intake/Output Summary (Last 24 hours) at 11/15/2019 1547  Last data filed at 11/15/2019 1300  Gross per 24 hour   Intake 1420 ml   Output 898 ml   Net 522 ml       Physical Exam:     Physical Exam   Constitutional: No distress  HENT:   Head: Normocephalic  Eyes: EOM are normal  Right eye exhibits no discharge  Left eye exhibits no discharge  Neck: No tracheal deviation present     Cardiovascular: Normal rate, regular rhythm and normal heart sounds  Pulmonary/Chest: Effort normal and breath sounds normal    Abdominal: Soft  Bowel sounds are normal    Neurological: He is alert  A cranial nerve deficit is present  Slurred speech  bl upperextremity weakness left greater than right   Left leg brace    Skin: He is not diaphoretic  Psychiatric: He has a normal mood and affect  His behavior is normal          Additional Data:     Labs:    Results from last 7 days   Lab Units 11/15/19  1304   WBC Thousand/uL 11 77*   HEMOGLOBIN g/dL 15 3   HEMATOCRIT % 47 7   PLATELETS Thousands/uL 126*   NEUTROS PCT % 80*   LYMPHS PCT % 7*   MONOS PCT % 11   EOS PCT % 2     Results from last 7 days   Lab Units 11/15/19  1304 11/14/19  1157   SODIUM mmol/L 139 134*   POTASSIUM mmol/L 4 0 3 7   CHLORIDE mmol/L 106 104   CO2 mmol/L 25 23   BUN mg/dL 25 26*   CREATININE mg/dL 1 17 1 25   ANION GAP mmol/L 8 7   CALCIUM mg/dL 9 4 9 3   ALBUMIN g/dL  --  3 6   TOTAL BILIRUBIN mg/dL  --  0 76   ALK PHOS U/L  --  85   ALT U/L  --  16   AST U/L  --  10   GLUCOSE RANDOM mg/dL 134 116             Results from last 7 days   Lab Units 11/15/19  0544   HEMOGLOBIN A1C % 5 2     Results from last 7 days   Lab Units 11/15/19  1304 11/14/19  1608   PROCALCITONIN ng/ml 0 09 0 20           * I Have Reviewed All Lab Data Listed Above  * Additional Pertinent Lab Tests Reviewed: All Labs Within Last 24 Hours Reviewed    Imaging:    Imaging Reports Reviewed Today Include:  · MRI of the brain showed no acute ischemia, mass or hemorrhage identified  It did show stable advanced diffuse chronic microangiopathic change within the cerebral hemispheres with multiple old lacunar infarcts  · CTA of the head and neck pending  Imaging Personally Reviewed by Myself Includes:  None     Recent Cultures (last 7 days):     Results from last 7 days   Lab Units 11/14/19  1609 11/14/19  1608   BLOOD CULTURE  Received in Microbiology Lab  Culture in Progress    --    GRAM STAIN RESULT   --  Gram positive cocci in clusters*       Last 24 Hours Medication List:     Current Facility-Administered Medications:  acetaminophen 650 mg Oral Q6H PRN David Horn MD   amLODIPine 10 mg Oral Daily David Horn MD   aspirin 81 mg Oral Daily David Horn MD   atorvastatin 40 mg Oral After Antonia Apley, MD   benzonatate 100 mg Oral TID PRN David Horn MD   budesonide 0 5 mg Nebulization BID David Horn MD   carvedilol 12 5 mg Oral BID With Meals David Horn MD   clopidogrel 75 mg Oral Daily David Horn MD   docusate sodium 100 mg Oral BID David Horn MD   enoxaparin 40 mg Subcutaneous Daily David Horn MD   ipratropium-albuterol 3 mL Nebulization 4x Daily PRN David Horn MD   latanoprost 1 drop Both Eyes HS David Horn MD   LORazepam 0 5 mg Oral Q8H PRN David Horn MD   pantoprazole 40 mg Oral Early Morning David Horn MD   PARoxetine 20 mg Oral Daily David Horn MD   polyethylene glycol 17 g Oral Daily PRN David Horn MD   predniSONE 5 mg Oral Daily David Horn MD   psyllium 1 packet Oral Daily David Horn MD   senna 2 tablet Oral HS David Horn MD   spironolactone 25 mg Oral Daily David Horn MD   tamsulosin 0 4 mg Oral Daily David Horn MD        Today, Patient Was Seen By: DIANA Mendiola    ** Please Note: Dictation voice to text software may have been used in the creation of this document   **

## 2019-11-16 ENCOUNTER — APPOINTMENT (INPATIENT)
Dept: NON INVASIVE DIAGNOSTICS | Facility: HOSPITAL | Age: 79
DRG: 057 | End: 2019-11-16
Payer: MEDICARE

## 2019-11-16 PROBLEM — R59.0 HILAR ADENOPATHY: Status: ACTIVE | Noted: 2019-11-16

## 2019-11-16 LAB
ERYTHROCYTE [DISTWIDTH] IN BLOOD BY AUTOMATED COUNT: 13.8 % (ref 11.6–15.1)
HCT VFR BLD AUTO: 45.2 % (ref 36.5–49.3)
HGB BLD-MCNC: 14.5 G/DL (ref 12–17)
MCH RBC QN AUTO: 29.7 PG (ref 26.8–34.3)
MCHC RBC AUTO-ENTMCNC: 32.1 G/DL (ref 31.4–37.4)
MCV RBC AUTO: 93 FL (ref 82–98)
PLATELET # BLD AUTO: 122 THOUSANDS/UL (ref 149–390)
PMV BLD AUTO: 12 FL (ref 8.9–12.7)
RBC # BLD AUTO: 4.88 MILLION/UL (ref 3.88–5.62)
WBC # BLD AUTO: 12.28 THOUSAND/UL (ref 4.31–10.16)

## 2019-11-16 PROCEDURE — 99232 SBSQ HOSP IP/OBS MODERATE 35: CPT | Performed by: NURSE PRACTITIONER

## 2019-11-16 PROCEDURE — 99233 SBSQ HOSP IP/OBS HIGH 50: CPT | Performed by: PSYCHIATRY & NEUROLOGY

## 2019-11-16 PROCEDURE — C8929 TTE W OR WO FOL WCON,DOPPLER: HCPCS

## 2019-11-16 PROCEDURE — 93306 TTE W/DOPPLER COMPLETE: CPT | Performed by: INTERNAL MEDICINE

## 2019-11-16 PROCEDURE — 94640 AIRWAY INHALATION TREATMENT: CPT

## 2019-11-16 PROCEDURE — 85027 COMPLETE CBC AUTOMATED: CPT | Performed by: NURSE PRACTITIONER

## 2019-11-16 PROCEDURE — 94760 N-INVAS EAR/PLS OXIMETRY 1: CPT

## 2019-11-16 RX ADMIN — IPRATROPIUM BROMIDE AND ALBUTEROL SULFATE 3 ML: 2.5; .5 SOLUTION RESPIRATORY (INHALATION) at 07:38

## 2019-11-16 RX ADMIN — CARVEDILOL 12.5 MG: 12.5 TABLET, FILM COATED ORAL at 08:46

## 2019-11-16 RX ADMIN — BUDESONIDE 0.5 MG: 0.5 INHALANT RESPIRATORY (INHALATION) at 07:38

## 2019-11-16 RX ADMIN — CLOPIDOGREL BISULFATE 75 MG: 75 TABLET ORAL at 08:46

## 2019-11-16 RX ADMIN — ATORVASTATIN CALCIUM 40 MG: 40 TABLET, FILM COATED ORAL at 17:33

## 2019-11-16 RX ADMIN — PSYLLIUM HUSK 1 PACKET: 3.4 POWDER ORAL at 08:47

## 2019-11-16 RX ADMIN — LATANOPROST 1 DROP: 50 SOLUTION OPHTHALMIC at 21:46

## 2019-11-16 RX ADMIN — SENNOSIDES 17.2 MG: 8.6 TABLET, FILM COATED ORAL at 21:46

## 2019-11-16 RX ADMIN — PANTOPRAZOLE SODIUM 40 MG: 40 TABLET, DELAYED RELEASE ORAL at 06:25

## 2019-11-16 RX ADMIN — DOCUSATE SODIUM 100 MG: 100 CAPSULE, LIQUID FILLED ORAL at 17:33

## 2019-11-16 RX ADMIN — PERFLUTREN 1.6 ML/MIN: 6.52 INJECTION, SUSPENSION INTRAVENOUS at 14:45

## 2019-11-16 RX ADMIN — SPIRONOLACTONE 25 MG: 25 TABLET ORAL at 08:46

## 2019-11-16 RX ADMIN — ASPIRIN 81 MG 81 MG: 81 TABLET ORAL at 08:46

## 2019-11-16 RX ADMIN — DOCUSATE SODIUM 100 MG: 100 CAPSULE, LIQUID FILLED ORAL at 08:46

## 2019-11-16 RX ADMIN — BUDESONIDE 0.5 MG: 0.5 INHALANT RESPIRATORY (INHALATION) at 19:16

## 2019-11-16 RX ADMIN — TAMSULOSIN HYDROCHLORIDE 0.4 MG: 0.4 CAPSULE ORAL at 08:46

## 2019-11-16 RX ADMIN — CARVEDILOL 12.5 MG: 12.5 TABLET, FILM COATED ORAL at 17:33

## 2019-11-16 RX ADMIN — AMLODIPINE BESYLATE 10 MG: 10 TABLET ORAL at 08:46

## 2019-11-16 RX ADMIN — SODIUM CHLORIDE 250 ML: 0.9 INJECTION, SOLUTION INTRAVENOUS at 09:08

## 2019-11-16 RX ADMIN — ENOXAPARIN SODIUM 40 MG: 40 INJECTION SUBCUTANEOUS at 08:47

## 2019-11-16 RX ADMIN — PAROXETINE 20 MG: 20 TABLET, FILM COATED ORAL at 08:46

## 2019-11-16 RX ADMIN — PREDNISONE 5 MG: 5 TABLET ORAL at 08:46

## 2019-11-16 NOTE — PLAN OF CARE
Problem: Potential for Falls  Goal: Patient will remain free of falls  Description  INTERVENTIONS:  - Assess patient frequently for physical needs  -  Identify cognitive and physical deficits and behaviors that affect risk of falls  -  [place] fall precautions as indicated by assessment   - Educate patient/family on patient safety including physical limitations  - Instruct patient to call for assistance with activity based on assessment  - Modify environment to reduce risk of injury  - Consider OT/PT consult to assist with strengthening/mobility  Outcome: Progressing     Problem: Prexisting or High Potential for Compromised Skin Integrity  Goal: Skin integrity is maintained or improved  Description  INTERVENTIONS:  - Identify patients at risk for skin breakdown  - Assess and monitor skin integrity  - Assess and monitor nutrition and hydration status  - Monitor labs   - Assess for incontinence   - Turn and reposition patient  - Assist with mobility/ambulation  - Relieve pressure over bony prominences  - Avoid friction and shearing  - Provide appropriate hygiene as needed including keeping skin clean and dry  - Evaluate need for skin moisturizer/barrier cream  - Collaborate with interdisciplinary team   - Patient/family teaching  - Consider wound care consult   Outcome: Progressing     Problem: Neurological Deficit  Goal: Neurological status is stable or improving  Description  Interventions:  - Monitor and assess patient's level of consciousness, motor function, sensory function, and level of assistance needed for ADLs  - Monitor and report changes from baseline  Collaborate with interdisciplinary team to initiate plan and implement interventions as ordered  - Provide and maintain a safe environment  - Consider seizure precautions  - Consider fall precautions  - Consider aspiration precautions  - Consider bleeding precautions  Outcome: Progressing     Problem:  Activity Intolerance/Impaired Mobility  Goal: Mobility/activity is maintained at optimum level for patient  Description  Interventions:  - Assess and monitor patient  barriers to mobility and need for assistive/adaptive devices  - Assess patient's emotional response to limitations  - Collaborate with interdisciplinary team and initiate plans and interventions as ordered  - Encourage independent activity per ability   - Maintain proper body alignment  - Perform active/passive rom as tolerated/ordered  - Plan activities to conserve energy   - Turn patient as appropriate  Outcome: Progressing     Problem: Communication Impairment  Goal: Ability to express needs and understand communication  Description  Assess patient's communication skills and ability to understand information  Patient will demonstrate use of effective communication techniques, alternative methods of communication and understanding even if not able to speak  - Encourage communication and provide alternate methods of communication as needed  - Collaborate with case management/ for discharge needs  - Include patient/family/caregiver in decisions related to communication  Outcome: Progressing     Problem: Potential for Aspiration  Goal: Non-ventilated patient's risk of aspiration is minimized  Description  Assess and monitor vital signs, respiratory status, and labs (WBC)  Monitor for signs of aspiration (tachypnea, cough, rales, wheezing, cyanosis, fever)  - Assess and monitor patient's ability to swallow  - Place patient up in chair to eat if possible  - HOB up at 90 degrees to eat if unable to get patient up into chair   - Supervise patient during oral intake  - Instruct patient/ family to take small bites  - Instruct patient/ family to take small single sips when taking liquids    - Follow patient-specific strategies generated by speech pathologist   Outcome: Progressing  Goal: Ventilated patient's risk of aspiration is minimized  Description  Assess and monitor vital signs, respiratory status, airway cuff pressure, and labs (WBC)  Monitor for signs of aspiration (tachypnea, cough, rales, wheezing, cyanosis, fever)  - Elevate head of bed 30 degrees if patient has tube feeding   - Monitor tube feeding  Outcome: Progressing     Problem: Nutrition  Goal: Nutrition/Hydration status is improving  Description  Monitor and assess patient's nutrition/hydration status for malnutrition (ex- brittle hair, bruises, dry skin, pale skin and conjunctiva, muscle wasting, smooth red tongue, and disorientation)  Collaborate with interdisciplinary team and initiate plan and interventions as ordered  Monitor patient's weight and dietary intake as ordered or per policy  Utilize nutrition screening tool and intervene per policy  Determine patient's food preferences and provide high-protein, high-caloric foods as appropriate  - Assist patient with eating   - Allow adequate time for meals   - Encourage patient to take dietary supplement as ordered  - Collaborate with clinical nutritionist   - Include patient/family/caregiver in decisions related to nutrition  Outcome: Progressing     Problem: Nutrition/Hydration-ADULT  Goal: Nutrient/Hydration intake appropriate for improving, restoring or maintaining nutritional needs  Description  Monitor and assess patient's nutrition/hydration status for malnutrition  Collaborate with interdisciplinary team and initiate plan and interventions as ordered  Monitor patient's weight and dietary intake as ordered or per policy  Utilize nutrition screening tool and intervene as necessary  Determine patient's food preferences and provide high-protein, high-caloric foods as appropriate       INTERVENTIONS:  - Monitor oral intake, urinary output, labs, and treatment plans  - Assess nutrition and hydration status and recommend course of action  - Evaluate amount of meals eaten  - Assist patient with eating if necessary   - Allow adequate time for meals  - Recommend/ encourage appropriate diets, oral nutritional supplements, and vitamin/mineral supplements  - Order, calculate, and assess calorie counts as needed  - Recommend, monitor, and adjust tube feedings and TPN/PPN based on assessed needs  - Assess need for intravenous fluids  - Provide specific nutrition/hydration education as appropriate  - Include patient/family/caregiver in decisions related to nutrition  Outcome: Progressing

## 2019-11-16 NOTE — ASSESSMENT & PLAN NOTE
Ct imaging results : discussed with daughter   -cta:  Enlarged right hilar adenopathy measuring 2 cm, previously 1 6 cm in March  Nonemergent outpatient PET/CT recommended for further evaluation  Right upper lobe postsurgical changes    Pt with significant hx and follows with Jc  Recommend follow up and likely will require PET scan if desired by Dr Zhen Hua

## 2019-11-16 NOTE — ASSESSMENT & PLAN NOTE
· Patient woke up with Left upper and lower extremity weakness this morning - lasted for 1-2 hours and resolved on its own  Patient has residual left-sided weakness from previous stroke  Normally he is able to lift his left upper and lower extremity which was unable to do this morning  · Pt was admitted for stroke/TIA pathway  · Neurology was consulted, discussed this am pending eeg and echo   · Echo results are pending   · ASA plavix statin - home meds - increase lipitor to 40  · PT recommend that pt receive skilled PT until medically cleared for D/C   Once medically cleared for D/C, recommend the pt be D/C to rehab likely tomorrow

## 2019-11-16 NOTE — PLAN OF CARE
Problem: Nutrition  Goal: Nutrition/Hydration status is improving  Description  Monitor and assess patient's nutrition/hydration status for malnutrition (ex- brittle hair, bruises, dry skin, pale skin and conjunctiva, muscle wasting, smooth red tongue, and disorientation)  Collaborate with interdisciplinary team and initiate plan and interventions as ordered  Monitor patient's weight and dietary intake as ordered or per policy  Utilize nutrition screening tool and intervene per policy  Determine patient's food preferences and provide high-protein, high-caloric foods as appropriate  - Assist patient with eating   - Allow adequate time for meals   - Encourage patient to take dietary supplement as ordered  - Collaborate with clinical nutritionist   - Include patient/family/caregiver in decisions related to nutrition  Outcome: Progressing     Problem: Nutrition/Hydration-ADULT  Goal: Nutrient/Hydration intake appropriate for improving, restoring or maintaining nutritional needs  Description  Monitor and assess patient's nutrition/hydration status for malnutrition  Collaborate with interdisciplinary team and initiate plan and interventions as ordered  Monitor patient's weight and dietary intake as ordered or per policy  Utilize nutrition screening tool and intervene as necessary  Determine patient's food preferences and provide high-protein, high-caloric foods as appropriate       INTERVENTIONS:  - Monitor oral intake, urinary output, labs, and treatment plans  - Assess nutrition and hydration status and recommend course of action  - Evaluate amount of meals eaten  - Assist patient with eating if necessary   - Allow adequate time for meals  - Recommend/ encourage appropriate diets, oral nutritional supplements, and vitamin/mineral supplements  - Order, calculate, and assess calorie counts as needed  - Recommend, monitor, and adjust tube feedings and TPN/PPN based on assessed needs  - Assess need for intravenous fluids  - Provide specific nutrition/hydration education as appropriate  - Include patient/family/caregiver in decisions related to nutrition  Outcome: Progressing

## 2019-11-16 NOTE — ASSESSMENT & PLAN NOTE
Reports some improvement in left-sided weakness  Continue aspirin atorvastatin clopidogrel  Neurology input noted  Physical therapy

## 2019-11-16 NOTE — PROGRESS NOTES
Progress Note - Kortney Haynes  1940, 77 y o  male MRN: [id number]    [facility] Encounter: [id number]    Primary Care Provider: Dallas Gordon DO   Date and time admitted to hospital: 11/14/2019 10:09 AM        * Acute on chronic left-sided weakness  Assessment & Plan  · Patient woke up with Left upper and lower extremity weakness this morning - lasted for 1-2 hours and resolved on its own  Patient has residual left-sided weakness from previous stroke  Normally he is able to lift his left upper and lower extremity which was unable to do this morning  · Pt was admitted for stroke/TIA pathway  · Neurology was consulted, discussed this am pending eeg and echo   · Echo results are pending   · ASA plavix statin - home meds - increase lipitor to 40  · PT recommend that pt receive skilled PT until medically cleared for D/C  Once medically cleared for D/C, recommend the pt be D/C to rehab likely tomorrow     Hilar adenopathy  Assessment & Plan  Ct imaging results : discussed with daughter   -cta:  Enlarged right hilar adenopathy measuring 2 cm, previously 1 6 cm in March  Nonemergent outpatient PET/CT recommended for further evaluation  Right upper lobe postsurgical changes  Pt with significant hx and follows with Jc  Recommend follow up and likely will require PET scan if desired by Dr Trudy Padilla     Neutrophilic leukocytosis  Assessment & Plan  WBC 16 on admission but have decreased to 11 77  No s/s of infection  Pt with chronic cough  UA unremarkable  CXR unremarkable  Procal 0 20 on 11/14 and 0 09 on 11/15  Hold Abx for now  Blood cultures one of two with contaminant   Also low dose prednisone 10 mg     MCI (mild cognitive impairment)  Assessment & Plan  · Follows with Geriatrics OP  · Had MRI brain on 11/13 showing sequela of remote infarcts and moderate chronic white matter microangiography      History of cerebrovascular accident (CVA) with residual deficit  Assessment & Plan  · Continue asa, plavix, statin  Increase lipitor to 40    Oropharyngeal dysphagia  Assessment & Plan  · With chronic cough  · On modified diet at home -Pureed with honey thick liquids  Will continue for now  · Speech evaluation shows mild oral and moderate pharyngeal dysphagia at bedside with baseline diet of puree and honey thick liquids  Essential hypertension  Assessment & Plan  · Well controlled  · Continue home meds      VTE Pharmacologic Prophylaxis:   Pharmacologic: Enoxaparin (Lovenox)  Mechanical VTE Prophylaxis in Place: Yes    Patient Centered Rounds: I have performed bedside rounds with nursing staff today  Discussions with Specialists or Other Care Team Provider: nursing    Education and Discussions with Family / Patient: patient and pts daughter over the phone     Time Spent for Care: 45 minutes  More than 50% of total time spent on counseling and coordination of care as described above  Current Length of Stay: 2 day(s)    Current Patient Status: Inpatient   Certification Statement: The patient will continue to require additional inpatient hospital stay due to pt recs pending pt eval and choices made likely dc tomorrow if auth approved     Discharge Plan: likely dc tomorrow     Code Status: Level 1 - Full Code      Subjective:   Pt is pleasant , cooperative able to follow command better today   More awake speech clearer  No pain no visual concerns    Objective:     Vitals:   Temp (24hrs), Av 2 °F (36 8 °C), Min:98 °F (36 7 °C), Max:98 3 °F (36 8 °C)    Temp:  [98 °F (36 7 °C)-98 3 °F (36 8 °C)] 98 °F (36 7 °C)  HR:  [65-78] 68  Resp:  [16-18] 16  BP: (110-140)/(66-80) 134/80  SpO2:  [96 %-98 %] 98 %  Body mass index is 25 73 kg/m²  Input and Output Summary (last 24 hours):        Intake/Output Summary (Last 24 hours) at 2019 0920  Last data filed at 2019 0456  Gross per 24 hour   Intake 240 ml   Output 415 ml   Net -175 ml       Physical Exam:     Physical Exam Constitutional: He is oriented to person, place, and time  No distress  HENT:   Head: Normocephalic and atraumatic  Eyes: Right eye exhibits no discharge  Left eye exhibits no discharge  No scleral icterus  Neck: No tracheal deviation present  No thyromegaly present  Cardiovascular: Normal rate  Exam reveals no gallop and no friction rub  No murmur heard  Pulmonary/Chest: No stridor  No respiratory distress  He has no wheezes  He has rales (fine rales )  Poor effort    Abdominal: He exhibits no distension and no mass  There is no tenderness  There is no rebound and no guarding  No hernia  Musculoskeletal: He exhibits no edema, tenderness or deformity  Brace to left lower extremity / also weaker on left leg (chronic) upper extremities weaker left more so   Lymphadenopathy:     He has no cervical adenopathy  Neurological: He is oriented to person, place, and time  Slurred speech clearer today more alert    Skin: He is not diaphoretic  Psychiatric: He has a normal mood and affect           Additional Data:     Labs:    Results from last 7 days   Lab Units 11/16/19  0452 11/15/19  1304   WBC Thousand/uL 12 28* 11 77*   HEMOGLOBIN g/dL 14 5 15 3   HEMATOCRIT % 45 2 47 7   PLATELETS Thousands/uL 122* 126*   NEUTROS PCT %  --  80*   LYMPHS PCT %  --  7*   MONOS PCT %  --  11   EOS PCT %  --  2     Results from last 7 days   Lab Units 11/15/19  1304 11/14/19  1157   SODIUM mmol/L 139 134*   POTASSIUM mmol/L 4 0 3 7   CHLORIDE mmol/L 106 104   CO2 mmol/L 25 23   BUN mg/dL 25 26*   CREATININE mg/dL 1 17 1 25   ANION GAP mmol/L 8 7   CALCIUM mg/dL 9 4 9 3   ALBUMIN g/dL  --  3 6   TOTAL BILIRUBIN mg/dL  --  0 76   ALK PHOS U/L  --  85   ALT U/L  --  16   AST U/L  --  10   GLUCOSE RANDOM mg/dL 134 116             Results from last 7 days   Lab Units 11/15/19  0544   HEMOGLOBIN A1C % 5 2     Results from last 7 days   Lab Units 11/15/19  1304 11/14/19  1608   PROCALCITONIN ng/ml 0 09 0 20           * I Have Reviewed All Lab Data Listed Above  * Additional Pertinent Lab Tests Reviewed: All Labs Within Last 24 Hours Reviewed    Imaging:    Imaging Reports Reviewed Today Include: reviewed   Recent Cultures (last 7 days):     Results from last 7 days   Lab Units 11/14/19  1609 11/14/19  1608   BLOOD CULTURE  No Growth at 24 hrs  Staphylococcus coagulase negative*   GRAM STAIN RESULT   --  Gram positive cocci in clusters*       Last 24 Hours Medication List:     Current Facility-Administered Medications:  acetaminophen 650 mg Oral Q6H PRN Garth Perez MD   amLODIPine 10 mg Oral Daily Garth Perez MD   aspirin 81 mg Oral Daily Garth Perez MD   atorvastatin 40 mg Oral After Nicholaus Lefort, MD   benzonatate 100 mg Oral TID PRN Garth Perez MD   budesonide 0 5 mg Nebulization BID Garth Perez MD   carvedilol 12 5 mg Oral BID With Meals Garth Perez MD   clopidogrel 75 mg Oral Daily Garth Perez MD   docusate sodium 100 mg Oral BID Garth Perez MD   enoxaparin 40 mg Subcutaneous Daily Garth Perez MD   ipratropium-albuterol 3 mL Nebulization 4x Daily PRN Garth Perez MD   latanoprost 1 drop Both Eyes HS Garth Perez MD   LORazepam 0 5 mg Oral Q8H PRN Garth Perez MD   pantoprazole 40 mg Oral Early Morning Garth Perez MD   PARoxetine 20 mg Oral Daily Garth Perez MD   polyethylene glycol 17 g Oral Daily PRN Garth Perez MD   predniSONE 5 mg Oral Daily Garth Perez MD   psyllium 1 packet Oral Daily Garth Perez MD   senna 2 tablet Oral HS Garth Perez MD   spironolactone 25 mg Oral Daily Garth Perez MD   tamsulosin 0 4 mg Oral Daily Garth Perez MD        Today, Patient Was Seen By: DIANA Meek    ** Please Note: Dictation voice to text software may have been used in the creation of this document   **

## 2019-11-16 NOTE — PROGRESS NOTES
PATIENT NAME: Meir Schaeffer ,  YOB: 1940  MEDICAL RECORD NUMBER: [id number]  Neurology Follow up Note     Following patient for: left sided weakness  Overnight Events: s/p EEG and CTA H/N  Subjective: Patient feels at baseline  today  12 point ROS negative for any changes  Scheduled Meds:  Current Facility-Administered Medications:  acetaminophen 650 mg Oral Q6H PRN Laverne Sheth MD   amLODIPine 10 mg Oral Daily Laverne Sheth MD   aspirin 81 mg Oral Daily Laverne Sheth MD   atorvastatin 40 mg Oral After James Rush MD   benzonatate 100 mg Oral TID PRN Laverne Sheth MD   budesonide 0 5 mg Nebulization BID Laverne Sheth MD   carvedilol 12 5 mg Oral BID With Meals Laverne Sheth MD   clopidogrel 75 mg Oral Daily Laverne Sheth MD   docusate sodium 100 mg Oral BID Laverne Sheth MD   enoxaparin 40 mg Subcutaneous Daily Laverne Sheth MD   ipratropium-albuterol 3 mL Nebulization 4x Daily PRN Laverne Sheth MD   latanoprost 1 drop Both Eyes HS Laverne Sheth MD   LORazepam 0 5 mg Oral Q8H PRN Laverne Sheth MD   pantoprazole 40 mg Oral Early Morning Laverne Sheth MD   PARoxetine 20 mg Oral Daily Laverne Sheth MD   polyethylene glycol 17 g Oral Daily PRN Laverne Sheth MD   predniSONE 5 mg Oral Daily Laverne Sheth MD   psyllium 1 packet Oral Daily Laverne Sheth MD   senna 2 tablet Oral HS Laverne Sheth MD   spironolactone 25 mg Oral Daily Laverne Sheth MD   tamsulosin 0 4 mg Oral Daily Laverne Sheth MD     Continuous Infusions:   PRN Meds:   acetaminophen    benzonatate    ipratropium-albuterol    LORazepam    polyethylene glycol      Objective  /80 (BP Location: Left arm)   Pulse 68   Temp 98 °F (36 7 °C) (Oral)   Resp 16   Ht 6' 5" (1 956 m)   Wt 95 4 kg (210 lb 6 4 oz)   SpO2 98%   BMI 24 95 kg/m²   GEN: Comfortable, NAD  HEENT: NC/AT  Anicteric  PPC  MMM   CVS: RRR  S1S2  No M/R/G    LUNGS: B/L entry, no wheezes, rhonchi or rales  EXT: B/L pulses, no edema  Mental Status: The patient was awake, alert, attentive, oriented to person, place, and time  Cranial Nerves:   I: smell Not tested   II: visual fields Full to confrontation  Pupils equal, round, reactive to light with normal accomodation  Fundus: normal cup to disc ratio with no edema  III,IV,VI: extraocular muscles EOMI, no nystagmus   V: masseter and pterygoid strength  Sensation in the V1 through V3 distributions intact to pinprick and light touch bilaterally  VII: Face is symmetric with no weakness noted  VIII: Audition intact to finger rub bilaterally  IX/X: Uvula midline  Soft palate elevation symmetric  XI: Trapezius and SCM strength 5/5 B/L  XII: Tongue midline with no atrophy or fasciculations with appropriate movement       Motor Examination:   Unchanged motor exam       Coordination: stable exam           Recent Results (from the past 24 hour(s))   Procalcitonin    Collection Time: 11/15/19  1:04 PM   Result Value Ref Range    Procalcitonin 0 09 <=0 25 ng/ml   Basic metabolic panel    Collection Time: 11/15/19  1:04 PM   Result Value Ref Range    Sodium 139 136 - 145 mmol/L    Potassium 4 0 3 5 - 5 3 mmol/L    Chloride 106 100 - 108 mmol/L    CO2 25 21 - 32 mmol/L    ANION GAP 8 4 - 13 mmol/L    BUN 25 5 - 25 mg/dL    Creatinine 1 17 0 60 - 1 30 mg/dL    Glucose 134 65 - 140 mg/dL    Calcium 9 4 8 3 - 10 1 mg/dL    eGFR 59 ml/min/1 73sq m   CBC and differential    Collection Time: 11/15/19  1:04 PM   Result Value Ref Range    WBC 11 77 (H) 4 31 - 10 16 Thousand/uL    RBC 5 18 3 88 - 5 62 Million/uL    Hemoglobin 15 3 12 0 - 17 0 g/dL    Hematocrit 47 7 36 5 - 49 3 %    MCV 92 82 - 98 fL    MCH 29 5 26 8 - 34 3 pg    MCHC 32 1 31 4 - 37 4 g/dL    RDW 13 9 11 6 - 15 1 %    MPV 11 3 8 9 - 12 7 fL    Platelets 167 (L) 994 - 390 Thousands/uL    nRBC 0 /100 WBCs Neutrophils Relative 80 (H) 43 - 75 %    Immat GRANS % 0 0 - 2 %    Lymphocytes Relative 7 (L) 14 - 44 %    Monocytes Relative 11 4 - 12 %    Eosinophils Relative 2 0 - 6 %    Basophils Relative 0 0 - 1 %    Neutrophils Absolute 9 35 (H) 1 85 - 7 62 Thousands/µL    Immature Grans Absolute 0 05 0 00 - 0 20 Thousand/uL    Lymphocytes Absolute 0 80 0 60 - 4 47 Thousands/µL    Monocytes Absolute 1 27 (H) 0 17 - 1 22 Thousand/µL    Eosinophils Absolute 0 26 0 00 - 0 61 Thousand/µL    Basophils Absolute 0 04 0 00 - 0 10 Thousands/µL   CBC and Platelet    Collection Time: 11/16/19  4:52 AM   Result Value Ref Range    WBC 12 28 (H) 4 31 - 10 16 Thousand/uL    RBC 4 88 3 88 - 5 62 Million/uL    Hemoglobin 14 5 12 0 - 17 0 g/dL    Hematocrit 45 2 36 5 - 49 3 %    MCV 93 82 - 98 fL    MCH 29 7 26 8 - 34 3 pg    MCHC 32 1 31 4 - 37 4 g/dL    RDW 13 8 11 6 - 15 1 %    Platelets 070 (L) 831 - 390 Thousands/uL    MPV 12 0 8 9 - 12 7 fL     CTA h/n - 1  No evidence of acute intracranial hemorrhage  No evidence of hemodynamic significant stenosis, aneurysm or dissection  2  Microangiopathy and lacunar infarcts  3   Enlarged right hilar adenopathy measuring 2 cm, previously 1 6 cm in March  Nonemergent outpatient PET/CT recommended for further evaluation  Right upper lobe postsurgical changes  EEG - no epileptogenic findings noted  A/P  77 y o  vasculopath with transient left sided symptoms - recrudescence of previously more sever stroke symptoms reason unclear, less likely focal seizures  Etiology unclear  Outpatient neurology follow up  Consider sleep depreived EEG  Continue current home medications at this point       Case d/w primary team

## 2019-11-16 NOTE — ASSESSMENT & PLAN NOTE
WBC 16 on admission but have decreased to 11 77  No s/s of infection  Pt with chronic cough  UA unremarkable  CXR unremarkable  Procal 0 20 on 11/14 and 0 09 on 11/15     Hold Abx for now  Blood cultures one of two with contaminant   Also low dose prednisone 10 mg

## 2019-11-17 PROCEDURE — 94640 AIRWAY INHALATION TREATMENT: CPT

## 2019-11-17 PROCEDURE — 94760 N-INVAS EAR/PLS OXIMETRY 1: CPT

## 2019-11-17 PROCEDURE — 99232 SBSQ HOSP IP/OBS MODERATE 35: CPT | Performed by: INTERNAL MEDICINE

## 2019-11-17 RX ADMIN — PAROXETINE 20 MG: 20 TABLET, FILM COATED ORAL at 08:23

## 2019-11-17 RX ADMIN — PREDNISONE 5 MG: 5 TABLET ORAL at 08:23

## 2019-11-17 RX ADMIN — CARVEDILOL 12.5 MG: 12.5 TABLET, FILM COATED ORAL at 08:23

## 2019-11-17 RX ADMIN — PANTOPRAZOLE SODIUM 40 MG: 40 TABLET, DELAYED RELEASE ORAL at 05:08

## 2019-11-17 RX ADMIN — CLOPIDOGREL BISULFATE 75 MG: 75 TABLET ORAL at 08:23

## 2019-11-17 RX ADMIN — ENOXAPARIN SODIUM 40 MG: 40 INJECTION SUBCUTANEOUS at 08:23

## 2019-11-17 RX ADMIN — PSYLLIUM HUSK 1 PACKET: 3.4 POWDER ORAL at 08:23

## 2019-11-17 RX ADMIN — SENNOSIDES 17.2 MG: 8.6 TABLET, FILM COATED ORAL at 21:52

## 2019-11-17 RX ADMIN — ATORVASTATIN CALCIUM 40 MG: 40 TABLET, FILM COATED ORAL at 17:04

## 2019-11-17 RX ADMIN — TAMSULOSIN HYDROCHLORIDE 0.4 MG: 0.4 CAPSULE ORAL at 08:23

## 2019-11-17 RX ADMIN — DOCUSATE SODIUM 100 MG: 100 CAPSULE, LIQUID FILLED ORAL at 08:23

## 2019-11-17 RX ADMIN — ASPIRIN 81 MG 81 MG: 81 TABLET ORAL at 08:23

## 2019-11-17 RX ADMIN — BUDESONIDE 0.5 MG: 0.5 INHALANT RESPIRATORY (INHALATION) at 07:48

## 2019-11-17 RX ADMIN — AMLODIPINE BESYLATE 10 MG: 10 TABLET ORAL at 08:23

## 2019-11-17 RX ADMIN — CARVEDILOL 12.5 MG: 12.5 TABLET, FILM COATED ORAL at 17:04

## 2019-11-17 RX ADMIN — SPIRONOLACTONE 25 MG: 25 TABLET ORAL at 08:23

## 2019-11-17 RX ADMIN — LATANOPROST 1 DROP: 50 SOLUTION OPHTHALMIC at 22:30

## 2019-11-17 RX ADMIN — BUDESONIDE 0.5 MG: 0.5 INHALANT RESPIRATORY (INHALATION) at 19:05

## 2019-11-17 RX ADMIN — DOCUSATE SODIUM 100 MG: 100 CAPSULE, LIQUID FILLED ORAL at 17:04

## 2019-11-17 NOTE — ASSESSMENT & PLAN NOTE
Ct imaging results : discussed with daughter   -cta:  Enlarged right hilar adenopathy measuring 2 cm, previously 1 6 cm in March  Nonemergent outpatient PET/CT recommended for further evaluation  Right upper lobe postsurgical changes    Pt with significant hx and follows with Dr Johan Gutiérrez  Recommend follow up and likely will require PET scan if desired by Dr Johan Gutiérrez

## 2019-11-17 NOTE — PROGRESS NOTES
Called engineering for bed extender, they are looking for one, if not will have to change pt's bed out

## 2019-11-17 NOTE — PROGRESS NOTES
Pt has a harsh croupy sounding NP cough , noticed during breakfast, his caregiver came in and said she is noticing this when he has a straw, all straws removed and pt so far seems to be coughing a bit less

## 2019-11-17 NOTE — PROGRESS NOTES
Progress Note - Escobar Ac  1940, 77 y o  male MRN: [id number]    Unit/Bed#: [facility] 730-01 Encounter: [id number]    Primary Care Provider: Latia Ramirez DO   Date and time admitted to hospital: 11/14/2019 10:09 AM        * Acute on chronic left-sided weakness  Assessment & Plan  Reports some improvement in left-sided weakness  Continue aspirin atorvastatin clopidogrel  Neurology input noted  Physical therapy      Hilar adenopathy  Assessment & Plan  Ct imaging results : discussed with daughter   -cta:  Enlarged right hilar adenopathy measuring 2 cm, previously 1 6 cm in March  Nonemergent outpatient PET/CT recommended for further evaluation  Right upper lobe postsurgical changes  Pt with significant hx and follows with Dr Kenia Santiago  Recommend follow up and likely will require PET scan if desired by Dr Kenia Santiago     Neutrophilic leukocytosis  Assessment & Plan  Monitor counts  Afebrile, procalcitonin levels noted  Monitor closely      MCI (mild cognitive impairment)  Assessment & Plan  · Follows with Geriatrics OP  · Had MRI brain on 11/13 showing sequela of remote infarcts and moderate chronic white matter microangiography  History of cerebrovascular accident (CVA) with residual deficit  Assessment & Plan  · Continue asa, plavix, statin    Oropharyngeal dysphagia  Assessment & Plan  · With chronic cough  · On modified diet at home -Pureed with honey thick liquids  Will continue for now  · Speech evaluation shows mild oral and moderate pharyngeal dysphagia at bedside with baseline diet of puree and honey thick liquids  Essential hypertension  Assessment & Plan  · Monitor blood pressures  · Avoid hypotension        VTE Pharmacologic Prophylaxis:   Pharmacologic: Enoxaparin (Lovenox)  Mechanical VTE Prophylaxis in Place: Yes    Patient Centered Rounds: I have performed bedside rounds with nursing staff today      Discussions with Specialists or Other Care Team Provider:     Education and Discussions with Family / Patient: pt, called updated daughter over phone in detail    Time Spent for Care: 30 minutes  More than 50% of total time spent on counseling and coordination of care as described above  Current Length of Stay: 3 day(s)    Current Patient Status: Inpatient   Certification Statement: The patient will continue to require additional inpatient hospital stay due to As mentioned    Discharge Plan:  Pending placement discussed with case management    Code Status: Level 1 - Full Code      Subjective:     Comfortably in bed  Reports left-sided weakness  History chart labs medications reviewed  Discussed with RN - aspiration precautions recommended she verbalized understanding    Objective:     Vitals:   Temp (24hrs), Av 1 °F (36 7 °C), Min:98 °F (36 7 °C), Max:98 1 °F (36 7 °C)    Temp:  [98 °F (36 7 °C)-98 1 °F (36 7 °C)] 98 °F (36 7 °C)  HR:  [79-83] 79  Resp:  [18] 18  BP: (128-141)/(72-86) 130/86  SpO2:  [95 %-96 %] 95 %  Body mass index is 24 95 kg/m²  Input and Output Summary (last 24 hours):        Intake/Output Summary (Last 24 hours) at 2019 1315  Last data filed at 2019 0501  Gross per 24 hour   Intake --   Output 287 ml   Net -287 ml       Physical Exam:     Physical Exam    Comfortably lying in bed  Neck supple  Lungs diminished breath sounds bilateral  Heart sounds S1-S2 noted  Abdomen soft  Awake alert obeys simple commands  Left-sided weakness noted  Pulses noted  No rash    Additional Data:     Labs:    Results from last 7 days   Lab Units 19  0452 11/15/19  1304   WBC Thousand/uL 12 28* 11 77*   HEMOGLOBIN g/dL 14 5 15 3   HEMATOCRIT % 45 2 47 7   PLATELETS Thousands/uL 122* 126*   NEUTROS PCT %  --  80*   LYMPHS PCT %  --  7*   MONOS PCT %  --  11   EOS PCT %  --  2     Results from last 7 days   Lab Units 11/15/19  1304 19  1157   SODIUM mmol/L 139 134*   POTASSIUM mmol/L 4 0 3 7   CHLORIDE mmol/L 106 104   CO2 mmol/L 25 23   BUN mg/dL 25 26* CREATININE mg/dL 1 17 1 25   ANION GAP mmol/L 8 7   CALCIUM mg/dL 9 4 9 3   ALBUMIN g/dL  --  3 6   TOTAL BILIRUBIN mg/dL  --  0 76   ALK PHOS U/L  --  85   ALT U/L  --  16   AST U/L  --  10   GLUCOSE RANDOM mg/dL 134 116             Results from last 7 days   Lab Units 11/15/19  0544   HEMOGLOBIN A1C % 5 2     Results from last 7 days   Lab Units 11/15/19  1304 11/14/19  1608   PROCALCITONIN ng/ml 0 09 0 20           * I Have Reviewed All Lab Data Listed Above  * Additional Pertinent Lab Tests Reviewed: All Labs Within Last 24 Hours Reviewed    Imaging:    Imaging Reports Reviewed Today Include:  Imaging studies noted  Imaging Personally Reviewed by Myself Includes:     Recent Cultures (last 7 days):     Results from last 7 days   Lab Units 11/14/19  1609 11/14/19  1608   BLOOD CULTURE  No Growth at 48 hrs   Staphylococcus coagulase negative*   GRAM STAIN RESULT   --  Gram positive cocci in clusters*       Last 24 Hours Medication List:     Current Facility-Administered Medications:  acetaminophen 650 mg Oral Q6H PRN Marylou Bedoya MD   amLODIPine 10 mg Oral Daily Marylou Bedoya MD   aspirin 81 mg Oral Daily Marylou Bedoya MD   atorvastatin 40 mg Oral After Josefa Arita MD   benzonatate 100 mg Oral TID PRN Marylou Bedoya MD   budesonide 0 5 mg Nebulization BID Marylou Bedoya MD   carvedilol 12 5 mg Oral BID With Meals Marylou Bedoya MD   clopidogrel 75 mg Oral Daily Marylou Bedoya MD   docusate sodium 100 mg Oral BID Marylou Bedoya MD   enoxaparin 40 mg Subcutaneous Daily Marylou Bedoya MD   ipratropium-albuterol 3 mL Nebulization 4x Daily PRN Marylou Bedoya MD   latanoprost 1 drop Both Eyes HS Marylou Bedoya MD   LORazepam 0 5 mg Oral Q8H PRN Marylou Bedoya MD   pantoprazole 40 mg Oral Early Morning Marylou Bedoya MD   PARoxetine 20 mg Oral Daily Marylou Bedoya MD   polyethylene glycol 17 g Oral Daily PRN Marylou Bedoya MD predniSONE 5 mg Oral Daily Mirian Hicks MD   psyllium 1 packet Oral Daily Mirian Hicks MD   senna 2 tablet Oral HS Mirian Hicks MD   spironolactone 25 mg Oral Daily Mirian Hicks MD   tamsulosin 0 4 mg Oral Daily Mirian Hicks MD        Today, Patient Was Seen By: Nellie Daniels MD    ** Please Note: Dictation voice to text software may have been used in the creation of this document   **

## 2019-11-17 NOTE — SOCIAL WORK
CM reviewed pt with primary medical team   CM continues to work towards a discharge plan  CM awaiting results of pt choices for SNF/rehab  CM to follow

## 2019-11-17 NOTE — PLAN OF CARE
Problem: Potential for Falls  Goal: Patient will remain free of falls  Description  INTERVENTIONS:  - Assess patient frequently for physical needs  -  Identify cognitive and physical deficits and behaviors that affect risk of falls  -  [place] fall precautions as indicated by assessment   - Educate patient/family on patient safety including physical limitations  - Instruct patient to call for assistance with activity based on assessment  - Modify environment to reduce risk of injury  - Consider OT/PT consult to assist with strengthening/mobility  Outcome: Progressing     Problem: Prexisting or High Potential for Compromised Skin Integrity  Goal: Skin integrity is maintained or improved  Description  INTERVENTIONS:  - Identify patients at risk for skin breakdown  - Assess and monitor skin integrity  - Assess and monitor nutrition and hydration status  - Monitor labs   - Assess for incontinence   - Turn and reposition patient  - Assist with mobility/ambulation  - Relieve pressure over bony prominences  - Avoid friction and shearing  - Provide appropriate hygiene as needed including keeping skin clean and dry  - Evaluate need for skin moisturizer/barrier cream  - Collaborate with interdisciplinary team   - Patient/family teaching  - Consider wound care consult   Outcome: Progressing     Problem: Neurological Deficit  Goal: Neurological status is stable or improving  Description  Interventions:  - Monitor and assess patient's level of consciousness, motor function, sensory function, and level of assistance needed for ADLs  - Monitor and report changes from baseline  Collaborate with interdisciplinary team to initiate plan and implement interventions as ordered  - Provide and maintain a safe environment  - Consider seizure precautions  - Consider fall precautions  - Consider aspiration precautions  - Consider bleeding precautions  Outcome: Progressing     Problem:  Activity Intolerance/Impaired Mobility  Goal: Mobility/activity is maintained at optimum level for patient  Description  Interventions:  - Assess and monitor patient  barriers to mobility and need for assistive/adaptive devices  - Assess patient's emotional response to limitations  - Collaborate with interdisciplinary team and initiate plans and interventions as ordered  - Encourage independent activity per ability   - Maintain proper body alignment  - Perform active/passive rom as tolerated/ordered  - Plan activities to conserve energy   - Turn patient as appropriate  Outcome: Progressing     Problem: Communication Impairment  Goal: Ability to express needs and understand communication  Description  Assess patient's communication skills and ability to understand information  Patient will demonstrate use of effective communication techniques, alternative methods of communication and understanding even if not able to speak  - Encourage communication and provide alternate methods of communication as needed  - Collaborate with case management/ for discharge needs  - Include patient/family/caregiver in decisions related to communication  Outcome: Progressing     Problem: Potential for Aspiration  Goal: Non-ventilated patient's risk of aspiration is minimized  Description  Assess and monitor vital signs, respiratory status, and labs (WBC)  Monitor for signs of aspiration (tachypnea, cough, rales, wheezing, cyanosis, fever)  - Assess and monitor patient's ability to swallow  - Place patient up in chair to eat if possible  - HOB up at 90 degrees to eat if unable to get patient up into chair   - Supervise patient during oral intake  - Instruct patient/ family to take small bites  - Instruct patient/ family to take small single sips when taking liquids    - Follow patient-specific strategies generated by speech pathologist   Outcome: Progressing  Goal: Ventilated patient's risk of aspiration is minimized  Description  Assess and monitor vital signs, respiratory status, airway cuff pressure, and labs (WBC)  Monitor for signs of aspiration (tachypnea, cough, rales, wheezing, cyanosis, fever)  - Elevate head of bed 30 degrees if patient has tube feeding   - Monitor tube feeding  Outcome: Progressing     Problem: Nutrition  Goal: Nutrition/Hydration status is improving  Description  Monitor and assess patient's nutrition/hydration status for malnutrition (ex- brittle hair, bruises, dry skin, pale skin and conjunctiva, muscle wasting, smooth red tongue, and disorientation)  Collaborate with interdisciplinary team and initiate plan and interventions as ordered  Monitor patient's weight and dietary intake as ordered or per policy  Utilize nutrition screening tool and intervene per policy  Determine patient's food preferences and provide high-protein, high-caloric foods as appropriate  - Assist patient with eating   - Allow adequate time for meals   - Encourage patient to take dietary supplement as ordered  - Collaborate with clinical nutritionist   - Include patient/family/caregiver in decisions related to nutrition  Outcome: Progressing     Problem: Nutrition/Hydration-ADULT  Goal: Nutrient/Hydration intake appropriate for improving, restoring or maintaining nutritional needs  Description  Monitor and assess patient's nutrition/hydration status for malnutrition  Collaborate with interdisciplinary team and initiate plan and interventions as ordered  Monitor patient's weight and dietary intake as ordered or per policy  Utilize nutrition screening tool and intervene as necessary  Determine patient's food preferences and provide high-protein, high-caloric foods as appropriate       INTERVENTIONS:  - Monitor oral intake, urinary output, labs, and treatment plans  - Assess nutrition and hydration status and recommend course of action  - Evaluate amount of meals eaten  - Assist patient with eating if necessary   - Allow adequate time for meals  - Recommend/ encourage appropriate diets, oral nutritional supplements, and vitamin/mineral supplements  - Order, calculate, and assess calorie counts as needed  - Recommend, monitor, and adjust tube feedings and TPN/PPN based on assessed needs  - Assess need for intravenous fluids  - Provide specific nutrition/hydration education as appropriate  - Include patient/family/caregiver in decisions related to nutrition  Outcome: Progressing

## 2019-11-18 VITALS
SYSTOLIC BLOOD PRESSURE: 138 MMHG | HEIGHT: 77 IN | WEIGHT: 210.4 LBS | HEART RATE: 79 BPM | TEMPERATURE: 98.1 F | RESPIRATION RATE: 21 BRPM | DIASTOLIC BLOOD PRESSURE: 83 MMHG | OXYGEN SATURATION: 93 % | BODY MASS INDEX: 24.84 KG/M2

## 2019-11-18 LAB
BACTERIA BLD CULT: ABNORMAL
GRAM STN SPEC: ABNORMAL

## 2019-11-18 PROCEDURE — 99239 HOSP IP/OBS DSCHRG MGMT >30: CPT | Performed by: INTERNAL MEDICINE

## 2019-11-18 PROCEDURE — 92526 ORAL FUNCTION THERAPY: CPT

## 2019-11-18 PROCEDURE — G8998 SWALLOW D/C STATUS: HCPCS

## 2019-11-18 PROCEDURE — 94760 N-INVAS EAR/PLS OXIMETRY 1: CPT

## 2019-11-18 PROCEDURE — 94640 AIRWAY INHALATION TREATMENT: CPT

## 2019-11-18 RX ORDER — LORAZEPAM 0.5 MG/1
0.25 TABLET ORAL DAILY
Qty: 5 TABLET | Refills: 0 | Status: SHIPPED | OUTPATIENT
Start: 2019-11-18 | End: 2019-11-25 | Stop reason: ALTCHOICE

## 2019-11-18 RX ORDER — ATORVASTATIN CALCIUM 40 MG/1
40 TABLET, FILM COATED ORAL
Refills: 0
Start: 2019-11-18

## 2019-11-18 RX ADMIN — PSYLLIUM HUSK 1 PACKET: 3.4 POWDER ORAL at 09:18

## 2019-11-18 RX ADMIN — BUDESONIDE 0.5 MG: 0.5 INHALANT RESPIRATORY (INHALATION) at 07:58

## 2019-11-18 RX ADMIN — BENZONATATE 100 MG: 100 CAPSULE ORAL at 09:19

## 2019-11-18 RX ADMIN — TAMSULOSIN HYDROCHLORIDE 0.4 MG: 0.4 CAPSULE ORAL at 09:20

## 2019-11-18 RX ADMIN — PREDNISONE 5 MG: 5 TABLET ORAL at 09:21

## 2019-11-18 RX ADMIN — DOCUSATE SODIUM 100 MG: 100 CAPSULE, LIQUID FILLED ORAL at 09:19

## 2019-11-18 RX ADMIN — PAROXETINE 20 MG: 20 TABLET, FILM COATED ORAL at 09:20

## 2019-11-18 RX ADMIN — ENOXAPARIN SODIUM 40 MG: 40 INJECTION SUBCUTANEOUS at 09:21

## 2019-11-18 RX ADMIN — PANTOPRAZOLE SODIUM 40 MG: 40 TABLET, DELAYED RELEASE ORAL at 05:22

## 2019-11-18 RX ADMIN — CLOPIDOGREL BISULFATE 75 MG: 75 TABLET ORAL at 09:20

## 2019-11-18 RX ADMIN — AMLODIPINE BESYLATE 10 MG: 10 TABLET ORAL at 09:19

## 2019-11-18 RX ADMIN — CARVEDILOL 12.5 MG: 12.5 TABLET, FILM COATED ORAL at 09:20

## 2019-11-18 RX ADMIN — ASPIRIN 81 MG 81 MG: 81 TABLET ORAL at 09:21

## 2019-11-18 RX ADMIN — SPIRONOLACTONE 25 MG: 25 TABLET ORAL at 09:21

## 2019-11-18 NOTE — RESTORATIVE TECHNICIAN NOTE
Restorative Specialist Mobility Note       Activity: Ambulate in room, Chair, Dangle, Stand at bedside(Educated/encouraged pt to ambulate with assistance 3-4 x's/day  Chair alarm on   Pt callbell, phone/tray within reach )     Assistive Device: Front wheel walker, Other (Comment)(Assist x1-2 with L Mafo and B/L shoes on )   Range of Motion: All extremities, Active(Pt did some ROM exercises while sitting up in the chair )       Fredy Browning BS, Restorative Technician, [employer]

## 2019-11-18 NOTE — PLAN OF CARE
Problem: Potential for Falls  Goal: Patient will remain free of falls  Description  INTERVENTIONS:  - Assess patient frequently for physical needs  -  Identify cognitive and physical deficits and behaviors that affect risk of falls  -  [place] fall precautions as indicated by assessment   - Educate patient/family on patient safety including physical limitations  - Instruct patient to call for assistance with activity based on assessment  - Modify environment to reduce risk of injury  - Consider OT/PT consult to assist with strengthening/mobility  Outcome: Progressing     Problem: Prexisting or High Potential for Compromised Skin Integrity  Goal: Skin integrity is maintained or improved  Description  INTERVENTIONS:  - Identify patients at risk for skin breakdown  - Assess and monitor skin integrity  - Assess and monitor nutrition and hydration status  - Monitor labs   - Assess for incontinence   - Turn and reposition patient  - Assist with mobility/ambulation  - Relieve pressure over bony prominences  - Avoid friction and shearing  - Provide appropriate hygiene as needed including keeping skin clean and dry  - Evaluate need for skin moisturizer/barrier cream  - Collaborate with interdisciplinary team   - Patient/family teaching  - Consider wound care consult   Outcome: Progressing     Problem: Neurological Deficit  Goal: Neurological status is stable or improving  Description  Interventions:  - Monitor and assess patient's level of consciousness, motor function, sensory function, and level of assistance needed for ADLs  - Monitor and report changes from baseline  Collaborate with interdisciplinary team to initiate plan and implement interventions as ordered  - Provide and maintain a safe environment  - Consider seizure precautions  - Consider fall precautions  - Consider aspiration precautions  - Consider bleeding precautions  Outcome: Progressing     Problem:  Activity Intolerance/Impaired Mobility  Goal: Mobility/activity is maintained at optimum level for patient  Description  Interventions:  - Assess and monitor patient  barriers to mobility and need for assistive/adaptive devices  - Assess patient's emotional response to limitations  - Collaborate with interdisciplinary team and initiate plans and interventions as ordered  - Encourage independent activity per ability   - Maintain proper body alignment  - Perform active/passive rom as tolerated/ordered  - Plan activities to conserve energy   - Turn patient as appropriate  Outcome: Progressing     Problem: Communication Impairment  Goal: Ability to express needs and understand communication  Description  Assess patient's communication skills and ability to understand information  Patient will demonstrate use of effective communication techniques, alternative methods of communication and understanding even if not able to speak  - Encourage communication and provide alternate methods of communication as needed  - Collaborate with case management/ for discharge needs  - Include patient/family/caregiver in decisions related to communication  Outcome: Progressing     Problem: Potential for Aspiration  Goal: Non-ventilated patient's risk of aspiration is minimized  Description  Assess and monitor vital signs, respiratory status, and labs (WBC)  Monitor for signs of aspiration (tachypnea, cough, rales, wheezing, cyanosis, fever)  - Assess and monitor patient's ability to swallow  - Place patient up in chair to eat if possible  - HOB up at 90 degrees to eat if unable to get patient up into chair   - Supervise patient during oral intake  - Instruct patient/ family to take small bites  - Instruct patient/ family to take small single sips when taking liquids    - Follow patient-specific strategies generated by speech pathologist   Outcome: Progressing  Goal: Ventilated patient's risk of aspiration is minimized  Description  Assess and monitor vital signs, respiratory status, airway cuff pressure, and labs (WBC)  Monitor for signs of aspiration (tachypnea, cough, rales, wheezing, cyanosis, fever)  - Elevate head of bed 30 degrees if patient has tube feeding   - Monitor tube feeding  Outcome: Progressing     Problem: Nutrition  Goal: Nutrition/Hydration status is improving  Description  Monitor and assess patient's nutrition/hydration status for malnutrition (ex- brittle hair, bruises, dry skin, pale skin and conjunctiva, muscle wasting, smooth red tongue, and disorientation)  Collaborate with interdisciplinary team and initiate plan and interventions as ordered  Monitor patient's weight and dietary intake as ordered or per policy  Utilize nutrition screening tool and intervene per policy  Determine patient's food preferences and provide high-protein, high-caloric foods as appropriate  - Assist patient with eating   - Allow adequate time for meals   - Encourage patient to take dietary supplement as ordered  - Collaborate with clinical nutritionist   - Include patient/family/caregiver in decisions related to nutrition  Outcome: Progressing     Problem: Nutrition/Hydration-ADULT  Goal: Nutrient/Hydration intake appropriate for improving, restoring or maintaining nutritional needs  Description  Monitor and assess patient's nutrition/hydration status for malnutrition  Collaborate with interdisciplinary team and initiate plan and interventions as ordered  Monitor patient's weight and dietary intake as ordered or per policy  Utilize nutrition screening tool and intervene as necessary  Determine patient's food preferences and provide high-protein, high-caloric foods as appropriate       INTERVENTIONS:  - Monitor oral intake, urinary output, labs, and treatment plans  - Assess nutrition and hydration status and recommend course of action  - Evaluate amount of meals eaten  - Assist patient with eating if necessary   - Allow adequate time for meals  - Recommend/ encourage appropriate diets, oral nutritional supplements, and vitamin/mineral supplements  - Order, calculate, and assess calorie counts as needed  - Recommend, monitor, and adjust tube feedings and TPN/PPN based on assessed needs  - Assess need for intravenous fluids  - Provide specific nutrition/hydration education as appropriate  - Include patient/family/caregiver in decisions related to nutrition  Outcome: Progressing

## 2019-11-18 NOTE — DISCHARGE SUMMARY
Discharge Summary - [address] [facility]    Patient Information: Guillermo Gauthier  77 y o  male MRN: [id number]  Unit/Bed#: DISCHARGE POOL Encounter: [id number]    Discharging Physician / Practitioner: Michelle Nuñez MD  PCP: Moody Perla DO  Admission Date: 11/14/2019  Discharge Date: 11/18/19    Disposition:     Other: SNF    Reason for Admission:  Left-sided weakness and confusion    Discharge Diagnoses:     Principal Problem:    Acute on chronic left-sided weakness  Active Problems:    Essential hypertension    Oropharyngeal dysphagia    History of cerebrovascular accident (CVA) with residual deficit    MCI (mild cognitive impairment)    Neutrophilic leukocytosis    Altered mental status    Hilar adenopathy  Resolved Problems:    * No resolved hospital problems  *      Consultations During Hospital Stay:  · Neurology    Procedures Performed:     · Chest x-ray no active lung disease  · CT head no acute intracranial abnormality, sequela of remote infarcts and moderate chronic white matter microangiopathy  · MRI brain stable relatively advanced diffuse chronic microangiopathic changes within cerebral hemispheres  Multiple old lacunar infarcts unchanged, no acute ischemia mass or hemorrhage identified  · EF 55%, hypokinesis of apical wall    Hospital Course:     Guillermo Gauthier  is a 77 y o  male patient who originally presented to the hospital on 11/14/2019 due to left-sided weakness and confusion  Patient presented with left-sided weakness and confusion  Patient has history of CVA with mild left-sided residual weakness  However his weakness was much worse than his baseline and he appeared confused  He was admitted placed on stroke TIA pathway and seen in consultation with Neurology  He underwent imaging studies as outlined per Neurology transient left-sided symptoms likely recur since of previously more severe stroke symptoms reason unclear    He is recommended outpatient follow-up with Neurology including sleep-deprived EEG as outpatient  He remains hemodynamically stable, symptomatically improved  He he was evaluated by physical therapy and is recommended SNF for rehabilitation  Kindly review the chart for details  Condition at Discharge: fair     Discharge Day Visit / Exam:     Subjective:      Comfortably sitting up in chair  Reports feeling okay  Agreeable to discharge      Vitals: Blood Pressure: 138/83 (11/18/19 0733)  Pulse: 79 (11/16/19 2155)  Temperature: 98 1 °F (36 7 °C) (11/18/19 0733)  Temp Source: Oral (11/16/19 2155)  Respirations: 21 (11/17/19 2329)  Height: 6' 5" (195 6 cm) (11/16/19 0900)  Weight - Scale: 95 4 kg (210 lb 6 4 oz) (11/16/19 0900)  SpO2: 93 % (11/18/19 0758)  Exam:   Physical Exam    Comfortably sitting up in chair  Neck supple  Lungs diminished breath sounds bases  Heart sounds S1-S2 noted  Abdomen soft nontender  Awake obeys simple commands  Left-sided weakness noted  Pulses noted  No rash    Discharge instructions/Information to patient and family:   See after visit summary for information provided to patient and family  Discharge plan discussed with Neurology  Discharge plan discussed the patient, called updated daughter Roro Rojas over phone in detail  Outpatient follow-up with Neurology    Provisions for Follow-Up Care:  See after visit summary for information related to follow-up care and any pertinent home health orders  Planned Readmission: no     Discharge Statement:  I spent 45 minutes discharging the patient  This time was spent on the day of discharge  I had direct contact with the patient on the day of discharge  Greater than 50% of the total time was spent examining patient, answering all patient questions, arranging and discussing plan of care with patient as well as directly providing post-discharge instructions  Additional time then spent on discharge activities      Discharge Medications:  See after visit summary for reconciled discharge medications provided to patient and family        ** Please Note: This note has been constructed using a voice recognition system **

## 2019-11-18 NOTE — TRANSPORTATION MEDICAL NECESSITY
Section I - General Information    Name of Patient: Zhanna Hernandez  : 1940    Medicare #: [id number]  Transport Date: 19 (PCS is valid for round trips on this date and for all repetitive trips in the 60-day range as noted below )  Origin: [address] [facility]                                                         Destination: [facility]  Is the pt's stay covered under Medicare Part A (PPS/DRG)   []     Closest appropriate facility? If no, why is transport to more distant facility required? Yes  If hospice pt, is this transport related to pt's terminal illness? NA     Section II - Medical Necessity Questionnaire  Ambulance transportation is medically necessary only if other means of transport are contraindicated or would be potentially harmful to the patient  To meet this requirement, the patient must either be "bed confined" or suffer from a condition such that transport by means other than ambulance is contraindicated by the patient's condition  The following questions must be answered by the medical professional signing below for this form to be valid:    1)  Describe the MEDICAL CONDITION (physical and/or mental) of this patient AT [address] [place] [address] that requires the patient to be transported in an ambulance and why transport by other means is contraindicated by the patient's condition: L sided weakness; Hilar adenopathy; neutrophilic leukocytosis; MCI; Hx CVA; fall risk; bed confined; non ambulatory; confused; unable to safely tolerate seated position  2) Is the patient "bed confined" as defined below? Yes  To be "be confined" the patient must satisfy all three of the following conditions: (1) unable to get up from bed without Assistance; AND (2) unable to ambulate; AND (3) unable to sit in a chair or wheelchair      3) Can this patient safely be transported by car or wheelchair van (i e , seated during transport without a medical attendant or monitoring)? No    4) In addition to completing questions 1-3 above, please check any of the following conditions that apply*:   *Note: supporting documentation for any boxes checked must be maintained in the patient's medical records  If hosp-hosp transfer, describe services needed at 2nd facility not available at 1st facility? Patient is confused  Medical attendant required   Unable to tolerate seated position for time needed to transport   Other(specify) fall risk    Section III - Signature of Physician or Healthcare Professional  I certify that the above information is true and correct based on my evaluation of this patient, and represent that the patient requires transport by ambulance and that other forms of transport are contraindicated  I understand that this information will be used by the Centers for Medicare and Medicaid Services (CMS) to support the determination of medical necessity for ambulance services, and I represent that I have personal knowledge of the patient's condition at time of transport  [x]  If this box is checked, I also certify that the patient is physically or mentally incapable of signing the ambulance service's claim and that the institution with which I am affiliated has furnished care, services, or assistance to the patient  My signature below is made on behalf of the patient pursuant to 42 CFR §424 36(b)(4)  In accordance with 42 CFR §424 37, the specific reason(s) that the patient is physically or mentally incapable of signing the claim form is as follows: confused      Signature of Physician* or Healthcare Professional_____________________________________________________  Signature Date 11/18/19 (For scheduled repetitive transports, this form is not valid for transports performed more than 60 days after this date)    Printed Name & Credentials of Physician or Healthcare Professional (MD, DO, RN, etc ) JACKELIN Blank    *Form must be signed by patient's attending physician for scheduled, repetitive transports   For non-repetitive, unscheduled ambulance transports, if unable to obtain the signature of the attending physician, any of the following may sign (choose appropriate option below)  [] Physician Assistant []  Clinical Nurse Specialist []  Registered Nurse  []  Nurse Practitioner  [x] Discharge Planner

## 2019-11-18 NOTE — PLAN OF CARE
Problem: Potential for Falls  Goal: Patient will remain free of falls  Description  INTERVENTIONS:  - Assess patient frequently for physical needs  -  Identify cognitive and physical deficits and behaviors that affect risk of falls  -  Rarden fall precautions as indicated by assessment   - Educate patient/family on patient safety including physical limitations  - Instruct patient to call for assistance with activity based on assessment  - Modify environment to reduce risk of injury  - Consider OT/PT consult to assist with strengthening/mobility  11/18/2019 1351 by Bonifacio Brown RN  Outcome: Adequate for Discharge  11/18/2019 0729 by Bonifacio Brown RN  Outcome: Progressing     Problem: Prexisting or High Potential for Compromised Skin Integrity  Goal: Skin integrity is maintained or improved  Description  INTERVENTIONS:  - Identify patients at risk for skin breakdown  - Assess and monitor skin integrity  - Assess and monitor nutrition and hydration status  - Monitor labs   - Assess for incontinence   - Turn and reposition patient  - Assist with mobility/ambulation  - Relieve pressure over bony prominences  - Avoid friction and shearing  - Provide appropriate hygiene as needed including keeping skin clean and dry  - Evaluate need for skin moisturizer/barrier cream  - Collaborate with interdisciplinary team   - Patient/family teaching  - Consider wound care consult   11/18/2019 1351 by Bonifacio Brown RN  Outcome: Adequate for Discharge  11/18/2019 0729 by Bonifacio Brown RN  Outcome: Progressing     Problem: Neurological Deficit  Goal: Neurological status is stable or improving  Description  Interventions:  - Monitor and assess patient's level of consciousness, motor function, sensory function, and level of assistance needed for ADLs  - Monitor and report changes from baseline  Collaborate with interdisciplinary team to initiate plan and implement interventions as ordered     - Provide and maintain a safe environment  - Consider seizure precautions  - Consider fall precautions  - Consider aspiration precautions  - Consider bleeding precautions  11/18/2019 1351 by Royal Will RN  Outcome: Adequate for Discharge  11/18/2019 0729 by Royal Will RN  Outcome: Progressing     Problem: Activity Intolerance/Impaired Mobility  Goal: Mobility/activity is maintained at optimum level for patient  Description  Interventions:  - Assess and monitor patient  barriers to mobility and need for assistive/adaptive devices  - Assess patient's emotional response to limitations  - Collaborate with interdisciplinary team and initiate plans and interventions as ordered  - Encourage independent activity per ability   - Maintain proper body alignment  - Perform active/passive rom as tolerated/ordered  - Plan activities to conserve energy   - Turn patient as appropriate  11/18/2019 1351 by Royal Will RN  Outcome: Adequate for Discharge  11/18/2019 0729 by Royal Will RN  Outcome: Progressing     Problem: Communication Impairment  Goal: Ability to express needs and understand communication  Description  Assess patient's communication skills and ability to understand information  Patient will demonstrate use of effective communication techniques, alternative methods of communication and understanding even if not able to speak  - Encourage communication and provide alternate methods of communication as needed  - Collaborate with case management/ for discharge needs  - Include patient/family/caregiver in decisions related to communication  11/18/2019 1351 by Royal Will RN  Outcome: Adequate for Discharge  11/18/2019 0729 by Royal Will RN  Outcome: Progressing     Problem: Potential for Aspiration  Goal: Non-ventilated patient's risk of aspiration is minimized  Description  Assess and monitor vital signs, respiratory status, and labs (WBC)    Monitor for signs of aspiration (tachypnea, cough, rales, wheezing, cyanosis, fever)  - Assess and monitor patient's ability to swallow  - Place patient up in chair to eat if possible  - HOB up at 90 degrees to eat if unable to get patient up into chair   - Supervise patient during oral intake  - Instruct patient/ family to take small bites  - Instruct patient/ family to take small single sips when taking liquids  - Follow patient-specific strategies generated by speech pathologist   11/18/2019 1351 by Angela Lee RN  Outcome: Adequate for Discharge  11/18/2019 0729 by Angela Lee RN  Outcome: Progressing  Goal: Ventilated patient's risk of aspiration is minimized  Description  Assess and monitor vital signs, respiratory status, airway cuff pressure, and labs (WBC)  Monitor for signs of aspiration (tachypnea, cough, rales, wheezing, cyanosis, fever)  - Elevate head of bed 30 degrees if patient has tube feeding   - Monitor tube feeding  11/18/2019 1351 by Angela Lee RN  Outcome: Adequate for Discharge  11/18/2019 0729 by Angela Lee RN  Outcome: Progressing     Problem: Nutrition  Goal: Nutrition/Hydration status is improving  Description  Monitor and assess patient's nutrition/hydration status for malnutrition (ex- brittle hair, bruises, dry skin, pale skin and conjunctiva, muscle wasting, smooth red tongue, and disorientation)  Collaborate with interdisciplinary team and initiate plan and interventions as ordered  Monitor patient's weight and dietary intake as ordered or per policy  Utilize nutrition screening tool and intervene per policy  Determine patient's food preferences and provide high-protein, high-caloric foods as appropriate  - Assist patient with eating   - Allow adequate time for meals   - Encourage patient to take dietary supplement as ordered  - Collaborate with clinical nutritionist   - Include patient/family/caregiver in decisions related to nutrition    11/18/2019 1351 by Aaron Chavira YFN Gutierrez  Outcome: Adequate for Discharge  11/18/2019 0729 by Burton Justin RN  Outcome: Progressing     Problem: Nutrition/Hydration-ADULT  Goal: Nutrient/Hydration intake appropriate for improving, restoring or maintaining nutritional needs  Description  Monitor and assess patient's nutrition/hydration status for malnutrition  Collaborate with interdisciplinary team and initiate plan and interventions as ordered  Monitor patient's weight and dietary intake as ordered or per policy  Utilize nutrition screening tool and intervene as necessary  Determine patient's food preferences and provide high-protein, high-caloric foods as appropriate       INTERVENTIONS:  - Monitor oral intake, urinary output, labs, and treatment plans  - Assess nutrition and hydration status and recommend course of action  - Evaluate amount of meals eaten  - Assist patient with eating if necessary   - Allow adequate time for meals  - Recommend/ encourage appropriate diets, oral nutritional supplements, and vitamin/mineral supplements  - Order, calculate, and assess calorie counts as needed  - Recommend, monitor, and adjust tube feedings and TPN/PPN based on assessed needs  - Assess need for intravenous fluids  - Provide specific nutrition/hydration education as appropriate  - Include patient/family/caregiver in decisions related to nutrition  11/18/2019 1351 by Burton Justin RN  Outcome: Adequate for Discharge  11/18/2019 0729 by Burton Justin RN  Outcome: Progressing

## 2019-11-18 NOTE — SPEECH THERAPY NOTE
Speech Language/Pathology    Speech/Language Pathology Progress Note    Patient Name: Delano Winter  Today's Date: 11/18/2019     Problem List  Principal Problem:    Acute on chronic left-sided weakness  Active Problems:    Essential hypertension    Oropharyngeal dysphagia    History of cerebrovascular accident (CVA) with residual deficit    MCI (mild cognitive impairment)    Neutrophilic leukocytosis    Altered mental status    Hilar adenopathy       Past Medical History  Past Medical History:   Diagnosis Date    RONAL [facility]) ([facility] [address] ) 5/31/2017    Aspiration into respiratory tract     Chest pain 5/31/2017    COPD [facility]chronic obstructive pulmonary disease) ([facility]     Depression     Elevated troponin 5/31/2017    GERD (gastroesophageal reflux disease)     History of CVA (cerebrovascular accident) 4/13/2016    Hyperlipidemia     Hypertension     Lung cancer ([facility] ) 2005    Right, status post lobectomy    Pneumonia     Prostate cancer ([facility] )     Sleep apnea     awaiting sleep study results    Squamous cell skin cancer     Stroke ([facility] )     Tracheomalacia     Weakness due to cerebrovascular accident (CVA)         Past Surgical History  Past Surgical History:   Procedure Laterality Date    COLONOSCOPY      ESOPHAGOGASTRODUODENOSCOPY N/A 4/13/2016    Procedure: ESOPHAGOGASTRODUODENOSCOPY (EGD); Surgeon: Gloria Dsouza MD;  [facility]: [facility]; Service:     JOINT REPLACEMENT      knee replacement    LUNG LOBECTOMY      SD ESOPHAGOGASTRODUODENOSCOPY TRANSORAL DIAGNOSTIC N/A 3/15/2017    Procedure: ESOPHAGOGASTRODUODENOSCOPY (EGD); Surgeon: Gloria Dsouza MD;  [facility]: [facility]; Service: Gastroenterology    SKIN BIOPSY      TRIGEMINAL NERVE DECOMPRESSION           Subjective:  "I'm getting tired of the food"     Objective:  Patient seen for f/u dysphagia therapy at lunch meal  Nursing notes report an increase in coughing when patient was utilizing straw for liquids   SLP actually recommended honey thick liquids on tsp after evaluation due to significant coughing episodes  Today the patient states "I hadn't used a straw in so long"  He has poor intake at lunch meal, but is assessed with honey thick shake and coffee, with puree peaches  Bite size and rate are adequate  The patient takes small sips of honey thick liquids via cup with no overt s/s aspiration  Patient reports dislike of our food and is looking forward to getting home  He reports eating [employer] frozen puree meals at home  The patient does not have any concerns re: swallow function at this time  Assessment:  Patient is tolerating current diet well  Continue to recommend honey thick liquids via tsp or by cup  NO straw! Plan/Recommendations:  Continue with baseline diet of puree and honey thick liquids  No further ST warranted at this time

## 2019-11-18 NOTE — SOCIAL WORK
CM was informed that pt is medically stable for dc today  CM spoke to Torri at [facility] who states pt is accepted and a bed is available today  CM arranged with [facility] for a 2pm dc to [facility]  CM notified pt's daughter Anjali Lambert, pt's bedside RN Kia Chaney, and Torri at [facility] of dc time  Facility transfer form and CMN completed  CMN signed and placed in medical record bin  Chart copy requested

## 2019-11-19 ENCOUNTER — NURSING HOME VISIT (OUTPATIENT)
Dept: GERIATRICS | Facility: OTHER | Age: 79
End: 2019-11-19
Payer: MEDICARE

## 2019-11-19 ENCOUNTER — TELEPHONE (OUTPATIENT)
Dept: GERIATRICS | Age: 79
End: 2019-11-19

## 2019-11-19 VITALS
HEART RATE: 69 BPM | RESPIRATION RATE: 18 BRPM | OXYGEN SATURATION: 95 % | DIASTOLIC BLOOD PRESSURE: 59 MMHG | SYSTOLIC BLOOD PRESSURE: 108 MMHG | TEMPERATURE: 97.6 F

## 2019-11-19 DIAGNOSIS — R59.0 HILAR ADENOPATHY: ICD-10-CM

## 2019-11-19 DIAGNOSIS — G31.84 MCI (MILD COGNITIVE IMPAIRMENT): ICD-10-CM

## 2019-11-19 DIAGNOSIS — I69.30 HISTORY OF CEREBROVASCULAR ACCIDENT (CVA) WITH RESIDUAL DEFICIT: Chronic | ICD-10-CM

## 2019-11-19 DIAGNOSIS — R53.1 LEFT-SIDED WEAKNESS: Primary | Chronic | ICD-10-CM

## 2019-11-19 DIAGNOSIS — I10 ESSENTIAL HYPERTENSION: Chronic | ICD-10-CM

## 2019-11-19 DIAGNOSIS — R13.12 OROPHARYNGEAL DYSPHAGIA: Chronic | ICD-10-CM

## 2019-11-19 LAB — BACTERIA BLD CULT: NORMAL

## 2019-11-19 PROCEDURE — 99305 1ST NF CARE MODERATE MDM 35: CPT | Performed by: INTERNAL MEDICINE

## 2019-11-19 RX ORDER — PANTOPRAZOLE SODIUM 40 MG/1
40 TABLET, DELAYED RELEASE ORAL DAILY
COMMUNITY
End: 2020-09-26 | Stop reason: ALTCHOICE

## 2019-11-19 NOTE — PROGRESS NOTES
[address] [place] [address] Progress Note    NAME: Raghu Marrero  AGE: 77 y o  SEX: male [id number]  [facility] [address]  DATE OF ENCOUNTER: 11/19/2019    Assessment and Plan     Problem List Items Addressed This Visit        Digestive    Oropharyngeal dysphagia (Chronic)     Patient had a chronic cough during hospitalization  He was placed on modified diet on a puree of honey and thick liquids  Speech therapy to follow here  Cardiovascular and Mediastinum    Essential hypertension (Chronic)     We will need to monitor BP on a regular basis  Nervous and Auditory    Left-sided weakness - Primary (Chronic)     Patient was admitted to [address] with left-sided weakness and confusion on 11/14/19  He had a previous CVA with mild left-sided residual weakness which apparently worsened from his baseline along with mental status changes  Pt was referred to [facility] for further rehabilitation  Immune and Lymphatic    Hilar adenopathy     Patient was noted to have hilar adenopathy in particular right hilar adenopathy measuring 2cm in size, previously had been 1 6 back in March  It was recommended that the patient have a non-emergent PET CT done as an outpatient  This will need to be discussed with patient's daughter Bernice Garcia  Other    History of cerebrovascular accident (CVA) with residual deficit (Chronic)     Patient is to continue with ASA, Plavix, and statin  MCI (mild cognitive impairment)     Patient had an MRI of the brain showing sequelae of remote infarcts and moderate white matter microangiopathy  All medications and routine orders were reviewed and updated as needed  Plan discussed with: Patient    Chief Complaint     No chief complaint on file  History of Present Illness     This is a 77year old  male who was admitted to [address] with left-sided weakness and confusion on 11/14/19   He is right side dominant  He had a previous CVA with mild left-sided residual weakness (2006, with left foot drop) which apparently worsened from his baseline along with mental status changes  Daughter noticed patient could not lift his LUE or LLE for several hours, then resolved  Pt was referred to [facility] for further rehabilitation  Other comorbidities include HTN, HLD, CAD, GERD, depression, COPD, BPH, prostate cancer with radioactive seeds, trigeminal neuralgia with decompression, and lung cancer with right upper lobe lobectomy  Neurology did evaluate the patient during his hospitalization  Pt was noted to have hilar adenopathy in particular right hilar adenopathy measuring 2cm in size, previously had been 1 6 back in March  It was recommended that the patient have a non-emergent PET CT done as an outpatient  As far as the cognitive impairment, pt had an MRI of the brain showing sequelae of remote infarcts and moderate white matter microangiopathy  As far as CVA, he is to continue with ASA, Plavix, and statin  As far as oropharyngeal dysphagia, pt had a chronic cough during hospitalization  He was placed on modified diet on a puree of honey and thick liquids  Speech therapy to follow here  For HTN, we will need to monitor BP on a regular basis  Patient is DNR  Patient currently lives in [facility] with his daughters in their house  He lives only on the first floor  He has lived there for the last 1 5 years, previously he lived in [address] [facility] [address]  Uses a walker to ambulate and his family helps with IADLs  Patient requires maximal assistance with overall ADLs ad functional mobility and transfer according to previous records  Patient felt he was more independent  He graduated from high school and night school  He used to work as a  in [address] [place] [address]  He was  25 years ago when his wife suffered a burst appendix + sepsis at age 48  They had 2 children   Patient previously smoked cigarettes, but quit 46 years ago         HISTORY:  Past Surgical History:   Procedure Laterality Date    COLONOSCOPY      ESOPHAGOGASTRODUODENOSCOPY N/A 2016    Procedure: ESOPHAGOGASTRODUODENOSCOPY (EGD); Surgeon: Todd Honeycutt MD;  [facility]; Service:     JOINT REPLACEMENT      knee replacement    LUNG LOBECTOMY      WI ESOPHAGOGASTRODUODENOSCOPY TRANSORAL DIAGNOSTIC N/A 3/15/2017    Procedure: ESOPHAGOGASTRODUODENOSCOPY (EGD); Surgeon: Todd Honeycutt MD;  [facility]; Service: Gastroenterology    SKIN BIOPSY      TRIGEMINAL NERVE DECOMPRESSION        Past Medical History:   Diagnosis Date    RONAL (acute kidney injury) ([facility] ) 2017    Aspiration into respiratory tract     Chest pain 2017    COPD (chronic obstructive pulmonary disease) (HCC)     Depression     Elevated troponin 2017    GERD (gastroesophageal reflux disease)     History of CVA (cerebrovascular accident) 2016    Hyperlipidemia     Hypertension     Lung cancer ([facility] )     Right, status post lobectomy    Pneumonia     Prostate cancer ([facility] )     Sleep apnea     awaiting sleep study results    Squamous cell skin cancer     Stroke ([facility] )     Tracheomalacia     Weakness due to cerebrovascular accident (CVA)      Family History   Family history unknown: Yes     Social History     Socioeconomic History    Marital status:       Spouse name: None    Number of children: None    Years of education: None    Highest education level: None   Occupational History    None   Social Needs    Financial resource strain: None    Food insecurity:     Worry: None     Inability: None    Transportation needs:     Medical: None     Non-medical: None   Tobacco Use    Smoking status: Former Smoker     Packs/day: 1 00     Years: 22 00     Pack years: 22 00     Types: Cigarettes     Start date:      Last attempt to quit:      Years since quittin 9    Smokeless tobacco: Never Used   Substance and Sexual Activity  Alcohol use: Not Currently    Drug use: Not Currently    Sexual activity: None   Lifestyle    Physical activity:     Days per week: None     Minutes per session: None    Stress: None   Relationships    Social connections:     Talks on phone: None     Gets together: None     Attends Hoahaoism service: None     Active member of club or organization: None     Attends meetings of clubs or organizations: None     Relationship status: None    Intimate partner violence:     Fear of current or ex partner: None     Emotionally abused: None     Physically abused: None     Forced sexual activity: None   Other Topics Concern    None   Social History Narrative    None       Allergies: Allergies   Allergen Reactions    Penicillins Rash       Review of Systems     Review of Systems   Constitutional: Negative  HENT: Positive for trouble swallowing (off and on, history of aspiration in the past)  Eyes: Positive for visual disturbance (hx cataracts)  Respiratory: Positive for shortness of breath (off and on)  Cardiovascular: Negative  Negative for chest pain  Gastrointestinal: Negative  Negative for constipation and diarrhea  Good appetite   Endocrine: Negative  Genitourinary: Negative  Musculoskeletal: Positive for arthralgias (knee pain off and on)  Skin: Negative  Allergic/Immunologic: Negative  Neurological: Positive for speech difficulty (back to baseline) and weakness (history of CVA)  Cognitive impairmet   Hematological: Negative  Psychiatric/Behavioral: Negative          PHQ-9 Depression Screening    PHQ-9:    Frequency of the following problems over the past two weeks:              Medications and orders       Current Outpatient Medications:     albuterol (PROVENTIL HFA,VENTOLIN HFA) 90 mcg/act inhaler, Inhale 2 puffs 4 (four) times a day, Disp: 2 Inhaler, Rfl: 0    amLODIPine (NORVASC) 10 mg tablet, Take 1 tablet by mouth daily, Disp: , Rfl:     ASPIRIN 81 PO, Take 1 tablet by mouth every other day  , Disp: , Rfl:     atorvastatin (LIPITOR) 40 mg tablet, Take 1 tablet (40 mg total) by mouth daily after dinner, Disp: , Rfl: 0    budesonide (PULMICORT) 0 5 mg/2 mL nebulizer solution, Take 1 vial (0 5 mg total) by nebulization 2 (two) times a day Rinse mouth after use , Disp: 360 mL, Rfl: 3    carvedilol (COREG) 12 5 mg tablet, Take 1 tablet by mouth 2 (two) times a day with meals, Disp: 60 tablet, Rfl: 0    clopidogrel (PLAVIX) 75 mg tablet, Take 1 tablet by mouth daily, Disp: 30 tablet, Rfl: 0    docusate sodium (COLACE) 100 mg capsule, Take 1 capsule (100 mg total) by mouth 2 (two) times a day, Disp: 10 capsule, Rfl: 0    Fesoterodine Fumarate ER (TOVIAZ) 8 MG TB24, Take 4 mg by mouth every evening  , Disp: , Rfl:     ketoconazole (NIZORAL) 2 % cream, Apply topically to both feet in their entirety (tops, bottoms and between toes) 3 times a day for 4 weeks straight , Disp: 60 g, Rfl: 5    latanoprost (XALATAN) 0 005 % ophthalmic solution, Administer 1 drop to both eyes daily at bedtime, Disp: , Rfl:     loratadine (CLARITIN) 10 mg tablet, Take 10 mg by mouth daily, Disp: , Rfl:     LORazepam (ATIVAN) 0 5 mg tablet, Take 0 5 tablets (0 25 mg total) by mouth daily for 3 days, Disp: 5 tablet, Rfl: 0    pantoprazole (PROTONIX) 40 mg tablet, Take 40 mg by mouth daily, Disp: , Rfl:     PARoxetine (PAXIL) 20 mg tablet, Take 1 tablet by mouth daily, Disp: , Rfl:     predniSONE 5 mg tablet, Take 1 tablet (5 mg total) by mouth daily, Disp: 90 tablet, Rfl: 3    senna (SENOKOT) 8 6 MG tablet, Take 2 tablets by mouth daily at bedtime, Disp: , Rfl:     spironolactone (ALDACTONE) 25 mg tablet, Take 1 tablet (25 mg total) by mouth daily, Disp: 30 tablet, Rfl: 0    tamsulosin (FLOMAX) 0 4 mg, Take 1 capsule by mouth daily, Disp: , Rfl:     acetaminophen (TYLENOL) 325 mg tablet, Take 2 tablets by mouth every 6 (six) hours as needed for fever, Disp: 30 tablet, Rfl: 0   benzonatate (TESSALON) 200 MG capsule, TAKE 1 CAPSULE BY MOUTH THREE TIMES A DAY AS NEEDED FOR COUGH (Patient taking differently: Take 200 mg by mouth 2 (two) times a day ), Disp: 90 capsule, Rfl: 5    ipratropium-albuterol (DUO-NEB) 0 5-2 5 mg/3 mL nebulizer solution, Take 3 mL by nebulization 4 (four) times a day as needed for wheezing or shortness of breath, Disp: , Rfl:     polyethylene glycol (MIRALAX) 17 g packet, Take 17 g by mouth daily as needed, Disp: , Rfl:     psyllium (METAMUCIL) 0 52 g capsule, Take 0 52 g by mouth daily as needed , Disp: , Rfl:   No current facility-administered medications for this visit  Objective     Vitals:   Vitals:    11/19/19 0949   BP: 108/59   Pulse: 69   Resp: 18   Temp: 97 6 °F (36 4 °C)   SpO2: 95%       Physical Exam   Constitutional: He is oriented to person, place, and time  He appears well-developed and well-nourished  HENT:   Head: Normocephalic and atraumatic  Right Ear: External ear normal    Mouth/Throat: Oropharynx is clear and moist    Moderate cerumen to left ear canal, but TM visualized and normal, edentulous (bottom and top rows)   Eyes: Pupils are equal, round, and reactive to light  Conjunctivae and EOM are normal    Neck: Normal range of motion  Neck supple  Cardiovascular: Normal rate, regular rhythm and intact distal pulses  Pulmonary/Chest: No respiratory distress  Decreased breath sounds   Abdominal: Soft  Bowel sounds are normal    Musculoskeletal: Normal range of motion  5/5 strength to RUE AND LLE  3/5 strength to LUE and LLE   Neurological: He is alert and oriented to person, place, and time  Left sided deficits including left facial droop, left foot drop, and left arm stasis with hand rolling, cognition appears intact at present     Skin: Skin is warm and dry  Psychiatric: He has a normal mood and affect  His behavior is normal    Nursing note and vitals reviewed  Pertinent Laboratory/Diagnostic Studies:    The following labs/studies were reviewed please see facility chart for details

## 2019-11-19 NOTE — ASSESSMENT & PLAN NOTE
Patient was noted to have hilar adenopathy in particular right hilar adenopathy measuring 2cm in size, previously had been 1 6 back in March  It was recommended that the patient have a non-emergent PET CT done as an outpatient  This will need to be discussed with patient's daughter Leandra Neal

## 2019-11-19 NOTE — PATIENT INSTRUCTIONS
1 - Continue PT/OT  Fall precautions  2 - Patient will need to follow up regarding increased hilar adenopathy given history of lung cancer  Patient will contact his oncologist  Patient will need to have PET scan for further investigation  This will need to be discussed with patient's daughter Terrence Hernandez  3 - Will monitor BP closely  Avoid hypotension  4 - Speech therapy to evaluate and monitor aspiration risk  5 - Will D/C omperazole as it interacts with Plavix   Will order Pantoprazole 40mg PO QD

## 2019-11-19 NOTE — ASSESSMENT & PLAN NOTE
Patient had an MRI of the brain showing sequelae of remote infarcts and moderate white matter microangiopathy

## 2019-11-19 NOTE — ASSESSMENT & PLAN NOTE
Patient had a chronic cough during hospitalization  He was placed on modified diet on a puree of honey and thick liquids  Speech therapy to follow here

## 2019-11-19 NOTE — ASSESSMENT & PLAN NOTE
Patient was admitted to [address] with left-sided weakness and confusion on 11/14/19  He had a previous CVA with mild left-sided residual weakness which apparently worsened from his baseline along with mental status changes  Pt was referred to [facility] for further rehabilitation

## 2019-11-19 NOTE — TELEPHONE ENCOUNTER
- [facility] hospital discharge of 11/18/19   - Cancelled scheduled Family Conference of 11/19/19 at [facility]    - Daughter will call back to reschedule Family Conference when father is discharged from [facility]

## 2019-11-22 ENCOUNTER — NURSING HOME VISIT (OUTPATIENT)
Dept: GERIATRICS | Facility: CLINIC | Age: 79
End: 2019-11-22
Payer: MEDICARE

## 2019-11-22 DIAGNOSIS — R53.1 LEFT-SIDED WEAKNESS: Primary | Chronic | ICD-10-CM

## 2019-11-22 DIAGNOSIS — K59.09 OTHER CONSTIPATION: ICD-10-CM

## 2019-11-22 DIAGNOSIS — R41.0 DELIRIUM: ICD-10-CM

## 2019-11-22 DIAGNOSIS — I10 ESSENTIAL HYPERTENSION: Chronic | ICD-10-CM

## 2019-11-22 DIAGNOSIS — R26.2 AMBULATORY DYSFUNCTION: Chronic | ICD-10-CM

## 2019-11-22 DIAGNOSIS — R13.12 OROPHARYNGEAL DYSPHAGIA: Chronic | ICD-10-CM

## 2019-11-22 DIAGNOSIS — J44.9 CHRONIC OBSTRUCTIVE PULMONARY DISEASE, UNSPECIFIED COPD TYPE (HCC): ICD-10-CM

## 2019-11-22 DIAGNOSIS — R53.1 ACUTE LEFT-SIDED WEAKNESS: ICD-10-CM

## 2019-11-22 PROCEDURE — 99316 NF DSCHRG MGMT 30 MIN+: CPT | Performed by: NURSE PRACTITIONER

## 2019-11-23 ENCOUNTER — TELEPHONE (OUTPATIENT)
Dept: OTHER | Facility: OTHER | Age: 79
End: 2019-11-23

## 2019-11-23 NOTE — TELEPHONE ENCOUNTER
[phone]/sarah/Shade/[birth year]/Zac Byrd Jr /fell found on hands and knees between bed and patio door

## 2019-11-25 VITALS
HEART RATE: 75 BPM | RESPIRATION RATE: 18 BRPM | OXYGEN SATURATION: 96 % | BODY MASS INDEX: 25.26 KG/M2 | DIASTOLIC BLOOD PRESSURE: 76 MMHG | WEIGHT: 213 LBS | TEMPERATURE: 98.4 F | SYSTOLIC BLOOD PRESSURE: 130 MMHG

## 2019-11-25 PROBLEM — R53.1 ACUTE LEFT-SIDED WEAKNESS: Status: RESOLVED | Noted: 2019-11-14 | Resolved: 2019-11-25

## 2019-11-25 PROBLEM — R41.0 DELIRIUM: Status: RESOLVED | Noted: 2019-02-02 | Resolved: 2019-11-25

## 2019-11-25 NOTE — ASSESSMENT & PLAN NOTE
· Cont pt/ot    Fall precautions  · Recommend home care = taxing effort for pt to leave home secondary to amb dys and need for assistive device  · F/u outpt

## 2019-11-25 NOTE — ASSESSMENT & PLAN NOTE
· Cont pt/to, fall precautions  · Cont asa/statin/plavix  · Monitor for worsening weakness  · F/u outpt

## 2019-11-25 NOTE — PROGRESS NOTES
[facility]  [address]  [phone]  [facility] DISCHARGE NOTE  [id number]      NAME: Jammie Staley  AGE: 77 y o  SEX: male    DATE OF ENCOUNTER: 11/22/2019    Assessment and Plan     Problem List Items Addressed This Visit        Digestive    Oropharyngeal dysphagia (Chronic)     · Cont with aspiration precautions  · Cont puree/honey thick liquids  · Recommend f/u with ST as needed to cont to assess swallow            Respiratory    COPD (chronic obstructive pulmonary disease) (HCC)     · Stable at present without cough or sob  · Cont albuterol inhaler, budesonide, prednisone  · Energy conservation techniques  · F/u outpt            Cardiovascular and Mediastinum    Essential hypertension (Chronic)     · Stable during therapy  · Cont norvasc, coreg  · F/u outpt            Nervous and Auditory    Left-sided weakness - Primary (Chronic)     · Cont pt/to, fall precautions  · Cont asa/statin/plavix  · Monitor for worsening weakness  · F/u outpt         RESOLVED: Delirium     · No problems during therapy  · Cont to monitor for MS changes  · Ensure adequate hydration/nutrition  · Avoid deliriogenic medications such as tramadol, benzo's, anticholenergics  · Ativan dc'd at rehab, would not restart  · Identify and treat reversible causes of confusion such as infection, urinary retention, constipation, hypoxia  · F/u outpt            Other    Ambulatory dysfunction (Chronic)     · Cont pt/ot    Fall precautions  · Recommend home care = taxing effort for pt to leave home secondary to amb dys and need for assistive device  · F/u outpt           Other constipation     · Controlled at present  · Cont senna/colace  · Ensure adequate hydration and dietary fiber  · F/u outpt         RESOLVED: Acute on chronic left-sided weakness     · Improvement with weakness during therapy  · Feels he's back to baseline  · F/u outpt               No orders of the defined types were placed in this encounter       - Counseling Documentation: patient was counseled regarding: instructions for management, patient and family education and impressions    Chief Complaint     No chief complaint on file  History of Present Illness     Mr Carmelo Esqueda is a 70yo male who is at [facility] for short term rehab after hospitalization for left sided weakness and confusion from 11/18/2019 and will discharge 11/25/2019 to home with daughter  Gene Ojeda PMH includes ambulatory dysfunction, lung CA, dysphagia, CVA w/left sided weakness  , COPD  During visit patient's vital signs were monitored: They were stable and no med changes were made  His lorazepam was DC  His atorvastatin was changed while at the hospital   Protonix was started  Patient initially ambulated 10 ft with wheeled walker mod assist   He progressed ambulating 15 ft with wheeled walker mod assist   He was initially max assist for self-care he continues to be max assist for self-care    Pt seen for discharge eval   He denies pain  Denies cp/sob/cough  Denies gi/gu distress  He cont to have left sided weakness which is at his baseline per pt  The following portions of the patient's history were reviewed and updated as appropriate: allergies, current medications, past family history, past medical history, past social history, past surgical history and problem list     Review of Systems     Review of Systems   Constitutional: Negative for activity change, appetite change, chills and fatigue  HENT: Negative for congestion  Respiratory: Positive for cough (chronic)  Negative for shortness of breath  Cardiovascular: Negative for chest pain  Gastrointestinal: Negative for abdominal pain, constipation, diarrhea, nausea and vomiting  Genitourinary: Negative for difficulty urinating  Musculoskeletal: Positive for gait problem  Neurological: Positive for weakness (left sided (baseline))  Negative for dizziness and light-headedness  Psychiatric/Behavioral: Positive for decreased concentration (forgetful)  Active Problem List     Patient Active Problem List   Diagnosis    Essential hypertension    Lung cancer ([facility] [address] [facility] )    Oropharyngeal dysphagia    History of cerebrovascular accident (CVA) with residual deficit    GERD (gastroesophageal reflux disease)    Left-sided weakness    Prostate cancer (HCC)    Hyperlipidemia    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    Tracheomalacia    Ambulatory dysfunction    Chronic cough    Other emphysema (HCC)    BPH (benign prostatic hyperplasia)    LIAM (obstructive sleep apnea)    Bacteriuria    Thrombocytopenia (HCC)    Weight loss    COPD (chronic obstructive pulmonary disease) (HCC)    Abnormal CT of the chest    MCI (mild cognitive impairment)    Encounter for vitamin deficiency screening    H/O fall    Other constipation    Polypharmacy    Anxiety    Neutrophilic leukocytosis    Altered mental status    Hilar adenopathy       Objective     /76   Pulse 75   Temp 98 4 °F (36 9 °C)   Resp 18   Wt 96 6 kg (213 lb)   SpO2 96%   BMI 25 26 kg/m²     Physical Exam   Constitutional: He is oriented to person, place, and time  He appears well-developed and well-nourished  No distress  HENT:   Head: Normocephalic  Neck: Normal range of motion  Cardiovascular: Normal rate and normal heart sounds  Exam reveals no gallop and no friction rub  No murmur heard  Pulmonary/Chest: Effort normal and breath sounds normal  No respiratory distress  He has no wheezes  He has no rales  Mildly coarse, clears with cough   Abdominal: Soft  Bowel sounds are normal  He exhibits no distension  There is no tenderness  Musculoskeletal: Normal range of motion  Left sided weakness, left min facial droop (both at baseline per pt)   Neurological: He is alert and oriented to person, place, and time  Skin: Skin is warm and dry  He is not diaphoretic     Psychiatric: He has a normal mood and affect  His behavior is normal    Nursing note and vitals reviewed  Pertinent Laboratory/Diagnostic Studies:  Labs reviewed in facility paper chart      Current Medications   Medications were reviewed in [facility] and updated in [facility]      Current Outpatient Medications:     acetaminophen (TYLENOL) 325 mg tablet, Take 2 tablets by mouth every 6 (six) hours as needed for fever, Disp: 30 tablet, Rfl: 0    albuterol (PROVENTIL HFA,VENTOLIN HFA) 90 mcg/act inhaler, Inhale 2 puffs 4 (four) times a day, Disp: 2 Inhaler, Rfl: 0    amLODIPine (NORVASC) 10 mg tablet, Take 1 tablet by mouth daily, Disp: , Rfl:     ASPIRIN 81 PO, Take 1 tablet by mouth every other day  , Disp: , Rfl:     atorvastatin (LIPITOR) 40 mg tablet, Take 1 tablet (40 mg total) by mouth daily after dinner, Disp: , Rfl: 0    benzonatate (TESSALON) 200 MG capsule, TAKE 1 CAPSULE BY MOUTH THREE TIMES A DAY AS NEEDED FOR COUGH (Patient taking differently: Take 200 mg by mouth 2 (two) times a day ), Disp: 90 capsule, Rfl: 5    budesonide (PULMICORT) 0 5 mg/2 mL nebulizer solution, Take 1 vial (0 5 mg total) by nebulization 2 (two) times a day Rinse mouth after use , Disp: 360 mL, Rfl: 3    carvedilol (COREG) 12 5 mg tablet, Take 1 tablet by mouth 2 (two) times a day with meals, Disp: 60 tablet, Rfl: 0    clopidogrel (PLAVIX) 75 mg tablet, Take 1 tablet by mouth daily, Disp: 30 tablet, Rfl: 0    docusate sodium (COLACE) 100 mg capsule, Take 1 capsule (100 mg total) by mouth 2 (two) times a day, Disp: 10 capsule, Rfl: 0    Fesoterodine Fumarate ER (TOVIAZ) 8 MG TB24, Take 4 mg by mouth every evening  , Disp: , Rfl:     ipratropium-albuterol (DUO-NEB) 0 5-2 5 mg/3 mL nebulizer solution, Take 3 mL by nebulization 4 (four) times a day as needed for wheezing or shortness of breath, Disp: , Rfl:     ketoconazole (NIZORAL) 2 % cream, Apply topically to both feet in their entirety (tops, bottoms and between toes) 3 times a day for 4 weeks straight , Disp: 60 g, Rfl: 5    latanoprost (XALATAN) 0 005 % ophthalmic solution, Administer 1 drop to both eyes daily at bedtime, Disp: , Rfl:     loratadine (CLARITIN) 10 mg tablet, Take 10 mg by mouth daily, Disp: , Rfl:     pantoprazole (PROTONIX) 40 mg tablet, Take 40 mg by mouth daily, Disp: , Rfl:     PARoxetine (PAXIL) 20 mg tablet, Take 1 tablet by mouth daily, Disp: , Rfl:     polyethylene glycol (MIRALAX) 17 g packet, Take 17 g by mouth daily as needed, Disp: , Rfl:     predniSONE 5 mg tablet, Take 1 tablet (5 mg total) by mouth daily, Disp: 90 tablet, Rfl: 3    psyllium (METAMUCIL) 0 52 g capsule, Take 0 52 g by mouth daily as needed , Disp: , Rfl:     senna (SENOKOT) 8 6 MG tablet, Take 2 tablets by mouth daily at bedtime, Disp: , Rfl:     spironolactone (ALDACTONE) 25 mg tablet, Take 1 tablet (25 mg total) by mouth daily, Disp: 30 tablet, Rfl: 0    tamsulosin (FLOMAX) 0 4 mg, Take 1 capsule by mouth daily, Disp: , Rfl:     >30min spent on discharge:  Pt visit, med rec, coordination of care, chart review  PCP update:  pcp not available in tiger text  KV to send rehab records to pcp office      DIANA Rogel  11/25/2019 12:27 PM

## 2019-11-25 NOTE — ASSESSMENT & PLAN NOTE
· No problems during therapy  · Cont to monitor for MS changes  · Ensure adequate hydration/nutrition  · Avoid deliriogenic medications such as tramadol, benzo's, anticholenergics  · Ativan dc'd at rehab, would not restart  · Identify and treat reversible causes of confusion such as infection, urinary retention, constipation, hypoxia  · F/u outpt

## 2019-11-25 NOTE — ASSESSMENT & PLAN NOTE
· Stable at present without cough or sob  · Cont albuterol inhaler, budesonide, prednisone  · Energy conservation techniques  · F/u outpt

## 2019-11-25 NOTE — ASSESSMENT & PLAN NOTE
· Controlled at present  · Cont senna/colace  · Ensure adequate hydration and dietary fiber  · F/u outpt

## 2019-11-25 NOTE — ASSESSMENT & PLAN NOTE
· Cont with aspiration precautions  · Cont puree/honey thick liquids  · Recommend f/u with ST as needed to cont to assess swallow

## 2019-11-26 ENCOUNTER — TELEPHONE (OUTPATIENT)
Dept: PULMONOLOGY | Facility: CLINIC | Age: 79
End: 2019-11-26

## 2019-11-26 ENCOUNTER — TELEPHONE (OUTPATIENT)
Dept: NEUROLOGY | Facility: CLINIC | Age: 79
End: 2019-11-26

## 2019-11-26 NOTE — TELEPHONE ENCOUNTER
Hospitalization 11/14-11/18  The purpose of this phone call is to assess patient's general wellbeing or for any assistance needed with follow-up care  -    According to chart, patient discharged to [facility]; [phone]  Called facility, spoke with [facility]  Patient discharged last evening back to home with daughter Neda Fang  Called patient, spoke with [place] Fang (she is on communication consent)  Since discharge, he has not experienced any new or worsening stroke symptoms    States he has returned to functional baseline, seems a little slower at the movement but is at baseline  Patient ambulates with a walker daughter assists with ADLs as needed  She managed his medications and appointments  Patient has follow up with PCP 12/11  Reminded her of stroke hospital follow up appointment scheduled 12/13  States she was not aware of appt  Mailed new patient packet to home address on file  Confirmed address  Scheduled for in home VNA and therapy visits this week  I reviewed medications with her  There have not been any changes since discharge  No difficulties obtaining medications  Reports taking all as prescribed with no missed doses or medication side effects  No signs of bleeding  Will ask PCP about switching from Protonix back to omeprazole  She verbalizes understanding of stroke symptoms and risk factor management  She declines review at this time  Patient monitors BP at home, average 134/70  he is a non smoker  Follows a puree diet with honey thick liquids  Answered all her questions  Provided my office info for questions  No further questions or concerns at this time

## 2019-11-26 NOTE — TELEPHONE ENCOUNTER
Daughter Cristopher Stinson calling saying [place] was in the hospital and they did a CT  She said it showed something and they recommended a PET CT  She wants to know what you think and if he should have that done prior to his next appt?

## 2019-11-27 NOTE — TELEPHONE ENCOUNTER
Received voicemail from daughter to call her back regarding appt  Pia Ash  She wanted to clarify appt time  Provided her all appt details  She verbalizes understanding  No further questions or concerns at this time

## 2019-12-10 ENCOUNTER — TELEPHONE (OUTPATIENT)
Dept: NEUROLOGY | Facility: CLINIC | Age: 79
End: 2019-12-10

## 2019-12-12 ENCOUNTER — TELEPHONE (OUTPATIENT)
Dept: NEUROLOGY | Facility: CLINIC | Age: 79
End: 2019-12-12

## 2019-12-12 NOTE — TELEPHONE ENCOUNTER
21/30 day post discharge follow up call  Hospitalization 11/14-11/18/19  Please see my telephone encounter 11/26 for 7 day follow up call  The purpose of this phone call is to assess patient's general wellbeing or for any assistance needed with follow-up care  -    Called daughter Lita Jackson  Patient continues to be free of any new or worsening stroke-like symptoms, he is still at baseline with no changes from previous follow up  Ambulating with the walker, continues to require assistance with ADLs from Lita Jackson  Successfully followed up with PCP yesterday  Scheduled with Jose Manuel for stroke hospital follow up appointment tomorrow  Confirmed time for her  States patient will some with his caregiver as well as she will not be available  I reviewed medications with her  Patient was indeed switched to omeprazole (states she thinks it is 40 mg daily but she is not completely sure)  No missed doses, medication side effects, or signs of bleeding  Competent in stroke symptoms and risk factor management  Declined review at this time  BPs continue to be stable  Average SBP before medications 140, after BP medications SBP 110s  Non smoker, no change in diet, still puree with honey thick liquids  Lita Jackson does not have any questions or concerns at this time

## 2019-12-13 ENCOUNTER — OFFICE VISIT (OUTPATIENT)
Dept: NEUROLOGY | Facility: CLINIC | Age: 79
End: 2019-12-13
Payer: MEDICARE

## 2019-12-13 VITALS — HEART RATE: 73 BPM | RESPIRATION RATE: 18 BRPM | DIASTOLIC BLOOD PRESSURE: 62 MMHG | SYSTOLIC BLOOD PRESSURE: 122 MMHG

## 2019-12-13 DIAGNOSIS — R53.1 LEFT-SIDED WEAKNESS: Chronic | ICD-10-CM

## 2019-12-13 DIAGNOSIS — I69.30 HISTORY OF CEREBROVASCULAR ACCIDENT (CVA) WITH RESIDUAL DEFICIT: Primary | Chronic | ICD-10-CM

## 2019-12-13 PROCEDURE — 99214 OFFICE O/P EST MOD 30 MIN: CPT | Performed by: PHYSICIAN ASSISTANT

## 2019-12-13 RX ORDER — LORAZEPAM 0.5 MG/1
0.25 TABLET ORAL EVERY 8 HOURS PRN
Status: ON HOLD | COMMUNITY
End: 2021-06-19 | Stop reason: SDUPTHER

## 2019-12-13 RX ORDER — OMEPRAZOLE 40 MG/1
40 CAPSULE, DELAYED RELEASE ORAL DAILY
COMMUNITY
End: 2020-02-05 | Stop reason: HOSPADM

## 2019-12-13 NOTE — PATIENT INSTRUCTIONS
Continue aspirin, plavix and statin for secondary stroke prevention  MRI brain in the hospital was negative for a new, acute stroke   Continue to follow with PCP for management of blood pressure, cholesterol   If any further episodes of worsening left sided weakness, confusion, would suggest further workup for possible seizures  As discussed, the EEG in the hospital was normal, but does not exclude the possibility of seizures  Would suggest prolonged monitoring for seizures if episodes recur     Follow up in about 6 months or sooner if needed

## 2019-12-13 NOTE — PROGRESS NOTES
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 with residual left-sided deficits and dysarthria  He was recently hospitalized for a 1-2 hour episode of worsening left-sided deficits as well as confusion/AMS  He has presented to the hospital several times in the last few years for worsening of his prior stroke symptoms in the setting of infection  During this admission, his white cell count was elevated, however no source of infection identified  CTA head and neck negative for any large vessel occlusion  MRI brain was negative for any new stroke  He had a routine EEG which was abnormal with theta frequency background slowing suggesting mild nonspecific diffuse cerebral dysfunction, but no epileptiform discharges  It was felt his presentation was likely recrudescence of prior stroke symptoms  We discussed further monitoring for seizure such as an ambulatory or sleep-deprived EEG, however patient and caregiver did not feel this is necessary  I am at lower suspicion for this given his presentation  However, I discussed that if he continues to have similar presentation (stereotyped episodes), I would suggest further monitoring with ambulatory EEG  We discussed that normal EEGs do not rule out seizures  They are in agreement with plan  He will continue aspirin, Plavix and statin for secondary stroke prevention  We discussed controlling cardiovascular risk factors under the direction of PCP including blood pressure, lipids, blood sugar  Will have him return to see vascular neurology attending in 6 months or sooner if needed  He was advised to call for any questions/concerns  We also reviewed signs and symptoms of stroke and when to call 911  There are no diagnoses linked to this encounter         Subjective:    HPI    Aggie Hwang  is a 77 y o  male with PMH of hypertension, mild cognitive impairment, and CVA >10 years ago with residual left sided deficits and dysarthria  He presents today for a hospital follow up  Patient presented to the ED on 11/14/19 due to a transient episode of worsening of left sided weakness, altered mental status/confusion  According to his aid, he could not lift himself off the bed due to the weakness  By the time he presented to the ED, he was noted to be back to his baseline  Patient has been evaluated by the inpatient neurology team a few times in the past (Feb 2018, April 2017) for similar presentation of worsening left sided deficits  Workup neg for acute CVA  Presentation usually occurs secondary to infection  On current admission, WBCs were elevated at 16, however then decreased to 11  No source of infection found  CTH unremarkable  MRI brain w/ and w/o contrast negative for acute ischemia, mass or hemorrhage  Pertinent findings included stable relatively advanced diffuse chronic microangiopathic change within the cerebral hemispheres, mild change within the brainstem, and multiple old lacunar infarcts  CTA head and neck without acute changes  Routine EEG did not show any epileptiform discharges  It was abnormal due to theta frequency background slowing suggesting mild nonspecific diffuse cerebral dysfunction  There was discussion of possible prolonged or sleep-deprived EEG as outpatient  [place] more likely this was recrudescence of old stroke symptoms  He was continued on ASA, Plavix and statin for secondary stroke prevention  Today, patient presents with his caregiver  Both patient and caregiver feels that he has been back to his baseline  No new neurologic symptoms  He is taking all medications as prescribed  His caregiver is adamant that she does not think he is having any seizures  Patient says he sleeping well, has good appetite    He is very happy with his care at home, lives with his daughter and son-in-law, as well their 3year-old son, and has a caregiver during the day  The following portions of the patient's history were reviewed and updated as appropriate: current medications, past family history, past medical history, past social history, past surgical history and problem list          Objective:    Blood pressure 122/62, pulse 73, resp  rate 18  Physical Exam   Constitutional: He appears well-developed and well-nourished  HENT:   Head: Normocephalic and atraumatic  Eyes: Pupils are equal, round, and reactive to light  EOM are normal    Pulmonary/Chest: Effort normal    Neurological:   Reflex Scores:       Bicep reflexes are 1+ on the right side and 2+ on the left side  Brachioradialis reflexes are 1+ on the right side and 2+ on the left side  Patellar reflexes are 1+ on the right side and 1+ on the left side  Achilles reflexes are 1+ on the right side and 1+ on the left side  Skin: Skin is warm and dry  Psychiatric: He has a normal mood and affect  His speech is normal        Neurological Exam  Mental Status   Oriented to person, place, time and situation  Speech is normal  Language is fluent with no aphasia  Attention and concentration are normal     Cranial Nerves  CN II: Visual fields full to confrontation  CN III, IV, VI: Extraocular movements intact bilaterally  Pupils equal round and reactive to light bilaterally  CN V: Facial sensation is normal   CN VII: Left facial droop, minimal   CN VIII: Hearing is normal   CN IX, X: Palate elevates symmetrically  CN XI: Shoulder shrug strength is normal   CN XII: Tongue midline without atrophy or fasciculations  Motor   Normal muscle tone  Left pronator drift                                               Right                     Left   Shoulder abduction               5                          4+  Elbow flexion                         5                          4+  Elbow extension                    5                          4+  Hip flexion 5                          4  Plantarflexion                         5                          3    Sensory  Light touch is normal in upper and lower extremities  Reflexes                                           Right                      Left  Brachioradialis                    1+                         2+  Biceps                                 1+                         2+  Patellar                                1+                         1+  Achilles                                1+                         1+    Coordination  Slower JERO on left  Gait  Deferred, in a wheelchair   ROS:    Review of Systems   Constitutional: Negative  Negative for appetite change and fever  HENT: Negative  Negative for hearing loss, tinnitus, trouble swallowing and voice change  Eyes: Negative  Negative for photophobia and pain  Respiratory: Negative  Negative for shortness of breath  Cardiovascular: Negative  Negative for palpitations  Gastrointestinal: Negative  Negative for nausea and vomiting  Endocrine: Negative  Negative for cold intolerance and heat intolerance  Genitourinary: Positive for urgency  Negative for dysuria and frequency  Musculoskeletal: Negative  Negative for myalgias and neck pain  Skin: Negative  Negative for rash  Neurological: Negative  Negative for dizziness, tremors, seizures, syncope, facial asymmetry, speech difficulty, weakness, light-headedness, numbness and headaches  Hematological: Negative  Does not bruise/bleed easily  Psychiatric/Behavioral: Negative  Negative for confusion, hallucinations and sleep disturbance       I personally reviewed and updated the ROS as appropriate

## 2019-12-30 DIAGNOSIS — J44.0 CHRONIC OBSTRUCTIVE PULMONARY DISEASE WITH ACUTE LOWER RESPIRATORY INFECTION (HCC): ICD-10-CM

## 2019-12-30 DIAGNOSIS — R05.3 CHRONIC COUGH: ICD-10-CM

## 2019-12-31 RX ORDER — BUDESONIDE 0.5 MG/2ML
0.5 INHALANT ORAL 2 TIMES DAILY
Qty: 360 ML | Refills: 3 | Status: SHIPPED | OUTPATIENT
Start: 2019-12-31 | End: 2020-01-15 | Stop reason: SDUPTHER

## 2020-01-14 ENCOUNTER — TELEPHONE (OUTPATIENT)
Dept: GERIATRICS | Age: 80
End: 2020-01-14

## 2020-01-14 NOTE — PROGRESS NOTES
Pulmonary Follow Up Note   Delano Winter  [de-identified] y o  male MRN: [id number]  1/15/2020      Assessment:  1  Recurrent aspiration pneumonia/tracheobronchitis  · Improved with Speech Therapy compliance  · Monitor for progression     2  Chronic dysphagia  · Honey thickened liquids per VBS  · Completed home speech therapy  · Will hold on plans for PEG tube at this time given stability  · Home suction machine declined by DME     3  Chronic Cough  · Overall improved     4  COPD with tracheomalacia  · Continue duonebs three to four times daily  · Continue pulmicort nebs BID  · Continue prednisone 5mg daily  · Flutter valve use four times daily  · Flu vaccine obtained 2019 and he obtained prevnar/pneumovax previously per daughter     5  GERD - continue PPI     6  Abnormal CT Chest  · Adenopathy increased on recent CT in Nov 19  · I suspect the TIB/Nodular infiltrates and adenopathy likely related to recurrent aspiration events  · Given NSCLCa history and enlarged adenopathy, will repeat CT now for further evaluation     7  Weight loss - now stable       Plan:    Diagnoses and all orders for this visit:    Chronic obstructive pulmonary disease, unspecified COPD type ([facility] [address] )  -     ipratropium-albuterol (DUO-NEB) 0 5-2 5 mg/3 mL nebulizer solution; Take 1 vial (3 mL total) by nebulization 3 (three) times a day    Oropharyngeal dysphagia    Tracheomalacia  -     predniSONE 5 mg tablet; Take 1 tablet (5 mg total) by mouth daily    Malignant neoplasm of right lung, unspecified part of lung (HCC)    Hilar adenopathy    History of cerebrovascular accident (CVA) with residual deficit    Abnormal CT of the chest  -     CT chest wo contrast; Future    Other emphysema (HCC)  -     predniSONE 5 mg tablet; Take 1 tablet (5 mg total) by mouth daily    Chronic obstructive pulmonary disease with acute lower respiratory infection (HCC)  -     budesonide (PULMICORT) 0 5 mg/2 mL nebulizer solution;  Take 1 vial (0 5 mg total) by nebulization 2 (two) times a day Rinse mouth after use  Chronic cough  -     budesonide (PULMICORT) 0 5 mg/2 mL nebulizer solution; Take 1 vial (0 5 mg total) by nebulization 2 (two) times a day Rinse mouth after use  Cough  -     benzonatate (TESSALON) 200 MG capsule; Take 1 capsule (200 mg total) by mouth 3 (three) times a day as needed for cough        Return in about 4 months (around 5/15/2020) for Recheck  History of Present Illness   HPI:  Liz Sarmiento  is a [de-identified] y o  male who has prior CVA with resultant left hemiparesis, lung cancer post RLL lobectomy, possible COPD, likely tracheomalacia, and chronic cough  He has had multiple admissions for hypoxic respiratory failure an bronchospasm felt secondary to aspiration tracheobronchitis   He has has further admissions for pneumonia felt secondary to likely aspiration, last in early Jan-Feb 2019  He was last seen by me in August 2019 and plans made to continue speech therapy and dysphagia precautions, nebs and bronchial hygiene therapy  He presents today for follow up      He reports feeling well  He had brief admission in Nov 2019 for worsened left sided weakness symptoms but new stroke was not identified  He reports mild cough that is overall improved, no purulent sputum production, no fevers or chills no weight loss, no hemoptysis, no SSCP  He has started to use [employer] and reports improved dysphagia and some weight gain  Review of Systems   Constitutional: Negative for activity change, chills, fatigue, fever and unexpected weight change  HENT: Positive for trouble swallowing  Negative for congestion, postnasal drip, rhinorrhea, sore throat and voice change  Eyes: Negative for visual disturbance  Respiratory: Positive for cough  Negative for chest tightness, shortness of breath and wheezing  Cardiovascular: Negative for chest pain, palpitations and leg swelling  Gastrointestinal: Negative for abdominal pain, diarrhea, nausea and vomiting  Endocrine: Negative for cold intolerance and heat intolerance  Musculoskeletal: Positive for gait problem  Negative for arthralgias  Skin: Negative for rash  Allergic/Immunologic: Negative for immunocompromised state  Neurological: Positive for speech difficulty and weakness  Negative for dizziness, light-headedness and headaches  Hematological: Negative for adenopathy  Psychiatric/Behavioral: Negative for sleep disturbance  The patient is not nervous/anxious  Historical Information   Past Medical History:   Diagnosis Date    [facility] 5/31/2017    Aspiration into respiratory tract     Chest pain 5/31/2017    COPD [facility]chronic obstructive pulmonary disease) [facility])     Depression     Elevated troponin 5/31/2017    GERD (gastroesophageal reflux disease)     History of CVA (cerebrovascular accident) 4/13/2016    Hyperlipidemia     Hypertension     Lung cancer ([place] [address] ) 2005    Right, status post lobectomy    Pneumonia     Prostate cancer ([facility] )     Sleep apnea     awaiting sleep study results    Squamous cell skin cancer     Stroke ([place] [address] )     Tracheomalacia     Weakness due to cerebrovascular accident (CVA)      Past Surgical History:   Procedure Laterality Date    COLONOSCOPY      ESOPHAGOGASTRODUODENOSCOPY N/A 4/13/2016    Procedure: ESOPHAGOGASTRODUODENOSCOPY (EGD); Surgeon: Todd Honeycutt MD;  [facility]; Service:     JOINT REPLACEMENT      knee replacement    LUNG LOBECTOMY      NC ESOPHAGOGASTRODUODENOSCOPY TRANSORAL DIAGNOSTIC N/A 3/15/2017    Procedure: ESOPHAGOGASTRODUODENOSCOPY (EGD); Surgeon: Todd Honeycutt MD;  [facility];   Service: Gastroenterology    SKIN BIOPSY      TRIGEMINAL NERVE DECOMPRESSION       Family History   Family history unknown: Yes         Meds/Allergies     Current Outpatient Medications:     acetaminophen (TYLENOL) 325 mg tablet, Take 2 tablets by mouth every 6 (six) hours as needed for fever, Disp: 30 tablet, Rfl: 0    albuterol (PROVENTIL HFA,VENTOLIN HFA) 90 mcg/act inhaler, Inhale 2 puffs 4 (four) times a day, Disp: 2 Inhaler, Rfl: 0    amLODIPine (NORVASC) 10 mg tablet, Take 1 tablet by mouth daily, Disp: , Rfl:     ASPIRIN 81 PO, Take 1 tablet by mouth every other day  , Disp: , Rfl:     atorvastatin (LIPITOR) 40 mg tablet, Take 1 tablet (40 mg total) by mouth daily after dinner, Disp: , Rfl: 0    benzonatate (TESSALON) 200 MG capsule, Take 1 capsule (200 mg total) by mouth 3 (three) times a day as needed for cough, Disp: 90 capsule, Rfl: 1    budesonide (PULMICORT) 0 5 mg/2 mL nebulizer solution, Take 1 vial (0 5 mg total) by nebulization 2 (two) times a day Rinse mouth after use , Disp: 360 mL, Rfl: 3    carvedilol (COREG) 12 5 mg tablet, Take 1 tablet by mouth 2 (two) times a day with meals, Disp: 60 tablet, Rfl: 0    clopidogrel (PLAVIX) 75 mg tablet, Take 1 tablet by mouth daily, Disp: 30 tablet, Rfl: 0    docusate sodium (COLACE) 100 mg capsule, Take 1 capsule (100 mg total) by mouth 2 (two) times a day, Disp: 10 capsule, Rfl: 0    Fesoterodine Fumarate ER (TOVIAZ) 8 MG TB24, Take 4 mg by mouth every evening  , Disp: , Rfl:     ipratropium-albuterol (DUO-NEB) 0 5-2 5 mg/3 mL nebulizer solution, Take 1 vial (3 mL total) by nebulization 3 (three) times a day, Disp: 810 mL, Rfl: 3    ketoconazole (NIZORAL) 2 % cream, Apply topically to both feet in their entirety (tops, bottoms and between toes) 3 times a day for 4 weeks straight , Disp: 60 g, Rfl: 5    latanoprost (XALATAN) 0 005 % ophthalmic solution, Administer 1 drop to both eyes daily at bedtime, Disp: , Rfl:     loratadine (CLARITIN) 10 mg tablet, Take 10 mg by mouth daily, Disp: , Rfl:     LORazepam (ATIVAN) 0 5 mg tablet, Take 0 5 mg by mouth every 8 (eight) hours as needed for anxiety, Disp: , Rfl:     omeprazole (PriLOSEC) 40 MG capsule, Take 40 mg by mouth daily, Disp: , Rfl:     pantoprazole (PROTONIX) 40 mg tablet, Take 40 mg by mouth daily, Disp: , Rfl:     PARoxetine (PAXIL) 20 mg tablet, Take 1 tablet by mouth daily, Disp: , Rfl:     polyethylene glycol (MIRALAX) 17 g packet, Take 17 g by mouth daily as needed, Disp: , Rfl:     predniSONE 5 mg tablet, Take 1 tablet (5 mg total) by mouth daily, Disp: 90 tablet, Rfl: 3    psyllium (METAMUCIL) 0 52 g capsule, Take 0 52 g by mouth daily as needed , Disp: , Rfl:     senna (SENOKOT) 8 6 MG tablet, Take 2 tablets by mouth daily at bedtime, Disp: , Rfl:     spironolactone (ALDACTONE) 25 mg tablet, Take 1 tablet (25 mg total) by mouth daily, Disp: 30 tablet, Rfl: 0    tamsulosin (FLOMAX) 0 4 mg, Take 1 capsule by mouth daily, Disp: , Rfl:   Allergies   Allergen Reactions    Penicillins Rash       Vitals: Blood pressure 129/83, pulse 98, temperature (!) 96 1 °F (35 6 °C), height 6' 5" (1 956 m), weight 103 kg (226 lb 12 8 oz), SpO2 100 %  Body mass index is 26 89 kg/m²  Oxygen Therapy  SpO2: 100 %  Oxygen Therapy: None (Room air)      Physical Exam  Physical Exam   Constitutional: He is oriented to person, place, and time  He appears well-developed and well-nourished  No distress  Elderly man in wheelchair, conversant with stable dysarthria, no distress   HENT:   Head: Normocephalic and atraumatic  Right Ear: External ear normal    Left Ear: External ear normal    Nose: Nose normal    Mouth/Throat: Oropharynx is clear and moist  No oropharyngeal exudate  No thrush   Eyes: Pupils are equal, round, and reactive to light  Conjunctivae are normal  Right eye exhibits no discharge  Left eye exhibits no discharge  No scleral icterus  Neck: Neck supple  No JVD present  No tracheal deviation present  Cardiovascular: Normal rate, regular rhythm and normal heart sounds  No murmur heard  Pulmonary/Chest: Effort normal  No stridor  No respiratory distress  He has no wheezes  He has no rales  Mildly diminished BS diffusely, no wheeze or rales   Abdominal: Soft   Bowel sounds are normal  He exhibits no distension  There is no tenderness  Musculoskeletal: He exhibits no edema or deformity  Lymphadenopathy:     He has no cervical adenopathy  Neurological: He is alert and oriented to person, place, and time  Left sided weakness stable, LLE brace in place   Skin: Skin is warm and dry  No rash noted  Psychiatric: He has a normal mood and affect  His behavior is normal    Vitals reviewed  Labs: I have personally reviewed pertinent lab results  Lab Results   Component Value Date    WBC 12 28 (H) 11/16/2019    HGB 14 5 11/16/2019    HCT 45 2 11/16/2019    MCV 93 11/16/2019     (L) 11/16/2019     Lab Results   Component Value Date    GLUCOSE 124 01/29/2019    CALCIUM 9 4 11/15/2019    K 4 0 11/15/2019    CO2 25 11/15/2019     11/15/2019    BUN 25 11/15/2019    CREATININE 1 17 11/15/2019     No results found for: IGE  Lab Results   Component Value Date    ALT 16 11/14/2019    AST 10 11/14/2019    ALKPHOS 85 11/14/2019       Sputum Cx 1/29/19 - 4+ MRF  Pertussis PCR neg     RADIOGRAPHS: New radiographs and reports personally reviewed  CTA Head/neck 11/15/19 - no evidence ICS, enlarged right hilar adenopathy 2cm previously 1 6cm    CXR 11/14/19 - post RUL lobectomy surgical changes and volume loss, no focal infiltrates no effusions, no PTX    VBS 4/30/19 - "Moderate oral/mild-moderate pharyngeal dysphagia w/ decreased oral control of liquids and delayed swallow initiation resulting in aspiration before the swallow   Oral prep and chin tuck of thick liquids by tsp reduced aspiration risk"     CT Chest 3/4/19 - improved bronchial/tracheal wall thickening, resolved hilar adenopathy, no sig mediastinal adenopathy, RML and RLL mild nodular infiltrate/TIB infiltrate noted, no sig effusions     CXR 1/29 - noted right hemithorax volume loss, kyphoscoliosis, improved overall aeration of right hemithorax, no PTX     CT angio 1/28/19 - no PE, noted right hilar adenopathy vs early mass lesion - favoring adenopathy based upon appearance, no PTX, no sig effusion, post surgical changes on right     11/2018 - VBS (+) dysphagia with penetration on thin liquids     Prior Studies  Chest X-ray 8/23/17 - CXR - images personally reviewed - noted right sided post-operative changes and mild volume loss, no acute infiltrates, no masses, no lesions appreciated  CT Scan 4/2017 - CT chest images reviewed with 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  Cardiac Testing 7/2017 TTE EF 55%, normal RV    Trey Allred DO, [facility]

## 2020-01-14 NOTE — TELEPHONE ENCOUNTER
- Patient's daughterKiarra, [phone] called to scheduled the Family Conf that had been cancelled from November, 2019   - Since that time patient had been hospitalized and had not been seen in this office   - Please advise if you would like me to schedule the [facility] or if a re-assessment appointment should be made  - I will then contact the daughterKiarra to schedule as appropriate

## 2020-01-15 ENCOUNTER — OFFICE VISIT (OUTPATIENT)
Dept: PULMONOLOGY | Facility: CLINIC | Age: 80
End: 2020-01-15
Payer: MEDICARE

## 2020-01-15 VITALS
BODY MASS INDEX: 26.78 KG/M2 | HEART RATE: 98 BPM | DIASTOLIC BLOOD PRESSURE: 83 MMHG | OXYGEN SATURATION: 100 % | SYSTOLIC BLOOD PRESSURE: 129 MMHG | WEIGHT: 226.8 LBS | TEMPERATURE: 96.1 F | HEIGHT: 77 IN

## 2020-01-15 DIAGNOSIS — J39.8 TRACHEOMALACIA: Chronic | ICD-10-CM

## 2020-01-15 DIAGNOSIS — R93.89 ABNORMAL CT OF THE CHEST: ICD-10-CM

## 2020-01-15 DIAGNOSIS — J44.0 CHRONIC OBSTRUCTIVE PULMONARY DISEASE WITH ACUTE LOWER RESPIRATORY INFECTION (HCC): ICD-10-CM

## 2020-01-15 DIAGNOSIS — R59.0 HILAR ADENOPATHY: ICD-10-CM

## 2020-01-15 DIAGNOSIS — I69.30 HISTORY OF CEREBROVASCULAR ACCIDENT (CVA) WITH RESIDUAL DEFICIT: Chronic | ICD-10-CM

## 2020-01-15 DIAGNOSIS — C34.91 MALIGNANT NEOPLASM OF RIGHT LUNG, UNSPECIFIED PART OF LUNG (HCC): Chronic | ICD-10-CM

## 2020-01-15 DIAGNOSIS — R13.12 OROPHARYNGEAL DYSPHAGIA: Chronic | ICD-10-CM

## 2020-01-15 DIAGNOSIS — R05.9 COUGH: ICD-10-CM

## 2020-01-15 DIAGNOSIS — J44.9 CHRONIC OBSTRUCTIVE PULMONARY DISEASE, UNSPECIFIED COPD TYPE (HCC): Primary | ICD-10-CM

## 2020-01-15 DIAGNOSIS — R05.3 CHRONIC COUGH: ICD-10-CM

## 2020-01-15 DIAGNOSIS — J43.8 OTHER EMPHYSEMA (HCC): Chronic | ICD-10-CM

## 2020-01-15 PROCEDURE — 99215 OFFICE O/P EST HI 40 MIN: CPT | Performed by: INTERNAL MEDICINE

## 2020-01-15 RX ORDER — BUDESONIDE 0.5 MG/2ML
0.5 INHALANT ORAL 2 TIMES DAILY
Qty: 360 ML | Refills: 3 | Status: SHIPPED | OUTPATIENT
Start: 2020-01-15 | End: 2021-01-18 | Stop reason: SDUPTHER

## 2020-01-15 RX ORDER — PREDNISONE 1 MG/1
5 TABLET ORAL DAILY
Qty: 90 TABLET | Refills: 3 | Status: SHIPPED | OUTPATIENT
Start: 2020-01-15 | End: 2020-12-15

## 2020-01-15 RX ORDER — BENZONATATE 200 MG/1
200 CAPSULE ORAL 3 TIMES DAILY PRN
Qty: 90 CAPSULE | Refills: 1 | Status: SHIPPED | OUTPATIENT
Start: 2020-01-15 | End: 2020-09-11

## 2020-01-15 RX ORDER — IPRATROPIUM BROMIDE AND ALBUTEROL SULFATE 2.5; .5 MG/3ML; MG/3ML
3 SOLUTION RESPIRATORY (INHALATION) 3 TIMES DAILY
Qty: 810 ML | Refills: 3 | Status: ON HOLD | OUTPATIENT
Start: 2020-01-15 | End: 2020-02-05 | Stop reason: SDUPTHER

## 2020-01-15 NOTE — TELEPHONE ENCOUNTER
We can schedule the family conf, no need for reassment unless he has had any new neurocognitive changes since last visit

## 2020-01-15 NOTE — PATIENT INSTRUCTIONS
· Continue current diet regimen and aspiration precautions  · Continue current nebulizer regimen and 5mg prednisone a day  · Tessalon as needed for cough  · Repeat CT Chest in next 1-2 months  · Follow up in 4 months or sooner as needed

## 2020-02-03 ENCOUNTER — TELEPHONE (OUTPATIENT)
Dept: PULMONOLOGY | Facility: CLINIC | Age: 80
End: 2020-02-03

## 2020-02-03 ENCOUNTER — APPOINTMENT (EMERGENCY)
Dept: RADIOLOGY | Facility: HOSPITAL | Age: 80
DRG: 202 | End: 2020-02-03
Payer: MEDICARE

## 2020-02-03 ENCOUNTER — HOSPITAL ENCOUNTER (INPATIENT)
Facility: HOSPITAL | Age: 80
LOS: 1 days | Discharge: HOME HEALTH | DRG: 202 | End: 2020-02-05
Attending: EMERGENCY MEDICINE | Admitting: INTERNAL MEDICINE
Payer: MEDICARE

## 2020-02-03 DIAGNOSIS — N18.30 CKD (CHRONIC KIDNEY DISEASE) STAGE 3, GFR 30-59 ML/MIN (HCC): ICD-10-CM

## 2020-02-03 DIAGNOSIS — I69.30 HISTORY OF CEREBROVASCULAR ACCIDENT (CVA) WITH RESIDUAL DEFICIT: Chronic | ICD-10-CM

## 2020-02-03 DIAGNOSIS — R05.8 PRODUCTIVE COUGH: Primary | ICD-10-CM

## 2020-02-03 DIAGNOSIS — R05.9 COUGH: ICD-10-CM

## 2020-02-03 DIAGNOSIS — R06.00 DYSPNEA: ICD-10-CM

## 2020-02-03 DIAGNOSIS — J44.9 CHRONIC OBSTRUCTIVE PULMONARY DISEASE, UNSPECIFIED COPD TYPE (HCC): ICD-10-CM

## 2020-02-03 PROCEDURE — 99285 EMERGENCY DEPT VISIT HI MDM: CPT

## 2020-02-03 PROCEDURE — 84484 ASSAY OF TROPONIN QUANT: CPT | Performed by: EMERGENCY MEDICINE

## 2020-02-03 PROCEDURE — 1123F ACP DISCUSS/DSCN MKR DOCD: CPT | Performed by: EMERGENCY MEDICINE

## 2020-02-03 PROCEDURE — 80053 COMPREHEN METABOLIC PANEL: CPT | Performed by: EMERGENCY MEDICINE

## 2020-02-03 PROCEDURE — 94640 AIRWAY INHALATION TREATMENT: CPT

## 2020-02-03 PROCEDURE — 85025 COMPLETE CBC W/AUTO DIFF WBC: CPT | Performed by: EMERGENCY MEDICINE

## 2020-02-03 PROCEDURE — 93005 ELECTROCARDIOGRAM TRACING: CPT

## 2020-02-03 PROCEDURE — 36415 COLL VENOUS BLD VENIPUNCTURE: CPT | Performed by: EMERGENCY MEDICINE

## 2020-02-03 NOTE — TELEPHONE ENCOUNTER
Spoke with [employer]  She said Quique Abarca has had a productive cough for about 5 days now  He is bringing up yellow mucous  She said he oxygen is good at 97%  She said she is afraid he is aspirating because he was just eating breakfast and his eyes were watering and he was drooling  He is on [employer] and using his nebulizers  She is running a humidifier  She said everyone in the house is sick  He does not have any other symptoms  Afebrile  She is wondering what else she can do for him  She is unsure if she should give him cough medicine because she does not necessarily want to suppress his cough as it helps him clear his throat  She does have Robitussn DM and CF at home  Please advise

## 2020-02-03 NOTE — TELEPHONE ENCOUNTER
Patients daughter Ermelinda Lamb calling saying Caryl Barber has a productive cough  His sats are fine and does not have a fever but she is unsure of what cough medicine to give him   [phone]

## 2020-02-03 NOTE — TELEPHONE ENCOUNTER
Polina Garza,    Please let her know that she can use Mucinex or Robitussin per package instructions  He should also continue his modified diet  If he does not improve or worsens, he may need a sick visit or imaging with CXR sooner than already planned      Iveth ORTIZ

## 2020-02-04 ENCOUNTER — APPOINTMENT (EMERGENCY)
Dept: CT IMAGING | Facility: HOSPITAL | Age: 80
DRG: 202 | End: 2020-02-04
Payer: MEDICARE

## 2020-02-04 PROBLEM — R05.9 COUGH: Status: ACTIVE | Noted: 2020-02-04

## 2020-02-04 PROBLEM — N18.30 CHRONIC KIDNEY DISEASE, STAGE 3 (HCC): Chronic | Status: ACTIVE | Noted: 2017-04-12

## 2020-02-04 LAB
ALBUMIN SERPL BCP-MCNC: 3.6 G/DL (ref 3.5–5)
ALP SERPL-CCNC: 107 U/L (ref 46–116)
ALT SERPL W P-5'-P-CCNC: 15 U/L (ref 12–78)
ANION GAP SERPL CALCULATED.3IONS-SCNC: 11 MMOL/L (ref 4–13)
ANION GAP SERPL CALCULATED.3IONS-SCNC: 12 MMOL/L (ref 4–13)
AST SERPL W P-5'-P-CCNC: 11 U/L (ref 5–45)
ATRIAL RATE: 71 BPM
BASOPHILS # BLD AUTO: 0.01 THOUSANDS/ΜL (ref 0–0.1)
BASOPHILS # BLD AUTO: 0.01 THOUSANDS/ΜL (ref 0–0.1)
BASOPHILS NFR BLD AUTO: 0 % (ref 0–1)
BASOPHILS NFR BLD AUTO: 0 % (ref 0–1)
BILIRUB SERPL-MCNC: 0.47 MG/DL (ref 0.2–1)
BUN SERPL-MCNC: 31 MG/DL (ref 5–25)
BUN SERPL-MCNC: 31 MG/DL (ref 5–25)
CALCIUM SERPL-MCNC: 9.6 MG/DL (ref 8.3–10.1)
CALCIUM SERPL-MCNC: 9.8 MG/DL (ref 8.3–10.1)
CHLORIDE SERPL-SCNC: 102 MMOL/L (ref 100–108)
CHLORIDE SERPL-SCNC: 99 MMOL/L (ref 100–108)
CO2 SERPL-SCNC: 24 MMOL/L (ref 21–32)
CO2 SERPL-SCNC: 25 MMOL/L (ref 21–32)
CREAT SERPL-MCNC: 1.22 MG/DL (ref 0.6–1.3)
CREAT SERPL-MCNC: 1.4 MG/DL (ref 0.6–1.3)
EOSINOPHIL # BLD AUTO: 0 THOUSAND/ΜL (ref 0–0.61)
EOSINOPHIL # BLD AUTO: 0.02 THOUSAND/ΜL (ref 0–0.61)
EOSINOPHIL NFR BLD AUTO: 0 % (ref 0–6)
EOSINOPHIL NFR BLD AUTO: 0 % (ref 0–6)
ERYTHROCYTE [DISTWIDTH] IN BLOOD BY AUTOMATED COUNT: 14.1 % (ref 11.6–15.1)
ERYTHROCYTE [DISTWIDTH] IN BLOOD BY AUTOMATED COUNT: 14.1 % (ref 11.6–15.1)
FLUAV RNA NPH QL NAA+PROBE: NORMAL
FLUBV RNA NPH QL NAA+PROBE: NORMAL
GFR SERPL CREATININE-BSD FRML MDRD: 47 ML/MIN/1.73SQ M
GFR SERPL CREATININE-BSD FRML MDRD: 56 ML/MIN/1.73SQ M
GLUCOSE P FAST SERPL-MCNC: 99 MG/DL (ref 65–99)
GLUCOSE SERPL-MCNC: 145 MG/DL (ref 65–140)
GLUCOSE SERPL-MCNC: 99 MG/DL (ref 65–140)
HCT VFR BLD AUTO: 41.9 % (ref 36.5–49.3)
HCT VFR BLD AUTO: 42.2 % (ref 36.5–49.3)
HGB BLD-MCNC: 13.7 G/DL (ref 12–17)
HGB BLD-MCNC: 13.9 G/DL (ref 12–17)
IMM GRANULOCYTES # BLD AUTO: 0.04 THOUSAND/UL (ref 0–0.2)
IMM GRANULOCYTES # BLD AUTO: 0.06 THOUSAND/UL (ref 0–0.2)
IMM GRANULOCYTES NFR BLD AUTO: 0 % (ref 0–2)
IMM GRANULOCYTES NFR BLD AUTO: 1 % (ref 0–2)
LYMPHOCYTES # BLD AUTO: 0.48 THOUSANDS/ΜL (ref 0.6–4.47)
LYMPHOCYTES # BLD AUTO: 0.91 THOUSANDS/ΜL (ref 0.6–4.47)
LYMPHOCYTES NFR BLD AUTO: 4 % (ref 14–44)
LYMPHOCYTES NFR BLD AUTO: 9 % (ref 14–44)
MCH RBC QN AUTO: 29.5 PG (ref 26.8–34.3)
MCH RBC QN AUTO: 30.2 PG (ref 26.8–34.3)
MCHC RBC AUTO-ENTMCNC: 32.5 G/DL (ref 31.4–37.4)
MCHC RBC AUTO-ENTMCNC: 33.2 G/DL (ref 31.4–37.4)
MCV RBC AUTO: 91 FL (ref 82–98)
MCV RBC AUTO: 91 FL (ref 82–98)
MONOCYTES # BLD AUTO: 0.4 THOUSAND/ΜL (ref 0.17–1.22)
MONOCYTES # BLD AUTO: 0.8 THOUSAND/ΜL (ref 0.17–1.22)
MONOCYTES NFR BLD AUTO: 4 % (ref 4–12)
MONOCYTES NFR BLD AUTO: 8 % (ref 4–12)
NEUTROPHILS # BLD AUTO: 8.12 THOUSANDS/ΜL (ref 1.85–7.62)
NEUTROPHILS # BLD AUTO: 9.98 THOUSANDS/ΜL (ref 1.85–7.62)
NEUTS SEG NFR BLD AUTO: 83 % (ref 43–75)
NEUTS SEG NFR BLD AUTO: 91 % (ref 43–75)
NRBC BLD AUTO-RTO: 0 /100 WBCS
NRBC BLD AUTO-RTO: 0 /100 WBCS
P AXIS: 62 DEGREES
PLATELET # BLD AUTO: 143 THOUSANDS/UL (ref 149–390)
PLATELET # BLD AUTO: 171 THOUSANDS/UL (ref 149–390)
PMV BLD AUTO: 10.3 FL (ref 8.9–12.7)
PMV BLD AUTO: 10.6 FL (ref 8.9–12.7)
POTASSIUM SERPL-SCNC: 4.2 MMOL/L (ref 3.5–5.3)
POTASSIUM SERPL-SCNC: 4.3 MMOL/L (ref 3.5–5.3)
PR INTERVAL: 206 MS
PROCALCITONIN SERPL-MCNC: 0.08 NG/ML
PROT SERPL-MCNC: 7.5 G/DL (ref 6.4–8.2)
QRS AXIS: 1 DEGREES
QRSD INTERVAL: 94 MS
QT INTERVAL: 382 MS
QTC INTERVAL: 415 MS
RBC # BLD AUTO: 4.61 MILLION/UL (ref 3.88–5.62)
RBC # BLD AUTO: 4.64 MILLION/UL (ref 3.88–5.62)
RSV RNA NPH QL NAA+PROBE: NORMAL
SODIUM SERPL-SCNC: 135 MMOL/L (ref 136–145)
SODIUM SERPL-SCNC: 138 MMOL/L (ref 136–145)
T WAVE AXIS: 67 DEGREES
TROPONIN I SERPL-MCNC: <0.02 NG/ML
VENTRICULAR RATE: 71 BPM
WBC # BLD AUTO: 10.93 THOUSAND/UL (ref 4.31–10.16)
WBC # BLD AUTO: 9.9 THOUSAND/UL (ref 4.31–10.16)

## 2020-02-04 PROCEDURE — 94640 AIRWAY INHALATION TREATMENT: CPT

## 2020-02-04 PROCEDURE — 84145 PROCALCITONIN (PCT): CPT | Performed by: PHYSICIAN ASSISTANT

## 2020-02-04 PROCEDURE — 36415 COLL VENOUS BLD VENIPUNCTURE: CPT | Performed by: PHYSICIAN ASSISTANT

## 2020-02-04 PROCEDURE — 85025 COMPLETE CBC W/AUTO DIFF WBC: CPT | Performed by: PHYSICIAN ASSISTANT

## 2020-02-04 PROCEDURE — 71250 CT THORAX DX C-: CPT

## 2020-02-04 PROCEDURE — 94664 DEMO&/EVAL PT USE INHALER: CPT

## 2020-02-04 PROCEDURE — 94760 N-INVAS EAR/PLS OXIMETRY 1: CPT

## 2020-02-04 PROCEDURE — 94668 MNPJ CHEST WALL SBSQ: CPT

## 2020-02-04 PROCEDURE — 87631 RESP VIRUS 3-5 TARGETS: CPT | Performed by: PHYSICIAN ASSISTANT

## 2020-02-04 PROCEDURE — 99285 EMERGENCY DEPT VISIT HI MDM: CPT | Performed by: EMERGENCY MEDICINE

## 2020-02-04 PROCEDURE — 93010 ELECTROCARDIOGRAM REPORT: CPT | Performed by: INTERNAL MEDICINE

## 2020-02-04 PROCEDURE — 80048 BASIC METABOLIC PNL TOTAL CA: CPT | Performed by: PHYSICIAN ASSISTANT

## 2020-02-04 PROCEDURE — 99220 PR INITIAL OBSERVATION CARE/DAY 70 MINUTES: CPT | Performed by: INTERNAL MEDICINE

## 2020-02-04 PROCEDURE — 96365 THER/PROPH/DIAG IV INF INIT: CPT

## 2020-02-04 PROCEDURE — 94669 MECHANICAL CHEST WALL OSCILL: CPT

## 2020-02-04 RX ORDER — BENZONATATE 100 MG/1
200 CAPSULE ORAL 3 TIMES DAILY PRN
Status: DISCONTINUED | OUTPATIENT
Start: 2020-02-04 | End: 2020-02-05 | Stop reason: HOSPADM

## 2020-02-04 RX ORDER — PREDNISONE 1 MG/1
5 TABLET ORAL DAILY
Status: DISCONTINUED | OUTPATIENT
Start: 2020-02-04 | End: 2020-02-05 | Stop reason: HOSPADM

## 2020-02-04 RX ORDER — DOCUSATE SODIUM 100 MG/1
100 CAPSULE, LIQUID FILLED ORAL 2 TIMES DAILY
Status: DISCONTINUED | OUTPATIENT
Start: 2020-02-04 | End: 2020-02-05 | Stop reason: HOSPADM

## 2020-02-04 RX ORDER — LEVALBUTEROL 1.25 MG/.5ML
1.25 SOLUTION, CONCENTRATE RESPIRATORY (INHALATION)
Status: DISCONTINUED | OUTPATIENT
Start: 2020-02-04 | End: 2020-02-05 | Stop reason: HOSPADM

## 2020-02-04 RX ORDER — IPRATROPIUM BROMIDE AND ALBUTEROL SULFATE 2.5; .5 MG/3ML; MG/3ML
3 SOLUTION RESPIRATORY (INHALATION)
Status: DISCONTINUED | OUTPATIENT
Start: 2020-02-04 | End: 2020-02-04

## 2020-02-04 RX ORDER — SODIUM CHLORIDE, SODIUM GLUCONATE, SODIUM ACETATE, POTASSIUM CHLORIDE, MAGNESIUM CHLORIDE, SODIUM PHOSPHATE, DIBASIC, AND POTASSIUM PHOSPHATE .53; .5; .37; .037; .03; .012; .00082 G/100ML; G/100ML; G/100ML; G/100ML; G/100ML; G/100ML; G/100ML
100 INJECTION, SOLUTION INTRAVENOUS CONTINUOUS
Status: DISPENSED | OUTPATIENT
Start: 2020-02-04 | End: 2020-02-04

## 2020-02-04 RX ORDER — BUDESONIDE 0.5 MG/2ML
0.5 INHALANT ORAL 2 TIMES DAILY
Status: DISCONTINUED | OUTPATIENT
Start: 2020-02-04 | End: 2020-02-04

## 2020-02-04 RX ORDER — OXYBUTYNIN CHLORIDE 5 MG/1
5 TABLET, EXTENDED RELEASE ORAL
Status: DISCONTINUED | OUTPATIENT
Start: 2020-02-04 | End: 2020-02-05 | Stop reason: HOSPADM

## 2020-02-04 RX ORDER — BUDESONIDE 0.5 MG/2ML
0.5 INHALANT ORAL
Status: DISCONTINUED | OUTPATIENT
Start: 2020-02-04 | End: 2020-02-05 | Stop reason: HOSPADM

## 2020-02-04 RX ORDER — CLOPIDOGREL BISULFATE 75 MG/1
75 TABLET ORAL DAILY
Status: DISCONTINUED | OUTPATIENT
Start: 2020-02-04 | End: 2020-02-05 | Stop reason: HOSPADM

## 2020-02-04 RX ORDER — ATORVASTATIN CALCIUM 40 MG/1
40 TABLET, FILM COATED ORAL
Status: DISCONTINUED | OUTPATIENT
Start: 2020-02-04 | End: 2020-02-05 | Stop reason: HOSPADM

## 2020-02-04 RX ORDER — SENNOSIDES 8.6 MG
2 TABLET ORAL
Status: DISCONTINUED | OUTPATIENT
Start: 2020-02-04 | End: 2020-02-05 | Stop reason: HOSPADM

## 2020-02-04 RX ORDER — TAMSULOSIN HYDROCHLORIDE 0.4 MG/1
0.4 CAPSULE ORAL DAILY
Status: DISCONTINUED | OUTPATIENT
Start: 2020-02-04 | End: 2020-02-05 | Stop reason: HOSPADM

## 2020-02-04 RX ORDER — SODIUM CHLORIDE, SODIUM GLUCONATE, SODIUM ACETATE, POTASSIUM CHLORIDE, MAGNESIUM CHLORIDE, SODIUM PHOSPHATE, DIBASIC, AND POTASSIUM PHOSPHATE .53; .5; .37; .037; .03; .012; .00082 G/100ML; G/100ML; G/100ML; G/100ML; G/100ML; G/100ML; G/100ML
125 INJECTION, SOLUTION INTRAVENOUS CONTINUOUS
Status: DISCONTINUED | OUTPATIENT
Start: 2020-02-04 | End: 2020-02-04

## 2020-02-04 RX ORDER — PAROXETINE HYDROCHLORIDE 20 MG/1
20 TABLET, FILM COATED ORAL DAILY
Status: DISCONTINUED | OUTPATIENT
Start: 2020-02-04 | End: 2020-02-05 | Stop reason: HOSPADM

## 2020-02-04 RX ORDER — POLYETHYLENE GLYCOL 3350 17 G/17G
17 POWDER, FOR SOLUTION ORAL DAILY PRN
Status: DISCONTINUED | OUTPATIENT
Start: 2020-02-04 | End: 2020-02-05 | Stop reason: HOSPADM

## 2020-02-04 RX ORDER — ASPIRIN 81 MG/1
81 TABLET, CHEWABLE ORAL DAILY
Status: DISCONTINUED | OUTPATIENT
Start: 2020-02-04 | End: 2020-02-05 | Stop reason: HOSPADM

## 2020-02-04 RX ORDER — LORATADINE 10 MG/1
10 TABLET ORAL DAILY
Status: DISCONTINUED | OUTPATIENT
Start: 2020-02-04 | End: 2020-02-05 | Stop reason: HOSPADM

## 2020-02-04 RX ORDER — ALBUTEROL SULFATE 2.5 MG/3ML
5 SOLUTION RESPIRATORY (INHALATION) ONCE
Status: COMPLETED | OUTPATIENT
Start: 2020-02-04 | End: 2020-02-04

## 2020-02-04 RX ORDER — ACETAMINOPHEN 325 MG/1
650 TABLET ORAL EVERY 6 HOURS PRN
Status: DISCONTINUED | OUTPATIENT
Start: 2020-02-04 | End: 2020-02-05 | Stop reason: HOSPADM

## 2020-02-04 RX ORDER — SPIRONOLACTONE 25 MG/1
25 TABLET ORAL DAILY
Status: DISCONTINUED | OUTPATIENT
Start: 2020-02-04 | End: 2020-02-05 | Stop reason: HOSPADM

## 2020-02-04 RX ORDER — AMLODIPINE BESYLATE 10 MG/1
10 TABLET ORAL DAILY
Status: DISCONTINUED | OUTPATIENT
Start: 2020-02-04 | End: 2020-02-05 | Stop reason: HOSPADM

## 2020-02-04 RX ORDER — AZITHROMYCIN 250 MG/1
250 TABLET, FILM COATED ORAL EVERY 24 HOURS
Status: DISCONTINUED | OUTPATIENT
Start: 2020-02-05 | End: 2020-02-05 | Stop reason: HOSPADM

## 2020-02-04 RX ORDER — PANTOPRAZOLE SODIUM 40 MG/1
40 TABLET, DELAYED RELEASE ORAL
Status: DISCONTINUED | OUTPATIENT
Start: 2020-02-04 | End: 2020-02-05 | Stop reason: HOSPADM

## 2020-02-04 RX ORDER — CARVEDILOL 12.5 MG/1
12.5 TABLET ORAL 2 TIMES DAILY WITH MEALS
Status: DISCONTINUED | OUTPATIENT
Start: 2020-02-04 | End: 2020-02-05 | Stop reason: HOSPADM

## 2020-02-04 RX ORDER — ONDANSETRON 2 MG/ML
4 INJECTION INTRAMUSCULAR; INTRAVENOUS EVERY 6 HOURS PRN
Status: DISCONTINUED | OUTPATIENT
Start: 2020-02-04 | End: 2020-02-05 | Stop reason: HOSPADM

## 2020-02-04 RX ORDER — LORAZEPAM 0.5 MG/1
0.5 TABLET ORAL EVERY 8 HOURS PRN
Status: DISCONTINUED | OUTPATIENT
Start: 2020-02-04 | End: 2020-02-05 | Stop reason: HOSPADM

## 2020-02-04 RX ORDER — LATANOPROST 50 UG/ML
1 SOLUTION/ DROPS OPHTHALMIC
Status: DISCONTINUED | OUTPATIENT
Start: 2020-02-04 | End: 2020-02-05 | Stop reason: HOSPADM

## 2020-02-04 RX ADMIN — IPRATROPIUM BROMIDE 0.5 MG: 0.5 SOLUTION RESPIRATORY (INHALATION) at 07:56

## 2020-02-04 RX ADMIN — AMLODIPINE BESYLATE 10 MG: 10 TABLET ORAL at 08:38

## 2020-02-04 RX ADMIN — IPRATROPIUM BROMIDE 0.5 MG: 0.5 SOLUTION RESPIRATORY (INHALATION) at 19:25

## 2020-02-04 RX ADMIN — TAMSULOSIN HYDROCHLORIDE 0.4 MG: 0.4 CAPSULE ORAL at 08:38

## 2020-02-04 RX ADMIN — PREDNISONE 5 MG: 5 TABLET ORAL at 08:38

## 2020-02-04 RX ADMIN — LEVALBUTEROL HYDROCHLORIDE 1.25 MG: 1.25 SOLUTION, CONCENTRATE RESPIRATORY (INHALATION) at 19:25

## 2020-02-04 RX ADMIN — SENNOSIDES 17.2 MG: 8.6 TABLET, FILM COATED ORAL at 21:12

## 2020-02-04 RX ADMIN — BUDESONIDE 0.5 MG: 0.5 INHALANT RESPIRATORY (INHALATION) at 08:05

## 2020-02-04 RX ADMIN — LORATADINE 10 MG: 10 TABLET ORAL at 08:39

## 2020-02-04 RX ADMIN — ENOXAPARIN SODIUM 40 MG: 40 INJECTION SUBCUTANEOUS at 08:39

## 2020-02-04 RX ADMIN — LATANOPROST 1 DROP: 50 SOLUTION OPHTHALMIC at 22:04

## 2020-02-04 RX ADMIN — IPRATROPIUM BROMIDE AND ALBUTEROL SULFATE 3 ML: 2.5; .5 SOLUTION RESPIRATORY (INHALATION) at 03:20

## 2020-02-04 RX ADMIN — PAROXETINE 20 MG: 20 TABLET, FILM COATED ORAL at 08:39

## 2020-02-04 RX ADMIN — CARVEDILOL 12.5 MG: 12.5 TABLET, FILM COATED ORAL at 17:12

## 2020-02-04 RX ADMIN — PANTOPRAZOLE SODIUM 40 MG: 40 TABLET, DELAYED RELEASE ORAL at 06:01

## 2020-02-04 RX ADMIN — ALBUTEROL SULFATE 5 MG: 2.5 SOLUTION RESPIRATORY (INHALATION) at 00:13

## 2020-02-04 RX ADMIN — IPRATROPIUM BROMIDE 0.5 MG: 0.5 SOLUTION RESPIRATORY (INHALATION) at 00:13

## 2020-02-04 RX ADMIN — BENZONATATE 200 MG: 100 CAPSULE ORAL at 06:01

## 2020-02-04 RX ADMIN — OXYBUTYNIN 5 MG: 5 TABLET, FILM COATED, EXTENDED RELEASE ORAL at 21:12

## 2020-02-04 RX ADMIN — IPRATROPIUM BROMIDE 0.5 MG: 0.5 SOLUTION RESPIRATORY (INHALATION) at 13:15

## 2020-02-04 RX ADMIN — SPIRONOLACTONE 25 MG: 25 TABLET ORAL at 08:39

## 2020-02-04 RX ADMIN — AZITHROMYCIN MONOHYDRATE 500 MG: 500 INJECTION, POWDER, LYOPHILIZED, FOR SOLUTION INTRAVENOUS at 03:20

## 2020-02-04 RX ADMIN — LEVALBUTEROL HYDROCHLORIDE 1.25 MG: 1.25 SOLUTION, CONCENTRATE RESPIRATORY (INHALATION) at 07:56

## 2020-02-04 RX ADMIN — CARVEDILOL 12.5 MG: 12.5 TABLET, FILM COATED ORAL at 08:39

## 2020-02-04 RX ADMIN — ASPIRIN 81 MG 81 MG: 81 TABLET ORAL at 08:39

## 2020-02-04 RX ADMIN — DOCUSATE SODIUM 100 MG: 100 CAPSULE, LIQUID FILLED ORAL at 17:12

## 2020-02-04 RX ADMIN — ATORVASTATIN CALCIUM 40 MG: 40 TABLET, FILM COATED ORAL at 17:12

## 2020-02-04 RX ADMIN — SODIUM CHLORIDE, SODIUM GLUCONATE, SODIUM ACETATE, POTASSIUM CHLORIDE, MAGNESIUM CHLORIDE, SODIUM PHOSPHATE, DIBASIC, AND POTASSIUM PHOSPHATE 125 ML/HR: .53; .5; .37; .037; .03; .012; .00082 INJECTION, SOLUTION INTRAVENOUS at 01:36

## 2020-02-04 RX ADMIN — SODIUM CHLORIDE, SODIUM GLUCONATE, SODIUM ACETATE, POTASSIUM CHLORIDE, MAGNESIUM CHLORIDE, SODIUM PHOSPHATE, DIBASIC, AND POTASSIUM PHOSPHATE 100 ML/HR: .53; .5; .37; .037; .03; .012; .00082 INJECTION, SOLUTION INTRAVENOUS at 02:51

## 2020-02-04 RX ADMIN — LEVALBUTEROL HYDROCHLORIDE 1.25 MG: 1.25 SOLUTION, CONCENTRATE RESPIRATORY (INHALATION) at 13:15

## 2020-02-04 RX ADMIN — DOCUSATE SODIUM 100 MG: 100 CAPSULE, LIQUID FILLED ORAL at 08:38

## 2020-02-04 RX ADMIN — CLOPIDOGREL BISULFATE 75 MG: 75 TABLET ORAL at 08:38

## 2020-02-04 RX ADMIN — BUDESONIDE 0.5 MG: 0.5 INHALANT RESPIRATORY (INHALATION) at 19:25

## 2020-02-04 NOTE — ASSESSMENT & PLAN NOTE
· His creatinine today is 1 40, which is the upper limit of his normal   · His baseline is around 1 1-1 4   · Received some fluids tonight, recheck BMP in the morning

## 2020-02-04 NOTE — PLAN OF CARE
Problem: Potential for Falls  Goal: Patient will remain free of falls  Description  INTERVENTIONS:  - Assess patient frequently for physical needs  -  Identify cognitive and physical deficits and behaviors that affect risk of falls    -  [place] fall precautions as indicated by assessment   - Educate patient/family on patient safety including physical limitations  - Instruct patient to call for assistance with activity based on assessment  - Modify environment to reduce risk of injury  - Consider OT/PT consult to assist with strengthening/mobility  Outcome: Progressing     Problem: Prexisting or High Potential for Compromised Skin Integrity  Goal: Skin integrity is maintained or improved  Description  INTERVENTIONS:  - Identify patients at risk for skin breakdown  - Assess and monitor skin integrity  - Assess and monitor nutrition and hydration status  - Monitor labs   - Assess for incontinence   - Turn and reposition patient  - Assist with mobility/ambulation  - Relieve pressure over bony prominences  - Avoid friction and shearing  - Provide appropriate hygiene as needed including keeping skin clean and dry  - Evaluate need for skin moisturizer/barrier cream  - Collaborate with interdisciplinary team   - Patient/family teaching  - Consider wound care consult   Outcome: Progressing     Problem: RESPIRATORY - ADULT  Goal: Achieves optimal ventilation and oxygenation  Description  INTERVENTIONS:  - Assess for changes in respiratory status  - Assess for changes in mentation and behavior  - Position to facilitate oxygenation and minimize respiratory effort  - Oxygen administered by appropriate delivery if ordered  - Initiate smoking cessation education as indicated  - Encourage broncho-pulmonary hygiene including cough, deep breathe, Incentive Spirometry  - Assess the need for suctioning and aspirate as needed  - Assess and instruct to report SOB or any respiratory difficulty  - Respiratory Therapy support as indicated  Outcome: Progressing     Problem: PAIN - ADULT  Goal: Verbalizes/displays adequate comfort level or baseline comfort level  Description  Interventions:  - Encourage patient to monitor pain and request assistance  - Assess pain using appropriate pain scale  - Administer analgesics based on type and severity of pain and evaluate response  - Implement non-pharmacological measures as appropriate and evaluate response  - Consider cultural and social influences on pain and pain management  - Notify physician/advanced practitioner if interventions unsuccessful or patient reports new pain  Outcome: Progressing     Problem: INFECTION - ADULT  Goal: Absence or prevention of progression during hospitalization  Description  INTERVENTIONS:  - Assess and monitor for signs and symptoms of infection  - Monitor lab/diagnostic results  - Monitor all insertion sites, i e  indwelling lines, tubes, and drains  - Monitor endotracheal if appropriate and nasal secretions for changes in amount and color  - [place] appropriate cooling/warming therapies per order  - Administer medications as ordered  - Instruct and encourage patient and family to use good hand hygiene technique  - Identify and instruct in appropriate isolation precautions for identified infection/condition  Outcome: Progressing     Problem: SAFETY ADULT  Goal: Patient will remain free of falls  Description  INTERVENTIONS:  - Assess patient frequently for physical needs  -  Identify cognitive and physical deficits and behaviors that affect risk of falls    -  [place] fall precautions as indicated by assessment   - Educate patient/family on patient safety including physical limitations  - Instruct patient to call for assistance with activity based on assessment  - Modify environment to reduce risk of injury  - Consider OT/PT consult to assist with strengthening/mobility  Outcome: Progressing  Goal: Maintain or return to baseline ADL function  Description  INTERVENTIONS:  -  Assess patient's ability to carry out ADLs; assess patient's baseline for ADL function and identify physical deficits which impact ability to perform ADLs (bathing, care of mouth/teeth, toileting, grooming, dressing, etc )  - Assess/evaluate cause of self-care deficits   - Assess range of motion  - Assess patient's mobility; develop plan if impaired  - Assess patient's need for assistive devices and provide as appropriate  - Encourage maximum independence but intervene and supervise when necessary  - Involve family in performance of ADLs  - Assess for home care needs following discharge   - Consider OT consult to assist with ADL evaluation and planning for discharge  - Provide patient education as appropriate  Outcome: Progressing  Goal: Maintain or return mobility status to optimal level  Description  INTERVENTIONS:  - Assess patient's baseline mobility status (ambulation, transfers, stairs, etc )    - Identify cognitive and physical deficits and behaviors that affect mobility  - Identify mobility aids required to assist with transfers and/or ambulation (gait belt, sit-to-stand, lift, walker, cane, etc )  - [place] fall precautions as indicated by assessment  - Record patient progress and toleration of activity level on Mobility SBAR; progress patient to next Phase/Stage  - Instruct patient to call for assistance with activity based on assessment  - Consider rehabilitation consult to assist with strengthening/weightbearing, etc   Outcome: Progressing     Problem: DISCHARGE PLANNING  Goal: Discharge to home or other facility with appropriate resources  Description  INTERVENTIONS:  - Identify barriers to discharge w/patient and caregiver  - Arrange for needed discharge resources and transportation as appropriate  - Identify discharge learning needs (meds, wound care, etc )  - Arrange for interpretive services to assist at discharge as needed  - Refer to Case Management Department for coordinating discharge planning if the patient needs post-hospital services based on physician/advanced practitioner order or complex needs related to functional status, cognitive ability, or social support system  Outcome: Progressing

## 2020-02-04 NOTE — PLAN OF CARE
Problem: Potential for Falls  Goal: Patient will remain free of falls  Description  INTERVENTIONS:  - Assess patient frequently for physical needs  -  Identify cognitive and physical deficits and behaviors that affect risk of falls    -  [place] fall precautions as indicated by assessment   - Educate patient/family on patient safety including physical limitations  - Instruct patient to call for assistance with activity based on assessment  - Modify environment to reduce risk of injury  - Consider OT/PT consult to assist with strengthening/mobility  Outcome: Progressing     Problem: Prexisting or High Potential for Compromised Skin Integrity  Goal: Skin integrity is maintained or improved  Description  INTERVENTIONS:  - Identify patients at risk for skin breakdown  - Assess and monitor skin integrity  - Assess and monitor nutrition and hydration status  - Monitor labs   - Assess for incontinence   - Turn and reposition patient  - Assist with mobility/ambulation  - Relieve pressure over bony prominences  - Avoid friction and shearing  - Provide appropriate hygiene as needed including keeping skin clean and dry  - Evaluate need for skin moisturizer/barrier cream  - Collaborate with interdisciplinary team   - Patient/family teaching  - Consider wound care consult   Outcome: Progressing     Problem: RESPIRATORY - ADULT  Goal: Achieves optimal ventilation and oxygenation  Description  INTERVENTIONS:  - Assess for changes in respiratory status  - Assess for changes in mentation and behavior  - Position to facilitate oxygenation and minimize respiratory effort  - Oxygen administered by appropriate delivery if ordered  - Initiate smoking cessation education as indicated  - Encourage broncho-pulmonary hygiene including cough, deep breathe, Incentive Spirometry  - Assess the need for suctioning and aspirate as needed  - Assess and instruct to report SOB or any respiratory difficulty  - Respiratory Therapy support as indicated  Outcome: Progressing     Problem: PAIN - ADULT  Goal: Verbalizes/displays adequate comfort level or baseline comfort level  Description  Interventions:  - Encourage patient to monitor pain and request assistance  - Assess pain using appropriate pain scale  - Administer analgesics based on type and severity of pain and evaluate response  - Implement non-pharmacological measures as appropriate and evaluate response  - Consider cultural and social influences on pain and pain management  - Notify physician/advanced practitioner if interventions unsuccessful or patient reports new pain  Outcome: Progressing

## 2020-02-04 NOTE — ASSESSMENT & PLAN NOTE
· Acute on chronic coughing, productive in nature  No fevers or hypoxia noted  Likely acute bronchitis  · CT scan of the chest: "No focal consolidation  Status post right upper lobectomy"  · Will treat with 1 dose of IV azithromycin 500 mg tonight  Then 250 mg azithromycin p o  x4 days  · Penicillin allergic  · Rule out flu/RSV  · Obtain procalcitonin  · Respiratory protocol, flutter valve, aggressive airway clearance, suctioning as needed  · Monitor respiratory status  · Admitted for observational status as well as respiratory support tonight

## 2020-02-04 NOTE — ASSESSMENT & PLAN NOTE
 Blood pressure is reviewed and acceptable   o Last recorded pressure: 146/75   Continue home medications   Monitor blood pressure

## 2020-02-04 NOTE — TELEPHONE ENCOUNTER
PT'S DAUGHTER Clarissa calling to notify Dr Hayley Car office that she canceled the Pulmonary Appointment for the 02 12 2020 because PT is currently admitted in [facility] and the CT Scan was done there  If you have any questions please follow up with PT's daughter, Thanks

## 2020-02-04 NOTE — ED PROVIDER NOTES
History  Chief Complaint   Patient presents with    Shortness of Breath     Pt c/o SOB worsening since this am +cough Pt has chronic cough, but has been bringing mucous up recently  -fevers -CP      This is a [de-identified] y o  old male who presents to the ED for evaluation of cough  History of chronic cough and has dysphagia due to prior stroke on honey thick and pureed diet  His cough has gotten acutely worse since yesterday  Last night family reports that the house was shaking he was coughing so much  Patient states he has not slept much  He has had decreased urine output and decreased drinking due to coughing so much  No fever chills  The cough is productive for thick white mucus  He has the poor gag reflex secondary to his previous stroke  No abd pain, nausea or vomiting  No urinary symptoms  Prior to Admission Medications   Prescriptions Last Dose Informant Patient Reported? Taking?    ASPIRIN 81 PO  Child Yes No   Sig: Take 1 tablet by mouth every other day     Fesoterodine Fumarate ER (TOVIAZ) 8 MG TB24  Child Yes No   Sig: Take 4 mg by mouth every evening     LORazepam (ATIVAN) 0 5 mg tablet  Child Yes No   Sig: Take 0 5 mg by mouth every 8 (eight) hours as needed for anxiety   PARoxetine (PAXIL) 20 mg tablet  Child Yes No   Sig: Take 1 tablet by mouth daily   acetaminophen (TYLENOL) 325 mg tablet  Child No No   Sig: Take 2 tablets by mouth every 6 (six) hours as needed for fever   albuterol (PROVENTIL HFA,VENTOLIN HFA) 90 mcg/act inhaler  Child No No   Sig: Inhale 2 puffs 4 (four) times a day   amLODIPine (NORVASC) 10 mg tablet  Child Yes No   Sig: Take 1 tablet by mouth daily   atorvastatin (LIPITOR) 40 mg tablet 2/3/2020 at Unknown time  No Yes   Sig: Take 1 tablet (40 mg total) by mouth daily after dinner   benzonatate (TESSALON) 200 MG capsule   No No   Sig: Take 1 capsule (200 mg total) by mouth 3 (three) times a day as needed for cough   budesonide (PULMICORT) 0 5 mg/2 mL nebulizer solution   No No   Sig: Take 1 vial (0 5 mg total) by nebulization 2 (two) times a day Rinse mouth after use  carvedilol (COREG) 12 5 mg tablet  Child No No   Sig: Take 1 tablet by mouth 2 (two) times a day with meals   clopidogrel (PLAVIX) 75 mg tablet  Child No No   Sig: Take 1 tablet by mouth daily   docusate sodium (COLACE) 100 mg capsule  Child No No   Sig: Take 1 capsule (100 mg total) by mouth 2 (two) times a day   ipratropium-albuterol (DUO-NEB) 0 5-2 5 mg/3 mL nebulizer solution   No No   Sig: Take 1 vial (3 mL total) by nebulization 3 (three) times a day   ketoconazole (NIZORAL) 2 % cream   No No   Sig: Apply topically to both feet in their entirety (tops, bottoms and between toes) 3 times a day for 4 weeks straight     latanoprost (XALATAN) 0 005 % ophthalmic solution  Child Yes No   Sig: Administer 1 drop to both eyes daily at bedtime   loratadine (CLARITIN) 10 mg tablet   Yes No   Sig: Take 10 mg by mouth daily   omeprazole (PriLOSEC) 40 MG capsule  Child Yes No   Sig: Take 40 mg by mouth daily   pantoprazole (PROTONIX) 40 mg tablet   Yes No   Sig: Take 40 mg by mouth daily   polyethylene glycol (MIRALAX) 17 g packet  Child Yes No   Sig: Take 17 g by mouth daily as needed   predniSONE 5 mg tablet   No No   Sig: Take 1 tablet (5 mg total) by mouth daily   psyllium (METAMUCIL) 0 52 g capsule  Child Yes No   Sig: Take 0 52 g by mouth daily as needed    senna (SENOKOT) 8 6 MG tablet  Child Yes No   Sig: Take 2 tablets by mouth daily at bedtime   spironolactone (ALDACTONE) 25 mg tablet  Child No No   Sig: Take 1 tablet (25 mg total) by mouth daily   tamsulosin (FLOMAX) 0 4 mg  Child Yes No   Sig: Take 1 capsule by mouth daily      Facility-Administered Medications: None     Past Medical History:   Diagnosis Date    [facility] 5/31/2017    Aspiration into respiratory tract     Chest pain 5/31/2017    COPD (chronic obstructive pulmonary disease) (HCC)     Depression     Elevated troponin 2017    GERD (gastroesophageal reflux disease)     History of CVA (cerebrovascular accident) 2016    Hyperlipidemia     Hypertension     Lung cancer ([facility] ) 2005    Right, status post lobectomy    Pneumonia     Prostate cancer ([facility] )     Sleep apnea     awaiting sleep study results    Squamous cell skin cancer     Stroke ([facility] )     Tracheomalacia     Weakness due to cerebrovascular accident (CVA)      Past Surgical History:   Procedure Laterality Date    COLONOSCOPY      ESOPHAGOGASTRODUODENOSCOPY N/A 2016    Procedure: ESOPHAGOGASTRODUODENOSCOPY (EGD); Surgeon: Lashonda Miller MD;  [facility]; Service:     JOINT REPLACEMENT      knee replacement    LUNG LOBECTOMY      SC ESOPHAGOGASTRODUODENOSCOPY TRANSORAL DIAGNOSTIC N/A 3/15/2017    Procedure: ESOPHAGOGASTRODUODENOSCOPY (EGD); Surgeon: Lashonda Miller MD;  [facility]: [facility]; Service: Gastroenterology    SKIN BIOPSY      TRIGEMINAL NERVE DECOMPRESSION       Family History   Family history unknown: Yes     I have reviewed and agree with the history as documented  Social History     Tobacco Use    Smoking status: Former Smoker     Packs/day: 1 00     Years: 22 00     Pack years: 22 00     Types: Cigarettes     Start date:      Last attempt to quit:      Years since quittin 1    Smokeless tobacco: Never Used   Substance Use Topics    Alcohol use: Not Currently    Drug use: Not Currently      Review of Systems   Constitutional: Negative for chills, fatigue, fever and unexpected weight change  HENT: Negative for congestion, rhinorrhea and sore throat  Eyes: Negative for redness and visual disturbance  Respiratory: Positive for cough and shortness of breath  Cardiovascular: Negative for chest pain and leg swelling  Gastrointestinal: Negative for abdominal pain, constipation, diarrhea, nausea and vomiting  Endocrine: Negative for cold intolerance and heat intolerance     Genitourinary: Positive for decreased urine volume  Negative for dysuria, frequency and urgency  Musculoskeletal: Negative for back pain  Skin: Negative for rash  Neurological: Negative for dizziness, syncope and numbness  All other systems reviewed and are negative  Physical Exam  Physical Exam   Constitutional: He is oriented to person, place, and time  He appears well-developed and well-nourished  No distress  HENT:   Head: Normocephalic and atraumatic  Nose: Nose normal    Eyes: Pupils are equal, round, and reactive to light  Conjunctivae and EOM are normal    Neck: Normal range of motion  Neck supple  Cardiovascular: Normal rate, regular rhythm and normal heart sounds  Exam reveals no gallop  No murmur heard  Pulmonary/Chest: Effort normal  No respiratory distress  He has no wheezes  He has rhonchi in the right middle field, the right lower field, the left middle field and the left lower field  He has no rales  Abdominal: Soft  Bowel sounds are normal  He exhibits no distension and no mass  There is no tenderness  There is no rebound and no guarding  Musculoskeletal: Normal range of motion  He exhibits no edema or deformity  Lymphadenopathy:     He has no cervical adenopathy  Neurological: He is alert and oriented to person, place, and time  A cranial nerve deficit (L facial droop) is present  He exhibits abnormal muscle tone (L old stroke)  GCS eye subscore is 4  GCS verbal subscore is 5  GCS motor subscore is 6  Skin: Skin is warm and dry  No rash noted  He is not diaphoretic  No erythema  Psychiatric: He has a normal mood and affect  Nursing note and vitals reviewed      Vital Signs  ED Triage Vitals [02/03/20 2330]   Temperature Pulse Respirations Blood Pressure SpO2   97 8 °F (36 6 °C) 70 (!) 25 146/77 96 %      Temp Source Heart Rate Source Patient Position - Orthostatic VS BP Location FiO2 (%)   Oral Monitor Sitting Right arm --      Pain Score       No Pain         ED Medications  Medications multi-electrolyte (PLASMALYTE-A/ISOLYTE-S PH 7 4) IV solution (100 mL/hr Intravenous New Bag 2/4/20 0251)   azithromycin (ZITHROMAX) tablet 250 mg (has no administration in time range)   oxybutynin (DITROPAN-XL) 24 hr tablet 5 mg (has no administration in time range)   budesonide (PULMICORT) inhalation solution 0 5 mg (has no administration in time range)   benzonatate (TESSALON PERLES) capsule 200 mg (200 mg Oral Given 2/4/20 0601)   atorvastatin (LIPITOR) tablet 40 mg (has no administration in time range)   aspirin chewable tablet 81 mg (has no administration in time range)   amLODIPine (NORVASC) tablet 10 mg (has no administration in time range)   acetaminophen (TYLENOL) tablet 650 mg (has no administration in time range)   latanoprost (XALATAN) 0 005 % ophthalmic solution 1 drop (has no administration in time range)   docusate sodium (COLACE) capsule 100 mg (has no administration in time range)   clopidogrel (PLAVIX) tablet 75 mg (has no administration in time range)   carvedilol (COREG) tablet 12 5 mg (has no administration in time range)   predniSONE tablet 5 mg (has no administration in time range)   senna (SENOKOT) tablet 17 2 mg (has no administration in time range)   spironolactone (ALDACTONE) tablet 25 mg (has no administration in time range)   tamsulosin (FLOMAX) capsule 0 4 mg (has no administration in time range)   polyethylene glycol (MIRALAX) packet 17 g (has no administration in time range)   PARoxetine (PAXIL) tablet 20 mg (has no administration in time range)   pantoprazole (PROTONIX) EC tablet 40 mg (40 mg Oral Given 2/4/20 0601)   LORazepam (ATIVAN) tablet 0 5 mg (has no administration in time range)   loratadine (CLARITIN) tablet 10 mg (has no administration in time range)   enoxaparin (LOVENOX) subcutaneous injection 40 mg (has no administration in time range)   ondansetron (ZOFRAN) injection 4 mg (has no administration in time range)   levalbuterol (XOPENEX) inhalation solution 1 25 mg (has no administration in time range)   ipratropium (ATROVENT) 0 02 % inhalation solution 0 5 mg (has no administration in time range)   albuterol inhalation solution 5 mg (5 mg Nebulization Given 2/4/20 0013)   ipratropium (ATROVENT) 0 02 % inhalation solution 0 5 mg (0 5 mg Nebulization Given 2/4/20 0013)   azithromycin (ZITHROMAX) 500 mg in sodium chloride 0 9% 250mL IVPB 500 mg (500 mg Intravenous New Bag 2/4/20 0320)       Diagnostic Studies  Results Reviewed     Procedure Component Value Units Date/Time    CBC and differential [[id number]]  (Abnormal) Collected:  02/04/20 0558    Lab Status:  Final result Specimen:  Blood from Arm, Right Updated:  02/04/20 0618     WBC 9 90 Thousand/uL      RBC 4 64 Million/uL      Hemoglobin 13 7 g/dL      Hematocrit 42 2 %      MCV 91 fL      MCH 29 5 pg      MCHC 32 5 g/dL      RDW 14 1 %      MPV 10 6 fL      Platelets 199 Thousands/uL      nRBC 0 /100 WBCs      Neutrophils Relative 83 %      Immat GRANS % 0 %      Lymphocytes Relative 9 %      Monocytes Relative 8 %      Eosinophils Relative 0 %      Basophils Relative 0 %      Neutrophils Absolute 8 12 Thousands/µL      Immature Grans Absolute 0 04 Thousand/uL      Lymphocytes Absolute 0 91 Thousands/µL      Monocytes Absolute 0 80 Thousand/µL      Eosinophils Absolute 0 02 Thousand/µL      Basophils Absolute 0 01 Thousands/µL     Basic metabolic panel [[id number]] Collected:  02/04/20 0557    Lab Status:  No result Specimen:  Blood from Arm, Right     Influenza A/B and RSV PCR [[id number]]  (Normal) Collected:  02/04/20 0320    Lab Status:  Final result Specimen:  Nose Updated:  02/04/20 0412     INFLUENZA A PCR None Detected     INFLUENZA B PCR None Detected     RSV PCR None Detected    Procalcitonin [[id number]] Collected:  02/04/20 0320    Lab Status:   In process Specimen:  Blood from Arm, Left Updated:  02/04/20 0336    CBC and differential [[id number]]  (Abnormal) Collected:  02/03/20 9572    Lab Status:  Final result Specimen:  Blood from Arm, Right Updated:  02/04/20 0031     WBC 10 93 Thousand/uL      RBC 4 61 Million/uL      Hemoglobin 13 9 g/dL      Hematocrit 41 9 %      MCV 91 fL      MCH 30 2 pg      MCHC 33 2 g/dL      RDW 14 1 %      MPV 10 3 fL      Platelets 389 Thousands/uL      nRBC 0 /100 WBCs      Neutrophils Relative 91 %      Immat GRANS % 1 %      Lymphocytes Relative 4 %      Monocytes Relative 4 %      Eosinophils Relative 0 %      Basophils Relative 0 %      Neutrophils Absolute 9 98 Thousands/µL      Immature Grans Absolute 0 06 Thousand/uL      Lymphocytes Absolute 0 48 Thousands/µL      Monocytes Absolute 0 40 Thousand/µL      Eosinophils Absolute 0 00 Thousand/µL      Basophils Absolute 0 01 Thousands/µL     Narrative: This is an appended report  These results have been appended to a previously verified report      Troponin I [[id number]]  (Normal) Collected:  02/03/20 2354    Lab Status:  Final result Specimen:  Blood from Arm, Right Updated:  02/04/20 0018     Troponin I <0 02 ng/mL     Comprehensive metabolic panel [[id number]]  (Abnormal) Collected:  02/03/20 2354    Lab Status:  Final result Specimen:  Blood from Arm, Right Updated:  02/04/20 0016     Sodium 135 mmol/L      Potassium 4 3 mmol/L      Chloride 99 mmol/L      CO2 25 mmol/L      ANION GAP 11 mmol/L      BUN 31 mg/dL      Creatinine 1 40 mg/dL      Glucose 145 mg/dL      Calcium 9 8 mg/dL      AST 11 U/L      ALT 15 U/L      Alkaline Phosphatase 107 U/L      Total Protein 7 5 g/dL      Albumin 3 6 g/dL      Total Bilirubin 0 47 mg/dL      eGFR 47 ml/min/1 73sq m     Narrative:       Viktoria guidelines for Chronic Kidney Disease (CKD):     Stage 1 with normal or high GFR (GFR > 90 mL/min/1 73 square meters)    Stage 2 Mild CKD (GFR = 60-89 mL/min/1 73 square meters)    Stage 3A Moderate CKD (GFR = 45-59 mL/min/1 73 square meters)    Stage 3B Moderate CKD (GFR = 30-44 mL/min/1 73 square meters)    Stage 4 Severe CKD (GFR = 15-29 mL/min/1 73 square meters)    Stage 5 End Stage CKD (GFR <15 mL/min/1 73 square meters)  Note: GFR calculation is accurate only with a steady state creatinine        CT chest without contrast   Final Result by Jahaira Mercado DO (02/04 0139)      No focal consolidation  Status post right upper lobectomy               Workstation performed: [id number]           Procedures  ECG 12 Lead Documentation Only  Date/Time: 2/4/2020 11:33 PM  Performed by: Bailey Alvares MD  Authorized by: Bailey Alvares MD     Indications / Diagnosis:  Dyspnea  ECG reviewed by me, the ED Provider: yes    Patient location:  ED  Comments:      Normal sinus rhythm, rate 70, normal intervals, normal axis, no acute ST, T-wave changes, chronic T-wave inversion aVL  When compared to previous EKG dated 11/14/2019, no changes are noted        ED Course  ED Course as of Feb 04 0623   Tue Feb 04, 2020   0345 SpO2: 96 %        A/P: This is a [de-identified] y o  male who presents to the ED for evaluation of cough, dyspnea  Labs EKG, CXR  Duoneb, chest PT by respiratory therapy  Suctioning for airway clearance  Reevaluate and dispo accordingly    HEART Risk Score      Most Recent Value   History  0 Filed at: 02/04/2020 0238   ECG  1 Filed at: 02/04/2020 0238   Age  2 Filed at: 02/04/2020 0238   Risk Factors  2 Filed at: 02/04/2020 0238   Troponin  0 Filed at: 02/04/2020 0238   Heart Score Risk Calculator   History  0 Filed at: 02/04/2020 0238   ECG  1 Filed at: 02/04/2020 0238   Age  2 Filed at: 02/04/2020 0238   Risk Factors  2 Filed at: 02/04/2020 0238   Troponin  0 Filed at: 02/04/2020 0238   HEART Score  5 Filed at: 02/04/2020 0238   HEART Score  5 Filed at: 02/04/2020 0238        0040 Patient with mild sandro (baseline creat about 1 1-1 3), likely 2/2 insensible losses with coughing  Will gently hydrate, pending CXR  Anticipate obs admission  Worried about worsening resp status with aggressive hydration   Will continue respiratory support, Chest PT suctioning, gently hydration and admit to medicine  0200 Chest CT unremarkable  D/w medicine for observation admission  MDM    Disposition  Final diagnoses:   Productive cough   Dyspnea   CKD (chronic kidney disease) stage 3, GFR 30-59 ml/min ([facility])     Time reflects when diagnosis was documented in both MDM as applicable and the Disposition within this note     Time User Action Codes Description Comment    2/4/2020  2:25 AM Wilkes Grisel Add [R05] Productive cough     2/4/2020  2:25 AM Wilkes Grisel Add [R06 00] Dyspnea     2/4/2020  2:32 AM Wilkes Grisel Add [N18 3] CKD (chronic kidney disease) stage 3, GFR 30-59 ml/min [facility])       ED Disposition     ED Disposition Condition Date/Time Comment    Admit Stable Tue Feb 4, 2020  2:25 AM Case was discussed with NAN and the patient's admission status was agreed to be Admission Status: observation status to the service of Dr Javier Lux          Follow-up Information    None       Current Discharge Medication List      CONTINUE these medications which have NOT CHANGED    Details   atorvastatin (LIPITOR) 40 mg tablet Take 1 tablet (40 mg total) by mouth daily after dinner  Refills: 0    Associated Diagnoses: History of cerebrovascular accident (CVA) with residual deficit      acetaminophen (TYLENOL) 325 mg tablet Take 2 tablets by mouth every 6 (six) hours as needed for fever  Qty: 30 tablet, Refills: 0      albuterol (PROVENTIL HFA,VENTOLIN HFA) 90 mcg/act inhaler Inhale 2 puffs 4 (four) times a day  Qty: 2 Inhaler, Refills: 0      amLODIPine (NORVASC) 10 mg tablet Take 1 tablet by mouth daily      ASPIRIN 81 PO Take 1 tablet by mouth every other day        benzonatate (TESSALON) 200 MG capsule Take 1 capsule (200 mg total) by mouth 3 (three) times a day as needed for cough  Qty: 90 capsule, Refills: 1    Associated Diagnoses: Cough      budesonide (PULMICORT) 0 5 mg/2 mL nebulizer solution Take 1 vial (0 5 mg total) by nebulization 2 (two) times a day Rinse mouth after use  Qty: 360 mL, Refills: 3    Comments: DX Code Needed    Associated Diagnoses: Chronic obstructive pulmonary disease with acute lower respiratory infection ([facility] [address] ); Chronic cough      carvedilol (COREG) 12 5 mg tablet Take 1 tablet by mouth 2 (two) times a day with meals  Qty: 60 tablet, Refills: 0      clopidogrel (PLAVIX) 75 mg tablet Take 1 tablet by mouth daily  Qty: 30 tablet, Refills: 0      docusate sodium (COLACE) 100 mg capsule Take 1 capsule (100 mg total) by mouth 2 (two) times a day  Qty: 10 capsule, Refills: 0    Associated Diagnoses: SIRS due to infectious process without acute organ dysfunction      Fesoterodine Fumarate ER (TOVIAZ) 8 MG TB24 Take 4 mg by mouth every evening        ipratropium-albuterol (DUO-NEB) 0 5-2 5 mg/3 mL nebulizer solution Take 1 vial (3 mL total) by nebulization 3 (three) times a day  Qty: 810 mL, Refills: 3    Associated Diagnoses: Chronic obstructive pulmonary disease, unspecified COPD type (HCC)      ketoconazole (NIZORAL) 2 % cream Apply topically to both feet in their entirety (tops, bottoms and between toes) 3 times a day for 4 weeks straight  Qty: 60 g, Refills: 5    Associated Diagnoses: Tinea pedis of both feet      latanoprost (XALATAN) 0 005 % ophthalmic solution Administer 1 drop to both eyes daily at bedtime      loratadine (CLARITIN) 10 mg tablet Take 10 mg by mouth daily      LORazepam (ATIVAN) 0 5 mg tablet Take 0 5 mg by mouth every 8 (eight) hours as needed for anxiety      omeprazole (PriLOSEC) 40 MG capsule Take 40 mg by mouth daily      pantoprazole (PROTONIX) 40 mg tablet Take 40 mg by mouth daily      PARoxetine (PAXIL) 20 mg tablet Take 1 tablet by mouth daily      polyethylene glycol (MIRALAX) 17 g packet Take 17 g by mouth daily as needed      predniSONE 5 mg tablet Take 1 tablet (5 mg total) by mouth daily  Qty: 90 tablet, Refills: 3    Associated Diagnoses: Tracheomalacia;  Other emphysema (HCC)      psyllium (METAMUCIL) 0 52 g capsule Take 0 52 g by mouth daily as needed       senna (SENOKOT) 8 6 MG tablet Take 2 tablets by mouth daily at bedtime      spironolactone (ALDACTONE) 25 mg tablet Take 1 tablet (25 mg total) by mouth daily  Qty: 30 tablet, Refills: 0    Associated Diagnoses: Chronic kidney disease, stage 3 (HCC)      tamsulosin (FLOMAX) 0 4 mg Take 1 capsule by mouth daily           No discharge procedures on file      ED Provider  Electronically Signed by           Jame Bhatia MD  02/04/20 [address] Dariel Klinefelter, MD  02/04/20 6369

## 2020-02-04 NOTE — ASSESSMENT & PLAN NOTE
· History of CVA with left-sided residual deficits as well as dysphagia  · Continue with aspirin and Lipitor  · Continue dysphagia diet  · Aspiration precautions

## 2020-02-04 NOTE — H&P
H&P- Simuel Drivers  1940, [de-identified] y o  male MRN: [id number]  Unit/Bed#: ED 14 Encounter: [id number]  Primary Care Provider: Pinky Gu DO   Date and time admitted to hospital: 2/3/2020 11:24 PM    * Cough  Assessment & Plan  · Acute on chronic coughing, productive in nature  No fevers or hypoxia noted  Likely acute bronchitis  · CT scan of the chest: "No focal consolidation  Status post right upper lobectomy"  · Will treat with 1 dose of IV azithromycin 500 mg tonight  Then 250 mg azithromycin p o  x4 days  · Penicillin allergic  · Rule out flu/RSV  · Obtain procalcitonin  · Respiratory protocol, flutter valve, aggressive airway clearance, suctioning as needed  · Monitor respiratory status  · Admitted for observational status as well as respiratory support tonight  Chronic kidney disease, stage 3 (HCC)  Assessment & Plan  · His creatinine today is 1 40, which is the upper limit of his normal   · His baseline is around 1 1-1 4   · Received some fluids tonight, recheck BMP in the morning  History of cerebrovascular accident (CVA) with residual deficit  Assessment & Plan  · History of CVA with left-sided residual deficits as well as dysphagia  · Continue with aspirin and Lipitor  · Continue dysphagia diet  · Aspiration precautions  Essential hypertension  Assessment & Plan   Blood pressure is reviewed and acceptable   o Last recorded pressure: 146/75   Continue home medications   Monitor blood pressure  VTE Prophylaxis: Enoxaparin (Lovenox)  / sequential compression device   Code Status: FULL CODE   POLST: POLST form is not discussed and not completed at this time  Discussion with family: patient at bedside  Called daughterModesot    Anticipated Length of Stay:  Patient will be admitted on an Observation basis with an anticipated length of stay of < 2 midnights  Justification for Hospital Stay: cough, acute bronchitis, need for respiratory support tonight      Total Time for Visit, including Counseling / Coordination of Care: 1 hour  Greater than 50% of this total time spent on direct patient counseling and coordination of care  Chief Complaint:   cough    History of Present Illness:    Guillermo Gauthier  is a [de-identified] y o  male with a history of CVA with residual deficit and dysphagia who presents with worsening cough over the past day  The patient has chronic cough, his daughter reports that has been becoming much worse yesterday  He has been frequently coughing, bringing up mucus  He has a history of dysphagia, and now has been having difficulty with clearing his airway  Has been eating less secondary to the coughing  No reported fevers, chills, sick contacts  He did receive the flu shot this year  Denies any chest pain, wheezing, abdominal pain, nausea, vomiting, changes in his stool  No recent travel  He feels better after deep suctioning himself  Does not have a gag reflex after his CVA, has been on a nectar thick liquid and pureed diet  Review of Systems:    Review of Systems   Constitutional: Negative for activity change, appetite change, chills, fatigue and fever  HENT: Positive for rhinorrhea and sinus pressure  Negative for congestion and sore throat  Eyes: Negative for photophobia, pain and visual disturbance  Respiratory: Positive for cough, chest tightness and shortness of breath  Negative for wheezing  Cardiovascular: Negative for chest pain, palpitations and leg swelling  Gastrointestinal: Negative for abdominal distention, abdominal pain, constipation, diarrhea, nausea and vomiting  Endocrine: Negative for cold intolerance, heat intolerance, polydipsia and polyuria  Genitourinary: Negative for difficulty urinating, dysuria, flank pain, frequency and hematuria  Musculoskeletal: Negative for arthralgias, back pain and joint swelling  Skin: Negative for color change, pallor and rash  Allergic/Immunologic: Negative      Neurological: Negative for dizziness, syncope, weakness, light-headedness and headaches  Hematological: Negative  Psychiatric/Behavioral: Negative  Past Medical and Surgical History:     Past Medical History:   Diagnosis Date    RONAL [facility] kidney injury) ([facility] ) 5/31/2017    Aspiration into respiratory tract     Chest pain 5/31/2017    [facility])     Depression     Elevated troponin 5/31/2017    GERD (gastroesophageal reflux disease)     History of CVA (cerebrovascular accident) 4/13/2016    Hyperlipidemia     Hypertension     Lung cancer ([place] [address] ) 2005    Right, status post lobectomy    Pneumonia     Prostate cancer ([place] [address] )     Sleep apnea     awaiting sleep study results    Squamous cell skin cancer     Stroke ([place] [address] )     Tracheomalacia     Weakness due to cerebrovascular accident (CVA)        Past Surgical History:   Procedure Laterality Date    COLONOSCOPY      ESOPHAGOGASTRODUODENOSCOPY N/A 4/13/2016    Procedure: ESOPHAGOGASTRODUODENOSCOPY (EGD); Surgeon: Ruthie Muniz MD;  [facility]; Service:     JOINT REPLACEMENT      knee replacement    LUNG LOBECTOMY      IA ESOPHAGOGASTRODUODENOSCOPY TRANSORAL DIAGNOSTIC N/A 3/15/2017    Procedure: ESOPHAGOGASTRODUODENOSCOPY (EGD); Surgeon: Ruthie Muniz MD;  [facility]; Service: Gastroenterology    SKIN BIOPSY      TRIGEMINAL NERVE DECOMPRESSION         Meds/Allergies:    Prior to Admission medications    Medication Sig Start Date End Date Taking?  Authorizing Provider   acetaminophen (TYLENOL) 325 mg tablet Take 2 tablets by mouth every 6 (six) hours as needed for fever 6/19/17   Rohit Perez MD   albuterol (PROVENTIL HFA,VENTOLIN HFA) 90 mcg/act inhaler Inhale 2 puffs 4 (four) times a day 6/19/17   Rohit Perez MD   amLODIPine (NORVASC) 10 mg tablet Take 1 tablet by mouth daily 10/10/17   Historical Provider, MD   ASPIRIN 81 PO Take 1 tablet by mouth every other day   10/10/17   Historical Provider, MD   atorvastatin (LIPITOR) 40 mg tablet Take 1 tablet (40 mg total) by mouth daily after dinner 11/18/19   Zaina Royal MD   benzonatate (TESSALON) 200 MG capsule Take 1 capsule (200 mg total) by mouth 3 (three) times a day as needed for cough 1/15/20 4/14/20  Linda Taylor, DO   budesonide (PULMICORT) 0 5 mg/2 mL nebulizer solution Take 1 vial (0 5 mg total) by nebulization 2 (two) times a day Rinse mouth after use  1/15/20 1/14/21  Linda Taylor,    carvedilol (COREG) 12 5 mg tablet Take 1 tablet by mouth 2 (two) times a day with meals 8/21/17   James Richardson MD   clopidogrel (PLAVIX) 75 mg tablet Take 1 tablet by mouth daily 6/19/17   James Richardson MD   docusate sodium (COLACE) 100 mg capsule Take 1 capsule (100 mg total) by mouth 2 (two) times a day 2/18/18   Dilshad Banks MD   Fesoterodine Fumarate ER (TOVIAZ) 8 MG TB24 Take 4 mg by mouth every evening      Historical Provider, MD   ipratropium-albuterol (DUO-NEB) 0 5-2 5 mg/3 mL nebulizer solution Take 1 vial (3 mL total) by nebulization 3 (three) times a day 1/15/20 1/14/21  Linda Taylor,    ketoconazole (NIZORAL) 2 % cream Apply topically to both feet in their entirety (tops, bottoms and between toes) 3 times a day for 4 weeks straight   10/16/19   Celina Mensah MD   latanoprost (XALATAN) 0 005 % ophthalmic solution Administer 1 drop to both eyes daily at bedtime    Historical Provider, MD   loratadine (CLARITIN) 10 mg tablet Take 10 mg by mouth daily    Historical Provider, MD   LORazepam (ATIVAN) 0 5 mg tablet Take 0 5 mg by mouth every 8 (eight) hours as needed for anxiety    Historical Provider, MD   omeprazole (PriLOSEC) 40 MG capsule Take 40 mg by mouth daily    Historical Provider, MD   pantoprazole (PROTONIX) 40 mg tablet Take 40 mg by mouth daily    Historical Provider, MD   PARoxetine (PAXIL) 20 mg tablet Take 1 tablet by mouth daily    Historical Provider, MD   polyethylene glycol (MIRALAX) 17 g packet Take 17 g by mouth daily as needed    Historical Provider, MD   predniSONE 5 mg tablet Take 1 tablet (5 mg total) by mouth daily 1/15/20   Humphreyany Barker DO   psyllium (METAMUCIL) 0 52 g capsule Take 0 52 g by mouth daily as needed     Historical Provider, MD   senna (SENOKOT) 8 6 MG tablet Take 2 tablets by mouth daily at bedtime    Historical Provider, MD   spironolactone (ALDACTONE) 25 mg tablet Take 1 tablet (25 mg total) by mouth daily 18   DIANA Jim   tamsulosin (FLOMAX) 0 4 mg Take 1 capsule by mouth daily    Historical Provider, MD DORAN have reviewed home medications with patient family member  Allergies: Allergies   Allergen Reactions    Penicillins Rash       Social History:     Marital Status:    Occupation: non-contrib  Patient Pre-hospital Living Situation: home  Patient Pre-hospital Level of Mobility: not assessed  Patient Pre-hospital Diet Restrictions: pureed, nectar thicken liq  Substance Use History:   Social History     Substance and Sexual Activity   Alcohol Use Not Currently     Social History     Tobacco Use   Smoking Status Former Smoker    Packs/day:     Years:     Pack years:     Types: Cigarettes    Start date:     Last attempt to quit:     Years since quittin 1   Smokeless Tobacco Never Used     Social History     Substance and Sexual Activity   Drug Use Not Currently       Family History:    Family History   Family history unknown: Yes       Physical Exam:     Vitals:   Blood Pressure: 146/75 (20)  Pulse: 66 (20)  Temperature: 97 8 °F (36 6 °C) (20)  Temp Source: Oral (20)  Respirations: 22 (20)  Height: 6' 5" (195 6 cm) (20)  Weight - Scale: 102 kg (224 lb 3 3 oz) (20)  SpO2: 93 % (20)    Physical Exam   Constitutional: He is oriented to person, place, and time  He appears well-developed and well-nourished  No distress     HENT:   Head: Normocephalic and atraumatic  Mouth/Throat: Oropharynx is clear and moist    Eyes: Pupils are equal, round, and reactive to light  EOM are normal  No scleral icterus  Neck: Neck supple  Cardiovascular: Normal rate, regular rhythm and normal heart sounds  No murmur heard  Pulmonary/Chest: Effort normal  No respiratory distress  He has no wheezes  He has rhonchi  He has no rales  Abdominal: Soft  Bowel sounds are normal  He exhibits no distension  There is no tenderness  Musculoskeletal: He exhibits no deformity  Neurological: He is alert and oriented to person, place, and time  No cranial nerve deficit  GCS eye subscore is 4  GCS verbal subscore is 5  GCS motor subscore is 6    + left sided facial droop   Skin: Skin is warm and dry  Capillary refill takes less than 2 seconds  No rash noted  He is not diaphoretic  No erythema  No pallor  Psychiatric: He has a normal mood and affect  Nursing note and vitals reviewed  Additional Data:     Lab Results: I have personally reviewed pertinent reports  Results from last 7 days   Lab Units 02/03/20  2354   WBC Thousand/uL 10 93*   HEMOGLOBIN g/dL 13 9   HEMATOCRIT % 41 9   PLATELETS Thousands/uL 171   NEUTROS PCT % 91*   LYMPHS PCT % 4*   MONOS PCT % 4   EOS PCT % 0     Results from last 7 days   Lab Units 02/03/20  2354   SODIUM mmol/L 135*   POTASSIUM mmol/L 4 3   CHLORIDE mmol/L 99*   CO2 mmol/L 25   BUN mg/dL 31*   CREATININE mg/dL 1 40*   ANION GAP mmol/L 11   CALCIUM mg/dL 9 8   ALBUMIN g/dL 3 6   TOTAL BILIRUBIN mg/dL 0 47   ALK PHOS U/L 107   ALT U/L 15   AST U/L 11   GLUCOSE RANDOM mg/dL 145*                       Imaging: I have personally reviewed pertinent reports  CT chest without contrast   Final Result by Imani Pineda DO (02/04 0139)      No focal consolidation    Status post right upper lobectomy               Workstation performed: [id number]             EKG, Pathology, and Other Studies Reviewed on Admission:   · Prior pertinent studies and records reviewed in [facility]  [employer] / [facility] Records Reviewed: Yes     ** Please Note: This note has been constructed using a voice recognition system   **

## 2020-02-04 NOTE — UTILIZATION REVIEW
Initial Clinical Review    Admission: Date/Time/Statement:  2/4/2020 0233 Observation with ED evaluation/treatment time starting 2/3/2020 2354  And changed 2/4/2020 1807 to inpatient re: patient needs > 2 midnight stay for continued evaluation of oxygen needs insetting of bronchitis with  history of right upper lobectomy  Start   Ordered   02/04/20 1807  Inpatient Admission Once     Transfer Service: General Medicine       Question Answer Comment   Admitting Physician Sergei Linn    Level of Care Med Surg    Estimated length of stay More than 2 Midnights    Certification I certify that inpatient services are medically necessary for this patient for a duration of greater than two midnights  See H&P and MD Progress Notes for additional information about the patient's course of treatment  02/04/20 1807       ED Arrival Information     Expected Arrival Acuity Means of Arrival Escorted By Service Admission Type    - 2/3/2020 23:20 Urgent Wheelchair Family Member Hospitalist Urgent    Arrival Complaint    Cough,difficulty breathing        Chief Complaint   Patient presents with    Shortness of Breath     Pt c/o SOB worsening since this am +cough Pt has chronic cough, but has been bringing mucous up recently  -fevers -CP      Assessment/Plan:  [de-identified] y o  male with a history of CVA with residual deficit and dysphagia who presents with worsening cough over the past day  Cough  Assessment & Plan  · Acute on chronic coughing, productive in nature  No fevers or hypoxia noted  Likely acute bronchitis  · CT scan of the chest: "No focal consolidation   Status post right upper lobectomy"  · Will treat with 1 dose of IV azithromycin 500 mg tonight  Then 250 mg azithromycin p o  x4 days  ? Penicillin allergic  · Rule out flu/RSV  · Obtain procalcitonin  · Respiratory protocol, flutter valve, aggressive airway clearance, suctioning as needed  · Monitor respiratory status    · Admitted for observational status as well as respiratory support tonight      Chronic kidney disease, stage 3 (HCC)  Assessment & Plan  · His creatinine today is 1 40, which is the upper limit of his normal   · His baseline is around 1 1-1 4   · Received some fluids tonight, recheck BMP in the morning      History of cerebrovascular accident (CVA) with residual deficit  Assessment & Plan  · History of CVA with left-sided residual deficits as well as dysphagia  · Continue with aspirin and Lipitor  · Continue dysphagia diet  · Aspiration precautions      Essential hypertension  Assessment & Plan  · Blood pressure is reviewed and acceptable  ? Last recorded pressure: 146/75  · Continue home medications  Monitor blood pressure    ED Triage Vitals [02/03/20 2330]   Temperature Pulse Respirations Blood Pressure SpO2   97 8 °F (36 6 °C) 70 (!) 25 146/77 96 %      Temp Source Heart Rate Source Patient Position - Orthostatic VS BP Location FiO2 (%)   Oral Monitor Sitting Right arm --      Pain Score       No Pain        Wt Readings from Last 1 Encounters:   02/04/20 102 kg (225 lb 15 5 oz)     Additional Vital Signs:   02/04/20 0700  98 °F (36 7 °C)  68  18  139/61  95 %  None (Room air)  Lying   02/04/20 0443  98 1 °F (36 7 °C)  78  20  148/65  98 %  None (Room air)         Pertinent Labs/Diagnostic Test Results:   2/4/2020 CT chest - No focal consolidation   Status post right upper lobectomy  Results from last 7 days   Lab Units 02/04/20  0558 02/03/20  2354   WBC Thousand/uL 9 90 10 93*   HEMOGLOBIN g/dL 13 7 13 9   HEMATOCRIT % 42 2 41 9   PLATELETS Thousands/uL 143* 171   NEUTROS ABS Thousands/µL 8 12* 9 98*     Results from last 7 days   Lab Units 02/04/20  0557 02/03/20  2354   SODIUM mmol/L 138 135*   POTASSIUM mmol/L 4 2 4 3   CHLORIDE mmol/L 102 99*   CO2 mmol/L 24 25   ANION GAP mmol/L 12 11   BUN mg/dL 31* 31*   CREATININE mg/dL 1 22 1 40*   EGFR ml/min/1 73sq m 56 47   CALCIUM mg/dL 9 6 9 8     Results from last 7 days   Lab Units 02/03/20  2354   AST U/L 11   ALT U/L 15   ALK PHOS U/L 107   TOTAL PROTEIN g/dL 7 5   ALBUMIN g/dL 3 6   TOTAL BILIRUBIN mg/dL 0 47     Results from last 7 days   Lab Units 02/04/20  0557 02/03/20  2354   GLUCOSE RANDOM mg/dL 99 145*     Results from last 7 days   Lab Units 02/03/20  2354   TROPONIN I ng/mL <0 02     Results from last 7 days   Lab Units 02/04/20  0320   PROCALCITONIN ng/ml 0 08     Results from last 7 days   Lab Units 02/04/20  0320   INFLUENZA A PCR  None Detected   INFLUENZA B PCR  None Detected   RSV PCR  None Detected     ED Treatment:   Medication Administration from 02/03/2020 2319 to 02/04/2020 0415       Date/Time Order Dose Route Action Comments     02/04/2020 0013 albuterol inhalation solution 5 mg 5 mg Nebulization Given      02/04/2020 0013 ipratropium (ATROVENT) 0 02 % inhalation solution 0 5 mg 0 5 mg Nebulization Given      02/04/2020 0136 multi-electrolyte (PLASMALYTE-A/ISOLYTE-S PH 7 4) IV solution 125 mL/hr Intravenous New Bag      02/04/2020 0251 multi-electrolyte (PLASMALYTE-A/ISOLYTE-S PH 7 4) IV solution 100 mL/hr Intravenous New Bag      02/04/2020 0320 azithromycin (ZITHROMAX) 500 mg in sodium chloride 0 9% 250mL IVPB 500 mg 500 mg Intravenous New Bag      02/04/2020 0320 ipratropium-albuterol (DUO-NEB) 0 5-2 5 mg/3 mL inhalation solution 3 mL 3 mL Nebulization Given         Past Medical History:   Diagnosis Date    [facility] ) 5/31/2017    Aspiration into respiratory tract     Chest pain 5/31/2017    COPD (chronic obstructive pulmonary disease) (HCC)     Depression     Elevated troponin 5/31/2017    GERD (gastroesophageal reflux disease)     History of CVA (cerebrovascular accident) 4/13/2016    Hyperlipidemia     Hypertension     [facility] ) 2005    Right, status post lobectomy    Pneumonia     Pro[facility] cancer ([facility] )     Sleep apnea     awaiting sleep study results    Squamous cell skin cancer     St[facility]     Tracheomalacia  Weakness due to cerebrovascular accident (CVA)      Present on Admission:   [facility]   Essential hypertension   Cough      Admitting Diagnosis: Cough [R05]  Dyspnea [R06 00]  CKD (chronic kidney disease) stage 3, GFR 30-59 ml/min ([facility]) [N18 3]  Difficulty breathing [R06 89]  Productive cough [R05]  Age/Sex: [de-identified] y o  male  Admission Orders: 2/4/2020 0233 Observation and changed 2/4/2020 1897 inpatient   Scheduled Medications:  Medications:  amLODIPine 10 mg Oral Daily   aspirin 81 mg Oral Daily   atorvastatin 40 mg Oral After Dinner   [START ON 2/5/2020] azithromycin 250 mg Oral Q24H   budesonide 0 5 mg Nebulization BID   carvedilol 12 5 mg Oral BID With Meals   clopidogrel 75 mg Oral Daily   docusate sodium 100 mg Oral BID   enoxaparin 40 mg Subcutaneous Daily   ipratropium 0 5 mg Nebulization TID   latanoprost 1 drop Both Eyes HS   levalbuterol 1 25 mg Nebulization TID   loratadine 10 mg Oral Daily   oxybutynin 5 mg Oral HS   pantoprazole 40 mg Oral Early Morning   PARoxetine 20 mg Oral Daily   predniSONE 5 mg Oral Daily   senna 2 tablet Oral HS   spironolactone 25 mg Oral Daily   tamsulosin 0 4 mg Oral Daily     Continuous IV Infusions:    multi-electrolyte (PLASMALYTE-A/ISOLYTE-S PH 7 4) IV solution   Rate: 100 mL/hr Dose: 100 mL/hr  Freq: Continuous Route: IV  Indications of Use: IV Hydration  Last Dose: 100 mL/hr (02/04/20 0251)  Start: 02/04/20 0245 End: 02/04/20 0750    PRN Meds:  acetaminophen 650 mg Oral Q6H PRN   Benzonatate - used x 1 200 mg Oral TID PRN   LORazepam 0 5 mg Oral Q8H PRN   ondansetron 4 mg Intravenous Q6H PRN   polyethylene glycol 17 g Oral Daily PRN     Oscillatory positive expiratory pressure TID  High frequency chest wall oscillation TID  Network Utilization Review Department  [email or web address]  ATTENTION: Please call with any questions or concerns to [phone] and carefully listen to the prompts so that you are directed to the right person  All voicemails are confidential   Analia Wise all requests for admission clinical reviews, approved or denied determinations and any other requests to dedicated fax number below belonging to the campus where the patient is receiving treatment   List of dedicated fax numbers for the Facilities:  [address] [place] [address] DENIALS (Administrative/Medical Necessity) [phone]   [address] (Maternity/NICU/Pediatrics) [phone]   Vinay Courser [phone]     [address]  [phone]   [address] [place] [address] [phone]   [address] [place] [address]  [phone]   [address] [place] [address] [phone]   [facility]  [phone]   [address] [facility] [address]  [address] [facility] [address] [facility] [phone]

## 2020-02-05 VITALS
RESPIRATION RATE: 17 BRPM | OXYGEN SATURATION: 93 % | WEIGHT: 225.97 LBS | HEART RATE: 60 BPM | BODY MASS INDEX: 26.68 KG/M2 | DIASTOLIC BLOOD PRESSURE: 72 MMHG | SYSTOLIC BLOOD PRESSURE: 130 MMHG | HEIGHT: 77 IN | TEMPERATURE: 97.5 F

## 2020-02-05 PROBLEM — J40 BRONCHITIS: Status: ACTIVE | Noted: 2020-02-04

## 2020-02-05 PROCEDURE — 94760 N-INVAS EAR/PLS OXIMETRY 1: CPT

## 2020-02-05 PROCEDURE — 94640 AIRWAY INHALATION TREATMENT: CPT

## 2020-02-05 PROCEDURE — 99239 HOSP IP/OBS DSCHRG MGMT >30: CPT | Performed by: PHYSICIAN ASSISTANT

## 2020-02-05 PROCEDURE — 94669 MECHANICAL CHEST WALL OSCILL: CPT

## 2020-02-05 RX ORDER — IPRATROPIUM BROMIDE AND ALBUTEROL SULFATE 2.5; .5 MG/3ML; MG/3ML
3 SOLUTION RESPIRATORY (INHALATION) 4 TIMES DAILY
Qty: 810 ML | Refills: 0 | Status: SHIPPED | OUTPATIENT
Start: 2020-02-05 | End: 2021-02-04

## 2020-02-05 RX ORDER — AZITHROMYCIN 250 MG/1
250 TABLET, FILM COATED ORAL EVERY 24 HOURS
Qty: 3 TABLET | Refills: 0 | Status: SHIPPED | OUTPATIENT
Start: 2020-02-06 | End: 2020-02-09

## 2020-02-05 RX ADMIN — PANTOPRAZOLE SODIUM 40 MG: 40 TABLET, DELAYED RELEASE ORAL at 06:03

## 2020-02-05 RX ADMIN — SPIRONOLACTONE 25 MG: 25 TABLET ORAL at 09:15

## 2020-02-05 RX ADMIN — ASPIRIN 81 MG 81 MG: 81 TABLET ORAL at 09:15

## 2020-02-05 RX ADMIN — IPRATROPIUM BROMIDE 0.5 MG: 0.5 SOLUTION RESPIRATORY (INHALATION) at 07:28

## 2020-02-05 RX ADMIN — LORATADINE 10 MG: 10 TABLET ORAL at 09:15

## 2020-02-05 RX ADMIN — AMLODIPINE BESYLATE 10 MG: 10 TABLET ORAL at 09:14

## 2020-02-05 RX ADMIN — ENOXAPARIN SODIUM 40 MG: 40 INJECTION SUBCUTANEOUS at 09:15

## 2020-02-05 RX ADMIN — AZITHROMYCIN 250 MG: 250 TABLET, FILM COATED ORAL at 06:03

## 2020-02-05 RX ADMIN — CLOPIDOGREL BISULFATE 75 MG: 75 TABLET ORAL at 09:15

## 2020-02-05 RX ADMIN — LEVALBUTEROL HYDROCHLORIDE 1.25 MG: 1.25 SOLUTION, CONCENTRATE RESPIRATORY (INHALATION) at 07:28

## 2020-02-05 RX ADMIN — DOCUSATE SODIUM 100 MG: 100 CAPSULE, LIQUID FILLED ORAL at 09:14

## 2020-02-05 RX ADMIN — PREDNISONE 5 MG: 5 TABLET ORAL at 09:15

## 2020-02-05 RX ADMIN — CARVEDILOL 12.5 MG: 12.5 TABLET, FILM COATED ORAL at 09:14

## 2020-02-05 RX ADMIN — TAMSULOSIN HYDROCHLORIDE 0.4 MG: 0.4 CAPSULE ORAL at 09:15

## 2020-02-05 RX ADMIN — PAROXETINE 20 MG: 20 TABLET, FILM COATED ORAL at 09:15

## 2020-02-05 RX ADMIN — BUDESONIDE 0.5 MG: 0.5 INHALANT RESPIRATORY (INHALATION) at 07:28

## 2020-02-05 NOTE — DISCHARGE SUMMARY
Addendum 1:46 p m  Please note that patient was prescribed suctioning due to his history of dysphagia from stroke and difficulty clearing secretion    Discharge- Ramiro Mario  1940, [de-identified] y o  male MRN: [id number]    Unit/Bed#: S -01 Encounter: [id number]    Primary Care Provider: Meeta Montemayor DO   Date and time admitted to hospital: 2/3/2020 11:24 PM  * Bronchitis  Assessment & Plan  · Acute on chronic coughing, productive in nature  No fevers or hypoxia noted  Likely acute bronchitis  · CT scan of the chest: "No focal consolidation  Status post right upper lobectomy"  · treated with 1 dose of IV azithromycin 500 mg, then 250 mg azithromycin p o  x4 days  · Penicillin allergic  · Ruled out flu/RSV  · procalcitonin only 0 08, not noted to have sepsis criteria  · Continue nebulizers-- Respiratory protocol, flutter valve, aggressive airway clearance, suctioning as needed  · Patient is known to pulmonology department and will follow-up with them  · Monitor respiratory status  History of cerebrovascular accident with ongoing dysphagia  Assessment & Plan  · History of CVA with left-sided residual deficits as well as dysphagia  · Continue with aspirin and Lipitor  · Continue dysphagia diet--pureed with HTL  · Aspiration precautions  Chronic kidney disease, stage 3 (HCC)  Assessment & Plan  · His creatinine is within baseline range at 1 22 (baseline is around 1 1-1 4)      Essential hypertension  Assessment & Plan   Blood pressure is reviewed and acceptable   continue home medications        Discharging Physician / Practitioner: Rosy Gutierres PA-C  PCP: Meeta Montemayor DO  Admission Date:   Admission Orders (From admission, onward)     Ordered        02/04/20 1807  Inpatient Admission  Once         02/04/20 0233  Place in Observation  Once                   Discharge Date: 02/05/20    Resolved Problems  Date Reviewed: 2/5/2020    None          Consultations During [facility] Stay:  · none    Procedures Performed:   · CT chest    Significant Findings / Test Results:   · As above    Incidental Findings:   · None     Test Results Pending at Discharge (will require follow up): · None     Outpatient Tests Requested:  · None    Complications:  None    Reason for Admission:  Cough    Hospital Course:     Doretha Layne  is a [de-identified] y o  male patient who originally presented to the hospital on 2/3/2020 due to acute on chronic cough  In addition he had recent increase mucus, and the severity of symptoms resulted in difficulty clearing airway and decreased oral intake  On admission patient tested negative for RSV and the flu and had a CT of the chest which was negative for pneumonia  He was not hypoxic nor did he have sepsis criteria or significant procalcitonin elevation  He is known to pulmonology as an outpatient  In addition he is chronically on a dysphagia diet following a stroke and is compliant with that diet  He was treated with Zithromax with improvement in his symptoms and is stable for discharge home today    Please see above list of diagnoses and related plan for additional information  Condition at Discharge: stable     Discharge Day Visit / Exam:     Subjective:  "I feel a lot better"--patient reports overall improvement since arriving at the hospital   He reports compliance with his modified diet and reports that his daughter won't let him cheat "  Vitals: Blood Pressure: 130/72 (02/05/20 0729)  Pulse: 60 (02/05/20 0729)  Temperature: 97 5 °F (36 4 °C) (02/05/20 0729)  Temp Source: Oral (02/05/20 0729)  Respirations: 17 (02/05/20 0729)  Height: 6' 5" (195 6 cm) (02/04/20 0443)  Weight - Scale: 102 kg (225 lb 15 5 oz) (02/04/20 0443)  SpO2: 93 % (02/05/20 0729)  Exam:   Physical Exam   Constitutional: He appears well-developed and well-nourished  No distress  Eyes: Conjunctivae are normal  Right eye exhibits no discharge  Left eye exhibits no discharge  Cardiovascular: Normal rate, regular rhythm and normal heart sounds  No murmur heard  Pulmonary/Chest: Effort normal  No stridor  No respiratory distress  He has no wheezes  He has no rales  Cough noted but no hypoxia, dyspnea, distress or wheezing   Abdominal: Soft  He exhibits no distension  There is no tenderness  There is no guarding  Musculoskeletal: He exhibits no edema  Neurological: He is alert  Awake alert interactive he was seen sitting up in the chair and has evidence of dysarthria   Skin: Skin is warm and dry  No rash noted  He is not diaphoretic  No erythema  No pallor  Psychiatric: He has a normal mood and affect  His behavior is normal    Pleasant and cooperative   Vitals reviewed  Discussion with Family:  Spoke with patient's daughter over the phone    Discharge instructions/Information to patient and family:   See after visit summary for information provided to patient and family  Provisions for Follow-Up Care:  See after visit summary for information related to follow-up care and any pertinent home health orders  Disposition: home with VNA    Planned Readmission: none     Discharge Statement:  I spent 35 minutes discharging the patient  This time was spent on the day of discharge  I had direct contact with the patient on the day of discharge  Greater than 50% of the total time was spent examining patient, answering all patient questions, arranging and discussing plan of care with patient as well as directly providing post-discharge instructions  Additional time then spent on discharge activities  Bedside nursing rounds performed and case was discussed with case management and my attending    Discharge Medications:  See after visit summary for reconciled discharge medications provided to patient and family        ** Please Note: This note has been constructed using a voice recognition system **

## 2020-02-05 NOTE — PLAN OF CARE
Problem: Potential for Falls  Goal: Patient will remain free of falls  Description  INTERVENTIONS:  - Assess patient frequently for physical needs  -  Identify cognitive and physical deficits and behaviors that affect risk of falls    -  [place] fall precautions as indicated by assessment   - Educate patient/family on patient safety including physical limitations  - Instruct patient to call for assistance with activity based on assessment  - Modify environment to reduce risk of injury  - Consider OT/PT consult to assist with strengthening/mobility  Outcome: Progressing     Problem: Prexisting or High Potential for Compromised Skin Integrity  Goal: Skin integrity is maintained or improved  Description  INTERVENTIONS:  - Identify patients at risk for skin breakdown  - Assess and monitor skin integrity  - Assess and monitor nutrition and hydration status  - Monitor labs   - Assess for incontinence   - Turn and reposition patient  - Assist with mobility/ambulation  - Relieve pressure over bony prominences  - Avoid friction and shearing  - Provide appropriate hygiene as needed including keeping skin clean and dry  - Evaluate need for skin moisturizer/barrier cream  - Collaborate with interdisciplinary team   - Patient/family teaching  - Consider wound care consult   Outcome: Progressing     Problem: RESPIRATORY - ADULT  Goal: Achieves optimal ventilation and oxygenation  Description  INTERVENTIONS:  - Assess for changes in respiratory status  - Assess for changes in mentation and behavior  - Position to facilitate oxygenation and minimize respiratory effort  - Oxygen administered by appropriate delivery if ordered  - Initiate smoking cessation education as indicated  - Encourage broncho-pulmonary hygiene including cough, deep breathe, Incentive Spirometry  - Assess the need for suctioning and aspirate as needed  - Assess and instruct to report SOB or any respiratory difficulty  - Respiratory Therapy support as indicated  Outcome: Progressing     Problem: PAIN - ADULT  Goal: Verbalizes/displays adequate comfort level or baseline comfort level  Description  Interventions:  - Encourage patient to monitor pain and request assistance  - Assess pain using appropriate pain scale  - Administer analgesics based on type and severity of pain and evaluate response  - Implement non-pharmacological measures as appropriate and evaluate response  - Consider cultural and social influences on pain and pain management  - Notify physician/advanced practitioner if interventions unsuccessful or patient reports new pain  Outcome: Progressing     Problem: INFECTION - ADULT  Goal: Absence or prevention of progression during hospitalization  Description  INTERVENTIONS:  - Assess and monitor for signs and symptoms of infection  - Monitor lab/diagnostic results  - Monitor all insertion sites, i e  indwelling lines, tubes, and drains  - Monitor endotracheal if appropriate and nasal secretions for changes in amount and color  - [place] appropriate cooling/warming therapies per order  - Administer medications as ordered  - Instruct and encourage patient and family to use good hand hygiene technique  - Identify and instruct in appropriate isolation precautions for identified infection/condition  Outcome: Progressing     Problem: SAFETY ADULT  Goal: Patient will remain free of falls  Description  INTERVENTIONS:  - Assess patient frequently for physical needs  -  Identify cognitive and physical deficits and behaviors that affect risk of falls    -  [place] fall precautions as indicated by assessment   - Educate patient/family on patient safety including physical limitations  - Instruct patient to call for assistance with activity based on assessment  - Modify environment to reduce risk of injury  - Consider OT/PT consult to assist with strengthening/mobility  Outcome: Progressing  Goal: Maintain or return to baseline ADL function  Description  INTERVENTIONS:  -  Assess patient's ability to carry out ADLs; assess patient's baseline for ADL function and identify physical deficits which impact ability to perform ADLs (bathing, care of mouth/teeth, toileting, grooming, dressing, etc )  - Assess/evaluate cause of self-care deficits   - Assess range of motion  - Assess patient's mobility; develop plan if impaired  - Assess patient's need for assistive devices and provide as appropriate  - Encourage maximum independence but intervene and supervise when necessary  - Involve family in performance of ADLs  - Assess for home care needs following discharge   - Consider OT consult to assist with ADL evaluation and planning for discharge  - Provide patient education as appropriate  Outcome: Progressing  Goal: Maintain or return mobility status to optimal level  Description  INTERVENTIONS:  - Assess patient's baseline mobility status (ambulation, transfers, stairs, etc )    - Identify cognitive and physical deficits and behaviors that affect mobility  - Identify mobility aids required to assist with transfers and/or ambulation (gait belt, sit-to-stand, lift, walker, cane, etc )  - [place] fall precautions as indicated by assessment  - Record patient progress and toleration of activity level on Mobility SBAR; progress patient to next Phase/Stage  - Instruct patient to call for assistance with activity based on assessment  - Consider rehabilitation consult to assist with strengthening/weightbearing, etc   Outcome: Progressing     Problem: DISCHARGE PLANNING  Goal: Discharge to home or other facility with appropriate resources  Description  INTERVENTIONS:  - Identify barriers to discharge w/patient and caregiver  - Arrange for needed discharge resources and transportation as appropriate  - Identify discharge learning needs (meds, wound care, etc )  - Arrange for interpretive services to assist at discharge as needed  - Refer to Case Management Department for coordinating discharge planning if the patient needs post-hospital services based on physician/advanced practitioner order or complex needs related to functional status, cognitive ability, or social support system  Outcome: Progressing

## 2020-02-05 NOTE — DISCHARGE INSTR - AVS FIRST PAGE
Dear Liz Sarmiento ,     It was our pleasure to care for you here at [facility]  It is our hope that we were always able to exceed the expected standards for your care during your stay  You were hospitalized due to bronchitis  You were cared for on the 2nd floor by Cintia Hanks PA-C under the service of Angelita Degroot MD with the [facility] who covers for your primary care physician (PCP), Boone Patel DO, while you were hospitalized  If you have any questions or concerns related to this hospitalization, you may contact us at [phone]  For follow up as well as any medication refills, we recommend that you follow up with your primary care physician  A registered nurse will reach out to you by phone within a few days after your discharge to answer any additional questions that you may have after going home  However, at this time we provide for you here, the most important instructions / recommendations at discharge:     · Notable Medication Adjustments -   · Added zithromax  · Testing Required after Discharge -   · none  · Important follow up information -   · Continue your modified diet to prevent aspiration  · Other Instructions -   · continue cough meds and nebulizer  · Please review this entire after visit summary as additional general instructions including medication list, appointments, activity, diet, any pertinent wound care, and other additional recommendations from your care team that may be provided for you        Sincerely,     Cintia Hanks PA-C

## 2020-02-05 NOTE — SOCIAL WORK
CM informed by [facility] that patient is medically stable for discharge to home today  Lachelle Ribeiro from [facility] called and spoke with dtr Dianna Raul who reports she or patient's caregiver will provide transport home  Dianna Carter would like for CM to arrange for VNA through [facility] if patient is agreeable  CM met with patient at bedside, patient alert and oriented and seated in recliner  Patient reports he lives at home with his daughter Dianna Carter and has caregiver assistance  Patient primarily utilizes a WC for transport  CM discussed discharge home and patient is excited to return home  CM offered to arrange for VNA and patient reports he would like for this to be arranged  Referral sent to [facility] for SN/PT/OT/ST  They are able to accept and contact info placed on AVS  No other CM needs identified via conversation with NAN at this time

## 2020-02-05 NOTE — ASSESSMENT & PLAN NOTE
· History of CVA with left-sided residual deficits as well as dysphagia  · Continue with aspirin and Lipitor  · Continue dysphagia diet--pureed with HTL  · Aspiration precautions

## 2020-02-05 NOTE — ASSESSMENT & PLAN NOTE
· Acute on chronic coughing, productive in nature  No fevers or hypoxia noted  Likely acute bronchitis  · CT scan of the chest: "No focal consolidation  Status post right upper lobectomy"  · treated with 1 dose of IV azithromycin 500 mg, then 250 mg azithromycin p o  x4 days  · Penicillin allergic  · Ruled out flu/RSV  · procalcitonin only 0 08, not noted to have sepsis criteria  · Continue nebulizers-- Respiratory protocol, flutter valve, aggressive airway clearance, suctioning as needed  · Patient is known to pulmonology department and will follow-up with them  · Monitor respiratory status

## 2020-02-05 NOTE — PLAN OF CARE
Problem: Potential for Falls  Goal: Patient will remain free of falls  Description  INTERVENTIONS:  - Assess patient frequently for physical needs  -  Identify cognitive and physical deficits and behaviors that affect risk of falls    -  [place] fall precautions as indicated by assessment   - Educate patient/family on patient safety including physical limitations  - Instruct patient to call for assistance with activity based on assessment  - Modify environment to reduce risk of injury  - Consider OT/PT consult to assist with strengthening/mobility  Outcome: Progressing     Problem: Prexisting or High Potential for Compromised Skin Integrity  Goal: Skin integrity is maintained or improved  Description  INTERVENTIONS:  - Identify patients at risk for skin breakdown  - Assess and monitor skin integrity  - Assess and monitor nutrition and hydration status  - Monitor labs   - Assess for incontinence   - Turn and reposition patient  - Assist with mobility/ambulation  - Relieve pressure over bony prominences  - Avoid friction and shearing  - Provide appropriate hygiene as needed including keeping skin clean and dry  - Evaluate need for skin moisturizer/barrier cream  - Collaborate with interdisciplinary team   - Patient/family teaching  - Consider wound care consult   Outcome: Progressing     Problem: RESPIRATORY - ADULT  Goal: Achieves optimal ventilation and oxygenation  Description  INTERVENTIONS:  - Assess for changes in respiratory status  - Assess for changes in mentation and behavior  - Position to facilitate oxygenation and minimize respiratory effort  - Oxygen administered by appropriate delivery if ordered  - Initiate smoking cessation education as indicated  - Encourage broncho-pulmonary hygiene including cough, deep breathe, Incentive Spirometry  - Assess the need for suctioning and aspirate as needed  - Assess and instruct to report SOB or any respiratory difficulty  - Respiratory Therapy support as indicated  Outcome: Progressing     Problem: PAIN - ADULT  Goal: Verbalizes/displays adequate comfort level or baseline comfort level  Description  Interventions:  - Encourage patient to monitor pain and request assistance  - Assess pain using appropriate pain scale  - Administer analgesics based on type and severity of pain and evaluate response  - Implement non-pharmacological measures as appropriate and evaluate response  - Consider cultural and social influences on pain and pain management  - Notify physician/advanced practitioner if interventions unsuccessful or patient reports new pain  Outcome: Progressing     Problem: INFECTION - ADULT  Goal: Absence or prevention of progression during hospitalization  Description  INTERVENTIONS:  - Assess and monitor for signs and symptoms of infection  - Monitor lab/diagnostic results  - Monitor all insertion sites, i e  indwelling lines, tubes, and drains  - Monitor endotracheal if appropriate and nasal secretions for changes in amount and color  - [place] appropriate cooling/warming therapies per order  - Administer medications as ordered  - Instruct and encourage patient and family to use good hand hygiene technique  - Identify and instruct in appropriate isolation precautions for identified infection/condition  Outcome: Progressing     Problem: SAFETY ADULT  Goal: Patient will remain free of falls  Description  INTERVENTIONS:  - Assess patient frequently for physical needs  -  Identify cognitive and physical deficits and behaviors that affect risk of falls    -  [place] fall precautions as indicated by assessment   - Educate patient/family on patient safety including physical limitations  - Instruct patient to call for assistance with activity based on assessment  - Modify environment to reduce risk of injury  - Consider OT/PT consult to assist with strengthening/mobility  Outcome: Progressing  Goal: Maintain or return to baseline ADL function  Description  INTERVENTIONS:  -  Assess patient's ability to carry out ADLs; assess patient's baseline for ADL function and identify physical deficits which impact ability to perform ADLs (bathing, care of mouth/teeth, toileting, grooming, dressing, etc )  - Assess/evaluate cause of self-care deficits   - Assess range of motion  - Assess patient's mobility; develop plan if impaired  - Assess patient's need for assistive devices and provide as appropriate  - Encourage maximum independence but intervene and supervise when necessary  - Involve family in performance of ADLs  - Assess for home care needs following discharge   - Consider OT consult to assist with ADL evaluation and planning for discharge  - Provide patient education as appropriate  Outcome: Progressing  Goal: Maintain or return mobility status to optimal level  Description  INTERVENTIONS:  - Assess patient's baseline mobility status (ambulation, transfers, stairs, etc )    - Identify cognitive and physical deficits and behaviors that affect mobility  - Identify mobility aids required to assist with transfers and/or ambulation (gait belt, sit-to-stand, lift, walker, cane, etc )  - [place] fall precautions as indicated by assessment  - Record patient progress and toleration of activity level on Mobility SBAR; progress patient to next Phase/Stage  - Instruct patient to call for assistance with activity based on assessment  - Consider rehabilitation consult to assist with strengthening/weightbearing, etc   Outcome: Progressing     Problem: DISCHARGE PLANNING  Goal: Discharge to home or other facility with appropriate resources  Description  INTERVENTIONS:  - Identify barriers to discharge w/patient and caregiver  - Arrange for needed discharge resources and transportation as appropriate  - Identify discharge learning needs (meds, wound care, etc )  - Arrange for interpretive services to assist at discharge as needed  - Refer to Case Management Department for coordinating discharge planning if the patient needs post-hospital services based on physician/advanced practitioner order or complex needs related to functional status, cognitive ability, or social support system  Outcome: Progressing

## 2020-02-10 ENCOUNTER — OFFICE VISIT (OUTPATIENT)
Dept: GERIATRICS | Age: 80
End: 2020-02-10
Payer: MEDICARE

## 2020-02-10 VITALS
TEMPERATURE: 97.5 F | HEART RATE: 71 BPM | RESPIRATION RATE: 16 BRPM | BODY MASS INDEX: 26.16 KG/M2 | DIASTOLIC BLOOD PRESSURE: 78 MMHG | WEIGHT: 220.6 LBS | SYSTOLIC BLOOD PRESSURE: 112 MMHG | OXYGEN SATURATION: 98 %

## 2020-02-10 DIAGNOSIS — Z79.899 POLYPHARMACY: ICD-10-CM

## 2020-02-10 DIAGNOSIS — D69.6 THROMBOCYTOPENIA (HCC): ICD-10-CM

## 2020-02-10 DIAGNOSIS — I73.9 PERIPHERAL VASCULAR DISEASE, UNSPECIFIED (HCC): ICD-10-CM

## 2020-02-10 DIAGNOSIS — F32.9 MAJOR DEPRESSIVE DISORDER WITHOUT PSYCHOTIC FEATURES: Chronic | ICD-10-CM

## 2020-02-10 DIAGNOSIS — K59.09 OTHER CONSTIPATION: ICD-10-CM

## 2020-02-10 DIAGNOSIS — F41.9 ANXIETY: ICD-10-CM

## 2020-02-10 DIAGNOSIS — E78.2 MIXED HYPERLIPIDEMIA: Chronic | ICD-10-CM

## 2020-02-10 DIAGNOSIS — R53.1 LEFT-SIDED WEAKNESS: Chronic | ICD-10-CM

## 2020-02-10 DIAGNOSIS — J39.8 TRACHEOMALACIA: Chronic | ICD-10-CM

## 2020-02-10 DIAGNOSIS — I69.30 HISTORY OF CEREBROVASCULAR ACCIDENT (CVA) WITH RESIDUAL DEFICIT: ICD-10-CM

## 2020-02-10 DIAGNOSIS — K21.9 GASTROESOPHAGEAL REFLUX DISEASE WITHOUT ESOPHAGITIS: Chronic | ICD-10-CM

## 2020-02-10 DIAGNOSIS — R26.2 AMBULATORY DYSFUNCTION: Chronic | ICD-10-CM

## 2020-02-10 DIAGNOSIS — F03.90 MILD DEMENTIA (HCC): Primary | ICD-10-CM

## 2020-02-10 DIAGNOSIS — J44.9 CHRONIC OBSTRUCTIVE PULMONARY DISEASE, UNSPECIFIED COPD TYPE (HCC): ICD-10-CM

## 2020-02-10 DIAGNOSIS — R13.12 OROPHARYNGEAL DYSPHAGIA: Chronic | ICD-10-CM

## 2020-02-10 DIAGNOSIS — G47.33 OSA (OBSTRUCTIVE SLEEP APNEA): Chronic | ICD-10-CM

## 2020-02-10 DIAGNOSIS — I10 ESSENTIAL HYPERTENSION: Chronic | ICD-10-CM

## 2020-02-10 DIAGNOSIS — J43.8 OTHER EMPHYSEMA (HCC): Chronic | ICD-10-CM

## 2020-02-10 PROBLEM — F03.A0 MILD DEMENTIA (HCC): Status: ACTIVE | Noted: 2019-10-22

## 2020-02-10 PROCEDURE — 99215 OFFICE O/P EST HI 40 MIN: CPT | Performed by: STUDENT IN AN ORGANIZED HEALTH CARE EDUCATION/TRAINING PROGRAM

## 2020-02-11 ENCOUNTER — PATIENT OUTREACH (OUTPATIENT)
Dept: CASE MANAGEMENT | Facility: OTHER | Age: 80
End: 2020-02-11

## 2020-02-11 NOTE — PHYSICIAN ADVISOR
Current patient class: Inpatient  The patient is currently on Hospital Day: 3 at [facility]      The patient was admitted to the hospital at [phone] on 2/4/20 for the following diagnosis:  Cough [R05]  Dyspnea [R06 00]  CKD (chronic kidney disease) stage 3, GFR 30-59 ml/min ([facility]) [N18 3]  Difficulty breathing [R06 89]  Productive cough [R05]       There is documentation in the medical record of an expected length of stay of at least 2 midnights  The patient is therefore expected to satisfy the 2 midnight benchmark and given the 2 midnight presumption is appropriate for INPATIENT ADMISSION  Given this expectation of a satisfying stay, CMS instructs us that the patient is most often appropriate for inpatient admission under part A provided medical necessity is documented in the chart  After review of the relevant documentation, labs, vital signs and test results, the patient is appropriate for INPATIENT ADMISSION  Admission to the hospital as an inpatient is a complex decision making process which requires the practitioner to consider the patients presenting complaint, history and physical examination and all relevant testing  With this in mind, in this case, the patient was deemed appropriate for INPATIENT ADMISSION  After review of the documentation and testing available at the time of the admission I concur with this clinical determination of medical necessity  Rationale is as follows: The patient is a [de-identified] yrs old Male who presented to the ED at 2/3/2020 11:24 PM with a chief complaint of Shortness of Breath (Pt c/o SOB worsening since this am +cough Pt has chronic cough, but has been bringing mucous up recently  -fevers -CP ) Patient s/p RU lobectomy and dysphagia, presents with increasing productive cough  He had been having increased difficulty clearing his airway  He was tachypneic on admission to     Admitted for further evaluation,  Ct chest did not show pneumonia, flu and rsv swabs were obtained and negative  Procalcitonin was within normal limits  He was treated with nebulizer treatments scheduled tid, respiratory protocol and received and IV dose of zithromax  He swallowing was monitored for aspiration given his dysphagia thus suctioning and aggressive airway clearance were recommended for patient  The patients vitals on arrival were ED Triage Vitals [02/03/20 2330]   Temperature Pulse Respirations Blood Pressure SpO2   97 8 °F (36 6 °C) 70 (!) 25 146/77 96 %      Temp Source Heart Rate Source Patient Position - Orthostatic VS BP Location FiO2 (%)   Oral Monitor Sitting Right arm --      Pain Score       No Pain           Past Medical History:   Diagnosis Date    RONAL (acute kidney injury) ([facility] ) 5/31/2017    Aspiration into respiratory tract     Chest pain 5/31/2017    COPD [facility]ch[facility] obstructive [facility] disease) [facility]     Depression     Elevated troponin 5/31/2017    GERD (gastroesophageal reflux disease)     History of CVA (cerebrovascular accident) 4/13/2016    Hyperlipidemia     Hypertension     Lung cancer ([facility] ) 2005    Right, status post lobectomy    Pneumonia     Prostate cancer ([facility] )     Sleep apnea     awaiting sleep study results    Squamous cell skin cancer     Stroke ([facility] )     Tracheomalacia     Weakness due to cerebrovascular accident (CVA)      Past Surgical History:   Procedure Laterality Date    COLONOSCOPY      ESOPHAGOGASTRODUODENOSCOPY N/A 4/13/2016    Procedure: ESOPHAGOGASTRODUODENOSCOPY (EGD); Surgeon: Jazmine Burgos MD;  [facility]; Service:     JOINT REPLACEMENT      knee replacement    LUNG LOBECTOMY      [place] ESOPHAGOGASTRODUODENOSCOPY TRANSORAL DIAGNOSTIC N/A 3/15/2017    Procedure: ESOPHAGOGASTRODUODENOSCOPY (EGD); Surgeon: Jazmine Burgos MD;  [facility];   Service: Gastroenterology    SKIN BIOPSY      TRIGEMINAL NERVE DECOMPRESSION             Consults have been placed to:   IP CONSULT TO CASE MANAGEMENT    Vitals:    02/04/20 1538 02/04/20 1925 02/04/20 2217 02/05/20 0729   BP: 145/76  136/71 130/72   BP Location: Right arm  Right arm Left arm   Pulse: 75  67 60   Resp: 19 18 17   Temp: 97 8 °F (36 6 °C)  98 6 °F (37 °C) 97 5 °F (36 4 °C)   TempSrc: Oral  Oral Oral   SpO2: 94% 94% 92% 93%   Weight:       Height:           Most recent labs:    No results for input(s): WBC, HGB, HCT, PLT, K, NA, CALCIUM, BUN, CREATININE, LIPASE, AMYLASE, INR, TROPONINI, CKTOTAL, AST, ALT, ALKPHOS, BILITOT in the last 72 hours  Scheduled Meds:  Continuous Infusions:  No current facility-administered medications for this encounter  PRN Meds:      Surgical procedures (if appropriate):

## 2020-02-11 NOTE — PROGRESS NOTES
Nando notification received for new bundle 2/10/20  Chart review completed  Patient hospitalized 2/3/2020 - 2/5/2020 at [facility] for c/o of worsening cough secondary to bronchitis  Patient with past medical history of lung cancer, GERD, CVA, and prostate cancer  Patient with history of   CKD3 and COPD which are presently stable  Review of IP SW note from 2/5/20 indicates patient lives at home with daughter and caregiver assistance  Patient also has wheelchair in home  Patient opened with  [facility] 2/6/20 for  / [place] [facility] /  services and has (6) skilled nursing visits scheduled  This CM will continue to monitor electronically via chart review and through  [facility]  CM will outreach upon discharge from [address] [place], [place] [address]A services

## 2020-02-11 NOTE — PROGRESS NOTES
[facility]   [facility], [facility] [address] [address]  [phone]      Problem List     Problem List Items Addressed This Visit        Digestive    Oropharyngeal dysphagia (Chronic)    GERD (gastroesophageal reflux disease) (Chronic)       Respiratory    Tracheomalacia (Chronic)    Other emphysema (HCC) (Chronic)    LIAM (obstructive sleep apnea) (Chronic)    Relevant Orders    Ambulatory referral to Sleep Medicine    COPD (chronic obstructive pulmonary disease) ([facility] )       Cardiovascular and Mediastinum    Essential hypertension (Chronic)    Peripheral vascular disease, unspecified (HCC)       Nervous and Auditory    Left-sided weakness (Chronic)    Mild dementia (HCC) - Primary       Other    Hyperlipidemia (Chronic)    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 (Chronic)    Ambulatory dysfunction (Chronic)    Thrombocytopenia (HCC) (Chronic)    History of cerebrovascular accident with ongoing dysphagia    Other constipation    Polypharmacy    Anxiety        See detailed outline of assessment and plan as listed below    History of Present Illness      HPI    Escobar Ac  is a [de-identified] y o  male who is in the office for [address] [place] [address]  History obtained from patient, daughter in caretaker  He denies any new complaints or recent falls  He was hospitalized recently last week for bronchitis, difficulty in clearing his airway and increased sputum production    Past Medical, Social, Surgical, Medications and Allergy history reviewed  Review of Systems     Review of Systems   Constitutional: Positive for activity change, appetite change and fatigue  Negative for chills and fever  HENT: Positive for trouble swallowing (Chronic) and voice change (Chronic)  Negative for congestion, ear pain, rhinorrhea and sore throat  Eyes: Negative for discharge          Wears glasses   Respiratory: Positive for cough (Chronic), shortness of breath (Intermittent, chronically) and stridor (Chronic)  Negative for chest tightness and wheezing  Cardiovascular: Positive for leg swelling (Minimally)  Negative for chest pain and palpitations  Gastrointestinal: Positive for constipation  Negative for abdominal pain, diarrhea, nausea and vomiting  Genitourinary: Negative for decreased urine volume, dysuria and hematuria  Musculoskeletal: Positive for arthralgias (Chronic) and gait problem (Uses wheelchair)  Skin: Negative for color change, pallor, rash and wound  Neurological: Positive for speech difficulty (Chronic, 2/2 cva) and weakness (Chronic)  Negative for dizziness and light-headedness  Hematological: Does not bruise/bleed easily  Psychiatric/Behavioral: Positive for confusion (Intermittently at baseline) and decreased concentration  Negative for agitation, behavioral problems, dysphoric mood, hallucinations, sleep disturbance and suicidal ideas  History:  Past Medical History:   Diagnosis Date    RONAL (acute kidney injury) ([facility] ) 5/31/2017    Aspiration into respiratory tract     Chest pain 5/31/2017    [facility]) [facility]     Depression     Elevated troponin 5/31/2017    GERD (gastroesophageal reflux disease)     History of CVA (cerebrovascular accident) 4/13/2016    Hyperlipidemia     Hypertension     Lung cancer ([facility] ) 2005    Right, status post lobectomy    Pneumonia     Prostate cancer ([facility] )     Sleep apnea     awaiting sleep study results    Squamous cell skin cancer     [facility]     Tracheomalacia     Weakness due to cerebrovascular accident (CVA)      Past Surgical History:   Procedure Laterality Date    COLONOSCOPY      ESOPHAGOGASTRODUODENOSCOPY N/A 4/13/2016    Procedure: ESOPHAGOGASTRODUODENOSCOPY (EGD); Surgeon: Sarah Munoz MD;  [facility];   Service:     JOINT REPLACEMENT      knee replacement    LUNG LOBECTOMY      ME ESOPHAGOGASTRODUODENOSCOPY TRANSORAL DIAGNOSTIC N/A 3/15/2017    Procedure: ESOPHAGOGASTRODUODENOSCOPY (EGD); Surgeon: Karine Sheridan MD;  [facility]; Service: Gastroenterology    SKIN BIOPSY      TRIGEMINAL NERVE DECOMPRESSION       Social History     Socioeconomic History    Marital status:       Spouse name: Not on file    Number of children: Not on file    Years of education: Not on file    Highest education level: Not on file   Occupational History    Not on file   Social Needs    Financial resource strain: Not on file    Food insecurity:     Worry: Not on file     Inability: Not on file    Transportation needs:     Medical: Not on file     Non-medical: Not on file   Tobacco Use    Smoking status: Former Smoker     Packs/day:  00     Years: 22 00     Pack years: 22 00     Types: Cigarettes     Start date:      Last attempt to quit:      Years since quittin 1    Smokeless tobacco: Never Used   Substance and Sexual Activity    Alcohol use: Not Currently    Drug use: Not Currently    Sexual activity: Not on file   Lifestyle    Physical activity:     Days per week: Not on file     Minutes per session: Not on file    Stress: Not on file   Relationships    Social connections:     Talks on phone: Not on file     Gets together: Not on file     Attends [employer] service: Not on file     Active member of club or organization: Not on file     Attends meetings of clubs or organizations: Not on file     Relationship status: Not on file    Intimate partner violence:     Fear of current or ex partner: Not on file     Emotionally abused: Not on file     Physically abused: Not on file     Forced sexual activity: Not on file   Other Topics Concern    Not on file   Social History Narrative    Not on file     Current Outpatient Medications   Medication Sig Dispense Refill    acetaminophen (TYLENOL) 325 mg tablet Take 2 tablets by mouth every 6 (six) hours as needed for fever 30 tablet 0    albuterol (PROVENTIL HFA,VENTOLIN HFA) 90 mcg/act inhaler Inhale 2 puffs 4 (four) times a day 2 Inhaler 0    amLODIPine (NORVASC) 10 mg tablet Take 1 tablet by mouth daily      ASPIRIN 81 PO Take 1 tablet by mouth every other day        atorvastatin (LIPITOR) 40 mg tablet Take 1 tablet (40 mg total) by mouth daily after dinner  0    benzonatate (TESSALON) 200 MG capsule Take 1 capsule (200 mg total) by mouth 3 (three) times a day as needed for cough 90 capsule 1    budesonide (PULMICORT) 0 5 mg/2 mL nebulizer solution Take 1 vial (0 5 mg total) by nebulization 2 (two) times a day Rinse mouth after use  360 mL 3    carvedilol (COREG) 12 5 mg tablet Take 1 tablet by mouth 2 (two) times a day with meals 60 tablet 0    clopidogrel (PLAVIX) 75 mg tablet Take 1 tablet by mouth daily 30 tablet 0    docusate sodium (COLACE) 100 mg capsule Take 1 capsule (100 mg total) by mouth 2 (two) times a day 10 capsule 0    Fesoterodine Fumarate ER (TOVIAZ) 8 MG TB24 Take 4 mg by mouth every evening        ipratropium-albuterol (DUO-NEB) 0 5-2 5 mg/3 mL nebulizer solution Take 1 vial (3 mL total) by nebulization 4 (four) times a day 810 mL 0    ketoconazole (NIZORAL) 2 % cream Apply topically to both feet in their entirety (tops, bottoms and between toes) 3 times a day for 4 weeks straight   60 g 5    latanoprost (XALATAN) 0 005 % ophthalmic solution Administer 1 drop to both eyes daily at bedtime      loratadine (CLARITIN) 10 mg tablet Take 10 mg by mouth daily      LORazepam (ATIVAN) 0 5 mg tablet Take 0 5 mg by mouth every 8 (eight) hours as needed for anxiety      pantoprazole (PROTONIX) 40 mg tablet Take 40 mg by mouth daily      PARoxetine (PAXIL) 20 mg tablet Take 1 tablet by mouth daily      polyethylene glycol (MIRALAX) 17 g packet Take 17 g by mouth daily as needed      predniSONE 5 mg tablet Take 1 tablet (5 mg total) by mouth daily 90 tablet 3    psyllium (METAMUCIL) 0 52 g capsule Take 0 52 g by mouth daily as needed       senna (SENOKOT) 8 6 MG tablet Take 2 tablets by mouth daily at bedtime      spironolactone (ALDACTONE) 25 mg tablet Take 1 tablet (25 mg total) by mouth daily 30 tablet 0    tamsulosin (FLOMAX) 0 4 mg Take 1 capsule by mouth daily       No current facility-administered medications for this visit  Family History   Family history unknown: Yes       Allergies: Allergies   Allergen Reactions    Penicillins Rash       Vitals:  Vitals:    02/10/20 0927   BP: 112/78   Pulse: 71   Resp: 16   Temp: 97 5 °F (36 4 °C)   SpO2: 98%       Physical Exam   Constitutional: No distress  Elderly frail male sitting comfortably in wheelchair in no obvious cardiorespiratory or painful distress   HENT:   Head: Normocephalic and atraumatic  Right Ear: External ear normal    Left Ear: External ear normal    Nose: Nose normal    Mouth/Throat: Oropharynx is clear and moist    Eyes: Conjunctivae are normal  Right eye exhibits no discharge  Left eye exhibits no discharge  No scleral icterus  Neck: Normal range of motion  Neck supple  No JVD present  Cardiovascular: Normal rate, regular rhythm and intact distal pulses  Exam reveals no gallop and no friction rub  Murmur (ESM 2/6) heard  Pulmonary/Chest: Effort normal  Stridor (Chronic) present  No respiratory distress  He has no wheezes  He has no rales  He exhibits no tenderness  Air entry good bilaterally, harsh breath sounds throughout, stridorous airway sounds chronically   Abdominal: Soft  Bowel sounds are normal  He exhibits no distension  There is no tenderness  There is no guarding  Musculoskeletal: Normal range of motion  He exhibits edema (Minimal nonpitting lower extremity edema)  He exhibits no tenderness or deformity  Neurological: He is alert  He has normal reflexes  No cranial nerve deficit  Patient was alert and oriented x 1  Follows commands readily  Moving all limbs   Skin: Skin is warm  No rash noted  He is not diaphoretic  No erythema  No pallor     Psychiatric: He has a normal mood and affect  Pleasant and cooperative   Vitals reviewed  Client's Name: Leonid Colindres  Family Present: Nola Shepherd (daughter on conference call), Mamie Degroot (caretaker)  Staff Present: Dr Asiya Martinez  Location of Conference: [address]  Date: 2/10/2020    Medical Problems:  CVA with residual deficit, history of fall, vitamin deficiency, HLD, HTN, tracheomalacia, COPD, oropharyngeal dysphagia, GERD without esophagitis, constipation, major depressive disorder without psychotic features and anxiety  Geriatric Syndromes/Age Related Syndromes:  Mild dementia, ambulatory dysfunction, urinary incontinence, constipation, vision impairment-wears glasses      Assessment and Plan     Assessment and Plan:   1  Neuropsychological:    Dementia-Mild, vascular in etiology   MOCHA:  17/30   Mindstream:  69 9  Vascular dementia given prior history of CVA and persisting vascular risk factors  Remain active physically, mentally and socially, offer reorientation, redirection (depending on situation)  Engage in cognitively challenging activities (stimulating puzzles)  Maintain chronic conditions under control  Discuss pharmaceutical non pharmaceutical interventions  Explained  the option of Aricept and explained side effect profile  Family declined at this time  Referral to speech therapy for cognitive memory rehabilitation  Patient take currently receiving VNA services with follow-up by speech therapy at home  Recommend continue speech therapy for cognitive deficits  Recommend a senior  program for positive socialization  Recommend adhering to [address] for prevention of memory loss and CVA  Manage risk factors for stroke, follow-up with PCP for close monitoring  Patient can access the web site [employer]  com for free online mind games and cognitive exercises   Repeat cognitive assessment in 6 months     Depression Screen:   Geriatric Depression Scale: 5/15   Recommend patient remain active mentally, physically and socially   Recommend weaning patient off paroxetine given increased side effect  profile for insomnia, xerostomia, constipation, dizziness, trauma,  anorexia, anxiety, visual disturbance and palpitations amongst multiple  other side effects in the elderly  Weaning schedule given to patient's  daughter to start by taking 10 mg paroxetine daily for 1 5 weeks then  decrease to 5 mg for 1 week then discontinue prior to starting an  alternative therapy  Would recommend Zoloft 25 mg daily upon  successful weaning of paroxetine   Patient to follow up closely with PCP during weaning process   Continued social support by family and friends        2  Physical Finding Impacting Function:   TUGT:  Deferred given patient's difficulty in mobilizing   Fall Risk Assessment:  High   Activities of Daily Living: Dependent   Instrumental Activities of Daily Living: Dependent    Fall prevention measures  Might recommend assistance with ADLs and IADLs   Physical Therapy recommended: yes  Patient is currently receiving  VNA services which includes physical therapy     3  Medications:   Medications reviewed   Discussed discontinuation of paroxetine after weaning off slowly  Recommend starting Zoloft instead given a better side effect profile in the elderly  Patient advised to avoid over the counter medications that can affect cognition (e g , Benadryl, Tylenol PM, NSAIDs)     4  Other Findings:     History of CVA   Manage risk factors:  HTN, HLD, LIAM   Continue carvedilol 12 5 mg p o  B i d , amlodipine 10 mg daily   Continue Plavix 75 mg p o  Daily, aspirin 81 mg daily   Continue atorvastatin 10 mg daily     LIAM   Patient did have a prior sleep study over 5 years ago inflammatory in  [place]    Daughter states she is unable to obtain the records  despite requesting it   Family requesting a repeat sleep study   Requisition given to patient for repeat sleep study     Hyperlipidemia   Continue atorvastatin 10 mg daily   Recommend adherence to a heart healthy diet   Patient follow up with PCP for continued monitoring     HTN   Continue amlodipine 10 mg daily, aspirin 81 mg daily, carvedilol 12 5 mg  p o  B i d    Follow-up with PCP for continued monitoring     COPD   Recent admission to hospital for worsening cough and inability to clear  airway   Continue routine follow-up with pulmonology as scheduled   Continue DuoNeb treatments 4 times daily   Continue budesonide nebulizers   Continue current prednisone dosing   Suctioning as needed     Oropharyngeal dysphagia   Maintain aspiration precautions and continue honey thick liquid diet   Suctioning as needed   Follow-up with pulmonology as routinely scheduled     GERD   Continue omeprazole 40 mg daily   Previously discussed PPI therapy side effects   Follow-up with PCP for continued monitoring     Constipation    Having regular bowel movements on current regimen   Continue Colace 100 mg p o  B i d    Continue MiraLax 17 g p o  Daily as needed, Metamucil daily, Senokot 2  tablets HS   Recommend adherence to a heart healthy, high-fiber diet   Physical activity as able   Follow up with PCP for continued monitoring     Polypharmacy   Reviewed multiple medications   Recommend discontinuation of paroxetine after weaning schedule  successfully completed   Would also recommend patient be weaned off lorazepam as needed   Follow-up with PCP for continued monitoring during weaning process     Anxiety   Patient currently on Ativan 0 5 mg q 8h   Recommend discontinuing paroxetine and starting Zoloft upon  completion of weaning of paroxetine   Recommend close follow-up with PCP for start of weaning Ativan 1  ready     5   Health Maintenance     Immunization History   Administered Date(s) Administered    Influenza Split High Dose Preservative Free IM 10/10/2017, 10/16/2019       Recommend Flu vaccine yearly, if not contraindicated  Recommend Pneumo vaccine every 5 years, if not contraindicated   Recommend Shingles vaccine (Zostavax), if not contraindicated     6  Social/Safety Concerns   Social isolation, History of Falls and [address] or increased outings for positive socialization (information provided)  Recommend [place] on Aging for possible eligible programs such as OPTIONS, caregiver support program or WAIVER (information provided)  Recommend assistance for medication administration (information provided)     7  Diagnostic Studies   Vitamin B12, Folate reviewed   TSH which not completed  Discussed with daughter, for follow-up with  PCP once TSH is completed   MRI brain showing remote infarcts and moderate chronic white matter  microangiopathy  Low hippocampal volume an enlarged inferior lateral  and overall ventricular system, suggestive of global ex vacuo dilatation  Also abnormal hippocampal occupancy score Recommend  re-evaluation in 6 months  Daughter explained that we would defer repeating MRI as  in the event  that any abnormalities were noted requiring neurosurgical intervention,  these options with the declined given patient's frailty and comorbidities  8  Long Term Care Issues   Potential for caregiver burnout and Advance Directives     Do you have a:   1  Living Will yes   2  Durable Power of  yes    Consider caregiver support group (information provided)   Information provided for long-term care facilities    Patient and family verbalized understanding of above Care Plan  Dandre MAYORGA     Geriatrics  2/10/2020 9:15 PM

## 2020-02-12 ENCOUNTER — TELEPHONE (OUTPATIENT)
Dept: GERIATRICS | Age: 80
End: 2020-02-12

## 2020-02-12 NOTE — TELEPHONE ENCOUNTER
ILSAW called Clarissa to follow up after family conference held with Dr Sarah Sanders on Monday, 2/10  Clarissa asked generally if there were any resources or benefits that they might be eligible for, or additional recommendations we might have  I noted that while I am unsure of eligibility, a great resource would be the [facility] [employer]  Provided their contact information  Kim also inquired about adult day services  I encouraged this idea and emailed Bebreanna at [email or web address] with a list of four adult day services in [facility]  LCSW to remain available as needed, provided my direct line for contact

## 2020-02-20 PROBLEM — J40 BRONCHITIS: Status: RESOLVED | Noted: 2020-02-04 | Resolved: 2020-02-20

## 2020-02-20 PROBLEM — Z13.21 ENCOUNTER FOR VITAMIN DEFICIENCY SCREENING: Status: RESOLVED | Noted: 2019-10-22 | Resolved: 2020-02-20

## 2020-02-20 NOTE — PROGRESS NOTES
Palliative and Supportive Care   Zhanna Hernandez  [de-identified] y o  male [id number]    Assessment/Plan:  1  Chronic obstructive pulmonary disease, unspecified COPD type ([facility] [address] )    2  Malignant neoplasm of right lung, unspecified part of lung ([facility] [address] )    3  Tracheomalacia    4  History of cerebrovascular accident (CVA) with residual deficit - dysphagia and left sided weakness    5  Frailty    6  Advanced care planning/counseling discussion    7  Palliative care patient      We discussed the importance of advanced care planning given the patient's more complicated medical history  His daughter will review the existing documents that she can find together with her sister  They were encouraged to update them with his current wishes and if not documents exist they were given the 5 Wishes and POLST forms to complete  We also discussed his readmissions, mostly related to pulmonary issues  They are interested in home based palliative care programs like Pulmonary PALS and PALS at home - I will make referrals to his programs  They were not ready to accept hospice as his plan of care  However given his medical complexity and overall frailty he is unlikely to tolerate the stresses of acute illnesses and hospitalizations well which we will continue to address as his clinical course evolves  Return for Refer to home based palliative care programs  May RTN to office PRN  Subjective: In attendance at this visit: Zhanna Hernandez  and his daughter  Chief Complaint  New consultation for:  symptom management, goal of care assessment and decisional support  HPI     Zhanna Hernandez  is a [de-identified] y o  male with COPD, tracheomalacia, and recurrent aspiration/tracheobronchitis  He has  Hx of non-small cell lung cancer and is status post RLL lobectomy  He has a hx of a CVA with residual dysphagia and left sided weakness  His pulmonologist is Dr Mota Salts    His current pulmonary therapy is: Duoneb TID, prednisone 5mg daily, pulmicort neb bid, and tessalon 200mg PRN  He's had multiple hospital admssions for hypoxic respiratory failure  He was most recently hospitalized from 2/3 - 2/5/2020 with bronchitis  The patient was referred to the department of Palliative & Supportive Care for concurrent symptom management and decisional support  Since his hospital stay he has been involved with PT/OT/ST/RN  The following portions of the medical history were reviewed: past medical history, problem list, medication list, and social history  · He lives with his daughter, [facility] who is an RN  Other people in the home are his son-in-law and 2 grandkids  · He has another daughter who lives in [place], [place]  · He has a make shift "in-law suite "  He used to be in an AL but they prefer a home based environment are trying to make it work  · He uses a wheelchair to get around  · He has a private pay home health aide 20 hours per week  · He watches a lot of TV    His daughter thinks that she has financial POA but is unsure about medical POA  She confirms that he has a will but is unsure if an living will exists and will have to ask her sister  She states that she tries to avoid readmissions to the hospital but has a hard time advocating for what he needs without making visits to urgent care or the ER          Current Outpatient Medications:     acetaminophen (TYLENOL) 325 mg tablet, Take 2 tablets by mouth every 6 (six) hours as needed for fever, Disp: 30 tablet, Rfl: 0    albuterol (PROVENTIL HFA,VENTOLIN HFA) 90 mcg/act inhaler, Inhale 2 puffs 4 (four) times a day, Disp: 2 Inhaler, Rfl: 0    amLODIPine (NORVASC) 10 mg tablet, Take 1 tablet by mouth daily, Disp: , Rfl:     ASPIRIN 81 PO, Take 1 tablet by mouth every other day  , Disp: , Rfl:     atorvastatin (LIPITOR) 40 mg tablet, Take 1 tablet (40 mg total) by mouth daily after dinner, Disp: , Rfl: 0    benzonatate (TESSALON) 200 MG capsule, Take 1 capsule (200 mg total) by mouth 3 (three) times a day as needed for cough, Disp: 90 capsule, Rfl: 1    budesonide (PULMICORT) 0 5 mg/2 mL nebulizer solution, Take 1 vial (0 5 mg total) by nebulization 2 (two) times a day Rinse mouth after use , Disp: 360 mL, Rfl: 3    carvedilol (COREG) 12 5 mg tablet, Take 1 tablet by mouth 2 (two) times a day with meals, Disp: 60 tablet, Rfl: 0    clopidogrel (PLAVIX) 75 mg tablet, Take 1 tablet by mouth daily, Disp: 30 tablet, Rfl: 0    docusate sodium (COLACE) 100 mg capsule, Take 1 capsule (100 mg total) by mouth 2 (two) times a day, Disp: 10 capsule, Rfl: 0    Fesoterodine Fumarate ER (TOVIAZ) 8 MG TB24, Take 4 mg by mouth every evening  , Disp: , Rfl:     ipratropium-albuterol (DUO-NEB) 0 5-2 5 mg/3 mL nebulizer solution, Take 1 vial (3 mL total) by nebulization 4 (four) times a day, Disp: 810 mL, Rfl: 0    ketoconazole (NIZORAL) 2 % cream, Apply topically to both feet in their entirety (tops, bottoms and between toes) 3 times a day for 4 weeks straight , Disp: 60 g, Rfl: 5    latanoprost (XALATAN) 0 005 % ophthalmic solution, Administer 1 drop to both eyes daily at bedtime, Disp: , Rfl:     loratadine (CLARITIN) 10 mg tablet, Take 10 mg by mouth daily, Disp: , Rfl:     LORazepam (ATIVAN) 0 5 mg tablet, Take 0 5 mg by mouth every 8 (eight) hours as needed for anxiety, Disp: , Rfl:     pantoprazole (PROTONIX) 40 mg tablet, Take 40 mg by mouth daily, Disp: , Rfl:     PARoxetine (PAXIL) 20 mg tablet, Take 1 tablet by mouth daily, Disp: , Rfl:     polyethylene glycol (MIRALAX) 17 g packet, Take 17 g by mouth daily as needed, Disp: , Rfl:     predniSONE 5 mg tablet, Take 1 tablet (5 mg total) by mouth daily, Disp: 90 tablet, Rfl: 3    psyllium (METAMUCIL) 0 52 g capsule, Take 0 52 g by mouth daily as needed , Disp: , Rfl:     senna (SENOKOT) 8 6 MG tablet, Take 2 tablets by mouth daily at bedtime, Disp: , Rfl:     spironolactone (ALDACTONE) 25 mg tablet, Take 1 tablet (25 mg total) by mouth daily, Disp: 30 tablet, Rfl: 0    tamsulosin (FLOMAX) 0 4 mg, Take 1 capsule by mouth daily, Disp: , Rfl:   Review of Systems   Constitutional: Positive for fatigue  Respiratory: Positive for shortness of breath  Musculoskeletal: Positive for gait problem  Neurological: Positive for weakness  All other systems negative    Objective:  Vital Signs  /70 (BP Location: Right arm, Patient Position: Sitting, Cuff Size: Standard)   Pulse 74   Temp (!) 96 9 °F (36 1 °C) (Tympanic)   Resp 18   Ht 6' 5" (1 956 m)   Wt 102 kg (225 lb)   SpO2 98%   BMI 26 68 kg/m²    Physical Exam    Constitutional: He appears overall frail  In no acute physical or emotional distress  Head: Normocephalic and atraumatic  Eyes: EOM are normal  No ocular discharge  No scleral icterus  Neck: No visible adenopathy or masses  Respiratory: Effort normal  No stridor  No respiratory distress  Gastrointestinal: No abdominal distension  Musculoskeletal: No edema  In wheelchair  Neurological: Alert  Mostly he defers to daughter to answer physician questions  However he was able to answer simple questions appropriately, given enough time to answer  Skin: No diaphoresis, no rashes seen on exposed areas of skin  Psychiatric: Displays a calm mood and affect

## 2020-02-24 NOTE — PROGRESS NOTES
Pulmonary Follow Up Note   Lauren Marshall  [de-identified] y o  male MRN: [id number]  2/26/2020      Assessment:  1  Recurrent aspiration pneumonia/tracheobronchitis  · Improved with Speech Therapy compliance  · Monitor for progression, may consider PEG in future     2  Chronic dysphagia  · Honey thickened liquids per VBS  · Completed home speech therapy  · Will hold on plans for PEG tube at this time     3  Chronic Cough  · Secondary to tracheomalacia and dysphagia     4  COPD with tracheomalacia  · Continue duonebs three to four times daily  · Continue pulmicort nebs BID  · Continue prednisone 5mg daily  · Flutter valve use four times daily, DME request for VEST tehrapy  · Flu vaccine obtained 2019 and he obtained prevnar/pneumovax previously per daughter     5  GERD - continue PPI     6  Abnormal CT Chest  · Adenopathy improved on most recent CT chest  · I suspect the TIB/Nodular infiltrates and adenopathy likely related to recurrent aspiration events  · Holding on repeat imaging at this time     7  Weight loss - now stable  Plan:    Diagnoses and all orders for this visit:    Chronic obstructive pulmonary disease, unspecified COPD type ([facility] [address] )    Tracheomalacia  -     Chest Percussion Vest    Gastroesophageal reflux disease without esophagitis    Oropharyngeal dysphagia  -     Chest Percussion Vest    Malignant neoplasm of right lung, unspecified part of lung (HCC)    Hilar adenopathy    Chronic cough    Abnormal CT of the chest        Return in about 4 months (around 6/26/2020)  History of Present Illness   HPI:  Lauren Marshall  is a [de-identified] y o  male who has prior CVA with resultant left hemiparesis, lung cancer post RLL lobectomy, possible COPD, likely tracheomalacia, and chronic cough  He has had multiple admissions for hypoxic respiratory failure an bronchospasm felt secondary to aspiration tracheobronchitis   He was last seen by me in Jan 2020    He presents today for follow up after recent admission for bronchitis, treated with azithromycin      He reports he was really helped by VEST therapy in the hospital  He is using flutter valve but notes periods of time difficulty clearing secretions  He is again working with speech therapy  He reported some coughing with eating and has increased his liquid thickener  He has some leg weakness and has also started working with physical therapy again  He denies fever or chills, no hemoptysis, no pleurisy, no LE edema  Review of Systems   Constitutional: Positive for activity change  Negative for chills, fatigue, fever and unexpected weight change  HENT: Positive for trouble swallowing  Negative for congestion, mouth sores, postnasal drip, sore throat and voice change  Eyes: Negative for visual disturbance  Respiratory: Positive for cough  Negative for chest tightness, shortness of breath and wheezing  Cardiovascular: Negative for chest pain, palpitations and leg swelling  Gastrointestinal: Negative for abdominal pain, diarrhea, nausea and vomiting  Endocrine: Negative for cold intolerance and heat intolerance  Musculoskeletal: Positive for arthralgias and gait problem  Negative for myalgias  Skin: Negative for rash  Allergic/Immunologic: Negative for immunocompromised state  Neurological: Positive for weakness  Negative for dizziness, light-headedness and headaches  Hematological: Negative for adenopathy  Psychiatric/Behavioral: Negative for sleep disturbance  The patient is not nervous/anxious          Historical Information   Past Medical History:   Diagnosis Date    RONAL (acute kidney injury) ([facility] [address] ) 5/31/2017    Aspiration into respiratory tract     Chest pain 5/31/2017    COPD (chronic obstructive pulmonary disease) (HCC)     Depression     Elevated troponin 5/31/2017    GERD (gastroesophageal reflux disease)     History of CVA (cerebrovascular accident) 4/13/2016    Hyperlipidemia     Hypertension     Lung cancer ([facility] [address] ) 2005    Right, status post lobectomy    Pneumonia     Prostate cancer ([facility] [address] )     Sleep apnea     awaiting sleep study results    Squamous cell skin cancer     Stroke ([facility] [address] )     Tracheomalacia     Weakness due to cerebrovascular accident (CVA)      Past Surgical History:   Procedure Laterality Date    COLONOSCOPY      ESOPHAGOGASTRODUODENOSCOPY N/A 4/13/2016    Procedure: ESOPHAGOGASTRODUODENOSCOPY (EGD); Surgeon: Marlys Whitehead MD;  [facility]; Service:     JOINT REPLACEMENT      knee replacement    LUNG LOBECTOMY      NC ESOPHAGOGASTRODUODENOSCOPY TRANSORAL DIAGNOSTIC N/A 3/15/2017    Procedure: ESOPHAGOGASTRODUODENOSCOPY (EGD); Surgeon: Marlys Whitehead MD;  [facility];   Service: Gastroenterology    SKIN BIOPSY      TRIGEMINAL NERVE DECOMPRESSION       Family History   Family history unknown: Yes         Meds/Allergies     Current Outpatient Medications:     acetaminophen (TYLENOL) 325 mg tablet, Take 2 tablets by mouth every 6 (six) hours as needed for fever, Disp: 30 tablet, Rfl: 0    albuterol (PROVENTIL HFA,VENTOLIN HFA) 90 mcg/act inhaler, Inhale 2 puffs 4 (four) times a day, Disp: 2 Inhaler, Rfl: 0    amLODIPine (NORVASC) 10 mg tablet, Take 1 tablet by mouth daily, Disp: , Rfl:     ASPIRIN 81 PO, Take 1 tablet by mouth every other day  , Disp: , Rfl:     atorvastatin (LIPITOR) 40 mg tablet, Take 1 tablet (40 mg total) by mouth daily after dinner, Disp: , Rfl: 0    benzonatate (TESSALON) 200 MG capsule, Take 1 capsule (200 mg total) by mouth 3 (three) times a day as needed for cough, Disp: 90 capsule, Rfl: 1    budesonide (PULMICORT) 0 5 mg/2 mL nebulizer solution, Take 1 vial (0 5 mg total) by nebulization 2 (two) times a day Rinse mouth after use , Disp: 360 mL, Rfl: 3    carvedilol (COREG) 12 5 mg tablet, Take 1 tablet by mouth 2 (two) times a day with meals, Disp: 60 tablet, Rfl: 0    clopidogrel (PLAVIX) 75 mg tablet, Take 1 tablet by mouth daily, Disp: 30 tablet, Rfl: 0   docusate sodium (COLACE) 100 mg capsule, Take 1 capsule (100 mg total) by mouth 2 (two) times a day, Disp: 10 capsule, Rfl: 0    Fesoterodine Fumarate ER (TOVIAZ) 8 MG TB24, Take 4 mg by mouth every evening  , Disp: , Rfl:     ipratropium-albuterol (DUO-NEB) 0 5-2 5 mg/3 mL nebulizer solution, Take 1 vial (3 mL total) by nebulization 4 (four) times a day, Disp: 810 mL, Rfl: 0    ketoconazole (NIZORAL) 2 % cream, Apply topically to both feet in their entirety (tops, bottoms and between toes) 3 times a day for 4 weeks straight , Disp: 60 g, Rfl: 5    latanoprost (XALATAN) 0 005 % ophthalmic solution, Administer 1 drop to both eyes daily at bedtime, Disp: , Rfl:     loratadine (CLARITIN) 10 mg tablet, Take 10 mg by mouth daily, Disp: , Rfl:     LORazepam (ATIVAN) 0 5 mg tablet, Take 0 5 mg by mouth every 8 (eight) hours as needed for anxiety, Disp: , Rfl:     pantoprazole (PROTONIX) 40 mg tablet, Take 40 mg by mouth daily, Disp: , Rfl:     PARoxetine (PAXIL) 20 mg tablet, Take 1 tablet by mouth daily, Disp: , Rfl:     polyethylene glycol (MIRALAX) 17 g packet, Take 17 g by mouth daily as needed, Disp: , Rfl:     predniSONE 5 mg tablet, Take 1 tablet (5 mg total) by mouth daily, Disp: 90 tablet, Rfl: 3    psyllium (METAMUCIL) 0 52 g capsule, Take 0 52 g by mouth daily as needed , Disp: , Rfl:     senna (SENOKOT) 8 6 MG tablet, Take 2 tablets by mouth daily at bedtime, Disp: , Rfl:     spironolactone (ALDACTONE) 25 mg tablet, Take 1 tablet (25 mg total) by mouth daily, Disp: 30 tablet, Rfl: 0    tamsulosin (FLOMAX) 0 4 mg, Take 1 capsule by mouth daily, Disp: , Rfl:   Allergies   Allergen Reactions    Penicillins Rash       Vitals: Blood pressure 132/70, pulse 72, temperature (!) 97 2 °F (36 2 °C), temperature source Tympanic, height 6' 3" (1 905 m), SpO2 98 %  Body mass index is 28 12 kg/m²   Oxygen Therapy  SpO2: 98 %  Oxygen Therapy: None (Room air)      Physical Exam  Physical Exam   Constitutional: He is oriented to person, place, and time  He appears well-developed and well-nourished  No distress  In wheelchair, mild stable dysarthria   HENT:   Head: Normocephalic and atraumatic  Right Ear: External ear normal    Left Ear: External ear normal    Nose: Nose normal    Mouth/Throat: No oropharyngeal exudate  No thrush   Eyes: Pupils are equal, round, and reactive to light  Conjunctivae are normal  Right eye exhibits no discharge  Left eye exhibits no discharge  No scleral icterus  Neck: Neck supple  No JVD present  No tracheal deviation present  Cardiovascular: Normal rate, regular rhythm and normal heart sounds  No murmur heard  Pulmonary/Chest: Effort normal  No stridor  No respiratory distress  He has no wheezes  He has no rales  Mild scattered rhonchi with barking cough with deep breath   Abdominal: Soft  Bowel sounds are normal  He exhibits no distension  There is no tenderness  Musculoskeletal: He exhibits no edema or deformity  No edema, LLE brace in place   Lymphadenopathy:     He has no cervical adenopathy  Neurological: He is alert and oriented to person, place, and time  Skin: Skin is warm and dry  No rash noted  Psychiatric: He has a normal mood and affect  His behavior is normal    Vitals reviewed  Labs: I have personally reviewed pertinent lab results    Lab Results   Component Value Date    WBC 9 90 02/04/2020    HGB 13 7 02/04/2020    HCT 42 2 02/04/2020    MCV 91 02/04/2020     (L) 02/04/2020     Lab Results   Component Value Date    GLUCOSE 124 01/29/2019    CALCIUM 9 6 02/04/2020    K 4 2 02/04/2020    CO2 24 02/04/2020     02/04/2020    BUN 31 (H) 02/04/2020    CREATININE 1 22 02/04/2020     No results found for: IGE  Lab Results   Component Value Date    ALT 15 02/03/2020    AST 11 02/03/2020    ALKPHOS 107 02/03/2020       FLU/RSV 2/4/2020 - negative     RADIOGRAPHS: New radiographs and reports personally reviewed  CT Chest 2/4/2020 - post RUL lobectomy noted, atelectasis at bases, no focal infiltrates, no sig mediastinal adenopathy    CTA Head/neck 11/15/19 - no evidence ICS, enlarged right hilar adenopathy 2cm previously 1 6cm     CXR 11/14/19 - post RUL lobectomy surgical changes and volume loss, no focal infiltrates no effusions, no PTX     VBS 4/30/19 - "Moderate oral/mild-moderate pharyngeal dysphagia w/ decreased oral control of liquids and delayed swallow initiation resulting in aspiration before the swallow  Oral prep and chin tuck of thick liquids by tsp reduced aspiration risk"     CT Chest 3/4/19 - improved bronchial/tracheal wall thickening, resolved hilar adenopathy, no sig mediastinal adenopathy, RML and RLL mild nodular infiltrate/TIB infiltrate noted, no sig effusions     CXR 1/29 - noted right hemithorax volume loss, kyphoscoliosis, improved overall aeration of right hemithorax, no PTX     CT angio 1/28/19 - no PE, noted right hilar adenopathy vs early mass lesion - favoring adenopathy based upon appearance, no PTX, no sig effusion, post surgical changes on right     11/2018 - VBS (+) dysphagia with penetration on thin liquids     Prior Studies  Chest X-ray 8/23/17 - CXR - images personally reviewed - noted right sided post-operative changes and mild volume loss, no acute infiltrates, no masses, no lesions appreciated  CT Scan 4/2017 - CT chest images reviewed with 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  Cardiac Testing 7/2017 TTE EF 55%, normal RV    Trey Allred DO, [facility]

## 2020-02-25 ENCOUNTER — SOCIAL WORK (OUTPATIENT)
Dept: PALLIATIVE MEDICINE | Facility: CLINIC | Age: 80
End: 2020-02-25
Payer: MEDICARE

## 2020-02-25 ENCOUNTER — OFFICE VISIT (OUTPATIENT)
Dept: PALLIATIVE MEDICINE | Facility: CLINIC | Age: 80
End: 2020-02-25
Payer: MEDICARE

## 2020-02-25 VITALS
DIASTOLIC BLOOD PRESSURE: 70 MMHG | OXYGEN SATURATION: 98 % | RESPIRATION RATE: 18 BRPM | HEART RATE: 74 BPM | HEIGHT: 77 IN | WEIGHT: 225 LBS | TEMPERATURE: 96.9 F | SYSTOLIC BLOOD PRESSURE: 122 MMHG | BODY MASS INDEX: 26.57 KG/M2

## 2020-02-25 DIAGNOSIS — R54 FRAILTY: ICD-10-CM

## 2020-02-25 DIAGNOSIS — J44.9 CHRONIC OBSTRUCTIVE PULMONARY DISEASE, UNSPECIFIED COPD TYPE (HCC): Primary | ICD-10-CM

## 2020-02-25 DIAGNOSIS — J39.8 TRACHEOMALACIA: Chronic | ICD-10-CM

## 2020-02-25 DIAGNOSIS — I69.30 HISTORY OF CEREBROVASCULAR ACCIDENT (CVA) WITH RESIDUAL DEFICIT: ICD-10-CM

## 2020-02-25 DIAGNOSIS — Z71.89 ADVANCED CARE PLANNING/COUNSELING DISCUSSION: ICD-10-CM

## 2020-02-25 DIAGNOSIS — Z71.89 COUNSELING AND COORDINATION OF CARE: Primary | ICD-10-CM

## 2020-02-25 DIAGNOSIS — Z51.5 PALLIATIVE CARE PATIENT: ICD-10-CM

## 2020-02-25 DIAGNOSIS — C34.91 MALIGNANT NEOPLASM OF RIGHT LUNG, UNSPECIFIED PART OF LUNG (HCC): Chronic | ICD-10-CM

## 2020-02-25 PROBLEM — Z99.3 WHEELCHAIR DEPENDENCE: Status: ACTIVE | Noted: 2020-02-25

## 2020-02-25 PROCEDURE — 99213 OFFICE O/P EST LOW 20 MIN: CPT | Performed by: FAMILY MEDICINE

## 2020-02-25 PROCEDURE — NC001 PR NO CHARGE

## 2020-02-25 NOTE — PROGRESS NOTES
Palliative LSW met with pt and daughter, [facility], this morning with Dr Branch  LSW introduced self and role to pt  [facility] went over pt's healthcare journey and current living situation  Pt lives with [facility] and has his own living and bedroom  Pt has a caregiver come in 6 days a week and has a total of 20 hours a week  The caregiver helps get him out of bed, dressed, fed, and will even take him out of the house at least once a week  Pt is wheelchair bound and is able to get around in this  Dr Branch discussed pulmonary PALS program and palliative PALS program  Pt and [facility] both interested in these programs and Dr Branch placed referral      Pt does not have any POA paperwork  [facility] team gave pt and daughter 5-Wishes and POLST form and went over both in great detail  [facility] team answered all of the questions they had  LSW provided emotional support and will follow

## 2020-02-25 NOTE — Clinical Note
Pt is currently involved with homecare services  Please refer to [facility] PALS when services are completed  No acute symptom management needs but family needs continued guidance with advance care planning - please considering seeing as a PALS at home pt

## 2020-02-26 ENCOUNTER — OFFICE VISIT (OUTPATIENT)
Dept: PULMONOLOGY | Facility: CLINIC | Age: 80
End: 2020-02-26
Payer: MEDICARE

## 2020-02-26 ENCOUNTER — TRANSCRIBE ORDERS (OUTPATIENT)
Dept: LAB | Facility: CLINIC | Age: 80
End: 2020-02-26

## 2020-02-26 ENCOUNTER — APPOINTMENT (OUTPATIENT)
Dept: LAB | Facility: CLINIC | Age: 80
End: 2020-02-26
Payer: MEDICARE

## 2020-02-26 VITALS
DIASTOLIC BLOOD PRESSURE: 70 MMHG | TEMPERATURE: 97.2 F | SYSTOLIC BLOOD PRESSURE: 132 MMHG | BODY MASS INDEX: 28.12 KG/M2 | OXYGEN SATURATION: 98 % | HEIGHT: 75 IN | HEART RATE: 72 BPM

## 2020-02-26 DIAGNOSIS — K21.9 GASTROESOPHAGEAL REFLUX DISEASE WITHOUT ESOPHAGITIS: Chronic | ICD-10-CM

## 2020-02-26 DIAGNOSIS — R13.12 OROPHARYNGEAL DYSPHAGIA: Chronic | ICD-10-CM

## 2020-02-26 DIAGNOSIS — C34.91 MALIGNANT NEOPLASM OF RIGHT LUNG, UNSPECIFIED PART OF LUNG (HCC): Chronic | ICD-10-CM

## 2020-02-26 DIAGNOSIS — R93.89 ABNORMAL CT OF THE CHEST: ICD-10-CM

## 2020-02-26 DIAGNOSIS — I51.9 MYXEDEMA HEART DISEASE: ICD-10-CM

## 2020-02-26 DIAGNOSIS — E03.9 MYXEDEMA HEART DISEASE: ICD-10-CM

## 2020-02-26 DIAGNOSIS — E03.9 MYXEDEMA HEART DISEASE: Primary | ICD-10-CM

## 2020-02-26 DIAGNOSIS — J44.9 CHRONIC OBSTRUCTIVE PULMONARY DISEASE, UNSPECIFIED COPD TYPE (HCC): Primary | ICD-10-CM

## 2020-02-26 DIAGNOSIS — R59.0 HILAR ADENOPATHY: ICD-10-CM

## 2020-02-26 DIAGNOSIS — J39.8 TRACHEOMALACIA: Chronic | ICD-10-CM

## 2020-02-26 DIAGNOSIS — I51.9 MYXEDEMA HEART DISEASE: Primary | ICD-10-CM

## 2020-02-26 DIAGNOSIS — R05.3 CHRONIC COUGH: Chronic | ICD-10-CM

## 2020-02-26 LAB — TSH SERPL DL<=0.05 MIU/L-ACNC: 0.64 UIU/ML (ref 0.36–3.74)

## 2020-02-26 PROCEDURE — 84443 ASSAY THYROID STIM HORMONE: CPT

## 2020-02-26 PROCEDURE — 99215 OFFICE O/P EST HI 40 MIN: CPT | Performed by: INTERNAL MEDICINE

## 2020-02-26 PROCEDURE — 36415 COLL VENOUS BLD VENIPUNCTURE: CPT

## 2020-02-26 NOTE — PATIENT INSTRUCTIONS
· Continue duonebs 3-4 times daily and pulmicort neb twice daily  · Add VEST therapy once obtained 2-3 times a day  · Continue to use flutter valve as needed for secretion clearance  · Continue prednisone 5mg daily  · Continue with speech therapy and aspiration precautions

## 2020-02-28 ENCOUNTER — DOCUMENTATION (OUTPATIENT)
Dept: PULMONOLOGY | Facility: CLINIC | Age: 80
End: 2020-02-28

## 2020-02-28 ENCOUNTER — PATIENT OUTREACH (OUTPATIENT)
Dept: CASE MANAGEMENT | Facility: OTHER | Age: 80
End: 2020-02-28

## 2020-02-28 NOTE — PROGRESS NOTES
Unsuccessful attempt at reaching patient's daughter  Left name, role of this CM and call back number encouraging call back as needed

## 2020-02-28 NOTE — PROGRESS NOTES
Faxed vest script, office notes, and vest assessment form to [employer] with attention to Goldie Pinzon, the manager

## 2020-03-04 ENCOUNTER — PATIENT OUTREACH (OUTPATIENT)
Dept: CASE MANAGEMENT | Facility: OTHER | Age: 80
End: 2020-03-04

## 2020-03-04 NOTE — PROGRESS NOTES
Patient's daughter left VM returning call of this CM  Daughter states father is doing well and has visiting nurses coming in  This care manager will continue to monitor via chart review throughout bundle episode

## 2020-03-11 ENCOUNTER — IN HOME VISIT (OUTPATIENT)
Dept: PALLIATIVE MEDICINE | Facility: CLINIC | Age: 80
End: 2020-03-11

## 2020-03-11 VITALS — HEART RATE: 64 BPM | SYSTOLIC BLOOD PRESSURE: 140 MMHG | DIASTOLIC BLOOD PRESSURE: 84 MMHG | RESPIRATION RATE: 16 BRPM

## 2020-03-11 DIAGNOSIS — R53.83 OTHER FATIGUE: ICD-10-CM

## 2020-03-11 DIAGNOSIS — Z71.89 ADVANCED CARE PLANNING/COUNSELING DISCUSSION: ICD-10-CM

## 2020-03-11 DIAGNOSIS — J44.9 CHRONIC OBSTRUCTIVE PULMONARY DISEASE, UNSPECIFIED COPD TYPE (HCC): Primary | ICD-10-CM

## 2020-03-11 DIAGNOSIS — Z51.5 PALLIATIVE CARE PATIENT: ICD-10-CM

## 2020-03-11 PROCEDURE — 99499 UNLISTED E&M SERVICE: CPT

## 2020-03-11 NOTE — PROGRESS NOTES
Visit made to patients home as part of the Palliative Care at Home program through [facility]  Greater than 1 hour was spent in direct patient care and participating in goals of care conversation  Chronic Care Management facilitated by this visit  Emotional and social issues addressed  This nurse reviewed all medications and instructed on their use and dosing  Used the teach back method to faciilitate learning of proper medication administration  Present during the visit were the patient and daughter who is a nurse  Vss  Lungs clear but diminished 02 sat 100 percent today on room air  No peripheral edema noted  Patient denies concerns with Gi and   He has no pain  He takes medication for anxiety  Daughter will give patient mucinex for cough that is non productive  He uses nebulizers three times daily and humidifier  Goals of Care discussion revealed patient has a POLST form that is in place and she will post it in area of home where patient is located the most     Plan for revisit in 1 month  Assess cvp  Sob and symptom management

## 2020-03-16 ENCOUNTER — TELEPHONE (OUTPATIENT)
Dept: PULMONOLOGY | Facility: CLINIC | Age: 80
End: 2020-03-16

## 2020-03-16 NOTE — TELEPHONE ENCOUNTER
Pt's daughter called to verify our fax # since she spoke to a rep from [employer] and they stated that their documentation was incomplete and that they would fax us some docs  She also asked about the status of the vest that was ordered by Dr Trudy Padilla and stated that she considers her father should benefit from some home O2 I told her that if the Dr considers that he needs it he would evaluate the Pt and order it

## 2020-03-25 ENCOUNTER — PATIENT OUTREACH (OUTPATIENT)
Dept: CASE MANAGEMENT | Facility: OTHER | Age: 80
End: 2020-03-25

## 2020-04-08 ENCOUNTER — TELEMEDICINE (OUTPATIENT)
Dept: SLEEP CENTER | Facility: CLINIC | Age: 80
End: 2020-04-08
Payer: MEDICARE

## 2020-04-08 DIAGNOSIS — G47.33 OSA (OBSTRUCTIVE SLEEP APNEA): Chronic | ICD-10-CM

## 2020-04-08 PROCEDURE — 99212 OFFICE O/P EST SF 10 MIN: CPT | Performed by: INTERNAL MEDICINE

## 2020-04-15 ENCOUNTER — TELEPHONE (OUTPATIENT)
Dept: PALLIATIVE MEDICINE | Facility: CLINIC | Age: 80
End: 2020-04-15

## 2020-05-04 ENCOUNTER — PATIENT OUTREACH (OUTPATIENT)
Dept: CASE MANAGEMENT | Facility: OTHER | Age: 80
End: 2020-05-04

## 2020-05-13 ENCOUNTER — TELEMEDICINE (OUTPATIENT)
Dept: PALLIATIVE MEDICINE | Facility: CLINIC | Age: 80
End: 2020-05-13
Payer: MEDICARE

## 2020-05-13 DIAGNOSIS — C34.91 MALIGNANT NEOPLASM OF RIGHT LUNG, UNSPECIFIED PART OF LUNG (HCC): ICD-10-CM

## 2020-05-13 DIAGNOSIS — Z51.5 PALLIATIVE CARE PATIENT: ICD-10-CM

## 2020-05-13 DIAGNOSIS — I69.30 HISTORY OF CEREBROVASCULAR ACCIDENT (CVA) WITH RESIDUAL DEFICIT: ICD-10-CM

## 2020-05-13 DIAGNOSIS — J44.9 CHRONIC OBSTRUCTIVE PULMONARY DISEASE, UNSPECIFIED COPD TYPE (HCC): Primary | ICD-10-CM

## 2020-05-13 DIAGNOSIS — J39.8 TRACHEOMALACIA: ICD-10-CM

## 2020-05-13 PROCEDURE — 99214 OFFICE O/P EST MOD 30 MIN: CPT

## 2020-06-05 ENCOUNTER — TELEPHONE (OUTPATIENT)
Dept: NEUROLOGY | Facility: CLINIC | Age: 80
End: 2020-06-05

## 2020-07-07 NOTE — PATIENT INSTRUCTIONS
PRESCRIPTION REFILL REMINDER:  All medication refills should be requested prior to [facility] on Friday  Any refill requests after noon on Friday would be addressed the following Monday  Please protect yourself from the novel Coronavirus (COVID-19)! Even though we STILL do not have a vaccine or good antiviral drugs for this infection, the following strategies can help you stay healthy:    = Wash your hands with soap and water, or hand  with at least 60% alcohol, often  = Avoid touching your face!   = Avoid close contact with others, even if they seem healthy  Avoid gatherings of more than 10 people, and don't travel unnecessarily  = Stay home, except to get medical care or other essentials  If you can eat and drink and breathe and sleep, please consider calling your doctor's office instead of visiting in person, even if you are ill   = Cover your cough with a tissue, or your elbow  = Clean frequently touched surfaces and objects daily (e g , tables, countertops, light switches, doorknobs, and cabinet handles)  Regular household detergent and water are sufficient  Numbers of cases of Coronavirus are spiking in many [place] States  This is not a more dangerous virus, but a sign that more people in a community are spreading the virus  Please check the local disease reports near you if you consider travelling this summer  We do NOT advise travel to any community or State with a rising viral caseload  Check out [employer] for Moore data that are updated daily  http://www [employer]/   Global Epidemics  Org, from [facility] will give you Gyrqrd-ae-Cwoyzl information on virus cases  Https://globalepidemics  org/

## 2020-07-08 ENCOUNTER — TELEMEDICINE (OUTPATIENT)
Dept: PALLIATIVE MEDICINE | Facility: CLINIC | Age: 80
End: 2020-07-08
Payer: MEDICARE

## 2020-07-08 DIAGNOSIS — C34.91 MALIGNANT NEOPLASM OF RIGHT LUNG, UNSPECIFIED PART OF LUNG (HCC): ICD-10-CM

## 2020-07-08 DIAGNOSIS — J39.8 TRACHEOMALACIA: ICD-10-CM

## 2020-07-08 DIAGNOSIS — J44.9 CHRONIC OBSTRUCTIVE PULMONARY DISEASE, UNSPECIFIED COPD TYPE (HCC): Primary | ICD-10-CM

## 2020-07-08 DIAGNOSIS — Z51.5 PALLIATIVE CARE PATIENT: ICD-10-CM

## 2020-07-08 PROCEDURE — 99213 OFFICE O/P EST LOW 20 MIN: CPT

## 2020-07-08 NOTE — PROGRESS NOTES
=]Virtual Regular Visit      Assessment/Plan: This patient has history of COPD, PMH lung Cancer, Aspiration and CVA  He is being seen Virtually today as part of the [address] [place] [address] at Home program             Reason for visit is      Encounter provider Paz Frias RN    Provider located at [address] [place] [id number] [phone]      Recent Visits  No visits were found meeting these conditions  Showing recent visits within past 7 days and meeting all other requirements     Future Appointments  No visits were found meeting these conditions  Showing future appointments within next 150 days and meeting all other requirements        The patient was identified by name and date of birth  Adrianna Taylor  was informed that this is a telemedicine visit and that the visit is being conducted through [employer]  He acknowledged consent and understanding of privacy and security of the video platform  The patient has agreed to participate and understands they can discontinue the visit at any time  My office Door was closed and no other person was in the room  Patient is aware this is a billable service  Subjective  Adrianna Taylor  is a [de-identified] y o  male    Patient has been provided with an overview of virtual video visit flow  The patient has verbally consented to the provision of Palliative virtual video RN services as a follow up to the home visit program and mandated by [address] [place] [address] physician  Patient is at significant risk for acute exacerbation, decompensation or functional decline and death  Patient has 2 or more chronic conditions requiring patient support to achieve health goals  This patient is challenged with safety concerns at home and is a fall risk  The goal of the Palliative care plan is to provide coordinated care that will improve health between doctor visits, offer symptom management, increase satisfaction with their care and make care more patient-centered  Other goals of the careplan are to improve patient satisfaction and decrease frequency of hospitalization and emergency room visits  The following assessments were performed today: medical, functional and psychosocial needs  This nurse educated patient and jerry on use of the pedal leg and arm exerciser and use for upper body strengthening and lower leg strengthening  They will obtain one of these and start using it to maintain his mobility  They prefer not to have a physical therapy referral at this time for Home PT due to Jeremiah  Medication reconcilitation was performed and review of adherence and potential interactions  Patient's jerry has oversight of of medication management  He continues to need thickened liquids for swallowing and this nurse instructed in proper thickening of liquids to avoid aspiration  Patient has not had a recent emergency room visit, hospitalization, or stay at a rehabilitation facility  Past Medical History:   Diagnosis Date    [facility] ) 5/31/2017    Aspiration into respiratory tract     Chest pain 5/31/2017    COPD (chronic obstructive pulmonary disease) (HCC)     Depression     Elevated troponin 5/31/2017    GERD (gastroesophageal reflux disease)     History of CVA (cerebrovascular accident) 4/13/2016    Hyperlipidemia     Hypertension     Lung cancer ([facility] ) 2005    Right, status post lobectomy    Pneumonia     Prostate cancer ([facility] )     Sleep apnea     awaiting sleep study results    Squamous cell skin cancer     [facility]     Tracheomalacia     Weakness due to cerebrovascular accident (CVA)        Past Surgical History:   Procedure Laterality Date    COLONOSCOPY      ESOPHAGOGASTRODUODENOSCOPY N/A 4/13/2016    Procedure: ESOPHAGOGASTRODUODENOSCOPY (EGD);   Surgeon: Royce Davis MD;  [facility]; Service:     JOINT REPLACEMENT      knee replacement    LUNG LOBECTOMY      [place] ESOPHAGOGASTRODUODENOSCOPY TRANSORAL DIAGNOSTIC N/A 3/15/2017    Procedure: ESOPHAGOGASTRODUODENOSCOPY (EGD); Surgeon: Immanuel Bennett MD;  [facility]; Service: Gastroenterology    SKIN BIOPSY      TRIGEMINAL NERVE DECOMPRESSION         Current Outpatient Medications   Medication Sig Dispense Refill    acetaminophen (TYLENOL) 325 mg tablet Take 2 tablets by mouth every 6 (six) hours as needed for fever 30 tablet 0    albuterol (PROVENTIL HFA,VENTOLIN HFA) 90 mcg/act inhaler Inhale 2 puffs 4 (four) times a day 2 Inhaler 0    amLODIPine (NORVASC) 10 mg tablet Take 1 tablet by mouth daily      ASPIRIN 81 PO Take 1 tablet by mouth every other day        atorvastatin (LIPITOR) 40 mg tablet Take 1 tablet (40 mg total) by mouth daily after dinner  0    budesonide (PULMICORT) 0 5 mg/2 mL nebulizer solution Take 1 vial (0 5 mg total) by nebulization 2 (two) times a day Rinse mouth after use  360 mL 3    carvedilol (COREG) 12 5 mg tablet Take 1 tablet by mouth 2 (two) times a day with meals 60 tablet 0    clopidogrel (PLAVIX) 75 mg tablet Take 1 tablet by mouth daily 30 tablet 0    docusate sodium (COLACE) 100 mg capsule Take 1 capsule (100 mg total) by mouth 2 (two) times a day 10 capsule 0    Fesoterodine Fumarate ER (TOVIAZ) 8 MG TB24 Take 4 mg by mouth every evening        ipratropium-albuterol (DUO-NEB) 0 5-2 5 mg/3 mL nebulizer solution Take 1 vial (3 mL total) by nebulization 4 (four) times a day 810 mL 0    ketoconazole (NIZORAL) 2 % cream Apply topically to both feet in their entirety (tops, bottoms and between toes) 3 times a day for 4 weeks straight   60 g 5    latanoprost (XALATAN) 0 005 % ophthalmic solution Administer 1 drop to both eyes daily at bedtime      loratadine (CLARITIN) 10 mg tablet Take 10 mg by mouth daily      LORazepam (ATIVAN) 0 5 mg tablet Take 0 5 mg by mouth every 8 (eight) hours as needed for anxiety      pantoprazole (PROTONIX) 40 mg tablet Take 40 mg by mouth daily      polyethylene glycol (MIRALAX) 17 g packet Take 17 g by mouth daily as needed      predniSONE 5 mg tablet Take 1 tablet (5 mg total) by mouth daily 90 tablet 3    psyllium (METAMUCIL) 0 52 g capsule Take 0 52 g by mouth daily as needed       senna (SENOKOT) 8 6 MG tablet Take 2 tablets by mouth daily at bedtime      sertraline (ZOLOFT) 50 mg tablet Take 50 mg by mouth daily      spironolactone (ALDACTONE) 25 mg tablet Take 1 tablet (25 mg total) by mouth daily 30 tablet 0    tamsulosin (FLOMAX) 0 4 mg Take 1 capsule by mouth daily       No current facility-administered medications for this visit  Allergies   Allergen Reactions    Penicillins Rash       Review of Systems   Constitutional: Positive for activity change and fatigue  HENT: Positive for trouble swallowing  Eyes: Negative  Respiratory: Positive for shortness of breath  Cardiovascular: Negative  Gastrointestinal: Negative  Endocrine: Negative  Genitourinary: Negative  Musculoskeletal: Positive for gait problem  Allergic/Immunologic: Negative  Neurological: Positive for weakness  Hematological: Negative  Psychiatric/Behavioral: Negative  Video Exam    There were no vitals filed for this visit  Physical Exam   Cardiovascular: Normal rate  Pulmonary/Chest: Breath sounds normal    Abdominal: Soft  He exhibits distension  Neurological: He is alert  Skin: Skin is warm and dry  Psychiatric: He has a normal mood and affect  His behavior is normal  Judgment and thought content normal               VIRTUAL VISIT DISCLAIMER    Brook Morton  acknowledges that he has consented to an online visit or consultation   He understands that the online visit is based solely on information provided by him, and that, in the absence of a face-to-face physical evaluation by the physician, the diagnosis he receives is both limited and provisional in terms of accuracy and completeness  This is not intended to replace a full medical face-to-face evaluation by the physician  Louise Vásquez  understands and accepts these terms

## 2020-08-26 ENCOUNTER — TELEPHONE (OUTPATIENT)
Dept: PULMONOLOGY | Facility: CLINIC | Age: 80
End: 2020-08-26

## 2020-08-26 ENCOUNTER — TELEPHONE (OUTPATIENT)
Dept: PALLIATIVE MEDICINE | Facility: CLINIC | Age: 80
End: 2020-08-26

## 2020-08-26 NOTE — TELEPHONE ENCOUNTER
Daughter Jacoby Phillips calling saying Ludwig Barnes is having more difficulty swallowing liquid and food  He is having honey thick liquid and sometimes pudding thick  She said he is coughing all the time with every sip  Yesterday he only had breakfast and 2 cups of juice all day  She told him if he stops eating, he is going to die  She wants to catch this before it gets worse  She said a peg tube was discussed before  Ludwig Barnes is now willing to consider it and they would like to come in for an appointment to discuss this  Daughter is willing to meet Dr Giovanny Ashby for an appointment at any office this Friday or she can come to [facility] next week  I would just have to use your same day spot for Herbert  Please advise

## 2020-08-26 NOTE — TELEPHONE ENCOUNTER
Monica Eaton (daughter) daughter called and states Kayla Lawton is having difficulty swallowing food and liquids  He is coughing with every swallow and about a half hour after he is done eating   Please advise

## 2020-08-26 NOTE — TELEPHONE ENCOUNTER
Clarissa called GI and got an appt for this Friday, if we can place a referral   They are still going to keep appointment with you next week

## 2020-08-27 DIAGNOSIS — R13.12 OROPHARYNGEAL DYSPHAGIA: Primary | Chronic | ICD-10-CM

## 2020-08-27 NOTE — TELEPHONE ENCOUNTER
This nurse called and left a message for Viktoria Hartman to ask her for call back so I can get more information on the swallowing issues patient is having as well as the goals now for the patient

## 2020-08-28 ENCOUNTER — OFFICE VISIT (OUTPATIENT)
Dept: GASTROENTEROLOGY | Facility: CLINIC | Age: 80
End: 2020-08-28
Payer: MEDICARE

## 2020-08-28 VITALS
HEIGHT: 75 IN | TEMPERATURE: 99.4 F | BODY MASS INDEX: 28.12 KG/M2 | SYSTOLIC BLOOD PRESSURE: 120 MMHG | HEART RATE: 72 BPM | DIASTOLIC BLOOD PRESSURE: 60 MMHG

## 2020-08-28 DIAGNOSIS — R13.12 OROPHARYNGEAL DYSPHAGIA: Primary | Chronic | ICD-10-CM

## 2020-08-28 DIAGNOSIS — R05.9 COUGH: ICD-10-CM

## 2020-08-28 DIAGNOSIS — I69.30 HISTORY OF CEREBROVASCULAR ACCIDENT (CVA) WITH RESIDUAL DEFICIT: ICD-10-CM

## 2020-08-28 PROCEDURE — 99204 OFFICE O/P NEW MOD 45 MIN: CPT | Performed by: INTERNAL MEDICINE

## 2020-08-30 PROBLEM — R05.9 COUGH: Status: ACTIVE | Noted: 2018-08-22

## 2020-08-30 NOTE — PROGRESS NOTES
[facility]  Progress Note - Zohaib Frankel  [de-identified] y o  male MRN: [id number]    Unit/Bed#:  Encounter: [id number]    Assessment/Plan:  1  Cough  - XR chest pa & lateral; Future  - CBC and differential; Future    2  Oropharyngeal dysphagia  3  History of CVA    He is a 79-year-old male presents here with his daughter who is a [facility] nurse for further evaluation for oropharyngeal dysphagia in setting of previous history of CVA and cough with oral intake of food  He has oropharyngeal dysphagia to both solids and liquids  He has increased cough associated with this  I suspect this could be secondary to his previous history of stroke and aspiration  Based on history EGD/esophagram will be low yield  Can consider ENT evaluation and has plan to follow up with our Pulmonary team   I would recommend noninvasive testing at this time as endoscopic evaluation manometry be low yield  Bronchitis and sedation ammonia we other etiology  X-rays and her CBC plan for further evaluation  Patient is not getting adequate amount of oral intake of food due to cough and oropharyngeal dysphagia  Feeding tube may be an appropriate next step  We extensively discussed risk and benefit of this  If oropharyngeal dysphagia is thought to be secondary to oropharyngeal etiology we can consider feeding tube placement  Patient's daughter will get back to us regarding this  Subjective:   Denies any nausea, vomiting, fevers, chills  No weight loss  Daughter reports decreased oral intake of food due to coughing associated oral intake of food  Coughing is progressively getting worse with constant throat clearing  No recent travel  Plan to follow-up with pulmonary for further evaluation of later next week  Objective:     Vitals: Blood pressure 120/60, pulse 72, temperature 99 4 °F (37 4 °C), temperature source Tympanic, height 6' 3" (1 905 m)  ,Body mass index is 28 12 kg/m²  [unfilled]    Physical Exam:    GEN: wn/wd, NAD  HEENT: MMM, no cervical or supraclavicular LAD, anciteric  CV: RRR, no m/r/g  CHEST: CTA b/l, no w/r/r  ABD: +BS, soft, NT/ND, no hepatosplenomegaly  EXT: no c/c/e  SKIN: no rashes  NEURO: aaox3      Invasive Devices     None                         Lab, Imaging and other studies:     No visits with results within 1 Day(s) from this visit  Latest known visit with results is:   Appointment on 02/26/2020   Component Date Value    TSH 3RD [facility] 02/26/2020 0 641          I have personally reviewed pertinent films in PACS    No current facility-administered medications for this visit

## 2020-08-31 NOTE — PROGRESS NOTES
[facility] - Outpatient Consultation  Loy Arenas  [de-identified] y o  male MRN: [id number]  Encounter: [id number]          ASSESSMENT AND PLAN:      1  Cough  - XR chest pa & lateral; Future  - CBC and differential; Future    2  Oropharyngeal dysphagia  3  History of CVA    He is a 26-year-old male presents here with his daughter who is a [facility] nurse for further evaluation for oropharyngeal dysphagia in setting of previous history of CVA and cough with oral intake of food  He has oropharyngeal dysphagia to both solids and liquids  He has increased cough associated with this  I suspect this could be secondary to his previous history of stroke and aspiration  Based on history EGD/esophagram will be low yield  Can consider ENT evaluation and has plan to follow up with our Pulmonary team   I would recommend noninvasive testing at this time as endoscopic evaluation manometry be low yield  Bronchitis and sedation ammonia we other etiology  X-rays and her CBC plan for further evaluation  Patient is not getting adequate amount of oral intake of food due to cough and oropharyngeal dysphagia  Feeding tube may be an appropriate next step  We extensively discussed risk and benefit of this  If oropharyngeal dysphagia is thought to be secondary to oropharyngeal etiology we can consider feeding tube placement  Patient's daughter will get back to us regarding this  Subjective:   Patient's daughter reports increased cough with oral intake of food  Patient now is going to be living with his daughter  History is limited as patient is a poor historian  Daughter is concerned about bronchitis and worried about decreased oral intake of food  No fevers, chills, nausea, vomiting  No weight loss  REVIEW OF SYSTEMS:    CONSTITUTIONAL: Denies any fever, chills, rigors, and weight loss  HEENT: No earache or tinnitus  Denies hearing loss or visual disturbances    CARDIOVASCULAR: No chest pain or palpitations  RESPIRATORY: Denies any cough, hemoptysis, shortness of breath or dyspnea on exertion  GASTROINTESTINAL: As noted in the History of Present Illness  GENITOURINARY: No problems with urination  Denies any hematuria or dysuria  NEUROLOGIC: No dizziness or vertigo, denies headaches  MUSCULOSKELETAL: Denies any muscle or joint pain  SKIN: Denies skin rashes or itching  ENDOCRINE: Denies excessive thirst  Denies intolerance to heat or cold  PSYCHOSOCIAL: Denies depression or anxiety  Denies any recent memory loss  Historical Information   Past Medical History:   Diagnosis Date    [facility]AL [facility] ([facility] ) 5/31/2017    Aspiration into respiratory tract     Chest pain 5/31/2017    COPD (chronic obstructive pulmonary disease) (HCC)     Depression     Elevated troponin 5/31/2017    GERD (gastroesophageal reflux disease)     History of CVA (cerebrovascular accident) 4/13/2016    Hyperlipidemia     Hypertension     Lung cancer ([facility] ) 2005    Right, status post lobectomy    Pneumonia     Prostate cancer ([place] [address] )     Sleep apnea     awaiting sleep study results    Squamous cell skin cancer     Stroke ([place] [address] )     Tracheomalacia     Weakness due to cerebrovascular accident (CVA)      Past Surgical History:   Procedure Laterality Date    COLONOSCOPY      ESOPHAGOGASTRODUODENOSCOPY N/A 4/13/2016    Procedure: ESOPHAGOGASTRODUODENOSCOPY (EGD); Surgeon: Monica Hodges MD;  [facility]; Service:     JOINT REPLACEMENT      knee replacement    LUNG LOBECTOMY      [place] ESOPHAGOGASTRODUODENOSCOPY TRANSORAL DIAGNOSTIC N/A 3/15/2017    Procedure: ESOPHAGOGASTRODUODENOSCOPY (EGD); Surgeon: Monica Hodges MD;  [facility];   Service: Gastroenterology    SKIN BIOPSY      TRIGEMINAL NERVE DECOMPRESSION       Social History   Social History     Substance and Sexual Activity   Alcohol Use Not Currently     Social History     Substance and Sexual Activity   Drug Use Not Currently     Social History     Tobacco Use   Smoking Status Former Smoker    Packs/day:  00    Years:     Pack years:     Types: Cigarettes    Start date:     Last attempt to quit:     Years since quittin 6   Smokeless Tobacco Never Used     Family History   Family history unknown: Yes       Meds/Allergies       Current Outpatient Medications:     acetaminophen (TYLENOL) 325 mg tablet    albuterol (PROVENTIL HFA,VENTOLIN HFA) 90 mcg/act inhaler    amLODIPine (NORVASC) 10 mg tablet    ASPIRIN 81 PO    atorvastatin (LIPITOR) 40 mg tablet    budesonide (PULMICORT) 0 5 mg/2 mL nebulizer solution    carvedilol (COREG) 12 5 mg tablet    clopidogrel (PLAVIX) 75 mg tablet    docusate sodium (COLACE) 100 mg capsule    Fesoterodine Fumarate ER (TOVIAZ) 8 MG TB24    ipratropium-albuterol (DUO-NEB) 0 5-2 5 mg/3 mL nebulizer solution    ketoconazole (NIZORAL) 2 % cream    latanoprost (XALATAN) 0 005 % ophthalmic solution    loratadine (CLARITIN) 10 mg tablet    LORazepam (ATIVAN) 0 5 mg tablet    pantoprazole (PROTONIX) 40 mg tablet    polyethylene glycol (MIRALAX) 17 g packet    predniSONE 5 mg tablet    psyllium (METAMUCIL) 0 52 g capsule    senna (SENOKOT) 8 6 MG tablet    sertraline (ZOLOFT) 50 mg tablet    spironolactone (ALDACTONE) 25 mg tablet    tamsulosin (FLOMAX) 0 4 mg    Allergies   Allergen Reactions    Penicillins Rash           Objective     Blood pressure 120/60, pulse 72, temperature 99 4 °F (37 4 °C), temperature source Tympanic, height 6' 3" (1 905 m)  Body mass index is 28 12 kg/m²  PHYSICAL EXAM:      General Appearance:   Alert, cooperative, no distress, in a wheelchair   HEENT:   Normocephalic, atraumatic, anicteric      Neck:  Supple, symmetrical, trachea midline   Lungs:   Clear to auscultation bilaterally; no rales, rhonchi or wheezing; respirations unlabored    Heart[de-identified]   Regular rate and rhythm; no murmur, rub, or gallop     Abdomen:   Soft, non-tender, non-distended; normal bowel sounds; no masses, no organomegaly    Genitalia:   Deferred    Rectal:   Deferred    Extremities:  No cyanosis, clubbing or edema    Pulses:  2+ and symmetric    Skin:  No jaundice, rashes, or lesions    Lymph nodes:  No palpable cervical lymphadenopathy        Lab Results:   No visits with results within 1 Day(s) from this visit  Latest known visit with results is:   Appointment on 02/26/2020   Component Date Value    TSH 3RD [facility] 02/26/2020 0 641          Radiology Results:   No results found

## 2020-09-02 ENCOUNTER — TRANSCRIBE ORDERS (OUTPATIENT)
Dept: LAB | Facility: CLINIC | Age: 80
End: 2020-09-02

## 2020-09-02 ENCOUNTER — HOSPITAL ENCOUNTER (OUTPATIENT)
Dept: RADIOLOGY | Facility: HOSPITAL | Age: 80
Discharge: HOME | End: 2020-09-02
Attending: INTERNAL MEDICINE
Payer: MEDICARE

## 2020-09-02 ENCOUNTER — APPOINTMENT (OUTPATIENT)
Dept: LAB | Facility: CLINIC | Age: 80
End: 2020-09-02
Payer: MEDICARE

## 2020-09-02 DIAGNOSIS — R05.9 COUGH: ICD-10-CM

## 2020-09-02 LAB
BASOPHILS # BLD AUTO: 0.05 THOUSANDS/ΜL (ref 0–0.1)
BASOPHILS NFR BLD AUTO: 1 % (ref 0–1)
EOSINOPHIL # BLD AUTO: 0.26 THOUSAND/ΜL (ref 0–0.61)
EOSINOPHIL NFR BLD AUTO: 3 % (ref 0–6)
ERYTHROCYTE [DISTWIDTH] IN BLOOD BY AUTOMATED COUNT: 13.8 % (ref 11.6–15.1)
HCT VFR BLD AUTO: 43 % (ref 36.5–49.3)
HGB BLD-MCNC: 14 G/DL (ref 12–17)
IMM GRANULOCYTES # BLD AUTO: 0.04 THOUSAND/UL (ref 0–0.2)
IMM GRANULOCYTES NFR BLD AUTO: 0 % (ref 0–2)
LYMPHOCYTES # BLD AUTO: 0.98 THOUSANDS/ΜL (ref 0.6–4.47)
LYMPHOCYTES NFR BLD AUTO: 10 % (ref 14–44)
MCH RBC QN AUTO: 30.6 PG (ref 26.8–34.3)
MCHC RBC AUTO-ENTMCNC: 32.6 G/DL (ref 31.4–37.4)
MCV RBC AUTO: 94 FL (ref 82–98)
MONOCYTES # BLD AUTO: 1.07 THOUSAND/ΜL (ref 0.17–1.22)
MONOCYTES NFR BLD AUTO: 10 % (ref 4–12)
NEUTROPHILS # BLD AUTO: 7.97 THOUSANDS/ΜL (ref 1.85–7.62)
NEUTS SEG NFR BLD AUTO: 76 % (ref 43–75)
NRBC BLD AUTO-RTO: 0 /100 WBCS
PLATELET # BLD AUTO: 178 THOUSANDS/UL (ref 149–390)
PMV BLD AUTO: 10.7 FL (ref 8.9–12.7)
RBC # BLD AUTO: 4.58 MILLION/UL (ref 3.88–5.62)
WBC # BLD AUTO: 10.37 THOUSAND/UL (ref 4.31–10.16)

## 2020-09-02 PROCEDURE — 85025 COMPLETE CBC W/AUTO DIFF WBC: CPT

## 2020-09-02 PROCEDURE — 36415 COLL VENOUS BLD VENIPUNCTURE: CPT

## 2020-09-02 PROCEDURE — 71046 X-RAY EXAM CHEST 2 VIEWS: CPT

## 2020-09-02 NOTE — TELEPHONE ENCOUNTER
9/2/2020 9:04 AM - Have not received additional info re: pt's aspiration symptoms  He should re-establish with new provider to get best advice on diagnosis, treatment, and prognosis moving forward without Dr Consuelo Torres advice    If pt has hardship coming to clinic, may schedule virtual visit with Dr Andreia Olivas  If able to come to clinic, pt may see either myself in [facility], or Dr Andreia Olivas in [facility]

## 2020-09-02 NOTE — TELEPHONE ENCOUNTER
This nurse reached out to Daughter, Stefanie Hudson and we spoke at length regarding what has transpired in past 2 weeks  Patient has been evaluated in GI clinic and has been referred for speech therapy as well as discussion regarding peg tube placement which is what they are leaning towards  He also saw his PCP last week and was started on Clindamycin for the possibility of aspiration pneumonia  They have a follow up appt next week with Dr Aura Noriega as well to discuss his risk of aspiration pneumonia and a peg tube placement  He has not been eating well and has gone down to 1 meal daily  They have been instructed by speech in the past as to thickened liquids and soft foods  He uses the chin tuck method when eating  Stefanie Hudson had questions about decision making and goals and this nurse offered support with the choices they are making  Stefanie Hudson will call for Palliative Care follow up as needed for symptom management but is not currently needing any symptom management  Thank you

## 2020-09-03 NOTE — PROGRESS NOTES
Pulmonary Follow Up Note   Leonidas Esteban  [de-identified] y o  male MRN: [id number]  9/4/2020      Assessment:  1  Recurrent aspiration pneumonia/tracheobronchitis  · Noted progression of symptoms with dysarthria and dysphagia  · Two episodes this year and reduced PO intake  · Complete Clindamycin course now  · I discussed at length PEG tube and recommended if considering, would perform when respiratory status is at stable state and prior to further episodes of aspiration and pneumonia or weight loss     2  Chronic dysphagia  · Honey thickened liquids per VBS  · Restart Speech Therapy  · Purse PEG placement with Dr Natali Sierra     3  Chronic Cough  · Secondary to tracheomalacia and dysphagia     4  COPD with tracheomalacia  · Continue duonebs three to four times daily  · Continue pulmicort nebs BID  · Continue prednisone 5mg daily  · Continue VEST therapy  · He obtained prevnar/pneumovax previously per daughter, FLU vaccine encouraged this fall     5  GERD - continue PPI     6  Abnormal CT Chest  · Adenopathy improved on most recent CT chest  · I suspect the TIB/Nodular infiltrates and adenopathy likely related to recurrent aspiration events  · Holding on repeat imaging at this time     7  Weight loss - continue to monitor    Plan:    Diagnoses and all orders for this visit:    Chronic obstructive pulmonary disease, unspecified COPD type ([facility] )    Oropharyngeal dysphagia    Tracheomalacia    History of cerebrovascular accident (CVA) with residual deficit - dysphagia and left sided weakness    Cough        Return in about 6 months (around 3/4/2021) for Recheck  History of Present Illness   HPI:  Leonidas Esteban  is a [de-identified] y o  male who has prior CVA with resultant left hemiparesis, lung cancer post RLL lobectomy, possible COPD, likely tracheomalacia, and chronic cough   He has had multiple admissions for hypoxic respiratory failure an bronchospasm felt secondary to aspiration tracheobronchitis   He was last seen by me in Feb 2020 after admission for aspiration tracheobronchitis  He presents today for follow up with his daughter  He now lives with her and they have been very strict with COVID distancing practices  She reports he is now coughing with any PO intake and has had variable sputum production  They remain on strict aspiration precautions but despite this his PO intake has reduced  She reports he struggles to complete meals due to coughing  He denied fever or chills  He denied pleurisy, no wheeze  He is considering PEG placement and they have seen Dr Santy Feng  He has been on clindamycin this week with improvement in cough and sputum production  He has been compliant with nebs, mucinex and VEST therapies  Review of Systems   Constitutional: Positive for activity change and unexpected weight change  Negative for chills, fatigue and fever  HENT: Positive for trouble swallowing and voice change  Negative for congestion, mouth sores, postnasal drip, rhinorrhea and sore throat  Eyes: Negative for visual disturbance  Respiratory: Positive for cough and shortness of breath  Negative for chest tightness and wheezing  Cardiovascular: Negative for chest pain, palpitations and leg swelling  Gastrointestinal: Negative for abdominal pain, diarrhea, nausea and vomiting  Endocrine: Negative for cold intolerance and heat intolerance  Musculoskeletal: Positive for arthralgias and gait problem  Skin: Negative for rash  Allergic/Immunologic: Negative for immunocompromised state  Neurological: Positive for speech difficulty  Negative for dizziness, light-headedness and headaches  Hematological: Negative for adenopathy  Psychiatric/Behavioral: Negative for sleep disturbance         Historical Information   Past Medical History:   Diagnosis Date    RONAL (acute kidney injury) ([facility] ) 5/31/2017    Aspiration into respiratory tract     Chest pain 5/31/2017    COPD (chronic obstructive pulmonary disease) ([facility] )     Depression     Elevated troponin 5/31/2017    GERD (gastroesophageal reflux disease)     History of CVA (cerebrovascular accident) 4/13/2016    Hyperlipidemia     Hypertension     Lung cancer ([facility] ) 2005    Right, status post lobectomy    Pneumonia     Prostate cancer ([facility] )     Sleep apnea     awaiting sleep study results    Squamous cell skin cancer     Stroke ([facility] )     Tracheomalacia     Weakness due to cerebrovascular accident (CVA)      Past Surgical History:   Procedure Laterality Date    COLONOSCOPY      ESOPHAGOGASTRODUODENOSCOPY N/A 4/13/2016    Procedure: ESOPHAGOGASTRODUODENOSCOPY (EGD); Surgeon: Ash Ybarra MD;  [facility]: [facility]; Service:     JOINT REPLACEMENT      knee replacement    LUNG LOBECTOMY      AK ESOPHAGOGASTRODUODENOSCOPY TRANSORAL DIAGNOSTIC N/A 3/15/2017    Procedure: ESOPHAGOGASTRODUODENOSCOPY (EGD); Surgeon: Ash Ybarra MD;  Location: [facility];   Service: Gastroenterology    SKIN BIOPSY      TRIGEMINAL NERVE DECOMPRESSION       Family History   Family history unknown: Yes         Meds/Allergies     Current Outpatient Medications:     acetaminophen (TYLENOL) 325 mg tablet, Take 2 tablets by mouth every 6 (six) hours as needed for fever, Disp: 30 tablet, Rfl: 0    albuterol (PROVENTIL HFA,VENTOLIN HFA) 90 mcg/act inhaler, Inhale 2 puffs 4 (four) times a day, Disp: 2 Inhaler, Rfl: 0    amLODIPine (NORVASC) 10 mg tablet, Take 1 tablet by mouth daily, Disp: , Rfl:     ASPIRIN 81 PO, Take 1 tablet by mouth every other day  , Disp: , Rfl:     atorvastatin (LIPITOR) 40 mg tablet, Take 1 tablet (40 mg total) by mouth daily after dinner, Disp: , Rfl: 0    budesonide (PULMICORT) 0 5 mg/2 mL nebulizer solution, Take 1 vial (0 5 mg total) by nebulization 2 (two) times a day Rinse mouth after use , Disp: 360 mL, Rfl: 3    carvedilol (COREG) 12 5 mg tablet, Take 1 tablet by mouth 2 (two) times a day with meals, Disp: 60 tablet, Rfl: 0    clopidogrel (PLAVIX) 75 mg tablet, Take 1 tablet by mouth daily, Disp: 30 tablet, Rfl: 0    docusate sodium (COLACE) 100 mg capsule, Take 1 capsule (100 mg total) by mouth 2 (two) times a day, Disp: 10 capsule, Rfl: 0    Fesoterodine Fumarate ER (TOVIAZ) 8 MG TB24, Take 4 mg by mouth every evening  , Disp: , Rfl:     ipratropium-albuterol (DUO-NEB) 0 5-2 5 mg/3 mL nebulizer solution, Take 1 vial (3 mL total) by nebulization 4 (four) times a day, Disp: 810 mL, Rfl: 0    ketoconazole (NIZORAL) 2 % cream, Apply topically to both feet in their entirety (tops, bottoms and between toes) 3 times a day for 4 weeks straight , Disp: 60 g, Rfl: 5    latanoprost (XALATAN) 0 005 % ophthalmic solution, Administer 1 drop to both eyes daily at bedtime, Disp: , Rfl:     loratadine (CLARITIN) 10 mg tablet, Take 10 mg by mouth daily, Disp: , Rfl:     LORazepam (ATIVAN) 0 5 mg tablet, Take 0 5 mg by mouth every 8 (eight) hours as needed for anxiety, Disp: , Rfl:     pantoprazole (PROTONIX) 40 mg tablet, Take 40 mg by mouth daily, Disp: , Rfl:     polyethylene glycol (MIRALAX) 17 g packet, Take 17 g by mouth daily as needed, Disp: , Rfl:     predniSONE 5 mg tablet, Take 1 tablet (5 mg total) by mouth daily, Disp: 90 tablet, Rfl: 3    psyllium (METAMUCIL) 0 52 g capsule, Take 0 52 g by mouth daily as needed , Disp: , Rfl:     senna (SENOKOT) 8 6 MG tablet, Take 2 tablets by mouth daily at bedtime, Disp: , Rfl:     sertraline (ZOLOFT) 50 mg tablet, Take 50 mg by mouth daily, Disp: , Rfl:     spironolactone (ALDACTONE) 25 mg tablet, Take 1 tablet (25 mg total) by mouth daily, Disp: 30 tablet, Rfl: 0    tamsulosin (FLOMAX) 0 4 mg, Take 1 capsule by mouth daily, Disp: , Rfl:   Allergies   Allergen Reactions    Penicillins Rash       Vitals: Blood pressure 120/64, pulse 75, temperature (!) 97 3 °F (36 3 °C), height 6' 3" (1 905 m), SpO2 97 %  Body mass index is 28 12 kg/m²   Oxygen Therapy  SpO2: 97 %      Physical Exam  Physical Exam  Vitals signs reviewed  Constitutional:       General: He is not in acute distress  Appearance: Normal appearance  He is well-developed  He is not ill-appearing, toxic-appearing or diaphoretic  Comments: Not weighed today but some mild weight loss appreciated on visual exam, in wheelchair   HENT:      Head: Normocephalic and atraumatic  Right Ear: External ear normal       Left Ear: External ear normal       Nose: Nose normal       Mouth/Throat:      Mouth: Mucous membranes are moist       Pharynx: Oropharynx is clear  No oropharyngeal exudate  Comments: No thrush  Eyes:      General: No scleral icterus  Right eye: No discharge  Left eye: No discharge  Conjunctiva/sclera: Conjunctivae normal       Pupils: Pupils are equal, round, and reactive to light  Neck:      Musculoskeletal: Neck supple  Vascular: No JVD  Trachea: No tracheal deviation  Cardiovascular:      Rate and Rhythm: Normal rate and regular rhythm  Heart sounds: Normal heart sounds  No murmur  Pulmonary:      Effort: Pulmonary effort is normal  No respiratory distress  Breath sounds: No stridor  No wheezing, rhonchi or rales  Comments: Mildly diminished BS diffusely, no sig cough on today's exam, no wheeze, no rales  Abdominal:      General: Bowel sounds are normal  There is no distension  Palpations: Abdomen is soft  Tenderness: There is no abdominal tenderness  There is no guarding  Musculoskeletal:         General: Deformity present  No swelling  Right lower leg: No edema  Left lower leg: No edema  Comments: LLE brace in place   Lymphadenopathy:      Cervical: No cervical adenopathy  Skin:     General: Skin is warm and dry  Findings: No rash  Neurological:      Mental Status: He is alert and oriented to person, place, and time  Mental status is at baseline     Psychiatric:         Mood and Affect: Mood normal          Behavior: Behavior normal          Thought Content: Thought content normal          Labs: I have personally reviewed pertinent lab results  Lab Results   Component Value Date    WBC 10 37 (H) 09/02/2020    HGB 14 0 09/02/2020    HCT 43 0 09/02/2020    MCV 94 09/02/2020     09/02/2020     Lab Results   Component Value Date    GLUCOSE 124 01/29/2019    CALCIUM 9 6 02/04/2020    K 4 2 02/04/2020    CO2 24 02/04/2020     02/04/2020    BUN 31 (H) 02/04/2020    CREATININE 1 22 02/04/2020     No results found for: IGE  Lab Results   Component Value Date    ALT 15 02/03/2020    AST 11 02/03/2020    ALKPHOS 107 02/03/2020     FLU/RSV 2/4/2020 - negative     RADIOGRAPHS: New radiographs and reports personally reviewed  CXR 9/2/2020 - noted scoliosis, no focal infiltrates, no effusions, no PTX    CT Chest 2/4/2020 - post RUL lobectomy noted, atelectasis at bases, no focal infiltrates, no sig mediastinal adenopathy     CTA Head/neck 11/15/19 - no evidence ICS, enlarged right hilar adenopathy 2cm previously 1 6cm     CXR 11/14/19 - post RUL lobectomy surgical changes and volume loss, no focal infiltrates no effusions, no PTX     VBS 4/30/19 - "Moderate oral/mild-moderate pharyngeal dysphagia w/ decreased oral control of liquids and delayed swallow initiation resulting in aspiration before the swallow   Oral prep and chin tuck of thick liquids by tsp reduced aspiration risk"     CT Chest 3/4/19 - improved bronchial/tracheal wall thickening, resolved hilar adenopathy, no sig mediastinal adenopathy, RML and RLL mild nodular infiltrate/TIB infiltrate noted, no sig effusions     CXR 1/29 - noted right hemithorax volume loss, kyphoscoliosis, improved overall aeration of right hemithorax, no PTX     CT angio 1/28/19 - no PE, noted right hilar adenopathy vs early mass lesion - favoring adenopathy based upon appearance, no PTX, no sig effusion, post surgical changes on right     11/2018 - VBS (+) dysphagia with penetration on thin liquids     Prior Studies  Chest X-ray 8/23/17 - CXR - images personally reviewed - noted right sided post-operative changes and mild volume loss, no acute infiltrates, no masses, no lesions appreciated  CT Scan 4/2017 - CT chest images reviewed with 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  Cardiac Testing 7/2017 TTE EF 55%, normal RV    Trey Allred DO, [facility]

## 2020-09-04 ENCOUNTER — OFFICE VISIT (OUTPATIENT)
Dept: PULMONOLOGY | Facility: HOSPITAL | Age: 80
End: 2020-09-04
Payer: MEDICARE

## 2020-09-04 VITALS
TEMPERATURE: 97.3 F | OXYGEN SATURATION: 97 % | HEIGHT: 75 IN | DIASTOLIC BLOOD PRESSURE: 64 MMHG | HEART RATE: 75 BPM | SYSTOLIC BLOOD PRESSURE: 120 MMHG | BODY MASS INDEX: 28.12 KG/M2

## 2020-09-04 DIAGNOSIS — J39.8 TRACHEOMALACIA: Chronic | ICD-10-CM

## 2020-09-04 DIAGNOSIS — I69.30 HISTORY OF CEREBROVASCULAR ACCIDENT (CVA) WITH RESIDUAL DEFICIT: ICD-10-CM

## 2020-09-04 DIAGNOSIS — R13.12 OROPHARYNGEAL DYSPHAGIA: Chronic | ICD-10-CM

## 2020-09-04 DIAGNOSIS — J44.9 CHRONIC OBSTRUCTIVE PULMONARY DISEASE, UNSPECIFIED COPD TYPE (HCC): Primary | ICD-10-CM

## 2020-09-04 DIAGNOSIS — R05.9 COUGH: ICD-10-CM

## 2020-09-04 PROCEDURE — 99214 OFFICE O/P EST MOD 30 MIN: CPT | Performed by: INTERNAL MEDICINE

## 2020-09-04 NOTE — LETTER
September 4, 2020     DO [address]  [address]  [id number]    Patient: Leonidas Majano  YOB: 1940   Date of Visit: 9/4/2020       Dear Dr Tono Chaidez: Thank you for referring Nadine Deutsch to me for evaluation  Below are my notes for this consultation  If you have questions, please do not hesitate to call me  I look forward to following your patient along with you  Sincerely,        Shamika Kuhn DO        CC: MD Shamika Momin DO  9/4/2020  4:41 PM  Sign when Signing Visit  Pulmonary Follow Up Note   Leonidas Majano  [de-identified] y o  male MRN: [id number]  9/4/2020      Assessment:  1  Recurrent aspiration pneumonia/tracheobronchitis  · Noted progression of symptoms with dysarthria and dysphagia  · Two episodes this year and reduced PO intake  · Complete Clindamycin course now  · I discussed at length PEG tube and recommended if considering, would perform when respiratory status is at stable state and prior to further episodes of aspiration and pneumonia or weight loss     2  Chronic dysphagia  · Honey thickened liquids per VBS  · Restart Speech Therapy  · Purse PEG placement with Dr Dede Ratliff     3  Chronic Cough  · Secondary to tracheomalacia and dysphagia     4  COPD with tracheomalacia  · Continue duonebs three to four times daily  · Continue pulmicort nebs BID  · Continue prednisone 5mg daily  · Continue VEST therapy  · He obtained prevnar/pneumovax previously per daughter, FLU vaccine encouraged this fall     5  GERD - continue PPI     6   Abnormal CT Chest  · Adenopathy improved on most recent CT chest  · I suspect the TIB/Nodular infiltrates and adenopathy likely related to recurrent aspiration events  · Holding on repeat imaging at this time     7  Weight loss - continue to monitor    Plan:    Diagnoses and all orders for this visit:    Chronic obstructive pulmonary disease, unspecified [facility] ([facility]    Oropharyngeal dysphagia    Tracheomalacia    History of cerebrovascular accident (CVA) with residual deficit - dysphagia and left sided weakness    Cough        Return in about 6 months (around 3/4/2021) for Recheck  History of Present Illness   HPI:  Lupis Oro  is a [de-identified] y o  male who has prior CVA with resultant left hemiparesis, lung cancer post RLL lobectomy, possible COPD, likely tracheomalacia, and chronic cough  He has had multiple admissions for hypoxic respiratory failure an bronchospasm felt secondary to aspiration tracheobronchitis   He was last seen by me in Feb 2020 after admission for aspiration tracheobronchitis  He presents today for follow up with his daughter  He now lives with her and they have been very strict with COVID distancing practices  She reports he is now coughing with any PO intake and has had variable sputum production  They remain on strict aspiration precautions but despite this his PO intake has reduced  She reports he struggles to complete meals due to coughing  He denied fever or chills  He denied pleurisy, no wheeze  He is considering PEG placement and they have seen Dr Db Malone  He has been on clindamycin this week with improvement in cough and sputum production  He has been compliant with nebs, mucinex and VEST therapies  Review of Systems   Constitutional: Positive for activity change and unexpected weight change  Negative for chills, fatigue and fever  HENT: Positive for trouble swallowing and voice change  Negative for congestion, mouth sores, postnasal drip, rhinorrhea and sore throat  Eyes: Negative for visual disturbance  Respiratory: Positive for cough and shortness of breath  Negative for chest tightness and wheezing  Cardiovascular: Negative for chest pain, palpitations and leg swelling  Gastrointestinal: Negative for abdominal pain, diarrhea, nausea and vomiting  Endocrine: Negative for cold intolerance and heat intolerance     Musculoskeletal: Positive for arthralgias and gait problem  Skin: Negative for rash  Allergic/Immunologic: Negative for immunocompromised state  Neurological: Positive for speech difficulty  Negative for dizziness, light-headedness and headaches  Hematological: Negative for adenopathy  Psychiatric/Behavioral: Negative for sleep disturbance  Historical Information   Past Medical History:   Diagnosis Date    [facility] 5[facility]/2017    Aspiration into respiratory tract     Chest pain 5/31/2017    COPD (chronic obstructive pulmonary disease) (HCC)     Depression     Elevated troponin 5/31/2017    GERD (gastroesophageal reflux disease)     History of CVA (cerebrovascular accident) 4/13/2016    Hyperlipidemia     Hypertension     [facility] ) 2005    Right, status post lobectomy    Pneumonia     Prostate cancer ([place] [address] )     Sleep apnea     awaiting sleep study results    Squamous cell skin cancer     Stroke ([place] [address] )     Tracheomalacia     Weakness due to cerebrovascular accident (CVA)      Past Surgical History:   Procedure Laterality Date    COLONOSCOPY      ESOPHAGOGASTRODUODENOSCOPY N/A 4/13/2016    Procedure: ESOPHAGOGASTRODUODENOSCOPY (EGD); Surgeon: Catie Saldivar MD;  [facility]; Service:     JOINT REPLACEMENT      knee replacement    LUNG LOBECTOMY      [place] ESOPHAGOGASTRODUODENOSCOPY TRANSORAL DIAGNOSTIC N/A 3/15/2017    Procedure: ESOPHAGOGASTRODUODENOSCOPY (EGD); Surgeon: Catie Saldivar MD;  [facility];   Service: Gastroenterology    SKIN BIOPSY      TRIGEMINAL NERVE DECOMPRESSION       Family History   Family history unknown: Yes         Meds/Allergies     Current Outpatient Medications:     acetaminophen (TYLENOL) 325 mg tablet, Take 2 tablets by mouth every 6 (six) hours as needed for fever, Disp: 30 tablet, Rfl: 0    albuterol (PROVENTIL HFA,VENTOLIN HFA) 90 mcg/act inhaler, Inhale 2 puffs 4 (four) times a day, Disp: 2 Inhaler, Rfl: 0   amLODIPine (NORVASC) 10 mg tablet, Take 1 tablet by mouth daily, Disp: , Rfl:     ASPIRIN 81 PO, Take 1 tablet by mouth every other day  , Disp: , Rfl:     atorvastatin (LIPITOR) 40 mg tablet, Take 1 tablet (40 mg total) by mouth daily after dinner, Disp: , Rfl: 0    budesonide (PULMICORT) 0 5 mg/2 mL nebulizer solution, Take 1 vial (0 5 mg total) by nebulization 2 (two) times a day Rinse mouth after use , Disp: 360 mL, Rfl: 3    carvedilol (COREG) 12 5 mg tablet, Take 1 tablet by mouth 2 (two) times a day with meals, Disp: 60 tablet, Rfl: 0    clopidogrel (PLAVIX) 75 mg tablet, Take 1 tablet by mouth daily, Disp: 30 tablet, Rfl: 0    docusate sodium (COLACE) 100 mg capsule, Take 1 capsule (100 mg total) by mouth 2 (two) times a day, Disp: 10 capsule, Rfl: 0    Fesoterodine Fumarate ER (TOVIAZ) 8 MG TB24, Take 4 mg by mouth every evening  , Disp: , Rfl:     ipratropium-albuterol (DUO-NEB) 0 5-2 5 mg/3 mL nebulizer solution, Take 1 vial (3 mL total) by nebulization 4 (four) times a day, Disp: 810 mL, Rfl: 0    ketoconazole (NIZORAL) 2 % cream, Apply topically to both feet in their entirety (tops, bottoms and between toes) 3 times a day for 4 weeks straight , Disp: 60 g, Rfl: 5    latanoprost (XALATAN) 0 005 % ophthalmic solution, Administer 1 drop to both eyes daily at bedtime, Disp: , Rfl:     loratadine (CLARITIN) 10 mg tablet, Take 10 mg by mouth daily, Disp: , Rfl:     LORazepam (ATIVAN) 0 5 mg tablet, Take 0 5 mg by mouth every 8 (eight) hours as needed for anxiety, Disp: , Rfl:     pantoprazole (PROTONIX) 40 mg tablet, Take 40 mg by mouth daily, Disp: , Rfl:     polyethylene glycol (MIRALAX) 17 g packet, Take 17 g by mouth daily as needed, Disp: , Rfl:     predniSONE 5 mg tablet, Take 1 tablet (5 mg total) by mouth daily, Disp: 90 tablet, Rfl: 3    psyllium (METAMUCIL) 0 52 g capsule, Take 0 52 g by mouth daily as needed , Disp: , Rfl:     senna (SENOKOT) 8 6 MG tablet, Take 2 tablets by mouth daily at bedtime, Disp: , Rfl:     sertraline (ZOLOFT) 50 mg tablet, Take 50 mg by mouth daily, Disp: , Rfl:     spironolactone (ALDACTONE) 25 mg tablet, Take 1 tablet (25 mg total) by mouth daily, Disp: 30 tablet, Rfl: 0    tamsulosin (FLOMAX) 0 4 mg, Take 1 capsule by mouth daily, Disp: , Rfl:   Allergies   Allergen Reactions    Penicillins Rash       Vitals: Blood pressure 120/64, pulse 75, temperature (!) 97 3 °F (36 3 °C), height 6' 3" (1 905 m), SpO2 97 %  Body mass index is 28 12 kg/m²  Oxygen Therapy  SpO2: 97 %      Physical Exam  Physical Exam  Vitals signs reviewed  Constitutional:       General: He is not in acute distress  Appearance: Normal appearance  He is well-developed  He is not ill-appearing, toxic-appearing or diaphoretic  Comments: Not weighed today but some mild weight loss appreciated on visual exam, in wheelchair   HENT:      Head: Normocephalic and atraumatic  Right Ear: External ear normal       Left Ear: External ear normal       Nose: Nose normal       Mouth/Throat:      Mouth: Mucous membranes are moist       Pharynx: Oropharynx is clear  No oropharyngeal exudate  Comments: No thrush  Eyes:      General: No scleral icterus  Right eye: No discharge  Left eye: No discharge  Conjunctiva/sclera: Conjunctivae normal       Pupils: Pupils are equal, round, and reactive to light  Neck:      Musculoskeletal: Neck supple  Vascular: No JVD  Trachea: No tracheal deviation  Cardiovascular:      Rate and Rhythm: Normal rate and regular rhythm  Heart sounds: Normal heart sounds  No murmur  Pulmonary:      Effort: Pulmonary effort is normal  No respiratory distress  Breath sounds: No stridor  No wheezing, rhonchi or rales  Comments: Mildly diminished BS diffusely, no sig cough on today's exam, no wheeze, no rales  Abdominal:      General: Bowel sounds are normal  There is no distension  Palpations: Abdomen is soft  Tenderness: There is no abdominal tenderness  There is no guarding  Musculoskeletal:         General: Deformity present  No swelling  Right lower leg: No edema  Left lower leg: No edema  Comments: LLE brace in place   Lymphadenopathy:      Cervical: No cervical adenopathy  Skin:     General: Skin is warm and dry  Findings: No rash  Neurological:      Mental Status: He is alert and oriented to person, place, and time  Mental status is at baseline  Psychiatric:         Mood and Affect: Mood normal          Behavior: Behavior normal          Thought Content: Thought content normal          Labs: I have personally reviewed pertinent lab results  Lab Results   Component Value Date    WBC 10 37 (H) 09/02/2020    HGB 14 0 09/02/2020    HCT 43 0 09/02/2020    MCV 94 09/02/2020     09/02/2020     Lab Results   Component Value Date    GLUCOSE 124 01/29/2019    CALCIUM 9 6 02/04/2020    K 4 2 02/04/2020    CO2 24 02/04/2020     02/04/2020    BUN 31 (H) 02/04/2020    CREATININE 1 22 02/04/2020     No results found for: IGE  Lab Results   Component Value Date    ALT 15 02/03/2020    AST 11 02/03/2020    ALKPHOS 107 02/03/2020     FLU/RSV 2/4/2020 - negative     RADIOGRAPHS: New radiographs and reports personally reviewed  CXR 9/2/2020 - noted scoliosis, no focal infiltrates, no effusions, no PTX    CT Chest 2/4/2020 - post RUL lobectomy noted, atelectasis at bases, no focal infiltrates, no sig mediastinal adenopathy     CTA Head/neck 11/15/19 - no evidence ICS, enlarged right hilar adenopathy 2cm previously 1 6cm     CXR 11/14/19 - post RUL lobectomy surgical changes and volume loss, no focal infiltrates no effusions, no PTX     VBS 4/30/19 - "Moderate oral/mild-moderate pharyngeal dysphagia w/ decreased oral control of liquids and delayed swallow initiation resulting in aspiration before the swallow   Oral prep and chin tuck of thick liquids by tsp reduced aspiration risk"     CT Chest 3/4/19 - improved bronchial/tracheal wall thickening, resolved hilar adenopathy, no sig mediastinal adenopathy, RML and RLL mild nodular infiltrate/TIB infiltrate noted, no sig effusions     CXR 1/29 - noted right hemithorax volume loss, kyphoscoliosis, improved overall aeration of right hemithorax, no PTX     CT angio 1/28/19 - no PE, noted right hilar adenopathy vs early mass lesion - favoring adenopathy based upon appearance, no PTX, no sig effusion, post surgical changes on right     11/2018 - VBS (+) dysphagia with penetration on thin liquids     Prior Studies  Chest X-ray 8/23/17 - CXR - images personally reviewed - noted right sided post-operative changes and mild volume loss, no acute infiltrates, no masses, no lesions appreciated  CT Scan 4/2017 - CT chest images reviewed with 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  Cardiac Testing 7/2017 TTE EF 55%, normal RV    Trey Allred DO, [facility]

## 2020-09-08 NOTE — TELEPHONE ENCOUNTER
Thanks, Eller Krabbe  I'm assuming they'd like to finish ABx and trial speech therapy, rather than jump straight to hospice  I appreciate you reaching out to work with them

## 2020-09-09 DIAGNOSIS — I69.30 HISTORY OF CEREBROVASCULAR ACCIDENT (CVA) WITH RESIDUAL DEFICIT: Primary | ICD-10-CM

## 2020-09-09 DIAGNOSIS — R13.12 OROPHARYNGEAL DYSPHAGIA: ICD-10-CM

## 2020-09-10 DIAGNOSIS — R05.9 COUGH: ICD-10-CM

## 2020-09-11 RX ORDER — BENZONATATE 200 MG/1
CAPSULE ORAL
Qty: 90 CAPSULE | Refills: 1 | Status: SHIPPED | OUTPATIENT
Start: 2020-09-11 | End: 2020-12-16

## 2020-09-24 ENCOUNTER — PREP FOR PROCEDURE (OUTPATIENT)
Dept: GASTROENTEROLOGY | Facility: MEDICAL CENTER | Age: 80
End: 2020-09-24

## 2020-09-24 DIAGNOSIS — R13.12 OROPHARYNGEAL DYSPHAGIA: Primary | ICD-10-CM

## 2020-09-26 ENCOUNTER — APPOINTMENT (EMERGENCY)
Dept: CT IMAGING | Facility: HOSPITAL | Age: 80
DRG: 177 | End: 2020-09-26
Payer: MEDICARE

## 2020-09-26 ENCOUNTER — APPOINTMENT (EMERGENCY)
Dept: RADIOLOGY | Facility: HOSPITAL | Age: 80
DRG: 177 | End: 2020-09-26
Payer: MEDICARE

## 2020-09-26 ENCOUNTER — HOSPITAL ENCOUNTER (INPATIENT)
Facility: HOSPITAL | Age: 80
LOS: 5 days | Discharge: HOME | DRG: 177 | End: 2020-10-01
Attending: EMERGENCY MEDICINE | Admitting: FAMILY MEDICINE
Payer: MEDICARE

## 2020-09-26 DIAGNOSIS — R13.12 OROPHARYNGEAL DYSPHAGIA: Chronic | ICD-10-CM

## 2020-09-26 DIAGNOSIS — I69.30 HISTORY OF CEREBROVASCULAR ACCIDENT (CVA) WITH RESIDUAL DEFICIT: ICD-10-CM

## 2020-09-26 DIAGNOSIS — J41.1 MUCOPURULENT CHRONIC BRONCHITIS (HCC): ICD-10-CM

## 2020-09-26 DIAGNOSIS — J44.1 COPD EXACERBATION (HCC): Primary | ICD-10-CM

## 2020-09-26 LAB
ALBUMIN SERPL BCP-MCNC: 4 G/DL (ref 3.5–5)
ALP SERPL-CCNC: 97 U/L (ref 46–116)
ALT SERPL W P-5'-P-CCNC: 21 U/L (ref 12–78)
ANION GAP SERPL CALCULATED.3IONS-SCNC: 9 MMOL/L (ref 4–13)
APTT PPP: 34 SECONDS (ref 23–37)
AST SERPL W P-5'-P-CCNC: 10 U/L (ref 5–45)
ATRIAL RATE: 84 BPM
BASOPHILS # BLD AUTO: 0.04 THOUSANDS/ΜL (ref 0–0.1)
BASOPHILS NFR BLD AUTO: 0 % (ref 0–1)
BILIRUB SERPL-MCNC: 0.64 MG/DL (ref 0.2–1)
BUN SERPL-MCNC: 18 MG/DL (ref 5–25)
CALCIUM SERPL-MCNC: 10.2 MG/DL (ref 8.3–10.1)
CHLORIDE SERPL-SCNC: 98 MMOL/L (ref 100–108)
CO2 SERPL-SCNC: 28 MMOL/L (ref 21–32)
CREAT SERPL-MCNC: 1.31 MG/DL (ref 0.6–1.3)
D DIMER PPP FEU-MCNC: 0.53 UG/ML FEU
EOSINOPHIL # BLD AUTO: 0.33 THOUSAND/ΜL (ref 0–0.61)
EOSINOPHIL NFR BLD AUTO: 2 % (ref 0–6)
ERYTHROCYTE [DISTWIDTH] IN BLOOD BY AUTOMATED COUNT: 14.1 % (ref 11.6–15.1)
GFR SERPL CREATININE-BSD FRML MDRD: 51 ML/MIN/1.73SQ M
GLUCOSE SERPL-MCNC: 94 MG/DL (ref 65–140)
HCT VFR BLD AUTO: 47.1 % (ref 36.5–49.3)
HGB BLD-MCNC: 15.2 G/DL (ref 12–17)
IMM GRANULOCYTES # BLD AUTO: 0.1 THOUSAND/UL (ref 0–0.2)
IMM GRANULOCYTES NFR BLD AUTO: 1 % (ref 0–2)
INR PPP: 0.97 (ref 0.84–1.19)
L PNEUMO1 AG UR QL IA.RAPID: NEGATIVE
LACTATE SERPL-SCNC: 1.6 MMOL/L (ref 0.5–2)
LYMPHOCYTES # BLD AUTO: 1.16 THOUSANDS/ΜL (ref 0.6–4.47)
LYMPHOCYTES NFR BLD AUTO: 8 % (ref 14–44)
MCH RBC QN AUTO: 29.7 PG (ref 26.8–34.3)
MCHC RBC AUTO-ENTMCNC: 32.3 G/DL (ref 31.4–37.4)
MCV RBC AUTO: 92 FL (ref 82–98)
MONOCYTES # BLD AUTO: 1.19 THOUSAND/ΜL (ref 0.17–1.22)
MONOCYTES NFR BLD AUTO: 9 % (ref 4–12)
NEUTROPHILS # BLD AUTO: 10.95 THOUSANDS/ΜL (ref 1.85–7.62)
NEUTS SEG NFR BLD AUTO: 80 % (ref 43–75)
NRBC BLD AUTO-RTO: 0 /100 WBCS
NT-PROBNP SERPL-MCNC: 151 PG/ML
P AXIS: 85 DEGREES
PLATELET # BLD AUTO: 183 THOUSANDS/UL (ref 149–390)
PLATELET # BLD AUTO: 197 THOUSANDS/UL (ref 149–390)
PMV BLD AUTO: 10.5 FL (ref 8.9–12.7)
PMV BLD AUTO: 10.5 FL (ref 8.9–12.7)
POTASSIUM SERPL-SCNC: 3.6 MMOL/L (ref 3.5–5.3)
PR INTERVAL: 216 MS
PROCALCITONIN SERPL-MCNC: 0.06 NG/ML
PROT SERPL-MCNC: 8.6 G/DL (ref 6.4–8.2)
PROTHROMBIN TIME: 13 SECONDS (ref 11.6–14.5)
QRS AXIS: 5 DEGREES
QRSD INTERVAL: 100 MS
QT INTERVAL: 378 MS
QTC INTERVAL: 446 MS
RBC # BLD AUTO: 5.11 MILLION/UL (ref 3.88–5.62)
S PNEUM AG UR QL: NEGATIVE
SARS-COV-2 RNA RESP QL NAA+PROBE: NEGATIVE
SODIUM SERPL-SCNC: 135 MMOL/L (ref 136–145)
T WAVE AXIS: 54 DEGREES
TROPONIN I SERPL-MCNC: <0.02 NG/ML
VENTRICULAR RATE: 84 BPM
WBC # BLD AUTO: 13.77 THOUSAND/UL (ref 4.31–10.16)

## 2020-09-26 PROCEDURE — 83880 ASSAY OF NATRIURETIC PEPTIDE: CPT | Performed by: EMERGENCY MEDICINE

## 2020-09-26 PROCEDURE — 96365 THER/PROPH/DIAG IV INF INIT: CPT

## 2020-09-26 PROCEDURE — 94640 AIRWAY INHALATION TREATMENT: CPT

## 2020-09-26 PROCEDURE — 85025 COMPLETE CBC W/AUTO DIFF WBC: CPT | Performed by: EMERGENCY MEDICINE

## 2020-09-26 PROCEDURE — 94760 N-INVAS EAR/PLS OXIMETRY 1: CPT

## 2020-09-26 PROCEDURE — 87449 NOS EACH ORGANISM AG IA: CPT | Performed by: PHYSICIAN ASSISTANT

## 2020-09-26 PROCEDURE — 71275 CT ANGIOGRAPHY CHEST: CPT

## 2020-09-26 PROCEDURE — 94664 DEMO&/EVAL PT USE INHALER: CPT

## 2020-09-26 PROCEDURE — 85049 AUTOMATED PLATELET COUNT: CPT | Performed by: PHYSICIAN ASSISTANT

## 2020-09-26 PROCEDURE — 85730 THROMBOPLASTIN TIME PARTIAL: CPT | Performed by: EMERGENCY MEDICINE

## 2020-09-26 PROCEDURE — 83605 ASSAY OF LACTIC ACID: CPT | Performed by: EMERGENCY MEDICINE

## 2020-09-26 PROCEDURE — 87040 BLOOD CULTURE FOR BACTERIA: CPT | Performed by: EMERGENCY MEDICINE

## 2020-09-26 PROCEDURE — 84484 ASSAY OF TROPONIN QUANT: CPT | Performed by: EMERGENCY MEDICINE

## 2020-09-26 PROCEDURE — 84145 PROCALCITONIN (PCT): CPT | Performed by: PHYSICIAN ASSISTANT

## 2020-09-26 PROCEDURE — 87147 CULTURE TYPE IMMUNOLOGIC: CPT | Performed by: EMERGENCY MEDICINE

## 2020-09-26 PROCEDURE — 85610 PROTHROMBIN TIME: CPT | Performed by: EMERGENCY MEDICINE

## 2020-09-26 PROCEDURE — 85379 FIBRIN DEGRADATION QUANT: CPT | Performed by: EMERGENCY MEDICINE

## 2020-09-26 PROCEDURE — G1004 CDSM NDSC: HCPCS

## 2020-09-26 PROCEDURE — 93010 ELECTROCARDIOGRAM REPORT: CPT | Performed by: INTERNAL MEDICINE

## 2020-09-26 PROCEDURE — 99223 1ST HOSP IP/OBS HIGH 75: CPT | Performed by: FAMILY MEDICINE

## 2020-09-26 PROCEDURE — 80053 COMPREHEN METABOLIC PANEL: CPT | Performed by: EMERGENCY MEDICINE

## 2020-09-26 PROCEDURE — 99285 EMERGENCY DEPT VISIT HI MDM: CPT

## 2020-09-26 PROCEDURE — 94644 CONT INHLJ TX 1ST HOUR: CPT

## 2020-09-26 PROCEDURE — 93005 ELECTROCARDIOGRAM TRACING: CPT

## 2020-09-26 PROCEDURE — 94668 MNPJ CHEST WALL SBSQ: CPT

## 2020-09-26 PROCEDURE — 96375 TX/PRO/DX INJ NEW DRUG ADDON: CPT

## 2020-09-26 PROCEDURE — 36415 COLL VENOUS BLD VENIPUNCTURE: CPT | Performed by: EMERGENCY MEDICINE

## 2020-09-26 PROCEDURE — 71045 X-RAY EXAM CHEST 1 VIEW: CPT

## 2020-09-26 PROCEDURE — 87635 SARS-COV-2 COVID-19 AMP PRB: CPT | Performed by: EMERGENCY MEDICINE

## 2020-09-26 PROCEDURE — 99285 EMERGENCY DEPT VISIT HI MDM: CPT | Performed by: EMERGENCY MEDICINE

## 2020-09-26 PROCEDURE — 94669 MECHANICAL CHEST WALL OSCILL: CPT

## 2020-09-26 RX ORDER — BENZONATATE 100 MG/1
100 CAPSULE ORAL 3 TIMES DAILY
Status: DISCONTINUED | OUTPATIENT
Start: 2020-09-26 | End: 2020-10-01 | Stop reason: HOSPADM

## 2020-09-26 RX ORDER — PREDNISONE 1 MG/1
5 TABLET ORAL DAILY
Status: DISCONTINUED | OUTPATIENT
Start: 2020-09-26 | End: 2020-10-01 | Stop reason: HOSPADM

## 2020-09-26 RX ORDER — HEPARIN SODIUM 5000 [USP'U]/ML
5000 INJECTION, SOLUTION INTRAVENOUS; SUBCUTANEOUS EVERY 8 HOURS SCHEDULED
Status: DISCONTINUED | OUTPATIENT
Start: 2020-09-26 | End: 2020-10-01 | Stop reason: HOSPADM

## 2020-09-26 RX ORDER — PAROXETINE HYDROCHLORIDE 20 MG/1
20 TABLET, FILM COATED ORAL DAILY
COMMUNITY

## 2020-09-26 RX ORDER — LORAZEPAM 0.5 MG/1
0.5 TABLET ORAL EVERY 8 HOURS PRN
Status: DISCONTINUED | OUTPATIENT
Start: 2020-09-26 | End: 2020-10-01 | Stop reason: HOSPADM

## 2020-09-26 RX ORDER — OMEPRAZOLE 40 MG/1
40 CAPSULE, DELAYED RELEASE ORAL DAILY
Status: ON HOLD | COMMUNITY
End: 2020-10-30

## 2020-09-26 RX ORDER — ATORVASTATIN CALCIUM 40 MG/1
40 TABLET, FILM COATED ORAL
Status: DISCONTINUED | OUTPATIENT
Start: 2020-09-26 | End: 2020-10-01 | Stop reason: HOSPADM

## 2020-09-26 RX ORDER — TAMSULOSIN HYDROCHLORIDE 0.4 MG/1
0.4 CAPSULE ORAL DAILY
Status: DISCONTINUED | OUTPATIENT
Start: 2020-09-26 | End: 2020-10-01 | Stop reason: HOSPADM

## 2020-09-26 RX ORDER — CARVEDILOL 12.5 MG/1
12.5 TABLET ORAL 2 TIMES DAILY WITH MEALS
Status: DISCONTINUED | OUTPATIENT
Start: 2020-09-26 | End: 2020-10-01 | Stop reason: HOSPADM

## 2020-09-26 RX ORDER — SODIUM CHLORIDE FOR INHALATION 0.9 %
12 VIAL, NEBULIZER (ML) INHALATION ONCE
Status: COMPLETED | OUTPATIENT
Start: 2020-09-26 | End: 2020-09-26

## 2020-09-26 RX ORDER — SPIRONOLACTONE 25 MG/1
25 TABLET ORAL DAILY
Status: DISCONTINUED | OUTPATIENT
Start: 2020-09-26 | End: 2020-10-01 | Stop reason: HOSPADM

## 2020-09-26 RX ORDER — LEVALBUTEROL 1.25 MG/.5ML
1.25 SOLUTION, CONCENTRATE RESPIRATORY (INHALATION)
Status: DISCONTINUED | OUTPATIENT
Start: 2020-09-26 | End: 2020-10-01 | Stop reason: HOSPADM

## 2020-09-26 RX ORDER — CLOPIDOGREL BISULFATE 75 MG/1
75 TABLET ORAL DAILY
Status: DISCONTINUED | OUTPATIENT
Start: 2020-09-26 | End: 2020-09-28

## 2020-09-26 RX ORDER — METHYLPREDNISOLONE SODIUM SUCCINATE 125 MG/2ML
125 INJECTION, POWDER, LYOPHILIZED, FOR SOLUTION INTRAMUSCULAR; INTRAVENOUS ONCE
Status: COMPLETED | OUTPATIENT
Start: 2020-09-26 | End: 2020-09-26

## 2020-09-26 RX ORDER — SENNOSIDES 8.6 MG
2 TABLET ORAL
Status: DISCONTINUED | OUTPATIENT
Start: 2020-09-26 | End: 2020-10-01 | Stop reason: HOSPADM

## 2020-09-26 RX ORDER — ASPIRIN 81 MG/1
81 TABLET, CHEWABLE ORAL EVERY OTHER DAY
Status: DISCONTINUED | OUTPATIENT
Start: 2020-09-26 | End: 2020-10-01 | Stop reason: HOSPADM

## 2020-09-26 RX ORDER — ALBUTEROL SULFATE 90 UG/1
2 AEROSOL, METERED RESPIRATORY (INHALATION) 4 TIMES DAILY
Status: DISCONTINUED | OUTPATIENT
Start: 2020-09-26 | End: 2020-09-26

## 2020-09-26 RX ORDER — LORATADINE 10 MG/1
10 TABLET ORAL DAILY
Status: DISCONTINUED | OUTPATIENT
Start: 2020-09-26 | End: 2020-10-01 | Stop reason: HOSPADM

## 2020-09-26 RX ORDER — BUDESONIDE 0.5 MG/2ML
0.5 INHALANT ORAL 2 TIMES DAILY
Status: DISCONTINUED | OUTPATIENT
Start: 2020-09-26 | End: 2020-10-01 | Stop reason: HOSPADM

## 2020-09-26 RX ORDER — AMLODIPINE BESYLATE 10 MG/1
10 TABLET ORAL DAILY
Status: DISCONTINUED | OUTPATIENT
Start: 2020-09-26 | End: 2020-10-01 | Stop reason: HOSPADM

## 2020-09-26 RX ORDER — OXYBUTYNIN CHLORIDE 5 MG/1
10 TABLET, EXTENDED RELEASE ORAL DAILY
Status: DISCONTINUED | OUTPATIENT
Start: 2020-09-26 | End: 2020-10-01 | Stop reason: HOSPADM

## 2020-09-26 RX ORDER — IPRATROPIUM BROMIDE AND ALBUTEROL SULFATE 2.5; .5 MG/3ML; MG/3ML
3 SOLUTION RESPIRATORY (INHALATION) 4 TIMES DAILY
Status: DISCONTINUED | OUTPATIENT
Start: 2020-09-26 | End: 2020-09-26

## 2020-09-26 RX ADMIN — CARVEDILOL 12.5 MG: 12.5 TABLET, FILM COATED ORAL at 11:48

## 2020-09-26 RX ADMIN — AZITHROMYCIN MONOHYDRATE 500 MG: 500 INJECTION, POWDER, LYOPHILIZED, FOR SOLUTION INTRAVENOUS at 07:32

## 2020-09-26 RX ADMIN — ALBUTEROL SULFATE 10 MG: 2.5 SOLUTION RESPIRATORY (INHALATION) at 01:51

## 2020-09-26 RX ADMIN — ISODIUM CHLORIDE 12 ML: 0.03 SOLUTION RESPIRATORY (INHALATION) at 01:50

## 2020-09-26 RX ADMIN — LORATADINE 10 MG: 10 TABLET ORAL at 10:59

## 2020-09-26 RX ADMIN — BENZONATATE 100 MG: 100 CAPSULE ORAL at 11:00

## 2020-09-26 RX ADMIN — HEPARIN SODIUM 5000 UNITS: 5000 INJECTION INTRAVENOUS; SUBCUTANEOUS at 21:37

## 2020-09-26 RX ADMIN — PREDNISONE 5 MG: 5 TABLET ORAL at 11:00

## 2020-09-26 RX ADMIN — BUDESONIDE 0.5 MG: 0.5 INHALANT RESPIRATORY (INHALATION) at 09:17

## 2020-09-26 RX ADMIN — IPRATROPIUM BROMIDE 1 MG: 0.5 SOLUTION RESPIRATORY (INHALATION) at 01:51

## 2020-09-26 RX ADMIN — ATORVASTATIN CALCIUM 40 MG: 40 TABLET, FILM COATED ORAL at 17:54

## 2020-09-26 RX ADMIN — IPRATROPIUM BROMIDE AND ALBUTEROL SULFATE 3 ML: 2.5; .5 SOLUTION RESPIRATORY (INHALATION) at 09:17

## 2020-09-26 RX ADMIN — METHYLPREDNISOLONE SODIUM SUCCINATE 125 MG: 125 INJECTION, POWDER, FOR SOLUTION INTRAMUSCULAR; INTRAVENOUS at 02:26

## 2020-09-26 RX ADMIN — CARVEDILOL 12.5 MG: 12.5 TABLET, FILM COATED ORAL at 17:54

## 2020-09-26 RX ADMIN — TAMSULOSIN HYDROCHLORIDE 0.4 MG: 0.4 CAPSULE ORAL at 10:59

## 2020-09-26 RX ADMIN — IPRATROPIUM BROMIDE 0.5 MG: 0.5 SOLUTION RESPIRATORY (INHALATION) at 19:16

## 2020-09-26 RX ADMIN — CEFTRIAXONE SODIUM 1000 MG: 10 INJECTION, POWDER, FOR SOLUTION INTRAVENOUS at 05:42

## 2020-09-26 RX ADMIN — OXYBUTYNIN 10 MG: 5 TABLET, FILM COATED, EXTENDED RELEASE ORAL at 11:48

## 2020-09-26 RX ADMIN — LEVALBUTEROL HYDROCHLORIDE 1.25 MG: 1.25 SOLUTION, CONCENTRATE RESPIRATORY (INHALATION) at 19:16

## 2020-09-26 RX ADMIN — ASPIRIN 81 MG CHEWABLE TABLET 81 MG: 81 TABLET CHEWABLE at 10:59

## 2020-09-26 RX ADMIN — BENZONATATE 100 MG: 100 CAPSULE ORAL at 21:37

## 2020-09-26 RX ADMIN — IOHEXOL 100 ML: 350 INJECTION, SOLUTION INTRAVENOUS at 02:49

## 2020-09-26 RX ADMIN — AMLODIPINE BESYLATE 10 MG: 10 TABLET ORAL at 12:23

## 2020-09-26 RX ADMIN — IPRATROPIUM BROMIDE 0.5 MG: 0.5 SOLUTION RESPIRATORY (INHALATION) at 14:57

## 2020-09-26 RX ADMIN — SENNOSIDES 17.2 MG: 8.6 TABLET, FILM COATED ORAL at 21:37

## 2020-09-26 RX ADMIN — LEVALBUTEROL HYDROCHLORIDE 1.25 MG: 1.25 SOLUTION, CONCENTRATE RESPIRATORY (INHALATION) at 14:57

## 2020-09-26 RX ADMIN — SERTRALINE HYDROCHLORIDE 50 MG: 50 TABLET ORAL at 11:48

## 2020-09-26 RX ADMIN — BUDESONIDE 0.5 MG: 0.5 INHALANT RESPIRATORY (INHALATION) at 19:16

## 2020-09-26 RX ADMIN — BENZONATATE 100 MG: 100 CAPSULE ORAL at 17:54

## 2020-09-26 RX ADMIN — SPIRONOLACTONE 25 MG: 25 TABLET ORAL at 11:48

## 2020-09-26 RX ADMIN — CLOPIDOGREL BISULFATE 75 MG: 75 TABLET ORAL at 11:00

## 2020-09-27 PROBLEM — J96.01 ACUTE RESPIRATORY FAILURE WITH HYPOXIA (HCC): Status: ACTIVE | Noted: 2020-09-27

## 2020-09-27 LAB
ANION GAP SERPL CALCULATED.3IONS-SCNC: 12 MMOL/L (ref 4–13)
BASOPHILS # BLD AUTO: 0.02 THOUSANDS/ΜL (ref 0–0.1)
BASOPHILS NFR BLD AUTO: 0 % (ref 0–1)
BUN SERPL-MCNC: 22 MG/DL (ref 5–25)
CALCIUM SERPL-MCNC: 9.4 MG/DL (ref 8.3–10.1)
CHLORIDE SERPL-SCNC: 103 MMOL/L (ref 100–108)
CO2 SERPL-SCNC: 23 MMOL/L (ref 21–32)
CREAT SERPL-MCNC: 1.02 MG/DL (ref 0.6–1.3)
EOSINOPHIL # BLD AUTO: 0.01 THOUSAND/ΜL (ref 0–0.61)
EOSINOPHIL NFR BLD AUTO: 0 % (ref 0–6)
ERYTHROCYTE [DISTWIDTH] IN BLOOD BY AUTOMATED COUNT: 14.2 % (ref 11.6–15.1)
GFR SERPL CREATININE-BSD FRML MDRD: 69 ML/MIN/1.73SQ M
GLUCOSE SERPL-MCNC: 106 MG/DL (ref 65–140)
HCT VFR BLD AUTO: 39.1 % (ref 36.5–49.3)
HGB BLD-MCNC: 12.8 G/DL (ref 12–17)
IMM GRANULOCYTES # BLD AUTO: 0.08 THOUSAND/UL (ref 0–0.2)
IMM GRANULOCYTES NFR BLD AUTO: 1 % (ref 0–2)
LYMPHOCYTES # BLD AUTO: 0.96 THOUSANDS/ΜL (ref 0.6–4.47)
LYMPHOCYTES NFR BLD AUTO: 6 % (ref 14–44)
MCH RBC QN AUTO: 30 PG (ref 26.8–34.3)
MCHC RBC AUTO-ENTMCNC: 32.7 G/DL (ref 31.4–37.4)
MCV RBC AUTO: 92 FL (ref 82–98)
MONOCYTES # BLD AUTO: 1.08 THOUSAND/ΜL (ref 0.17–1.22)
MONOCYTES NFR BLD AUTO: 7 % (ref 4–12)
NEUTROPHILS # BLD AUTO: 12.79 THOUSANDS/ΜL (ref 1.85–7.62)
NEUTS SEG NFR BLD AUTO: 86 % (ref 43–75)
NRBC BLD AUTO-RTO: 0 /100 WBCS
PLATELET # BLD AUTO: 168 THOUSANDS/UL (ref 149–390)
PMV BLD AUTO: 10.5 FL (ref 8.9–12.7)
POTASSIUM SERPL-SCNC: 4 MMOL/L (ref 3.5–5.3)
PROCALCITONIN SERPL-MCNC: 0.06 NG/ML
RBC # BLD AUTO: 4.26 MILLION/UL (ref 3.88–5.62)
SODIUM SERPL-SCNC: 138 MMOL/L (ref 136–145)
WBC # BLD AUTO: 14.94 THOUSAND/UL (ref 4.31–10.16)

## 2020-09-27 PROCEDURE — 94760 N-INVAS EAR/PLS OXIMETRY 1: CPT

## 2020-09-27 PROCEDURE — 92610 EVALUATE SWALLOWING FUNCTION: CPT

## 2020-09-27 PROCEDURE — 85025 COMPLETE CBC W/AUTO DIFF WBC: CPT | Performed by: PHYSICIAN ASSISTANT

## 2020-09-27 PROCEDURE — 94669 MECHANICAL CHEST WALL OSCILL: CPT

## 2020-09-27 PROCEDURE — 94640 AIRWAY INHALATION TREATMENT: CPT

## 2020-09-27 PROCEDURE — 94668 MNPJ CHEST WALL SBSQ: CPT

## 2020-09-27 PROCEDURE — 84145 PROCALCITONIN (PCT): CPT | Performed by: PHYSICIAN ASSISTANT

## 2020-09-27 PROCEDURE — 80048 BASIC METABOLIC PNL TOTAL CA: CPT | Performed by: PHYSICIAN ASSISTANT

## 2020-09-27 PROCEDURE — 99232 SBSQ HOSP IP/OBS MODERATE 35: CPT | Performed by: PHYSICIAN ASSISTANT

## 2020-09-27 RX ADMIN — CEFTRIAXONE SODIUM 1000 MG: 10 INJECTION, POWDER, FOR SOLUTION INTRAVENOUS at 06:15

## 2020-09-27 RX ADMIN — LORATADINE 10 MG: 10 TABLET ORAL at 09:12

## 2020-09-27 RX ADMIN — BENZONATATE 100 MG: 100 CAPSULE ORAL at 21:32

## 2020-09-27 RX ADMIN — TAMSULOSIN HYDROCHLORIDE 0.4 MG: 0.4 CAPSULE ORAL at 09:12

## 2020-09-27 RX ADMIN — SERTRALINE HYDROCHLORIDE 50 MG: 50 TABLET ORAL at 09:12

## 2020-09-27 RX ADMIN — BENZONATATE 100 MG: 100 CAPSULE ORAL at 18:31

## 2020-09-27 RX ADMIN — PREDNISONE 5 MG: 5 TABLET ORAL at 09:12

## 2020-09-27 RX ADMIN — CARVEDILOL 12.5 MG: 12.5 TABLET, FILM COATED ORAL at 09:12

## 2020-09-27 RX ADMIN — AMLODIPINE BESYLATE 10 MG: 10 TABLET ORAL at 09:12

## 2020-09-27 RX ADMIN — HEPARIN SODIUM 5000 UNITS: 5000 INJECTION INTRAVENOUS; SUBCUTANEOUS at 06:16

## 2020-09-27 RX ADMIN — BUDESONIDE 0.5 MG: 0.5 INHALANT RESPIRATORY (INHALATION) at 07:12

## 2020-09-27 RX ADMIN — LEVALBUTEROL HYDROCHLORIDE 1.25 MG: 1.25 SOLUTION, CONCENTRATE RESPIRATORY (INHALATION) at 19:19

## 2020-09-27 RX ADMIN — AZITHROMYCIN MONOHYDRATE 250 MG: 500 INJECTION, POWDER, LYOPHILIZED, FOR SOLUTION INTRAVENOUS at 09:12

## 2020-09-27 RX ADMIN — SENNOSIDES 17.2 MG: 8.6 TABLET, FILM COATED ORAL at 21:32

## 2020-09-27 RX ADMIN — IPRATROPIUM BROMIDE 0.5 MG: 0.5 SOLUTION RESPIRATORY (INHALATION) at 13:53

## 2020-09-27 RX ADMIN — HEPARIN SODIUM 5000 UNITS: 5000 INJECTION INTRAVENOUS; SUBCUTANEOUS at 14:44

## 2020-09-27 RX ADMIN — METRONIDAZOLE 500 MG: 500 INJECTION, SOLUTION INTRAVENOUS at 18:30

## 2020-09-27 RX ADMIN — IPRATROPIUM BROMIDE 0.5 MG: 0.5 SOLUTION RESPIRATORY (INHALATION) at 19:19

## 2020-09-27 RX ADMIN — ATORVASTATIN CALCIUM 40 MG: 40 TABLET, FILM COATED ORAL at 18:31

## 2020-09-27 RX ADMIN — BENZONATATE 100 MG: 100 CAPSULE ORAL at 09:12

## 2020-09-27 RX ADMIN — HEPARIN SODIUM 5000 UNITS: 5000 INJECTION INTRAVENOUS; SUBCUTANEOUS at 21:31

## 2020-09-27 RX ADMIN — LEVALBUTEROL HYDROCHLORIDE 1.25 MG: 1.25 SOLUTION, CONCENTRATE RESPIRATORY (INHALATION) at 07:12

## 2020-09-27 RX ADMIN — LEVALBUTEROL HYDROCHLORIDE 1.25 MG: 1.25 SOLUTION, CONCENTRATE RESPIRATORY (INHALATION) at 13:53

## 2020-09-27 RX ADMIN — CLOPIDOGREL BISULFATE 75 MG: 75 TABLET ORAL at 09:12

## 2020-09-27 RX ADMIN — CARVEDILOL 12.5 MG: 12.5 TABLET, FILM COATED ORAL at 18:31

## 2020-09-27 RX ADMIN — OXYBUTYNIN 10 MG: 5 TABLET, FILM COATED, EXTENDED RELEASE ORAL at 09:12

## 2020-09-27 RX ADMIN — BUDESONIDE 0.5 MG: 0.5 INHALANT RESPIRATORY (INHALATION) at 19:19

## 2020-09-27 RX ADMIN — SPIRONOLACTONE 25 MG: 25 TABLET ORAL at 09:12

## 2020-09-27 RX ADMIN — IPRATROPIUM BROMIDE 0.5 MG: 0.5 SOLUTION RESPIRATORY (INHALATION) at 07:12

## 2020-09-28 LAB
ALBUMIN SERPL BCP-MCNC: 3.1 G/DL (ref 3.5–5)
ALP SERPL-CCNC: 72 U/L (ref 46–116)
ALT SERPL W P-5'-P-CCNC: 46 U/L (ref 12–78)
ANION GAP SERPL CALCULATED.3IONS-SCNC: 10 MMOL/L (ref 4–13)
AST SERPL W P-5'-P-CCNC: 33 U/L (ref 5–45)
BASOPHILS # BLD AUTO: 0.02 THOUSANDS/ΜL (ref 0–0.1)
BASOPHILS NFR BLD AUTO: 0 % (ref 0–1)
BILIRUB SERPL-MCNC: 0.39 MG/DL (ref 0.2–1)
BUN SERPL-MCNC: 26 MG/DL (ref 5–25)
CALCIUM ALBUM COR SERPL-MCNC: 9.7 MG/DL (ref 8.3–10.1)
CALCIUM SERPL-MCNC: 9 MG/DL (ref 8.3–10.1)
CHLORIDE SERPL-SCNC: 104 MMOL/L (ref 100–108)
CO2 SERPL-SCNC: 24 MMOL/L (ref 21–32)
CREAT SERPL-MCNC: 1.02 MG/DL (ref 0.6–1.3)
EOSINOPHIL # BLD AUTO: 0.13 THOUSAND/ΜL (ref 0–0.61)
EOSINOPHIL NFR BLD AUTO: 1 % (ref 0–6)
ERYTHROCYTE [DISTWIDTH] IN BLOOD BY AUTOMATED COUNT: 14.3 % (ref 11.6–15.1)
GFR SERPL CREATININE-BSD FRML MDRD: 69 ML/MIN/1.73SQ M
GLUCOSE SERPL-MCNC: 94 MG/DL (ref 65–140)
HCT VFR BLD AUTO: 42.2 % (ref 36.5–49.3)
HGB BLD-MCNC: 13.3 G/DL (ref 12–17)
IMM GRANULOCYTES # BLD AUTO: 0.05 THOUSAND/UL (ref 0–0.2)
IMM GRANULOCYTES NFR BLD AUTO: 0 % (ref 0–2)
LYMPHOCYTES # BLD AUTO: 1.35 THOUSANDS/ΜL (ref 0.6–4.47)
LYMPHOCYTES NFR BLD AUTO: 11 % (ref 14–44)
MCH RBC QN AUTO: 29.9 PG (ref 26.8–34.3)
MCHC RBC AUTO-ENTMCNC: 31.5 G/DL (ref 31.4–37.4)
MCV RBC AUTO: 95 FL (ref 82–98)
MONOCYTES # BLD AUTO: 1.19 THOUSAND/ΜL (ref 0.17–1.22)
MONOCYTES NFR BLD AUTO: 10 % (ref 4–12)
NEUTROPHILS # BLD AUTO: 9.43 THOUSANDS/ΜL (ref 1.85–7.62)
NEUTS SEG NFR BLD AUTO: 78 % (ref 43–75)
NRBC BLD AUTO-RTO: 0 /100 WBCS
PLATELET # BLD AUTO: 154 THOUSANDS/UL (ref 149–390)
PMV BLD AUTO: 10.7 FL (ref 8.9–12.7)
POTASSIUM SERPL-SCNC: 4.3 MMOL/L (ref 3.5–5.3)
PROT SERPL-MCNC: 6.7 G/DL (ref 6.4–8.2)
RBC # BLD AUTO: 4.45 MILLION/UL (ref 3.88–5.62)
SODIUM SERPL-SCNC: 138 MMOL/L (ref 136–145)
WBC # BLD AUTO: 12.17 THOUSAND/UL (ref 4.31–10.16)

## 2020-09-28 PROCEDURE — 94760 N-INVAS EAR/PLS OXIMETRY 1: CPT

## 2020-09-28 PROCEDURE — 94668 MNPJ CHEST WALL SBSQ: CPT

## 2020-09-28 PROCEDURE — 99222 1ST HOSP IP/OBS MODERATE 55: CPT | Performed by: INTERNAL MEDICINE

## 2020-09-28 PROCEDURE — 97163 PT EVAL HIGH COMPLEX 45 MIN: CPT

## 2020-09-28 PROCEDURE — 94640 AIRWAY INHALATION TREATMENT: CPT

## 2020-09-28 PROCEDURE — 92526 ORAL FUNCTION THERAPY: CPT

## 2020-09-28 PROCEDURE — 80053 COMPREHEN METABOLIC PANEL: CPT | Performed by: PHYSICIAN ASSISTANT

## 2020-09-28 PROCEDURE — 85025 COMPLETE CBC W/AUTO DIFF WBC: CPT | Performed by: PHYSICIAN ASSISTANT

## 2020-09-28 PROCEDURE — 94669 MECHANICAL CHEST WALL OSCILL: CPT

## 2020-09-28 PROCEDURE — 99232 SBSQ HOSP IP/OBS MODERATE 35: CPT | Performed by: PHYSICIAN ASSISTANT

## 2020-09-28 RX ORDER — GUAIFENESIN 100 MG/5ML
400 SOLUTION ORAL 3 TIMES DAILY
Status: DISCONTINUED | OUTPATIENT
Start: 2020-09-28 | End: 2020-10-01 | Stop reason: HOSPADM

## 2020-09-28 RX ADMIN — TAMSULOSIN HYDROCHLORIDE 0.4 MG: 0.4 CAPSULE ORAL at 09:31

## 2020-09-28 RX ADMIN — CARVEDILOL 12.5 MG: 12.5 TABLET, FILM COATED ORAL at 09:31

## 2020-09-28 RX ADMIN — GUAIFENESIN 400 MG: 100 SOLUTION ORAL at 16:26

## 2020-09-28 RX ADMIN — LORATADINE 10 MG: 10 TABLET ORAL at 09:31

## 2020-09-28 RX ADMIN — HEPARIN SODIUM 5000 UNITS: 5000 INJECTION INTRAVENOUS; SUBCUTANEOUS at 14:59

## 2020-09-28 RX ADMIN — GUAIFENESIN 400 MG: 100 SOLUTION ORAL at 21:45

## 2020-09-28 RX ADMIN — AZITHROMYCIN MONOHYDRATE 250 MG: 500 INJECTION, POWDER, LYOPHILIZED, FOR SOLUTION INTRAVENOUS at 06:11

## 2020-09-28 RX ADMIN — CLOPIDOGREL BISULFATE 75 MG: 75 TABLET ORAL at 09:30

## 2020-09-28 RX ADMIN — METRONIDAZOLE 500 MG: 500 INJECTION, SOLUTION INTRAVENOUS at 01:10

## 2020-09-28 RX ADMIN — ASPIRIN 81 MG CHEWABLE TABLET 81 MG: 81 TABLET CHEWABLE at 09:31

## 2020-09-28 RX ADMIN — LEVALBUTEROL HYDROCHLORIDE 1.25 MG: 1.25 SOLUTION, CONCENTRATE RESPIRATORY (INHALATION) at 08:13

## 2020-09-28 RX ADMIN — GUAIFENESIN 400 MG: 100 SOLUTION ORAL at 09:31

## 2020-09-28 RX ADMIN — BENZONATATE 100 MG: 100 CAPSULE ORAL at 21:45

## 2020-09-28 RX ADMIN — IPRATROPIUM BROMIDE 0.5 MG: 0.5 SOLUTION RESPIRATORY (INHALATION) at 08:13

## 2020-09-28 RX ADMIN — CEFTRIAXONE SODIUM 1000 MG: 10 INJECTION, POWDER, FOR SOLUTION INTRAVENOUS at 04:34

## 2020-09-28 RX ADMIN — BUDESONIDE 0.5 MG: 0.5 INHALANT RESPIRATORY (INHALATION) at 08:13

## 2020-09-28 RX ADMIN — PREDNISONE 5 MG: 5 TABLET ORAL at 09:31

## 2020-09-28 RX ADMIN — HEPARIN SODIUM 5000 UNITS: 5000 INJECTION INTRAVENOUS; SUBCUTANEOUS at 05:38

## 2020-09-28 RX ADMIN — BENZONATATE 100 MG: 100 CAPSULE ORAL at 16:26

## 2020-09-28 RX ADMIN — AMLODIPINE BESYLATE 10 MG: 10 TABLET ORAL at 09:30

## 2020-09-28 RX ADMIN — ATORVASTATIN CALCIUM 40 MG: 40 TABLET, FILM COATED ORAL at 16:26

## 2020-09-28 RX ADMIN — SERTRALINE HYDROCHLORIDE 50 MG: 50 TABLET ORAL at 09:31

## 2020-09-28 RX ADMIN — CARVEDILOL 12.5 MG: 12.5 TABLET, FILM COATED ORAL at 16:26

## 2020-09-28 RX ADMIN — BENZONATATE 100 MG: 100 CAPSULE ORAL at 09:31

## 2020-09-28 RX ADMIN — BUDESONIDE 0.5 MG: 0.5 INHALANT RESPIRATORY (INHALATION) at 19:31

## 2020-09-28 RX ADMIN — IPRATROPIUM BROMIDE 0.5 MG: 0.5 SOLUTION RESPIRATORY (INHALATION) at 19:31

## 2020-09-28 RX ADMIN — LEVALBUTEROL HYDROCHLORIDE 1.25 MG: 1.25 SOLUTION, CONCENTRATE RESPIRATORY (INHALATION) at 19:31

## 2020-09-28 RX ADMIN — SENNOSIDES 17.2 MG: 8.6 TABLET, FILM COATED ORAL at 21:45

## 2020-09-28 RX ADMIN — IPRATROPIUM BROMIDE 0.5 MG: 0.5 SOLUTION RESPIRATORY (INHALATION) at 13:56

## 2020-09-28 RX ADMIN — HEPARIN SODIUM 5000 UNITS: 5000 INJECTION INTRAVENOUS; SUBCUTANEOUS at 21:45

## 2020-09-28 RX ADMIN — OXYBUTYNIN 10 MG: 5 TABLET, FILM COATED, EXTENDED RELEASE ORAL at 09:31

## 2020-09-28 RX ADMIN — SPIRONOLACTONE 25 MG: 25 TABLET ORAL at 09:31

## 2020-09-28 RX ADMIN — LEVALBUTEROL HYDROCHLORIDE 1.25 MG: 1.25 SOLUTION, CONCENTRATE RESPIRATORY (INHALATION) at 13:56

## 2020-09-29 LAB
ANION GAP SERPL CALCULATED.3IONS-SCNC: 8 MMOL/L (ref 4–13)
BACTERIA BLD CULT: ABNORMAL
BASOPHILS # BLD AUTO: 0.03 THOUSANDS/ΜL (ref 0–0.1)
BASOPHILS NFR BLD AUTO: 0 % (ref 0–1)
BUN SERPL-MCNC: 24 MG/DL (ref 5–25)
CALCIUM SERPL-MCNC: 9.2 MG/DL (ref 8.3–10.1)
CHLORIDE SERPL-SCNC: 103 MMOL/L (ref 100–108)
CO2 SERPL-SCNC: 26 MMOL/L (ref 21–32)
CREAT SERPL-MCNC: 1.06 MG/DL (ref 0.6–1.3)
EOSINOPHIL # BLD AUTO: 0.22 THOUSAND/ΜL (ref 0–0.61)
EOSINOPHIL NFR BLD AUTO: 3 % (ref 0–6)
ERYTHROCYTE [DISTWIDTH] IN BLOOD BY AUTOMATED COUNT: 14.4 % (ref 11.6–15.1)
GFR SERPL CREATININE-BSD FRML MDRD: 66 ML/MIN/1.73SQ M
GLUCOSE SERPL-MCNC: 87 MG/DL (ref 65–140)
GRAM STN SPEC: ABNORMAL
HCT VFR BLD AUTO: 42.1 % (ref 36.5–49.3)
HGB BLD-MCNC: 13.4 G/DL (ref 12–17)
IMM GRANULOCYTES # BLD AUTO: 0.04 THOUSAND/UL (ref 0–0.2)
IMM GRANULOCYTES NFR BLD AUTO: 1 % (ref 0–2)
LYMPHOCYTES # BLD AUTO: 1.44 THOUSANDS/ΜL (ref 0.6–4.47)
LYMPHOCYTES NFR BLD AUTO: 19 % (ref 14–44)
MCH RBC QN AUTO: 29.7 PG (ref 26.8–34.3)
MCHC RBC AUTO-ENTMCNC: 31.8 G/DL (ref 31.4–37.4)
MCV RBC AUTO: 93 FL (ref 82–98)
MONOCYTES # BLD AUTO: 0.68 THOUSAND/ΜL (ref 0.17–1.22)
MONOCYTES NFR BLD AUTO: 9 % (ref 4–12)
NEUTROPHILS # BLD AUTO: 5.37 THOUSANDS/ΜL (ref 1.85–7.62)
NEUTS SEG NFR BLD AUTO: 68 % (ref 43–75)
NRBC BLD AUTO-RTO: 0 /100 WBCS
PLATELET # BLD AUTO: 142 THOUSANDS/UL (ref 149–390)
PMV BLD AUTO: 10.2 FL (ref 8.9–12.7)
POTASSIUM SERPL-SCNC: 4.1 MMOL/L (ref 3.5–5.3)
RBC # BLD AUTO: 4.51 MILLION/UL (ref 3.88–5.62)
SODIUM SERPL-SCNC: 137 MMOL/L (ref 136–145)
WBC # BLD AUTO: 7.78 THOUSAND/UL (ref 4.31–10.16)

## 2020-09-29 PROCEDURE — 94668 MNPJ CHEST WALL SBSQ: CPT

## 2020-09-29 PROCEDURE — 94640 AIRWAY INHALATION TREATMENT: CPT

## 2020-09-29 PROCEDURE — 97167 OT EVAL HIGH COMPLEX 60 MIN: CPT

## 2020-09-29 PROCEDURE — 94669 MECHANICAL CHEST WALL OSCILL: CPT

## 2020-09-29 PROCEDURE — 99233 SBSQ HOSP IP/OBS HIGH 50: CPT | Performed by: INTERNAL MEDICINE

## 2020-09-29 PROCEDURE — 86022 PLATELET ANTIBODIES: CPT | Performed by: INTERNAL MEDICINE

## 2020-09-29 PROCEDURE — 92526 ORAL FUNCTION THERAPY: CPT

## 2020-09-29 PROCEDURE — 85025 COMPLETE CBC W/AUTO DIFF WBC: CPT | Performed by: PHYSICIAN ASSISTANT

## 2020-09-29 PROCEDURE — 80048 BASIC METABOLIC PNL TOTAL CA: CPT | Performed by: PHYSICIAN ASSISTANT

## 2020-09-29 PROCEDURE — 94760 N-INVAS EAR/PLS OXIMETRY 1: CPT

## 2020-09-29 RX ORDER — SODIUM CHLORIDE FOR INHALATION 0.9 %
3 VIAL, NEBULIZER (ML) INHALATION
Status: DISCONTINUED | OUTPATIENT
Start: 2020-09-29 | End: 2020-09-29

## 2020-09-29 RX ADMIN — ATORVASTATIN CALCIUM 40 MG: 40 TABLET, FILM COATED ORAL at 16:34

## 2020-09-29 RX ADMIN — TAMSULOSIN HYDROCHLORIDE 0.4 MG: 0.4 CAPSULE ORAL at 09:40

## 2020-09-29 RX ADMIN — IPRATROPIUM BROMIDE 0.5 MG: 0.5 SOLUTION RESPIRATORY (INHALATION) at 13:53

## 2020-09-29 RX ADMIN — SERTRALINE HYDROCHLORIDE 50 MG: 50 TABLET ORAL at 09:40

## 2020-09-29 RX ADMIN — CARVEDILOL 12.5 MG: 12.5 TABLET, FILM COATED ORAL at 16:34

## 2020-09-29 RX ADMIN — HEPARIN SODIUM 5000 UNITS: 5000 INJECTION INTRAVENOUS; SUBCUTANEOUS at 05:42

## 2020-09-29 RX ADMIN — LEVALBUTEROL HYDROCHLORIDE 1.25 MG: 1.25 SOLUTION, CONCENTRATE RESPIRATORY (INHALATION) at 20:19

## 2020-09-29 RX ADMIN — CARVEDILOL 12.5 MG: 12.5 TABLET, FILM COATED ORAL at 09:40

## 2020-09-29 RX ADMIN — LEVALBUTEROL HYDROCHLORIDE 1.25 MG: 1.25 SOLUTION, CONCENTRATE RESPIRATORY (INHALATION) at 13:53

## 2020-09-29 RX ADMIN — BENZONATATE 100 MG: 100 CAPSULE ORAL at 09:40

## 2020-09-29 RX ADMIN — HEPARIN SODIUM 5000 UNITS: 5000 INJECTION INTRAVENOUS; SUBCUTANEOUS at 21:22

## 2020-09-29 RX ADMIN — LORATADINE 10 MG: 10 TABLET ORAL at 09:40

## 2020-09-29 RX ADMIN — HEPARIN SODIUM 5000 UNITS: 5000 INJECTION INTRAVENOUS; SUBCUTANEOUS at 13:35

## 2020-09-29 RX ADMIN — BUDESONIDE 0.5 MG: 0.5 INHALANT RESPIRATORY (INHALATION) at 08:34

## 2020-09-29 RX ADMIN — SPIRONOLACTONE 25 MG: 25 TABLET ORAL at 09:40

## 2020-09-29 RX ADMIN — GUAIFENESIN 400 MG: 100 SOLUTION ORAL at 21:22

## 2020-09-29 RX ADMIN — IPRATROPIUM BROMIDE 0.5 MG: 0.5 SOLUTION RESPIRATORY (INHALATION) at 08:34

## 2020-09-29 RX ADMIN — GUAIFENESIN 400 MG: 100 SOLUTION ORAL at 09:40

## 2020-09-29 RX ADMIN — BUDESONIDE 0.5 MG: 0.5 INHALANT RESPIRATORY (INHALATION) at 20:19

## 2020-09-29 RX ADMIN — LEVALBUTEROL HYDROCHLORIDE 1.25 MG: 1.25 SOLUTION, CONCENTRATE RESPIRATORY (INHALATION) at 08:34

## 2020-09-29 RX ADMIN — BENZONATATE 100 MG: 100 CAPSULE ORAL at 21:22

## 2020-09-29 RX ADMIN — BENZONATATE 100 MG: 100 CAPSULE ORAL at 16:34

## 2020-09-29 RX ADMIN — GUAIFENESIN 400 MG: 100 SOLUTION ORAL at 16:34

## 2020-09-29 RX ADMIN — OXYBUTYNIN 10 MG: 5 TABLET, FILM COATED, EXTENDED RELEASE ORAL at 09:40

## 2020-09-29 RX ADMIN — IPRATROPIUM BROMIDE 0.5 MG: 0.5 SOLUTION RESPIRATORY (INHALATION) at 20:19

## 2020-09-29 RX ADMIN — PREDNISONE 5 MG: 5 TABLET ORAL at 09:40

## 2020-09-29 RX ADMIN — AMLODIPINE BESYLATE 10 MG: 10 TABLET ORAL at 09:40

## 2020-09-29 RX ADMIN — SENNOSIDES 17.2 MG: 8.6 TABLET, FILM COATED ORAL at 21:22

## 2020-09-30 LAB
ANION GAP SERPL CALCULATED.3IONS-SCNC: 12 MMOL/L (ref 4–13)
BUN SERPL-MCNC: 28 MG/DL (ref 5–25)
CALCIUM SERPL-MCNC: 9.3 MG/DL (ref 8.3–10.1)
CHLORIDE SERPL-SCNC: 103 MMOL/L (ref 100–108)
CO2 SERPL-SCNC: 22 MMOL/L (ref 21–32)
CREAT SERPL-MCNC: 1.02 MG/DL (ref 0.6–1.3)
GFR SERPL CREATININE-BSD FRML MDRD: 69 ML/MIN/1.73SQ M
GLUCOSE SERPL-MCNC: 94 MG/DL (ref 65–140)
POTASSIUM SERPL-SCNC: 4 MMOL/L (ref 3.5–5.3)
SODIUM SERPL-SCNC: 137 MMOL/L (ref 136–145)

## 2020-09-30 PROCEDURE — 99233 SBSQ HOSP IP/OBS HIGH 50: CPT | Performed by: INTERNAL MEDICINE

## 2020-09-30 PROCEDURE — 94669 MECHANICAL CHEST WALL OSCILL: CPT

## 2020-09-30 PROCEDURE — 80048 BASIC METABOLIC PNL TOTAL CA: CPT | Performed by: INTERNAL MEDICINE

## 2020-09-30 PROCEDURE — 94760 N-INVAS EAR/PLS OXIMETRY 1: CPT

## 2020-09-30 PROCEDURE — 94668 MNPJ CHEST WALL SBSQ: CPT

## 2020-09-30 PROCEDURE — 94640 AIRWAY INHALATION TREATMENT: CPT

## 2020-09-30 RX ADMIN — LEVALBUTEROL HYDROCHLORIDE 1.25 MG: 1.25 SOLUTION, CONCENTRATE RESPIRATORY (INHALATION) at 07:42

## 2020-09-30 RX ADMIN — CARVEDILOL 12.5 MG: 12.5 TABLET, FILM COATED ORAL at 09:21

## 2020-09-30 RX ADMIN — BENZONATATE 100 MG: 100 CAPSULE ORAL at 04:39

## 2020-09-30 RX ADMIN — BUDESONIDE 0.5 MG: 0.5 INHALANT RESPIRATORY (INHALATION) at 07:42

## 2020-09-30 RX ADMIN — SPIRONOLACTONE 25 MG: 25 TABLET ORAL at 09:21

## 2020-09-30 RX ADMIN — HEPARIN SODIUM 5000 UNITS: 5000 INJECTION INTRAVENOUS; SUBCUTANEOUS at 05:57

## 2020-09-30 RX ADMIN — AMLODIPINE BESYLATE 10 MG: 10 TABLET ORAL at 09:21

## 2020-09-30 RX ADMIN — IPRATROPIUM BROMIDE 0.5 MG: 0.5 SOLUTION RESPIRATORY (INHALATION) at 07:42

## 2020-09-30 RX ADMIN — GUAIFENESIN 400 MG: 100 SOLUTION ORAL at 09:21

## 2020-09-30 RX ADMIN — HEPARIN SODIUM 5000 UNITS: 5000 INJECTION INTRAVENOUS; SUBCUTANEOUS at 21:24

## 2020-09-30 RX ADMIN — LEVALBUTEROL HYDROCHLORIDE 1.25 MG: 1.25 SOLUTION, CONCENTRATE RESPIRATORY (INHALATION) at 13:57

## 2020-09-30 RX ADMIN — PREDNISONE 5 MG: 5 TABLET ORAL at 09:22

## 2020-09-30 RX ADMIN — OXYBUTYNIN 10 MG: 5 TABLET, FILM COATED, EXTENDED RELEASE ORAL at 09:21

## 2020-09-30 RX ADMIN — LEVALBUTEROL HYDROCHLORIDE 1.25 MG: 1.25 SOLUTION, CONCENTRATE RESPIRATORY (INHALATION) at 20:07

## 2020-09-30 RX ADMIN — ASPIRIN 81 MG CHEWABLE TABLET 81 MG: 81 TABLET CHEWABLE at 09:21

## 2020-09-30 RX ADMIN — HEPARIN SODIUM 5000 UNITS: 5000 INJECTION INTRAVENOUS; SUBCUTANEOUS at 13:02

## 2020-09-30 RX ADMIN — LORATADINE 10 MG: 10 TABLET ORAL at 09:21

## 2020-09-30 RX ADMIN — IPRATROPIUM BROMIDE 0.5 MG: 0.5 SOLUTION RESPIRATORY (INHALATION) at 20:07

## 2020-09-30 RX ADMIN — TAMSULOSIN HYDROCHLORIDE 0.4 MG: 0.4 CAPSULE ORAL at 09:21

## 2020-09-30 RX ADMIN — SERTRALINE HYDROCHLORIDE 50 MG: 50 TABLET ORAL at 09:21

## 2020-09-30 RX ADMIN — IPRATROPIUM BROMIDE 0.5 MG: 0.5 SOLUTION RESPIRATORY (INHALATION) at 13:56

## 2020-09-30 RX ADMIN — BUDESONIDE 0.5 MG: 0.5 INHALANT RESPIRATORY (INHALATION) at 20:07

## 2020-10-01 VITALS
BODY MASS INDEX: 25.92 KG/M2 | WEIGHT: 201.94 LBS | HEART RATE: 77 BPM | DIASTOLIC BLOOD PRESSURE: 72 MMHG | OXYGEN SATURATION: 94 % | RESPIRATION RATE: 18 BRPM | HEIGHT: 74 IN | SYSTOLIC BLOOD PRESSURE: 115 MMHG | TEMPERATURE: 98 F

## 2020-10-01 PROBLEM — L89.213 PRESSURE INJURY OF RIGHT HIP, STAGE 3 (HCC): Status: ACTIVE | Noted: 2020-10-01

## 2020-10-01 PROBLEM — J96.01 ACUTE RESPIRATORY FAILURE WITH HYPOXIA (HCC): Status: RESOLVED | Noted: 2020-09-27 | Resolved: 2020-10-01

## 2020-10-01 LAB
ANION GAP SERPL CALCULATED.3IONS-SCNC: 10 MMOL/L (ref 4–13)
BACTERIA BLD CULT: NORMAL
BUN SERPL-MCNC: 27 MG/DL (ref 5–25)
CALCIUM SERPL-MCNC: 9.2 MG/DL (ref 8.3–10.1)
CHLORIDE SERPL-SCNC: 103 MMOL/L (ref 100–108)
CO2 SERPL-SCNC: 23 MMOL/L (ref 21–32)
CREAT SERPL-MCNC: 1.05 MG/DL (ref 0.6–1.3)
ERYTHROCYTE [DISTWIDTH] IN BLOOD BY AUTOMATED COUNT: 14.2 % (ref 11.6–15.1)
GFR SERPL CREATININE-BSD FRML MDRD: 67 ML/MIN/1.73SQ M
GLUCOSE SERPL-MCNC: 101 MG/DL (ref 65–140)
HCT VFR BLD AUTO: 44.3 % (ref 36.5–49.3)
HGB BLD-MCNC: 14.2 G/DL (ref 12–17)
MCH RBC QN AUTO: 29.3 PG (ref 26.8–34.3)
MCHC RBC AUTO-ENTMCNC: 32.1 G/DL (ref 31.4–37.4)
MCV RBC AUTO: 91 FL (ref 82–98)
PF4 HEPARIN CMPLX AB SER-ACNC: 0.1 OD (ref 0–0.4)
PLATELET # BLD AUTO: 177 THOUSANDS/UL (ref 149–390)
PMV BLD AUTO: 10.4 FL (ref 8.9–12.7)
POTASSIUM SERPL-SCNC: 3.8 MMOL/L (ref 3.5–5.3)
RBC # BLD AUTO: 4.85 MILLION/UL (ref 3.88–5.62)
SODIUM SERPL-SCNC: 136 MMOL/L (ref 136–145)
WBC # BLD AUTO: 9.37 THOUSAND/UL (ref 4.31–10.16)

## 2020-10-01 PROCEDURE — 94640 AIRWAY INHALATION TREATMENT: CPT

## 2020-10-01 PROCEDURE — 97530 THERAPEUTIC ACTIVITIES: CPT

## 2020-10-01 PROCEDURE — 94669 MECHANICAL CHEST WALL OSCILL: CPT

## 2020-10-01 PROCEDURE — 80048 BASIC METABOLIC PNL TOTAL CA: CPT | Performed by: INTERNAL MEDICINE

## 2020-10-01 PROCEDURE — 94760 N-INVAS EAR/PLS OXIMETRY 1: CPT

## 2020-10-01 PROCEDURE — 94761 N-INVAS EAR/PLS OXIMETRY MLT: CPT

## 2020-10-01 PROCEDURE — 99239 HOSP IP/OBS DSCHRG MGMT >30: CPT | Performed by: INTERNAL MEDICINE

## 2020-10-01 PROCEDURE — 85027 COMPLETE CBC AUTOMATED: CPT | Performed by: INTERNAL MEDICINE

## 2020-10-01 RX ORDER — GUAIFENESIN 100 MG/5ML
400 SOLUTION ORAL 3 TIMES DAILY
Qty: 236 ML | Refills: 0 | Status: SHIPPED | OUTPATIENT
Start: 2020-10-01

## 2020-10-01 RX ADMIN — SPIRONOLACTONE 25 MG: 25 TABLET ORAL at 08:51

## 2020-10-01 RX ADMIN — CARVEDILOL 12.5 MG: 12.5 TABLET, FILM COATED ORAL at 08:51

## 2020-10-01 RX ADMIN — HEPARIN SODIUM 5000 UNITS: 5000 INJECTION INTRAVENOUS; SUBCUTANEOUS at 05:50

## 2020-10-01 RX ADMIN — BUDESONIDE 0.5 MG: 0.5 INHALANT RESPIRATORY (INHALATION) at 07:07

## 2020-10-01 RX ADMIN — AMLODIPINE BESYLATE 10 MG: 10 TABLET ORAL at 08:51

## 2020-10-01 RX ADMIN — TAMSULOSIN HYDROCHLORIDE 0.4 MG: 0.4 CAPSULE ORAL at 08:52

## 2020-10-01 RX ADMIN — GUAIFENESIN 400 MG: 100 SOLUTION ORAL at 08:52

## 2020-10-01 RX ADMIN — PREDNISONE 5 MG: 5 TABLET ORAL at 08:51

## 2020-10-01 RX ADMIN — IPRATROPIUM BROMIDE 0.5 MG: 0.5 SOLUTION RESPIRATORY (INHALATION) at 13:36

## 2020-10-01 RX ADMIN — BENZONATATE 100 MG: 100 CAPSULE ORAL at 08:52

## 2020-10-01 RX ADMIN — OXYBUTYNIN 10 MG: 5 TABLET, FILM COATED, EXTENDED RELEASE ORAL at 08:51

## 2020-10-01 RX ADMIN — LEVALBUTEROL HYDROCHLORIDE 1.25 MG: 1.25 SOLUTION, CONCENTRATE RESPIRATORY (INHALATION) at 07:07

## 2020-10-01 RX ADMIN — LORATADINE 10 MG: 10 TABLET ORAL at 08:51

## 2020-10-01 RX ADMIN — SERTRALINE HYDROCHLORIDE 50 MG: 50 TABLET ORAL at 08:52

## 2020-10-01 RX ADMIN — LEVALBUTEROL HYDROCHLORIDE 1.25 MG: 1.25 SOLUTION, CONCENTRATE RESPIRATORY (INHALATION) at 13:36

## 2020-10-01 RX ADMIN — IPRATROPIUM BROMIDE 0.5 MG: 0.5 SOLUTION RESPIRATORY (INHALATION) at 07:07

## 2020-10-05 ENCOUNTER — TELEPHONE (OUTPATIENT)
Dept: GASTROENTEROLOGY | Facility: CLINIC | Age: 80
End: 2020-10-05

## 2020-10-16 ENCOUNTER — ANESTHESIA EVENT (OUTPATIENT)
Dept: GASTROENTEROLOGY | Facility: HOSPITAL | Age: 80
End: 2020-10-16

## 2020-10-19 ENCOUNTER — ANESTHESIA (OUTPATIENT)
Dept: GASTROENTEROLOGY | Facility: HOSPITAL | Age: 80
End: 2020-10-19

## 2020-10-19 ENCOUNTER — PREP FOR PROCEDURE (OUTPATIENT)
Dept: INTERVENTIONAL RADIOLOGY/VASCULAR | Facility: CLINIC | Age: 80
End: 2020-10-19

## 2020-10-19 ENCOUNTER — TELEMEDICINE (OUTPATIENT)
Dept: INTERVENTIONAL RADIOLOGY/VASCULAR | Facility: CLINIC | Age: 80
End: 2020-10-19

## 2020-10-19 ENCOUNTER — HOSPITAL ENCOUNTER (OUTPATIENT)
Dept: GASTROENTEROLOGY | Facility: HOSPITAL | Age: 80
Setting detail: OUTPATIENT SURGERY
Discharge: HOME | End: 2020-10-19
Attending: INTERNAL MEDICINE | Admitting: INTERNAL MEDICINE
Payer: MEDICARE

## 2020-10-19 VITALS
OXYGEN SATURATION: 93 % | DIASTOLIC BLOOD PRESSURE: 67 MMHG | HEIGHT: 74 IN | TEMPERATURE: 98.5 F | RESPIRATION RATE: 20 BRPM | SYSTOLIC BLOOD PRESSURE: 133 MMHG | HEART RATE: 70 BPM | WEIGHT: 214 LBS | BODY MASS INDEX: 27.46 KG/M2

## 2020-10-19 VITALS — HEART RATE: 73 BPM

## 2020-10-19 DIAGNOSIS — R05.9 COUGH: Primary | ICD-10-CM

## 2020-10-19 DIAGNOSIS — R13.12 OROPHARYNGEAL DYSPHAGIA: ICD-10-CM

## 2020-10-19 DIAGNOSIS — R63.4 WEIGHT LOSS: Primary | ICD-10-CM

## 2020-10-19 DIAGNOSIS — T17.908S ASPIRATION INTO RESPIRATORY TRACT, SEQUELA: Primary | ICD-10-CM

## 2020-10-19 PROCEDURE — 43246 EGD PLACE GASTROSTOMY TUBE: CPT | Performed by: INTERNAL MEDICINE

## 2020-10-19 PROCEDURE — NC001 PR NO CHARGE: Performed by: RADIOLOGY

## 2020-10-19 RX ORDER — PROPOFOL 10 MG/ML
INJECTION, EMULSION INTRAVENOUS AS NEEDED
Status: DISCONTINUED | OUTPATIENT
Start: 2020-10-19 | End: 2020-10-19

## 2020-10-19 RX ORDER — VANCOMYCIN HYDROCHLORIDE 1 G/200ML
INJECTION, SOLUTION INTRAVENOUS AS NEEDED
Status: DISCONTINUED | OUTPATIENT
Start: 2020-10-19 | End: 2020-10-19

## 2020-10-19 RX ORDER — VANCOMYCIN HYDROCHLORIDE 1 G/200ML
10 INJECTION, SOLUTION INTRAVENOUS ONCE
Status: COMPLETED | OUTPATIENT
Start: 2020-10-19 | End: 2020-10-19

## 2020-10-19 RX ORDER — SODIUM CHLORIDE 9 MG/ML
125 INJECTION, SOLUTION INTRAVENOUS CONTINUOUS
Status: DISCONTINUED | OUTPATIENT
Start: 2020-10-19 | End: 2020-10-23 | Stop reason: HOSPADM

## 2020-10-19 RX ADMIN — PROPOFOL 50 MG: 10 INJECTION, EMULSION INTRAVENOUS at 14:01

## 2020-10-19 RX ADMIN — SODIUM CHLORIDE 125 ML/HR: 0.9 INJECTION, SOLUTION INTRAVENOUS at 13:32

## 2020-10-19 RX ADMIN — VANCOMYCIN HYDROCHLORIDE 1000 MG: 1 INJECTION, SOLUTION INTRAVENOUS at 13:22

## 2020-10-19 RX ADMIN — PROPOFOL 30 MG: 10 INJECTION, EMULSION INTRAVENOUS at 14:08

## 2020-10-19 RX ADMIN — PROPOFOL 50 MG: 10 INJECTION, EMULSION INTRAVENOUS at 13:59

## 2020-10-19 RX ADMIN — VANCOMYCIN HYDROCHLORIDE 1000 MG: 1 INJECTION, SOLUTION INTRAVENOUS at 13:32

## 2020-10-19 RX ADMIN — PROPOFOL 30 MG: 10 INJECTION, EMULSION INTRAVENOUS at 14:11

## 2020-10-19 RX ADMIN — PROPOFOL 30 MG: 10 INJECTION, EMULSION INTRAVENOUS at 14:04

## 2020-10-19 RX ADMIN — PROPOFOL 70 MG: 10 INJECTION, EMULSION INTRAVENOUS at 13:58

## 2020-10-20 NOTE — ED PROVIDER NOTES
History  Chief Complaint   Patient presents with    Cough     Pt  presents to the ED with complaints of a cough that has been present for the past three days  Sending facility has concern if vandana pt  had aspirated food  No other symptoms reported  76-year-old male sent in from local nursing home with chief complaint of loud, coarse cough  Onset was 3 days ago  No fevers or chills  Patient has not been short of breath or had any significant respiratory distress  History provided by:  Patient and nursing home   used: No    Cough   Cough characteristics:  Non-productive  Severity:  Moderate  Onset quality:  Gradual  Duration:  3 days  Timing:  Constant  Progression:  Unchanged  Chronicity:  New  Smoker: no    Relieved by:  Nothing  Worsened by:  Nothing  Ineffective treatments:  None tried  Associated symptoms: no chest pain, no chills, no diaphoresis, no fever, no rash, no shortness of breath, no sinus congestion and no wheezing        Prior to Admission Medications   Prescriptions Last Dose Informant Patient Reported? Taking?    ASPIRIN 81 PO   Yes No   Sig: Take 1 tablet by mouth every other day     Fesoterodine Fumarate ER (TOVIAZ) 8 MG TB24  Outside Facility (Specify) Yes No   Sig: Take 4 mg by mouth every evening     PARoxetine (PAXIL) 20 mg tablet   Yes No   Sig: Take 1 tablet by mouth daily   acetaminophen (TYLENOL) 325 mg tablet   No No   Sig: Take 2 tablets by mouth every 6 (six) hours as needed for fever   albuterol (PROVENTIL HFA,VENTOLIN HFA) 90 mcg/act inhaler   No No   Sig: Inhale 2 puffs 4 (four) times a day   amLODIPine (NORVASC) 10 mg tablet   Yes No   Sig: Take 1 tablet by mouth daily   atorvastatin (LIPITOR) 10 mg tablet   Yes No   Sig: Take 1 tablet by mouth   benzonatate (TESSALON PERLES) 100 mg capsule   No No   Sig: Take 1 capsule by mouth 3 (three) times a day as needed for cough   bimatoprost (LUMIGAN) 0 01 % ophthalmic drops   No No   Sig: Administer 1 drop to both eyes daily at bedtime   carvedilol (COREG) 12 5 mg tablet   No No   Sig: Take 1 tablet by mouth 2 (two) times a day with meals   clopidogrel (PLAVIX) 75 mg tablet   No No   Sig: Take 1 tablet by mouth daily   docusate sodium (COLACE) 100 mg capsule   No No   Sig: Take 1 capsule (100 mg total) by mouth 2 (two) times a day   fluorouracil (EFUDEX) 5 % cream   Yes No   Sig: APPLY TO ENTIRE LESIONS Q 12 H FOR 28 DAYS FROM 2/3/18 TO 3/2/18   fluticasone (FLONASE) 50 mcg/act nasal spray   Yes No   Si-2 sprays into each nostril daily   fluticasone-salmeterol (ADVAIR DISKUS) 250-50 mcg/dose inhaler   Yes No   Sig: Inhale 2 (two) times a day     hydrochlorothiazide (HYDRODIURIL) 12 5 mg tablet   No No   Sig: Take 1 tablet (12 5 mg total) by mouth daily   levocetirizine (XYZAL) 5 MG tablet   No No   Sig: Take 1 tablet by mouth every evening   meclizine (ANTIVERT) 25 mg tablet   No No   Sig: Take 1 tablet by mouth 3 (three) times a day as needed for dizziness   mupirocin (BACTROBAN) 2 % ointment   Yes No   Sig: ELA AA TID FOR 7 DAYS FROM 18 THROUGH 18   omeprazole (PriLOSEC) 40 MG capsule   Yes No   Sig: Take 1 capsule by mouth daily   polyethylene glycol (MIRALAX) 17 g packet   No No   Sig: Take 17 g by mouth daily   psyllium (METAMUCIL) 0 52 g capsule   Yes No   Sig: Take 0 52 g by mouth daily   spironolactone (ALDACTONE) 25 mg tablet   No No   Sig: Take 1 tablet (25 mg total) by mouth daily   tamsulosin (FLOMAX) 0 4 mg   Yes No   Sig: Take 1 capsule by mouth daily   tiotropium (SPIRIVA HANDIHALER) 18 mcg inhalation capsule   Yes No   Sig: Place into inhaler and inhale daily      Facility-Administered Medications: None       Past Medical History:   Diagnosis Date    [facility] ) 2017    Aspiration into respiratory tract     Chest pain 2017    [facility]     Depression     Elevated troponin 2017    GERD (gastroesophageal reflux disease)     History of CVA (cerebrovascular accident) 4/13/2016    Hyperlipidemia     Hypertension     [facility]    Right, status post lobectomy    Pneumonia     [facility] ([facility] )     Sleep apnea     awaiting sleep study results    Stroke ([facility] )     Weakness due to cerebrovascular accident (CVA)        Past Surgical History:   Procedure Laterality Date    COLONOSCOPY      ESOPHAGOGASTRODUODENOSCOPY N/A 4/13/2016    Procedure: ESOPHAGOGASTRODUODENOSCOPY (EGD); Surgeon: Alissa Cornejo MD;  [facility]; Service:     JOINT REPLACEMENT      knee replacement    LUNG LOBECTOMY      [place] ESOPHAGOGASTRODUODENOSCOPY TRANSORAL DIAGNOSTIC N/A 3/15/2017    Procedure: ESOPHAGOGASTRODUODENOSCOPY (EGD); Surgeon: Alissa Cornejo MD;  [facility]; Service: Gastroenterology    TRIGEMINAL NERVE DECOMPRESSION         Family History   Problem Relation Age of Onset    Family history unknown: Yes     I have reviewed and agree with the history as documented  Social History   Substance Use Topics    Smoking status: Former Smoker     Packs/day: 1 00     Years: 22 00     Types: Cigarettes     Start date: 1955     Quit date: 1977    Smokeless tobacco: Never Used    Alcohol use No        Review of Systems   Constitutional: Negative for chills, diaphoresis and fever  Respiratory: Positive for cough  Negative for shortness of breath and wheezing  Cardiovascular: Negative for chest pain and palpitations  Gastrointestinal: Negative for diarrhea, nausea and vomiting  Genitourinary: Negative for dysuria and frequency  Skin: Negative for rash  All other systems reviewed and are negative  Physical Exam  Physical Exam   Constitutional: He appears well-developed and well-nourished  No distress  HENT:   Head: Normocephalic and atraumatic  Eyes: EOM are normal  Pupils are equal, round, and reactive to light  Neck: Normal range of motion  No JVD present     Cardiovascular: Normal rate, regular rhythm, normal heart sounds and intact distal pulses  Exam reveals no gallop and no friction rub  No murmur heard  Pulmonary/Chest: Effort normal  No respiratory distress  He has wheezes (Scattered right-sided)  He has no rales  He exhibits no tenderness  Musculoskeletal: Normal range of motion  He exhibits no tenderness  Neurological: He is alert  He exhibits normal muscle tone  Skin: Skin is warm and dry  Psychiatric: He has a normal mood and affect  His behavior is normal  Judgment and thought content normal    Nursing note and vitals reviewed  Vital Signs  ED Triage Vitals [05/20/18 1848]   Temperature Pulse Respirations Blood Pressure SpO2   98 8 °F (37 1 °C) 75 22 159/83 98 %      Temp Source Heart Rate Source Patient Position - Orthostatic VS BP Location FiO2 (%)   Oral Monitor Lying Right arm --      Pain Score       No Pain           Vitals:    05/20/18 1848   BP: 159/83   Pulse: 75   Patient Position - Orthostatic VS: Lying       Visual Acuity      ED Medications  Medications - No data to display    Diagnostic Studies  Results Reviewed     None                 XR chest 2 views   ED Interpretation by Bartolo Sherman MD (05/20 1904)   This film was interpreted independently by me  No infiltrate, cardiac silhouette normal, no pleural effusions or pulmonary edema                    Procedures  Procedures       Phone Contacts  ED Phone Contact    ED Course                               MDM  Number of Diagnoses or Management Options  Cough: new and requires workup  Diagnosis management comments: Background: 65 y o  male presents with cough    Differential DX includes but is not limited to: mild uri, aspiration pneumonitis, doubt pneumonia, bacterial bronchitis    Plan: chest xray          Amount and/or Complexity of Data Reviewed  Tests in the radiology section of CPT®: ordered and reviewed  Independent visualization of images, tracings, or specimens: yes    Patient Progress  Patient progress: stable    CritCare Time    Disposition  Final diagnoses:   Cough - acute     Time reflects when diagnosis was documented in both MDM as applicable and the Disposition within this note     Time User Action Codes Description Comment    5/20/2018  7:07 PM Tavo Michaels Add [R05] Cough     5/20/2018  7:07 PM Tavo Michaels Modify [R05] Cough acute      ED Disposition     ED Disposition Condition Comment    Discharge  Bruce Reddy  discharge to home/self care  Condition at discharge: Good        Follow-up Information     Follow up With Specialties Details Why Contact Info    Jack Alonzo DO Family Medicine Schedule an appointment as soon as possible for a visit in 3 days  [address] [place] [address]  [phone]            Patient's Medications   Discharge Prescriptions    No medications on file     No discharge procedures on file      ED Provider  Electronically Signed by           Gaila Leventhal, MD  05/20/18 0696 Negative

## 2020-10-22 ENCOUNTER — TELEPHONE (OUTPATIENT)
Dept: RADIOLOGY | Facility: HOSPITAL | Age: 80
End: 2020-10-22

## 2020-10-23 ENCOUNTER — TELEPHONE (OUTPATIENT)
Dept: OBGYN CLINIC | Facility: HOSPITAL | Age: 80
End: 2020-10-23

## 2020-10-23 ENCOUNTER — TELEPHONE (OUTPATIENT)
Dept: RADIOLOGY | Facility: HOSPITAL | Age: 80
End: 2020-10-23

## 2020-10-30 ENCOUNTER — HOSPITAL ENCOUNTER (OUTPATIENT)
Dept: RADIOLOGY | Facility: HOSPITAL | Age: 80
Discharge: HOME | End: 2020-10-30
Attending: RADIOLOGY | Admitting: RADIOLOGY
Payer: MEDICARE

## 2020-10-30 ENCOUNTER — ANESTHESIA EVENT (OUTPATIENT)
Dept: RADIOLOGY | Facility: HOSPITAL | Age: 80
End: 2020-10-30
Payer: MEDICARE

## 2020-10-30 ENCOUNTER — ANESTHESIA (OUTPATIENT)
Dept: RADIOLOGY | Facility: HOSPITAL | Age: 80
End: 2020-10-30
Payer: MEDICARE

## 2020-10-30 VITALS
DIASTOLIC BLOOD PRESSURE: 81 MMHG | SYSTOLIC BLOOD PRESSURE: 147 MMHG | HEART RATE: 70 BPM | HEIGHT: 77 IN | TEMPERATURE: 97 F | RESPIRATION RATE: 16 BRPM | OXYGEN SATURATION: 99 % | BODY MASS INDEX: 25.98 KG/M2 | WEIGHT: 220 LBS

## 2020-10-30 VITALS — HEART RATE: 86 BPM

## 2020-10-30 DIAGNOSIS — T17.908S ASPIRATION INTO RESPIRATORY TRACT, SEQUELA: ICD-10-CM

## 2020-10-30 PROCEDURE — 49440 PLACE GASTROSTOMY TUBE PERC: CPT

## 2020-10-30 PROCEDURE — 99024 POSTOP FOLLOW-UP VISIT: CPT | Performed by: RADIOLOGY

## 2020-10-30 PROCEDURE — 49440 PLACE GASTROSTOMY TUBE PERC: CPT | Performed by: RADIOLOGY

## 2020-10-30 RX ORDER — KETAMINE HCL IN NACL, ISO-OSM 100MG/10ML
SYRINGE (ML) INJECTION AS NEEDED
Status: DISCONTINUED | OUTPATIENT
Start: 2020-10-30 | End: 2020-10-30

## 2020-10-30 RX ORDER — LIDOCAINE HYDROCHLORIDE 10 MG/ML
INJECTION, SOLUTION EPIDURAL; INFILTRATION; INTRACAUDAL; PERINEURAL AS NEEDED
Status: DISCONTINUED | OUTPATIENT
Start: 2020-10-30 | End: 2020-10-30

## 2020-10-30 RX ORDER — PROPOFOL 10 MG/ML
INJECTION, EMULSION INTRAVENOUS CONTINUOUS PRN
Status: DISCONTINUED | OUTPATIENT
Start: 2020-10-30 | End: 2020-10-30

## 2020-10-30 RX ORDER — SODIUM CHLORIDE 9 MG/ML
INJECTION, SOLUTION INTRAVENOUS CONTINUOUS PRN
Status: DISCONTINUED | OUTPATIENT
Start: 2020-10-30 | End: 2020-10-30

## 2020-10-30 RX ORDER — PANTOPRAZOLE SODIUM 40 MG/1
40 TABLET, DELAYED RELEASE ORAL DAILY
COMMUNITY

## 2020-10-30 RX ORDER — LIDOCAINE WITH 8.4% SOD BICARB 0.9%(10ML)
SYRINGE (ML) INJECTION CODE/TRAUMA/SEDATION MEDICATION
Status: COMPLETED | OUTPATIENT
Start: 2020-10-30 | End: 2020-10-30

## 2020-10-30 RX ORDER — VANCOMYCIN HYDROCHLORIDE 1 G/20ML
INJECTION, POWDER, LYOPHILIZED, FOR SOLUTION INTRAVENOUS AS NEEDED
Status: DISCONTINUED | OUTPATIENT
Start: 2020-10-30 | End: 2020-10-30

## 2020-10-30 RX ORDER — VANCOMYCIN HYDROCHLORIDE 1 G/200ML
1000 INJECTION, SOLUTION INTRAVENOUS ONCE
Status: DISCONTINUED | OUTPATIENT
Start: 2020-10-30 | End: 2020-10-31 | Stop reason: HOSPADM

## 2020-10-30 RX ADMIN — GLUCAGON HYDROCHLORIDE 1 MG: KIT at 16:30

## 2020-10-30 RX ADMIN — LIDOCAINE HYDROCHLORIDE 50 MG: 10 INJECTION, SOLUTION EPIDURAL; INFILTRATION; INTRACAUDAL; PERINEURAL at 16:15

## 2020-10-30 RX ADMIN — VANCOMYCIN HYDROCHLORIDE 1 G: 1 INJECTION, POWDER, LYOPHILIZED, FOR SOLUTION INTRAVENOUS at 16:05

## 2020-10-30 RX ADMIN — SODIUM CHLORIDE: 9 INJECTION, SOLUTION INTRAVENOUS at 16:15

## 2020-10-30 RX ADMIN — Medication 10 ML: at 16:38

## 2020-10-30 RX ADMIN — PROPOFOL 50 MCG/KG/MIN: 10 INJECTION, EMULSION INTRAVENOUS at 16:15

## 2020-10-30 RX ADMIN — Medication 30 MG: at 16:15

## 2020-11-04 ENCOUNTER — TELEPHONE (OUTPATIENT)
Dept: GASTROENTEROLOGY | Facility: CLINIC | Age: 80
End: 2020-11-04

## 2020-11-04 DIAGNOSIS — R13.12 OROPHARYNGEAL DYSPHAGIA: Primary | ICD-10-CM

## 2020-12-10 ENCOUNTER — OFFICE VISIT (OUTPATIENT)
Dept: DERMATOLOGY | Facility: CLINIC | Age: 80
End: 2020-12-10
Payer: MEDICARE

## 2020-12-10 VITALS — TEMPERATURE: 97 F

## 2020-12-10 DIAGNOSIS — L57.0 ACTINIC KERATOSIS: Primary | ICD-10-CM

## 2020-12-10 DIAGNOSIS — D18.01 CHERRY ANGIOMA: ICD-10-CM

## 2020-12-10 DIAGNOSIS — Q82.8 POROKERATOSIS OF MIBELLI: ICD-10-CM

## 2020-12-10 DIAGNOSIS — D22.70 MULTIPLE BENIGN MELANOCYTIC NEVI OF UPPER AND LOWER EXTREMITIES AND TRUNK: ICD-10-CM

## 2020-12-10 DIAGNOSIS — L81.4 SOLAR LENTIGO: ICD-10-CM

## 2020-12-10 DIAGNOSIS — L82.1 SEBORRHEIC KERATOSIS: ICD-10-CM

## 2020-12-10 DIAGNOSIS — D22.5 MULTIPLE BENIGN MELANOCYTIC NEVI OF UPPER AND LOWER EXTREMITIES AND TRUNK: ICD-10-CM

## 2020-12-10 DIAGNOSIS — D22.60 MULTIPLE BENIGN MELANOCYTIC NEVI OF UPPER AND LOWER EXTREMITIES AND TRUNK: ICD-10-CM

## 2020-12-10 PROCEDURE — 17000 DESTRUCT PREMALG LESION: CPT | Performed by: DERMATOLOGY

## 2020-12-10 PROCEDURE — 17003 DESTRUCT PREMALG LES 2-14: CPT | Performed by: DERMATOLOGY

## 2020-12-10 PROCEDURE — 99214 OFFICE O/P EST MOD 30 MIN: CPT | Performed by: DERMATOLOGY

## 2020-12-15 DIAGNOSIS — J43.8 OTHER EMPHYSEMA (HCC): Chronic | ICD-10-CM

## 2020-12-15 DIAGNOSIS — J39.8 TRACHEOMALACIA: Chronic | ICD-10-CM

## 2020-12-15 RX ORDER — PREDNISONE 1 MG/1
TABLET ORAL
Qty: 90 TABLET | Refills: 3 | Status: SHIPPED | OUTPATIENT
Start: 2020-12-15 | End: 2021-12-03

## 2020-12-16 DIAGNOSIS — R05.9 COUGH: ICD-10-CM

## 2020-12-16 RX ORDER — BENZONATATE 200 MG/1
CAPSULE ORAL
Qty: 90 CAPSULE | Refills: 1 | Status: SHIPPED | OUTPATIENT
Start: 2020-12-16 | End: 2021-03-13

## 2020-12-17 ENCOUNTER — TELEPHONE (OUTPATIENT)
Dept: PSYCHIATRY | Facility: CLINIC | Age: 80
End: 2020-12-17

## 2020-12-17 NOTE — TELEPHONE ENCOUNTER
Behavorial Health Outpatient Intake Questions    Referred by: GASTRO     Please advised interviewee that they need to answer all questions truthfully to allow for best care and any misrepresentations of information may affect their ability to be seen at this clinic   => Was this discussed? 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 ? If yes, can they speak to you on the phone? If they are too limited to speak to you on phone, refer out No    Are you taking any psychiatric medications? Yes    => If yes, who prescribes? If yes, are they injectable medications? PCP    Does the patient have a language barrier or hearing impairment? No    Have you been treated at [facility] by a therapist or a doctor in the past? If yes, who? No    Has the patient been hospitalized for mental health? No   If yes, how long ago was last hospitalization and where was it? Do you actively use alcohol or marijuana or illegal substances? If yes, what and how much - refer out to Drug and alcohol treatment if use is excessive or daily use of illegal substances No concerns of substance abuse are reported  Do you have a community treatment team or ? No    Legal History-     Does the patient have any history of arrests, FPC/correction time, or DUIs? No  If Yes-  1) What types of charges? 2) When were they last incarcerated? 3) Are they currently on parole or probation? Minor Child-    Who has custody of the child? Is there a custody agreement?      If there is a custody agreement remind parent that they must bring a copy to the first appt or they will not be seen  Intake Team, please check with provider before scheduling if flags come up such as:  - complex case  - legal history (other than DUI)  - communication barrier concerns are present  - if, in your judgment, this needs further review    ACCEPTED as a patient Yes  => Appointment Date: Monday, February 15th at 1:30pm with Dr Olivia Bright? No    Name of Insurance Co: Medicare A and B  Insurance ID# [employer] #  If ins is primary or secondary: [employer]: [id number]  If patient is a minor, parents information such as Name, D  O B of guarantor

## 2021-01-18 DIAGNOSIS — R05.3 CHRONIC COUGH: ICD-10-CM

## 2021-01-18 DIAGNOSIS — J44.0 CHRONIC OBSTRUCTIVE PULMONARY DISEASE WITH ACUTE LOWER RESPIRATORY INFECTION (HCC): ICD-10-CM

## 2021-01-18 RX ORDER — BUDESONIDE 0.5 MG/2ML
0.5 INHALANT ORAL 2 TIMES DAILY
Qty: 360 ML | Refills: 3 | Status: SHIPPED | OUTPATIENT
Start: 2021-01-18 | End: 2021-12-06

## 2021-01-20 DIAGNOSIS — Z23 ENCOUNTER FOR IMMUNIZATION: ICD-10-CM

## 2021-02-15 ENCOUNTER — TELEMEDICINE (OUTPATIENT)
Dept: PSYCHIATRY | Facility: CLINIC | Age: 81
End: 2021-02-15
Payer: MEDICARE

## 2021-02-15 ENCOUNTER — TELEPHONE (OUTPATIENT)
Dept: PSYCHIATRY | Facility: CLINIC | Age: 81
End: 2021-02-15

## 2021-02-15 DIAGNOSIS — F41.9 ANXIETY: ICD-10-CM

## 2021-02-15 DIAGNOSIS — F32.5 MAJOR DEPRESSIVE DISORDER WITH SINGLE EPISODE, IN FULL REMISSION (HCC): Primary | ICD-10-CM

## 2021-02-15 DIAGNOSIS — F03.90 MILD DEMENTIA (HCC): ICD-10-CM

## 2021-02-15 PROCEDURE — 90792 PSYCH DIAG EVAL W/MED SRVCS: CPT | Performed by: PSYCHIATRY & NEUROLOGY

## 2021-02-15 RX ORDER — TRAZODONE HYDROCHLORIDE 50 MG/1
TABLET ORAL
Qty: 30 TABLET | Refills: 2 | Status: SHIPPED | OUTPATIENT
Start: 2021-02-15

## 2021-02-15 NOTE — BH TREATMENT PLAN
TREATMENT PLAN (Medication Management Only)        [facility]    Name and Date of Birth:  Doretha Layne  79 y o  1940  Date of Treatment Plan: February 15, 2021  Diagnosis/Diagnoses:    1  Major depressive disorder with single episode, in full remission ([facility] )    2  Anxiety    3  Mild dementia [facility])      Strengths/Personal Resources for Self-Care: supportive family, ability to communicate needs, family ties, financial means  Area/Areas of need (in own words): anxiety, depression  1  Long Term Goal: continue improvement in depression  Target Date:6 months - 8/15/2021  Person/Persons responsible for completion of goal: family Davi  2  Short Term Objective (s) - How will we reach this goal?:   A  Provider new recommended medication/dosage changes and/or continue medication(s): continue current medications as prescribed Paxil, Trazodone, Ativan  B  exercise, music, meditation     C  keeping busy, arts and crafts, word games     Target Date:6 months - 8/15/2021  Person/Persons Responsible for Completion of Goal: family Davi  Progress Towards Goals: starting treatment  Treatment Modality: medication management every 3 months  Review due 180 days from date of this plan: 6 months - 8/15/2021  Expected length of service: ongoing treatment  My Physician/PA/NP and I have developed this plan together and I agree to work on the goals and objectives  I understand the treatment goals that were developed for my treatment  Patient has given consent for COVID 19 precautions and use of telephonic communication or video communications, when applicable, and has given verbal consent for treatment plan, but is unable to sign treatment plan due to [address] concerns and video visit

## 2021-02-15 NOTE — PSYCH
Psychiatric Evaluation - Behavioral Health     Identification Data:Zac Edmond  79 y o  male MRN: [id number]    Chief Complaint: "I am alright I think"  History of present illness:  Patient is a 80 yo male who has multiple medical problems including CVA (14 years ago), mild dementia, currently on Paxil 20 mg daily, Ativan 0 5 mg, half tab bid  He is present on this video call with his daughter  He has been on these medication for quite a while for depression and anxiety, for 25 years, although the Ativan had been PRN prior to the last few years  He had an attempt to change over the Paxil for Zoloft about 6 months ago  This occurred over a few weeks  His daughter feels that he has been doing well on the Paxil and has had minimal depression on his current meds  He and daughter feel that he has anxiety about his physical conditions and limitations, this can frustrate him at times  He has occasional anger or irritability, but this has been short lived  In the past he had depression and anxiety symptoms  These symptoms occurred more after his wife  22 years ago  He has no side effects on his medications  He has been having some difficulty getting to sleep on time about 3 times per week, usually the nights that his daughter works  He sees things at times, mostly in relation to things that are there, which are not perceived correctly  Sometimes he is disoriented on awakening  He does not seem to have agitation later in the day  He has mild dementia and sometimes forgets to eat in a certain way to limit the effects of dysphagia, which resulted from CVA  He is on a honey thick liquid diet, tube is being used for hydration  Soon tube feeding will be added for part of the day, due to choking and aspiration risk  Giovanni Huynh      Psychiatric Review Of Systems:  Change in sleep: yes  Appetite changes: no  Weight changes: no  Change in energy/anergy: yes  Change in interest/pleasure/anhedonia: no  Somatic symptoms: no  Anxiety/panic: no  Manic symptoms: no  Guilt feelings:no  Hopeless: no  Self injurious behavior/risky behavior: no    Historical Information     Past Psychiatric History:   Has never been hospitalized, has only been the above meds  Substance Abuse History:  He and daughter denies  Past Medical History  CVA w left sided weakness and dysphagia w G tube, GERD, COPD, HTN, PVD, BPH, CKD, Prostate CA, Lung CA, Thrombocytopenia, Leukocytosis, Mild Dementia  Family Psychiatric History:    None    Social History:  Developmental: born and raised in [address] [place] [address]  Education: some college  Marital history:   Living arrangement, social support: children, lives with 1 of his 2 daughter  The other one is [address] [facility]  Occupational History: retired   Access to firearms: none      ALLERGIES   PCN  Mental Status Evaluation:  Appearance:  {Adequate hygiene and grooming and Good eye contact   Behavior:  calm, cooperative and friendly   Speech:   Language Normal rate and Soft  Able to name objects and Able to repeat phrases   Mood:  euthymic   Affect:    Thought process appropriate  Goal directed and coherent   Associations: Tightly connected   Thought Content:  Does not verbalize delusional material   Perceptual Disturbances: Denies hallucinations and does not appear to be responding to internal stimuli   Risk Potential: No suicidal or homicidal ideation   Orientation  Oriented x 3   Memory Short term impaired and Long term intact   Attention/Concentration attention span and concentration were age appropriate   Fund of knowledge aware of current events, Aware of past history and vocabulary Average   Insight:  Good insight   Judgment: Good judgment   Gait/Station: The patient is nonambulatory   Motor Activity: No abnormal movement noted             Assessment/Plan   Diagnoses and all orders for this visit:    Major depressive disorder with single episode, in full remission ([facility] [address] )    Anxiety  -     traZODone (DESYREL) 50 mg tablet; 1/2 to 1 tab @ HS PRN    Mild dementia ([facility] [address] )        Plan:   3 months trazodone sent in, receives Paxil and Ativan from PCP  Follow up in 3 months  Risks, benefits and possible side effects of Medications:   Risks, benefits, and possible side effects of medications explained to patient and patient verbalizes understanding  Virtual Regular Visit      Assessment/Plan:    Problem List Items Addressed This Visit        Nervous and Auditory    Mild dementia ([facility] [address] )    Relevant Medications    traZODone (DESYREL) 50 mg tablet       Other    Major depressive disorder with single episode, in full remission ([facility] [address] ) - Primary    Relevant Medications    traZODone (DESYREL) 50 mg tablet    Anxiety    Relevant Medications    traZODone (DESYREL) 50 mg tablet               Reason for visit is No chief complaint on file  Encounter provider Saqib Mayers MD    Provider located at [address] [place] [address] [address]  [facility] [address] [phone]      Recent Visits  No visits were found meeting these conditions  Showing recent visits within past 7 days and meeting all other requirements     Today's Visits  Date Type Provider Dept   02/15/21 Telemedicine Saqib Mayers MD [facility]   Showing today's visits and meeting all other requirements     Future Appointments  No visits were found meeting these conditions  Showing future appointments within next 150 days and meeting all other requirements        The patient was identified by name and date of birth  Raghu Marrero  was informed that this is a telemedicine visit and that the visit is being conducted through [email or web address] and patient was informed that this is a secure, HIPAA-compliant platform  He agrees to proceed     My office door was closed  No one else was in the room    He acknowledged consent and understanding of privacy and security of the video platform  The patient has agreed to participate and understands they can discontinue the visit at any time  Patient is aware this is a billable service  Subjective  Cheryl Ortiz  is a 79 y o  male see above   HPI     Past Medical History:   Diagnosis Date    RONAL (acute kidney injury) ([facility] [address] ) 5/31/2017    Aspiration into respiratory tract     Chest pain 5/31/2017    COPD (chronic obstructive pulmonary disease) (HCC)     Depression     Elevated troponin 5/31/2017    GERD (gastroesophageal reflux disease)     History of CVA (cerebrovascular accident) 4/13/2016    Hyperlipidemia     Hypertension     Lung cancer ([facility] [address] ) 2005    Right, status post lobectomy    Pneumonia     [facility]     Sleep apnea     awaiting sleep study results    Squamous cell skin cancer     Stroke ([facility] )     Tracheomalacia     Weakness due to cerebrovascular accident (CVA)        Past Surgical History:   Procedure Laterality Date    COLONOSCOPY      CT GUIDED FINE NEEDLE ASPIRATION (ALL INC)  10/30/2020    ESOPHAGOGASTRODUODENOSCOPY N/A 4/13/2016    Procedure: ESOPHAGOGASTRODUODENOSCOPY (EGD); Surgeon: Ramos Velez MD;  [facility]; Service:     JOINT REPLACEMENT      knee replacement    LUNG LOBECTOMY      ME ESOPHAGOGASTRODUODENOSCOPY TRANSORAL DIAGNOSTIC N/A 3/15/2017    Procedure: ESOPHAGOGASTRODUODENOSCOPY (EGD); Surgeon: Ramos Velez MD;  [facility]: [facility];   Service: Gastroenterology    SKIN BIOPSY      TRIGEMINAL NERVE DECOMPRESSION         Current Outpatient Medications   Medication Sig Dispense Refill    acetaminophen (TYLENOL) 325 mg tablet Take 2 tablets by mouth every 6 (six) hours as needed for fever 30 tablet 0    albuterol (PROVENTIL HFA,VENTOLIN HFA) 90 mcg/act inhaler Inhale 2 puffs 4 (four) times a day 2 Inhaler 0    amLODIPine (NORVASC) 10 mg tablet Take 1 tablet by mouth daily      ASPIRIN 81 PO Take 1 tablet by mouth every other day        atorvastatin (LIPITOR) 40 mg tablet Take 1 tablet (40 mg total) by mouth daily after dinner (Patient taking differently: Take 10 mg by mouth daily after dinner )  0    benzonatate (TESSALON) 200 MG capsule TAKE 1 CAPSULE BY MOUTH THREE TIMES A DAY AS NEEDED FOR COUGH 90 capsule 1    budesonide (PULMICORT) 0 5 mg/2 mL nebulizer solution Take 1 vial (0 5 mg total) by nebulization 2 (two) times a day Rinse mouth after use  360 mL 3    carvedilol (COREG) 12 5 mg tablet Take 1 tablet by mouth 2 (two) times a day with meals 60 tablet 0    clopidogrel (PLAVIX) 75 mg tablet Take 1 tablet by mouth daily 30 tablet 0    Fesoterodine Fumarate ER (TOVIAZ) 8 MG TB24 Take 4 mg by mouth every evening        guaiFENesin (ROBITUSSIN) 100 mg/5 mL oral solution Take 20 mL (400 mg total) by mouth 3 (three) times a day (Patient not taking: Reported on 12/10/2020) 236 mL 0    ketoconazole (NIZORAL) 2 % cream Apply topically to both feet in their entirety (tops, bottoms and between toes) 3 times a day for 4 weeks straight   60 g 5    latanoprost (XALATAN) 0 005 % ophthalmic solution Administer 1 drop to both eyes daily at bedtime      loratadine (CLARITIN) 10 mg tablet Take 10 mg by mouth daily      LORazepam (ATIVAN) 0 5 mg tablet Take 0 25 mg by mouth every 8 (eight) hours as needed for anxiety       pantoprazole (PROTONIX) 40 mg tablet Take 40 mg by mouth daily      PARoxetine (PAXIL) 20 mg tablet Take 20 mg by mouth daily      polyethylene glycol (MIRALAX) 17 g packet Take 17 g by mouth daily as needed      predniSONE 5 mg tablet TAKE 1 TABLET BY MOUTH EVERY DAY 90 tablet 3    psyllium (METAMUCIL) 0 52 g capsule Take 0 52 g by mouth daily as needed       senna (SENOKOT) 8 6 MG tablet Take 2 tablets by mouth daily at bedtime      spironolactone (ALDACTONE) 25 mg tablet Take 1 tablet (25 mg total) by mouth daily 30 tablet 0    tamsulosin (FLOMAX) 0 4 mg Take 1 capsule by mouth daily      traZODone (DESYREL) 50 mg tablet 1/2 to 1 tab @ HS PRN 30 tablet 2     No current facility-administered medications for this visit  Allergies   Allergen Reactions    Penicillins Rash       Review of Systems    Video Exam    There were no vitals filed for this visit  Physical Exam     I spent 60 minutes directly with the patient during this visit      [facility] Hao Morris  acknowledges that he has consented to an online visit or consultation  He understands that the online visit is based solely on information provided by him, and that, in the absence of a face-to-face physical evaluation by the physician, the diagnosis he receives is both limited and provisional in terms of accuracy and completeness  This is not intended to replace a full medical face-to-face evaluation by the physician  Lauren Marshall  understands and accepts these terms

## 2021-03-10 ENCOUNTER — CLINICAL SUPPORT (OUTPATIENT)
Dept: NUTRITION | Facility: HOSPITAL | Age: 81
End: 2021-03-10
Payer: MEDICARE

## 2021-03-10 DIAGNOSIS — R13.12 OROPHARYNGEAL DYSPHAGIA: ICD-10-CM

## 2021-03-10 NOTE — PROGRESS NOTES
Virtual Brief Visit    Assessment/Plan:    Problem List Items Addressed This Visit        Digestive    Oropharyngeal dysphagia (Chronic)                Reason for visit is No chief complaint on file  Encounter provider AN DIETICIAN    Provider located at [address] [address]    Recent Visits  No visits were found meeting these conditions  Showing recent visits within past 7 days and meeting all other requirements     Future Appointments  No visits were found meeting these conditions  Showing future appointments within next 150 days and meeting all other requirements        After connecting through [employer] Teams and patient was informed that this is a secure, HIPAA-compliant platform  He agrees to proceed  , the patient was identified by name and date of birth  Gabby Hanna  was informed that this is a telemedicine visit and that the visit is being conducted through [employer] and patient was informed that this is a secure, HIPAA-compliant platform  He agrees to proceed     My office door was closed  No one else was in the room  He acknowledged consent and understanding of privacy and security of the platform  The patient has agreed to participate and understands he can discontinue the visit at any time  Patient is aware this is a billable service  Subjective    Gabby Hanna  is a 79 y o  male     HPI     Past Medical History:   Diagnosis Date    RONAL [facility]acute [facility]) [facility] ) 5/31/2017    Aspiration into respiratory tract     Chest pain 5/31/2017    [facility]     Depression     Elevated troponin 5/31/2017    GERD (gastroesophageal reflux disease)     History of CVA (cerebrovascular accident) 4/13/2016    Hyperlipidemia     Hypertension     Lung cancer ([facility] ) 2005    Right, status post lobectomy    Pneumonia  Prostate cancer ([facility] )     Sleep apnea     awaiting sleep study results    Squamous cell skin cancer     Stroke ([facility] )     Tracheomalacia     Weakness due to cerebrovascular accident (CVA)        Past Surgical History:   Procedure Laterality Date    COLONOSCOPY      CT GUIDED FINE NEEDLE ASPIRATION (ALL INC)  10/30/2020    ESOPHAGOGASTRODUODENOSCOPY N/A 4/13/2016    Procedure: ESOPHAGOGASTRODUODENOSCOPY (EGD); Surgeon: Jing Avalos MD;  [facility]; Service:     JOINT REPLACEMENT      knee replacement    LUNG LOBECTOMY      TX ESOPHAGOGASTRODUODENOSCOPY TRANSORAL DIAGNOSTIC N/A 3/15/2017    Procedure: ESOPHAGOGASTRODUODENOSCOPY (EGD); Surgeon: Jing Avalos MD;  [facility]: [facility]; Service: Gastroenterology    SKIN BIOPSY      TRIGEMINAL NERVE DECOMPRESSION         Current Outpatient Medications   Medication Sig Dispense Refill    acetaminophen (TYLENOL) 325 mg tablet Take 2 tablets by mouth every 6 (six) hours as needed for fever 30 tablet 0    albuterol (PROVENTIL HFA,VENTOLIN HFA) 90 mcg/act inhaler Inhale 2 puffs 4 (four) times a day 2 Inhaler 0    amLODIPine (NORVASC) 10 mg tablet Take 1 tablet by mouth daily      ASPIRIN 81 PO Take 1 tablet by mouth every other day        atorvastatin (LIPITOR) 40 mg tablet Take 1 tablet (40 mg total) by mouth daily after dinner (Patient taking differently: Take 10 mg by mouth daily after dinner )  0    benzonatate (TESSALON) 200 MG capsule TAKE 1 CAPSULE BY MOUTH THREE TIMES A DAY AS NEEDED FOR COUGH 90 capsule 1    budesonide (PULMICORT) 0 5 mg/2 mL nebulizer solution Take 1 vial (0 5 mg total) by nebulization 2 (two) times a day Rinse mouth after use   360 mL 3    carvedilol (COREG) 12 5 mg tablet Take 1 tablet by mouth 2 (two) times a day with meals 60 tablet 0    clopidogrel (PLAVIX) 75 mg tablet Take 1 tablet by mouth daily 30 tablet 0    Fesoterodine Fumarate ER (TOVIAZ) 8 MG TB24 Take 4 mg by mouth every evening        guaiFENesin (ROBITUSSIN) 100 mg/5 mL oral solution Take 20 mL (400 mg total) by mouth 3 (three) times a day (Patient not taking: Reported on 12/10/2020) 236 mL 0    ketoconazole (NIZORAL) 2 % cream Apply topically to both feet in their entirety (tops, bottoms and between toes) 3 times a day for 4 weeks straight  60 g 5    latanoprost (XALATAN) 0 005 % ophthalmic solution Administer 1 drop to both eyes daily at bedtime      loratadine (CLARITIN) 10 mg tablet Take 10 mg by mouth daily      LORazepam (ATIVAN) 0 5 mg tablet Take 0 25 mg by mouth every 8 (eight) hours as needed for anxiety       pantoprazole (PROTONIX) 40 mg tablet Take 40 mg by mouth daily      PARoxetine (PAXIL) 20 mg tablet Take 20 mg by mouth daily      polyethylene glycol (MIRALAX) 17 g packet Take 17 g by mouth daily as needed      predniSONE 5 mg tablet TAKE 1 TABLET BY MOUTH EVERY DAY 90 tablet 3    psyllium (METAMUCIL) 0 52 g capsule Take 0 52 g by mouth daily as needed       senna (SENOKOT) 8 6 MG tablet Take 2 tablets by mouth daily at bedtime      spironolactone (ALDACTONE) 25 mg tablet Take 1 tablet (25 mg total) by mouth daily 30 tablet 0    tamsulosin (FLOMAX) 0 4 mg Take 1 capsule by mouth daily      traZODone (DESYREL) 50 mg tablet 1/2 to 1 tab @ HS PRN 30 tablet 2     No current facility-administered medications for this visit  Allergies   Allergen Reactions    Penicillins Rash       Review of Systems    There were no vitals filed for this visit  I spent 60 minutes directly with the patient during this visit    [facility]  acknowledges that he has consented to an online visit or consultation  He understands that the online visit is based solely on information provided by him, and that, in the absence of a face-to-face physical evaluation by the physician, the diagnosis he receives is both limited and provisional in terms of accuracy and completeness   This is not intended to replace a full medical face-to-face evaluation by the physician  Raghu Marrero  understands and accepts these terms  Initial Nutrition Assessment Form    Patient Name: Raghu Marrero  YOB: 1940    Sex:  Male     Assessment Date: 3/10/2021  Start Time: 1 Stop Time: 2 Total Minutes: 61     Data:  Present at session: self   Parent/Patient Concerns: "What can we supplement through the peg tube to help his nutrition"   Medical Dx/Reason for Referral: dysphagia   Past Medical History:   Diagnosis Date    RONAL [facility]) ([facility] ) 5/31/2017    Aspiration into respiratory tract     Chest pain 5/31/2017    COPD (chronic obstructive pulmonary disease) ([facility] )     Depression     Elevated troponin 5/31/2017    GERD (gastroesophageal reflux disease)     History of CVA (cerebrovascular accident) 4/13/2016    Hyperlipidemia     Hypertension     Lung cancer ([facility] ) 2005    Right, status post lobectomy    Pneumonia     Prostate [place] ([place] [address] )     Sleep apnea     awaiting sleep study results    Squamous cell skin cancer     Stroke ([place] [address] )     Tracheomalacia     Weakness due to cerebrovascular accident (CVA)        Current Outpatient Medications   Medication Sig Dispense Refill    acetaminophen (TYLENOL) 325 mg tablet Take 2 tablets by mouth every 6 (six) hours as needed for fever 30 tablet 0    albuterol (PROVENTIL HFA,VENTOLIN HFA) 90 mcg/act inhaler Inhale 2 puffs 4 (four) times a day 2 Inhaler 0    amLODIPine (NORVASC) 10 mg tablet Take 1 tablet by mouth daily      ASPIRIN 81 PO Take 1 tablet by mouth every other day        atorvastatin (LIPITOR) 40 mg tablet Take 1 tablet (40 mg total) by mouth daily after dinner (Patient taking differently: Take 10 mg by mouth daily after dinner )  0    benzonatate (TESSALON) 200 MG capsule TAKE 1 CAPSULE BY MOUTH THREE TIMES A DAY AS NEEDED FOR COUGH 90 capsule 1    budesonide (PULMICORT) 0 5 mg/2 mL nebulizer solution Take 1 vial (0 5 mg total) by nebulization 2 (two) times a day Rinse mouth after use  360 mL 3    carvedilol (COREG) 12 5 mg tablet Take 1 tablet by mouth 2 (two) times a day with meals 60 tablet 0    clopidogrel (PLAVIX) 75 mg tablet Take 1 tablet by mouth daily 30 tablet 0    Fesoterodine Fumarate ER (TOVIAZ) 8 MG TB24 Take 4 mg by mouth every evening        guaiFENesin (ROBITUSSIN) 100 mg/5 mL oral solution Take 20 mL (400 mg total) by mouth 3 (three) times a day (Patient not taking: Reported on 12/10/2020) 236 mL 0    ketoconazole (NIZORAL) 2 % cream Apply topically to both feet in their entirety (tops, bottoms and between toes) 3 times a day for 4 weeks straight  60 g 5    latanoprost (XALATAN) 0 005 % ophthalmic solution Administer 1 drop to both eyes daily at bedtime      loratadine (CLARITIN) 10 mg tablet Take 10 mg by mouth daily      LORazepam (ATIVAN) 0 5 mg tablet Take 0 25 mg by mouth every 8 (eight) hours as needed for anxiety       pantoprazole (PROTONIX) 40 mg tablet Take 40 mg by mouth daily      PARoxetine (PAXIL) 20 mg tablet Take 20 mg by mouth daily      polyethylene glycol (MIRALAX) 17 g packet Take 17 g by mouth daily as needed      predniSONE 5 mg tablet TAKE 1 TABLET BY MOUTH EVERY DAY 90 tablet 3    psyllium (METAMUCIL) 0 52 g capsule Take 0 52 g by mouth daily as needed       senna (SENOKOT) 8 6 MG tablet Take 2 tablets by mouth daily at bedtime      spironolactone (ALDACTONE) 25 mg tablet Take 1 tablet (25 mg total) by mouth daily 30 tablet 0    tamsulosin (FLOMAX) 0 4 mg Take 1 capsule by mouth daily      traZODone (DESYREL) 50 mg tablet 1/2 to 1 tab @ HS PRN 30 tablet 2     No current facility-administered medications for this visit           Additional Meds/Supplements:    Special Learning Needs: n/a   Height: HC Readings from Last 3 Encounters:   No data found for [facility], [facility]       Weight: Wt Readings from Last 10 Encounters:   10/30/20 99 8 kg (220 lb)   10/19/20 97 1 kg (214 lb)   09/26/20 91 6 kg (201 lb 15 1 oz)   20 102 kg (225 lb)   02/10/20 100 kg (220 lb 9 6 oz)   20 102 kg (225 lb 15 5 oz)   20 103 kg (226 lb)   01/15/20 103 kg (226 lb 12 8 oz)   19 96 6 kg (213 lb)   19 95 4 kg (210 lb 6 4 oz)     Estimated body mass index is 26 09 kg/m² as calculated from the following:    Height as of 10/30/20: 6' 5" (1 956 m)  Weight as of 10/30/20: 99 8 kg (220 lb)  Recent Weight Change: []Yes     [x]No  Amount: fiarly stable at this time      Energy Needs: 2475kals (25kcals/kg)   Allergies   Allergen Reactions    Penicillins Rash    NKFA   Social History     Substance and Sexual Activity   Alcohol Use Not Currently       Social History     Tobacco Use   Smoking Status Former Smoker    Packs/day:     Years:     Pack years:     Types: Cigarettes    Start date:     Quit date: 0    Years since quittin 2   Smokeless Tobacco Never Used       Who shops? [employer]   Who cooks? daughter   Exercise: W/c bound   Prior Counseling? []Yes     [x]No  When:      Why:         Diet Hx:  Feeding tube in the fall aspirates a lot, aspirating on thickened liquids, mainly for hydration 2 meals a day large B and dinner pre pureed "mom's meals"; boost 20gms protein 240kcals 1-3/day CIB instead  Breakfast:  chobani greek yogurt  Cereal hot-oatmeal-instantx2   [employer] toast sticks x5 syrup pancakes or waffles pureed; drinks clear apple juice or iced tea 8 oz honey thick 120cals;  40oz coffee- 1 liter of water      830  a m  Lunch:boost for lunch     p m  Dinner: pureed mom's meals    5-6 p m    Skipping 1-2 night a week         Snacks:pudding snacks for dessert AM -   PM -   HS -         Nutrition Diagnosis:   Altered gastrointestinal function  related to Alteration in gastrointestinal tract structure and/or function as evidenced by  Avoidance or limitation of estimated total intake or intake of specific foods/food groups due to GI symptoms       Medical Nutrition Therapy Intervention:  []Individualized Meal Plan []Understanding Lab Values   []Basic Pathophysiology of Disease []Food/Medication Interactions   []Food Diary []Exercise   []Lifestyle/Behavior Modification Techniques []Medication, Mechanism of Action   []Label Reading []Self Blood Glucose Monitoring   [x]Weight/BMI Goals-maintain current [x]Other - current fluid intake and calories appear adequate   Other Notes: bed at 9;Zac lives with his daughter and she will add water during the day through PEG if she see he is getting dehydrated  Takes an hour to eat 1 meal, takes his time and eats slow minimal choking, liquids appear harder for him than solids       Comprehension: []Excellent  []Very Good  [x]Good  []Fair   []Poor    Receptivity: []Excellent  []Very Good  [x]Good  []Fair   []Poor    Expected Compliance: []Excellent  []Very Good  [x]Good  []Fair   []Poor        Goals:  1   3 meals a day no skipping,    2  If 2 meals then would supplement with boost or jevity 1 0 or 1 2 x 8oz via PEG with 50oz flush before and after each feeding   3 3506-0038 kcals a day for weight maintenance       No follow-ups on file    Labs:  CMP  Lab Results   Component Value Date    K 3 8 10/01/2020     10/01/2020    CO2 23 10/01/2020    BUN 27 (H) 10/01/2020    CREATININE 1 05 10/01/2020    GLUCOSE 124 01/29/2019    GLUF 99 02/04/2020    CALCIUM 9 2 10/01/2020    CORRECTEDCA 9 7 09/28/2020    AST 33 09/28/2020    ALT 46 09/28/2020    ALKPHOS 72 09/28/2020    EGFR 67 10/01/2020       BMP  Lab Results   Component Value Date    GLUCOSE 124 01/29/2019    CALCIUM 9 2 10/01/2020    K 3 8 10/01/2020    CO2 23 10/01/2020     10/01/2020    BUN 27 (H) 10/01/2020    CREATININE 1 05 10/01/2020       Lipids  No results found for: CHOL  Lab Results   Component Value Date    HDL 44 11/15/2019    HDL 42 04/10/2017     Lab Results   Component Value Date    LDLCALC 35 11/15/2019    LDLCALC 41 04/10/2017     Lab Results   Component Value Date    TRIG 75 11/15/2019    TRIG 89 04/10/2017     No results found for: CHOLHDL    Hemoglobin A1C  Lab Results   Component Value Date    HGBA1C 5 2 11/15/2019       Fasting Glucose  Lab Results   Component Value Date    GLUF 99 02/04/2020       Insulin     Thyroid  No results found for: TSH, B6DACHD, D6WGTFZ, THYROIDAB    Hepatic Function Panel  Lab Results   Component Value Date    ALT 46 09/28/2020    AST 33 09/28/2020    ALKPHOS 72 09/28/2020       Celiac Disease Antibody Panel  No results found for: ENDOMYSIAL IGA, GLIADIN IGA, GLIADIN IGG, IGA, TISSUE TRANSGLUT AB, TTG IGA   Iron  No results found for: IRON, TIBC, FERRITIN    Vitamins  No results found for: VITAMIN B2   No results found for: NICOTINAMIDE, NICOTINIC ACID   No results found for: VITAMINB6  Lab Results   Component Value Date    FZWOAYAK20 398 11/13/2019     No results found for: VITB5  No results found for: G5YOXBSX  No results found for: THYROGLB  No results found for: VITAMIN K   No results found for: 25-HYDROXY VIT D   No components found for: Jose Cornejo MS RD LDN  [address]  [address] [address]

## 2021-03-13 DIAGNOSIS — R05.9 COUGH: ICD-10-CM

## 2021-03-13 RX ORDER — BENZONATATE 200 MG/1
CAPSULE ORAL
Qty: 90 CAPSULE | Refills: 1 | Status: SHIPPED | OUTPATIENT
Start: 2021-03-13 | End: 2021-05-26

## 2021-04-30 ENCOUNTER — HOSPITAL ENCOUNTER (OUTPATIENT)
Dept: RADIOLOGY | Facility: HOSPITAL | Age: 81
Discharge: HOME | End: 2021-04-30
Attending: RADIOLOGY
Payer: MEDICARE

## 2021-04-30 VITALS
OXYGEN SATURATION: 97 % | RESPIRATION RATE: 20 BRPM | HEART RATE: 64 BPM | DIASTOLIC BLOOD PRESSURE: 69 MMHG | SYSTOLIC BLOOD PRESSURE: 138 MMHG

## 2021-04-30 DIAGNOSIS — T17.908S ASPIRATION INTO RESPIRATORY TRACT, SEQUELA: ICD-10-CM

## 2021-04-30 PROCEDURE — 49450 REPLACE G/C TUBE PERC: CPT | Performed by: RADIOLOGY

## 2021-04-30 PROCEDURE — 49450 REPLACE G/C TUBE PERC: CPT

## 2021-04-30 RX ADMIN — IOHEXOL 10 ML: 350 INJECTION, SOLUTION INTRAVENOUS at 11:46

## 2021-04-30 NOTE — SEDATION DOCUMENTATION
Patient tolerated tube change without any immediate complications  Patient offers no complaints at this time

## 2021-05-07 DIAGNOSIS — F41.9 ANXIETY: ICD-10-CM

## 2021-05-07 RX ORDER — TRAZODONE HYDROCHLORIDE 50 MG/1
TABLET ORAL
Qty: 90 TABLET | OUTPATIENT
Start: 2021-05-07

## 2021-05-10 ENCOUNTER — TELEPHONE (OUTPATIENT)
Dept: PSYCHIATRY | Facility: CLINIC | Age: 81
End: 2021-05-10

## 2021-05-10 ENCOUNTER — TELEMEDICINE (OUTPATIENT)
Dept: PSYCHIATRY | Facility: CLINIC | Age: 81
End: 2021-05-10
Payer: MEDICARE

## 2021-05-10 ENCOUNTER — DOCUMENTATION (OUTPATIENT)
Dept: PSYCHIATRY | Facility: CLINIC | Age: 81
End: 2021-05-10

## 2021-05-10 DIAGNOSIS — F32.5 MAJOR DEPRESSIVE DISORDER WITH SINGLE EPISODE, IN FULL REMISSION (HCC): Primary | ICD-10-CM

## 2021-05-10 DIAGNOSIS — F03.90 MILD DEMENTIA (HCC): ICD-10-CM

## 2021-05-10 DIAGNOSIS — F41.9 ANXIETY: ICD-10-CM

## 2021-05-10 PROCEDURE — 99213 OFFICE O/P EST LOW 20 MIN: CPT | Performed by: PSYCHIATRY & NEUROLOGY

## 2021-05-10 NOTE — PSYCH
Patient ID: Karilyn Fabry  is a 79 y o  male  Subjective:  Patient seen for follow up, last seen 3 months ago, has been on Paxil 20 mg daily, Ativan 0 25 mg prn  Added Trazodone 50 mg hs prn  He has been living home with 24/7 care  He reports that his anxiety and mood have been well controlled  He has not needed the trazodone because he has been sleeping well, eating regularly pureed and honey thickened  He also has a G tube for fluids  He denies anger, confusion, psychotic symptoms  Review Of Systems:     Mood Euthymic   Thought Content Normal   General As HPI   Physical symptoms No physical complaints       Laboratory Results: No results found for this or any previous visit  Objective:       Mental status:  Appearance calm and cooperative , adequate hygiene and grooming and good eye contact    Mood euthymic   Affect affect appropriate    Speech Normal rate and Normal volume   Thought Processes coherent/organized   Hallucinations no hallucinations present    Thought Content no delusional thoughts verbalized   Abnormal Thoughts no suicidal thoughts  and no homicidal thoughts    Orientation A+O x 3       Assessment/Plan:       Diagnoses and all orders for this visit:    Major depressive disorder with single episode, in full remission ([facility] )    Anxiety    Mild dementia ([facility] )            Treatment Recommendations- Risks Benefits    No fiurther need for treatment will discharge from care, since PCP ordering meds    Risks, Benefits And Possible Side Effects Of Medications:  Risks, benefits, and possible side effects of medications explained to patient and patient verbalizes understanding                  Virtual Regular Visit      Assessment/Plan:    Problem List Items Addressed This Visit        Nervous and Auditory    Mild dementia ([facility] )       Other    Major depressive disorder with single episode, in full remission ([facility] ) - Primary    Anxiety          Goals addressed in session: Goal 1 Reason for visit is No chief complaint on file  Encounter provider Terry Hodges MD    Provider located at [facility] [address]  [facility] [address]  [place] [facility] [address] [phone]      Recent Visits  No visits were found meeting these conditions  Showing recent visits within past 7 days and meeting all other requirements     Future Appointments  No visits were found meeting these conditions  Showing future appointments within next 150 days and meeting all other requirements        The patient was identified by name and date of birth  Lupis Oro  was informed that this is a telemedicine visit and that the visit is being conducted [place] [facility] and patient was informed that this is a secure, HIPAA-compliant platform  He agrees to proceed     My office door was closed  No one else was in the room  He acknowledged consent and understanding of privacy and security of the video platform  The patient has agreed to participate and understands they can discontinue the visit at any time  Patient is aware this is a billable service  Subjective  Lupis Oro  is a 79 y o  male see above         HPI     Past Medical History:   Diagnosis Date    RONAL (acute kidney injury) ([facility] ) 5/31/2017    Aspiration into respiratory tract     Chest pain 5/31/2017    COPD (chronic obstructive pulmonary disease) ([facility])     Depression     Elevated troponin 5/31/2017    GERD (gastroesophageal reflux disease)     History of CVA (cerebrovascular accident) 4/13/2016    Hyperlipidemia     Hypertension     Lung cancer ([facility] ) 2005    Right, status post lobectomy    Pneumonia     Prostate cancer ([facility] )     Sleep apnea     awaiting sleep study results    Squamous cell skin cancer     [facility]     Tracheomalacia     Weakness due to cerebrovascular accident (CVA)        Past Surgical History:   Procedure Laterality Date    COLONOSCOPY      CT GUIDED FINE NEEDLE ASPIRATION (ALL INC)  10/30/2020    ESOPHAGOGASTRODUODENOSCOPY N/A 4/13/2016    Procedure: ESOPHAGOGASTRODUODENOSCOPY (EGD); Surgeon: Chato Miller MD;  [facility]; Service:     IR GASTROSTOMY (G) TUBE CHECK/CHANGE/REINSERTION/UPSIZE  4/30/2021    JOINT REPLACEMENT      knee replacement    LUNG LOBECTOMY      CA ESOPHAGOGASTRODUODENOSCOPY TRANSORAL DIAGNOSTIC N/A 3/15/2017    Procedure: ESOPHAGOGASTRODUODENOSCOPY (EGD); Surgeon: Chato Miller MD;  [facility]: [facility]; Service: Gastroenterology    SKIN BIOPSY      TRIGEMINAL NERVE DECOMPRESSION         Current Outpatient Medications   Medication Sig Dispense Refill    acetaminophen (TYLENOL) 325 mg tablet Take 2 tablets by mouth every 6 (six) hours as needed for fever 30 tablet 0    albuterol (PROVENTIL HFA,VENTOLIN HFA) 90 mcg/act inhaler Inhale 2 puffs 4 (four) times a day 2 Inhaler 0    amLODIPine (NORVASC) 10 mg tablet Take 1 tablet by mouth daily      ASPIRIN 81 PO Take 1 tablet by mouth every other day        atorvastatin (LIPITOR) 40 mg tablet Take 1 tablet (40 mg total) by mouth daily after dinner (Patient taking differently: Take 10 mg by mouth daily after dinner )  0    benzonatate (TESSALON) 200 MG capsule TAKE 1 CAPSULE BY MOUTH THREE TIMES A DAY AS NEEDED FOR COUGH 90 capsule 1    budesonide (PULMICORT) 0 5 mg/2 mL nebulizer solution Take 1 vial (0 5 mg total) by nebulization 2 (two) times a day Rinse mouth after use   360 mL 3    carvedilol (COREG) 12 5 mg tablet Take 1 tablet by mouth 2 (two) times a day with meals 60 tablet 0    clopidogrel (PLAVIX) 75 mg tablet Take 1 tablet by mouth daily 30 tablet 0    Fesoterodine Fumarate ER (TOVIAZ) 8 MG TB24 Take 4 mg by mouth every evening        guaiFENesin (ROBITUSSIN) 100 mg/5 mL oral solution Take 20 mL (400 mg total) by mouth 3 (three) times a day (Patient not taking: Reported on 12/10/2020) 236 mL 0    ketoconazole (NIZORAL) 2 % cream Apply topically to both feet in their entirety (tops, bottoms and between toes) 3 times a day for 4 weeks straight  60 g 5    latanoprost (XALATAN) 0 005 % ophthalmic solution Administer 1 drop to both eyes daily at bedtime      loratadine (CLARITIN) 10 mg tablet Take 10 mg by mouth daily      LORazepam (ATIVAN) 0 5 mg tablet Take 0 25 mg by mouth every 8 (eight) hours as needed for anxiety       pantoprazole (PROTONIX) 40 mg tablet Take 40 mg by mouth daily      PARoxetine (PAXIL) 20 mg tablet Take 20 mg by mouth daily      polyethylene glycol (MIRALAX) 17 g packet Take 17 g by mouth daily as needed      predniSONE 5 mg tablet TAKE 1 TABLET BY MOUTH EVERY DAY 90 tablet 3    psyllium (METAMUCIL) 0 52 g capsule Take 0 52 g by mouth daily as needed       senna (SENOKOT) 8 6 MG tablet Take 2 tablets by mouth daily at bedtime      spironolactone (ALDACTONE) 25 mg tablet Take 1 tablet (25 mg total) by mouth daily 30 tablet 0    tamsulosin (FLOMAX) 0 4 mg Take 1 capsule by mouth daily      traZODone (DESYREL) 50 mg tablet 1/2 to 1 tab @ HS PRN 30 tablet 2     No current facility-administered medications for this visit  Allergies   Allergen Reactions    Penicillins Rash       Review of Systems    Video Exam    There were no vitals filed for this visit  Physical Exam     I spent 20 minutes directly with the patient during this visit      Salas [place]wood Parks  acknowledges that he has consented to an online visit or consultation  He understands that the online visit is based solely on information provided by him, and that, in the absence of a face-to-face physical evaluation by the physician, the diagnosis he receives is both limited and provisional in terms of accuracy and completeness  This is not intended to replace a full medical face-to-face evaluation by the physician  Miryam Goddard  understands and accepts these terms

## 2021-05-10 NOTE — PROGRESS NOTES
Psychiatric Discharge Summary   Discharge Summary: None and Seen for 2 visit, 2/15/21 and 5/10/21   Discharge Diagnosis: RUI, Depression  Treating Physician: EFREN Hernandez   , Treatment Complications: None  Prognosis at time of discharge: Good  Presenting Problems/Pertinent Findings: ongoing symptoms controlled by medications  Therapist: None  Course of Treatment: Medication Management  Summary of Treatment Progress: Doing well    To what extent did the consumer achieve their goals? Some  Criteria for Discharge: Have completed tx goals and objectives and are no longer in need of services  Aftercare Recommendations:  Follow up with pcp  Discharge Medications:   Current Outpatient Medications:     acetaminophen (TYLENOL) 325 mg tablet, Take 2 tablets by mouth every 6 (six) hours as needed for fever, Disp: 30 tablet, Rfl: 0    albuterol (PROVENTIL HFA,VENTOLIN HFA) 90 mcg/act inhaler, Inhale 2 puffs 4 (four) times a day, Disp: 2 Inhaler, Rfl: 0    amLODIPine (NORVASC) 10 mg tablet, Take 1 tablet by mouth daily, Disp: , Rfl:     ASPIRIN 81 PO, Take 1 tablet by mouth every other day  , Disp: , Rfl:     atorvastatin (LIPITOR) 40 mg tablet, Take 1 tablet (40 mg total) by mouth daily after dinner (Patient taking differently: Take 10 mg by mouth daily after dinner ), Disp: , Rfl: 0    benzonatate (TESSALON) 200 MG capsule, TAKE 1 CAPSULE BY MOUTH THREE TIMES A DAY AS NEEDED FOR COUGH, Disp: 90 capsule, Rfl: 1    budesonide (PULMICORT) 0 5 mg/2 mL nebulizer solution, Take 1 vial (0 5 mg total) by nebulization 2 (two) times a day Rinse mouth after use , Disp: 360 mL, Rfl: 3    carvedilol (COREG) 12 5 mg tablet, Take 1 tablet by mouth 2 (two) times a day with meals, Disp: 60 tablet, Rfl: 0    clopidogrel (PLAVIX) 75 mg tablet, Take 1 tablet by mouth daily, Disp: 30 tablet, Rfl: 0    Fesoterodine Fumarate ER (TOVIAZ) 8 MG TB24, Take 4 mg by mouth every evening  , Disp: , Rfl:     guaiFENesin (ROBITUSSIN) 100 mg/5 mL oral solution, Take 20 mL (400 mg total) by mouth 3 (three) times a day (Patient not taking: Reported on 12/10/2020), Disp: 236 mL, Rfl: 0    ketoconazole (NIZORAL) 2 % cream, Apply topically to both feet in their entirety (tops, bottoms and between toes) 3 times a day for 4 weeks straight , Disp: 60 g, Rfl: 5    latanoprost (XALATAN) 0 005 % ophthalmic solution, Administer 1 drop to both eyes daily at bedtime, Disp: , Rfl:     loratadine (CLARITIN) 10 mg tablet, Take 10 mg by mouth daily, Disp: , Rfl:     LORazepam (ATIVAN) 0 5 mg tablet, Take 0 25 mg by mouth every 8 (eight) hours as needed for anxiety , Disp: , Rfl:     pantoprazole (PROTONIX) 40 mg tablet, Take 40 mg by mouth daily, Disp: , Rfl:     PARoxetine (PAXIL) 20 mg tablet, Take 20 mg by mouth daily, Disp: , Rfl:     polyethylene glycol (MIRALAX) 17 g packet, Take 17 g by mouth daily as needed, Disp: , Rfl:     predniSONE 5 mg tablet, TAKE 1 TABLET BY MOUTH EVERY DAY, Disp: 90 tablet, Rfl: 3    psyllium (METAMUCIL) 0 52 g capsule, Take 0 52 g by mouth daily as needed , Disp: , Rfl:     senna (SENOKOT) 8 6 MG tablet, Take 2 tablets by mouth daily at bedtime, Disp: , Rfl:     spironolactone (ALDACTONE) 25 mg tablet, Take 1 tablet (25 mg total) by mouth daily, Disp: 30 tablet, Rfl: 0    tamsulosin (FLOMAX) 0 4 mg, Take 1 capsule by mouth daily, Disp: , Rfl:     traZODone (DESYREL) 50 mg tablet, 1/2 to 1 tab @ HS PRN, Disp: 30 tablet, Rfl: 2     Describe consumers ability and willingness to work and solve his mental problem     fair    Substance Abuse History:  Social History     Substance and Sexual Activity   Drug Use Not Currently       Family Psychiatric History:   Family History   Family history unknown: Yes       The following portions of the patient's history were reviewed and updated as appropriate: allergies, current medications, past family history, past medical history, past social history, past surgical history and problem list     Social History     Socioeconomic History    Marital status:      Spouse name: Not on file    Number of children: 2    Years of education: Not on file    Highest education level: Not on file   Occupational History    Not on file   Social Needs    Financial resource strain: Not on file    Food insecurity     Worry: Not on file     Inability: Not on file    Transportation needs     Medical: Not on file     Non-medical: Not on file   Tobacco Use    Smoking status: Former Smoker     Packs/day: 1 00     Years: 22 00     Pack years: 22 00     Types: Cigarettes     Start date:      Quit date:      Years since quittin 4    Smokeless tobacco: Never Used   Substance and Sexual Activity    Alcohol use: Not Currently    Drug use: Not Currently    Sexual activity: Not Currently   Lifestyle    Physical activity     Days per week: Not on file     Minutes per session: Not on file    Stress: Not on file   Relationships    Social connections     Talks on phone: Not on file     Gets together: Not on file     Attends [employer] service: Not on file     Active member of club or organization: Not on file     Attends meetings of clubs or organizations: Not on file     Relationship status: Not on file    Intimate partner violence     Fear of current or ex partner: Not on file     Emotionally abused: Not on file     Physically abused: Not on file     Forced sexual activity: Not on file   Other Topics Concern    Not on file   Social History Narrative    He lives with his daughter, [facility] who is an RN  Other people in the home are his son-in-law and 2 grandkids  He has a make shift "in-law suite"    He uses a wheelchair to get around    He has a private pay home health aide 20 hours per week    He watches a lot of TV     Social History     Social History Narrative    He lives with his daughter, [facility] who is an RN  Other people in the home are his son-in-law and 2 grandkids      He has a make shift "in-law suite"    He uses a wheelchair to get around    He has a private pay home health aide 20 hours per week    He watches a lot of TV       Mental Status at Time of most recent visit on 5/19/21       Mental status:  Appearance calm and cooperative , adequate hygiene and grooming and good eye contact   Mood euthymic  Affect affect appropriate   Speech a normal rate and speech soft  Thought Processes normal thought processes  Hallucinations no hallucinations present   Thought Content no delusions  Abnormal Thoughts no suicidal thoughts  and no homicidal thoughts   Orientation  oriented to person and place and time  Remote Memory short term memory impaired and long term memory intact  Attention Span concentration impaired  Intellect Appears to be of Average Intelligence  Fund of Knowledge displays adequate knowledge of current events  Insight Insight intact  Judgement judgment was intact  Muscle Strength Decreased muscle strength  Language no difficulty naming common objects, no difficulty repeating a phrase  and no difficulty writing a sentence   Fund of Knowledge displays adequate knowledge of current events, adequate fund of knowledge regarding past history and adequate fund of knowledge regarding vocabulary   Pain none  Pain Scale 0

## 2021-05-10 NOTE — TELEPHONE ENCOUNTER
Patient Discharged due to completing treatment with Dr Aquino  May return to [facility] [place] if symptoms fail to improve or worsen  Discharge letter placed in Dr Willett's Mailbox

## 2021-05-25 DIAGNOSIS — R05.9 COUGH: ICD-10-CM

## 2021-05-26 RX ORDER — BENZONATATE 200 MG/1
CAPSULE ORAL
Qty: 90 CAPSULE | Refills: 1 | Status: SHIPPED | OUTPATIENT
Start: 2021-05-26 | End: 2021-08-13

## 2021-06-09 ENCOUNTER — OFFICE VISIT (OUTPATIENT)
Dept: PULMONOLOGY | Facility: CLINIC | Age: 81
End: 2021-06-09
Payer: MEDICARE

## 2021-06-09 VITALS
RESPIRATION RATE: 18 BRPM | DIASTOLIC BLOOD PRESSURE: 64 MMHG | HEART RATE: 72 BPM | SYSTOLIC BLOOD PRESSURE: 134 MMHG | BODY MASS INDEX: 26.09 KG/M2 | OXYGEN SATURATION: 99 % | TEMPERATURE: 98.3 F | HEIGHT: 77 IN

## 2021-06-09 DIAGNOSIS — J39.8 TRACHEOMALACIA: Chronic | ICD-10-CM

## 2021-06-09 DIAGNOSIS — R09.02 HYPOXIA: ICD-10-CM

## 2021-06-09 DIAGNOSIS — R13.12 OROPHARYNGEAL DYSPHAGIA: Chronic | ICD-10-CM

## 2021-06-09 DIAGNOSIS — Z99.3 WHEELCHAIR DEPENDENCE: ICD-10-CM

## 2021-06-09 DIAGNOSIS — C34.91 MALIGNANT NEOPLASM OF RIGHT LUNG, UNSPECIFIED PART OF LUNG (HCC): ICD-10-CM

## 2021-06-09 DIAGNOSIS — J41.1 MUCOPURULENT CHRONIC BRONCHITIS (HCC): Primary | ICD-10-CM

## 2021-06-09 PROCEDURE — 99214 OFFICE O/P EST MOD 30 MIN: CPT | Performed by: INTERNAL MEDICINE

## 2021-06-09 RX ORDER — IPRATROPIUM BROMIDE AND ALBUTEROL SULFATE 2.5; .5 MG/3ML; MG/3ML
3 SOLUTION RESPIRATORY (INHALATION)
COMMUNITY

## 2021-06-09 NOTE — PROGRESS NOTES
Pulmonary Follow Up Note   Wally Wood  79 y o  male MRN: [id number]  6/9/2021      Assessment:  1  Recurrent aspiration pneumonia/tracheobronchitis  · Noted progression of symptoms with dysarthria and dysphagia  · POST PEG tube 10/2021 but not currently used due to patient/family preference  · Continue strict aspiration precautions, advised may need to consider further PEG tube use     2  Chronic dysphagia  · Honey thickened liquids per VBS  · Further speech therapy recs     3  Chronic Cough  · Secondary to tracheomalacia and dysphagia     4  COPD with tracheomalacia  · Continue duonebs 2-3 times daily  · Continue pulmicort nebs BID  · Continue prednisone 5mg daily  · Continue VEST therapy  · He obtained prevnar/pneumovax previously per daughter, FLU vaccine yearly     5  GERD - continue PPI     6  Abnormal CT Chest, prior history of lung cancer  · Waxing and waning secondary to aspiration events  · Hold on repeat imaging at this time     7  Weight loss - stable now continue to monitor    8  Hypoxia - unable to ambulate to demonstrate desaturation, will check overnight SpO2 testing on RA  Plan:    Diagnoses and all orders for this visit:    Mucopurulent chronic bronchitis (HCC)    Tracheomalacia    Oropharyngeal dysphagia  -     Pulse oximetry overnight    Wheelchair dependence    Hypoxia  -     Pulse oximetry overnight    Malignant neoplasm of right lung, unspecified part of lung ([facility] )    Other orders  -     ipratropium-albuterol (DUO-NEB) 0 5-2 5 mg/3 mL nebulizer solution; Take 3 mL by nebulization 3 (three) times a day        Return in about 6 months (around 12/9/2021) for Recheck  History of Present Illness   HPI:  Wally Wood  is a 79 y o  male who has prior CVA with resultant left hemiparesis, lung cancer post RLL lobectomy, possible COPD, likely tracheomalacia, and chronic cough   He has had multiple admissions for hypoxic respiratory failure an bronchospasm felt secondary to aspiration tracheobronchitis   He was last seen by me in September 2020  He had repeat admission for aspiration pneumonia later that month and eventually had PEG placed in Oct 2020 by IR  He presents today for follow up  He reports he has overall done well  He remains on pulmicort BID and duoneb BID-TID  He has PEG but has not been using it  He was released from hospital with oxygen and uses intermittently  He has continued white sputum production, no hemoptysis  He continues with pureed diet and honey thick liquids, no gross aspiration events noted, uses VEST 2-3 times a day  Review of Systems   Constitutional: Negative for activity change, chills, fever and unexpected weight change  HENT: Positive for trouble swallowing  Negative for mouth sores and sore throat  Respiratory: Positive for cough  Negative for chest tightness, shortness of breath and wheezing  Cardiovascular: Negative for chest pain, palpitations and leg swelling  Gastrointestinal: Positive for constipation and vomiting  Negative for abdominal distention, abdominal pain and nausea  Musculoskeletal: Positive for gait problem  Neurological: Positive for weakness and light-headedness  Negative for headaches  Hematological: Negative for adenopathy  Psychiatric/Behavioral: Negative for sleep disturbance  The patient is not nervous/anxious          Historical Information   Past Medical History:   Diagnosis Date    [facility] 5/31/2017    Aspiration into respiratory tract     Chest pain 5/31/2017    [facility]ch[facility]     Depression     Elevated troponin 5/31/2017    GERD (gastroesophageal reflux disease)     History of CVA (cerebrovascular accident) 4/13/2016    Hyperlipidemia     Hypertension     Lung [facility] ) 2005    Right, status post lobectomy    Pneumonia     Prostate cancer ([facility] )     Sleep apnea     awaiting sleep study results    Squamous cell skin cancer     Stroke [facility])     Tracheomalacia     Weakness due to cerebrovascular accident (CVA)      Past Surgical History:   Procedure Laterality Date    COLONOSCOPY      CT GUIDED FINE NEEDLE ASPIRATION (ALL INC)  10/30/2020    ESOPHAGOGASTRODUODENOSCOPY N/A 4/13/2016    Procedure: ESOPHAGOGASTRODUODENOSCOPY (EGD); Surgeon: Jean-Paul Gonzales MD;  [facility]; Service:     IR GASTROSTOMY (G) TUBE CHECK/CHANGE/REINSERTION/UPSIZE  4/30/2021    JOINT REPLACEMENT      knee replacement    LUNG LOBECTOMY      [place] ESOPHAGOGASTRODUODENOSCOPY TRANSORAL DIAGNOSTIC N/A 3/15/2017    Procedure: ESOPHAGOGASTRODUODENOSCOPY (EGD); Surgeon: Jean-Paul Gonzales MD;  [facility];   Service: Gastroenterology    SKIN BIOPSY      TRIGEMINAL NERVE DECOMPRESSION       Family History   Family history unknown: Yes         Meds/Allergies     Current Outpatient Medications:     acetaminophen (TYLENOL) 325 mg tablet, Take 2 tablets by mouth every 6 (six) hours as needed for fever, Disp: 30 tablet, Rfl: 0    albuterol (PROVENTIL HFA,VENTOLIN HFA) 90 mcg/act inhaler, Inhale 2 puffs 4 (four) times a day, Disp: 2 Inhaler, Rfl: 0    amLODIPine (NORVASC) 10 mg tablet, Take 1 tablet by mouth daily, Disp: , Rfl:     ASPIRIN 81 PO, Take 1 tablet by mouth every other day  , Disp: , Rfl:     atorvastatin (LIPITOR) 40 mg tablet, Take 1 tablet (40 mg total) by mouth daily after dinner (Patient taking differently: Take 10 mg by mouth daily after dinner ), Disp: , Rfl: 0    benzonatate (TESSALON) 200 MG capsule, TAKE 1 CAPSULE BY MOUTH THREE TIMES A DAY AS NEEDED FOR COUGH, Disp: 90 capsule, Rfl: 1    budesonide (PULMICORT) 0 5 mg/2 mL nebulizer solution, Take 1 vial (0 5 mg total) by nebulization 2 (two) times a day Rinse mouth after use , Disp: 360 mL, Rfl: 3    carvedilol (COREG) 12 5 mg tablet, Take 1 tablet by mouth 2 (two) times a day with meals, Disp: 60 tablet, Rfl: 0    clopidogrel (PLAVIX) 75 mg tablet, Take 1 tablet by mouth daily, Disp: 30 tablet, Rfl: 0    Fesoterodine Fumarate ER (TOVIAZ) 8 MG TB24, Take 4 mg by mouth every evening  , Disp: , Rfl:     guaiFENesin (ROBITUSSIN) 100 mg/5 mL oral solution, Take 20 mL (400 mg total) by mouth 3 (three) times a day, Disp: 236 mL, Rfl: 0    ipratropium-albuterol (DUO-NEB) 0 5-2 5 mg/3 mL nebulizer solution, Take 3 mL by nebulization 3 (three) times a day, Disp: , Rfl:     ketoconazole (NIZORAL) 2 % cream, Apply topically to both feet in their entirety (tops, bottoms and between toes) 3 times a day for 4 weeks straight , Disp: 60 g, Rfl: 5    latanoprost (XALATAN) 0 005 % ophthalmic solution, Administer 1 drop to both eyes daily at bedtime, Disp: , Rfl:     loratadine (CLARITIN) 10 mg tablet, Take 10 mg by mouth daily, Disp: , Rfl:     LORazepam (ATIVAN) 0 5 mg tablet, Take 0 25 mg by mouth every 8 (eight) hours as needed for anxiety , Disp: , Rfl:     pantoprazole (PROTONIX) 40 mg tablet, Take 40 mg by mouth daily, Disp: , Rfl:     PARoxetine (PAXIL) 20 mg tablet, Take 20 mg by mouth daily, Disp: , Rfl:     polyethylene glycol (MIRALAX) 17 g packet, Take 17 g by mouth daily as needed, Disp: , Rfl:     predniSONE 5 mg tablet, TAKE 1 TABLET BY MOUTH EVERY DAY, Disp: 90 tablet, Rfl: 3    psyllium (METAMUCIL) 0 52 g capsule, Take 0 52 g by mouth daily as needed , Disp: , Rfl:     senna (SENOKOT) 8 6 MG tablet, Take 2 tablets by mouth daily at bedtime, Disp: , Rfl:     spironolactone (ALDACTONE) 25 mg tablet, Take 1 tablet (25 mg total) by mouth daily, Disp: 30 tablet, Rfl: 0    tamsulosin (FLOMAX) 0 4 mg, Take 1 capsule by mouth daily, Disp: , Rfl:     traZODone (DESYREL) 50 mg tablet, 1/2 to 1 tab @ HS PRN, Disp: 30 tablet, Rfl: 2  Allergies   Allergen Reactions    Penicillins Rash       Vitals: Blood pressure 134/64, pulse 72, temperature 98 3 °F (36 8 °C), resp  rate 18, height 6' 5" (1 956 m), SpO2 99 %  Body mass index is 26 09 kg/m²   Oxygen Therapy  SpO2: 99 %      Physical Exam  Physical Exam  Vitals signs reviewed  Constitutional:       General: He is not in acute distress  Appearance: Normal appearance  He is well-developed and normal weight  He is not ill-appearing, toxic-appearing or diaphoretic  Comments: Chronic speech changes post stroke, in wheelchair   HENT:      Head: Normocephalic and atraumatic  Right Ear: External ear normal       Left Ear: External ear normal       Nose: Nose normal  No congestion  Mouth/Throat:      Mouth: Mucous membranes are moist       Pharynx: Oropharynx is clear  No oropharyngeal exudate  Comments: No thrush  Eyes:      General: No scleral icterus  Right eye: No discharge  Left eye: No discharge  Conjunctiva/sclera: Conjunctivae normal       Pupils: Pupils are equal, round, and reactive to light  Neck:      Musculoskeletal: Neck supple  Vascular: No JVD  Trachea: No tracheal deviation  Cardiovascular:      Rate and Rhythm: Normal rate and regular rhythm  Heart sounds: Normal heart sounds  No murmur  Pulmonary:      Effort: Pulmonary effort is normal  No respiratory distress  Breath sounds: No stridor  No wheezing, rhonchi or rales  Comments: Mildly diminished, scant rhonchi cleared with cough, no wheeze  Abdominal:      General: Bowel sounds are normal  There is no distension  Palpations: Abdomen is soft  Tenderness: There is no abdominal tenderness  There is no guarding  Comments: PEG in place w/o purulence   Musculoskeletal:         General: Deformity present  No swelling  Right lower leg: No edema  Left lower leg: No edema  Comments: In wheelchair, LLE brace in place   Lymphadenopathy:      Cervical: No cervical adenopathy  Skin:     General: Skin is warm and dry  Findings: No rash  Neurological:      Mental Status: He is alert and oriented to person, place, and time  Mental status is at baseline        Gait: Gait abnormal  Psychiatric:         Mood and Affect: Mood normal          Behavior: Behavior normal          Thought Content: Thought content normal          Labs: I have personally reviewed pertinent lab results  Lab Results   Component Value Date    WBC 9 37 10/01/2020    HGB 14 2 10/01/2020    HCT 44 3 10/01/2020    MCV 91 10/01/2020     10/01/2020     Lab Results   Component Value Date    GLUCOSE 124 01/29/2019    CALCIUM 9 2 10/01/2020    K 3 8 10/01/2020    CO2 23 10/01/2020     10/01/2020    BUN 27 (H) 10/01/2020    CREATININE 1 05 10/01/2020     No results found for: IGE  Lab Results   Component Value Date    ALT 46 09/28/2020    AST 33 09/28/2020    ALKPHOS 72 09/28/2020       FLU/RSV 2/4/2020 - negative     RADIOGRAPHS: New radiographs and reports personally reviewed  CT angio 9/26/20 - no PE, RLL bronchial wall thickening, mucous plugging and basilar infiltrates, right hilar adenopathy, no sig effusion, no PTX    CXR 9/2/2020 - noted scoliosis, no focal infiltrates, no effusions, no PTX     CT Chest 2/4/2020 - post RUL lobectomy noted, atelectasis at bases, no focal infiltrates, no sig mediastinal adenopathy     CTA Head/neck 11/15/19 - no evidence ICS, enlarged right hilar adenopathy 2cm previously 1 6cm     CXR 11/14/19 - post RUL lobectomy surgical changes and volume loss, no focal infiltrates no effusions, no PTX     VBS 4/30/19 - "Moderate oral/mild-moderate pharyngeal dysphagia w/ decreased oral control of liquids and delayed swallow initiation resulting in aspiration before the swallow   Oral prep and chin tuck of thick liquids by tsp reduced aspiration risk"     CT Chest 3/4/19 - improved bronchial/tracheal wall thickening, resolved hilar adenopathy, no sig mediastinal adenopathy, RML and RLL mild nodular infiltrate/TIB infiltrate noted, no sig effusions     CXR 1/29 - noted right hemithorax volume loss, kyphoscoliosis, improved overall aeration of right hemithorax, no PTX     CT angio 1/28/19 - no PE, noted right hilar adenopathy vs early mass lesion - favoring adenopathy based upon appearance, no PTX, no sig effusion, post surgical changes on right     11/2018 - VBS (+) dysphagia with penetration on thin liquids    Prior Studies  Chest X-ray 8/23/17 - CXR - images personally reviewed - noted right sided post-operative changes and mild volume loss, no acute infiltrates, no masses, no lesions appreciated  CT Scan 4/2017 - CT chest images reviewed with 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  Cardiac Testing 7/2017 TTE EF 55%, normal RV    Trey Allred DO, [facility]

## 2021-06-15 ENCOUNTER — HOSPITAL ENCOUNTER (INPATIENT)
Facility: HOSPITAL | Age: 81
LOS: 3 days | Discharge: NURSING, REHAB OR LONG-TERM CARE | DRG: 871 | End: 2021-06-19
Attending: EMERGENCY MEDICINE | Admitting: INTERNAL MEDICINE
Payer: MEDICARE

## 2021-06-15 ENCOUNTER — APPOINTMENT (EMERGENCY)
Dept: CT IMAGING | Facility: HOSPITAL | Age: 81
DRG: 871 | End: 2021-06-15
Payer: MEDICARE

## 2021-06-15 DIAGNOSIS — R63.4 WEIGHT LOSS: ICD-10-CM

## 2021-06-15 DIAGNOSIS — T17.908A ASPIRATION INTO AIRWAY: ICD-10-CM

## 2021-06-15 DIAGNOSIS — J18.9 BILATERAL PNEUMONIA: ICD-10-CM

## 2021-06-15 DIAGNOSIS — F41.9 ANXIETY: ICD-10-CM

## 2021-06-15 DIAGNOSIS — J18.9 PNEUMONIA: Primary | ICD-10-CM

## 2021-06-15 LAB
ALBUMIN SERPL BCP-MCNC: 3.7 G/DL (ref 3.5–5)
ALP SERPL-CCNC: 87 U/L (ref 46–116)
ALT SERPL W P-5'-P-CCNC: 15 U/L (ref 12–78)
ANION GAP SERPL CALCULATED.3IONS-SCNC: 8 MMOL/L (ref 4–13)
APTT PPP: 30 SECONDS (ref 23–37)
AST SERPL W P-5'-P-CCNC: 11 U/L (ref 5–45)
BASOPHILS # BLD MANUAL: 0.2 THOUSAND/UL (ref 0–0.1)
BASOPHILS NFR MAR MANUAL: 1 % (ref 0–1)
BILIRUB SERPL-MCNC: 0.6 MG/DL (ref 0.2–1)
BUN SERPL-MCNC: 22 MG/DL (ref 5–25)
CALCIUM SERPL-MCNC: 9.6 MG/DL (ref 8.3–10.1)
CHLORIDE SERPL-SCNC: 98 MMOL/L (ref 100–108)
CO2 SERPL-SCNC: 28 MMOL/L (ref 21–32)
CREAT SERPL-MCNC: 1.26 MG/DL (ref 0.6–1.3)
CRP SERPL HS-MCNC: 33.31 MG/L
EOSINOPHIL # BLD MANUAL: 0 THOUSAND/UL (ref 0–0.4)
EOSINOPHIL NFR BLD MANUAL: 0 % (ref 0–6)
ERYTHROCYTE [DISTWIDTH] IN BLOOD BY AUTOMATED COUNT: 14 % (ref 11.6–15.1)
GFR SERPL CREATININE-BSD FRML MDRD: 53 ML/MIN/1.73SQ M
GLUCOSE SERPL-MCNC: 129 MG/DL (ref 65–140)
HCT VFR BLD AUTO: 47.6 % (ref 36.5–49.3)
HGB BLD-MCNC: 15.2 G/DL (ref 12–17)
INR PPP: 0.98 (ref 0.84–1.19)
LIPASE SERPL-CCNC: 33 U/L (ref 73–393)
LYMPHOCYTES # BLD AUTO: 0.6 THOUSAND/UL (ref 0.6–4.47)
LYMPHOCYTES # BLD AUTO: 3 % (ref 14–44)
MCH RBC QN AUTO: 29.6 PG (ref 26.8–34.3)
MCHC RBC AUTO-ENTMCNC: 31.9 G/DL (ref 31.4–37.4)
MCV RBC AUTO: 93 FL (ref 82–98)
MONOCYTES # BLD AUTO: 1 THOUSAND/UL (ref 0–1.22)
MONOCYTES NFR BLD: 5 % (ref 4–12)
NEUTROPHILS # BLD MANUAL: 18.13 THOUSAND/UL (ref 1.85–7.62)
NEUTS BAND NFR BLD MANUAL: 17 % (ref 0–8)
NEUTS SEG NFR BLD AUTO: 74 % (ref 43–75)
NRBC BLD AUTO-RTO: 0 /100 WBCS
NT-PROBNP SERPL-MCNC: 560 PG/ML
PHOSPHATE SERPL-MCNC: 2.3 MG/DL (ref 2.3–4.1)
PLATELET # BLD AUTO: 143 THOUSANDS/UL (ref 149–390)
PLATELET BLD QL SMEAR: ABNORMAL
PMV BLD AUTO: 10.4 FL (ref 8.9–12.7)
POTASSIUM SERPL-SCNC: 4.2 MMOL/L (ref 3.5–5.3)
PROT SERPL-MCNC: 7.6 G/DL (ref 6.4–8.2)
PROTHROMBIN TIME: 13.1 SECONDS (ref 11.6–14.5)
RBC # BLD AUTO: 5.14 MILLION/UL (ref 3.88–5.62)
RBC MORPH BLD: NORMAL
SODIUM SERPL-SCNC: 134 MMOL/L (ref 136–145)
TOTAL CELLS COUNTED SPEC: 100
TROPONIN I SERPL-MCNC: <0.02 NG/ML
WBC # BLD AUTO: 19.92 THOUSAND/UL (ref 4.31–10.16)

## 2021-06-15 PROCEDURE — U0005 INFEC AGEN DETEC AMPLI PROBE: HCPCS | Performed by: EMERGENCY MEDICINE

## 2021-06-15 PROCEDURE — 85610 PROTHROMBIN TIME: CPT | Performed by: EMERGENCY MEDICINE

## 2021-06-15 PROCEDURE — 99285 EMERGENCY DEPT VISIT HI MDM: CPT | Performed by: EMERGENCY MEDICINE

## 2021-06-15 PROCEDURE — 87040 BLOOD CULTURE FOR BACTERIA: CPT | Performed by: EMERGENCY MEDICINE

## 2021-06-15 PROCEDURE — 84484 ASSAY OF TROPONIN QUANT: CPT | Performed by: EMERGENCY MEDICINE

## 2021-06-15 PROCEDURE — 93005 ELECTROCARDIOGRAM TRACING: CPT

## 2021-06-15 PROCEDURE — 83880 ASSAY OF NATRIURETIC PEPTIDE: CPT | Performed by: EMERGENCY MEDICINE

## 2021-06-15 PROCEDURE — 85027 COMPLETE CBC AUTOMATED: CPT | Performed by: EMERGENCY MEDICINE

## 2021-06-15 PROCEDURE — 80053 COMPREHEN METABOLIC PANEL: CPT | Performed by: EMERGENCY MEDICINE

## 2021-06-15 PROCEDURE — 83690 ASSAY OF LIPASE: CPT | Performed by: EMERGENCY MEDICINE

## 2021-06-15 PROCEDURE — 85730 THROMBOPLASTIN TIME PARTIAL: CPT | Performed by: EMERGENCY MEDICINE

## 2021-06-15 PROCEDURE — 71275 CT ANGIOGRAPHY CHEST: CPT

## 2021-06-15 PROCEDURE — 83605 ASSAY OF LACTIC ACID: CPT | Performed by: EMERGENCY MEDICINE

## 2021-06-15 PROCEDURE — 1123F ACP DISCUSS/DSCN MKR DOCD: CPT | Performed by: EMERGENCY MEDICINE

## 2021-06-15 PROCEDURE — 96367 TX/PROPH/DG ADDL SEQ IV INF: CPT

## 2021-06-15 PROCEDURE — 36415 COLL VENOUS BLD VENIPUNCTURE: CPT | Performed by: EMERGENCY MEDICINE

## 2021-06-15 PROCEDURE — 96365 THER/PROPH/DIAG IV INF INIT: CPT

## 2021-06-15 PROCEDURE — 99285 EMERGENCY DEPT VISIT HI MDM: CPT

## 2021-06-15 PROCEDURE — 85007 BL SMEAR W/DIFF WBC COUNT: CPT | Performed by: EMERGENCY MEDICINE

## 2021-06-15 PROCEDURE — U0003 INFECTIOUS AGENT DETECTION BY NUCLEIC ACID (DNA OR RNA); SEVERE ACUTE RESPIRATORY SYNDROME CORONAVIRUS 2 (SARS-COV-2) (CORONAVIRUS DISEASE [COVID-19]), AMPLIFIED PROBE TECHNIQUE, MAKING USE OF HIGH THROUGHPUT TECHNOLOGIES AS DESCRIBED BY CMS-2020-01-R: HCPCS | Performed by: EMERGENCY MEDICINE

## 2021-06-15 PROCEDURE — 84100 ASSAY OF PHOSPHORUS: CPT | Performed by: EMERGENCY MEDICINE

## 2021-06-15 PROCEDURE — 86141 C-REACTIVE PROTEIN HS: CPT | Performed by: EMERGENCY MEDICINE

## 2021-06-15 RX ADMIN — IOHEXOL 85 ML: 350 INJECTION, SOLUTION INTRAVENOUS at 23:42

## 2021-06-15 RX ADMIN — VANCOMYCIN HYDROCHLORIDE 1500 MG: 1 INJECTION, POWDER, LYOPHILIZED, FOR SOLUTION INTRAVENOUS at 23:18

## 2021-06-15 RX ADMIN — CEFEPIME HYDROCHLORIDE 2000 MG: 2 INJECTION, POWDER, FOR SOLUTION INTRAVENOUS at 22:25

## 2021-06-16 PROBLEM — J18.9 BILATERAL PNEUMONIA: Status: ACTIVE | Noted: 2021-06-16

## 2021-06-16 PROBLEM — A41.9 SEPSIS (HCC): Status: ACTIVE | Noted: 2021-06-16

## 2021-06-16 PROBLEM — R06.89 ACUTE RESPIRATORY INSUFFICIENCY: Status: ACTIVE | Noted: 2021-06-16

## 2021-06-16 PROBLEM — R53.83 FATIGUE: Status: ACTIVE | Noted: 2021-06-16

## 2021-06-16 LAB
ANION GAP SERPL CALCULATED.3IONS-SCNC: 8 MMOL/L (ref 4–13)
ATRIAL RATE: 79 BPM
BASOPHILS # BLD AUTO: 0.07 THOUSANDS/ΜL (ref 0–0.1)
BASOPHILS NFR BLD AUTO: 0 % (ref 0–1)
BUN SERPL-MCNC: 23 MG/DL (ref 5–25)
CALCIUM SERPL-MCNC: 9.1 MG/DL (ref 8.3–10.1)
CHLORIDE SERPL-SCNC: 99 MMOL/L (ref 100–108)
CO2 SERPL-SCNC: 26 MMOL/L (ref 21–32)
CREAT SERPL-MCNC: 1.16 MG/DL (ref 0.6–1.3)
EOSINOPHIL # BLD AUTO: 0.01 THOUSAND/ΜL (ref 0–0.61)
EOSINOPHIL NFR BLD AUTO: 0 % (ref 0–6)
ERYTHROCYTE [DISTWIDTH] IN BLOOD BY AUTOMATED COUNT: 14.3 % (ref 11.6–15.1)
GFR SERPL CREATININE-BSD FRML MDRD: 59 ML/MIN/1.73SQ M
GLUCOSE SERPL-MCNC: 106 MG/DL (ref 65–140)
HCT VFR BLD AUTO: 41.6 % (ref 36.5–49.3)
HGB BLD-MCNC: 13.5 G/DL (ref 12–17)
IMM GRANULOCYTES # BLD AUTO: 0.1 THOUSAND/UL (ref 0–0.2)
IMM GRANULOCYTES NFR BLD AUTO: 1 % (ref 0–2)
L PNEUMO1 AG UR QL IA.RAPID: NEGATIVE
LACTATE SERPL-SCNC: 1.9 MMOL/L (ref 0.5–2)
LACTATE SERPL-SCNC: 2.5 MMOL/L (ref 0.5–2)
LACTATE SERPL-SCNC: 2.7 MMOL/L (ref 0.5–2)
LYMPHOCYTES # BLD AUTO: 0.97 THOUSANDS/ΜL (ref 0.6–4.47)
LYMPHOCYTES NFR BLD AUTO: 5 % (ref 14–44)
MCH RBC QN AUTO: 29.7 PG (ref 26.8–34.3)
MCHC RBC AUTO-ENTMCNC: 32.5 G/DL (ref 31.4–37.4)
MCV RBC AUTO: 92 FL (ref 82–98)
MONOCYTES # BLD AUTO: 1.12 THOUSAND/ΜL (ref 0.17–1.22)
MONOCYTES NFR BLD AUTO: 6 % (ref 4–12)
NEUTROPHILS # BLD AUTO: 18.28 THOUSANDS/ΜL (ref 1.85–7.62)
NEUTS SEG NFR BLD AUTO: 88 % (ref 43–75)
NRBC BLD AUTO-RTO: 0 /100 WBCS
P AXIS: 33 DEGREES
PLATELET # BLD AUTO: 136 THOUSANDS/UL (ref 149–390)
PLATELET # BLD AUTO: 136 THOUSANDS/UL (ref 149–390)
PMV BLD AUTO: 10.5 FL (ref 8.9–12.7)
PMV BLD AUTO: 11.1 FL (ref 8.9–12.7)
POTASSIUM SERPL-SCNC: 4.7 MMOL/L (ref 3.5–5.3)
PR INTERVAL: 182 MS
PROCALCITONIN SERPL-MCNC: 9.05 NG/ML
QRS AXIS: -1 DEGREES
QRSD INTERVAL: 92 MS
QT INTERVAL: 358 MS
QTC INTERVAL: 410 MS
RBC # BLD AUTO: 4.54 MILLION/UL (ref 3.88–5.62)
S PNEUM AG UR QL: NEGATIVE
SARS-COV-2 RNA RESP QL NAA+PROBE: NEGATIVE
SODIUM SERPL-SCNC: 133 MMOL/L (ref 136–145)
T WAVE AXIS: 64 DEGREES
VENTRICULAR RATE: 79 BPM
WBC # BLD AUTO: 20.55 THOUSAND/UL (ref 4.31–10.16)

## 2021-06-16 PROCEDURE — 94669 MECHANICAL CHEST WALL OSCILL: CPT

## 2021-06-16 PROCEDURE — 85027 COMPLETE CBC AUTOMATED: CPT | Performed by: PHYSICIAN ASSISTANT

## 2021-06-16 PROCEDURE — 97163 PT EVAL HIGH COMPLEX 45 MIN: CPT

## 2021-06-16 PROCEDURE — 94760 N-INVAS EAR/PLS OXIMETRY 1: CPT

## 2021-06-16 PROCEDURE — 83605 ASSAY OF LACTIC ACID: CPT | Performed by: INTERNAL MEDICINE

## 2021-06-16 PROCEDURE — 94640 AIRWAY INHALATION TREATMENT: CPT

## 2021-06-16 PROCEDURE — 94664 DEMO&/EVAL PT USE INHALER: CPT

## 2021-06-16 PROCEDURE — 93010 ELECTROCARDIOGRAM REPORT: CPT | Performed by: INTERNAL MEDICINE

## 2021-06-16 PROCEDURE — 84145 PROCALCITONIN (PCT): CPT | Performed by: PHYSICIAN ASSISTANT

## 2021-06-16 PROCEDURE — 87449 NOS EACH ORGANISM AG IA: CPT | Performed by: PHYSICIAN ASSISTANT

## 2021-06-16 PROCEDURE — 96366 THER/PROPH/DIAG IV INF ADDON: CPT

## 2021-06-16 PROCEDURE — 83605 ASSAY OF LACTIC ACID: CPT | Performed by: EMERGENCY MEDICINE

## 2021-06-16 PROCEDURE — 92610 EVALUATE SWALLOWING FUNCTION: CPT

## 2021-06-16 PROCEDURE — 99223 1ST HOSP IP/OBS HIGH 75: CPT | Performed by: INTERNAL MEDICINE

## 2021-06-16 PROCEDURE — 36415 COLL VENOUS BLD VENIPUNCTURE: CPT | Performed by: EMERGENCY MEDICINE

## 2021-06-16 PROCEDURE — 97530 THERAPEUTIC ACTIVITIES: CPT

## 2021-06-16 PROCEDURE — 85049 AUTOMATED PLATELET COUNT: CPT | Performed by: PHYSICIAN ASSISTANT

## 2021-06-16 PROCEDURE — 80048 BASIC METABOLIC PNL TOTAL CA: CPT | Performed by: PHYSICIAN ASSISTANT

## 2021-06-16 RX ORDER — AMLODIPINE BESYLATE 10 MG/1
10 TABLET ORAL DAILY
Status: DISCONTINUED | OUTPATIENT
Start: 2021-06-16 | End: 2021-06-16

## 2021-06-16 RX ORDER — PAROXETINE HYDROCHLORIDE 20 MG/1
20 TABLET, FILM COATED ORAL DAILY
Status: DISCONTINUED | OUTPATIENT
Start: 2021-06-16 | End: 2021-06-16

## 2021-06-16 RX ORDER — SENNOSIDES 8.8 MG/5ML
17.6 LIQUID ORAL
Status: DISCONTINUED | OUTPATIENT
Start: 2021-06-16 | End: 2021-06-16

## 2021-06-16 RX ORDER — SODIUM CHLORIDE 9 MG/ML
75 INJECTION, SOLUTION INTRAVENOUS CONTINUOUS
Status: DISCONTINUED | OUTPATIENT
Start: 2021-06-16 | End: 2021-06-17

## 2021-06-16 RX ORDER — ACETAMINOPHEN 325 MG/1
650 TABLET ORAL EVERY 6 HOURS PRN
Status: DISCONTINUED | OUTPATIENT
Start: 2021-06-16 | End: 2021-06-19 | Stop reason: HOSPADM

## 2021-06-16 RX ORDER — LORAZEPAM 0.5 MG/1
0.25 TABLET ORAL EVERY 8 HOURS PRN
Status: DISCONTINUED | OUTPATIENT
Start: 2021-06-16 | End: 2021-06-16

## 2021-06-16 RX ORDER — TAMSULOSIN HYDROCHLORIDE 0.4 MG/1
0.4 CAPSULE ORAL
Status: DISCONTINUED | OUTPATIENT
Start: 2021-06-16 | End: 2021-06-19 | Stop reason: HOSPADM

## 2021-06-16 RX ORDER — AMLODIPINE BESYLATE 10 MG/1
10 TABLET ORAL DAILY
Status: DISCONTINUED | OUTPATIENT
Start: 2021-06-17 | End: 2021-06-19 | Stop reason: HOSPADM

## 2021-06-16 RX ORDER — PANTOPRAZOLE SODIUM 40 MG/1
40 TABLET, DELAYED RELEASE ORAL
Status: DISCONTINUED | OUTPATIENT
Start: 2021-06-16 | End: 2021-06-19 | Stop reason: HOSPADM

## 2021-06-16 RX ORDER — ALBUTEROL SULFATE 90 UG/1
2 AEROSOL, METERED RESPIRATORY (INHALATION) 4 TIMES DAILY
Status: DISCONTINUED | OUTPATIENT
Start: 2021-06-16 | End: 2021-06-16

## 2021-06-16 RX ORDER — CLOPIDOGREL BISULFATE 75 MG/1
75 TABLET ORAL DAILY
Status: DISCONTINUED | OUTPATIENT
Start: 2021-06-16 | End: 2021-06-16

## 2021-06-16 RX ORDER — CARVEDILOL 12.5 MG/1
12.5 TABLET ORAL 2 TIMES DAILY WITH MEALS
Status: DISCONTINUED | OUTPATIENT
Start: 2021-06-16 | End: 2021-06-19 | Stop reason: HOSPADM

## 2021-06-16 RX ORDER — BUDESONIDE 0.5 MG/2ML
0.5 INHALANT ORAL
Status: DISCONTINUED | OUTPATIENT
Start: 2021-06-16 | End: 2021-06-19 | Stop reason: HOSPADM

## 2021-06-16 RX ORDER — ASPIRIN 81 MG/1
81 TABLET, CHEWABLE ORAL EVERY OTHER DAY
Status: DISCONTINUED | OUTPATIENT
Start: 2021-06-16 | End: 2021-06-19 | Stop reason: HOSPADM

## 2021-06-16 RX ORDER — LEVALBUTEROL 1.25 MG/.5ML
1.25 SOLUTION, CONCENTRATE RESPIRATORY (INHALATION)
Status: DISCONTINUED | OUTPATIENT
Start: 2021-06-16 | End: 2021-06-19 | Stop reason: HOSPADM

## 2021-06-16 RX ORDER — IPRATROPIUM BROMIDE AND ALBUTEROL SULFATE 2.5; .5 MG/3ML; MG/3ML
3 SOLUTION RESPIRATORY (INHALATION)
Status: DISCONTINUED | OUTPATIENT
Start: 2021-06-16 | End: 2021-06-16

## 2021-06-16 RX ORDER — SENNOSIDES 8.6 MG
17.6 TABLET ORAL
Status: DISCONTINUED | OUTPATIENT
Start: 2021-06-16 | End: 2021-06-19 | Stop reason: HOSPADM

## 2021-06-16 RX ORDER — ATORVASTATIN CALCIUM 10 MG/1
10 TABLET, FILM COATED ORAL
Status: DISCONTINUED | OUTPATIENT
Start: 2021-06-16 | End: 2021-06-19 | Stop reason: HOSPADM

## 2021-06-16 RX ORDER — LATANOPROST 50 UG/ML
1 SOLUTION/ DROPS OPHTHALMIC
Status: DISCONTINUED | OUTPATIENT
Start: 2021-06-16 | End: 2021-06-19 | Stop reason: HOSPADM

## 2021-06-16 RX ORDER — CLOPIDOGREL BISULFATE 75 MG/1
75 TABLET ORAL DAILY
Status: DISCONTINUED | OUTPATIENT
Start: 2021-06-17 | End: 2021-06-19 | Stop reason: HOSPADM

## 2021-06-16 RX ORDER — LORAZEPAM 0.5 MG/1
0.25 TABLET ORAL 2 TIMES DAILY
Status: DISCONTINUED | OUTPATIENT
Start: 2021-06-16 | End: 2021-06-18

## 2021-06-16 RX ORDER — PREDNISONE 1 MG/1
5 TABLET ORAL DAILY
Status: DISCONTINUED | OUTPATIENT
Start: 2021-06-17 | End: 2021-06-19 | Stop reason: HOSPADM

## 2021-06-16 RX ORDER — PREDNISONE 1 MG/1
5 TABLET ORAL DAILY
Status: DISCONTINUED | OUTPATIENT
Start: 2021-06-16 | End: 2021-06-16

## 2021-06-16 RX ORDER — GUAIFENESIN 100 MG/5ML
200 SOLUTION ORAL EVERY 4 HOURS PRN
Status: DISCONTINUED | OUTPATIENT
Start: 2021-06-16 | End: 2021-06-19 | Stop reason: HOSPADM

## 2021-06-16 RX ORDER — POLYETHYLENE GLYCOL 3350 17 G/17G
17 POWDER, FOR SOLUTION ORAL DAILY PRN
Status: DISCONTINUED | OUTPATIENT
Start: 2021-06-16 | End: 2021-06-19 | Stop reason: HOSPADM

## 2021-06-16 RX ORDER — CARVEDILOL 12.5 MG/1
12.5 TABLET ORAL 2 TIMES DAILY WITH MEALS
Status: DISCONTINUED | OUTPATIENT
Start: 2021-06-16 | End: 2021-06-16

## 2021-06-16 RX ORDER — ALBUTEROL SULFATE 90 UG/1
2 AEROSOL, METERED RESPIRATORY (INHALATION) EVERY 4 HOURS PRN
Status: DISCONTINUED | OUTPATIENT
Start: 2021-06-16 | End: 2021-06-19 | Stop reason: HOSPADM

## 2021-06-16 RX ORDER — HEPARIN SODIUM 5000 [USP'U]/ML
5000 INJECTION, SOLUTION INTRAVENOUS; SUBCUTANEOUS EVERY 8 HOURS SCHEDULED
Status: DISCONTINUED | OUTPATIENT
Start: 2021-06-16 | End: 2021-06-19 | Stop reason: HOSPADM

## 2021-06-16 RX ORDER — ONDANSETRON 2 MG/ML
4 INJECTION INTRAMUSCULAR; INTRAVENOUS EVERY 6 HOURS PRN
Status: DISCONTINUED | OUTPATIENT
Start: 2021-06-16 | End: 2021-06-19 | Stop reason: HOSPADM

## 2021-06-16 RX ORDER — CALCIUM CARBONATE 200(500)MG
1000 TABLET,CHEWABLE ORAL DAILY PRN
Status: DISCONTINUED | OUTPATIENT
Start: 2021-06-16 | End: 2021-06-19 | Stop reason: HOSPADM

## 2021-06-16 RX ORDER — PAROXETINE HYDROCHLORIDE 20 MG/1
20 TABLET, FILM COATED ORAL DAILY
Status: DISCONTINUED | OUTPATIENT
Start: 2021-06-17 | End: 2021-06-19 | Stop reason: HOSPADM

## 2021-06-16 RX ADMIN — ATORVASTATIN CALCIUM 10 MG: 10 TABLET, FILM COATED ORAL at 17:05

## 2021-06-16 RX ADMIN — HEPARIN SODIUM 5000 UNITS: 5000 INJECTION INTRAVENOUS; SUBCUTANEOUS at 05:01

## 2021-06-16 RX ADMIN — METRONIDAZOLE 500 MG: 500 INJECTION, SOLUTION INTRAVENOUS at 21:08

## 2021-06-16 RX ADMIN — TAMSULOSIN HYDROCHLORIDE 0.4 MG: 0.4 CAPSULE ORAL at 16:44

## 2021-06-16 RX ADMIN — PANTOPRAZOLE SODIUM 40 MG: 40 TABLET, DELAYED RELEASE ORAL at 05:01

## 2021-06-16 RX ADMIN — HEPARIN SODIUM 5000 UNITS: 5000 INJECTION INTRAVENOUS; SUBCUTANEOUS at 21:07

## 2021-06-16 RX ADMIN — SENNOSIDES 17.2 MG: 8.6 TABLET, FILM COATED ORAL at 21:07

## 2021-06-16 RX ADMIN — LORAZEPAM 0.25 MG: 0.5 TABLET ORAL at 17:05

## 2021-06-16 RX ADMIN — IPRATROPIUM BROMIDE 0.5 MG: 0.5 SOLUTION RESPIRATORY (INHALATION) at 19:46

## 2021-06-16 RX ADMIN — LATANOPROST 1 DROP: 50 SOLUTION OPHTHALMIC at 21:08

## 2021-06-16 RX ADMIN — PAROXETINE HYDROCHLORIDE 20 MG: 20 TABLET, FILM COATED ORAL at 09:45

## 2021-06-16 RX ADMIN — METRONIDAZOLE 500 MG: 500 INJECTION, SOLUTION INTRAVENOUS at 01:28

## 2021-06-16 RX ADMIN — METRONIDAZOLE 500 MG: 500 INJECTION, SOLUTION INTRAVENOUS at 05:01

## 2021-06-16 RX ADMIN — LEVALBUTEROL HYDROCHLORIDE 1.25 MG: 1.25 SOLUTION, CONCENTRATE RESPIRATORY (INHALATION) at 19:46

## 2021-06-16 RX ADMIN — LEVALBUTEROL HYDROCHLORIDE 1.25 MG: 1.25 SOLUTION, CONCENTRATE RESPIRATORY (INHALATION) at 14:44

## 2021-06-16 RX ADMIN — LORAZEPAM 0.25 MG: 0.5 TABLET ORAL at 10:35

## 2021-06-16 RX ADMIN — HEPARIN SODIUM 5000 UNITS: 5000 INJECTION INTRAVENOUS; SUBCUTANEOUS at 14:54

## 2021-06-16 RX ADMIN — ASPIRIN 81 MG: 81 TABLET, CHEWABLE ORAL at 09:46

## 2021-06-16 RX ADMIN — BUDESONIDE 0.5 MG: 0.5 INHALANT ORAL at 07:33

## 2021-06-16 RX ADMIN — METRONIDAZOLE 500 MG: 500 INJECTION, SOLUTION INTRAVENOUS at 14:54

## 2021-06-16 RX ADMIN — CARVEDILOL 12.5 MG: 12.5 TABLET, FILM COATED ORAL at 16:44

## 2021-06-16 RX ADMIN — PREDNISONE 5 MG: 5 TABLET ORAL at 09:45

## 2021-06-16 RX ADMIN — CLOPIDOGREL BISULFATE 75 MG: 75 TABLET ORAL at 09:46

## 2021-06-16 RX ADMIN — IPRATROPIUM BROMIDE 0.5 MG: 0.5 SOLUTION RESPIRATORY (INHALATION) at 07:33

## 2021-06-16 RX ADMIN — SODIUM CHLORIDE 75 ML/HR: 0.9 INJECTION, SOLUTION INTRAVENOUS at 14:56

## 2021-06-16 RX ADMIN — SODIUM CHLORIDE 75 ML/HR: 0.9 INJECTION, SOLUTION INTRAVENOUS at 04:55

## 2021-06-16 RX ADMIN — IPRATROPIUM BROMIDE 0.5 MG: 0.5 SOLUTION RESPIRATORY (INHALATION) at 14:44

## 2021-06-16 RX ADMIN — BUDESONIDE 0.5 MG: 0.5 INHALANT ORAL at 19:46

## 2021-06-16 RX ADMIN — CEFTRIAXONE SODIUM 1000 MG: 10 INJECTION, POWDER, FOR SOLUTION INTRAVENOUS at 21:44

## 2021-06-16 RX ADMIN — LEVALBUTEROL HYDROCHLORIDE 1.25 MG: 1.25 SOLUTION, CONCENTRATE RESPIRATORY (INHALATION) at 07:33

## 2021-06-16 NOTE — WOUND OSTOMY CARE
Consult Note - Wound   [place] Stade  79 y o  male MRN: [id number]  Unit/Bed#: S -71 Encounter: [id number]      History and Present Illness:  79year old male presented to the hospital with cough, shortness of breath and increased sputum  Patient's history significant for CVA with left sided weakness  Assessment Findings:   Patient agreeable to assessment and photography  He is able to turn in bed for assessment with assist x 2  He is incontinent of bowel and bladder  Condom catheter in place for management  Bilateral feet are dry, flaky and scaly with heels intact, dry, erythematous, and blanchable  Bilateral buttocks and sacrum are intact without redness  Right medial lower extremity with dry, scaly intact area--fungal?      Present on admission right lateral hip deep tissue injury--deep purple/tan intact, non-blanchable area with surrounding blanchable erythema  Patient has had stage 3 pressure injury to this area in the past--he favors leaning to his right side due to CVA  See flowsheet and media for wound details  Wound Care Plan:   1-Apply Allevyn Life foam dressing to bilateral heels for prevention  Jd with P   Peel back at least daily for skin assessment and re-apply  Change dressing every 3 days and PRN  2-Elevate heels off of bed/chair surface to offload pressure  3-Offloading air cushion in chair when out of bed  4-Moisturize skin daily with skin nourishing cream   5-Turn/reposition every 2 hours while in bed, or when medically stable, using positioning wedges; and weight shift frequently while in chair for pressure re-distribution on skin  6-Hydraguard lotion to buttocks and sacrum three times daily and as needed with incontinence care  7-Right hip--cleanse, pat dry  Apply Allevyn Life foam dressing  Jd with T   Change dressing every 3 days  Wound care team to follow  Plan of care reviewed with primary RN      Dr Anahi Crabtree made aware of assessment findings  Wound 06/16/21 Hip Right;Lateral (Active)   Wound Image   06/16/21 1102   Wound Description Hunt;Dry; Intact 06/16/21 1102   [place]-wound Assessment Erythema 06/16/21 1102   Wound Length (cm) 1 5 cm 06/16/21 1102   Wound Width (cm) 0 7 cm 06/16/21 1102   Wound Depth (cm) 0 cm 06/16/21 1102   Wound Surface Area (cm^2) 1 05 cm^2 06/16/21 1102   Wound Volume (cm^3) 0 cm^3 06/16/21 1102   Calculated Wound Volume (cm^3) 0 cm^3 06/16/21 1102   Change in Wound Size % 100 06/16/21 1102   Drainage Amount None 06/16/21 1102   Drainage Description Serous 06/16/21 1000   Treatments Cleansed;Site care 06/16/21 0300   Dressing Foam, Silicon (eg  Allevyn, etc) 06/16/21 1102   Dressing Changed New 06/16/21 0300   Patient Tolerance Tolerated well 06/16/21 1102   Dressing Status Clean;Dry; Intact 06/16/21 Via Gerardo Ford RN, [employer]

## 2021-06-16 NOTE — ASSESSMENT & PLAN NOTE
Hx of COPD with tracheomalacia and chronic cough 2/2 dysphagia   Continue duonebs TID, pulmicort BID   Continue chronic prednisone 5 mg daily   Daily VEST therapy, RRT consulted   Follows Dr Giovanny Ashby outpatient

## 2021-06-16 NOTE — SPEECH THERAPY NOTE
Speech-Language Pathology Bedside Swallow Evaluation        Patient Name: Wally Wood  Today's Date: 6/16/2021     Problem List  Principal Problem:    Bilateral pneumonia  Active Problems:    Essential hypertension    Oropharyngeal dysphagia    History of cerebrovascular accident (CVA) with residual deficit - dysphagia and left sided weakness    GERD (gastroesophageal reflux disease)    Chronic kidney disease, stage 3 (HCC)    Hyperlipidemia    Major depressive disorder with single episode, in full remission ([facility] )    Ambulatory dysfunction    COPD (chronic obstructive pulmonary disease) ([facility])    Fatigue    Sepsis ([place] [address] )    Acute respiratory insufficiency         Past Medical History  Past Medical History:   Diagnosis Date    [facility] 5/31/2017    Aspiration into respiratory tract     Chest pain 5/31/2017    COPD (chronic obstructive pulmonary disease) ([facility])     Depression     Elevated troponin 5/31/2017    GERD (gastroesophageal reflux disease)     History of CVA (cerebrovascular accident) 4/13/2016    Hyperlipidemia     Hypertension     Lung cancer ([place] [address] ) 2005    Right, status post lobectomy    Pneumonia     Prostate cancer ([place] [address] )     Sleep apnea     awaiting sleep study results    Squamous cell skin cancer     Stroke ([place] [address] )     Tracheomalacia     Weakness due to cerebrovascular accident (CVA)        Past Surgical History  Past Surgical History:   Procedure Laterality Date    COLONOSCOPY      CT GUIDED FINE NEEDLE ASPIRATION (ALL INC)  10/30/2020    ESOPHAGOGASTRODUODENOSCOPY N/A 4/13/2016    Procedure: ESOPHAGOGASTRODUODENOSCOPY (EGD); Surgeon: Immanuel Bennett MD;  [facility]; Service:     IR GASTROSTOMY (G) TUBE CHECK/CHANGE/REINSERTION/UPSIZE  4/30/2021    JOINT REPLACEMENT      knee replacement    LUNG LOBECTOMY      [place] ESOPHAGOGASTRODUODENOSCOPY TRANSORAL DIAGNOSTIC N/A 3/15/2017    Procedure: ESOPHAGOGASTRODUODENOSCOPY (EGD);   Surgeon: Manisha Caldwell Denise Gutierrez MD;  [facility]; Service: Gastroenterology    SKIN BIOPSY      TRIGEMINAL NERVE DECOMPRESSION         Summary    Pt presents with  Baseline oropharyngeal dysphagia w/ history of aspiration and aspiration pneumonia  Pt had PEG tube placed OCt 2020, but still takes puree/ honey thick liquids by mouth  Pt may be at risk for aspiration of oral secretions, pt noted to have drooling before and during po  Recommendations:   Diet: puree/level 1 diet and honey thick liquids -by tsp w/ chin tuck  Meds: crushed with puree and non-oral if possible   Frequent Oral care: 4x/day  Aspiration precautions   Other Recommendations/ considerations: will cont to follow for diet tolerance      Current Medical Status  Pt is a 79 y o  male who presented to [address] [place] [address]  with bilateral pneumonia  Now presents with symptoms and findings of bilateral pneumonia with concerns for aspiration  On my evaluation this morning, he reports feeling much better than initial presentation  Denies any fever or chills, no abdominal pain  Does report tendency to cough with meals  Past medical history:   Please see H&P for details    Special Studies:  CT ed chest pe study: 6/15/21 Patchy opacities within bilateral lower lobes right greater than left compatible with pneumonia in the appropriate clinical setting  Trace right pleural effusion  VBS 4/30/2019   Moderate oral/mild-moderate pharyngeal dysphagia w/ decreased oral control of liquids and delayed swallow initiation resulting in aspiration before the swallow  Oral prep and chin tuck of thick liquids by tsp reduced aspiration risk       Social/Education/Vocational Hx:  Pt lives with family    Swallow Information   Current Risks for Dysphagia & Aspiration: known history of dysphagia and known history of aspiration  Current Symptoms/Concerns: change in respiratory status and hx of dysphagia and aspiration; current pneumonia  Current Diet: NPO   Baseline Diet: puree/level 1 diet and honey thick liquids- only receiving hydration via PEG  Takes pills- crushed in puree     Baseline Assessment   Behavior/Cognition: alert  Speech/Language Status: able to follow commands and limited verbal output  Patient Positioning: upright in bed     Swallow Mechanism Exam ** drooling noted, yankaur used for suctioning  Facial: symmetrical  Labial: decreased strength-weak labial seal  Lingual: WFL  Velum: unable to visualize  Mandible: adequate ROM  Dentition: edentulous  Vocal quality:clear/adequate   Volitional Cough: weak   Respiratory: NC    Consistencies Assessed and Performance   Consistencies Administered: honey thick and puree by tsp, chin tuck w/ HTL    Oral Stage: pt w/ fair bolus retrieval via spoon, somewhat weak seal  Adequate manipulation and transfer w/ puree and HTL, mildly decreased oral control w/ HTL at times  Drooling noted during session, yankaur used  Pharyngeal Stage: swallows were delayed, w/ reduced laryngeal excursion  Cough noted x1 w/ HTL by tsp  No coughing w/ 4 oz puree  Voice remained clear         Esophageal Concerns: pt c/o feeling full after 4 oz of puree and 2 oz of HTL, no TF running      Results Reviewed with: patient and RN   Dysphagia Goals: pt will tolerate puree diet with honey thick liquids by tsp w/ chin tuck without s/s of aspiration x72 hours       April Princess Nimo MA CCC-SLP  Speech Pathologist  [place] license # [employer] [facility] [id number]  [place] license # [id number]  Available via [employer] App

## 2021-06-16 NOTE — PLAN OF CARE
Problem: Nutrition/Hydration-ADULT  Goal: Nutrient/Hydration intake appropriate for improving, restoring or maintaining nutritional needs  Description: Monitor and assess patient's nutrition/hydration status for malnutrition  Collaborate with interdisciplinary team and initiate plan and interventions as ordered  Monitor patient's weight and dietary intake as ordered or per policy  Utilize nutrition screening tool and intervene as necessary  Determine patient's food preferences and provide high-protein, high-caloric foods as appropriate       INTERVENTIONS:  - Monitor oral intake, urinary output, labs, and treatment plans  - Assess nutrition and hydration status and recommend course of action  - Evaluate amount of meals eaten  - Assist patient with eating if necessary   - Allow adequate time for meals  - Recommend/ encourage appropriate diets, oral nutritional supplements, and vitamin/mineral supplements  - Order, calculate, and assess calorie counts as needed  - Recommend, monitor, and adjust tube feedings and TPN/PPN based on assessed needs  - Assess need for intravenous fluids  - Provide specific nutrition/hydration education as appropriate  - Include patient/family/caregiver in decisions related to nutrition  Outcome: Progressing     Problem: Prexisting or High Potential for Compromised Skin Integrity  Goal: Skin integrity is maintained or improved  Description: INTERVENTIONS:  - Identify patients at risk for skin breakdown  - Assess and monitor skin integrity  - Assess and monitor nutrition and hydration status  - Monitor labs   - Assess for incontinence   - Turn and reposition patient  - Assist with mobility/ambulation  - Relieve pressure over bony prominences  - Avoid friction and shearing  - Provide appropriate hygiene as needed including keeping skin clean and dry  - Evaluate need for skin moisturizer/barrier cream  - Collaborate with interdisciplinary team   - Patient/family teaching  - Consider wound care consult   Outcome: Progressing     Problem: MOBILITY - ADULT  Goal: Maintain or return to baseline ADL function  Description: INTERVENTIONS:  -  Assess patient's ability to carry out ADLs; assess patient's baseline for ADL function and identify physical deficits which impact ability to perform ADLs (bathing, care of mouth/teeth, toileting, grooming, dressing, etc )  - Assess/evaluate cause of self-care deficits   - Assess range of motion  - Assess patient's mobility; develop plan if impaired  - Assess patient's need for assistive devices and provide as appropriate  - Encourage maximum independence but intervene and supervise when necessary  - Involve family in performance of ADLs  - Assess for home care needs following discharge   - Consider OT consult to assist with ADL evaluation and planning for discharge  - Provide patient education as appropriate  Outcome: Progressing  Goal: Maintains/Returns to pre admission functional level  Description: INTERVENTIONS:  - Perform BMAT or MOVE assessment daily    - Set and communicate daily mobility goal to care team and patient/family/caregiver     - Collaborate with rehabilitation services on mobility goals if consulted  - - Record patient progress and toleration of activity level   Outcome: Progressing     Problem: PAIN - ADULT  Goal: Verbalizes/displays adequate comfort level or baseline comfort level  Description: Interventions:  - Encourage patient to monitor pain and request assistance  - Assess pain using appropriate pain scale  - Administer analgesics based on type and severity of pain and evaluate response  - Implement non-pharmacological measures as appropriate and evaluate response  - Consider cultural and social influences on pain and pain management  - Notify physician/advanced practitioner if interventions unsuccessful or patient reports new pain  Outcome: Progressing     Problem: INFECTION - ADULT  Goal: Absence or prevention of progression during hospitalization  Description: INTERVENTIONS:  - Assess and monitor for signs and symptoms of infection  - Monitor lab/diagnostic results  - Monitor all insertion sites, i e  indwelling lines, tubes, and drains  - Monitor endotracheal if appropriate and nasal secretions for changes in amount and color  - [place] appropriate cooling/warming therapies per order  - Administer medications as ordered  - Instruct and encourage patient and family to use good hand hygiene technique  - Identify and instruct in appropriate isolation precautions for identified infection/condition  Outcome: Progressing     Problem: SAFETY ADULT  Goal: Maintain or return to baseline ADL function  Description: INTERVENTIONS:  -  Assess patient's ability to carry out ADLs; assess patient's baseline for ADL function and identify physical deficits which impact ability to perform ADLs (bathing, care of mouth/teeth, toileting, grooming, dressing, etc )  - Assess/evaluate cause of self-care deficits   - Assess range of motion  - Assess patient's mobility; develop plan if impaired  - Assess patient's need for assistive devices and provide as appropriate  - Encourage maximum independence but intervene and supervise when necessary  - Involve family in performance of ADLs  - Assess for home care needs following discharge   - Consider OT consult to assist with ADL evaluation and planning for discharge  - Provide patient education as appropriate  Outcome: Progressing  Goal: Maintains/Returns to pre admission functional level  Description: INTERVENTIONS:  - Perform BMAT or MOVE assessment daily    - Set and communicate daily mobility goal to care team and patient/family/caregiver  - Collaborate with rehabilitation services on mobility goals if consulted  - Perform Range of Motion 3   times a day  - Reposition patient every 2 hours    - Out of bed to chair  2 times a day   - Out of bed for meals  3 times a day  - Out of bed for toileting  - Record patient progress and toleration of activity level   Outcome: Progressing  Goal: Patient will remain free of falls  Description: INTERVENTIONS:  - Educate patient/family on patient safety including physical limitations  - Instruct patient to call for assistance with activity   - Consult OT/PT to assist with strengthening/mobility   - Keep Call bell within reach  - Keep bed low and locked with side rails adjusted as appropriate  - Keep care items and personal belongings within reach  - Initiate and maintain comfort rounds  - Make Fall Risk Sign visible to staff    - Initiate/Maintain  bed/chairalarm  - Obtain necessary fall risk management equipment:   - Apply yellow socks and bracelet for high fall risk patients  - Consider moving patient to room near nurses station  Outcome: Progressing     Problem: DISCHARGE PLANNING  Goal: Discharge to home or other facility with appropriate resources  Description: INTERVENTIONS:  - Identify barriers to discharge w/patient and caregiver  - Arrange for needed discharge resources and transportation as appropriate  - Identify discharge learning needs (meds, wound care, etc )  - Arrange for interpretive services to assist at discharge as needed  - Refer to Case Management Department for coordinating discharge planning if the patient needs post-hospital services based on physician/advanced practitioner order or complex needs related to functional status, cognitive ability, or social support system  Outcome: Progressing

## 2021-06-16 NOTE — ASSESSMENT & PLAN NOTE
Hx of chronic dysphagia   Follows SLP - prior diet puree/HTL   NPO until reassessed by speech, changed meds to per peg for now

## 2021-06-16 NOTE — PHYSICAL THERAPY NOTE
PHYSICAL THERAPY EVALUATION  NAME:  Darlyn Colindres  DATE: 06/16/21    AGE:   79 y o  Mrn:   [id number]  ADMIT DX:  Altered mental status [R41 82]  Pneumonia [J18 9]  Problem List:   Patient Active Problem List   Diagnosis    Essential hypertension    [facility] ([facility] )    Oropharyngeal dysphagia    History of cerebrovascular accident (CVA) with residual deficit - dysphagia and left sided weakness    Shortness of breath    GERD (gastroesophageal reflux disease)    Left-sided weakness    Chronic kidney disease, stage 3 ([place] [address] )    Prostate cancer ([place] [address] )    Hyperlipidemia    Major depressive disorder with single episode, in full remission ([place] [address] )    Tracheomalacia    Ambulatory dysfunction    Cough    BPH (benign prostatic hyperplasia)    LIAM (obstructive sleep apnea)    Bacteriuria    [facility]    Weight loss    COPD (chronic obstructive pulmonary disease) [facility]    Abnormal CT of the chest    Mild dementia ([place] [address] )    H/O fall    Other constipation    Polypharmacy    Anxiety    Neutrophilic leukocytosis    Peripheral vascular disease, unspecified ([place] [address] )    Palliative care patient    Advanced care planning/counseling discussion    Frailty    Wheelchair dependence    Pressure injury of right hip, stage 3 ([facility]    Hypoxia    Fatigue    Sepsis ([place] [address] )    Acute respiratory insufficiency    Bilateral pneumonia         Length Of Stay: 0  Performed at least 2 patient identifiers during session: Name and Birthday  PHYSICAL THERAPY EVALUATION :    06/16/21 1202   PT Last Visit   PT Visit Date 06/16/21   Note Type   Note type Evaluation   Pain Assessment   Pain Assessment Tool Pain Assessment not indicated - pt denies pain   [address]; Wheelchair-manual   Additional Comments Caregiver reports the patient is temporarily in an apartment across the street"    Caregiver reports she is air primarily 24 hours a day, other than when his daughter will [place] her a break"  She reports that up until about 2 weeks ago that he was ambulating with a walker, but most most recently been moving around by wheelchair using right and left upper extremities  Patient reportedly performs transfers with rolling walker, left MAFO, and assist   Patient's other home is a 2 story home with main accommodations on the 1st floor, ramp to enter replacing the 4 steps to enter   Prior Function   Level of [place] Needs assistance with IADLs; Needs assistance with ADLs and functional mobility   Lives With Daughter; Other (Comment)  (And caregiver)   Falls in the last 6 months   (None in last 6 months per caregiver)   Comments However, last admission PT evaluation from 09/20/2020 reports that patient had 2 falls in the last 6 months min  Restrictions/Precautions   Weight Bearing Precautions Per Order No   Braces or Orthoses AFO  (Dependent donned by caregiver)   Other Precautions Bed Alarm; Chair Alarm;Cognitive; Fall Risk;O2  (Limited verbalization)   General   Family/Caregiver Present Yes  (Caregiver Angela)   Cognition   Arousal/Participation Alert   Orientation Level Oriented to person   Following Commands Follows one step commands with increased time or repetition   RUE Strength   RUE Overall Strength Within Functional Limits - able to perform ADL tasks with strength   LUE Strength   LUE Overall Strength Deficits   LUE Tone   LUE Tone Hypertonic   RLE Assessment   RLE Assessment   (Limited hamstring flexibility)   Strength RLE   R Hip Flexion 4-/5   R Knee Extension 4-/5   R Ankle Dorsiflexion 4-/5   LLE Assessment   LLE Assessment   (Limited hamstring flexibility)   Strength LLE   L Hip Flexion 3+/5   L Knee Extension 3-/5   L Ankle Dorsiflexion   (Not tested MAFO on)   Coordination   Movements are Fluid and Coordinated 0   Coordination and Movement Description Dysmetria   Light Touch   RLE Light Touch Not tested   LLE Light Touch Not tested   Bed Mobility   Supine to Sit 3  Moderate assistance   Additional items Assist x 1; Increased time required;Verbal cues; Bedrails;HOB elevated  (Right egress)   Transfers   Sit to Stand Unable to assess  (Unable to clear buttocks with BUE bed rail @ edge of bed)   Ambulation/Elevation   Gait pattern Not appropriate   Balance   Static Sitting Fair +  (Initially fair minus)   Static Standing Zero   Endurance Deficit   Endurance Deficit Yes   Endurance Deficit Description Patient requires therapeutic rest between mobility trials   Activity Tolerance   Activity Tolerance Patient limited by pain; Patient limited by fatigue   Medical Staff Made Aware Spoke to Zan Lopez from case management prior to session   Nurse Made Aware Spoke to Isabella MARTINEZ RN   Assessment   Prognosis Fair   Problem List Decreased strength;Decreased range of motion;Decreased endurance; Impaired balance;Decreased mobility; Decreased cognition;Decreased coordination; Impaired judgement; Impaired tone;Decreased safety awareness   Barriers to Discharge Comments Comorbidities affecting pt's physical performance at time of assessment include:RONAL (acute kidney injury) ([facility] ) (5/31/2017), Aspiration into respiratory tract, Chest pain (5/31/2017), COPD (chronic obstructive pulmonary disease) ([facility] ), Depression, Hypertension, Lung cancer ([address] ) (2005), Pneumonia, Prostate cancer ([address] ), Sleep apnea, Squamous cell skin cancer, Stroke ([address] ), Tracheomalacia, Lung lobectomy; Joint replacement; Trigeminal nerve decompression  Personal factors affecting pt at time of IE include: inability to perform IADLs, inability to perform ADLs, inability to ambulate household distances and inability to navigate community distances     Goals   Patient Goals None stated, however patient's caregiver reports that she and patient's daughter would like him to get up into the chair while here   STG Expiration Date   (t+10)   PT Treatment Day 1  (Treatment documented in note)   Plan   Treatment/Interventions Functional transfer training;LE strengthening/ROM; Therapeutic exercise; Endurance training;Cognitive reorientation;Patient/family training;Equipment eval/education; Bed mobility;Gait training;Spoke to case management;Spoke to nursing   PT Frequency 5x/wk   Recommendation   PT Discharge Recommendation Post acute rehabilitation services   Equipment Recommended Hurtis Brayton; Wheelchair   Wheelchair Package Recommended Standard  (Has 1)   Walker Package Recommended Wheeled walker  (Has 1)   AM-PAC Basic Mobility Inpatient   Turning in Bed Without Bedrails 2   Lying on Back to Sitting on Edge of Flat Bed 1   Moving Bed to Chair 1   Standing Up From Chair 1   Walk in Room 1   Climb 3-5 Stairs 1   Basic Mobility Inpatient Raw Score 7   Turning Head Towards Sound 3   Follow Simple Instructions 3   Low Function Basic Mobility Raw Score 13   Low Function Basic Mobility Standardized Score 20 14   (Please find full objective findings from PT assessment regarding body systems outlined above)  Assessment: Pt is a 79 y o  male seen for PT evaluation s/p admit to [facility] on 6/15/2021 w/ Bilateral pneumonia  Order placed for PT  Prior to 2 weeks ago, patient was ambulatory with assistance using walker for short distances, used wheelchair otherwise, and had 24 hour care at home in apartment  Upon evaluation: Pt requires moderate assistance for bed mobility to edge of bed, and was not successful in performing transfers nor buttock clearance with using stabilized bilateral bed rails  Pt's clinical presentation is currently unstable/unpredictable given the functional mobility deficits above, especially (but not limited to) weakness, decreased endurance, decreased functional mobility tolerance and impaired tone, coupled with fall risks including hx of falls, impaired balance and impaired coordination, and combined with medical complications of abnormal WBCs and abnormal sodium values    Pt to benefit from continued skilled PT tx while in hospital and upon DC to address deficits as defined above and maximize level of functional mobility  Recommend trials with quick move for sit to stand performance to target surfaces, eventual progression to rolling walker, therapeutic exercise including lower extremity stretching, case management consultation for coordination of care  Goals: Pt will: Perform bed mobility tasks with consistent Min assist to prepare for transfers and reposition in bed, decrease burden of care  Complete sit to stand transfers with consistent assist of 1 to decrease burden of care and decrease risk for falls  Assess ambulation with rolling walker and assess wheelchair propulsion and set goals decrease burden of care and decrease risk for falls  Improve standing tolerance with bilateral upper extremity support for 1 minutes to demonstrate improved activity tolerance    Portions of the record may have been created with voice recognition software  Occasional wrong word or "sound a like" substitutions may have occurred due to the inherent limitations of voice recognition software  Read the chart carefully and recognize, using context, where substitutions have occurred    PHYSICAL THERAPY TREATMENT NOTE  TREATMENT:   Time: 7888-7932: 15 min  S:  Patient needs some encouragement to participate with quick move transfers  Angela also providing encouragement  O:  · Transfers sit to stand with elevated height of bed Min assist of 2, taking more than reasonable time to transition to upright standing tolerance  · Patient dependent for transfers bed to chair using quick move  · Patient incontinent of bowel during transfers, nursing notified and patient cleaned  · Standing tolerance with upper extremity support of quick move x1 5 minutes with fair minus balance  · Patient positioned in chair to promote 90 90 positioning  A:  Patient successfully in chair using quick move, but would recommend assist of 2 for transfer to target surfaces at this time    Recommend patient be out of bed for more than 2 hours at a time due to limited repositioning and potential difficulty having patient return back to bed or standing up from that chair  P:  Recommend continued PT services to progress patient from use of quick move to rolling walker      Portions of the record may have been created with voice recognition software  Occasional wrong word or "sound a like" substitutions may have occurred due to the inherent limitations of voice recognition software    Read the chart carefully and recognize, using context, where substitutions have occurred

## 2021-06-16 NOTE — DISCHARGE INSTR - DIET
Dysphagia 1 puree w/ honey thick liquids by tsp using chin tuck when swallowing   meds crushed   aspiration precautions

## 2021-06-16 NOTE — ASSESSMENT & PLAN NOTE
Pt presents with weakness, SOB, cough  +Sepsis criteria on admission    Suspect recurrent aspiration pneumonia despite PEG tube and careful AHF by caregivers   PEG placed in 10/2020 but only used for hydration purposes   CTA chest shows R>L bibasilar patchy opacities   Check urinary antigens, sputum cx if able, PCT   De-escalate abx to ceftriaxone and flagyl   Respiratory protocol, pulm toilet, IS  Consult speech therapy, RD for recommendations for tube feeds, make NPO and change meds to per G tube until evaluated for oral diet - previously on puree with HTL   Strict aspiration precautions

## 2021-06-16 NOTE — ASSESSMENT & PLAN NOTE
POA, tachypnea, leukocytosis w left shift, lactic acidosis   Trend lactic   IV abx for suspected aspiration pneumonia   F/u blood cultures   Trend temps, repeat CBC in AM   IV fluids per protocol

## 2021-06-16 NOTE — H&P
[facility]  H&P- Carmel Massing  1940, 79 y o  male MRN: [id number]  Unit/Bed#: S -02 Encounter: [id number]  Primary Care Provider: Lacey Abreu DO   Date and time admitted to hospital: 6/15/2021  9:54 PM    * Bilateral pneumonia  Assessment & Plan  Pt presents with weakness, SOB, cough  +Sepsis criteria on admission    Suspect recurrent aspiration pneumonia despite PEG tube and careful AHF by caregivers   PEG placed in 10/2020 but only used for hydration purposes   CTA chest shows R>L bibasilar patchy opacities   Check urinary antigens, sputum cx if able, PCT   De-escalate abx to ceftriaxone and flagyl   Respiratory protocol, pulm toilet, IS  Consult speech therapy, RD for recommendations for tube feeds, make NPO and change meds to per G tube until evaluated for oral diet - previously on puree with HTL   Strict aspiration precautions     Sepsis ([facility] [address] )  Assessment & Plan  POA, tachypnea, leukocytosis w left shift, lactic acidosis   Trend lactic   IV abx for suspected aspiration pneumonia   F/u blood cultures   Trend temps, repeat CBC in AM   IV fluids per protocol     Oropharyngeal dysphagia  Assessment & Plan  Hx of chronic dysphagia   Follows SLP - prior diet puree/HTL   NPO until reassessed by speech, changed meds to per peg for now     Acute respiratory insufficiency  Assessment & Plan  Patient presenting with weakness and SOB, family suspected recurrent aspiration  Pt states he wears O2 at home PRN   Currently on 2L NC   Maintains sats >/= 90%    COPD (chronic obstructive pulmonary disease) ([address] )  Assessment & Plan  Hx of COPD with tracheomalacia and chronic cough 2/2 dysphagia   Continue duonebs TID, pulmicort BID   Continue chronic prednisone 5 mg daily   Daily VEST therapy, RRT consulted   Follows Dr Boni Morenotingham outpatient     Ambulatory dysfunction  Assessment & Plan  Presents w/ increase weakness - likely 2/2 infectious process   PT/OT     Major depressive disorder with single episode, in full remission ([facility] [address] )  Assessment & Plan  Continue paxil, prn ativan     Hyperlipidemia  Assessment & Plan  Continue statin     Chronic kidney disease, stage 3 (HCC)  Assessment & Plan  Lab Results   Component Value Date    EGFR 53 06/15/2021    EGFR 67 10/01/2020    EGFR 69 09/30/2020    CREATININE 1 26 06/15/2021    CREATININE 1 05 10/01/2020    CREATININE 1 02 09/30/2020     Cr is at BL, 1 2   Diuretics on hold in setting of pna, sepsis, IVF resuscitation     GERD (gastroesophageal reflux disease)  Assessment & Plan  Continue PPI daily     History of cerebrovascular accident (CVA) with residual deficit - dysphagia and left sided weakness  Assessment & Plan  Hx of CVA w L sided deficits nd dysphagia   Aspiration precautions   Continue lipitor, plavix, asa     Essential hypertension  Assessment & Plan  Continue norvasc, coreg  Hold aldactone while on fluids     VTE Prophylaxis: Heparin  / sequential compression device and foot pump applied   Code Status: FULL CODE - d/w patient   POLST: POLST form is not discussed and not completed at this time  Discussion with family: pt states dtr just left     Anticipated Length of Stay:  Patient will be admitted on an Inpatient basis with an anticipated length of stay of  > 2 midnights  Justification for Hospital Stay: sepsis, pneumonia     Total Time for Visit, including Counseling / Coordination of Care: 45 minutes  Greater than 50% of this total time spent on direct patient counseling and coordination of care  Chief Complaint:   Possible aspiration     History of Present Illness:    Loy Arenas  is a 79 y o  male with past medical history significant for hypertension prior stroke with residual calmer are clear, COPD prostate cancer, lung cancer, chronic kidney disease, GERD who presents with cough, shortness of breath and increased sputum production over the last 2 days    Patient has PEG tube in place for history of dysphagia but only uses it for free water since he is on a thickened liquid diet per speech  He has caregivers that assist with oral feedings  Denies fevers or chills  Typically eats 2 large meals a day, breakfast and dinner with pre pureed mom's Meals  Review of Systems:    Review of Systems   Constitutional: Positive for appetite change  Negative for chills and fever  HENT: Positive for congestion  Respiratory: Positive for cough (increased sputum ) and shortness of breath  Cardiovascular: Negative for chest pain  Gastrointestinal: Negative for abdominal pain  Genitourinary: Negative for dysuria  Musculoskeletal: Positive for gait problem  Negative for back pain  Skin: Positive for pallor and wound (right ischial )  Neurological: Positive for weakness  Negative for dizziness and light-headedness  Psychiatric/Behavioral: Negative for confusion  Past Medical and Surgical History:     Past Medical History:   Diagnosis Date    RONAL (acute kidney injury) ([facility] [address] ) 5/31/2017    Aspiration into respiratory tract     Chest pain 5/31/2017    COPD (chronic obstructive pulmonary disease) (HCC)     Depression     Elevated troponin 5/31/2017    GERD (gastroesophageal reflux disease)     History of CVA (cerebrovascular accident) 4/13/2016    Hyperlipidemia     Hypertension     [facility]    Right, status post lobectomy    Pneumonia     [facility]     Sleep apnea     awaiting sleep study results    Squamous cell skin cancer     [facility]     Tracheomalacia     Weakness due to cerebrovascular accident (CVA)        Past Surgical History:   Procedure Laterality Date    COLONOSCOPY      CT GUIDED FINE NEEDLE ASPIRATION (ALL INC)  10/30/2020    ESOPHAGOGASTRODUODENOSCOPY N/A 4/13/2016    Procedure: ESOPHAGOGASTRODUODENOSCOPY (EGD); Surgeon: Osmin Clements MD;  [facility];   Service:     IR GASTROSTOMY (G) TUBE CHECK/CHANGE/REINSERTION/UPSIZE  4/30/2021    JOINT REPLACEMENT      knee replacement    LUNG LOBECTOMY      WA ESOPHAGOGASTRODUODENOSCOPY TRANSORAL DIAGNOSTIC N/A 3/15/2017    Procedure: ESOPHAGOGASTRODUODENOSCOPY (EGD); Surgeon: Kalia Gordon MD;  Location: [facility]; Service: Gastroenterology    SKIN BIOPSY      TRIGEMINAL NERVE DECOMPRESSION         Meds/Allergies:    Prior to Admission medications    Medication Sig Start Date End Date Taking? Authorizing Provider   acetaminophen (TYLENOL) 325 mg tablet Take 2 tablets by mouth every 6 (six) hours as needed for fever 6/19/17  Yes Jack Garcia MD   albuterol (PROVENTIL HFA,VENTOLIN HFA) 90 mcg/act inhaler Inhale 2 puffs 4 (four) times a day 6/19/17  Yes Jack Garcia MD   amLODIPine (NORVASC) 10 mg tablet Take 1 tablet by mouth daily 10/10/17  Yes Historical Provider, MD   ASPIRIN 81 PO Take 1 tablet by mouth every other day   10/10/17  Yes Historical Provider, MD   atorvastatin (LIPITOR) 40 mg tablet Take 1 tablet (40 mg total) by mouth daily after dinner  Patient taking differently: Take 10 mg by mouth daily after dinner  11/18/19  Yes Steve Kolb MD   benzonatate (TESSALON) 200 MG capsule TAKE 1 CAPSULE BY MOUTH THREE TIMES A DAY AS NEEDED FOR COUGH 5/26/21  Yes DIANA Collins   budesonide (PULMICORT) 0 5 mg/2 mL nebulizer solution Take 1 vial (0 5 mg total) by nebulization 2 (two) times a day Rinse mouth after use   1/18/21 1/18/22 Yes DIANA Collins   carvedilol (COREG) 12 5 mg tablet Take 1 tablet by mouth 2 (two) times a day with meals 8/21/17  Yes Jack Garcia MD   clopidogrel (PLAVIX) 75 mg tablet Take 1 tablet by mouth daily 6/19/17  Yes Jack Garcia MD   guaiFENesin (ROBITUSSIN) 100 mg/5 mL oral solution Take 20 mL (400 mg total) by mouth 3 (three) times a day 10/1/20  Yes Frederick Harvey MD   ipratropium-albuterol (DUO-NEB) 0 5-2 5 mg/3 mL nebulizer solution Take 3 mL by nebulization 3 (three) times a day   Yes Historical Provider, MD   latanoprost (XALATAN) 0 005 % ophthalmic solution Administer 1 drop to both eyes daily at bedtime   Yes Historical Provider, MD   loratadine (CLARITIN) 10 mg tablet Take 10 mg by mouth daily   Yes Historical Provider, MD   LORazepam (ATIVAN) 0 5 mg tablet Take 0 25 mg by mouth every 8 (eight) hours as needed for anxiety    Yes Historical Provider, MD   PARoxetine (PAXIL) 20 mg tablet Take 20 mg by mouth daily   Yes Historical Provider, MD   polyethylene glycol (MIRALAX) 17 g packet Take 17 g by mouth daily as needed   Yes Historical Provider, MD   predniSONE 5 mg tablet TAKE 1 TABLET BY MOUTH EVERY DAY 12/15/20  Yes Kip Krabbe, DO   psyllium (METAMUCIL) 0 52 g capsule Take 0 52 g by mouth daily as needed    Yes Historical Provider, MD   senna (SENOKOT) 8 6 MG tablet Take 2 tablets by mouth daily at bedtime   Yes Historical Provider, MD   spironolactone (ALDACTONE) 25 mg tablet Take 1 tablet (25 mg total) by mouth daily 2/4/18  Yes DIANA Guo   tamsulosin (FLOMAX) 0 4 mg Take 1 capsule by mouth daily   Yes Historical Provider, MD   Fesoterodine Fumarate ER (TOVIAZ) 8 MG TB24 Take 4 mg by mouth every evening      Historical Provider, MD   ketoconazole (NIZORAL) 2 % cream Apply topically to both feet in their entirety (tops, bottoms and between toes) 3 times a day for 4 weeks straight  Patient not taking: Reported on 6/15/2021 10/16/19   Han Escobar MD   pantoprazole (PROTONIX) 40 mg tablet Take 40 mg by mouth daily    Historical Provider, MD   traZODone (DESYREL) 50 mg tablet 1/2 to 1 tab @ HS PRN  Patient not taking: Reported on 6/15/2021 2/15/21   Myrna Sanchez MD     I have reviewed home medications with patient personally  Allergies: Allergies   Allergen Reactions    Penicillins Rash     Social History:     Marital Status:     Occupation:  None  Patient Pre-hospital Living Situation:  Alone in apartment with care givers   Patient Pre-hospital Level of Mobility:  Wheelchair  Patient Pre-hospital Diet Restrictions: Thickened liquids  Substance Use History:   Social History     Substance and Sexual Activity   Alcohol Use Not Currently     Social History     Tobacco Use   Smoking Status Former Smoker    Packs/day: 1 00    Years: 22 00    Pack years: 22 00    Types: Cigarettes    Start date:     Quit date: 0    Years since quittin 4   Smokeless Tobacco Never Used     Social History     Substance and Sexual Activity   Drug Use Not Currently       Family History:    Family History   Family history unknown: Yes       Physical Exam:     Vitals:   Blood Pressure: 120/58 (21)  Pulse: 68 (21)  Temperature: 98 °F (36 7 °C) (06/15/21 2156)  Temp Source: Oral (06/15/21 2156)  Respirations: 20 (21)  Height: 6' 5" (195 6 cm) (06/15/21 2156)  Weight - Scale: 92 1 kg (203 lb 0 7 oz) (21)  SpO2: 93 % (21)    Physical Exam  Constitutional:       Appearance: He is ill-appearing (chronically ill appearing )  Comments: Laying on right side with head of bed elevated 30 degrees, sleeping but easily arousable    HENT:      Head: Normocephalic and atraumatic  Mouth/Throat:      Mouth: Mucous membranes are dry  Cardiovascular:      Rate and Rhythm: Normal rate and regular rhythm  Pulmonary:      Effort: Pulmonary effort is normal       Breath sounds: Rhonchi present  Comments: Decreased right lung base   Abdominal:      General: Abdomen is flat  Palpations: Abdomen is soft  Genitourinary:     Comments: Condom catheter   Musculoskeletal:         General: No swelling  Normal range of motion  Skin:     General: Skin is warm and dry  Neurological:      Mental Status: He is alert and oriented to person, place, and time  Mental status is at baseline  Cranial Nerves: Cranial nerve deficit present  Motor: Weakness present        Comments: Dysarthria    Psychiatric:         Mood and Affect: Mood normal          Behavior: Behavior normal        Additional Data:     Lab Results: I have personally reviewed pertinent reports  Results from last 7 days   Lab Units 06/15/21  2225   WBC Thousand/uL 19 92*   HEMOGLOBIN g/dL 15 2   HEMATOCRIT % 47 6   PLATELETS Thousands/uL 143*   BANDS PCT % 17*   LYMPHO PCT % 3*   MONO PCT % 5   EOS PCT % 0     Results from last 7 days   Lab Units 06/15/21  2225   SODIUM mmol/L 134*   POTASSIUM mmol/L 4 2   CHLORIDE mmol/L 98*   CO2 mmol/L 28   BUN mg/dL 22   CREATININE mg/dL 1 26   ANION GAP mmol/L 8   CALCIUM mg/dL 9 6   ALBUMIN g/dL 3 7   TOTAL BILIRUBIN mg/dL 0 60   ALK PHOS U/L 87   ALT U/L 15   AST U/L 11   GLUCOSE RANDOM mg/dL 129     Results from last 7 days   Lab Units 06/15/21  2225   INR  0 98             Results from last 7 days   Lab Units 06/16/21  0036 06/15/21  2225   LACTIC ACID mmol/L 2 7* 2 5*       Imaging: I have personally reviewed pertinent reports  CTA ED chest PE Study   Final Result by Celina Rodrigues MD (06/16 0028)      1  No evidence of pulmonary embolus  2   Patchy opacities within bilateral lower lobes right greater than left compatible with pneumonia in the appropriate clinical setting  Trace right pleural effusion  3   Increase in size of right hilar and mediastinal lymphadenopathy which may be reactive  Workstation performed: [id number]             EKG, Pathology, and Other Studies Reviewed on Admission:   · EKG:     Allscripts / Epic Records Reviewed: Yes     ** Please Note: This note has been constructed using a voice recognition system   **

## 2021-06-16 NOTE — RESPIRATORY THERAPY NOTE
RT Protocol Note  Isabella Grissom  79 y o  male MRN: [id number]  Unit/Bed#: S -01 Encounter: [id number]    Assessment    Principal Problem:    Bilateral pneumonia  Active Problems:    Essential hypertension    Oropharyngeal dysphagia    History of cerebrovascular accident (CVA) with residual deficit - dysphagia and left sided weakness    GERD (gastroesophageal reflux disease)    Chronic kidney disease, stage 3 ([facility])    Hyperlipidemia    Major depressive disorder with single episode, in full remission [facility])    Ambulatory dysfunction    COPD (chronic obstructive pulmonary disease) ([facility])    Fatigue    Sepsis ([place] [address] )    Acute respiratory insufficiency      Home Pulmonary Medications:  Albuterol MDI, duoneb,pulmicort     06/16/21 0421   Respiratory Protocol   Protocol Initiated? Yes   Protocol Selection Respiratory   Medical & Social History Reviewed? Yes   Diagnostic Studies Reviewed? Yes   Physical Assessment Performed?  Yes   Home Devices/Therapy Home O2  (prn)   Respiratory Assessment   Assessment Type Assess only   General Appearance Sleeping   Respiratory Pattern Normal   Chest Assessment Chest expansion symmetrical   Bilateral Breath Sounds Rhonchi   Cough None   Resp Comments pt assessed per protocol, pt sleeping soundly at this time, per chart review uses home O2 prn, takes pulmicort, duoneb and albuterol MDI, will change to xopenex/atrovent TID, has a hx of COPD and follows with pulmonary as an outpt   Additional Assessments   Pulse 66   Respirations 18   SpO2 94 %     Home Devices/Therapy: Home O2 (prn)    Past Medical History:   Diagnosis Date    [facility]) ([facility] ) 5/31/2017    Aspiration into respiratory tract     Chest pain 5/31/2017    COPD (chronic obstructive pulmonary disease) ([place] [address] )     Depression     Elevated troponin 5/31/2017    GERD (gastroesophageal reflux disease)     History of CVA (cerebrovascular accident) 4/13/2016    Hyperlipidemia     Hypertension     Lung cancer [facility]) 2005    Right, status post lobectomy    Pneumonia     Prostate cancer ([facility] [address] )     Sleep apnea     awaiting sleep study results    Squamous cell skin cancer     [facility]     Tracheomalacia     Weakness due to cerebrovascular accident (CVA)      Social History     Socioeconomic History    Marital status:      Spouse name: None    Number of children: 2    Years of education: None    Highest education level: None   Occupational History    None   Tobacco Use    Smoking status: Former Smoker     Packs/day: 1 00     Years: 22 00     Pack years: 22 00     Types: Cigarettes     Start date:      Quit date:      Years since quittin 3    Smokeless tobacco: Never Used   Vaping Use    Vaping Use: Never used   Substance and Sexual Activity    Alcohol use: Not Currently    Drug use: Not Currently    Sexual activity: Not Currently   Other Topics Concern    None   Social History Narrative    He lives with his daughter, Slava Mendoza who is an RN  Other people in the home are his son-in-law and 2 grandkids  He has a make shift "in-law suite"    He uses a wheelchair to get around    He has a private pay home health aide 20 hours per week    He watches a lot of TV     Social Determinants of Health     Financial Resource Strain:     Difficulty of Paying Living Expenses:    Food Insecurity:     Worried About Running Out of Food in the Last Year:     [address] in the Last Year:    Transportation Needs:     Lack of Transportation (Medical):      Lack of Transportation (Non-Medical):    Physical Activity:     Days of Exercise per Week:     Minutes of Exercise per Session:    Stress:     Feeling of Stress :    Social Connections:     Frequency of Communication with Friends and Family:     Frequency of Social Gatherings with Friends and Family:     Attends Yazidism Services:     Active Member of Clubs or Organizations:     Attends Club or Organization Meetings:     Marital Status:    Intimate Partner Violence:     Fear of Current or Ex-Partner:     Emotionally Abused:     Physically Abused:     Sexually Abused:        Subjective         Objective    Physical Exam:   Assessment Type: Assess only  General Appearance: Sleeping  Respiratory Pattern: Normal  Chest Assessment: Chest expansion symmetrical  Bilateral Breath Sounds: Rhonchi  Cough: None    Vitals:  Blood pressure 120/58, pulse (P) 66, temperature 98 °F (36 7 °C), temperature source Oral, resp  rate (P) 18, height 6' 5" (1 956 m), weight 92 5 kg (203 lb 14 8 oz), SpO2 (P) 94 %  Imaging and other studies: I have personally reviewed pertinent reports              Plan             Resp Comments: (P) pt assessed per protocol, pt sleeping soundly at this time, per chart review uses home O2 prn, takes pulmicort, duoneb and albuterol MDI, will change to xopenex/atrovent TID, has a hx of COPD and follows with pulmonary as an outpt

## 2021-06-16 NOTE — NUTRITION
06/16/21 1013   Assessment   Timepoint Initial  (consult - tube feeds)   Labs & Meds   Labs/Meds Review Lab values reviewed; Meds reviewed  (norvasc, lipitor, corag, plavix, protonix, flomax, IVF)   Feeding Route   PO NPO   Tube Feeding PEG tube  (used for hydration purposes)   Adequacy of Intake   Nutrition Modality NPO   Estimated Fluid Intake %  (IVF)   Nutrition Prognosis   Nutrition Concerns Dysphagia;RN skin assessment reviewed  (sacral pressure injury, hip wound documented)   Comorbid Concerns   (history CVA, COPD, bilateral PNA sepsis, CKD)   Nutrition Precautions & Barriers to Adequate Nutrition Aspiration concerns   Nutrition Considerations Nutrition Educ not indicated at this time   PES Statement   Problem Clinical   Functional (1) Swallowing difficulty NC-1 1   Related to Swallowing difficulty   As evidenced by: NPO status; Other (Comment); Weight loss  (need for nutritional support)   Patient Nutrition Goals   Goal Nutrition via appropriate route   Goal Status initiated   Timeframe to complete goal by next f/u   Recommendations/Interventions   Summary Per consult for tf recs, suggest initiate Jevity 1 5@ 30 ml/hr  continuous feed, increasing by 10 ml every 12 hours to goal rate of 60 ml/hr  Goal rate will provide 2160 kcal, 92 g protein, 1094 ml free water  flush 200 ml q 4 hours  Once continuous feeding  is established and tolerated,  if bolus feedings are preferred, patient will require (6) 240 ml bolus feeding per 24 hours with 100 ml flush pre and post bolus     Interventions EN initiate   Nutrition Recommendations Coordination of care  (SLP)   Nutrition Complexity Risk   Nutrition complexity level High risk   Follow up date 06/20/21

## 2021-06-16 NOTE — PLAN OF CARE
Problem: PHYSICAL THERAPY ADULT  Goal: Performs mobility at highest level of function for planned discharge setting  See evaluation for individualized goals  Description: Treatment/Interventions: Functional transfer training, LE strengthening/ROM, Therapeutic exercise, Endurance training, Cognitive reorientation, Patient/family training, Equipment eval/education, Bed mobility, Gait training, Spoke to case management, Spoke to nursing  Equipment Recommended: Laine Biggs, Wheelchair       See flowsheet documentation for full assessment, interventions and recommendations  6/16/2021 1323 by Tania Rachel PT  Note: Prognosis: Fair  Problem List: Decreased strength, Decreased range of motion, Decreased endurance, Impaired balance, Decreased mobility, Decreased cognition, Decreased coordination, Impaired judgement, Impaired tone, Decreased safety awareness  Assessment: Pt is a 79 y o  male seen for PT evaluation s/p admit to [facility] on 6/15/2021 w/ Bilateral pneumonia  Order placed for PT  Prior to 2 weeks ago, patient was ambulatory with assistance using walker for short distances, used wheelchair otherwise, and had 24 hour care at home in apartment  Upon evaluation: Pt requires moderate assistance for bed mobility to edge of bed, and was not successful in performing transfers nor buttock clearance with using stabilized bilateral bed rails  Pt's clinical presentation is currently unstable/unpredictable given the functional mobility deficits above, especially (but not limited to) weakness, decreased endurance, decreased functional mobility tolerance and impaired tone, coupled with fall risks including hx of falls, impaired balance and impaired coordination, and combined with medical complications of abnormal WBCs and abnormal sodium values  Pt to benefit from continued skilled PT tx while in hospital and upon DC to address deficits as defined above and maximize level of functional mobility   Recommend trials with quick move for sit to stand performance to target surfaces, eventual progression to rolling walker, therapeutic exercise including lower extremity stretching, case management consultation for coordination of care  Barriers to Discharge Comments: Comorbidities affecting pt's physical performance at time of assessment include:RONAL (acute kidney injury) ([facility] [address] ) (5/31/2017), Aspiration into respiratory tract, Chest pain (5/31/2017), COPD (chronic obstructive pulmonary disease) ([facility] [address] ), Depression, Hypertension, Lung cancer ([facility] [address] ) (2005), Pneumonia, Prostate cancer ([facility] [address] ), Sleep apnea, Squamous cell skin cancer, Stroke ([facility] [address] ), Tracheomalacia, Lung lobectomy; Joint replacement; Trigeminal nerve decompression  Personal factors affecting pt at time of IE include: inability to perform IADLs, inability to perform ADLs, inability to ambulate household distances and inability to navigate community distances  PT Discharge Recommendation: Post acute rehabilitation services          See flowsheet documentation for full assessment

## 2021-06-16 NOTE — DISCHARGE INSTR - OTHER ORDERS
Wound Care Plan:   1-Right hip--cleanse, pat dry  Apply Allevyn Life foam dressing  Change dressing every 3 days  2-Elevate heels off of bed/chair surface to offload pressure  3-Offloading air cushion in chair when out of bed  4-Moisturize skin daily with skin nourishing cream   5-Turn/reposition every 2 hours while in bed using positioning wedges; and weight shift frequently while in chair for pressure re-distribution on skin  6-Hydraguard lotion to buttocks and sacrum three times daily and as needed with incontinence care

## 2021-06-17 LAB
ANION GAP SERPL CALCULATED.3IONS-SCNC: 7 MMOL/L (ref 4–13)
BASOPHILS # BLD AUTO: 0.03 THOUSANDS/ΜL (ref 0–0.1)
BASOPHILS NFR BLD AUTO: 0 % (ref 0–1)
BUN SERPL-MCNC: 22 MG/DL (ref 5–25)
CALCIUM SERPL-MCNC: 8.9 MG/DL (ref 8.3–10.1)
CHLORIDE SERPL-SCNC: 102 MMOL/L (ref 100–108)
CO2 SERPL-SCNC: 27 MMOL/L (ref 21–32)
CREAT SERPL-MCNC: 1.21 MG/DL (ref 0.6–1.3)
EOSINOPHIL # BLD AUTO: 0.14 THOUSAND/ΜL (ref 0–0.61)
EOSINOPHIL NFR BLD AUTO: 1 % (ref 0–6)
ERYTHROCYTE [DISTWIDTH] IN BLOOD BY AUTOMATED COUNT: 14.4 % (ref 11.6–15.1)
GFR SERPL CREATININE-BSD FRML MDRD: 56 ML/MIN/1.73SQ M
GLUCOSE SERPL-MCNC: 86 MG/DL (ref 65–140)
HCT VFR BLD AUTO: 41.2 % (ref 36.5–49.3)
HGB BLD-MCNC: 13.1 G/DL (ref 12–17)
IMM GRANULOCYTES # BLD AUTO: 0.06 THOUSAND/UL (ref 0–0.2)
IMM GRANULOCYTES NFR BLD AUTO: 1 % (ref 0–2)
LYMPHOCYTES # BLD AUTO: 0.81 THOUSANDS/ΜL (ref 0.6–4.47)
LYMPHOCYTES NFR BLD AUTO: 6 % (ref 14–44)
MCH RBC QN AUTO: 29 PG (ref 26.8–34.3)
MCHC RBC AUTO-ENTMCNC: 31.8 G/DL (ref 31.4–37.4)
MCV RBC AUTO: 91 FL (ref 82–98)
MONOCYTES # BLD AUTO: 0.8 THOUSAND/ΜL (ref 0.17–1.22)
MONOCYTES NFR BLD AUTO: 6 % (ref 4–12)
NEUTROPHILS # BLD AUTO: 11.29 THOUSANDS/ΜL (ref 1.85–7.62)
NEUTS SEG NFR BLD AUTO: 86 % (ref 43–75)
NRBC BLD AUTO-RTO: 0 /100 WBCS
PLATELET # BLD AUTO: 109 THOUSANDS/UL (ref 149–390)
PMV BLD AUTO: 11.2 FL (ref 8.9–12.7)
POTASSIUM SERPL-SCNC: 3.9 MMOL/L (ref 3.5–5.3)
PROCALCITONIN SERPL-MCNC: 4.6 NG/ML
RBC # BLD AUTO: 4.51 MILLION/UL (ref 3.88–5.62)
SODIUM SERPL-SCNC: 136 MMOL/L (ref 136–145)
WBC # BLD AUTO: 13.13 THOUSAND/UL (ref 4.31–10.16)

## 2021-06-17 PROCEDURE — 97530 THERAPEUTIC ACTIVITIES: CPT

## 2021-06-17 PROCEDURE — 80048 BASIC METABOLIC PNL TOTAL CA: CPT | Performed by: PSYCHIATRY & NEUROLOGY

## 2021-06-17 PROCEDURE — 94760 N-INVAS EAR/PLS OXIMETRY 1: CPT

## 2021-06-17 PROCEDURE — 94669 MECHANICAL CHEST WALL OSCILL: CPT

## 2021-06-17 PROCEDURE — 94640 AIRWAY INHALATION TREATMENT: CPT

## 2021-06-17 PROCEDURE — 84145 PROCALCITONIN (PCT): CPT | Performed by: PHYSICIAN ASSISTANT

## 2021-06-17 PROCEDURE — 85025 COMPLETE CBC W/AUTO DIFF WBC: CPT | Performed by: PSYCHIATRY & NEUROLOGY

## 2021-06-17 PROCEDURE — 99232 SBSQ HOSP IP/OBS MODERATE 35: CPT | Performed by: INTERNAL MEDICINE

## 2021-06-17 PROCEDURE — 92526 ORAL FUNCTION THERAPY: CPT

## 2021-06-17 RX ADMIN — LORAZEPAM 0.25 MG: 0.5 TABLET ORAL at 09:46

## 2021-06-17 RX ADMIN — LATANOPROST 1 DROP: 50 SOLUTION OPHTHALMIC at 21:08

## 2021-06-17 RX ADMIN — HEPARIN SODIUM 5000 UNITS: 5000 INJECTION INTRAVENOUS; SUBCUTANEOUS at 14:19

## 2021-06-17 RX ADMIN — IPRATROPIUM BROMIDE 0.5 MG: 0.5 SOLUTION RESPIRATORY (INHALATION) at 20:11

## 2021-06-17 RX ADMIN — PANTOPRAZOLE SODIUM 40 MG: 40 TABLET, DELAYED RELEASE ORAL at 05:00

## 2021-06-17 RX ADMIN — HEPARIN SODIUM 5000 UNITS: 5000 INJECTION INTRAVENOUS; SUBCUTANEOUS at 21:08

## 2021-06-17 RX ADMIN — CARVEDILOL 12.5 MG: 12.5 TABLET, FILM COATED ORAL at 09:49

## 2021-06-17 RX ADMIN — LEVALBUTEROL HYDROCHLORIDE 1.25 MG: 1.25 SOLUTION, CONCENTRATE RESPIRATORY (INHALATION) at 13:20

## 2021-06-17 RX ADMIN — ATORVASTATIN CALCIUM 10 MG: 10 TABLET, FILM COATED ORAL at 17:33

## 2021-06-17 RX ADMIN — CLOPIDOGREL BISULFATE 75 MG: 75 TABLET ORAL at 09:43

## 2021-06-17 RX ADMIN — AMLODIPINE BESYLATE 10 MG: 10 TABLET ORAL at 09:42

## 2021-06-17 RX ADMIN — HEPARIN SODIUM 5000 UNITS: 5000 INJECTION INTRAVENOUS; SUBCUTANEOUS at 05:00

## 2021-06-17 RX ADMIN — METRONIDAZOLE 500 MG: 500 INJECTION, SOLUTION INTRAVENOUS at 14:17

## 2021-06-17 RX ADMIN — BUDESONIDE 0.5 MG: 0.5 INHALANT ORAL at 20:13

## 2021-06-17 RX ADMIN — BUDESONIDE 0.5 MG: 0.5 INHALANT ORAL at 07:42

## 2021-06-17 RX ADMIN — LEVALBUTEROL HYDROCHLORIDE 1.25 MG: 1.25 SOLUTION, CONCENTRATE RESPIRATORY (INHALATION) at 20:11

## 2021-06-17 RX ADMIN — TAMSULOSIN HYDROCHLORIDE 0.4 MG: 0.4 CAPSULE ORAL at 17:33

## 2021-06-17 RX ADMIN — PAROXETINE HYDROCHLORIDE 20 MG: 20 TABLET, FILM COATED ORAL at 09:43

## 2021-06-17 RX ADMIN — IPRATROPIUM BROMIDE 0.5 MG: 0.5 SOLUTION RESPIRATORY (INHALATION) at 13:20

## 2021-06-17 RX ADMIN — CEFTRIAXONE SODIUM 1000 MG: 10 INJECTION, POWDER, FOR SOLUTION INTRAVENOUS at 21:58

## 2021-06-17 RX ADMIN — PREDNISONE 5 MG: 5 TABLET ORAL at 09:43

## 2021-06-17 RX ADMIN — LORAZEPAM 0.25 MG: 0.5 TABLET ORAL at 17:33

## 2021-06-17 RX ADMIN — IPRATROPIUM BROMIDE 0.5 MG: 0.5 SOLUTION RESPIRATORY (INHALATION) at 07:42

## 2021-06-17 RX ADMIN — SENNOSIDES 17.2 MG: 8.6 TABLET, FILM COATED ORAL at 21:08

## 2021-06-17 RX ADMIN — METRONIDAZOLE 500 MG: 500 INJECTION, SOLUTION INTRAVENOUS at 21:07

## 2021-06-17 RX ADMIN — METRONIDAZOLE 500 MG: 500 INJECTION, SOLUTION INTRAVENOUS at 05:00

## 2021-06-17 RX ADMIN — LEVALBUTEROL HYDROCHLORIDE 1.25 MG: 1.25 SOLUTION, CONCENTRATE RESPIRATORY (INHALATION) at 07:42

## 2021-06-17 NOTE — PLAN OF CARE
Problem: MOBILITY - ADULT  Goal: Maintain or return to baseline ADL function  Description: INTERVENTIONS:  -  Assess patient's ability to carry out ADLs; assess patient's baseline for ADL function and identify physical deficits which impact ability to perform ADLs (bathing, care of mouth/teeth, toileting, grooming, dressing, etc )  - Assess/evaluate cause of self-care deficits   - Assess range of motion  - Assess patient's mobility; develop plan if impaired  - Assess patient's need for assistive devices and provide as appropriate  - Encourage maximum independence but intervene and supervise when necessary  - Involve family in performance of ADLs  - Assess for home care needs following discharge   - Consider OT consult to assist with ADL evaluation and planning for discharge  - Provide patient education as appropriate  Outcome: Progressing     Problem: SAFETY ADULT  Goal: Maintain or return to baseline ADL function  Description: INTERVENTIONS:  -  Assess patient's ability to carry out ADLs; assess patient's baseline for ADL function and identify physical deficits which impact ability to perform ADLs (bathing, care of mouth/teeth, toileting, grooming, dressing, etc )  - Assess/evaluate cause of self-care deficits   - Assess range of motion  - Assess patient's mobility; develop plan if impaired  - Assess patient's need for assistive devices and provide as appropriate  - Encourage maximum independence but intervene and supervise when necessary  - Involve family in performance of ADLs  - Assess for home care needs following discharge   - Consider OT consult to assist with ADL evaluation and planning for discharge  - Provide patient education as appropriate  Outcome: Progressing     Problem: DISCHARGE PLANNING  Goal: Discharge to home or other facility with appropriate resources  Description: INTERVENTIONS:  - Identify barriers to discharge w/patient and caregiver  - Arrange for needed discharge resources and transportation as appropriate  - Identify discharge learning needs (meds, wound care, etc )  - Arrange for interpretive services to assist at discharge as needed  - Refer to Case Management Department for coordinating discharge planning if the patient needs post-hospital services based on physician/advanced practitioner order or complex needs related to functional status, cognitive ability, or social support system  Outcome: Progressing

## 2021-06-17 NOTE — PHYSICAL THERAPY NOTE
PHYSICAL THERAPY NOTE    Patient Name: Zohaib Frankel  Today's Date: 21 0908   PT Last Visit   PT Visit Date 21   Note Type   Note Type Treatment   Pain Assessment   Pain Assessment Tool Pain Assessment not indicated - pt denies pain   Pain Score No Pain   Restrictions/Precautions   Weight Bearing Precautions Per Order No   Braces or Orthoses AFO  (Dependent donned by caregiver)   Other Precautions Bed Alarm; Chair Alarm;Cognitive; Fall Risk;O2  (Limited verbalization, 2L O2 via NC)   General   Family/Caregiver Present No   Subjective   Subjective Patient supine in bed and is agreeable to participate in therapy session  Patient identifers obtained from name &   Bed Mobility   Supine to Sit 3  Moderate assistance   Additional items HOB elevated; Bedrails; Increased time required;Verbal cues;LE management   Sit to Supine Unable to assess   Additional Comments Patient seated OOB in recliner post session via quick move with chair alarm engaged, call bell and belongings in reach  Transfers   Sit to Stand 3  Moderate assistance  (with quick move x 3 trials)   Additional items Assist x 1; Increased time required;Verbal cues   Stand to Sit 3  Moderate assistance  (for controlled descent with quickmove)   Additional items Assist x 1; Increased time required;Verbal cues   Additional Comments Dependently transferred EOB to recliner via quick move  (Multiple standing tolerance trials 60s, 54s and 36s)   Balance   Static Sitting Fair +   Static Standing Poor -   Endurance Deficit   Endurance Deficit Yes   Endurance Deficit Description limited activity tolerance   Activity Tolerance   Activity Tolerance Patient limited by fatigue   Medical Staff Made Aware Care coordination with PCA   Nurse Made Aware Spoke to [employer]    Assessment   Prognosis Fair   Problem List Decreased strength;Decreased range of motion;Decreased endurance; Impaired balance;Decreased mobility; Decreased cognition;Decreased coordination; Impaired judgement; Impaired tone;Decreased safety awareness   Assessment Patient agreeable to participate in therapy session  Patient demonstrates progression with functional mobility trials however continues to require signficant amount of physical assistance  Supine to sit with mod ax1, increased time with assist for LE management, trunk control and to complete transition to EOB  Pt tolerated sitting EOB x 5 minutes prior to transfer trials  AFO and shoes dependently donned with patient seated EOB and CGA for steadying  Multiple standing trials with B UE support on quick move with mod ax1 to stand and min ax1 for steadying balance  Standing tolerance 60s, 54s, 36s  Pt dependently transfered from EOB to recliner  Pt incontient of bowel during session requiring increased time for hygenie  Continue to focus on bed mobility and transfer progression and standing tolerance with quickmove as appropriate and able  Goals   Patient Goals none stated   STG Expiration Date 06/26/21   PT Treatment Day 2   Plan   Treatment/Interventions Functional transfer training;LE strengthening/ROM; Therapeutic exercise; Endurance training;Cognitive reorientation;Patient/family training;Equipment eval/education; Bed mobility;Gait training;Spoke to case management;Spoke to nursing   PT Frequency 5x/wk   Recommendation   PT Discharge Recommendation Post acute rehabilitation services   Equipment Recommended Jewel Thompson; Wheelchair   Wheelchair Package Recommended Standard  (has one)   Assmbly Recommended Wheeled walker  (has one)   AM-PAC Basic Mobility Inpatient   Turning in Bed Without Bedrails 2   Lying on Back to Sitting on Edge of Flat Bed 1   Moving Bed to Chair 1   Standing Up From Chair 1   Walk in Room 1   Climb 3-5 Stairs 1   Basic Mobility Inpatient Raw Score 7   Turning Head Towards Sound 3   Follow Simple Instructions 3   Low Function Basic Mobility Raw Score 13   Low Function Basic Mobility Standardized Score 20 14     The patient's AM-PAC Basic Mobility Inpatient Short Form Raw Score is 7, Standardized Score is 20  14  A standardized score less than 42 9 suggests the patient may benefit from discharge to post-acute rehabilitation services  Please also refer to the recommendation of the Physical Therapist for safe discharge planning      Octavia Joy PTA

## 2021-06-17 NOTE — ASSESSMENT & PLAN NOTE
POA, tachypnea, leukocytosis w left shift, lactic acidosis   Lactate has resolved  Strep pneumo urine negative  Legionella urine negative    Current IV abx patient is on are currently ceftriaxone and flagyl IV  F/u blood cultures   Trend temps, repeat CBC in AM   IV fluids discontinued

## 2021-06-17 NOTE — ASSESSMENT & PLAN NOTE
Pt presents with weakness, SOB, cough  +Sepsis criteria on admission    Suspect recurrent aspiration pneumonia despite PEG tube and careful AHF by caregivers   PEG placed in 10/2020 but only used for hydration purposes   CTA chest shows R>L bibasilar patchy opacities   Urinary antigens negative    Check urinary antigens, sputum cx if able, PCT   De-escalated abx to ceftriaxone and flagyl (Day 3 of abx)  Respiratory protocol, pulm toilet, IS  Currently on dysphagia diet and honey thick liquids as recommended by speech  Strict aspiration precautions

## 2021-06-17 NOTE — QUICK NOTE
Updated both Tyra Hdz concerning their father and about his improvement with abx  Answered questions to best of my ability

## 2021-06-17 NOTE — ASSESSMENT & PLAN NOTE
Patient presenting with weakness and SOB, family suspected recurrent aspiration  Pt states he wears O2 at home PRN     Weaned oxygen to 1 5 L nasal cannula  Maintains sats >/= 90%

## 2021-06-17 NOTE — ASSESSMENT & PLAN NOTE
Hx of chronic dysphagia with notable admission in 10/2020 for aspiration        Follows [facility] - prior diet puree/HTL   Patient currently on dysphagia diet with honey thick liquids as recommended by speech  Aspiration precautions

## 2021-06-17 NOTE — PLAN OF CARE
Problem: PHYSICAL THERAPY ADULT  Goal: Performs mobility at highest level of function for planned discharge setting  See evaluation for individualized goals  Description: Treatment/Interventions: Functional transfer training, LE strengthening/ROM, Therapeutic exercise, Endurance training, Cognitive reorientation, Patient/family training, Equipment eval/education, Bed mobility, Gait training, Spoke to case management, Spoke to nursing  Equipment Recommended: Chato Angel, Wheelchair       See flowsheet documentation for full assessment, interventions and recommendations  Outcome: Progressing  Note: Prognosis: Fair  Problem List: Decreased strength, Decreased range of motion, Decreased endurance, Impaired balance, Decreased mobility, Decreased cognition, Decreased coordination, Impaired judgement, Impaired tone, Decreased safety awareness  Assessment: Patient agreeable to participate in therapy session  Patient demonstrates progression with functional mobility trials however continues to require signficant amount of physical assistance  Supine to sit with mod ax1, increased time with assist for LE management, trunk control and to complete transition to EOB  Pt tolerated sitting EOB x 5 minutes prior to transfer trials  AFO and shoes dependently donned with patient seated EOB and CGA for steadying  Multiple standing trials with B UE support on quick move with mod ax1 to stand and min ax1 for steadying balance  Standing tolerance 60s, 54s, 36s  Pt dependently transfered from EOB to recliner  Pt incontient of bowel during session requiring increased time for hygenie  Continue to focus on bed mobility and transfer progression and standing tolerance with quickmove as appropriate and able       Barriers to Discharge Comments: Comorbidities affecting pt's physical performance at time of assessment include:RONAL (acute kidney injury) ([facility] [address] ) (5/31/2017), Aspiration into respiratory tract, Chest pain (5/31/2017), COPD (chronic obstructive pulmonary disease) ([facility] [address] ), Depression, Hypertension, Lung cancer ([facility] [address] ) (2005), Pneumonia, Prostate cancer ([facility] [address] ), Sleep apnea, Squamous cell skin cancer, Stroke ([facility] [address] ), Tracheomalacia, Lung lobectomy; Joint replacement; Trigeminal nerve decompression  Personal factors affecting pt at time of IE include: inability to perform IADLs, inability to perform ADLs, inability to ambulate household distances and inability to navigate community distances  PT Discharge Recommendation: Post acute rehabilitation services          See flowsheet documentation for full assessment

## 2021-06-17 NOTE — ASSESSMENT & PLAN NOTE
Hx of COPD with tracheomalacia and chronic cough 2/2 dysphagia   Continue duonebs TID, pulmicort BID   Continue chronic prednisone 5 mg daily   Daily VEST therapy, RRT consulted   Follows Dr Lilian Mustafa outpatient

## 2021-06-17 NOTE — PROGRESS NOTES
[facility]  Progress Note - [facility]  1940, 79 y o  male MRN: [id number]  Unit/Bed#: S -11 Encounter: [id number]  Primary Care Provider: Brenna Dyer DO   Date and time admitted to hospital: 6/15/2021  9:54 PM    * Bilateral pneumonia  Assessment & Plan  Pt presents with weakness, SOB, cough  +Sepsis criteria on admission    Suspect recurrent aspiration pneumonia despite PEG tube and careful AHF by caregivers   PEG placed in 10/2020 but only used for hydration purposes   CTA chest shows R>L bibasilar patchy opacities   Urinary antigens negative    Check urinary antigens, sputum cx if able, PCT   De-escalated abx to ceftriaxone and flagyl (Day 3 of abx)  Respiratory protocol, pulm toilet, IS  Currently on dysphagia diet and honey thick liquids as recommended by speech  Strict aspiration precautions     Acute respiratory insufficiency  Assessment & Plan  Patient presenting with weakness and SOB, family suspected recurrent aspiration  Pt states he wears O2 at home PRN     Weaned oxygen to 1 5 L nasal cannula  Maintains sats >/= 90%    Sepsis ([address] )  Assessment & Plan  POA, tachypnea, leukocytosis w left shift, lactic acidosis   Lactate has resolved  Strep pneumo urine negative  Legionella urine negative    Current IV abx patient is on are currently ceftriaxone and flagyl IV  F/u blood cultures   Trend temps, repeat CBC in AM   IV fluids discontinued    COPD (chronic obstructive pulmonary disease) (HCC)  Assessment & Plan  Hx of COPD with tracheomalacia and chronic cough 2/2 dysphagia   Continue duonebs TID, pulmicort BID   Continue chronic prednisone 5 mg daily   Daily VEST therapy, RRT consulted   Follows Dr Beverly Guevara outpatient     Ambulatory dysfunction  Assessment & Plan  Presents w/ increase weakness - likely 2/2 infectious process     PT/OT recommends post acute rehab    Major depressive disorder with single episode, in full remission [facility])  Assessment & Plan  Continue paxil, prn ativan     Hyperlipidemia  Assessment & Plan  Continue statin     Chronic kidney disease, stage 3 [facility])  Assessment & Plan  Lab Results   Component Value Date    EGFR 56 2021    EGFR 59 2021    EGFR 53 06/15/2021    CREATININE 1 21 2021    CREATININE 1 16 2021    CREATININE 1 26 06/15/2021     Cr is at BL, 1 2   Continues to be baseline  IV fluids d/manjit    GERD (gastroesophageal reflux disease)  Assessment & Plan  Continue protonix 40mg daily     History of cerebrovascular accident (CVA) with residual deficit - dysphagia and left sided weakness  Assessment & Plan  Hx of CVA w L sided deficits nd dysphagia   Aspiration precautions   Continue lipitor, plavix, asa     Oropharyngeal dysphagia  Assessment & Plan  Hx of chronic dysphagia with notable admission in 10/2020 for aspiration  Follows [facility] - prior diet puree/HTL   Patient currently on dysphagia diet with honey thick liquids as recommended by speech  Aspiration precautions    Essential hypertension  Assessment & Plan  Continue norvasc, coreg  Hold aldactone while on fluids       VTE Pharmacologic Prophylaxis:   Pharmacologic: Heparin  Mechanical VTE Prophylaxis in Place: Yes    Discussions with Specialists or Other Care Team Provider: Medical Team    Education and Discussions with Family / Patient: Patient    Current Length of Stay: 1 day(s)    Current Patient Status: Inpatient     Discharge Plan / Estimated Discharge Date: Transition to oral abx    Code Status: Level 1 - Full Code      Subjective:   Patient was overall stable in bed  Patient went up 2 5L O2  Patient had no fevers, no chills  Continued SOB and coughs  Objective:     Vitals:   Temp (24hrs), Av 5 °F (36 4 °C), Min:97 °F (36 1 °C), Max:97 8 °F (36 6 °C)    Temp:  [97 °F (36 1 °C)-97 8 °F (36 6 °C)] 97 8 °F (36 6 °C)  HR:  [66-67] 66  Resp:  [18-20] 18  BP: (105-129)/(55-59) 114/59  SpO2:  [90 %-97 %] 93 %  Body mass index is 23 76 kg/m²  Input and Output Summary (last 24 hours): Intake/Output Summary (Last 24 hours) at 6/17/2021 1317  Last data filed at 6/17/2021 0506  Gross per 24 hour   Intake 220 ml   Output 375 ml   Net -155 ml       Physical Exam:     Physical Exam  Constitutional:       Appearance: He is normal weight  HENT:      Head: Normocephalic and atraumatic  Nose: Nose normal       Mouth/Throat:      Mouth: Mucous membranes are moist    Eyes:      Extraocular Movements: Extraocular movements intact  Pupils: Pupils are equal, round, and reactive to light  Cardiovascular:      Rate and Rhythm: Normal rate and regular rhythm  Pulses: Normal pulses  Heart sounds: Normal heart sounds  Pulmonary:      Effort: Pulmonary effort is normal       Breath sounds: Wheezing present  Comments: Wheezes bilaterally but lessened  Abdominal:      General: Bowel sounds are normal       Palpations: Abdomen is soft  Tenderness: There is no abdominal tenderness  Musculoskeletal:         General: Normal range of motion  Skin:     General: Skin is warm and dry  Capillary Refill: Capillary refill takes less than 2 seconds  Neurological:      General: No focal deficit present  Mental Status: He is alert     Psychiatric:         Mood and Affect: Mood normal          Behavior: Behavior normal        Additional Data:     Labs:    Results from last 7 days   Lab Units 06/17/21  0515   WBC Thousand/uL 13 13*   HEMOGLOBIN g/dL 13 1   HEMATOCRIT % 41 2   PLATELETS Thousands/uL 109*   NEUTROS PCT % 86*   LYMPHS PCT % 6*   MONOS PCT % 6   EOS PCT % 1     Results from last 7 days   Lab Units 06/17/21  0515 06/15/21  2225   POTASSIUM mmol/L 3 9 4 2   CHLORIDE mmol/L 102 98*   CO2 mmol/L 27 28   BUN mg/dL 22 22   CREATININE mg/dL 1 21 1 26   CALCIUM mg/dL 8 9 9 6   ALK PHOS U/L  --  87   ALT U/L  --  15   AST U/L  --  11     Results from last 7 days   Lab Units 06/15/21  2225   INR  0 98       * I Have Reviewed All Lab Data Listed Above  * Additional Pertinent Lab Tests Reviewed: All Labs Within Last 24 Hours Reviewed    Imaging:    Imaging Reports Reviewed Today Include: CTA PE study  Imaging Personally Reviewed by Myself Includes:  None    Recent Cultures (last 7 days):     Results from last 7 days   Lab Units 06/16/21  0529 06/15/21  2225   BLOOD CULTURE   --  No Growth at 24 hrs  No Growth at 24 hrs     LEGIONELLA URINARY ANTIGEN  Negative  --        Last 24 Hours Medication List:   Current Facility-Administered Medications   Medication Dose Route Frequency Provider Last Rate    acetaminophen  650 mg Per PEG Tube Q6H PRN Zo Hernández PA-C      albuterol  2 puff Inhalation Q4H PRN Julien Vasques MD      amLODIPine  10 mg Oral Daily Noemi Esparza MD      aspirin  81 mg Oral Every Other Day Eitan Hernández PA-C      atorvastatin  10 mg Oral After Sri Hernández PA-C      budesonide  0 5 mg Nebulization BID Eitan Hernández PA-C      calcium carbonate  1,000 mg Oral Daily PRN Eitan Hernández PA-C      carvedilol  12 5 mg Oral BID With Meals Noemi Esparza MD      cefTRIAXone  1,000 mg Intravenous Q24H Eitan Hernández PA-C 1,000 mg (06/16/21 2144)    clopidogrel  75 mg Oral Daily Noemi Esparza MD      guaiFENesin  200 mg Oral Q4H PRN Zo Hernández PA-C      heparin (porcine)  5,000 Units Subcutaneous Q8H [address] Eitan Hernández PA-C      ipratropium  0 5 mg Nebulization TID Julien Vasques MD      latanoprost  1 drop Both Eyes HS Eitan Hernández PA-C      levalbuterol  1 25 mg Nebulization TID Julien Vasques MD      LORazepam  0 25 mg Oral BID Noemi Esparza MD      metroNIDAZOLE  500 mg Intravenous Q8H Eitan Hernández PA-C 500 mg (06/17/21 0500)    ondansetron  4 mg Intravenous Q6H PRN Eitan Hernández PA-C      pantoprazole  40 mg Oral Early Morning Eitan L Trejo-Matika, PA-C      PARoxetine  20 mg Oral Daily Noemi Esparza MD     [employer] polyethylene glycol  17 g Per PEG Tube Daily PRN Eitan Hernández PA-C      predniSONE  5 mg Oral Daily Noemi Esparza MD      senna  17 2 mg Oral HS Noemi Esparza MD      sodium chloride  75 mL/hr Intravenous Continuous Eitan Hernández PA-C Stopped (06/17/21 1009)    tamsulosin  0 4 mg Oral Daily With [address]          Today, Patient Was Seen By: Noemi Esparza MD    ** Please Note: This note has been constructed using a voice recognition system   **

## 2021-06-17 NOTE — CASE MANAGEMENT
LOS 1  Pt is currently not a bundle or a 30 day readmission  Met with pt who provided information for initial assessment  Introduced self and role  Pt does have Dysphagia and at times pt had difficulty speaking but was able to provide information  Pt stated he lives across the [place] in the [facility]  Pt has a live-in caregiver  Pt stated his daughter Kim lives down the street from him  Prior to admission, pt has been primarily using the wheelchair as pt's mobility has been declining  Caregiver assists with all ADLs  Hx of VNA through [facility]  Hx of STR but was unable to recall the name of the facility  PCP- Dr Villanueva Links  Daughter or caregiver transports pt to medical appointments  Preferred pharmacy- [facility] on [address]  Daughter picks up pt's meds  Pt stated he does have a living will and identified his daughter Kim as is his POA  A post acute care recommendation was made by care team for STR  Discussed Freedom of Choice with patient  List of facilities given to patient via in person  patient aware the list is custom filtered for them by zip code location and that [facility] post acute providers are designated  CM also reached out to pt's daughter  Voicemail left for Kim  Spoke with Bringrs  CM introduced self and role  Daughter did confirm the above assessment information  Explained STR recommendations and daughter does agree pt needs STR  She stated he has been to [address] at [facility] and requested a referral to be placed  Daughter also stated she has a copy of pt's advanced directive  Daughter will e-mail it to CM  Provided email of CM and CC in case daughter doesn't email before end of the day  Referral placed to [facility]       CM reviewed discharge planning process including the following: identifying caregivers at home, preference for d/c planning needs, availability of [facility] program, availability of treatment team to discuss questions or concerns patient and/or family may have regarding diagnosis, plan of care, old or new medications and discharge planning   CM will continue to follow for care coordination and update assessment as appropriate

## 2021-06-17 NOTE — PLAN OF CARE
Pt needs supervision and cues at meals for HTL by tsp and chin tuck when swallowing     Cont meds crushed in puree   Aspiration precautions

## 2021-06-17 NOTE — ASSESSMENT & PLAN NOTE
Lab Results   Component Value Date    EGFR 56 06/17/2021    EGFR 59 06/16/2021    EGFR 53 06/15/2021    CREATININE 1 21 06/17/2021    CREATININE 1 16 06/16/2021    CREATININE 1 26 06/15/2021     Cr is at BL, 1 2   Continues to be baseline  IV fluids d/manjit

## 2021-06-17 NOTE — SPEECH THERAPY NOTE
Speech Language/Pathology    Speech/Language Pathology Progress Note    Patient Name: Lyo Arenas  Today's Date: 6/17/2021       Subjective:  Patient was seen for dysphagia therapy at lunch  Patient is sitting out of bed in a chair  Patient was able to self feed once set up  Patient is quite dysarthric  Objective:  Patient took small bites of pureed mac and cheese, [place] and pureed peas  Patient needed Min cues to take honey thick by spoon  Patient with fair bolus retrieval, slow manipulation and transfer  Patient also needed cues for chin tuck with honey thick  Patient had coughing episodes x2 with production of mucous after taking honey thick liquids and not using chin tuck  Overall p o  Intake was fair for lunch, less than 50%    Assessment:  Patient requires supervision at meals and occasional cues to take honey thick liquids by tsp and use of chin tuck when swallowing  Patient remains at risk for aspiration, especially with honey thick liquids  Plan/Recommendations:  Continue dysphagia 1 puree with honey thick liquids by tsp, chin tuck when swallowing  Continue meds crushed in puree  Aspiration precautions  Supervision at meals  Will continue to follow      Hussein Gutierrez CCC-SLP  Speech Pathologist  Available via  tiger text

## 2021-06-18 LAB
ANION GAP SERPL CALCULATED.3IONS-SCNC: 9 MMOL/L (ref 4–13)
BASOPHILS # BLD AUTO: 0.03 THOUSANDS/ΜL (ref 0–0.1)
BASOPHILS NFR BLD AUTO: 0 % (ref 0–1)
BUN SERPL-MCNC: 23 MG/DL (ref 5–25)
CALCIUM SERPL-MCNC: 8.9 MG/DL (ref 8.3–10.1)
CHLORIDE SERPL-SCNC: 103 MMOL/L (ref 100–108)
CO2 SERPL-SCNC: 24 MMOL/L (ref 21–32)
CREAT SERPL-MCNC: 0.96 MG/DL (ref 0.6–1.3)
EOSINOPHIL # BLD AUTO: 0.14 THOUSAND/ΜL (ref 0–0.61)
EOSINOPHIL NFR BLD AUTO: 2 % (ref 0–6)
ERYTHROCYTE [DISTWIDTH] IN BLOOD BY AUTOMATED COUNT: 14.4 % (ref 11.6–15.1)
GFR SERPL CREATININE-BSD FRML MDRD: 74 ML/MIN/1.73SQ M
GLUCOSE SERPL-MCNC: 94 MG/DL (ref 65–140)
HCT VFR BLD AUTO: 38.2 % (ref 36.5–49.3)
HGB BLD-MCNC: 12.6 G/DL (ref 12–17)
IMM GRANULOCYTES # BLD AUTO: 0.02 THOUSAND/UL (ref 0–0.2)
IMM GRANULOCYTES NFR BLD AUTO: 0 % (ref 0–2)
LYMPHOCYTES # BLD AUTO: 0.98 THOUSANDS/ΜL (ref 0.6–4.47)
LYMPHOCYTES NFR BLD AUTO: 10 % (ref 14–44)
MCH RBC QN AUTO: 29.7 PG (ref 26.8–34.3)
MCHC RBC AUTO-ENTMCNC: 33 G/DL (ref 31.4–37.4)
MCV RBC AUTO: 90 FL (ref 82–98)
MONOCYTES # BLD AUTO: 0.65 THOUSAND/ΜL (ref 0.17–1.22)
MONOCYTES NFR BLD AUTO: 7 % (ref 4–12)
NEUTROPHILS # BLD AUTO: 7.63 THOUSANDS/ΜL (ref 1.85–7.62)
NEUTS SEG NFR BLD AUTO: 81 % (ref 43–75)
NRBC BLD AUTO-RTO: 0 /100 WBCS
PLATELET # BLD AUTO: 114 THOUSANDS/UL (ref 149–390)
PMV BLD AUTO: 10.8 FL (ref 8.9–12.7)
POTASSIUM SERPL-SCNC: 3.7 MMOL/L (ref 3.5–5.3)
RBC # BLD AUTO: 4.24 MILLION/UL (ref 3.88–5.62)
SODIUM SERPL-SCNC: 136 MMOL/L (ref 136–145)
WBC # BLD AUTO: 9.45 THOUSAND/UL (ref 4.31–10.16)

## 2021-06-18 PROCEDURE — 99232 SBSQ HOSP IP/OBS MODERATE 35: CPT | Performed by: INTERNAL MEDICINE

## 2021-06-18 PROCEDURE — 94640 AIRWAY INHALATION TREATMENT: CPT

## 2021-06-18 PROCEDURE — 80048 BASIC METABOLIC PNL TOTAL CA: CPT | Performed by: PSYCHIATRY & NEUROLOGY

## 2021-06-18 PROCEDURE — 97530 THERAPEUTIC ACTIVITIES: CPT

## 2021-06-18 PROCEDURE — 94760 N-INVAS EAR/PLS OXIMETRY 1: CPT

## 2021-06-18 PROCEDURE — 94669 MECHANICAL CHEST WALL OSCILL: CPT

## 2021-06-18 PROCEDURE — 85025 COMPLETE CBC W/AUTO DIFF WBC: CPT | Performed by: PSYCHIATRY & NEUROLOGY

## 2021-06-18 RX ORDER — LORAZEPAM 0.5 MG/1
0.25 TABLET ORAL 2 TIMES DAILY PRN
Status: DISCONTINUED | OUTPATIENT
Start: 2021-06-18 | End: 2021-06-19 | Stop reason: HOSPADM

## 2021-06-18 RX ORDER — CEFDINIR 300 MG/1
300 CAPSULE ORAL EVERY 12 HOURS SCHEDULED
Status: DISCONTINUED | OUTPATIENT
Start: 2021-06-18 | End: 2021-06-18

## 2021-06-18 RX ORDER — METRONIDAZOLE 500 MG/1
500 TABLET ORAL EVERY 8 HOURS SCHEDULED
Status: DISCONTINUED | OUTPATIENT
Start: 2021-06-18 | End: 2021-06-19 | Stop reason: HOSPADM

## 2021-06-18 RX ORDER — CEFDINIR 250 MG/5ML
300 POWDER, FOR SUSPENSION ORAL EVERY 12 HOURS SCHEDULED
Status: DISCONTINUED | OUTPATIENT
Start: 2021-06-18 | End: 2021-06-19 | Stop reason: HOSPADM

## 2021-06-18 RX ADMIN — HEPARIN SODIUM 5000 UNITS: 5000 INJECTION INTRAVENOUS; SUBCUTANEOUS at 05:09

## 2021-06-18 RX ADMIN — BUDESONIDE 0.5 MG: 0.5 INHALANT ORAL at 19:00

## 2021-06-18 RX ADMIN — CARVEDILOL 12.5 MG: 12.5 TABLET, FILM COATED ORAL at 17:04

## 2021-06-18 RX ADMIN — HEPARIN SODIUM 5000 UNITS: 5000 INJECTION INTRAVENOUS; SUBCUTANEOUS at 14:09

## 2021-06-18 RX ADMIN — LEVALBUTEROL HYDROCHLORIDE 1.25 MG: 1.25 SOLUTION, CONCENTRATE RESPIRATORY (INHALATION) at 19:00

## 2021-06-18 RX ADMIN — CEFDINIR 300 MG: 250 POWDER, FOR SUSPENSION ORAL at 14:08

## 2021-06-18 RX ADMIN — METRONIDAZOLE 500 MG: 500 INJECTION, SOLUTION INTRAVENOUS at 05:09

## 2021-06-18 RX ADMIN — LATANOPROST 1 DROP: 50 SOLUTION OPHTHALMIC at 21:52

## 2021-06-18 RX ADMIN — METRONIDAZOLE 500 MG: 500 TABLET ORAL at 21:51

## 2021-06-18 RX ADMIN — SENNOSIDES 17.2 MG: 8.6 TABLET, FILM COATED ORAL at 21:51

## 2021-06-18 RX ADMIN — METRONIDAZOLE 500 MG: 500 TABLET ORAL at 14:09

## 2021-06-18 RX ADMIN — IPRATROPIUM BROMIDE 0.5 MG: 0.5 SOLUTION RESPIRATORY (INHALATION) at 19:01

## 2021-06-18 RX ADMIN — PAROXETINE HYDROCHLORIDE 20 MG: 20 TABLET, FILM COATED ORAL at 08:28

## 2021-06-18 RX ADMIN — BUDESONIDE 0.5 MG: 0.5 INHALANT ORAL at 08:43

## 2021-06-18 RX ADMIN — LORAZEPAM 0.25 MG: 0.5 TABLET ORAL at 21:51

## 2021-06-18 RX ADMIN — IPRATROPIUM BROMIDE 0.5 MG: 0.5 SOLUTION RESPIRATORY (INHALATION) at 08:43

## 2021-06-18 RX ADMIN — IPRATROPIUM BROMIDE 0.5 MG: 0.5 SOLUTION RESPIRATORY (INHALATION) at 13:32

## 2021-06-18 RX ADMIN — LEVALBUTEROL HYDROCHLORIDE 1.25 MG: 1.25 SOLUTION, CONCENTRATE RESPIRATORY (INHALATION) at 08:43

## 2021-06-18 RX ADMIN — LEVALBUTEROL HYDROCHLORIDE 1.25 MG: 1.25 SOLUTION, CONCENTRATE RESPIRATORY (INHALATION) at 13:32

## 2021-06-18 RX ADMIN — LORAZEPAM 0.25 MG: 0.5 TABLET ORAL at 08:28

## 2021-06-18 RX ADMIN — HEPARIN SODIUM 5000 UNITS: 5000 INJECTION INTRAVENOUS; SUBCUTANEOUS at 21:51

## 2021-06-18 RX ADMIN — ATORVASTATIN CALCIUM 10 MG: 10 TABLET, FILM COATED ORAL at 17:04

## 2021-06-18 RX ADMIN — ASPIRIN 81 MG: 81 TABLET, CHEWABLE ORAL at 08:29

## 2021-06-18 RX ADMIN — CEFDINIR 300 MG: 250 POWDER, FOR SUSPENSION ORAL at 21:51

## 2021-06-18 RX ADMIN — PANTOPRAZOLE SODIUM 40 MG: 40 TABLET, DELAYED RELEASE ORAL at 05:09

## 2021-06-18 RX ADMIN — CLOPIDOGREL BISULFATE 75 MG: 75 TABLET ORAL at 08:29

## 2021-06-18 RX ADMIN — TAMSULOSIN HYDROCHLORIDE 0.4 MG: 0.4 CAPSULE ORAL at 17:04

## 2021-06-18 RX ADMIN — PREDNISONE 5 MG: 5 TABLET ORAL at 08:29

## 2021-06-18 NOTE — DISCHARGE SUMMARY
[facility]  Discharge- Loy Arenas  1940, 79 y o  male MRN: [id number]  Unit/Bed#: S -41 Encounter: [id number]  Primary Care Provider: Olivia Quintero DO   Date and time admitted to hospital: 6/15/2021  9:54 PM    * Bilateral pneumonia  Assessment & Plan  Pt presents with weakness, SOB, cough  +Sepsis criteria on admission    Suspect recurrent aspiration pneumonia despite PEG tube and careful AHF by caregivers   PEG placed in 10/2020 but only used for hydration purposes   CTA chest shows R>L bibasilar patchy opacities   Urinary antigens negative      Current IV abx patient is on are currently ceftriaxone and flagyl IV (day 3)   Transitioned to oral abx omnicef and oral flagyl for 4 more days of treatment (total of 7 days)   On discharge, patient to be on 3 more days of both omnicef and flagyl  Respiratory protocol, pulm toilet, IS  Currently on dysphagia diet and honey thick liquids as recommended by speech  Strict aspiration precautions     Acute respiratory insufficiency  Assessment & Plan  Patient presenting with weakness and SOB, family suspected recurrent aspiration  Pt states he wears O2 at home PRN     Continues to be off oxygen and satting well on room air- SpO2 of 86% this morning but most likely equipment error due to >90% on oxygen afterward    COPD (chronic obstructive pulmonary disease) ([facility] [address] )  Assessment & Plan  Hx of COPD with tracheomalacia and chronic cough 2/2 dysphagia   Continue duonebs TID, pulmicort BID   Continue chronic prednisone 5 mg daily   Daily VEST therapy, RRT consulted   Follows Dr Parviz Heller outpatient     Ambulatory dysfunction  Assessment & Plan  Presents w/ increase weakness - likely 2/2 infectious process     PT/OT recommends post acute rehab  Placement to [facility]    Major depressive disorder with single episode, in full remission ([facility] [address] )  Assessment & Plan  Continue paxil, prn ativan     Hyperlipidemia  Assessment & Plan  Continue statin     Chronic kidney disease, stage 3 [facility])  Assessment & Plan  Lab Results   Component Value Date    EGFR 74 06/18/2021    EGFR 56 06/17/2021    EGFR 59 06/16/2021    CREATININE 0 96 06/18/2021    CREATININE 1 21 06/17/2021    CREATININE 1 16 06/16/2021     Cr is at BL, 1 2   Continues to be baseline  IV fluids d/manjit    GERD (gastroesophageal reflux disease)  Assessment & Plan  Continue protonix 40mg daily     History of cerebrovascular accident (CVA) with residual deficit - dysphagia and left sided weakness  Assessment & Plan  Hx of CVA w L sided deficits nd dysphagia   Aspiration precautions   Continue lipitor, plavix, asa     Oropharyngeal dysphagia  Assessment & Plan  Hx of chronic dysphagia with notable admission in 10/2020 for aspiration        Follows [facility] - prior diet puree/HTL   Continue pureed diet with honey thick liquids  Aspiration precautions    Essential hypertension  Assessment & Plan  Continue norvasc, coreg  Hold aldactone due to normal to borderline low bp    Sepsis (HCC)-resolved as of 6/19/2021  Assessment & Plan  POA, tachypnea, leukocytosis w left shift, lactic acidosis   Lactate has resolved  Strep pneumo urine negative  Legionella urine negative    F/u blood cultures   Trend temps, repeat CBC in AM   IV fluids discontinued    Discharging Resident: Virginia Chapa MD  Attending: Roel Edge MD  PCP: Acosta Wall DO  Admission Date: 6/15/2021  Discharge Date: 06/19/21    Disposition:      [address]  at [place] [address] to [facility] SNF:   · [facility] - Not Applicable to this Patient    Reason for Admission: SOB, Fevers, coughs    Consultations During Hospital Stay:  · Case Management  · Nutrition    Procedures Performed:     · None    Medication Adjustments and Discharge Medications:  · Summary of Medication Adjustments made as a result of this hospitalization: Placed patient on abx initially cefepime and then ceftriaxone IV and Flagyl IV and then patient is given Cefdinir 300mg oral suspension and flagyl 500mg tablet, which patient need to be on for 3 more days  · Medication Dosing Tapers - Please refer to Discharge Medication List for details on any medication dosing tapers (if applicable to patient)  · Medications being temporarily held (include recommended restart time): Aldactone can be restarted  · Discharge Medication List: See after visit summary for reconciled discharge medications  Wound Care Recommendations:  When applicable, please see wound care section of After Visit Summary  Diet Recommendations at Discharge:  Diet -        Diet Orders   (From admission, onward)             Start     Ordered    06/18/21 0824  Diet Dysphagia/Modified Consistency; Dysphagia 1-Pureed; Honey Thick Liquid; Honey Thick Liquid  Diet effective now     Comments: By eben w/ chin tuck    Requires supervision when given Honey Thick Liquids    Meds crushed   Question Answer Comment   Diet Type Dysphagia/Modified Consistency    Dysphagia/Modified Consistency Dysphagia 1-Pureed    Liquid Modifier Honey Thick Liquid    Other Restriction(s): Honey Thick Liquid    RD to adjust diet per protocol? Yes        06/18/21 0824 06/16/21 0809  Room Service  Once     Question:  Type of Service  Answer:  Room Service - Appropriate with Assistance    06/16/21 0809              Fluid Restriction - No Fluid Restriction at Discharge  Instructions for any Catheters / Lines Present at Discharge (including removal date, if applicable): No catheters and lines    Significant Findings / Test Results:     · Procal 9 05 6/16 decreased to 4 60 6/17  · Lactate 2 7 decreased to 1 9  · WBC 6/16 20 55 decreased to 9 45 6/18  CTA ED chest PE Study    Result Date: 6/16/2021  · Impression: 1  No evidence of pulmonary embolus  2   Patchy opacities within bilateral lower lobes right greater than left compatible with pneumonia in the appropriate clinical setting    Trace right pleural effusion  3   Increase in size of right hilar and mediastinal lymphadenopathy which may be reactive  Workstation performed: [id number]   ·     Incidental Findings:   · None     Test Results Pending at Discharge (will require follow up): · None     Outpatient Tests Requested:  · None    Complications:  None    Hospital Course:     Karilyn Fabry  is a 79 y o  male patient who originally presented to the hospital on 6/15/2021 due to SOB, coughs, increased sputum production over last 2 days  CTA showed patchy opacities bilaterally indicating pneumonia most likely aspiration pneumonia  Patient was placed on 2 5L of oxygen nasal cannula in the hospital  Patient initially placed on tube feeds and with speech clearance, placed him on pureed diet with honey thick liquids same as the diet he has previously  Patient has had no fevers, chills while at admission  Procal and Leukocytosis trended downward and he had no aspiration episodes while in hospital  He was weaned off the oxygen to room air and his antibiotics were converted to oral- ceftriaxone and flagyl IV to omnicef and flagul oral     Patient was recommended for post acute rehab which patient and daughters were agreeable with  Patient is to be discharged with [facility]  Patient is to take 3 more days of cefdinir solution 300mg every 12 hours and flagyl 500mg q8hrs  Patient will need followup with PCP in 1-2 weeks  Condition at Discharge: stable     Discharge Day Visit / Exam:     Subjective:  Patient seen this morning getting a nebulizer  Patient continued to be off oxygen  Patient continues to have no fevers and no chills       Vitals: Blood Pressure: 120/57 (06/19/21 0720)  Pulse: 57 (06/19/21 0720)  Temperature: 98 °F (36 7 °C) (06/19/21 0720)  Temp Source: Oral (06/19/21 0720)  Respirations: 16 (06/19/21 0720)  Height: 6' 5" (195 6 cm) (06/15/21 2156)  Weight - Scale: 103 kg (227 lb 4 7 oz) (06/19/21 0600)  SpO2: 91 % (06/19/21 1020)  Exam:   Physical Exam  Constitutional:       Appearance: He is normal weight  HENT:      Head: Normocephalic and atraumatic  Nose: Nose normal       Mouth/Throat:      Mouth: Mucous membranes are moist    Eyes:      Extraocular Movements: Extraocular movements intact  Pupils: Pupils are equal, round, and reactive to light  Cardiovascular:      Rate and Rhythm: Normal rate and regular rhythm  Pulses: Normal pulses  Heart sounds: Normal heart sounds  Pulmonary:      Effort: Pulmonary effort is normal       Breath sounds: Wheezing (moderate) present  Abdominal:      General: Bowel sounds are normal       Palpations: Abdomen is soft  Tenderness: There is no abdominal tenderness  Musculoskeletal:         General: Normal range of motion  Skin:     General: Skin is warm and dry  Capillary Refill: Capillary refill takes less than 2 seconds  Neurological:      Mental Status: He is alert  Comments: Chronic dysarthria from stroke   Psychiatric:         Mood and Affect: Mood normal          Behavior: Behavior normal            Discussion with Family: Daughter Darlin Romberg about discharge    Goals of Care Discussions:  · Code Status at Discharge: Level 1 - Full Code  · Were there any Goals of Care Discussions during Hospitalization?: No  · Results of any General Goals of Care Discussions: None   · POLST Completed: No   · If POLST Completed, Summary of POLST Agreement Provided Here: None   · OK to Rehospitalize if Needed? Yes    Discharge instructions/Information to patient and family:   See after visit summary section titled Discharge Instructions for information provided to patient and family        Planned Readmission: None      ** Please Note: This note has been constructed using a voice recognition system **

## 2021-06-18 NOTE — DISCHARGE INSTRUCTIONS
Aspiration Pneumonia   WHAT YOU NEED TO KNOW:   Aspiration pneumonia is a lung infection that develops after you aspirate (inhale) food, liquid, or vomit into your lungs  You can also aspirate food or liquid from your stomach that backs up into your esophagus  If you are not able to cough up the aspirated material, bacteria can grow in your lungs and cause an infection  Your risk is highest if you are older than 75 or live in a nursing home or long-term care center  You may be less active, bedridden, or not able to swallow or cough well  The muscles that help you swallow can become weakened by age, illness, or disease  Your risk also increases if you have a weak immune system  DISCHARGE INSTRUCTIONS:   Seek care immediately if:   · You have chest pain  · You are confused or cannot think clearly  · You have more trouble breathing or your breathing seems faster than normal     Contact your healthcare provider if:   · You have a fever  · Your symptoms are not better after 2 or 3 days of treatment  · You have questions or concerns about your condition or care  Medicines: You may need any of the following:  · Antibiotics  treat pneumonia caused by bacteria  · Steroids  may help to open your air passages so you can breathe easier  Do not stop taking this medicine without asking your healthcare provider  Stopping on your own can cause problems  · Take your medicine as directed  Contact your healthcare provider if you think your medicine is not helping or if you have side effects  Tell him or her if you are allergic to any medicine  Keep a list of the medicines, vitamins, and herbs you take  Include the amounts, and when and why you take them  Bring the list or the pill bottles to follow-up visits  Carry your medicine list with you in case of an emergency  Follow up with your healthcare provider as directed: You may need another chest x-ray in 6 to 8 weeks   Follow up with your speech-language pathologist, dietitian, or occupational therapist as directed  Write down your questions so you remember to ask them during your follow-up visits  Prevent or manage aspiration pneumonia:   · Go to speech therapy as directed  A speech therapist can teach you exercises to strengthen the muscles you use to swallow  · Sit up while you eat  If you are bedridden, keep the head of your bed slightly up (at about a 30° to 45° angle) while you eat  Take small bites, eat slowly, and swallow with your chin down  · Eat soft foods and drink thickened liquids  A dietitian can teach you how to thicken your liquids so you have less trouble swallowing  Drink liquids through a straw or sip them from a spoon  Ask your dietitian what kinds of foods you should eat  He or she may suggest soft foods such as cooked cereal, pasta, well-cooked fruits and vegetables, and scrambled eggs  Your dietitian may also suggest moist, tender meats that are cut into small pieces  · Care for your teeth and mouth  Mouth care can help kill harmful bacteria in your mouth so you do not aspirate them  While you are sitting up, brush your teeth for 2 minutes daily after breakfast and again after dinner  Also brush your tongue  If you do not have teeth, gently brush your gums with a soft toothbrush  Dentures should be removed and cleaned with an electric toothbrush and water after breakfast and dinner  Soak dentures overnight in a cleaning solution  Visit a dentist regularly to have your teeth and gums cleaned  · Limit or avoid taking sedatives  These medicines increase the risk of aspiration because they dry out your mouth and make you drowsy  If possible, avoid taking antihistamine medicines because they also make your mouth dry  · Do not smoke  Nicotine and other chemicals in cigarettes and cigars can cause lung damage  Ask your healthcare provider for information if you currently smoke and need help to quit  E-cigarettes or smokeless tobacco still contain nicotine  Talk to your healthcare provider before you use these products  © Copyright [address] Information is for End User's use only and may not be sold, redistributed or otherwise used for commercial purposes  All illustrations and images included in CareNotes® are the copyrighted property of A D A M , Inc  or [facility] Kina Red   The above information is an  only  It is not intended as medical advice for individual conditions or treatments  Talk to your doctor, nurse or pharmacist before following any medical regimen to see if it is safe and effective for you

## 2021-06-18 NOTE — CASE MANAGEMENT
Cm was notified by Ewa Flores that patient is medically cleared for discharge  Patient is accepted at [facility] for admission tomorrow  PASSR completed per [facility] request    CM sent transport request to [facility] via [employer]  Per nursing, patient is not using O2 anymore  12 noon [facility] transport with [facility]  EMILIE notified [facility] via [employer], nurse, NAN, panchito Eugene via telephone  IMM reviewed with panchito Eugene over the telephone  Facility contacts updated in [address]

## 2021-06-18 NOTE — PROGRESS NOTES
[facility]  Progress Note - Ezio Garcia  1940, 79 y o  male MRN: [id number]  Unit/Bed#: S -36 Encounter: [id number]  Primary Care Provider: Robert Mosqueda DO   Date and time admitted to hospital: 6/15/2021  9:54 PM    * Bilateral pneumonia  Assessment & Plan  Pt presents with weakness, SOB, cough  +Sepsis criteria on admission    Suspect recurrent aspiration pneumonia despite PEG tube and careful AHF by caregivers   PEG placed in 10/2020 but only used for hydration purposes   CTA chest shows R>L bibasilar patchy opacities   Urinary antigens negative    Check urinary antigens, sputum cx if able, PCT   De-escalated abx to ceftriaxone and flagyl (Day 3 of abx)  Respiratory protocol, pulm toilet, IS  Currently on dysphagia diet and honey thick liquids as recommended by speech  Strict aspiration precautions     Acute respiratory insufficiency  Assessment & Plan  Patient presenting with weakness and SOB, family suspected recurrent aspiration  Pt states he wears O2 at home PRN     Weaned oxygen to 1 5 L nasal cannula  Maintains sats >/= 90%    Sepsis ([address] )  Assessment & Plan  POA, tachypnea, leukocytosis w left shift, lactic acidosis   Lactate has resolved  Strep pneumo urine negative  Legionella urine negative    Current IV abx patient is on are currently ceftriaxone and flagyl IV (day 3)   Transition to oral abx omnicef and oral flagyl for 2 more days of treatment (total of 5 days)  F/u blood cultures   Trend temps, repeat CBC in AM   IV fluids discontinued    COPD (chronic obstructive pulmonary disease) (HCC)  Assessment & Plan  Hx of COPD with tracheomalacia and chronic cough 2/2 dysphagia   Continue duonebs TID, pulmicort BID   Continue chronic prednisone 5 mg daily   Daily VEST therapy, RRT consulted   Follows Dr Artie Hong outpatient     Ambulatory dysfunction  Assessment & Plan  Presents w/ increase weakness - likely 2/2 infectious process     PT/OT recommends post acute rehab    Major depressive disorder with single episode, in full remission [facility])  Assessment & Plan  Continue paxil, prn ativan     Hyperlipidemia  Assessment & Plan  Continue statin     Chronic kidney disease, stage 3 [facility])  Assessment & Plan  Lab Results   Component Value Date    EGFR 74 2021    EGFR 56 2021    EGFR 59 2021    CREATININE 0 96 2021    CREATININE 1 21 2021    CREATININE 1 16 2021     Cr is at BL, 1 2   Continues to be baseline  IV fluids d/manjit    GERD (gastroesophageal reflux disease)  Assessment & Plan  Continue protonix 40mg daily     History of cerebrovascular accident (CVA) with residual deficit - dysphagia and left sided weakness  Assessment & Plan  Hx of CVA w L sided deficits nd dysphagia   Aspiration precautions   Continue lipitor, plavix, asa     Oropharyngeal dysphagia  Assessment & Plan  Hx of chronic dysphagia with notable admission in 10/2020 for aspiration  Follows [facility] - prior diet puree/HTL   Patient currently on dysphagia diet with honey thick liquids as recommended by speech  Aspiration precautions    Essential hypertension  Assessment & Plan  Continue norvasc, coreg  Hold aldactone while on fluids         VTE Pharmacologic Prophylaxis:   Pharmacologic: Enoxaparin (Lovenox)  Mechanical VTE Prophylaxis in Place: Yes    Discussions with Specialists or Other Care Team Provider: Attending    Education and Discussions with Family / Patient: Patient    Current Length of Stay: 2 day(s)    Current Patient Status: Inpatient     Discharge Plan / Estimated Discharge Date: Pending post acute rehab recommendation  Code Status: Level 1 - Full Code      Subjective:   Patient denies fevers, chills  Reports coughing  Patient reports sleeping a lot yesterday but reports good appetite  Weaned to 1L of oxygen      Objective:     Vitals:   Temp (24hrs), Av 7 °F (36 5 °C), Min:97 3 °F (36 3 °C), Max:98 5 °F (36 9 °C)    Temp:  [97 3 °F (36 3 °C)-98 5 °F (36 9 °C)] 98 5 °F (36 9 °C)  HR:  [65-67] 67  Resp:  [18] 18  BP: (102-133)/(57-72) 102/59  SpO2:  [92 %-97 %] 92 %  Body mass index is 24 03 kg/m²  Input and Output Summary (last 24 hours): Intake/Output Summary (Last 24 hours) at 6/18/2021 0833  Last data filed at 6/18/2021 0549  Gross per 24 hour   Intake 360 ml   Output 400 ml   Net -40 ml       Physical Exam:     Physical Exam  Constitutional:       Appearance: He is normal weight  HENT:      Head: Normocephalic and atraumatic  Nose: Nose normal       Mouth/Throat:      Mouth: Mucous membranes are moist    Eyes:      Extraocular Movements: Extraocular movements intact  Pupils: Pupils are equal, round, and reactive to light  Cardiovascular:      Rate and Rhythm: Normal rate and regular rhythm  Pulses: Normal pulses  Heart sounds: Normal heart sounds  Pulmonary:      Effort: Pulmonary effort is normal       Breath sounds: Wheezing present  Comments: Mild wheezes bilaterally    Nasal cannula on patient; although difficult to place over nose  Abdominal:      General: Bowel sounds are normal       Palpations: Abdomen is soft  Tenderness: There is no abdominal tenderness  Musculoskeletal:         General: Normal range of motion  Skin:     General: Skin is warm and dry  Capillary Refill: Capillary refill takes less than 2 seconds  Neurological:      General: No focal deficit present  Mental Status: He is alert        Comments: Chronic dysarthria   Psychiatric:         Mood and Affect: Mood normal          Behavior: Behavior normal        Additional Data:     Labs:    Results from last 7 days   Lab Units 06/18/21  0502   WBC Thousand/uL 9 45   HEMOGLOBIN g/dL 12 6   HEMATOCRIT % 38 2   PLATELETS Thousands/uL 114*   NEUTROS PCT % 81*   LYMPHS PCT % 10*   MONOS PCT % 7   EOS PCT % 2     Results from last 7 days   Lab Units 06/18/21  0502 06/15/21  2225   POTASSIUM mmol/L 3 7 4 2   CHLORIDE mmol/L 103 98* CO2 mmol/L 24 28   BUN mg/dL 23 22   CREATININE mg/dL 0 96 1 26   CALCIUM mg/dL 8 9 9 6   ALK PHOS U/L  --  87   ALT U/L  --  15   AST U/L  --  11     Results from last 7 days   Lab Units 06/15/21  2225   INR  0 98       * I Have Reviewed All Lab Data Listed Above  * Additional Pertinent Lab Tests Reviewed: All Labs Within Last 24 Hours Reviewed    Imaging:    Imaging Reports Reviewed Today Include: None  Imaging Personally Reviewed by Myself Includes:  None    Recent Cultures (last 7 days):     Results from last 7 days   Lab Units 06/16/21  0529 06/15/21  2225   BLOOD CULTURE   --  No Growth at 48 hrs  No Growth at 48 hrs     LEGIONELLA URINARY ANTIGEN  Negative  --        Last 24 Hours Medication List:   Current Facility-Administered Medications   Medication Dose Route Frequency Provider Last Rate    acetaminophen  650 mg Per PEG Tube Q6H PRN Tod Alexei Hernández PA-C      albuterol  2 puff Inhalation Q4H PRN Elizabeth Beltran MD      amLODIPine  10 mg Oral Daily Neisha Magdaleno MD      aspirin  81 mg Oral Every Other Day Eitan Hernández PA-C      atorvastatin  10 mg Oral After Jasmyneant Yissel Hernández PA-C      budesonide  0 5 mg Nebulization BID Eitan Hernández PA-C      calcium carbonate  1,000 mg Oral Daily PRN Eitan Hernández PA-C      carvedilol  12 5 mg Oral BID With Meals Neisha Magdaleno MD      cefTRIAXone  1,000 mg Intravenous Q24H Eitan Hernández PA-C 1,000 mg (06/17/21 2158)    clopidogrel  75 mg Oral Daily Neisha Magdaleno MD      guaiFENesin  200 mg Oral Q4H PRN Tod Alexei Hernández PA-C      heparin (porcine)  5,000 Units Subcutaneous Q8H [address] Eitan Hernández PA-C      ipratropium  0 5 mg Nebulization TID Elizabeth Beltran MD      latanoprost  1 drop Both Eyes HS Eitan Hernández PA-C      levalbuterol  1 25 mg Nebulization TID Elizabeth Beltran MD      LORazepam  0 25 mg Oral BID Neisha Magdaleno MD      metroNIDAZOLE  500 mg Intravenous Q8H Eitan MARMOLEJO LADY Hernández Stopped (06/18/21 0549)    ondansetron  4 mg Intravenous Q6H PRN Eitan Hernández PA-C      pantoprazole  40 mg Oral Early Morning Eitan Hernández PA-C      PARoxetine  20 mg Oral Daily Toyin Hwang MD      polyethylene glycol  17 g Per PEG Tube Daily PRN Eitan Hernández PA-C      predniSONE  5 mg Oral Daily Toyin Hwang MD      senna  17 2 mg Oral HS Toiyn Hwang MD      tamsulosin  0 4 mg Oral Daily [address], LADY          Today, Patient Was Seen By: Toyin Hwang MD    ** Please Note: This note has been constructed using a voice recognition system   **

## 2021-06-18 NOTE — PHYSICAL THERAPY NOTE
PHYSICAL THERAPY NOTE    Patient Name: Tally Primrose  Today's Date: 21 1124   PT Last Visit   PT Visit Date 21   Note Type   Note Type Treatment   Pain Assessment   Pain Assessment Tool Pain Assessment not indicated - pt denies pain   Pain Score No Pain   Restrictions/Precautions   Weight Bearing Precautions Per Order No   Braces or Orthoses AFO  (dependently donned)   Other Precautions Bed Alarm; Chair Alarm;Cognitive; Fall Risk   General   Family/Caregiver Present No   Subjective   Subjective Patient supine in bed and is agreeable to participate in therapy session  Patient identifers obtained from name &   Bed Mobility   Rolling R 4  Minimal assistance   Additional items Assist x 1;Bedrails; Increased time required;Verbal cues;LE management   Rolling L 4  Minimal assistance   Additional items Assist x 1;Bedrails; Increased time required;Verbal cues;LE management   Supine to Sit 2  Maximal assistance   Additional items Assist x 1;HOB elevated; Bedrails; Increased time required;Verbal cues;LE management   Sit to Supine 2  Maximal assistance   Additional items Assist x 1;Bedrails; Increased time required;Verbal cues;LE management   Additional Comments Pt supine in bed post session with bed alarm engaged, call bell and belongings in reach  Transfers   Sit to Stand 2  Maximal assistance  (mod ax1 inital, max ax1 additional)   Additional items Assist x 1; Increased time required;Verbal cues  (use of quick move)   Stand to Sit 3  Moderate assistance   Additional items Assist x 1; Increased time required;Verbal cues  (use of quick move)   Additional Comments Standing tolerance initally 20 seconds, additional trials less than 5 seconds  Sitting tolerance at EOB x 15 minutes     Balance   Static Sitting Fair   Static Standing Poor -   Endurance Deficit   Endurance Deficit Yes   Endurance Deficit Description limited activity tolerance   Activity Tolerance   Activity Tolerance Patient limited by fatigue; Other (Comment)  (nausea and bowel incontience)   Nurse Made Aware Spoke to Levon Harada, RN    Assessment   Prognosis Fair   Problem List Decreased strength;Decreased range of motion;Decreased endurance; Impaired balance;Decreased mobility; Decreased cognition;Decreased coordination; Impaired judgement; Impaired tone;Decreased safety awareness   Assessment Patient agreeable to participate in therapy session however reports increased fatigue  Supine to sit with max ax1 and increased time to transition sitting EOB  Pt tolerated sitting EOB x 15 minutes with inital min ax1 for balance than progressed to close supervision  Multiple sit<>stand trials with quick move with mod ax1 for initally, requiring max ax1 for additional  Standing tolerance initally 20 seconds, additional trials less than 5 seconds  Pt incontient of bowel requiring return to supine in bed and rolling to L and R side multiple times with min ax1 and increased time  Continue to focus on bed mobility and standing tolerance/transfers with quick move as appropriate and able  Goals   Patient Goals none stated   STG Expiration Date 06/26/21   PT Treatment Day 3   Plan   Treatment/Interventions Functional transfer training;LE strengthening/ROM; Therapeutic exercise; Endurance training;Cognitive reorientation;Patient/family training;Equipment eval/education; Bed mobility;Gait training;Spoke to case management;Spoke to nursing   PT Frequency 5x/wk   Recommendation   PT Discharge Recommendation Post acute rehabilitation services   Equipment Recommended Shelby Trejo Wheelchair   Wheelchair Package Recommended Standard  (has one)   [place] Recommended Wheeled walker  (has one)   AM-PAC Basic Mobility Inpatient   Turning in Bed Without Bedrails 2   Lying on Back to Sitting on Edge of Flat Bed 1   Moving Bed to Chair 1   Standing Up From Chair 1   Walk in Room 1   Climb 3-5 Stairs 1 Basic Mobility Inpatient Raw Score 7   Turning Head Towards Sound 3   Follow Simple Instructions 3   Low Function Basic Mobility Raw Score 13   Low Function Basic Mobility Standardized Score 20 14      The patient's AM-PAC Basic Mobility Inpatient Short Form Raw Score is 7, Standardized Score is 20  14  A standardized score less than 42 9 suggests the patient may benefit from discharge to post-acute rehabilitation services  Please also refer to the recommendation of the Physical Therapist for safe discharge planning      Ruben Finch, PTA

## 2021-06-18 NOTE — ED PROVIDER NOTES
History  Chief Complaint   Patient presents with    Altered Mental Status     Pt lives at home with caregiver and per caregiver pt has not been acting himself all day and has been slow to respond  Caregiver was monitoring pt's O2 at home and it has been in the low 90's all day  Pt denies pain, chest pain  Pt has hx of stroke and COPD  Per EMS, pt is at baseline right now  HPI    Patient is a 79 yom who presents with reported low oxygen and "not acting himself"  Patient with history of aspiration pneumonia  Patient with history of copd/lung ca/ckd/gerd and others  Patient denies fevers but does not that he feels similar to prior pneumonia  + crackles on exam      MDM 79 yom, will ct to rule out pe, suspect pneumonia  Of note, given CT findings, will treat for PNA admit  Prior to Admission Medications   Prescriptions Last Dose Informant Patient Reported? Taking?    ASPIRIN 81 PO 6/14/2021 at Unknown time Child Yes Yes   Sig: Take 1 tablet by mouth every other day     Fesoterodine Fumarate ER (TOVIAZ) 8 MG TB24 Unknown at Unknown time Child Yes No   Sig: Take 4 mg by mouth every evening     LORazepam (ATIVAN) 0 5 mg tablet 6/15/2021 at Unknown time Child Yes Yes   Sig: Take 0 25 mg by mouth every 8 (eight) hours as needed for anxiety    PARoxetine (PAXIL) 20 mg tablet 6/15/2021 at Unknown time Child Yes Yes   Sig: Take 20 mg by mouth daily   acetaminophen (TYLENOL) 325 mg tablet  Child No Yes   Sig: Take 2 tablets by mouth every 6 (six) hours as needed for fever   albuterol (PROVENTIL HFA,VENTOLIN HFA) 90 mcg/act inhaler  Child No Yes   Sig: Inhale 2 puffs 4 (four) times a day   amLODIPine (NORVASC) 10 mg tablet 6/15/2021 at Unknown time Child Yes Yes   Sig: Take 1 tablet by mouth daily   atorvastatin (LIPITOR) 40 mg tablet 6/15/2021 at Unknown time Child No Yes   Sig: Take 1 tablet (40 mg total) by mouth daily after dinner   Patient taking differently: Take 10 mg by mouth daily after dinner    benzonatate (TESSALON) 200 MG capsule 6/15/2021 at Unknown time Child No Yes   Sig: TAKE 1 CAPSULE BY MOUTH THREE TIMES A DAY AS NEEDED FOR COUGH   budesonide (PULMICORT) 0 5 mg/2 mL nebulizer solution  Child No Yes   Sig: Take 1 vial (0 5 mg total) by nebulization 2 (two) times a day Rinse mouth after use  carvedilol (COREG) 12 5 mg tablet 6/15/2021 at Unknown time Child No Yes   Sig: Take 1 tablet by mouth 2 (two) times a day with meals   clopidogrel (PLAVIX) 75 mg tablet 6/15/2021 at Unknown time Child No Yes   Sig: Take 1 tablet by mouth daily   guaiFENesin (ROBITUSSIN) 100 mg/5 mL oral solution 6/15/2021 at Unknown time Child No Yes   Sig: Take 20 mL (400 mg total) by mouth 3 (three) times a day   ipratropium-albuterol (DUO-NEB) 0 5-2 5 mg/3 mL nebulizer solution 6/15/2021 at Unknown time  Yes Yes   Sig: Take 3 mL by nebulization 3 (three) times a day   ketoconazole (NIZORAL) 2 % cream Not Taking at Unknown time Child No No   Sig: Apply topically to both feet in their entirety (tops, bottoms and between toes) 3 times a day for 4 weeks straight     Patient not taking: Reported on 6/15/2021   latanoprost (XALATAN) 0 005 % ophthalmic solution 6/14/2021 at Unknown time Child Yes Yes   Sig: Administer 1 drop to both eyes daily at bedtime   loratadine (CLARITIN) 10 mg tablet 6/15/2021 at Unknown time Child Yes Yes   Sig: Take 10 mg by mouth daily   pantoprazole (PROTONIX) 40 mg tablet  Child Yes No   Sig: Take 40 mg by mouth daily   polyethylene glycol (MIRALAX) 17 g packet  Child Yes Yes   Sig: Take 17 g by mouth daily as needed   predniSONE 5 mg tablet 6/15/2021 at Unknown time Child No Yes   Sig: TAKE 1 TABLET BY MOUTH EVERY DAY   psyllium (METAMUCIL) 0 52 g capsule  Child Yes Yes   Sig: Take 0 52 g by mouth daily as needed    senna (SENOKOT) 8 6 MG tablet 6/15/2021 at Unknown time Child Yes Yes   Sig: Take 2 tablets by mouth daily at bedtime   spironolactone (ALDACTONE) 25 mg tablet 6/15/2021 at Unknown time Child No Yes   Sig: Take 1 tablet (25 mg total) by mouth daily   tamsulosin (FLOMAX) 0 4 mg 6/15/2021 at Unknown time Child Yes Yes   Sig: Take 1 capsule by mouth daily   traZODone (DESYREL) 50 mg tablet Not Taking at Unknown time Child No No   Si/2 to 1 tab @ HS PRN   Patient not taking: Reported on 6/15/2021      Facility-Administered Medications: None       Past Medical History:   Diagnosis Date    [facility]    Aspiration into respiratory tract     Chest pain 2017    COPD (chronic obstructive pulmonary disease) ([facility] )     Depression     Elevated troponin 2017    GERD (gastroesophageal reflux disease)     History of CVA (cerebrovascular accident) 2016    Hyperlipidemia     Hypertension     Lung cancer ([place] [address] )     Right, status post lobectomy    Pneumonia     Prostate cancer ([place] [address] )     Sleep apnea     awaiting sleep study results    Squamous cell skin cancer     Stroke ([place] [address] )     Tracheomalacia     Weakness due to cerebrovascular accident (CVA)        Past Surgical History:   Procedure Laterality Date    COLONOSCOPY      CT GUIDED FINE NEEDLE ASPIRATION (ALL INC)  10/30/2020    ESOPHAGOGASTRODUODENOSCOPY N/A 2016    Procedure: ESOPHAGOGASTRODUODENOSCOPY (EGD); Surgeon: Sahil Pinto MD;  [facility]; Service:     IR GASTROSTOMY (G) TUBE CHECK/CHANGE/REINSERTION/UPSIZE  2021    JOINT REPLACEMENT      knee replacement    LUNG LOBECTOMY      TN ESOPHAGOGASTRODUODENOSCOPY TRANSORAL DIAGNOSTIC N/A 3/15/2017    Procedure: ESOPHAGOGASTRODUODENOSCOPY (EGD); Surgeon: Sahil Pinto MD;  [facility]: [facility]; Service: Gastroenterology    SKIN BIOPSY      TRIGEMINAL NERVE DECOMPRESSION         Family History   Family history unknown: Yes     I have reviewed and agree with the history as documented      E-Cigarette/Vaping    E-Cigarette Use Never User      E-Cigarette/Vaping Substances    Nicotine No     THC No  CBD No     Flavoring No     Other No     Unknown No      Social History     Tobacco Use    Smoking status: Former Smoker     Packs/day: 1 00     Years: 22 00     Pack years: 22 00     Types: Cigarettes     Start date:      Quit date:      Years since quittin 3    Smokeless tobacco: Never Used   Vaping Use    Vaping Use: Never used   Substance Use Topics    Alcohol use: Not Currently    Drug use: Not Currently       Review of Systems   Constitutional: Positive for appetite change and fatigue  Musculoskeletal: Positive for gait problem  All other systems reviewed and are negative  Physical Exam  Physical Exam  Vitals and nursing note reviewed  Constitutional:       Appearance: He is well-developed  He is not diaphoretic  HENT:      Head: Normocephalic and atraumatic  Right Ear: External ear normal       Left Ear: External ear normal       Nose: No congestion  Eyes:      General:         Right eye: No discharge  Left eye: No discharge  Extraocular Movements: Extraocular movements intact  Cardiovascular:      Rate and Rhythm: Normal rate and regular rhythm  Heart sounds: Normal heart sounds  No murmur heard  Pulmonary:      Effort: Pulmonary effort is normal  No respiratory distress  Breath sounds: Normal breath sounds  No wheezing  Abdominal:      General: There is no distension  Palpations: Abdomen is soft  Tenderness: There is no abdominal tenderness  Musculoskeletal:         General: No tenderness or signs of injury  Cervical back: Normal range of motion and neck supple  Skin:     General: Skin is warm and dry  Findings: No erythema  Neurological:      General: No focal deficit present  Mental Status: He is alert and oriented to person, place, and time  Motor: No weakness     Psychiatric:         Mood and Affect: Mood normal          Behavior: Behavior normal          Vital Signs  ED Triage Vitals Temperature Pulse Respirations Blood Pressure SpO2   06/15/21 2156 06/15/21 2156 06/15/21 2156 06/15/21 2158 06/15/21 2156   98 °F (36 7 °C) 77 22 120/63 93 %      Temp Source Heart Rate Source Patient Position - Orthostatic VS BP Location FiO2 (%)   06/15/21 2156 06/15/21 2156 06/15/21 2156 06/15/21 2156 --   Oral Monitor Lying Right arm       Pain Score       06/15/21 2156       No Pain           Vitals:    06/17/21 0700 06/17/21 0948 06/17/21 1321 06/17/21 2241   BP: 105/55 114/59 105/57 133/72   Pulse: 66  65 66   Patient Position - Orthostatic VS: Lying Lying Sitting Lying         Visual Acuity  Visual Acuity      Most Recent Value   L Pupil Size (mm)  3   R Pupil Size (mm)  3          ED Medications  Medications   acetaminophen (TYLENOL) tablet 650 mg (has no administration in time range)   ondansetron (ZOFRAN) injection 4 mg (has no administration in time range)   calcium carbonate (TUMS) chewable tablet 1,000 mg (has no administration in time range)   heparin (porcine) subcutaneous injection 5,000 Units (5,000 Units Subcutaneous Given 6/17/21 2108)   guaiFENesin (ROBITUSSIN) oral solution 200 mg (has no administration in time range)   budesonide (PULMICORT) inhalation solution 0 5 mg (0 5 mg Nebulization Given 6/17/21 2013)   atorvastatin (LIPITOR) tablet 10 mg (10 mg Oral Given 6/17/21 1733)   aspirin chewable tablet 81 mg (81 mg Oral Given 6/16/21 0946)   latanoprost (XALATAN) 0 005 % ophthalmic solution 1 drop (1 drop Both Eyes Given 6/17/21 2108)   pantoprazole (PROTONIX) EC tablet 40 mg (40 mg Oral Given 6/17/21 0500)   tamsulosin (FLOMAX) capsule 0 4 mg (0 4 mg Oral Given 6/17/21 1733)   polyethylene glycol (MIRALAX) packet 17 g (has no administration in time range)   metroNIDAZOLE (FLAGYL) IVPB (premix) 500 mg 100 mL (500 mg Intravenous New Bag 6/17/21 2107)   cefTRIAXone (ROCEPHIN) 1,000 mg in dextrose 5 % 50 mL IVPB (1,000 mg Intravenous New Bag 6/17/21 6093)   albuterol (PROVENTIL HFA,VENTOLIN HFA) inhaler 2 puff (has no administration in time range)   levalbuterol (XOPENEX) inhalation solution 1 25 mg (1 25 mg Nebulization Given 6/17/21 2011)   ipratropium (ATROVENT) 0 02 % inhalation solution 0 5 mg (0 5 mg Nebulization Given 6/17/21 2011)   LORazepam (ATIVAN) tablet 0 25 mg (0 25 mg Oral Given 6/17/21 1733)   amLODIPine (NORVASC) tablet 10 mg (10 mg Oral Given 6/17/21 0942)   carvedilol (COREG) tablet 12 5 mg (12 5 mg Oral Not Given 6/17/21 1732)   clopidogrel (PLAVIX) tablet 75 mg (75 mg Oral Given 6/17/21 0943)   PARoxetine (PAXIL) tablet 20 mg (20 mg Oral Given 6/17/21 0943)   predniSONE tablet 5 mg (5 mg Oral Given 6/17/21 0943)   senna (SENOKOT) tablet 17 2 mg (17 2 mg Oral Given 6/17/21 2108)   vancomycin (VANCOCIN) 1500 mg in sodium chloride 0 9% 250 mL IVPB (0 mg/kg × 99 8 kg Intravenous Stopped 6/16/21 0107)   cefepime (MAXIPIME) 2 g/50 mL dextrose IVPB (0 mg Intravenous Stopped 6/15/21 2255)   iohexol (OMNIPAQUE) 350 MG/ML injection (SINGLE-DOSE) 85 mL (85 mL Intravenous Given 6/15/21 2342)   metroNIDAZOLE (FLAGYL) IVPB (premix) 500 mg 100 mL (500 mg Intravenous New Bag 6/16/21 0128)       Diagnostic Studies  Results Reviewed     Procedure Component Value Units Date/Time    Blood culture #1 [[id number]] Collected: 06/15/21 2225    Lab Status: Preliminary result Specimen: Blood from Arm, Right Updated: 06/17/21 0801     Blood Culture No Growth at 24 hrs  Blood culture #2 [[id number]] Collected: 06/15/21 2225    Lab Status: Preliminary result Specimen: Blood from Arm, Right Updated: 06/17/21 0801     Blood Culture No Growth at 24 hrs  Lactic acid 2 Hours [[id number]]  (Abnormal) Collected: 06/16/21 0036    Lab Status: Final result Specimen: Blood from Arm, Right Updated: 06/16/21 0121     LACTIC ACID 2 7 mmol/L     Narrative:      Result may be elevated if tourniquet was used during collection      Novel Coronavirus (Covid-19),PCR [facility] - 2 Hour Stat [[id number]]  (Normal) Collected: 06/15/21 2225    Lab Status: Final result Specimen: Nares from Nose Updated: 06/16/21 0019     SARS-CoV-2 Negative    Narrative: The specimen collection materials, transport medium, and/or testing methodology utilized in the production of these test results have been proven to be reliable in a limited validation with an abbreviated program under the Emergency Utilization Authorization provided by the FDA  Testing reported as "Presumptive positive" will be confirmed with secondary testing to ensure result accuracy  Clinical caution and judgement should be used with the interpretation of these results with consideration of the clinical impression and other laboratory testing  Testing reported as "Positive" or "Negative" has been proven to be accurate according to standard laboratory validation requirements  All testing is performed with control materials showing appropriate reactivity at standard intervals  Lactic acid [[id number]]  (Abnormal) Collected: 06/15/21 2225    Lab Status: Final result Specimen: Blood from Arm, Right Updated: 06/16/21 0009     LACTIC ACID 2 5 mmol/L     Narrative:      Result may be elevated if tourniquet was used during collection  CBC and differential [[id number]]  (Abnormal) Collected: 06/15/21 2225    Lab Status: Final result Specimen: Blood from Arm, Right Updated: 06/15/21 2359     WBC 19 92 Thousand/uL      RBC 5 14 Million/uL      Hemoglobin 15 2 g/dL      Hematocrit 47 6 %      MCV 93 fL      MCH 29 6 pg      MCHC 31 9 g/dL      RDW 14 0 %      MPV 10 4 fL      Platelets 196 Thousands/uL      nRBC 0 /100 WBCs     Narrative: This is an appended report  These results have been appended to a previously verified report      Manual Differential(PHLEBS Do Not Order) [[id number]]  (Abnormal) Collected: 06/15/21 2225    Lab Status: Final result Specimen: Blood from Arm, Right Updated: 06/15/21 2359     Segmented % 74 %      Bands % 17 %      Lymphocytes % 3 %      Monocytes % 5 % Eosinophils, % 0 %      Basophils % 1 %      Absolute Neutrophils 18 13 Thousand/uL      Lymphocytes Absolute 0 60 Thousand/uL      Monocytes Absolute 1 00 Thousand/uL      Eosinophils Absolute 0 00 Thousand/uL      Basophils Absolute 0 20 Thousand/uL      Total Counted 100     RBC Morphology Normal     Platelet Estimate Borderline    High sensitivity CRP [[id number]] Collected: 06/15/21 2225    Lab Status: Final result Specimen: Blood from Arm, Right Updated: 06/15/21 2327     CRP, High Sensitivity 33 31 mg/L     Narrative:            HsCRP Level       Relative Risk           <1 0 mg/L          Low           1 0 to 3 0 mg/L    Average           >3 0 mg/L          High        Protime-INR [[id number]]  (Normal) Collected: 06/15/21 2225    Lab Status: Final result Specimen: Blood from Arm, Right Updated: 06/15/21 2326     Protime 13 1 seconds      INR 0 98    APTT [[id number]]  (Normal) Collected: 06/15/21 2225    Lab Status: Final result Specimen: Blood from Arm, Right Updated: 06/15/21 2326     PTT 30 seconds     Lipase [[id number]]  (Abnormal) Collected: 06/15/21 2225    Lab Status: Final result Specimen: Blood from Arm, Right Updated: 06/15/21 2326     Lipase 33 u/L     Phosphorus [[id number]]  (Normal) Collected: 06/15/21 2225    Lab Status: Final result Specimen: Blood from Arm, Right Updated: 06/15/21 2326     Phosphorus 2 3 mg/dL     NT-BNP PRO [[id number]]  (Abnormal) Collected: 06/15/21 2225    Lab Status: Final result Specimen: Blood from Arm, Right Updated: 06/15/21 2326     NT-proBNP 560 pg/mL     Troponin I [[id number]]  (Normal) Collected: 06/15/21 2225    Lab Status: Final result Specimen: Blood from Arm, Right Updated: 06/15/21 2320     Troponin I <0 02 ng/mL     Comprehensive metabolic panel [[id number]]  (Abnormal) Collected: 06/15/21 2225    Lab Status: Final result Specimen: Blood from Arm, Right Updated: 06/15/21 2255     Sodium 134 mmol/L      Potassium 4 2 mmol/L      Chloride 98 mmol/L      CO2 28 mmol/L      ANION GAP 8 mmol/L      BUN 22 mg/dL      Creatinine 1 26 mg/dL      Glucose 129 mg/dL      Calcium 9 6 mg/dL      AST 11 U/L      ALT 15 U/L      Alkaline Phosphatase 87 U/L      Total Protein 7 6 g/dL      Albumin 3 7 g/dL      Total Bilirubin 0 60 mg/dL      eGFR 53 ml/min/1 73sq m     Narrative:      [place] guidelines for Chronic Kidney Disease (CKD):     Stage 1 with normal or high GFR (GFR > 90 mL/min/1 73 square meters)    Stage 2 Mild CKD (GFR = 60-89 mL/min/1 73 square meters)    Stage 3A Moderate CKD (GFR = 45-59 mL/min/1 73 square meters)    Stage 3B Moderate CKD (GFR = 30-44 mL/min/1 73 square meters)    Stage 4 Severe CKD (GFR = 15-29 mL/min/1 73 square meters)    Stage 5 End Stage CKD (GFR <15 mL/min/1 73 square meters)  Note: GFR calculation is accurate only with a steady state creatinine                 CTA ED chest PE Study   Final Result by Barbara Woods MD (06/16 0028)      1  No evidence of pulmonary embolus  2   Patchy opacities within bilateral lower lobes right greater than left compatible with pneumonia in the appropriate clinical setting  Trace right pleural effusion  3   Increase in size of right hilar and mediastinal lymphadenopathy which may be reactive  Workstation performed: [id number]                    Procedures  Procedures         ED Course  ED Course as of Jun 18 0253   Wed Jun 16, 2021   0057 IMPRESSION:     1  No evidence of pulmonary embolus  2   Patchy opacities within bilateral lower lobes right greater than left compatible with pneumonia in the appropriate clinical setting  Trace right pleural effusion  3   Increase in size of right hilar and mediastinal lymphadenopathy which may be reactive                                    SBIRT 22yo+      Most Recent Value   SBIRT (24 yo +)   In order to provide better care to our patients, we are screening all of our patients for alcohol and drug use   Would it be okay to ask you these screening questions? Unable to answer at this time Filed at: 06/15/2021 2202                    MDM    Disposition  Final diagnoses:   Pneumonia     Time reflects when diagnosis was documented in both MDM as applicable and the Disposition within this note     Time User Action Codes Description Comment    6/16/2021  1:17 AM Harpal ContrerasDony Add [J18 9] Pneumonia     6/16/2021  3:48 AM Catie Hernández Sharps L Add [R63 4] Weight loss     6/16/2021  3:48 AM Deleon-Matika, Leopold Shoulders Add [L23 616W] Aspiration into airway       ED Disposition     ED Disposition Condition Date/Time Comment    Admit Stable Wed Jun 16, 2021  1:17 AM Case was discussed with erika stack and the patient's admission status was agreed to be Admission Status: inpatient status to the service of Dr Queenie Barclay   Follow-up Information    None         Current Discharge Medication List      CONTINUE these medications which have NOT CHANGED    Details   acetaminophen (TYLENOL) 325 mg tablet Take 2 tablets by mouth every 6 (six) hours as needed for fever  Qty: 30 tablet, Refills: 0      albuterol (PROVENTIL HFA,VENTOLIN HFA) 90 mcg/act inhaler Inhale 2 puffs 4 (four) times a day  Qty: 2 Inhaler, Refills: 0      amLODIPine (NORVASC) 10 mg tablet Take 1 tablet by mouth daily      ASPIRIN 81 PO Take 1 tablet by mouth every other day        atorvastatin (LIPITOR) 40 mg tablet Take 1 tablet (40 mg total) by mouth daily after dinner  Refills: 0    Associated Diagnoses: History of cerebrovascular accident (CVA) with residual deficit      benzonatate (TESSALON) 200 MG capsule TAKE 1 CAPSULE BY MOUTH THREE TIMES A DAY AS NEEDED FOR COUGH  Qty: 90 capsule, Refills: 1    Associated Diagnoses: Cough      budesonide (PULMICORT) 0 5 mg/2 mL nebulizer solution Take 1 vial (0 5 mg total) by nebulization 2 (two) times a day Rinse mouth after use    Qty: 360 mL, Refills: 3    Comments: DX Code J44 9  Associated Diagnoses: Chronic obstructive pulmonary disease with acute lower respiratory infection (HCC)      carvedilol (COREG) 12 5 mg tablet Take 1 tablet by mouth 2 (two) times a day with meals  Qty: 60 tablet, Refills: 0      clopidogrel (PLAVIX) 75 mg tablet Take 1 tablet by mouth daily  Qty: 30 tablet, Refills: 0      guaiFENesin (ROBITUSSIN) 100 mg/5 mL oral solution Take 20 mL (400 mg total) by mouth 3 (three) times a day  Qty: 236 mL, Refills: 0    Associated Diagnoses: Mucopurulent chronic bronchitis (HCC)      ipratropium-albuterol (DUO-NEB) 0 5-2 5 mg/3 mL nebulizer solution Take 3 mL by nebulization 3 (three) times a day      latanoprost (XALATAN) 0 005 % ophthalmic solution Administer 1 drop to both eyes daily at bedtime      loratadine (CLARITIN) 10 mg tablet Take 10 mg by mouth daily      LORazepam (ATIVAN) 0 5 mg tablet Take 0 25 mg by mouth every 8 (eight) hours as needed for anxiety       PARoxetine (PAXIL) 20 mg tablet Take 20 mg by mouth daily      polyethylene glycol (MIRALAX) 17 g packet Take 17 g by mouth daily as needed      predniSONE 5 mg tablet TAKE 1 TABLET BY MOUTH EVERY DAY  Qty: 90 tablet, Refills: 3    Associated Diagnoses: Tracheomalacia; Other emphysema (HCC)      psyllium (METAMUCIL) 0 52 g capsule Take 0 52 g by mouth daily as needed       senna (SENOKOT) 8 6 MG tablet Take 2 tablets by mouth daily at bedtime      spironolactone (ALDACTONE) 25 mg tablet Take 1 tablet (25 mg total) by mouth daily  Qty: 30 tablet, Refills: 0    Associated Diagnoses: Chronic kidney disease, stage 3 (HCC)      tamsulosin (FLOMAX) 0 4 mg Take 1 capsule by mouth daily      Fesoterodine Fumarate ER (TOVIAZ) 8 MG TB24 Take 4 mg by mouth every evening        ketoconazole (NIZORAL) 2 % cream Apply topically to both feet in their entirety (tops, bottoms and between toes) 3 times a day for 4 weeks straight    Qty: 60 g, Refills: 5    Associated Diagnoses: Tinea pedis of both feet      pantoprazole (PROTONIX) 40 mg tablet Take 40 mg by mouth daily      traZODone (DESYREL) 50 mg tablet 1/2 to 1 tab @ HS PRN  Qty: 30 tablet, Refills: 2    Associated Diagnoses: Anxiety           No discharge procedures on file      PDMP Review     None          ED Provider  Electronically Signed by           Bettye Feliz MD  06/18/21 3490

## 2021-06-18 NOTE — ASSESSMENT & PLAN NOTE
Patient presenting with weakness and SOB, family suspected recurrent aspiration  Pt states he wears O2 at home PRN     Continues to be off oxygen and satting well on room air- SpO2 of 86% this morning but most likely equipment error due to >90% on oxygen afterward

## 2021-06-18 NOTE — PROGRESS NOTES
At this time, TF not ordered  Pureed with HTLs diet ordered, reflecting ST recommendations found in 6/17 ST note  100% meal completion x 1/1 meals per nursing flowsheets  Per RN report, pt is receiving 200 ml water flush q 4 h by PEG  Would recommend adding water flush to order set for consistency of care/help maintain patency of PEG  If desiring to initiate enteral feedings, would agree with colleague's recommendation of initiating Jevity 1 5 at 30 ml/hr continuous feed, increasing by 10 ml every 12 hours to goal rate of 60 ml/hr  Goal rate to provide 2160 kcal (100% estimated energy needs), 92 g PRO (100% estimated PRO needs), 1094 ml free water  Water flush of 200 ml q 4 h for total of 2294 ml fluid daily  Would then recommend calorie count to complete ongoing assessment of oral intake versus enteral nutrition needs  Will follow up

## 2021-06-18 NOTE — PLAN OF CARE
Problem: Nutrition/Hydration-ADULT  Goal: Nutrient/Hydration intake appropriate for improving, restoring or maintaining nutritional needs  Description: Monitor and assess patient's nutrition/hydration status for malnutrition  Collaborate with interdisciplinary team and initiate plan and interventions as ordered  Monitor patient's weight and dietary intake as ordered or per policy  Utilize nutrition screening tool and intervene as necessary  Determine patient's food preferences and provide high-protein, high-caloric foods as appropriate       INTERVENTIONS:  - Monitor oral intake, urinary output, labs, and treatment plans  - Assess nutrition and hydration status and recommend course of action  - Evaluate amount of meals eaten  - Assist patient with eating if necessary   - Allow adequate time for meals  - Recommend/ encourage appropriate diets, oral nutritional supplements, and vitamin/mineral supplements  - Order, calculate, and assess calorie counts as needed  - Recommend, monitor, and adjust tube feedings and TPN/PPN based on assessed needs  - Assess need for intravenous fluids  - Provide specific nutrition/hydration education as appropriate  - Include patient/family/caregiver in decisions related to nutrition  Outcome: Progressing     Problem: Prexisting or High Potential for Compromised Skin Integrity  Goal: Skin integrity is maintained or improved  Description: INTERVENTIONS:  - Identify patients at risk for skin breakdown  - Assess and monitor skin integrity  - Assess and monitor nutrition and hydration status  - Monitor labs   - Assess for incontinence   - Turn and reposition patient  - Assist with mobility/ambulation  - Relieve pressure over bony prominences  - Avoid friction and shearing  - Provide appropriate hygiene as needed including keeping skin clean and dry  - Evaluate need for skin moisturizer/barrier cream  - Collaborate with interdisciplinary team   - Patient/family teaching  - Consider wound care consult   Outcome: Progressing     Problem: MOBILITY - ADULT  Goal: Maintain or return to baseline ADL function  Description: INTERVENTIONS:  -  Assess patient's ability to carry out ADLs; assess patient's baseline for ADL function and identify physical deficits which impact ability to perform ADLs (bathing, care of mouth/teeth, toileting, grooming, dressing, etc )  - Assess/evaluate cause of self-care deficits   - Assess range of motion  - Assess patient's mobility; develop plan if impaired  - Assess patient's need for assistive devices and provide as appropriate  - Encourage maximum independence but intervene and supervise when necessary  - Involve family in performance of ADLs  - Assess for home care needs following discharge   - Consider OT consult to assist with ADL evaluation and planning for discharge  - Provide patient education as appropriate  Outcome: Progressing  Problem: PAIN - ADULT  Goal: Verbalizes/displays adequate comfort level or baseline comfort level  Description: Interventions:  - Encourage patient to monitor pain and request assistance  - Assess pain using appropriate pain scale  - Administer analgesics based on type and severity of pain and evaluate response  - Implement non-pharmacological measures as appropriate and evaluate response  - Consider cultural and social influences on pain and pain management  - Notify physician/advanced practitioner if interventions unsuccessful or patient reports new pain  Outcome: Progressing     Problem: INFECTION - ADULT  Goal: Absence or prevention of progression during hospitalization  Description: INTERVENTIONS:  - Assess and monitor for signs and symptoms of infection  - Monitor lab/diagnostic results  - Monitor all insertion sites, i e  indwelling lines, tubes, and drains  - Monitor endotracheal if appropriate and nasal secretions for changes in amount and color  - [place] appropriate cooling/warming therapies per order  - Administer medications as ordered  - Instruct and encourage patient and family to use good hand hygiene technique  - Identify and instruct in appropriate isolation precautions for identified infection/condition  Outcome: Progressing     Problem: SAFETY ADULT  Goal: Maintain or return to baseline ADL function  Description: INTERVENTIONS:  -  Assess patient's ability to carry out ADLs; assess patient's baseline for ADL function and identify physical deficits which impact ability to perform ADLs (bathing, care of mouth/teeth, toileting, grooming, dressing, etc )  - Assess/evaluate cause of self-care deficits   - Assess range of motion  - Assess patient's mobility; develop plan if impaired  - Assess patient's need for assistive devices and provide as appropriate  - Encourage maximum independence but intervene and supervise when necessary  - Involve family in performance of ADLs  - Assess for home care needs following discharge   - Consider OT consult to assist with ADL evaluation and planning for discharge  - Provide patient education as appropriate  Outcome: Progressing  Goal: Maintains/Returns to pre admission functional level  Description: INTERVENTIONS:  - Perform BMAT or MOVE assessment daily    - Set and communicate daily mobility goal to care team and patient/family/caregiver  - Collaborate with rehabilitation services on mobility goals if consulted  - Reposition patient every 2 hours    - Out of bed for toileting  - Record patient progress and toleration of activity level   Outcome: Progressing  Goal: Patient will remain free of falls  Description: INTERVENTIONS:  - Educate patient/family on patient safety including physical limitations  - Instruct patient to call for assistance with activity   - Consult OT/PT to assist with strengthening/mobility   - Keep Call bell within reach  - Keep bed low and locked with side rails adjusted as appropriate  - Keep care items and personal belongings within reach  - Initiate and maintain comfort rounds  - Make Fall Risk Sign visible to staff  - Offer Toileting every 2 Hours, in advance of need  - Initiate/Maintain bed alarm  - Obtain necessary fall risk management equipment:   - Apply yellow socks and bracelet for high fall risk patients  - Consider moving patient to room near nurses station  Outcome: Progressing     Problem: DISCHARGE PLANNING  Goal: Discharge to home or other facility with appropriate resources  Description: INTERVENTIONS:  - Identify barriers to discharge w/patient and caregiver  - Arrange for needed discharge resources and transportation as appropriate  - Identify discharge learning needs (meds, wound care, etc )  - Arrange for interpretive services to assist at discharge as needed  - Refer to Case Management Department for coordinating discharge planning if the patient needs post-hospital services based on physician/advanced practitioner order or complex needs related to functional status, cognitive ability, or social support system  Outcome: Progressing     Problem: Knowledge Deficit  Goal: Patient/family/caregiver demonstrates understanding of disease process, treatment plan, medications, and discharge instructions  Description: Complete learning assessment and assess knowledge base    Interventions:  - Provide teaching at level of understanding  - Provide teaching via preferred learning methods  Outcome: Progressing

## 2021-06-18 NOTE — ASSESSMENT & PLAN NOTE
Lab Results   Component Value Date    EGFR 74 06/18/2021    EGFR 56 06/17/2021    EGFR 59 06/16/2021    CREATININE 0 96 06/18/2021    CREATININE 1 21 06/17/2021    CREATININE 1 16 06/16/2021     Cr is at BL, 1 2   Continues to be baseline  IV fluids d/manjit

## 2021-06-18 NOTE — ASSESSMENT & PLAN NOTE
Pt presents with weakness, SOB, cough  +Sepsis criteria on admission    Suspect recurrent aspiration pneumonia despite PEG tube and careful AHF by caregivers   PEG placed in 10/2020 but only used for hydration purposes   CTA chest shows R>L bibasilar patchy opacities   Urinary antigens negative      Current IV abx patient is on are currently ceftriaxone and flagyl IV (day 3)   Transitioned to oral abx omnicef and oral flagyl for 4 more days of treatment (total of 7 days)   On discharge, patient to be on 3 more days of both omnicef and flagyl  Respiratory protocol, pulm toilet, IS  Currently on dysphagia diet and honey thick liquids as recommended by speech  Strict aspiration precautions

## 2021-06-18 NOTE — DISCHARGE INSTR - AVS FIRST PAGE
Dear Adrianna Taylor ,     It was our pleasure to care for you here at [facility], [facility]  It is our hope that we were always able to exceed the expected standards for your care during your stay  You were hospitalized due to Aspiration Pneumonia  You were cared for on the [address] floor by Neisha Magdaleno MD under the service of Garth Milner MD with the [facility] who covers for your primary care physician (PCP), Almeta Pallas, DO, while you were hospitalized  If you have any questions or concerns related to this hospitalization, you may contact us at [phone]  For follow up as well as any medication refills, we recommend that you follow up with your primary care physician  A registered nurse will reach out to you by phone within a few days after your discharge to answer any additional questions that you may have after going home  However, at this time we provide for you here, the most important instructions / recommendations at discharge:     · Notable Medication Adjustments -   · Take Omnicef (cefdinir) oral solution 300mg 2 a day by mouth for 4 more days to 6/22  · Take Flagyl (Metronidazole) tablet 500mg 3 times a day by mouth for 4 more days to 6/22; Please crush the tablet to prevent choking  · Testing Required after Discharge -   · None  · Important follow up information -   · Followup with your Dr Ania Watkins within 1 week through telemedicine/virtual visit or have a visit set up 1 week after last day of post acute rehab  · Other Instructions -   · Chew food slowly and cautiously to prevent choking  · Eat softer foods and honey thick liquids to prevent choking  · Please review this entire after visit summary as additional general instructions including medication list, appointments, activity, diet, any pertinent wound care, and other additional recommendations from your care team that may be provided for you        Sincerely,     Neisha Magdaleno MD

## 2021-06-18 NOTE — PLAN OF CARE
Problem: PHYSICAL THERAPY ADULT  Goal: Performs mobility at highest level of function for planned discharge setting  See evaluation for individualized goals  Description: Treatment/Interventions: Functional transfer training, LE strengthening/ROM, Therapeutic exercise, Endurance training, Cognitive reorientation, Patient/family training, Equipment eval/education, Bed mobility, Gait training, Spoke to case management, Spoke to nursing  Equipment Recommended: Sharmin, Wheelchair       See flowsheet documentation for full assessment, interventions and recommendations  Outcome: Not Progressing  Note: Prognosis: Fair  Problem List: Decreased strength, Decreased range of motion, Decreased endurance, Impaired balance, Decreased mobility, Decreased cognition, Decreased coordination, Impaired judgement, Impaired tone, Decreased safety awareness  Assessment: Patient agreeable to participate in therapy session however reports increased fatigue  Supine to sit with max ax1 and increased time to transition sitting EOB  Pt tolerated sitting EOB x 15 minutes with inital min ax1 for balance than progressed to close supervision  Multiple sit<>stand trials with quick move with mod ax1 for initally, requiring max ax1 for additional  Standing tolerance initally 20 seconds, additional trials less than 5 seconds  Pt incontient of bowel requiring return to supine in bed and rolling to L and R side multiple times with min ax1 and increased time  Continue to focus on bed mobility and standing tolerance/transfers with quick move as appropriate and able       Barriers to Discharge Comments: Comorbidities affecting pt's physical performance at time of assessment include:RONAL (acute kidney injury) ([facility] ) (5/31/2017), Aspiration into respiratory tract, Chest pain (5/31/2017), COPD (chronic obstructive pulmonary disease) ([facility] ), Depression, Hypertension, Lung cancer ([address] ) (2005), Pneumonia, Prostate cancer ([facility] ), Sleep apnea, Squamous cell skin cancer, Stroke ([facility] ), Tracheomalacia, Lung lobectomy; Joint replacement; Trigeminal nerve decompression  Personal factors affecting pt at time of IE include: inability to perform IADLs, inability to perform ADLs, inability to ambulate household distances and inability to navigate community distances  PT Discharge Recommendation: Post acute rehabilitation services          See flowsheet documentation for full assessment

## 2021-06-18 NOTE — ASSESSMENT & PLAN NOTE
Hx of chronic dysphagia with notable admission in 10/2020 for aspiration        Follows [facility] - prior diet puree/HTL   Continue pureed diet with honey thick liquids  Aspiration precautions

## 2021-06-18 NOTE — ASSESSMENT & PLAN NOTE
POA, tachypnea, leukocytosis w left shift, lactic acidosis   Lactate has resolved  Strep pneumo urine negative  Legionella urine negative    F/u blood cultures   Trend temps, repeat CBC in AM   IV fluids discontinued

## 2021-06-18 NOTE — ASSESSMENT & PLAN NOTE
Hx of COPD with tracheomalacia and chronic cough 2/2 dysphagia   Continue duonebs TID, pulmicort BID   Continue chronic prednisone 5 mg daily   Daily VEST therapy, RRT consulted   Follows Dr Shanta Murphy outpatient

## 2021-06-18 NOTE — ASSESSMENT & PLAN NOTE
POA, tachypnea, leukocytosis w left shift, lactic acidosis   Lactate has resolved  Strep pneumo urine negative  Legionella urine negative    Current IV abx patient is on are currently ceftriaxone and flagyl IV (day 3)   Transition to oral abx omnicef and oral flagyl for 2 more days of treatment (total of 5 days)  F/u blood cultures   Trend temps, repeat CBC in AM   IV fluids discontinued

## 2021-06-18 NOTE — ASSESSMENT & PLAN NOTE
Presents w/ increase weakness - likely 2/2 infectious process     PT/OT recommends post acute rehab  Placement to [facility]

## 2021-06-18 NOTE — ASSESSMENT & PLAN NOTE
Hx of COPD with tracheomalacia and chronic cough 2/2 dysphagia   Continue duonebs TID, pulmicort BID   Continue chronic prednisone 5 mg daily   Daily VEST therapy, RRT consulted   Follows Dr Adriel Morillo outpatient

## 2021-06-19 ENCOUNTER — NURSE TRIAGE (OUTPATIENT)
Dept: OTHER | Facility: OTHER | Age: 81
End: 2021-06-19

## 2021-06-19 VITALS
HEART RATE: 57 BPM | BODY MASS INDEX: 26.84 KG/M2 | DIASTOLIC BLOOD PRESSURE: 57 MMHG | RESPIRATION RATE: 16 BRPM | WEIGHT: 227.29 LBS | HEIGHT: 77 IN | OXYGEN SATURATION: 91 % | TEMPERATURE: 98 F | SYSTOLIC BLOOD PRESSURE: 120 MMHG

## 2021-06-19 PROBLEM — A41.9 SEPSIS (HCC): Status: RESOLVED | Noted: 2021-06-16 | Resolved: 2021-06-19

## 2021-06-19 LAB — SARS-COV-2 RNA RESP QL NAA+PROBE: NEGATIVE

## 2021-06-19 PROCEDURE — U0003 INFECTIOUS AGENT DETECTION BY NUCLEIC ACID (DNA OR RNA); SEVERE ACUTE RESPIRATORY SYNDROME CORONAVIRUS 2 (SARS-COV-2) (CORONAVIRUS DISEASE [COVID-19]), AMPLIFIED PROBE TECHNIQUE, MAKING USE OF HIGH THROUGHPUT TECHNOLOGIES AS DESCRIBED BY CMS-2020-01-R: HCPCS | Performed by: FAMILY MEDICINE

## 2021-06-19 PROCEDURE — 94669 MECHANICAL CHEST WALL OSCILL: CPT

## 2021-06-19 PROCEDURE — 99239 HOSP IP/OBS DSCHRG MGMT >30: CPT | Performed by: INTERNAL MEDICINE

## 2021-06-19 PROCEDURE — U0005 INFEC AGEN DETEC AMPLI PROBE: HCPCS | Performed by: FAMILY MEDICINE

## 2021-06-19 PROCEDURE — 94640 AIRWAY INHALATION TREATMENT: CPT

## 2021-06-19 PROCEDURE — 94760 N-INVAS EAR/PLS OXIMETRY 1: CPT

## 2021-06-19 RX ORDER — CEFDINIR 250 MG/5ML
300 POWDER, FOR SUSPENSION ORAL EVERY 12 HOURS SCHEDULED
Qty: 36 ML | Refills: 0 | Status: SHIPPED | OUTPATIENT
Start: 2021-06-19 | End: 2021-06-22

## 2021-06-19 RX ORDER — LORAZEPAM 0.5 MG/1
0.25 TABLET ORAL EVERY 8 HOURS PRN
Qty: 30 TABLET | Refills: 0 | Status: SHIPPED | OUTPATIENT
Start: 2021-06-19

## 2021-06-19 RX ORDER — METRONIDAZOLE 500 MG/1
500 TABLET ORAL EVERY 8 HOURS SCHEDULED
Qty: 9 TABLET | Refills: 0 | Status: SHIPPED | OUTPATIENT
Start: 2021-06-19 | End: 2021-06-22

## 2021-06-19 RX ADMIN — PREDNISONE 5 MG: 5 TABLET ORAL at 08:07

## 2021-06-19 RX ADMIN — CEFDINIR 300 MG: 250 POWDER, FOR SUSPENSION ORAL at 08:08

## 2021-06-19 RX ADMIN — LEVALBUTEROL HYDROCHLORIDE 1.25 MG: 1.25 SOLUTION, CONCENTRATE RESPIRATORY (INHALATION) at 07:16

## 2021-06-19 RX ADMIN — CARVEDILOL 12.5 MG: 12.5 TABLET, FILM COATED ORAL at 08:07

## 2021-06-19 RX ADMIN — PAROXETINE HYDROCHLORIDE 20 MG: 20 TABLET, FILM COATED ORAL at 08:08

## 2021-06-19 RX ADMIN — PANTOPRAZOLE SODIUM 40 MG: 40 TABLET, DELAYED RELEASE ORAL at 05:47

## 2021-06-19 RX ADMIN — HEPARIN SODIUM 5000 UNITS: 5000 INJECTION INTRAVENOUS; SUBCUTANEOUS at 05:47

## 2021-06-19 RX ADMIN — AMLODIPINE BESYLATE 10 MG: 10 TABLET ORAL at 08:08

## 2021-06-19 RX ADMIN — CLOPIDOGREL BISULFATE 75 MG: 75 TABLET ORAL at 08:07

## 2021-06-19 RX ADMIN — BUDESONIDE 0.5 MG: 0.5 INHALANT ORAL at 07:16

## 2021-06-19 RX ADMIN — METRONIDAZOLE 500 MG: 500 TABLET ORAL at 05:47

## 2021-06-19 RX ADMIN — IPRATROPIUM BROMIDE 0.5 MG: 0.5 SOLUTION RESPIRATORY (INHALATION) at 07:16

## 2021-06-19 NOTE — TELEPHONE ENCOUNTER
Nurse needs to verify med orders on new admission  Nurse informed to call back in 45 minutes if she does not hear from on call provider

## 2021-06-19 NOTE — TELEPHONE ENCOUNTER
Reason for Disposition   [1] Caller has URGENT medication question about med that PCP or specialist prescribed AND [2] triager unable to answer question    Answer Assessment - Initial Assessment Questions  1  NAME of MEDICATION: "What medicine are you calling about?"      Cefdinir    2  QUESTION: "What is your question?"      Pharmacy states they need to change medication from 250mg/5mL liquid to 300 mg capsule if possible due to a shortage    3  PRESCRIBING HCP: "Who prescribed it?" Reason: if prescribed by specialist, call should be referred to that group  Dr Celina Maguire        4  SYMPTOMS: "Do you have any symptoms?"      Did not discuss    5   SEVERITY: If symptoms are present, ask "Are they mild, moderate or severe?"      N/A    Protocols used: MEDICATION QUESTION CALL-ADULT-

## 2021-06-21 ENCOUNTER — NURSING HOME VISIT (OUTPATIENT)
Dept: GERIATRICS | Age: 81
End: 2021-06-21
Payer: MEDICARE

## 2021-06-21 VITALS
RESPIRATION RATE: 18 BRPM | WEIGHT: 192.7 LBS | OXYGEN SATURATION: 98 % | DIASTOLIC BLOOD PRESSURE: 64 MMHG | BODY MASS INDEX: 22.85 KG/M2 | TEMPERATURE: 97.8 F | SYSTOLIC BLOOD PRESSURE: 107 MMHG | HEART RATE: 80 BPM

## 2021-06-21 DIAGNOSIS — J69.0 ASPIRATION PNEUMONIA OF BOTH LUNGS, UNSPECIFIED ASPIRATION PNEUMONIA TYPE, UNSPECIFIED PART OF LUNG (HCC): Primary | ICD-10-CM

## 2021-06-21 DIAGNOSIS — J41.1 MUCOPURULENT CHRONIC BRONCHITIS (HCC): ICD-10-CM

## 2021-06-21 DIAGNOSIS — R06.02 SHORTNESS OF BREATH: ICD-10-CM

## 2021-06-21 DIAGNOSIS — L89.213 PRESSURE INJURY OF RIGHT HIP, STAGE 3 (HCC): ICD-10-CM

## 2021-06-21 DIAGNOSIS — L89.216 PRESSURE INJURY OF DEEP TISSUE OF RIGHT HIP: ICD-10-CM

## 2021-06-21 DIAGNOSIS — I10 ESSENTIAL HYPERTENSION: Chronic | ICD-10-CM

## 2021-06-21 LAB
BACTERIA BLD CULT: NORMAL
BACTERIA BLD CULT: NORMAL

## 2021-06-21 PROCEDURE — 99309 SBSQ NF CARE MODERATE MDM 30: CPT | Performed by: NURSE PRACTITIONER

## 2021-06-21 NOTE — ASSESSMENT & PLAN NOTE
· Shortness of breath at rest  · Currently on 2L O2 via NC  · Scheduled nebulizer treatments  · Long term use of prednisone  · Will continue to monitor for increased shortness of breath

## 2021-06-21 NOTE — ASSESSMENT & PLAN NOTE
· Shortness of breath at rest  · Currently on 2L O2 NC  · Continue to encourage nasal breathing  · Will continue to monitor for increased shortness of breath

## 2021-06-21 NOTE — ASSESSMENT & PLAN NOTE
· Improving  · Shortness of breath at rest  · Currently finishing course of Cefdinir and flagyl  · Currently on 2L O2 via NC  · Scheduled nebulizer treatments  · Right lung sounds diminished  · Will continue to monitor for increased shortness of breath

## 2021-06-21 NOTE — PROGRESS NOTES
[facility]  [address]  [phone]  [place] [facility]  STR Progress Note        NAME: Arlene Sanchez  AGE: 79 y o  SEX: male    DATE OF ENCOUNTER: 6/29/2021    Assessment and Plan     1  Aspiration pneumonia of both lungs, unspecified aspiration pneumonia type, unspecified part of lung ([address] )  Assessment & Plan:  · Improving  · Shortness of breath at rest  · Currently finishing course of Cefdinir and flagyl  · Currently on 2L O2 via NC  · Scheduled nebulizer treatments  · Right lung sounds diminished  · Will continue to monitor for increased shortness of breath      2  Mucopurulent chronic bronchitis (HCC)  Assessment & Plan:  · Shortness of breath at rest  · Currently on 2L O2 via NC  · Scheduled nebulizer treatments  · Long term use of prednisone  · Will continue to monitor for increased shortness of breath      3  Essential hypertension  Assessment & Plan:  · Doing well  · Continue with Amlodipine, coreg, spironlactone  · Will continue BP      4  Pressure injury of right hip, stage 3 ([address] )    5  Shortness of breath  Assessment & Plan:  · Shortness of breath at rest  · Currently on 2L O2 NC  · Continue to encourage nasal breathing  · Will continue to monitor for increased shortness of breath      6  Pressure injury of deep tissue of right hip  Assessment & Plan:  · Slow healing  · Continue with wound care  · Continue with pressure relief measures  · Monitor for s/s of infection        No orders of the defined types were placed in this encounter  History of Present Illness     Arlene Sanchez  79 y o  male seen for follow-up of care and treatment plan for ambulatory dysfunction doing well at STR, aspiration pneumonia improving, COPD at baseline, HTN doing well on BP meds, right hip pressure injury slow healing with wound care, shortness of breath controlled with O2 NC       The following portions of the patient's history were reviewed and updated as appropriate: allergies, current medications, past family history, past medical history, past social history, past surgical history and problem list     Review of Systems     Review of Systems   Constitutional: Positive for fatigue  Negative for chills and fever  HENT: Negative for ear pain and sore throat  Eyes: Negative for pain and visual disturbance  Respiratory: Positive for shortness of breath  Negative for cough  Cardiovascular: Negative for chest pain and palpitations  Gastrointestinal: Negative for abdominal pain and vomiting  Genitourinary: Negative for dysuria and hematuria  Musculoskeletal: Positive for gait problem  Negative for arthralgias and back pain  Skin: Negative for color change and rash  Neurological: Positive for weakness  Negative for seizures and syncope  All other systems reviewed and are negative        Active Problem List     Patient Active Problem List   Diagnosis    Hypertension    [facility]    Oropharyngeal dysphagia    History of cerebrovascular accident (CVA) with residual deficit - dysphagia and left sided weakness    Shortness of breath    GERD (gastroesophageal reflux disease)    Hemiparesis affecting left side as late effect of cerebrovascular accident (CVA) ([facility] )    Chronic kidney disease, stage 3 ([facility] )    Prostate cancer ([facility] )    Hyperlipidemia    Major depressive disorder with single episode, in full remission ([facility] )    Tracheomalacia    Ambulatory dysfunction    Cough    BPH (benign prostatic hyperplasia)    LIAM (obstructive sleep apnea)    Bacteriuria    Thrombocytopenia (HCC)    Weight loss    COPD (chronic obstructive pulmonary disease) (HCC)    Abnormal CT of the chest    Mild dementia ([facility] )    H/O fall    Constipation    Polypharmacy    Anxiety    Neutrophilic leukocytosis    Peripheral vascular disease, unspecified ([facility] )    Palliative care patient    Goals of care, counseling/discussion    Frailty    Wheelchair dependence  Acute on chronic respiratory failure with hypoxia (HCC)    Hypoxia    Fatigue    Bilateral pneumonia    Medication management    Pressure injury of deep tissue       Objective     /64   Pulse 80   Temp 97 8 °F (36 6 °C)   Resp 18   Wt 87 4 kg (192 lb 11 2 oz)   SpO2 98%   BMI 22 85 kg/m²     Physical Exam  Vitals reviewed  Constitutional:       Appearance: He is ill-appearing  HENT:      Head: Normocephalic and atraumatic  Eyes:      Conjunctiva/sclera: Conjunctivae normal    Cardiovascular:      Rate and Rhythm: Normal rate and regular rhythm  Heart sounds: Normal heart sounds, S1 normal and S2 normal  No murmur heard  Pulmonary:      Effort: Pulmonary effort is normal  Tachypnea (Shortness of breath) present  No respiratory distress  Breath sounds: Examination of the right-upper field reveals decreased breath sounds  Examination of the right-middle field reveals decreased breath sounds  Examination of the right-lower field reveals decreased breath sounds  Decreased breath sounds present  No wheezing  Abdominal:      General: Bowel sounds are normal  There is no distension  Palpations: Abdomen is soft  Tenderness: There is no abdominal tenderness  Musculoskeletal:         General: Normal range of motion  Cervical back: Normal range of motion  Skin:     General: Skin is warm and dry  Neurological:      Mental Status: He is oriented to person, place, and time  He is lethargic  Motor: Weakness (Chronic left sided weakness and facial droop ) present  Gait: Gait abnormal    Psychiatric:         Speech: Speech normal          Behavior: Behavior normal          Thought Content: Thought content normal          Pertinent Laboratory/Diagnostic Studies:  Hospital Labs Reviewed    Current Medications   Medication list reviewed and updated today   Please see paper chart for updated medication list      Pamela Reinoso  6/29/202111:18 AM      Name: Wil Connelly  : 1940  MRN: [id number]  DOS: 2021    Diagnoses:   Diagnosis ICD-10-CM Associated Orders   1  Aspiration pneumonia of both lungs, unspecified aspiration pneumonia type, unspecified part of lung ([facility] )  J69 0    2  Mucopurulent chronic bronchitis ([facility] )  J41 1    3  Essential hypertension  I10    4  Pressure injury of right hip, stage 3 ([facility] )  L89 213    5  Shortness of breath  R06 02    6   Pressure injury of deep tissue of right hip  L89 216

## 2021-06-21 NOTE — ASSESSMENT & PLAN NOTE
· Slow healing  · Continue with wound care  · Continue with pressure relief measures  · Monitor for s/s of infection

## 2021-06-22 ENCOUNTER — NURSING HOME VISIT (OUTPATIENT)
Dept: GERIATRICS | Facility: OTHER | Age: 81
End: 2021-06-22
Payer: MEDICARE

## 2021-06-22 DIAGNOSIS — R93.89 ABNORMAL CT OF THE CHEST: ICD-10-CM

## 2021-06-22 DIAGNOSIS — J41.1 MUCOPURULENT CHRONIC BRONCHITIS (HCC): ICD-10-CM

## 2021-06-22 DIAGNOSIS — I10 HYPERTENSION, UNSPECIFIED TYPE: ICD-10-CM

## 2021-06-22 DIAGNOSIS — J96.21 ACUTE ON CHRONIC RESPIRATORY FAILURE WITH HYPOXIA (HCC): ICD-10-CM

## 2021-06-22 DIAGNOSIS — J69.0 ASPIRATION PNEUMONIA OF BOTH LUNGS, UNSPECIFIED ASPIRATION PNEUMONIA TYPE, UNSPECIFIED PART OF LUNG (HCC): Primary | ICD-10-CM

## 2021-06-22 DIAGNOSIS — I69.30 HISTORY OF CEREBROVASCULAR ACCIDENT (CVA) WITH RESIDUAL DEFICIT: ICD-10-CM

## 2021-06-22 DIAGNOSIS — N40.1 BENIGN PROSTATIC HYPERPLASIA WITH LOWER URINARY TRACT SYMPTOMS, SYMPTOM DETAILS UNSPECIFIED: Chronic | ICD-10-CM

## 2021-06-22 DIAGNOSIS — I69.354 HEMIPARESIS AFFECTING LEFT SIDE AS LATE EFFECT OF CEREBROVASCULAR ACCIDENT (CVA) (HCC): ICD-10-CM

## 2021-06-22 DIAGNOSIS — E78.2 MIXED HYPERLIPIDEMIA: Chronic | ICD-10-CM

## 2021-06-22 DIAGNOSIS — Z71.89 GOALS OF CARE, COUNSELING/DISCUSSION: ICD-10-CM

## 2021-06-22 DIAGNOSIS — R26.2 AMBULATORY DYSFUNCTION: Chronic | ICD-10-CM

## 2021-06-22 PROCEDURE — 99306 1ST NF CARE HIGH MDM 50: CPT | Performed by: INTERNAL MEDICINE

## 2021-06-23 PROBLEM — Z79.899 MEDICATION MANAGEMENT: Status: ACTIVE | Noted: 2021-06-23

## 2021-06-23 PROBLEM — I69.354 HEMIPARESIS AFFECTING LEFT SIDE AS LATE EFFECT OF CEREBROVASCULAR ACCIDENT (CVA) (HCC): Status: ACTIVE | Noted: 2017-04-11

## 2021-06-23 PROBLEM — J96.21 ACUTE ON CHRONIC RESPIRATORY FAILURE WITH HYPOXIA (HCC): Status: ACTIVE | Noted: 2020-09-27

## 2021-06-23 PROBLEM — R06.89 ACUTE RESPIRATORY INSUFFICIENCY: Status: RESOLVED | Noted: 2021-06-16 | Resolved: 2021-06-23

## 2021-06-23 NOTE — ASSESSMENT & PLAN NOTE
· Will continue his outpatient regimen of DuoNeb, prednisone, Pulmicort, and as needed oxygen therapy   · Will continue to monitor for change in his condition   · Will check with rehabilitation facility to see if his able to use his home respiratory vest, per daughter's request   · She reports that he uses the vest twice daily for 30 minutes at 60%  · He will follow up with his PCP upon discharge

## 2021-06-23 NOTE — ASSESSMENT & PLAN NOTE
· Will continue his outpatient atorvastatin   · He will follow up with his PCP upon discharge Manual Repair Warning Statement: We plan on removing the manually selected variable below in favor of our much easier automatic structured text blocks found in the previous tab. We decided to do this to help make the flow better and give you the full power of structured data. Manual selection is never going to be ideal in our platform and I would encourage you to avoid using manual selection from this point on, especially since I will be sunsetting this feature. It is important that you do one of two things with the customized text below. First, you can save all of the text in a word file so you can have it for future reference. Second, transfer the text to the appropriate area in the Library tab. Lastly, if there is a flap or graft type which we do not have you need to let us know right away so I can add it in before the variable is hidden. No need to panic, we plan to give you roughly 6 months to make the change.

## 2021-06-23 NOTE — PROGRESS NOTES
[address]  [address] [place] [address]  Facility:   [facility]  31     HISTORY AND PHYSICAL    NAME: Carmel Patel  AGE: 79 y o  SEX: male    DATE OF ENCOUNTER: 6/22/2021    Code status:  CPR    Assessment and Plan     1  Aspiration pneumonia of both lungs, unspecified aspiration pneumonia type, unspecified part of lung ([address] )  Assessment & Plan:  · Improving after hospitalization and finishing course of antibiotics   · Will continue outpatient regimen for treatment of his chronic bronchitis  · Will continue with as needed supplemental oxygen  · Will consult OT/PT/SLP for evaluation and treatment to assist him in returning to his prior level of functioning  · Will continue with monitoring for change in his condition      2  Acute on chronic respiratory failure with hypoxia (HCC)  Assessment & Plan:  · Improving with treatment of his aspiration pneumonia  · Will continue with his outpatient regimen for his chronic bronchitis with hypoxia  · Will continue with as needed supplemental oxygen   · As discussed with his daughter, will see if he is able to use his home respiratory vest at the rehabilitation facility   · Will consult PT/OT for evaluation and treatment to assist him in returning to his prior level of functioning   · Will continue with monitoring for change in his condition      3  Mucopurulent chronic bronchitis ([address] )  Assessment & Plan:  · Will continue his outpatient regimen of DuoNeb, prednisone, Pulmicort, and as needed oxygen therapy   · Will continue to monitor for change in his condition   · Will check with rehabilitation facility to see if his able to use his home respiratory vest, per daughter's request   · She reports that he uses the vest twice daily for 30 minutes at 60%  · He will follow up with his PCP upon discharge      4   Hypertension, unspecified type  Assessment & Plan:  · Will continue his outpatient regimen of amlodipine and carvedilol  · Will continue with monitoring for change in his condition   · He will follow up with his PCP upon discharge      5  History of cerebrovascular accident (CVA) with residual deficit - dysphagia and left sided weakness  Assessment & Plan:  · Will continue with his secondary stroke prevention of aspirin, clopidogrel, and atorvastatin  · Will continue with care/support of his ADLs while at the rehabilitation facility  · Will consult PT/OT/SLP services for evaluation and treatment to assist him in returning to his prior level of functioning   · He will follow up with his PCP upon discharge      6  Hemiparesis affecting left side as late effect of cerebrovascular accident (CVA) ([facility] )  Assessment & Plan:  · Will continue his secondary stroke prevention of aspirin, atorvastatin and clopidogrel   · Will continue with care/support of his ADLs at the rehabilitation facility  · Will consult PT/OT/SLP for evaluation and treatment to assist him in returning to his prior level function  · Will continue to monitor for change in his condition  · He will follow up with his PCP upon discharge      7  Abnormal CT of the chest  Assessment & Plan:  · Reviewed results of CT of chest from Sarah 15, 2021 with daughter   · Advised that she follow-up with her father's PCP after his discharge from the rehabilitation facility   · Will continue with monitoring for change in his condition   · He will follow up with his PCP upon discharge      8  Ambulatory dysfunction  Assessment & Plan:  · Secondary to his acute on chronic medical conditions  · Will consult PT/OT for evaluation and treatment to assist him in returning to his prior level of functioning  · Will continue to monitor for change in his condition   · He will follow up with his PCP upon discharge      9   Benign prostatic hyperplasia with lower urinary tract symptoms, symptom details unspecified  Assessment & Plan:  · Will continue his outpatient regimen of tamsulosin  · Will continue with monitoring for change in his condition   · He will follow up with his PCP upon discharge      10  Mixed hyperlipidemia  Assessment & Plan:  · Will continue his outpatient atorvastatin   · He will follow up with his PCP upon discharge      11  Goals of care, counseling/discussion  Assessment & Plan:  ·  His resuscitation status is "CPR"        All medications and routine orders were reviewed and updated as needed  Plan discussed with:  Nursing staff and daughter, Jacoby Phillips  CC: PCP    Chief Complaint     He is seen for a visit to perform a history and physical exam to be admitted to the rehabilitation facility  History of Present Illness     History is obtained from patient interview, review of medical records, and phone interview of his daughter, Jacoby Phillips  He is a pleasant 79-year-old gentleman with the comorbidities of chronic bronchitis with hypoxia, hypertension, history of CVA with left-sided hemiparesis and dysphagia, BPH with LUTS, hyperlipidemia, and recent hospitalization for aspiration pneumonia who is seen for a visit to perform a history and physical exam to be admitted to the rehabilitation facility  Per his daughter, he had been living with her prior to the COVID-19 pandemic  However, he moved back home with a live-in 24/7 aide  He was brought to the emergency room on Sarah 15, 2021 after his aide found him to be lethargic and his pulse oximetry reading was lower than normal   Evaluation in the emergency room revealed bilateral lower lobe infiltrates consistent with aspiration pneumonia  He was admitted to the acute care hospital from Sarah 15, 2021 through June 19, 2021  His condition improved with antibiotics  PT evaluation recommended a post acute care rehabilitation stay prior to returning home  He was felt stable to be transferred to the rehabilitation facility on June 19, 2021  When asked, he has no specific complaints or concerns for me during the visit    Nursing endorses having no complaints for me during the visit  HISTORY:  Past Medical History:   Diagnosis Date    RONAL (acute kidney injury) ([address] ) 5/31/2017    Aspiration into respiratory tract     Chest pain 5/31/2017    COPD (chronic obstructive pulmonary disease) (HCC)     Depression     Elevated troponin 5/31/2017    GERD (gastroesophageal reflux disease)     History of CVA (cerebrovascular accident) 4/13/2016    Hyperlipidemia     Hypertension     Lung cancer ([place] [address] ) 2005    Right, status post lobectomy    Pneumonia     Prostate cancer ([place] [address] )     Sleep apnea     awaiting sleep study results    Squamous cell skin cancer     Stroke ([place] [address] )     Tracheomalacia     Weakness due to cerebrovascular accident (CVA)      Past Surgical History:   Procedure Laterality Date    COLONOSCOPY      CT GUIDED FINE NEEDLE ASPIRATION (ALL INC)  10/30/2020    ESOPHAGOGASTRODUODENOSCOPY N/A 4/13/2016    Procedure: ESOPHAGOGASTRODUODENOSCOPY (EGD); Surgeon: Renee Vizcarra MD;  [facility]; Service:     IR GASTROSTOMY (G) TUBE CHECK/CHANGE/REINSERTION/UPSIZE  4/30/2021    JOINT REPLACEMENT      knee replacement    LUNG LOBECTOMY      [place] ESOPHAGOGASTRODUODENOSCOPY TRANSORAL DIAGNOSTIC N/A 3/15/2017    Procedure: ESOPHAGOGASTRODUODENOSCOPY (EGD); Surgeon: Renee Vizcarra MD;  [facility]; Service: Gastroenterology    SKIN BIOPSY      TRIGEMINAL NERVE DECOMPRESSION       Family History   Problem Relation Age of Onset    Cancer Mother     Lymphoma Father     Diabetes Maternal Grandmother      Social History     Socioeconomic History    Marital status:       Spouse name: Not on file    Number of children: 2    Years of education: Not on file    Highest education level: Not on file   Occupational History    Not on file   Tobacco Use    Smoking status: Former Smoker     Packs/day: 1 00     Years: 22 00     Pack years: 22 00     Types: Cigarettes     Start date: 5     Quit date: 1977 Years since quittin 6    Smokeless tobacco: Never Used   Vaping Use    Vaping Use: Never used   Substance and Sexual Activity    Alcohol use: Not Currently    Drug use: Not Currently    Sexual activity: Not Currently   Other Topics Concern    Not on file   Social History Narrative    He lives with his daughter, Jeremy Walsh who is an RN  Other people in the home are his son-in-law and 2 grandkids  He has a make shift "in-law suite"    He uses a wheelchair to get around    He has a private pay home health aide 20 hours per week    He watches a lot of TV     Social Determinants of Health     Financial Resource Strain:     Difficulty of Paying Living Expenses:    Food Insecurity:     Worried About Running Out of Food in the Last Year:     [address] in the Last Year:    Transportation Needs:     Lack of Transportation (Medical):  Lack of Transportation (Non-Medical):    Physical Activity:     Days of Exercise per Week:     Minutes of Exercise per Session:    Stress:     Feeling of Stress :    Social Connections:     Frequency of Communication with Friends and Family:     Frequency of Social Gatherings with Friends and Family:     Attends Faith Services:     Active Member of Clubs or Organizations:     Attends Club or Organization Meetings:     Marital Status:    Intimate Partner Violence:     Fear of Current or Ex-Partner:     Emotionally Abused:     Physically Abused:     Sexually Abused: Allergies: Allergies   Allergen Reactions    Penicillins Rash       Review of Systems     Review of Systems   Constitutional: Negative for fever  HENT: Positive for dental problem (Edentulous and awaiting delivery of his upper and lower dentures)  Eyes: Negative for visual disturbance  Respiratory: Positive for cough  Negative for shortness of breath  Cardiovascular: Negative for chest pain  Gastrointestinal: Negative for abdominal pain and constipation     Genitourinary: Negative for difficulty urinating  Musculoskeletal: Positive for arthralgias (knees)  Skin: Negative for rash  Neurological: Negative for headaches  Psychiatric/Behavioral: Negative for dysphoric mood and sleep disturbance  The patient is not nervous/anxious  Medications and orders     All medications reviewed and updated in Nursing Home EMR  Objective     Vitals:  Weight 193 lb, temperature 97 2° F, pulse 60, blood pressure 147/78, pulse oximetry 95% on supplemental oxygen  Physical Exam  Vitals reviewed  Constitutional:       General: He is awake  He is not in acute distress  Appearance: He is well-developed  He is not toxic-appearing or diaphoretic  Comments: He appears comfortable lying in bed, his stated age, chronically ill, and frail  HENT:      Mouth/Throat:      Mouth: Mucous membranes are moist    Eyes:      General: No scleral icterus  Extraocular Movements: Extraocular movements intact  Conjunctiva/sclera: Conjunctivae normal    Cardiovascular:      Rate and Rhythm: Normal rate and regular rhythm  Heart sounds: No murmur heard  No friction rub  No gallop  Comments: There is no pretibial edema, bilaterally  Pulmonary:      Effort: Pulmonary effort is normal  No respiratory distress  Breath sounds: Normal breath sounds  No stridor  No wheezing or rhonchi  Abdominal:      General: Abdomen is flat  There is no distension  Palpations: Abdomen is soft  There is no mass  Tenderness: There is no abdominal tenderness  There is no guarding or rebound  Musculoskeletal:         General: No signs of injury  Cervical back: Neck supple  Skin:     Findings: Bruising (Bilateral arms from hospital stay per patient) present  Neurological:      Mental Status: He is alert and oriented to person, place, and time  Psychiatric:         Mood and Affect: Mood normal          Behavior: Behavior normal  Behavior is cooperative         Pertinent Laboratory/Diagnostic Studies: The following labs/studies were reviewed please see chart or hospital paperwork for details  Lab Results   Component Value Date    WBC 9 45 06/18/2021    HGB 12 6 06/18/2021    HCT 38 2 06/18/2021    MCV 90 06/18/2021     (L) 06/18/2021       Lab Results   Component Value Date    SODIUM 136 06/18/2021    K 3 7 06/18/2021     06/18/2021    CO2 24 06/18/2021    BUN 23 06/18/2021    CREATININE 0 96 06/18/2021    GLUC 94 06/18/2021    CALCIUM 8 9 06/18/2021       Lab Results   Component Value Date    ALT 15 06/15/2021    AST 11 06/15/2021    ALKPHOS 87 06/15/2021       Lab Results   Component Value Date    UOO7TSNOVYFL 0 641 02/26/2020     Sarah 15, 2021:     CTA of chest PE study:     FINDINGS:     PULMONARY ARTERIAL TREE:  No pulmonary embolus is seen       LUNGS: Right upper lobectomy  Patchy opacities within bilateral lower lobes right greater than left compatible with pneumonia in appropriate clinical setting  In addition, there are small nodular opacities within the right lower lobe at the apex  Small secretions are seen within the upper trachea      PLEURA:  Trace right pleural effusion      HEART/GREAT VESSELS:  Unremarkable for patient's age      MEDIASTINUM AND DALIA:  Right hilar lymph nodes measure up to 1 3 cm in short axis (series 2, image 118), previously 1 0 cm  Subcarinal lymph node measures 1 7 cm in short axis (series 2, image 130), previously 1 3 cm      CHEST WALL AND LOWER NECK:   Unremarkable      VISUALIZED STRUCTURES IN THE UPPER ABDOMEN:  Unremarkable      OSSEOUS STRUCTURES:  No acute fracture or destructive osseous lesion  Right posterior rib deformities compatible with prior thoracotomy  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

## 2021-06-23 NOTE — ASSESSMENT & PLAN NOTE
· Will continue his outpatient regimen of amlodipine and carvedilol  · Will continue with monitoring for change in his condition   · He will follow up with his PCP upon discharge

## 2021-06-23 NOTE — ASSESSMENT & PLAN NOTE
· Improving with treatment of his aspiration pneumonia  · Will continue with his outpatient regimen for his chronic bronchitis with hypoxia  · Will continue with as needed supplemental oxygen   · As discussed with his daughter, will see if he is able to use his home respiratory vest at the rehabilitation facility   · Will consult PT/OT for evaluation and treatment to assist him in returning to his prior level of functioning   · Will continue with monitoring for change in his condition

## 2021-06-23 NOTE — ASSESSMENT & PLAN NOTE
· Will continue his outpatient regimen of tamsulosin  · Will continue with monitoring for change in his condition   · He will follow up with his PCP upon discharge

## 2021-06-23 NOTE — ASSESSMENT & PLAN NOTE
· Will continue his secondary stroke prevention of aspirin, atorvastatin and clopidogrel   · Will continue with care/support of his ADLs at the rehabilitation facility  · Will consult PT/OT/SLP for evaluation and treatment to assist him in returning to his prior level function  · Will continue to monitor for change in his condition  · He will follow up with his PCP upon discharge

## 2021-06-23 NOTE — ASSESSMENT & PLAN NOTE
· Secondary to his acute on chronic medical conditions  · Will consult PT/OT for evaluation and treatment to assist him in returning to his prior level of functioning  · Will continue to monitor for change in his condition   · He will follow up with his PCP upon discharge

## 2021-06-23 NOTE — ASSESSMENT & PLAN NOTE
· Reviewed results of CT of chest from Sarah 15, 2021 with daughter   · Advised that she follow-up with her father's PCP after his discharge from the rehabilitation facility   · Will continue with monitoring for change in his condition   · He will follow up with his PCP upon discharge

## 2021-06-23 NOTE — ASSESSMENT & PLAN NOTE
· Will continue with his secondary stroke prevention of aspirin, clopidogrel, and atorvastatin  · Will continue with care/support of his ADLs while at the rehabilitation facility  · Will consult PT/OT/SLP services for evaluation and treatment to assist him in returning to his prior level of functioning   · He will follow up with his PCP upon discharge

## 2021-06-23 NOTE — ASSESSMENT & PLAN NOTE
· Improving after hospitalization and finishing course of antibiotics   · Will continue outpatient regimen for treatment of his chronic bronchitis  · Will continue with as needed supplemental oxygen  · Will consult OT/PT/SLP for evaluation and treatment to assist him in returning to his prior level of functioning  · Will continue with monitoring for change in his condition

## 2021-06-28 ENCOUNTER — NURSING HOME VISIT (OUTPATIENT)
Dept: GERIATRICS | Facility: OTHER | Age: 81
End: 2021-06-28
Payer: MEDICARE

## 2021-06-28 VITALS
BODY MASS INDEX: 22.17 KG/M2 | TEMPERATURE: 97.7 F | DIASTOLIC BLOOD PRESSURE: 68 MMHG | WEIGHT: 187 LBS | RESPIRATION RATE: 20 BRPM | SYSTOLIC BLOOD PRESSURE: 134 MMHG | HEART RATE: 66 BPM

## 2021-06-28 DIAGNOSIS — K59.00 CONSTIPATION, UNSPECIFIED CONSTIPATION TYPE: Primary | ICD-10-CM

## 2021-06-28 DIAGNOSIS — R26.2 AMBULATORY DYSFUNCTION: Chronic | ICD-10-CM

## 2021-06-28 PROCEDURE — 99308 SBSQ NF CARE LOW MDM 20: CPT | Performed by: NURSE PRACTITIONER

## 2021-06-28 NOTE — ASSESSMENT & PLAN NOTE
· Doing well at short-term rehab  · Continue with start of Care   · Assist with ADL and I ADL   · Fall precautions  · Continue PT/OT

## 2021-06-28 NOTE — ASSESSMENT & PLAN NOTE
·  Patient states he has not had a bowel movement for 2 days   · States at home he uses milk of magnesia  · Will order milk of magnesia b i d  p r n   15 mL  · May have 1st dose today  · Monitor for loose stool

## 2021-06-28 NOTE — PROGRESS NOTES
[facility]  [address]  [phone]  [facility]  STR Acute Note        NAME: Arlene Sanchez  AGE: 79 y o  SEX: male    DATE OF ENCOUNTER: 6/28/2021    Assessment and Plan     1  Constipation, unspecified constipation type  Assessment & Plan:  ·  Patient states he has not had a bowel movement for 2 days   · States at home he uses milk of magnesia  · Will order milk of magnesia b i d  p r n  15 mL  · May have 1st dose today  · Monitor for loose stool      2  Ambulatory dysfunction  Assessment & Plan:  · Doing well at short-term rehab  · Continue with start of Care   · Assist with ADL and I ADL   · Fall precautions  · Continue PT/OT          No orders of the defined types were placed in this encounter  History of Present Illness     Arlene Sanchez  79 y o  male seen for follow-up of care and treatment plan for ambulatory dysfunction doing well at Short-term rehab, constipation controlled with milk of magnesia    The following portions of the patient's history were reviewed and updated as appropriate: allergies, current medications, past family history, past medical history, past social history, past surgical history and problem list     Review of Systems     Review of Systems   Constitutional: Negative for chills and fever  HENT: Negative for ear pain and sore throat  Eyes: Negative for pain and visual disturbance  Respiratory: Negative for cough and shortness of breath  Cardiovascular: Negative for chest pain and palpitations  Gastrointestinal: Positive for constipation  Negative for abdominal pain and vomiting  Genitourinary: Negative for dysuria and hematuria  Musculoskeletal: Positive for gait problem  Negative for arthralgias and back pain  Skin: Negative for color change and rash  Neurological: Positive for weakness  Negative for seizures and syncope  All other systems reviewed and are negative        Active Problem List     Patient Active Problem List   Diagnosis    Hypertension    Lung cancer ([place] [address] )    Oropharyngeal dysphagia    History of cerebrovascular accident (CVA) with residual deficit - dysphagia and left sided weakness    Shortness of breath    GERD (gastroesophageal reflux disease)    Hemiparesis affecting left side as late effect of cerebrovascular accident (CVA) ([place] [address] )    Chronic kidney disease, stage 3 ([place] [address] )    Prostate cancer ([place] [address] )    Hyperlipidemia    Major depressive disorder with single episode, in full remission ([place] [address] )    Tracheomalacia    Ambulatory dysfunction    Cough    BPH (benign prostatic hyperplasia)    LIAM (obstructive sleep apnea)    Bacteriuria    Thrombocytopenia (HCC)    Weight loss    [facility]) [facility])    Abnormal CT of the chest    Mild dementia ([place] [address] )    H/O fall    Constipation    Polypharmacy    Anxiety    Neutrophilic leukocytosis    Peripheral vascular disease, unspecified ([place] [address] )    Palliative care patient    Goals of care, counseling/discussion    Frailty    Wheelchair dependence    Acute on chronic respiratory failure with hypoxia ([facility])    Pressure injury of right hip, stage 3 ([facility])    Hypoxia    Fatigue    Bilateral pneumonia    Medication management       Objective     /68   Pulse 66   Temp 97 7 °F (36 5 °C)   Resp 20   Wt 84 8 kg (187 lb)   BMI 22 17 kg/m²     Physical Exam  Vitals reviewed  Constitutional:       Appearance: He is well-developed  HENT:      Head: Normocephalic and atraumatic  Eyes:      Conjunctiva/sclera: Conjunctivae normal    Cardiovascular:      Rate and Rhythm: Normal rate and regular rhythm  Heart sounds: Normal heart sounds, S1 normal and S2 normal  No murmur heard  Pulmonary:      Effort: Pulmonary effort is normal  No respiratory distress  Breath sounds: Normal breath sounds  No wheezing  Abdominal:      General: Bowel sounds are normal  There is no distension        Palpations: Abdomen is soft  Tenderness: There is no abdominal tenderness  Musculoskeletal:         General: Normal range of motion  Cervical back: Normal range of motion  Comments:  Generalized weakness   Skin:     General: Skin is warm and dry  Neurological:      Mental Status: He is alert  Psychiatric:         Attention and Perception: Attention normal          Mood and Affect: Mood normal          Speech: Speech is slurred  Behavior: Behavior normal          Pertinent Laboratory/Diagnostic Studies:  HFM Labs Reviewed    Current Medications   Medication list reviewed and updated today  Please see paper chart for updated medication list      Audra Dhillon  15:26 PM      Name: Zonia Rodney  : 1940  MRN: [id number]  DOS: 2021    Diagnoses:   Diagnosis ICD-10-CM Associated Orders   1  Constipation, unspecified constipation type  K59 00    2   Ambulatory dysfunction  R26 2

## 2021-06-29 ENCOUNTER — NURSING HOME VISIT (OUTPATIENT)
Dept: WOUND CARE | Facility: HOSPITAL | Age: 81
End: 2021-06-29
Payer: MEDICARE

## 2021-06-29 DIAGNOSIS — T14.8XXA DEEP TISSUE INJURY: Primary | ICD-10-CM

## 2021-06-29 DIAGNOSIS — R26.2 AMBULATORY DYSFUNCTION: ICD-10-CM

## 2021-06-29 PROBLEM — L89.96 PRESSURE INJURY OF DEEP TISSUE: Status: ACTIVE | Noted: 2021-06-29

## 2021-06-29 PROCEDURE — 99305 1ST NF CARE MODERATE MDM 35: CPT | Performed by: NURSE PRACTITIONER

## 2021-06-29 NOTE — LETTER
Patient:  Maddi Begum    1940           DIANA Garcia saw Maddi Begum  for a wound care visit on 6/29/2021  See below for information relating to this visit  Chief Complaint   Patient presents with    New Patient Visit     Right hip        Assessment/Plan:  1  Deep tissue injury  Assessment & Plan:  - Right hip  - non blanchable purple with macerated periwound   - local wound care - skin prep  - offload, order low air loss mattress  - increase protein  - follow up next week    Orders:  -     Wound cleansing and dressings; Future    2  Ambulatory dysfunction  Assessment & Plan:  - on short term rehab  - on PT/OT             Orders:  Maddi Begum   1940  Orders Placed This Encounter   Procedures    Wound cleansing and dressings     Wound:  Right hip  Discontinue previous wound order  Cleanse the wound bed with NSS   Apply non-sting skin prep to periwound area and wound bed  Frequency : daily and prn for soiling  Offload all wounds  Order low air loss mattress  Turn and reposition frequently, maximum of every two hours  Instruct / Assist with weight shifting every 15 - 20 minutes when in chair  Increase protein intake  Monitor for any sign of infection or worsening, inform PCP or patient's primary physician in your facility  Standing Status:   Future     Standing Expiration Date:   6/29/2022         Follow Up:  Return in about 1 week (around 7/6/2021)  [facility] hours are 8:00 am - 4:30 pm Monday through Friday  The center phone number is [phone]  You can also contact me directly thru my email at [facility]  [email or web address]  [email or web address] or thru tiger text  If it is an emergency, please contact the PCP or patient's attending physician in your facility       Sincerely,    Electronically signed by DIANA Garcia    Patient : Maddi Begum     1940

## 2021-06-29 NOTE — PATIENT INSTRUCTIONS
Orders Placed This Encounter   Procedures    Wound cleansing and dressings     Wound:  Right hip  Discontinue previous wound order  Cleanse the wound bed with NSS   Apply non-sting skin prep to periwound area and wound bed  Frequency : daily and prn for soiling  Offload all wounds  Order low air loss mattress  Turn and reposition frequently, maximum of every two hours  Instruct / Assist with weight shifting every 15 - 20 minutes when in chair  Increase protein intake  Monitor for any sign of infection or worsening, inform PCP or patient's primary physician in your facility       Standing Status:   Future     Standing Expiration Date:   6/29/2022
No

## 2021-06-29 NOTE — PROGRESS NOTES
[address] [facility]   [facility]       Patient ID: Tally Primrose  is a 79 y o  male Date of Birth 1940     Location of Service: [facility]    Performed wound round with: Supervisor      Chief Complaint   Patient presents with    New Patient Visit     Right hip       Wound Instructions:  Orders Placed This Encounter   Procedures    Wound cleansing and dressings     Wound:  Right hip  Discontinue previous wound order  Cleanse the wound bed with NSS   Apply non-sting skin prep to periwound area and wound bed  Frequency : daily and prn for soiling  Offload all wounds  Order low air loss mattress  Turn and reposition frequently, maximum of every two hours  Instruct / Assist with weight shifting every 15 - 20 minutes when in chair  Increase protein intake  Monitor for any sign of infection or worsening, inform PCP or patient's primary physician in your facility  Standing Status:   Future     Standing Expiration Date:   6/29/2022       Allergies  Penicillins      Assessment & Plan:  1  Deep tissue injury  Assessment & Plan:  - Right hip  - non blanchable purple with macerated periwound   - local wound care - skin prep  - offload, order low air loss mattress  - increase protein  - follow up next week    Orders:  -     Wound cleansing and dressings; Future    2  Ambulatory dysfunction  Assessment & Plan:  - on short term rehab  - on PT/OT             Subjective: This is a 79year old male referred to our service for wound on the right hip  As per medical record review, the wound first assessed in acute care on 6/16/2021  Etiology : pressure ulcer  Severity : no obvious sign of infection  Current treatment : bordered foam  Received patient in bed, denies pain  Currently using a regular bed  Under the care of dietitian, on protein supplements  Patient's care was coordinated with nursing facility staff   Recent vitals, labs and updated medications were reviewed on EMR or chart system of facility   Past Medical, surgical, social, medication and allergy history and patient's previous records were reviewed and updated as appropriate:     Patient Active Problem List   Diagnosis    Hypertension    Lung cancer ([place] [address] )    Oropharyngeal dysphagia    History of cerebrovascular accident (CVA) with residual deficit - dysphagia and left sided weakness    Shortness of breath    GERD (gastroesophageal reflux disease)    Hemiparesis affecting left side as late effect of cerebrovascular accident (CVA) ([place] [address] )    Chronic kidney disease, stage 3 ([place] [address] )    Prostate cancer ([place] [address] )    Hyperlipidemia    Major depressive disorder with single episode, in full remission ([place] [address] )    Tracheomalacia    Ambulatory dysfunction    Cough    BPH (benign prostatic hyperplasia)    LIAM (obstructive sleep apnea)    Bacteriuria    [facility])    Weight loss    COPD (chronic obstructive pulmonary disease) ([facility])    Abnormal CT of the chest    Mild dementia ([place] [address] )    H/O fall    Constipation    Polypharmacy    Anxiety    Neutrophilic leukocytosis    Peripheral vascular disease, unspecified ([place] [address] )    Palliative care patient    Goals of care, counseling/discussion    Frailty    Wheelchair dependence    Acute on chronic respiratory failure with hypoxia ([facility])    Hypoxia    Fatigue    Bilateral pneumonia    Medication management    Pressure injury of deep tissue    Deep tissue injury     Past Medical History:   Diagnosis Date    RONAL (acute kidney injury) ([place] [address] ) 5/31/2017    Aspiration into respiratory tract     Chest pain 5/31/2017    COPD (chronic obstructive pulmonary disease) ([facility])     Depression     Elevated troponin 5/31/2017    GERD (gastroesophageal reflux disease)     History of CVA (cerebrovascular accident) 4/13/2016    Hyperlipidemia     Hypertension     Lung cancer ([place] [address] ) 2005    Right, status post lobectomy    Pneumonia     Prostate cancer ([place] [address] )  Sleep apnea     awaiting sleep study results    Squamous cell skin cancer     Stroke ([facility] [address] )     Tracheomalacia     Weakness due to cerebrovascular accident (CVA)      Past Surgical History:   Procedure Laterality Date    COLONOSCOPY      CT GUIDED FINE NEEDLE ASPIRATION (ALL INC)  10/30/2020    ESOPHAGOGASTRODUODENOSCOPY N/A 2016    Procedure: ESOPHAGOGASTRODUODENOSCOPY (EGD); Surgeon: Yasmine Delarosa MD;  [facility]; Service:     IR GASTROSTOMY (G) TUBE CHECK/CHANGE/REINSERTION/UPSIZE  2021    JOINT REPLACEMENT      knee replacement    LUNG LOBECTOMY      NH ESOPHAGOGASTRODUODENOSCOPY TRANSORAL DIAGNOSTIC N/A 3/15/2017    Procedure: ESOPHAGOGASTRODUODENOSCOPY (EGD); Surgeon: Yasmine Delarosa MD;  [facility]; Service: Gastroenterology    SKIN BIOPSY      TRIGEMINAL NERVE DECOMPRESSION       Social History     Socioeconomic History    Marital status:      Spouse name: None    Number of children: 2    Years of education: None    Highest education level: None   Occupational History    None   Tobacco Use    Smoking status: Former Smoker     Packs/day: 1 00     Years: 22 00     Pack years: 22 00     Types: Cigarettes     Start date:      Quit date:      Years since quittin 5    Smokeless tobacco: Never Used   Vaping Use    Vaping Use: Never used   Substance and Sexual Activity    Alcohol use: Not Currently    Drug use: Not Currently    Sexual activity: Not Currently   Other Topics Concern    None   Social History Narrative    He lives with his daughter, Moira Rogers who is an RN  Other people in the home are his son-in-law and 2 grandkids      He has a make shift "in-law suite"    He uses a wheelchair to get around    He has a private pay home health aide 20 hours per week    He watches a lot of TV     Social Determinants of Health     Financial Resource Strain:     Difficulty of Paying Living Expenses:    Food Insecurity:     Worried About Running Out of Food in the Last Year:    [address] in the Last Year:    Transportation Needs:     Lack of Transportation (Medical):      Lack of Transportation (Non-Medical):    Physical Activity:     Days of Exercise per Week:     Minutes of Exercise per Session:    Stress:     Feeling of Stress :    Social Connections:     Frequency of Communication with Friends and Family:     Frequency of Social Gatherings with Friends and Family:     Attends Confucianist Services:     Active Member of Clubs or Organizations:     Attends Club or Organization Meetings:     Marital Status:    Intimate Partner Violence:     Fear of Current or Ex-Partner:     Emotionally Abused:     Physically Abused:     Sexually Abused:         Current Outpatient Medications:     acetaminophen (TYLENOL) 325 mg tablet, Take 2 tablets by mouth every 6 (six) hours as needed for fever, Disp: 30 tablet, Rfl: 0    albuterol (PROVENTIL HFA,VENTOLIN HFA) 90 mcg/act inhaler, Inhale 2 puffs 4 (four) times a day, Disp: 2 Inhaler, Rfl: 0    amLODIPine (NORVASC) 10 mg tablet, Take 1 tablet by mouth daily, Disp: , Rfl:     ASPIRIN 81 PO, Take 1 tablet by mouth every other day  , Disp: , Rfl:     atorvastatin (LIPITOR) 40 mg tablet, Take 1 tablet (40 mg total) by mouth daily after dinner (Patient taking differently: Take 10 mg by mouth daily after dinner ), Disp: , Rfl: 0    benzonatate (TESSALON) 200 MG capsule, TAKE 1 CAPSULE BY MOUTH THREE TIMES A DAY AS NEEDED FOR COUGH, Disp: 90 capsule, Rfl: 1    budesonide (PULMICORT) 0 5 mg/2 mL nebulizer solution, Take 1 vial (0 5 mg total) by nebulization 2 (two) times a day Rinse mouth after use , Disp: 360 mL, Rfl: 3    carvedilol (COREG) 12 5 mg tablet, Take 1 tablet by mouth 2 (two) times a day with meals, Disp: 60 tablet, Rfl: 0    clopidogrel (PLAVIX) 75 mg tablet, Take 1 tablet by mouth daily, Disp: 30 tablet, Rfl: 0    Fesoterodine Fumarate ER (TOVIAZ) 8 MG TB24, Take 4 mg by mouth every evening  , Disp: , Rfl:     guaiFENesin (ROBITUSSIN) 100 mg/5 mL oral solution, Take 20 mL (400 mg total) by mouth 3 (three) times a day, Disp: 236 mL, Rfl: 0    ipratropium-albuterol (DUO-NEB) 0 5-2 5 mg/3 mL nebulizer solution, Take 3 mL by nebulization 3 (three) times a day, Disp: , Rfl:     ketoconazole (NIZORAL) 2 % cream, Apply topically to both feet in their entirety (tops, bottoms and between toes) 3 times a day for 4 weeks straight   (Patient not taking: Reported on 6/15/2021), Disp: 60 g, Rfl: 5    latanoprost (XALATAN) 0 005 % ophthalmic solution, Administer 1 drop to both eyes daily at bedtime, Disp: , Rfl:     loratadine (CLARITIN) 10 mg tablet, Take 10 mg by mouth daily, Disp: , Rfl:     LORazepam (ATIVAN) 0 5 mg tablet, Take 0 5 tablets (0 25 mg total) by mouth every 8 (eight) hours as needed for anxiety, Disp: 30 tablet, Rfl: 0    magnesium hydroxide (MILK OF MAGNESIA) 400 mg/5 mL oral suspension, Take 15 mL by mouth 2 (two) times a day as needed, Disp: , Rfl:     pantoprazole (PROTONIX) 40 mg tablet, Take 40 mg by mouth daily, Disp: , Rfl:     PARoxetine (PAXIL) 20 mg tablet, Take 20 mg by mouth daily, Disp: , Rfl:     polyethylene glycol (MIRALAX) 17 g packet, Take 17 g by mouth daily as needed, Disp: , Rfl:     predniSONE 5 mg tablet, TAKE 1 TABLET BY MOUTH EVERY DAY, Disp: 90 tablet, Rfl: 3    psyllium (METAMUCIL) 0 52 g capsule, Take 0 52 g by mouth daily as needed , Disp: , Rfl:     senna (SENOKOT) 8 6 MG tablet, Take 2 tablets by mouth daily at bedtime, Disp: , Rfl:     spironolactone (ALDACTONE) 25 mg tablet, Take 1 tablet (25 mg total) by mouth daily, Disp: 30 tablet, Rfl: 0    tamsulosin (FLOMAX) 0 4 mg, Take 1 capsule by mouth daily, Disp: , Rfl:     traZODone (DESYREL) 50 mg tablet, 1/2 to 1 tab @ HS PRN (Patient not taking: Reported on 6/15/2021), Disp: 30 tablet, Rfl: 2  Family History   Problem Relation Age of Onset    Cancer Mother     Lymphoma Father     Diabetes Maternal Grandmother         Review of Systems   Constitutional: Negative for appetite change and fatigue  HENT: Negative for mouth sores, sore throat and trouble swallowing  Eyes: Negative for pain, discharge, itching and visual disturbance  Respiratory: Negative for cough, chest tightness and shortness of breath  Cardiovascular: Negative for chest pain and leg swelling  Gastrointestinal: Negative for abdominal distention, abdominal pain, constipation, diarrhea and nausea  Endocrine: Negative for polydipsia, polyphagia and polyuria  Genitourinary: Negative for dysuria and flank pain  Musculoskeletal: Positive for gait problem  Skin: Positive for wound  See HPI   Allergic/Immunologic: Negative for environmental allergies  Neurological: Negative for dizziness, seizures and headaches  Psychiatric/Behavioral: Negative for behavioral problems and confusion  The patient is not nervous/anxious  Objective: There were no vitals taken for this visit  Physical Exam  HENT:      Head: Normocephalic  Nose: Nose normal       Mouth/Throat:      Mouth: Mucous membranes are moist    Eyes:      General:         Right eye: No discharge  Left eye: No discharge  Cardiovascular:      Rate and Rhythm: Normal rate  Pulses: Normal pulses  Pulmonary:      Effort: Pulmonary effort is normal    Abdominal:      General: Abdomen is flat  Tenderness: There is no abdominal tenderness  There is no guarding  Musculoskeletal:      Cervical back: Normal range of motion  Right lower leg: No edema  Left lower leg: No edema  Comments: LROM, dependent with turning and positioning when in bed   Skin:     General: Skin is dry  Findings: Lesion present        Comments: Location Right hip wound bed -  2 0 x 1 0 x 0 1 cm , no undermining, no tunneling, 100% purple, non-blanchable, exudate - no drainage, no malodor ( assess after dressing removal and cleansing), wound edge - attached to base, periwound - intact  No localized sign of infection, Denies pain     Neurological:      Mental Status: He is alert  Gait: Gait abnormal    Psychiatric:         Mood and Affect: Mood normal          Behavior: Behavior normal               Procedures             Coordination of Care: Supervisor aware of the treatment plan    Patient / Staff education : Staff was given education on sign of infection and pressure ulcer prevention  Staff verbalized understanding     Follow up :  Return in about 1 week (around 7/6/2021)        DIANA Vogt

## 2021-06-29 NOTE — ASSESSMENT & PLAN NOTE
- Right hip  - non blanchable purple with macerated periwound   - local wound care - skin prep  - offload, order low air loss mattress  - increase protein     - follow up next week

## 2021-07-06 ENCOUNTER — NURSING HOME VISIT (OUTPATIENT)
Dept: WOUND CARE | Facility: HOSPITAL | Age: 81
End: 2021-07-06
Payer: MEDICARE

## 2021-07-06 DIAGNOSIS — T14.8XXA DEEP TISSUE INJURY: Primary | ICD-10-CM

## 2021-07-06 DIAGNOSIS — R26.2 AMBULATORY DYSFUNCTION: ICD-10-CM

## 2021-07-06 PROCEDURE — 99307 SBSQ NF CARE SF MDM 10: CPT | Performed by: NURSE PRACTITIONER

## 2021-07-06 NOTE — PATIENT INSTRUCTIONS
Orders Placed This Encounter   Procedures    Wound cleansing and dressings     Wound:  Right hip wound   Discontinue previous wound order  Apply non-sting skin prep to Right hip reddened area  Frequency : daily and prn for soiling  Offload all wounds  Turn and reposition frequently, maximum of every two hours  Instruct / Assist with weight shifting every 15 - 20 minutes when in chair  Increase protein intake  Monitor for any sign of infection or worsening, inform PCP or patient's primary physician in your facility       Standing Status:   Future     Standing Expiration Date:   7/6/2022

## 2021-07-06 NOTE — LETTER
Patient:  Shaila Morris    1940           DIANA Reina saw [facility]  for a wound care visit on 7/6/2021  See below for information relating to this visit  Chief Complaint   Patient presents with    Follow Up Wound Care Visit     Right hip        Assessment/Plan:  1  Deep tissue injury  Assessment & Plan:  - Right hip DTI, healed  - continue skin prep for protection  - sign off    Orders:  -     Wound cleansing and dressings; Future    2  Ambulatory dysfunction  Assessment & Plan:  - on short term rehab  - on PT/OT             Orders:  Shaila Morris   1940  Orders Placed This Encounter   Procedures    Wound cleansing and dressings     Wound:  Right hip wound   Discontinue previous wound order  Apply non-sting skin prep to Right hip reddened area  Frequency : daily and prn for soiling  Offload all wounds  Turn and reposition frequently, maximum of every two hours  Instruct / Assist with weight shifting every 15 - 20 minutes when in chair  Increase protein intake  Monitor for any sign of infection or worsening, inform PCP or patient's primary physician in your facility  Standing Status:   Future     Standing Expiration Date:   7/6/2022         Follow Up:  Return if symptoms worsen or fail to improve  [facility] hours are 8:00 am - 4:30 pm Monday through Friday  The center phone number is [phone]  You can also contact me directly thru my email at [facility]  [email or web address] or thru tiger text  If it is an emergency, please contact the PCP or patient's attending physician in your facility       Sincerely,    Electronically signed by DIANA Reina    Patient : Shaila Morris     1940

## 2021-07-06 NOTE — LETTER
Patient:  Dane Oscar    1940           DIANA Quintero saw Dane Oscar  for a wound care visit on 7/6/2021  See below for information relating to this visit  Chief Complaint   Patient presents with    Follow Up Wound Care Visit     Right hip        Assessment/Plan:  1  Deep tissue injury  Assessment & Plan:  - Right hip DTI, healed  - continue skin prep for protection  - sign off    Orders:  -     Wound cleansing and dressings; Future    2  Ambulatory dysfunction  Assessment & Plan:  - on short term rehab  - on PT/OT             Orders:  Dane Oscar   1940  Orders Placed This Encounter   Procedures    Wound cleansing and dressings     Wound:  Right hip wound   Discontinue previous wound order  Apply non-sting skin prep to Right hip reddened area  Frequency : daily and prn for soiling  Offload all wounds  Turn and reposition frequently, maximum of every two hours  Instruct / Assist with weight shifting every 15 - 20 minutes when in chair  Increase protein intake  Monitor for any sign of infection or worsening, inform PCP or patient's primary physician in your facility  Standing Status:   Future     Standing Expiration Date:   7/6/2022         Follow Up:  Return if symptoms worsen or fail to improve  [facility] hours are 8:00 am - 4:30 pm Monday through Friday  The center phone number is [phone]  You can also contact me directly thru my email at [facility]  [email or web address]  [email or web address] or thru tiger text  If it is an emergency, please contact the PCP or patient's attending physician in your facility       Sincerely,    Electronically signed by DIANA Quintero    Patient : Dane Oscar     1940

## 2021-07-07 ENCOUNTER — NURSING HOME VISIT (OUTPATIENT)
Dept: GERIATRICS | Age: 81
End: 2021-07-07
Payer: MEDICARE

## 2021-07-07 DIAGNOSIS — R26.2 AMBULATORY DYSFUNCTION: Chronic | ICD-10-CM

## 2021-07-07 DIAGNOSIS — I10 HYPERTENSION, UNSPECIFIED TYPE: ICD-10-CM

## 2021-07-07 DIAGNOSIS — J41.1 MUCOPURULENT CHRONIC BRONCHITIS (HCC): ICD-10-CM

## 2021-07-07 DIAGNOSIS — J96.21 ACUTE ON CHRONIC RESPIRATORY FAILURE WITH HYPOXIA (HCC): Primary | ICD-10-CM

## 2021-07-07 DIAGNOSIS — L89.216 PRESSURE INJURY OF DEEP TISSUE OF RIGHT HIP: ICD-10-CM

## 2021-07-07 PROCEDURE — 99309 SBSQ NF CARE MODERATE MDM 30: CPT | Performed by: NURSE PRACTITIONER

## 2021-07-07 NOTE — PROGRESS NOTES
[address] MANAGEMENT   AND HYPERBARIC MEDICINE CENTER       Patient ID: Kasi Garcia  is a 79 y o  male Date of Birth 1940     Location of Service: [facility]    Performed wound round with: Supervisor      Chief Complaint   Patient presents with    Follow Up Wound Care Visit     Right hip       Wound Instructions:  Orders Placed This Encounter   Procedures    Wound cleansing and dressings     Wound:  Right hip wound   Discontinue previous wound order  Apply non-sting skin prep to Right hip reddened area  Frequency : daily and prn for soiling  Offload all wounds  Turn and reposition frequently, maximum of every two hours  Instruct / Assist with weight shifting every 15 - 20 minutes when in chair  Increase protein intake  Monitor for any sign of infection or worsening, inform PCP or patient's primary physician in your facility  Standing Status:   Future     Standing Expiration Date:   7/6/2022       Allergies  Penicillins      Assessment & Plan:  1  Deep tissue injury  Assessment & Plan:  - Right hip DTI, healed  - continue skin prep for protection  - sign off    Orders:  -     Wound cleansing and dressings; Future    2  Ambulatory dysfunction  Assessment & Plan:  - on short term rehab  - on PT/OT             Subjective: This is a follow up of wounds on the right hip  Patient denies pain  No significant issues  nterval history  6/29/2021 This is a 79year old male referred to our service for wound on the right hip  As per medical record review, the wound first assessed in acute care on 6/16/2021  Etiology : pressure ulcer  Severity : no obvious sign of infection  Current treatment : bordered foam  Received patient in bed, denies pain  Currently using a regular bed  Under the care of dietitian, on protein supplements  Patient's care was coordinated with nursing facility staff   Recent vitals, labs and updated medications were reviewed on EMR or chart system of facility   Past Medical, surgical, social, medication and allergy history and patient's previous records were reviewed and updated as appropriate:     Patient Active Problem List   Diagnosis    Hypertension    Lung cancer ([place] [address] )    Oropharyngeal dysphagia    History of cerebrovascular accident (CVA) with residual deficit - dysphagia and left sided weakness    Shortness of breath    GERD (gastroesophageal reflux disease)    Hemiparesis affecting left side as late effect of cerebrovascular accident (CVA) ([place] [address] )    Chronic kidney disease, stage 3 ([place] [address] )    Prostate cancer ([place] [address] )    Hyperlipidemia    Major depressive disorder with single episode, in full remission ([place] [address] )    Tracheomalacia    Ambulatory dysfunction    Cough    BPH (benign prostatic hyperplasia)    LIAM (obstructive sleep apnea)    Bacteriuria    [facility]    Weight loss    COPD [facility]chronic obstructive pulmonary disease) ([facility]    Abnormal CT of the chest    Mild dementia ([place] [address] )    H/O fall    Constipation    Polypharmacy    Anxiety    Neutrophilic leukocytosis    Peripheral vascular disease, unspecified ([place] [address] )    Palliative care patient    Goals of care, counseling/discussion    Frailty    Wheelchair dependence    Acute on chronic respiratory failure with hypoxia ([facility])    Hypoxia    Fatigue    Bilateral pneumonia    Medication management    Pressure injury of deep tissue    Deep tissue injury     Past Medical History:   Diagnosis Date    RONAL (acute kidney injury) ([place] [address] ) 5/31/2017    Aspiration into respiratory tract     Chest pain 5/31/2017    COPD [facility]chronic obstructive pulmonary disease) ([facility]     Depression     Elevated troponin 5/31/2017    GERD (gastroesophageal reflux disease)     History of CVA (cerebrovascular accident) 4/13/2016    Hyperlipidemia     Hypertension     Lung cancer ([place] [address] ) 2005    Right, status post lobectomy    Pneumonia     Prostate cancer ([place] [address] )     Sleep apnea     awaiting sleep study results    Squamous cell skin cancer     Stroke [facility])     Tracheomalacia     Weakness due to cerebrovascular accident (CVA)      Past Surgical History:   Procedure Laterality Date    COLONOSCOPY      CT GUIDED FINE NEEDLE ASPIRATION (ALL INC)  10/30/2020    ESOPHAGOGASTRODUODENOSCOPY N/A 2016    Procedure: ESOPHAGOGASTRODUODENOSCOPY (EGD); Surgeon: Marylene Alstrom, MD;  [facility]; Service:     IR GASTROSTOMY (G) TUBE CHECK/CHANGE/REINSERTION/UPSIZE  2021    JOINT REPLACEMENT      knee replacement    LUNG LOBECTOMY      DE ESOPHAGOGASTRODUODENOSCOPY TRANSORAL DIAGNOSTIC N/A 3/15/2017    Procedure: ESOPHAGOGASTRODUODENOSCOPY (EGD); Surgeon: Marylene Alstrom, MD;  [facility]; Service: Gastroenterology    SKIN BIOPSY      TRIGEMINAL NERVE DECOMPRESSION       Social History     Socioeconomic History    Marital status:      Spouse name: None    Number of children: 2    Years of education: None    Highest education level: None   Occupational History    None   Tobacco Use    Smoking status: Former Smoker     Packs/day: 1 00     Years: 22 00     Pack years: 22 00     Types: Cigarettes     Start date:      Quit date:      Years since quittin 5    Smokeless tobacco: Never Used   Vaping Use    Vaping Use: Never used   Substance and Sexual Activity    Alcohol use: Not Currently    Drug use: Not Currently    Sexual activity: Not Currently   Other Topics Concern    None   Social History Narrative    He lives with his daughter, Dashawn Martinez who is an RN  Other people in the home are his son-in-law and 2 grandkids      He has a make shift "in-law suite"    He uses a wheelchair to get around    He has a private pay home health aide 20 hours per week    He watches a lot of TV     Social Determinants of Health     Financial Resource Strain:     Difficulty of Paying Living Expenses:    Food Insecurity:     Worried About Running Out of Food in the Last Year:     Ran Out of Food in the Last Year:    Transportation Needs:     Lack of Transportation (Medical):      Lack of Transportation (Non-Medical):    Physical Activity:     Days of Exercise per Week:     Minutes of Exercise per Session:    Stress:     Feeling of Stress :    Social Connections:     Frequency of Communication with Friends and Family:     Frequency of Social Gatherings with Friends and Family:     Attends [employer] Services:     Active Member of Clubs or Organizations:     Attends Club or Organization Meetings:     Marital Status:    Intimate Partner Violence:     Fear of Current or Ex-Partner:     Emotionally Abused:     Physically Abused:     Sexually Abused:         Current Outpatient Medications:     acetaminophen (TYLENOL) 325 mg tablet, Take 2 tablets by mouth every 6 (six) hours as needed for fever, Disp: 30 tablet, Rfl: 0    albuterol (PROVENTIL HFA,VENTOLIN HFA) 90 mcg/act inhaler, Inhale 2 puffs 4 (four) times a day, Disp: 2 Inhaler, Rfl: 0    amLODIPine (NORVASC) 10 mg tablet, Take 1 tablet by mouth daily, Disp: , Rfl:     ASPIRIN 81 PO, Take 1 tablet by mouth every other day  , Disp: , Rfl:     atorvastatin (LIPITOR) 40 mg tablet, Take 1 tablet (40 mg total) by mouth daily after dinner (Patient taking differently: Take 10 mg by mouth daily after dinner ), Disp: , Rfl: 0    benzonatate (TESSALON) 200 MG capsule, TAKE 1 CAPSULE BY MOUTH THREE TIMES A DAY AS NEEDED FOR COUGH, Disp: 90 capsule, Rfl: 1    budesonide (PULMICORT) 0 5 mg/2 mL nebulizer solution, Take 1 vial (0 5 mg total) by nebulization 2 (two) times a day Rinse mouth after use , Disp: 360 mL, Rfl: 3    carvedilol (COREG) 12 5 mg tablet, Take 1 tablet by mouth 2 (two) times a day with meals, Disp: 60 tablet, Rfl: 0    clopidogrel (PLAVIX) 75 mg tablet, Take 1 tablet by mouth daily, Disp: 30 tablet, Rfl: 0    Fesoterodine Fumarate ER (TOVIAZ) 8 MG TB24, Take 4 mg by mouth every evening  , Disp: , Rfl:    guaiFENesin (ROBITUSSIN) 100 mg/5 mL oral solution, Take 20 mL (400 mg total) by mouth 3 (three) times a day, Disp: 236 mL, Rfl: 0    ipratropium-albuterol (DUO-NEB) 0 5-2 5 mg/3 mL nebulizer solution, Take 3 mL by nebulization 3 (three) times a day, Disp: , Rfl:     ketoconazole (NIZORAL) 2 % cream, Apply topically to both feet in their entirety (tops, bottoms and between toes) 3 times a day for 4 weeks straight   (Patient not taking: Reported on 6/15/2021), Disp: 60 g, Rfl: 5    latanoprost (XALATAN) 0 005 % ophthalmic solution, Administer 1 drop to both eyes daily at bedtime, Disp: , Rfl:     loratadine (CLARITIN) 10 mg tablet, Take 10 mg by mouth daily, Disp: , Rfl:     LORazepam (ATIVAN) 0 5 mg tablet, Take 0 5 tablets (0 25 mg total) by mouth every 8 (eight) hours as needed for anxiety, Disp: 30 tablet, Rfl: 0    magnesium hydroxide (MILK OF MAGNESIA) 400 mg/5 mL oral suspension, Take 15 mL by mouth 2 (two) times a day as needed, Disp: , Rfl:     pantoprazole (PROTONIX) 40 mg tablet, Take 40 mg by mouth daily, Disp: , Rfl:     PARoxetine (PAXIL) 20 mg tablet, Take 20 mg by mouth daily, Disp: , Rfl:     polyethylene glycol (MIRALAX) 17 g packet, Take 17 g by mouth daily as needed, Disp: , Rfl:     predniSONE 5 mg tablet, TAKE 1 TABLET BY MOUTH EVERY DAY, Disp: 90 tablet, Rfl: 3    psyllium (METAMUCIL) 0 52 g capsule, Take 0 52 g by mouth daily as needed , Disp: , Rfl:     senna (SENOKOT) 8 6 MG tablet, Take 2 tablets by mouth daily at bedtime, Disp: , Rfl:     spironolactone (ALDACTONE) 25 mg tablet, Take 1 tablet (25 mg total) by mouth daily, Disp: 30 tablet, Rfl: 0    tamsulosin (FLOMAX) 0 4 mg, Take 1 capsule by mouth daily, Disp: , Rfl:     traZODone (DESYREL) 50 mg tablet, 1/2 to 1 tab @ HS PRN (Patient not taking: Reported on 6/15/2021), Disp: 30 tablet, Rfl: 2  Family History   Problem Relation Age of Onset    Cancer Mother     Lymphoma Father     Diabetes Maternal Grandmother Review of Systems   Constitutional: Negative for appetite change and fatigue  HENT: Negative for mouth sores, sore throat and trouble swallowing  Eyes: Negative for pain, discharge, itching and visual disturbance  Respiratory: Negative for cough, chest tightness and shortness of breath  Cardiovascular: Negative for chest pain and leg swelling  Gastrointestinal: Negative for abdominal distention, abdominal pain, constipation, diarrhea and nausea  Endocrine: Negative for polydipsia, polyphagia and polyuria  Genitourinary: Negative for dysuria and flank pain  Musculoskeletal: Positive for gait problem  Skin: Positive for wound  See HPI   Allergic/Immunologic: Negative for environmental allergies  Neurological: Negative for dizziness, seizures and headaches  Psychiatric/Behavioral: Negative for behavioral problems and confusion  The patient is not nervous/anxious  Objective: There were no vitals taken for this visit  Physical Exam  HENT:      Head: Normocephalic  Nose: Nose normal       Mouth/Throat:      Mouth: Mucous membranes are moist    Eyes:      General:         Right eye: No discharge  Left eye: No discharge  Pulmonary:      Effort: Pulmonary effort is normal    Abdominal:      General: Abdomen is flat  Tenderness: There is no abdominal tenderness  There is no guarding  Musculoskeletal:      Cervical back: Normal range of motion  Right lower leg: No edema  Left lower leg: No edema  Comments: LROM, dependent with turning and positioning when in bed   Skin:     General: Skin is dry  Findings: Lesion present  Comments: Location Right hip wound bed - healed, red, blanchable   Neurological:      Mental Status: He is alert        Gait: Gait abnormal    Psychiatric:         Mood and Affect: Mood normal          Behavior: Behavior normal               Procedures               Coordination of Care: Supervisor aware of the treatment plan        Follow up :  Return if symptoms worsen or fail to improve        DIANA Zuñiga

## 2021-07-08 VITALS
BODY MASS INDEX: 22.2 KG/M2 | WEIGHT: 187.2 LBS | HEART RATE: 65 BPM | SYSTOLIC BLOOD PRESSURE: 126 MMHG | TEMPERATURE: 97.3 F | DIASTOLIC BLOOD PRESSURE: 71 MMHG | RESPIRATION RATE: 20 BRPM

## 2021-07-08 NOTE — ASSESSMENT & PLAN NOTE
· Doing well  · Continues with supplemental O2 NC  · Using home respiration vest  · Continue with nebulizers  · Monitor for any increased shortness of breath

## 2021-07-08 NOTE — ASSESSMENT & PLAN NOTE
· Doing well at [facility]  · Continue with restorative care  · Assist with aDL and IADL  · Fall precautions  · PT/OT

## 2021-07-08 NOTE — PROGRESS NOTES
[facility]  [address] [place]  [phone]  [facility]BBY [facility]ORGA [facility]  STR Progress Note        NAME: Virginia Subramanian  AGE: 79 y o  SEX: male    DATE OF ENCOUNTER: 7/7/21    Assessment and Plan     1  Acute on chronic respiratory failure with hypoxia (HCC)  Assessment & Plan:  · Doing well  · Pt wears cough vest  · Continues with supplemental O2 NC  · Monitor for any changes      2  Ambulatory dysfunction  Assessment & Plan:  · Doing well at STR  · Continue with restorative care  · Assist with aDL and IADL  · Fall precautions  · PT/OT      3  Mucopurulent chronic bronchitis ([address] )  Assessment & Plan:  · Doing well  · Continues with supplemental O2 NC  · Using home respiration vest  · Continue with nebulizers  · Monitor for any increased shortness of breath      4  Hypertension, unspecified type  Assessment & Plan:  · Controlled  · contineu with amlodipine and carvedilol  · Monitor BP      5  Pressure injury of deep tissue of right hip  Assessment & Plan:  · Healing  · Continue with current wound care  · Followed by [facility] wound team        No orders of the defined types were placed in this encounter  History of Present Illness     Virginia Subramanian  79 y o  male seen for follow-up of care and treatment plan for ambulatory dysfunction doing well at STR, COPD at baseline, acute on chronic respiratory failure controlled,  HTN doing well with BP meds, PI doing well with wound care    The following portions of the patient's history were reviewed and updated as appropriate: allergies, current medications, past family history, past medical history, past social history, past surgical history and problem list     Review of Systems     Review of Systems   Constitutional: Positive for fatigue  Negative for chills and fever  HENT: Negative for ear pain and sore throat  Eyes: Negative for pain and visual disturbance  Respiratory: Positive for shortness of breath  Negative for cough  Cardiovascular: Negative for chest pain and palpitations  Gastrointestinal: Negative for abdominal pain and vomiting  Genitourinary: Negative for dysuria and hematuria  Musculoskeletal: Positive for gait problem  Negative for arthralgias and back pain  Skin: Positive for wound  Negative for color change and rash  Neurological: Positive for weakness  Negative for seizures and syncope  All other systems reviewed and are negative  Active Problem List     Patient Active Problem List   Diagnosis    Hypertension    [facility] cancer ([facility] )    Oropharyngeal dysphagia    History of cerebrovascular accident (CVA) with residual deficit - dysphagia and left sided weakness    Shortness of breath    GERD (gastroesophageal reflux disease)    Hemiparesis affecting left side as late effect of cerebrovascular accident (CVA) ([facility] )    [facility], [facility] ([facility] )    Prostate cancer ([facility] )    Hyperlipidemia    Major depressive disorder with single episode, in full remission ([facility] )    Tracheomalacia    Ambulatory dysfunction    Cough    BPH (benign prostatic hyperplasia)    LIAM (obstructive sleep apnea)    Bacteriuria    Thrombocytopenia (HCC)    Weight loss    COPD (chronic obstructive pulmonary disease) (HCC)    Abnormal CT of the chest    Mild dementia ([facility] )    H/O fall    Constipation    Polypharmacy    Anxiety    Neutrophilic leukocytosis    Peripheral vascular disease, unspecified ([place] [address] )    Palliative care patient    Goals of care, counseling/discussion    Frailty    Wheelchair dependence    Acute on chronic respiratory failure with hypoxia (HCC)    Hypoxia    Fatigue    Bilateral pneumonia    Medication management    Pressure injury of deep tissue    Deep tissue injury       Objective     /71   Pulse 65   Temp (!) 97 3 °F (36 3 °C)   Resp 20   Wt 84 9 kg (187 lb 3 2 oz)   BMI 22 20 kg/m²     Physical Exam  Vitals reviewed     Constitutional:       Appearance: He is well-developed  HENT:      Head: Normocephalic and atraumatic  Eyes:      Conjunctiva/sclera: Conjunctivae normal    Cardiovascular:      Rate and Rhythm: Normal rate and regular rhythm  Heart sounds: Normal heart sounds, S1 normal and S2 normal  No murmur heard  Pulmonary:      Effort: Pulmonary effort is normal  No respiratory distress  Breath sounds: Normal breath sounds  No wheezing  Comments: Supplemental O2  Abdominal:      General: Bowel sounds are normal  There is no distension  Palpations: Abdomen is soft  Tenderness: There is no abdominal tenderness  Musculoskeletal:         General: Normal range of motion  Cervical back: Normal range of motion  Comments: Generalized weakness   Skin:     General: Skin is warm and dry  Neurological:      Mental Status: He is alert  Psychiatric:         Attention and Perception: Attention normal          Mood and Affect: Mood normal          Speech: Speech is slurred  Behavior: Behavior normal          Thought Content: Thought content normal          Pertinent Laboratory/Diagnostic Studies:  [facility] Labs Reviewed    Current Medications   Medication list reviewed and updated today  Please see paper chart for updated medication list      Cheryl Adams  12:06 PM      Name: Wanda Ludwig  : 1940  MRN: [id number]  DOS: 21    Diagnoses:   Diagnosis ICD-10-CM Associated Orders   1  Acute on chronic respiratory failure with hypoxia (HCC)  J96 21    2  Ambulatory dysfunction  R26 2    3  Mucopurulent chronic bronchitis ([address] )  J41 1    4  Hypertension, unspecified type  I10    5   Pressure injury of deep tissue of right hip  L89 216

## 2021-07-08 NOTE — ASSESSMENT & PLAN NOTE
· Doing well at this time  · Continue with daily aspirin, atorvastatin and clopidogrel  · Continue supportive care  · PT/OT/ST

## 2021-07-09 ENCOUNTER — NURSING HOME VISIT (OUTPATIENT)
Dept: GERIATRICS | Age: 81
End: 2021-07-09
Payer: MEDICARE

## 2021-07-09 VITALS
BODY MASS INDEX: 22.17 KG/M2 | DIASTOLIC BLOOD PRESSURE: 81 MMHG | HEART RATE: 81 BPM | WEIGHT: 187 LBS | TEMPERATURE: 97.9 F | SYSTOLIC BLOOD PRESSURE: 123 MMHG | RESPIRATION RATE: 18 BRPM

## 2021-07-09 DIAGNOSIS — I10 HYPERTENSION, UNSPECIFIED TYPE: ICD-10-CM

## 2021-07-09 DIAGNOSIS — R26.2 AMBULATORY DYSFUNCTION: Primary | Chronic | ICD-10-CM

## 2021-07-09 DIAGNOSIS — N18.31 STAGE 3A CHRONIC KIDNEY DISEASE (HCC): Chronic | ICD-10-CM

## 2021-07-09 DIAGNOSIS — L89.216 PRESSURE INJURY OF DEEP TISSUE OF RIGHT HIP: ICD-10-CM

## 2021-07-09 DIAGNOSIS — J41.1 MUCOPURULENT CHRONIC BRONCHITIS (HCC): ICD-10-CM

## 2021-07-09 DIAGNOSIS — I69.354 HEMIPARESIS AFFECTING LEFT SIDE AS LATE EFFECT OF CEREBROVASCULAR ACCIDENT (CVA) (HCC): ICD-10-CM

## 2021-07-09 PROBLEM — R05.9 COUGH: Status: RESOLVED | Noted: 2018-08-22 | Resolved: 2021-07-09

## 2021-07-09 PROCEDURE — 99316 NF DSCHRG MGMT 30 MIN+: CPT | Performed by: NURSE PRACTITIONER

## 2021-07-09 NOTE — ASSESSMENT & PLAN NOTE
·  Hives at baseline  · Continue to avoid nephrotoxins as much as possible   · Monitor BMP periodically for renal function  · Follow-up with PCP

## 2021-07-09 NOTE — ASSESSMENT & PLAN NOTE
·  Controlled   · Continue with amlodipine, carvedilol, spironolacton  · Continue to monitor BP   · Follow-up with primary care physician

## 2021-07-09 NOTE — ASSESSMENT & PLAN NOTE
· Doing well at short-term rehab   · Continue with restorative care   · Assist with ADL and I ADL  · Teach fall precautions   · Patient to be discharged home with [facility] PT/OT/RN

## 2021-07-09 NOTE — ASSESSMENT & PLAN NOTE
·  Asymptomatic at this time  · Continue with pulmonary medications   · Continue with respiratory vest  · Family at hold help patient with vest   · Continue to monitor for any changes

## 2021-07-09 NOTE — ASSESSMENT & PLAN NOTE
·  At baseline  · Patient ambulates with wheelchair   · Continue with restorative care  · PT/OT in-home

## 2021-07-09 NOTE — PROGRESS NOTES
[address]  [address]   [phone]  [address] [place] [address] St: [facility] SNF List: [facility]  POS: 31: SNF/Short Term Rehab    NAME: Will Chavarria  AGE: 79 y o  :1940 SEX: male [id number]  DATE OF ADMISSION: 21 DATE OF DISCHARGE: 7/10/21 DISCHARGE DISPOSITION: Stable    Reason for admission: Patient was admitted for rehabilitation after hospitalization for s/p PNA  Course of stay: Patient received skilled nursing care, PT/OT/ST, dietary and  during their stay at [facility] with an overall improvement in functional status  PT/OT Report: Bed mobility max assist, sit to transfer max assist, sit to stand Min to mod assist, ambulates 20 ft with both bars with moderate assistance, upper body dressing/ bathing Min assist, lower body dressing / bathing max assist, toilet transfers  hygiene and clothing management dependent    Discharge Medications: See discharge medication list which was reviewed and signed  Status at time of discharge: Stable    Today's Visit: :20 PM    Subjective: 79y o  year old male seen and examined today for discharge planning  Patient is scheduled to be discharged on  07/10/2021 to home with the following services: VNA, PT and OT  Patient states that they are feeling well and appear comfortable in the room  They deny headache, dizziness, blurred vision, dyspnea, abdominal pain, nausea, vomiting and diarrhea  Vitals:   Vitals:    21 1319   BP: 123/81   Pulse: 81   Resp: 18   Temp: 97 9 °F (36 6 °C)       Exam: Physical Exam  Vitals reviewed  Constitutional:       Appearance: He is well-developed  HENT:      Head: Normocephalic and atraumatic  Eyes:      Conjunctiva/sclera: Conjunctivae normal    Cardiovascular:      Rate and Rhythm: Normal rate and regular rhythm  Heart sounds: Normal heart sounds, S1 normal and S2 normal  No murmur heard       Pulmonary: Effort: Pulmonary effort is normal  No respiratory distress  Breath sounds: Normal breath sounds  Decreased air movement present  No wheezing  Abdominal:      General: Bowel sounds are normal  There is no distension  Palpations: Abdomen is soft  Tenderness: There is no abdominal tenderness  Musculoskeletal:         General: Normal range of motion  Cervical back: Normal range of motion  Comments:  Generalized weakness   Skin:     General: Skin is warm and dry  Neurological:      Mental Status: He is alert  Psychiatric:         Attention and Perception: Attention normal          Mood and Affect: Mood normal          Speech: Speech is slurred  Behavior: Behavior normal          Thought Content: Thought content normal          Discussion with patient/family and further instructions:  -Fall precautions  -Aspiration precautions  -Bleeding precautions  -Monitor for signs/symptoms of infection  -Medication list was reviewed and signed  -DME form was completed    Follow-up Recommendations: Please follow-up with your primary care physician within 7-10 days of discharge to review medication changes and current status       Problem List Follow-up Recommendations:  Problem List Items Addressed This Visit        Respiratory    COPD (chronic obstructive pulmonary disease) ([facility] )     ·  Asymptomatic at this time  · Continue with pulmonary medications   · Continue with respiratory vest  · Family at hold help patient with vest   · Continue to monitor for any changes            Cardiovascular and Mediastinum    Hypertension     ·  Controlled   · Continue with amlodipine, carvedilol, spironolacton  · Continue to monitor BP   · Follow-up with primary care physician            Nervous and Auditory    Hemiparesis affecting left side as late effect of cerebrovascular accident (CVA) ([facility] )     ·  At baseline  · Patient ambulates with wheelchair   · Continue with restorative care  · PT/OT in-home Musculoskeletal and Integument    Pressure injury of deep tissue     ·  Slow healing  · Doing well with current wound care  · VNA RN to follow up with patient in home            Genitourinary    Chronic kidney disease, stage 3 (HCC) (Chronic)     ·  Hives at baseline  · Continue to avoid nephrotoxins as much as possible   · Monitor BMP periodically for renal function  · Follow-up with PCP            Other    Ambulatory dysfunction - Primary (Chronic)     · Doing well at short-term rehab   · Continue with restorative care   · Assist with ADL and I ADL  · Teach fall precautions   · Patient to be discharged home with VNA PT/OT/RN               DIANA Easley  7/9/20211:20 PM    Discharge  Statement   I spent 45 minutes minutes discharging the patient  This time was spent on the day of discharge  I had direct contact with the patient on the day of discharge

## 2021-07-13 ENCOUNTER — DOCUMENTATION (OUTPATIENT)
Dept: SOCIAL WORK | Facility: HOSPITAL | Age: 81
End: 2021-07-13

## 2021-07-13 NOTE — PROGRESS NOTES
Admission Report at [facility] Care and update to PCP    [facility] has admitted your patient to [address] [place] [address] service with the following disciplines:      PT and OT  This report is informational only, no responses is needed  Primary focus of home health care Cardiopulmonary  Patient stated goals of care "toget back to walking"  Anticipated visit pattern and next visit date 2w4 1w1  Significant clinical findings none  Request for additional disciplines none  Request for medication clarification none  Request for other order clarification none  Needs follow up physician appointments scheduled pcp July 16th  Potential barriers to goal achievement fatigue  Other pertinent information DTI R hip that they are cleansing with soap and water and repositioning  Pressure area on L Great toe with no SS of infection and no drainage  Keep AILIN and cleaning with soap and water  Please advise on any additional tx as necessary  Thank you for allowing us to participate in the care of your patient        Ameena Jacobsone

## 2021-07-13 NOTE — PROGRESS NOTES
Patient to be seen today, 7/13/2021, for home PT [facility]      Thanks  ADIEL SunshineT

## 2021-08-11 ENCOUNTER — TELEPHONE (OUTPATIENT)
Dept: PULMONOLOGY | Facility: CLINIC | Age: 81
End: 2021-08-11

## 2021-08-11 NOTE — TELEPHONE ENCOUNTER
Patient needs new order for O2 please, I will send to [employer] with notes and test results  Per previous message above COPD should be listed as diagnosis  I called Louis at [place] [phone] regarding her question about COPD initially being crossed out because I don't see anything noted about that, but she hasn't returned my call   Thanks

## 2021-08-11 NOTE — TELEPHONE ENCOUNTER
[address] called for patient, she stated that they received the test results but still need the clinical notes, and documentation of the patients diagnosis faxed over  She said SOB is not considered a diagnosis to be covered but COPD would be, she's wondering why COPD was crossed out  If you need more information her contact info is Louis #[phone] ext   [phone]

## 2021-08-13 DIAGNOSIS — R05.9 COUGH: ICD-10-CM

## 2021-08-13 RX ORDER — BENZONATATE 200 MG/1
CAPSULE ORAL
Qty: 90 CAPSULE | Refills: 1 | Status: SHIPPED | OUTPATIENT
Start: 2021-08-13 | End: 2021-10-22

## 2021-08-13 NOTE — TELEPHONE ENCOUNTER
[facility] called back, they are saying they still don't have all the information they needed    She said she needs a script to keep using oxygen, needs notes from within 30 days of testing, and that COPD has to be in the diagnosis  Please give Louis a call back to discuss this, thank you!

## 2021-08-20 NOTE — TELEPHONE ENCOUNTER
Called and spoke to St frankel from [employer] regarding O2 orders  Last OV / face to face was 06/09/2021  If patient only needs O2 at night, he will need a tele-health visit and a new script for O2 @ night only  If patient needs POC he will need a new face to face visit to re-cert him for it  He will need either OV by 9/4/21 to qualify  Please advise, thanks

## 2021-08-20 NOTE — TELEPHONE ENCOUNTER
Carla from [employer] calling asking for face to face notes from 8/13  She states she received the orders but needs chart notes  Please call back at [phone] ext   [phone]

## 2021-08-20 NOTE — TELEPHONE ENCOUNTER
Spoke with daughter, scheduled virtual visit with you 8/26/21  Call Flynn Hernandez his caregiver for virtual, she will be with him at that time  [phone]  I will put in appt notes also

## 2021-08-26 ENCOUNTER — TELEMEDICINE (OUTPATIENT)
Dept: PULMONOLOGY | Facility: CLINIC | Age: 81
End: 2021-08-26
Payer: MEDICARE

## 2021-08-26 VITALS — HEART RATE: 70 BPM | DIASTOLIC BLOOD PRESSURE: 74 MMHG | OXYGEN SATURATION: 98 % | SYSTOLIC BLOOD PRESSURE: 140 MMHG

## 2021-08-26 DIAGNOSIS — J41.1 MUCOPURULENT CHRONIC BRONCHITIS (HCC): Primary | ICD-10-CM

## 2021-08-26 DIAGNOSIS — J42 CHRONIC BRONCHITIS, UNSPECIFIED CHRONIC BRONCHITIS TYPE (HCC): ICD-10-CM

## 2021-08-26 PROCEDURE — 99443 PR PHYS/QHP TELEPHONE EVALUATION 21-30 MIN: CPT | Performed by: PHYSICIAN ASSISTANT

## 2021-08-26 PROCEDURE — 1123F ACP DISCUSS/DSCN MKR DOCD: CPT | Performed by: PHYSICIAN ASSISTANT

## 2021-08-26 RX ORDER — OXYBUTYNIN CHLORIDE 5 MG/1
5 TABLET, EXTENDED RELEASE ORAL DAILY
COMMUNITY
Start: 2021-08-17

## 2021-08-26 NOTE — PROGRESS NOTES
I have personally spent 20 minutes reviewing the chart and imaging prior to the visit  I have personally spent 20 minutes on the phone with the patient  I have personally spent 20 minutes reviewing imaging, laboratory results and other testing results with the patient  Virtual Regular Visit      Reason for visit is  sick visit    This virtual check-in was done via telephone  Encounter provider Laverne Eaton PA-C    Provider located at [facility]  [address]  [facility]  [address] [facility] [address]  [place] [id number] [facility]  [phone]      Recent Visits  No visits were found meeting these conditions  Showing recent visits within past 7 days and meeting all other requirements  Today's Visits  Date Type Provider Dept   08/26/21 Telemedicine Doc [facility]y [facility]   Showing today's visits and meeting all other requirements  Future Appointments  No visits were found meeting these conditions  Showing future appointments within next 150 days and meeting all other requirements       Patient agrees to participate in a virtual check in via telephone or video visit instead of presenting to the office to address urgent/immediate medical needs  Patient is aware this is a billable service  After connecting through telephone, the patient was identified by name and date of birth  Yadiel Click  was informed that this was a telemedicine visit and that the exam was being conducted confidentially over secure lines  My office door was closed  No one else was in the room  He acknowledged consent and understanding of privacy and security of the virtual check-in visit  I informed the patient that I have reviewed his record in Epic and presented the opportunity for him to ask any questions regarding the visit today  The patient initiated communication and agreed to participate        Progress note - Pulmonary Medicine Colonel Fulton  79 y o  male MRN: [id number]         Assessment/Plan:    Problem List Items Addressed This Visit     None           1  Recurrent aspiration pneumonia/tracheobronchitis  · Noted progression of symptoms with dysarthria and dysphagia  · POST PEG tube 10/2021      2  Chronic dysphagia  · Honey thickened liquids per VBS  · Further speech therapy recs     3  Chronic Cough  · Secondary to tracheomalacia and dysphagia     4  COPD with tracheomalacia  · Continue duonebs 2-3 times daily  · Continue pulmicort nebs BID  · Continue prednisone 5mg daily  · Continue VEST therapy     5  GERD - continue PPI     6  Abnormal CT Chest, prior history of lung cancer  · Waxing and waning secondary to aspiration events  · Hold on repeat imaging at this time     7  Weight loss - stable now continue to monitor     8  Hypoxia - patient was recently requalify for oxygen  He is using regularly  Education provided at this visit:      No follow-ups on file  All of his questions were answered prior to ending the call today  He will follow-up with Dr Cristina Cho is in December months or sooner should the need arise  He is aware to call our office with any further questions or concerns  Virtual visit time component:  I spent 20 minutes with the patient and > 50% was spent in counseling coordination of care  Total visit time for chart and diagnostic data review, telephonic or video conference communication with the patient, and documentation: 20    I have personally spent 20 minutes reviewing the chart and imaging prior to the visit  I have personally spent 20 minutes on the phone with the patient  I have personally spent 20 minutes reviewing imaging, laboratory results and other testing results with the patient     ______________________________________________________________________    HPI:    Colonel Fulton  is being evaluated by virtual visit for follow-up for a sick visit    He has been having increased cough and weakness home  His caregiver knows that when he becomes very weak is when he has an infection brewing  He has been seen by his PCP and he was started on antibiotic and is following up today in the pulmonary office to ensure that he is continually improving  Mr Camacho Caro reports that his cough is still present however this is slowly improving and he is feeling significantly better  He is able to get up and ambulate without difficulty  He denies any wheezing or shortness of breath  He otherwise offers no complaints  I answered all of his questions and his caretakers questions today  They verbalized understanding with the plan  He will follow-up with Dr Boogie Osborne in December as previously scheduled    He knows to call our office with any questions or concerns and follow up sooner if needed        Current Medications:    Current Outpatient Medications:     acetaminophen (TYLENOL) 325 mg tablet, Take 2 tablets by mouth every 6 (six) hours as needed for fever, Disp: 30 tablet, Rfl: 0    albuterol (PROVENTIL HFA,VENTOLIN HFA) 90 mcg/act inhaler, Inhale 2 puffs 4 (four) times a day, Disp: 2 Inhaler, Rfl: 0    amLODIPine (NORVASC) 10 mg tablet, Take 1 tablet by mouth daily, Disp: , Rfl:     ASPIRIN 81 PO, Take 1 tablet by mouth every other day  , Disp: , Rfl:     atorvastatin (LIPITOR) 40 mg tablet, Take 1 tablet (40 mg total) by mouth daily after dinner (Patient taking differently: Take 10 mg by mouth daily after dinner ), Disp: , Rfl: 0    benzonatate (TESSALON) 200 MG capsule, TAKE 1 CAPSULE BY MOUTH 3 TIMES A DAY AS NEEDED FOR COUGH, Disp: 90 capsule, Rfl: 1    budesonide (PULMICORT) 0 5 mg/2 mL nebulizer solution, Take 1 vial (0 5 mg total) by nebulization 2 (two) times a day Rinse mouth after use , Disp: 360 mL, Rfl: 3    carvedilol (COREG) 12 5 mg tablet, Take 1 tablet by mouth 2 (two) times a day with meals, Disp: 60 tablet, Rfl: 0    clopidogrel (PLAVIX) 75 mg tablet, Take 1 tablet by mouth daily, Disp: 30 tablet, Rfl: 0    Fesoterodine Fumarate ER (TOVIAZ) 8 MG TB24, Take 4 mg by mouth every evening  , Disp: , Rfl:     guaiFENesin (ROBITUSSIN) 100 mg/5 mL oral solution, Take 20 mL (400 mg total) by mouth 3 (three) times a day, Disp: 236 mL, Rfl: 0    ipratropium-albuterol (DUO-NEB) 0 5-2 5 mg/3 mL nebulizer solution, Take 3 mL by nebulization 3 (three) times a day, Disp: , Rfl:     ketoconazole (NIZORAL) 2 % cream, Apply topically to both feet in their entirety (tops, bottoms and between toes) 3 times a day for 4 weeks straight   (Patient not taking: Reported on 6/15/2021), Disp: 60 g, Rfl: 5    latanoprost (XALATAN) 0 005 % ophthalmic solution, Administer 1 drop to both eyes daily at bedtime, Disp: , Rfl:     loratadine (CLARITIN) 10 mg tablet, Take 10 mg by mouth daily, Disp: , Rfl:     LORazepam (ATIVAN) 0 5 mg tablet, Take 0 5 tablets (0 25 mg total) by mouth every 8 (eight) hours as needed for anxiety, Disp: 30 tablet, Rfl: 0    magnesium hydroxide (MILK OF MAGNESIA) 400 mg/5 mL oral suspension, Take 15 mL by mouth 2 (two) times a day as needed, Disp: , Rfl:     oxybutynin (DITROPAN-XL) 5 mg 24 hr tablet, Take 5 mg by mouth daily, Disp: , Rfl:     pantoprazole (PROTONIX) 40 mg tablet, Take 40 mg by mouth daily, Disp: , Rfl:     PARoxetine (PAXIL) 20 mg tablet, Take 20 mg by mouth daily, Disp: , Rfl:     polyethylene glycol (MIRALAX) 17 g packet, Take 17 g by mouth daily as needed, Disp: , Rfl:     predniSONE 5 mg tablet, TAKE 1 TABLET BY MOUTH EVERY DAY, Disp: 90 tablet, Rfl: 3    psyllium (METAMUCIL) 0 52 g capsule, Take 0 52 g by mouth daily as needed , Disp: , Rfl:     senna (SENOKOT) 8 6 MG tablet, Take 2 tablets by mouth daily at bedtime, Disp: , Rfl:     spironolactone (ALDACTONE) 25 mg tablet, Take 1 tablet (25 mg total) by mouth daily, Disp: 30 tablet, Rfl: 0    tamsulosin (FLOMAX) 0 4 mg, Take 1 capsule by mouth daily, Disp: , Rfl:     traZODone (DESYREL) 50 mg tablet, 1/2 to 1 tab @ HS PRN (Patient not taking: Reported on 6/15/2021), Disp: 30 tablet, Rfl: 2    Review of Systems:  Review of Systems   Constitutional: Positive for fatigue  Respiratory: Positive for cough, choking, shortness of breath and wheezing  All other systems reviewed and are negative  Past medical history, surgical history, and family history were reviewed and updated as appropriate    Social history updates:  Social History     Tobacco Use   Smoking Status Former Smoker    Packs/day:     Years: 22     Pack years:     Types: Cigarettes    Start date:     Quit date: 0    Years since quittin 6   Smokeless Tobacco Never Used       PhysicalExamination:  Vitals: Blood pressure 140/74, pulse 70, SpO2 98 %  There is no height or weight on file to calculate BMI  Oxygen Therapy  SpO2: 98 %  Virtual visit  Patient was evaluated telephonically, no further physical examination could be performed  Diagnostic Data:  Labs: I personally reviewed the most recent laboratory data pertinent to today's visit  Lab Results   Component Value Date    WBC 9 45 2021    HGB 12 6 2021    HCT 38 2 2021    MCV 90 2021     (L) 2021     Lab Results   Component Value Date    SODIUM 136 2021    K 3 7 2021    CO2 24 2021     2021    BUN 23 2021    CREATININE 0 96 2021    CALCIUM 8 9 2021       PFT results: The most recent pulmonary function tests were reviewed  Imaging:  I personally reviewed the images on the [facility] system pertinent to today's visit        Other studies:    RADIOGRAPHS: New radiographs and reports personally reviewed  CT angio 20 - no PE, RLL bronchial wall thickening, mucous plugging and basilar infiltrates, right hilar adenopathy, no sig effusion, no PTX     CXR 2020 - noted scoliosis, no focal infiltrates, no effusions, no PTX     CT Chest 2020 - post RUL lobectomy noted, atelectasis at bases, no focal infiltrates, no sig mediastinal adenopathy     CTA Head/neck 11/15/19 - no evidence ICS, enlarged right hilar adenopathy 2cm previously 1 6cm     CXR 11/14/19 - post RUL lobectomy surgical changes and volume loss, no focal infiltrates no effusions, no PTX     VBS 4/30/19 - "Moderate oral/mild-moderate pharyngeal dysphagia w/ decreased oral control of liquids and delayed swallow initiation resulting in aspiration before the swallow  Oral prep and chin tuck of thick liquids by tsp reduced aspiration risk"     CT Chest 3/4/19 - improved bronchial/tracheal wall thickening, resolved hilar adenopathy, no sig mediastinal adenopathy, RML and RLL mild nodular infiltrate/TIB infiltrate noted, no sig effusions     CXR 1/29 - noted right hemithorax volume loss, kyphoscoliosis, improved overall aeration of right hemithorax, no PTX     CT angio 1/28/19 - no PE, noted right hilar adenopathy vs early mass lesion - favoring adenopathy based upon appearance, no PTX, no sig effusion, post surgical changes on right     11/2018 - VBS (+) dysphagia with penetration on thin liquids     Prior Studies  Chest X-ray 8/23/17 - CXR - images personally reviewed - noted right sided post-operative changes and mild volume loss, no acute infiltrates, no masses, no lesions appreciated  CT Scan 4/2017 - CT chest images reviewed with 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  Cardiac Testing 7/2017 TTE EF 55%, normal RV      FINDINGS:     PULMONARY ARTERIAL TREE:  No pulmonary embolus is seen       LUNGS: Right upper lobectomy  Patchy opacities within bilateral lower lobes right greater than left compatible with pneumonia in appropriate clinical setting  In addition, there are small nodular opacities within the right lower lobe at the apex      Small secretions are seen within the upper trachea      PLEURA:  Trace right pleural effusion      HEART/GREAT VESSELS:  Unremarkable for patient's age      MEDIASTINUM AND DALIA:  Right hilar lymph nodes measure up to 1 3 cm in short axis (series 2, image 118), previously 1 0 cm  Subcarinal lymph node measures 1 7 cm in short axis (series 2, image 130), previously 1 3 cm      CHEST WALL AND LOWER NECK:   Unremarkable      VISUALIZED STRUCTURES IN THE UPPER ABDOMEN:  Unremarkable      OSSEOUS STRUCTURES:  No acute fracture or destructive osseous lesion  Right posterior rib deformities compatible with prior thoracotomy  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

## 2021-09-14 ENCOUNTER — TELEPHONE (OUTPATIENT)
Dept: OTHER | Facility: OTHER | Age: 81
End: 2021-09-14

## 2021-10-22 DIAGNOSIS — R05.9 COUGH: ICD-10-CM

## 2021-10-22 RX ORDER — BENZONATATE 200 MG/1
CAPSULE ORAL
Qty: 90 CAPSULE | Refills: 1 | Status: SHIPPED | OUTPATIENT
Start: 2021-10-22 | End: 2021-12-09

## 2021-12-02 DIAGNOSIS — J44.0 CHRONIC OBSTRUCTIVE PULMONARY DISEASE WITH ACUTE LOWER RESPIRATORY INFECTION (HCC): ICD-10-CM

## 2021-12-02 DIAGNOSIS — J43.8 OTHER EMPHYSEMA (HCC): Chronic | ICD-10-CM

## 2021-12-02 DIAGNOSIS — J39.8 TRACHEOMALACIA: Chronic | ICD-10-CM

## 2021-12-03 RX ORDER — PREDNISONE 1 MG/1
TABLET ORAL
Qty: 90 TABLET | Refills: 3 | Status: SHIPPED | OUTPATIENT
Start: 2021-12-03

## 2021-12-06 RX ORDER — BUDESONIDE 0.5 MG/2ML
0.5 INHALANT ORAL 2 TIMES DAILY
Qty: 360 ML | Refills: 3 | Status: SHIPPED | OUTPATIENT
Start: 2021-12-06 | End: 2022-12-06

## 2021-12-08 DIAGNOSIS — R05.9 COUGH: ICD-10-CM

## 2021-12-09 RX ORDER — BENZONATATE 200 MG/1
CAPSULE ORAL
Qty: 90 CAPSULE | Refills: 1 | Status: SHIPPED | OUTPATIENT
Start: 2021-12-09

## 2021-12-14 ENCOUNTER — PREP FOR PROCEDURE (OUTPATIENT)
Dept: INTERVENTIONAL RADIOLOGY/VASCULAR | Facility: CLINIC | Age: 81
End: 2021-12-14

## 2021-12-14 ENCOUNTER — HOSPITAL ENCOUNTER (OUTPATIENT)
Dept: RADIOLOGY | Facility: HOSPITAL | Age: 81
Discharge: HOME | End: 2021-12-14
Attending: RADIOLOGY
Payer: MEDICARE

## 2021-12-14 DIAGNOSIS — R13.12 OROPHARYNGEAL DYSPHAGIA: ICD-10-CM

## 2021-12-14 DIAGNOSIS — R05.9 COUGH: Primary | ICD-10-CM

## 2021-12-14 PROCEDURE — 49450 REPLACE G/C TUBE PERC: CPT

## 2021-12-14 PROCEDURE — 49450 REPLACE G/C TUBE PERC: CPT | Performed by: STUDENT IN AN ORGANIZED HEALTH CARE EDUCATION/TRAINING PROGRAM

## 2021-12-14 PROCEDURE — C1769 GUIDE WIRE: HCPCS

## 2021-12-14 RX ADMIN — IOHEXOL 10 ML: 350 INJECTION, SOLUTION INTRAVENOUS at 13:18

## 2021-12-21 ENCOUNTER — TELEMEDICINE (OUTPATIENT)
Dept: PULMONOLOGY | Facility: CLINIC | Age: 81
End: 2021-12-21
Payer: MEDICARE

## 2021-12-21 VITALS — HEIGHT: 75 IN | WEIGHT: 200 LBS | BODY MASS INDEX: 24.87 KG/M2

## 2021-12-21 DIAGNOSIS — I69.354 HEMIPARESIS AFFECTING LEFT SIDE AS LATE EFFECT OF CEREBROVASCULAR ACCIDENT (CVA) (HCC): ICD-10-CM

## 2021-12-21 DIAGNOSIS — J41.1 MUCOPURULENT CHRONIC BRONCHITIS (HCC): ICD-10-CM

## 2021-12-21 DIAGNOSIS — R13.12 OROPHARYNGEAL DYSPHAGIA: Primary | Chronic | ICD-10-CM

## 2021-12-21 DIAGNOSIS — G47.33 OSA (OBSTRUCTIVE SLEEP APNEA): Chronic | ICD-10-CM

## 2021-12-21 PROCEDURE — 99213 OFFICE O/P EST LOW 20 MIN: CPT | Performed by: INTERNAL MEDICINE

## 2021-12-29 ENCOUNTER — APPOINTMENT (OUTPATIENT)
Dept: LAB | Facility: CLINIC | Age: 81
End: 2021-12-29
Payer: MEDICARE

## 2022-01-18 ENCOUNTER — TELEPHONE (OUTPATIENT)
Dept: LAB | Facility: HOSPITAL | Age: 82
End: 2022-01-18

## 2022-01-23 ENCOUNTER — AMB VIDEO VISIT (OUTPATIENT)
Dept: OTHER | Facility: HOSPITAL | Age: 82
End: 2022-01-23

## 2022-01-23 PROCEDURE — ECARE PR SL URGENT CARE VIRTUAL VISIT: Performed by: FAMILY MEDICINE

## 2022-01-23 NOTE — CARE ANYWHERE EVISITS
Visit Summary for Jena Bañuelos - Gender: Male - Date of Birth: [id number]  Date: [id number] - Duration: 2 minutes  Patient: Jena Bañuelos  Provider: Ayden Monsivais    Patient Contact Information  Address  [address] [place] [address] [address] [address]; [address]   [phone]    Visit Topics  UTI [Added By: Self - 2022-01-23]    Triage Questions   What is your current physical address in the event of a medical emergency? Answer []  Are you allergic to any medications? Answer []  Are you now or could you be pregnant? Answer []  Do you have any immune system compromise or chronic lung   disease? Answer []  Do you have any vulnerable family members in the home (infant, pregnant, cancer, elderly)? Answer []     Conversation Transcripts  [0A][0A] [Notification] You are connected with Ayden Monsivais Family Physician [0A][Notification] Jena Bañuelos is located in [place]  [0A][Notification] Jena Bañuelos has shared health history  Jesus Rocha  [0A]    Diagnosis  Altered mental status, unspecified    Procedures  Value: [id number] Code: CPT-4 UNLISTED E&M SERVICE    Medications Prescribed    No prescriptions ordered    Provider Notes  [0A][0A] Spoke wtih patient's daughter  Pt has been confused x 2 1/2 days  Was treated for UTI on 12/29, daughter thinks he may have another UTI  Had labs done 2 days ago at home, does not have results  [0A]PE pt sitting in chair not responding[0A]AP   increased confusion x 2 days[0A]'recommend pt go to ER for further evaluation[0A]    Electronically signed by:  Sylvia Jacobs(NPI [id number])

## 2022-01-31 ENCOUNTER — HOSPITAL ENCOUNTER (EMERGENCY)
Facility: HOSPITAL | Age: 82
Discharge: HOME | End: 2022-01-31
Attending: EMERGENCY MEDICINE
Payer: MEDICARE

## 2022-01-31 ENCOUNTER — APPOINTMENT (EMERGENCY)
Dept: RADIOLOGY | Facility: HOSPITAL | Age: 82
End: 2022-01-31
Payer: MEDICARE

## 2022-01-31 VITALS
DIASTOLIC BLOOD PRESSURE: 79 MMHG | RESPIRATION RATE: 16 BRPM | HEART RATE: 87 BPM | SYSTOLIC BLOOD PRESSURE: 128 MMHG | TEMPERATURE: 97.9 F | OXYGEN SATURATION: 87 % | BODY MASS INDEX: 24.99 KG/M2 | WEIGHT: 199.96 LBS

## 2022-01-31 DIAGNOSIS — J69.0 ASPIRATION PNEUMONIA OF LEFT LOWER LOBE, UNSPECIFIED ASPIRATION PNEUMONIA TYPE (HCC): ICD-10-CM

## 2022-01-31 DIAGNOSIS — R09.02 HYPOXIA: Primary | ICD-10-CM

## 2022-01-31 DIAGNOSIS — Z51.5 ENCOUNTER FOR HOME HOSPICE CARE: ICD-10-CM

## 2022-01-31 LAB
ALBUMIN SERPL BCP-MCNC: 3.6 G/DL (ref 3.5–5)
ALP SERPL-CCNC: 68 U/L (ref 46–116)
ALT SERPL W P-5'-P-CCNC: 24 U/L (ref 12–78)
ANION GAP SERPL CALCULATED.3IONS-SCNC: 11 MMOL/L (ref 4–13)
AST SERPL W P-5'-P-CCNC: 16 U/L (ref 5–45)
BASOPHILS # BLD AUTO: 0.03 THOUSANDS/ΜL (ref 0–0.1)
BASOPHILS NFR BLD AUTO: 0 % (ref 0–1)
BILIRUB SERPL-MCNC: 0.89 MG/DL (ref 0.2–1)
BUN SERPL-MCNC: 32 MG/DL (ref 5–25)
CALCIUM SERPL-MCNC: 10.1 MG/DL (ref 8.3–10.1)
CHLORIDE SERPL-SCNC: 94 MMOL/L (ref 100–108)
CO2 SERPL-SCNC: 23 MMOL/L (ref 21–32)
CREAT SERPL-MCNC: 1.45 MG/DL (ref 0.6–1.3)
EOSINOPHIL # BLD AUTO: 0.03 THOUSAND/ΜL (ref 0–0.61)
EOSINOPHIL NFR BLD AUTO: 0 % (ref 0–6)
ERYTHROCYTE [DISTWIDTH] IN BLOOD BY AUTOMATED COUNT: 15.6 % (ref 11.6–15.1)
GFR SERPL CREATININE-BSD FRML MDRD: 44 ML/MIN/1.73SQ M
GLUCOSE SERPL-MCNC: 129 MG/DL (ref 65–140)
HCT VFR BLD AUTO: 42.2 % (ref 36.5–49.3)
HGB BLD-MCNC: 14.1 G/DL (ref 12–17)
IMM GRANULOCYTES # BLD AUTO: 0.1 THOUSAND/UL (ref 0–0.2)
IMM GRANULOCYTES NFR BLD AUTO: 1 % (ref 0–2)
LYMPHOCYTES # BLD AUTO: 0.62 THOUSANDS/ΜL (ref 0.6–4.47)
LYMPHOCYTES NFR BLD AUTO: 4 % (ref 14–44)
MCH RBC QN AUTO: 29.3 PG (ref 26.8–34.3)
MCHC RBC AUTO-ENTMCNC: 33.4 G/DL (ref 31.4–37.4)
MCV RBC AUTO: 88 FL (ref 82–98)
MONOCYTES # BLD AUTO: 0.98 THOUSAND/ΜL (ref 0.17–1.22)
MONOCYTES NFR BLD AUTO: 6 % (ref 4–12)
NEUTROPHILS # BLD AUTO: 15.7 THOUSANDS/ΜL (ref 1.85–7.62)
NEUTS SEG NFR BLD AUTO: 89 % (ref 43–75)
NRBC BLD AUTO-RTO: 0 /100 WBCS
PLATELET # BLD AUTO: 174 THOUSANDS/UL (ref 149–390)
PMV BLD AUTO: 11.1 FL (ref 8.9–12.7)
POTASSIUM SERPL-SCNC: 4.5 MMOL/L (ref 3.5–5.3)
PROT SERPL-MCNC: 7.3 G/DL (ref 6.4–8.2)
RBC # BLD AUTO: 4.82 MILLION/UL (ref 3.88–5.62)
SODIUM SERPL-SCNC: 128 MMOL/L (ref 136–145)
WBC # BLD AUTO: 17.46 THOUSAND/UL (ref 4.31–10.16)

## 2022-01-31 PROCEDURE — 36415 COLL VENOUS BLD VENIPUNCTURE: CPT | Performed by: EMERGENCY MEDICINE

## 2022-01-31 PROCEDURE — 80053 COMPREHEN METABOLIC PANEL: CPT | Performed by: EMERGENCY MEDICINE

## 2022-01-31 PROCEDURE — 96374 THER/PROPH/DIAG INJ IV PUSH: CPT

## 2022-01-31 PROCEDURE — 99291 CRITICAL CARE FIRST HOUR: CPT | Performed by: EMERGENCY MEDICINE

## 2022-01-31 PROCEDURE — 99291 CRITICAL CARE FIRST HOUR: CPT

## 2022-01-31 PROCEDURE — 85025 COMPLETE CBC W/AUTO DIFF WBC: CPT | Performed by: EMERGENCY MEDICINE

## 2022-01-31 PROCEDURE — 71045 X-RAY EXAM CHEST 1 VIEW: CPT

## 2022-01-31 PROCEDURE — 96376 TX/PRO/DX INJ SAME DRUG ADON: CPT

## 2022-01-31 RX ORDER — MORPHINE SULFATE 100 MG/5ML
10 SOLUTION, CONCENTRATE ORAL
Qty: 30 ML | Refills: 0 | Status: SHIPPED | OUTPATIENT
Start: 2022-01-31 | End: 2022-02-10

## 2022-01-31 RX ORDER — MORPHINE SULFATE 4 MG/ML
4 INJECTION, SOLUTION INTRAMUSCULAR; INTRAVENOUS ONCE
Status: COMPLETED | OUTPATIENT
Start: 2022-01-31 | End: 2022-01-31

## 2022-01-31 RX ORDER — SCOLOPAMINE TRANSDERMAL SYSTEM 1 MG/1
1 PATCH, EXTENDED RELEASE TRANSDERMAL
Qty: 20 PATCH | Refills: 0 | Status: SHIPPED | OUTPATIENT
Start: 2022-01-31

## 2022-01-31 RX ORDER — SCOLOPAMINE TRANSDERMAL SYSTEM 1 MG/1
1 PATCH, EXTENDED RELEASE TRANSDERMAL
Status: DISCONTINUED | OUTPATIENT
Start: 2022-01-31 | End: 2022-01-31 | Stop reason: HOSPADM

## 2022-01-31 RX ORDER — LORAZEPAM 2 MG/ML
1 CONCENTRATE ORAL EVERY 6 HOURS PRN
Qty: 30 ML | Refills: 0 | Status: SHIPPED | OUTPATIENT
Start: 2022-01-31 | End: 2022-02-10

## 2022-01-31 RX ADMIN — SCOPALAMINE 1 PATCH: 1 PATCH, EXTENDED RELEASE TRANSDERMAL at 10:23

## 2022-01-31 RX ADMIN — MORPHINE SULFATE 4 MG: 4 INJECTION INTRAVENOUS at 13:36

## 2022-01-31 RX ADMIN — MORPHINE SULFATE 4 MG: 4 INJECTION INTRAVENOUS at 08:08

## 2022-01-31 NOTE — ED NOTES
Condition remains the same  Family at [facility]   Waiting for case management     Jocelyn Doss RN  01/31/22 1007

## 2022-01-31 NOTE — ED NOTES
Family arranged home hospice, will be discharged to [address] at [address] [facility]  [facility] called   For transportation will call back with pickup time  Family remains at bedside       Marii Barbosa RN  01/31/22 1028

## 2022-01-31 NOTE — CASE MANAGEMENT
Case Management Progress Note    Patient name Miryam Goddard  Location ED 05/ED 05 MRN [id number]  : 1940 Date 2022       LOS (days): 0  Geometric Mean LOS (GMLOS) (days):   Days to GMLOS:        OBJECTIVE:        Current admission status: Emergency  Preferred Pharmacy:   [facility] [address] [address]  Phone: [phone] Fax: [facility] (James Hopkins) [facility]  [address] [facility] [address]  Phone: [phone] Fax: [phone]    Primary Care Provider: Nancy Dominguez DO    Primary Insurance: MEDICARE  Secondary Insurance: BLUE CROSS    PROGRESS NOTE:    ED charge nurse notified CM of family request re: assistance with hospice set up at home  CM spoke with daughter and granddaughter at patient bedside while patient sleeping  Daughter relayed request for H&P and AVS be sent to [facility] via [employer] or fax [phone] for hospice home setup  CM notified daughter, most recent h&p is from 21, daughter confirmed would like that one sent to [facility]   CM sent requested documents to [facility] via [employer] referral

## 2022-01-31 NOTE — ED PROVIDER NOTES
History  Chief Complaint   Patient presents with    Aspiration w/severe Dyspnea     aspirated on tube feed     34-year-old male, brought in by EMS for increasing cough and hypoxia  , patient has severe COPD family has noticed over the past 2 weeks he has been increasingly confused and unable to perform most of his ADLs, patient had a stroke 15 years ago, does have a caregiver, but normally can self propel a wheelchair and get around over past 2 weeks he has been unable to do this, they believe he had an aspiration event as he suddenly has worsening cough and increasing sputum production although he is not producing anything with this very wet sound and cough  , mental status has significantly declined  , upon paramedic arrival O2 sats in the 50s on home 4 L nasal cannula, he normally only wears oxygen p r n , not on 24 hour 7 basis  , upon arrival to the ED on a non-rebreather his pulse ox is 75%  , family says he is DNR DNI does not want aggressive measures  They have been in talks with outside hospice agency and have meetings about placing on hospice within the next few weeks      History provided by:  Caregiver and relative (daughter)  Cough  Cough characteristics:  Non-productive  Sputum characteristics:  Unable to specify  Severity:  Moderate  Onset quality:  Gradual  Duration:  2 days  Timing:  Constant  Progression:  Unchanged  Chronicity:  New  Relieved by:  None tried  Worsened by:  Nothing  Ineffective treatments:  None tried      Prior to Admission Medications   Prescriptions Last Dose Informant Patient Reported? Taking?    ASPIRIN 81 PO  Child Yes No   Sig: Take 1 tablet by mouth every other day     Fesoterodine Fumarate ER (TOVIAZ) 8 MG TB24  Child Yes No   Sig: Take 4 mg by mouth every evening     LORazepam (ATIVAN) 0 5 mg tablet  Child No No   Sig: Take 0 5 tablets (0 25 mg total) by mouth every 8 (eight) hours as needed for anxiety   PARoxetine (PAXIL) 20 mg tablet  Child Yes No   Sig: Take 20 mg by mouth daily   acetaminophen (TYLENOL) 325 mg tablet  Child No No   Sig: Take 2 tablets by mouth every 6 (six) hours as needed for fever   albuterol (PROVENTIL HFA,VENTOLIN HFA) 90 mcg/act inhaler  Child No No   Sig: Inhale 2 puffs 4 (four) times a day   amLODIPine (NORVASC) 10 mg tablet  Child Yes No   Sig: Take 1 tablet by mouth daily   atorvastatin (LIPITOR) 40 mg tablet  Child No No   Sig: Take 1 tablet (40 mg total) by mouth daily after dinner   Patient not taking: Reported on 12/21/2021    benzonatate (TESSALON) 200 MG capsule  Child No No   Sig: TAKE 1 CAPSULE BY MOUTH 3 TIMES A DAY AS NEEDED FOR COUGH   budesonide (PULMICORT) 0 5 mg/2 mL nebulizer solution  Child No No   Sig: TAKE 1 VIAL (0 5 MG TOTAL) BY NEBULIZATION 2 (TWO) TIMES A DAY RINSE MOUTH AFTER USE    carvedilol (COREG) 12 5 mg tablet  Child No No   Sig: Take 1 tablet by mouth 2 (two) times a day with meals   clopidogrel (PLAVIX) 75 mg tablet  Child No No   Sig: Take 1 tablet by mouth daily   guaiFENesin (ROBITUSSIN) 100 mg/5 mL oral solution  Child No No   Sig: Take 20 mL (400 mg total) by mouth 3 (three) times a day   ipratropium-albuterol (DUO-NEB) 0 5-2 5 mg/3 mL nebulizer solution  Child Yes No   Sig: Take 3 mL by nebulization 3 (three) times a day   ketoconazole (NIZORAL) 2 % cream  Child No No   Sig: Apply topically to both feet in their entirety (tops, bottoms and between toes) 3 times a day for 4 weeks straight     Patient not taking: Reported on 6/15/2021   latanoprost (XALATAN) 0 005 % ophthalmic solution  Child Yes No   Sig: Administer 1 drop to both eyes daily at bedtime   loratadine (CLARITIN) 10 mg tablet  Child Yes No   Sig: Take 10 mg by mouth daily   magnesium hydroxide (MILK OF MAGNESIA) 400 mg/5 mL oral suspension  Child Yes No   Sig: Take 15 mL by mouth 2 (two) times a day as needed   oxybutynin (DITROPAN-XL) 5 mg 24 hr tablet  Child Yes No   Sig: Take 5 mg by mouth daily   pantoprazole (PROTONIX) 40 mg tablet  Child Yes No Sig: Take 40 mg by mouth daily   polyethylene glycol (MIRALAX) 17 g packet  Child Yes No   Sig: Take 17 g by mouth daily as needed   predniSONE 5 mg tablet  Child No No   Sig: TAKE 1 TABLET BY MOUTH EVERY DAY   psyllium (METAMUCIL) 0 52 g capsule  Child Yes No   Sig: Take 0 52 g by mouth daily as needed    senna (SENOKOT) 8 6 MG tablet  Child Yes No   Sig: Take 2 tablets by mouth daily at bedtime   spironolactone (ALDACTONE) 25 mg tablet  Child No No   Sig: Take 1 tablet (25 mg total) by mouth daily   sulfamethoxazole-trimethoprim (BACTRIM DS) 800-160 mg per tablet  Child Yes No   tamsulosin (FLOMAX) 0 4 mg  Child Yes No   Sig: Take 1 capsule by mouth daily   traZODone (DESYREL) 50 mg tablet  Child No No   Si/2 to 1 tab @ HS PRN   Patient not taking: Reported on 6/15/2021      Facility-Administered Medications: None       Past Medical History:   Diagnosis Date    RONAL (acute kidney injury) ([facility] ) 2017    Aspiration into respiratory tract     Chest pain 2017    [facility]     Depression     Elevated troponin 2017    GERD (gastroesophageal reflux disease)     History of CVA (cerebrovascular accident) 2016    Hyperlipidemia     Hypertension     Lung cancer ([facility] )     Right, status post lobectomy    Pneumonia     Prostate cancer ([facility] )     Sleep apnea     awaiting sleep study results    Squamous cell skin cancer     Stroke ([facility] )     Tracheomalacia     Weakness due to cerebrovascular accident (CVA)        Past Surgical History:   Procedure Laterality Date    COLONOSCOPY      CT GUIDED FINE NEEDLE ASPIRATION (ALL INC)  10/30/2020    ESOPHAGOGASTRODUODENOSCOPY N/A 2016    Procedure: ESOPHAGOGASTRODUODENOSCOPY (EGD); Surgeon: Gladis Coombs MD;  [facility];   Service:     IR GASTROSTOMY (G) TUBE CHECK/CHANGE/REINSERTION/UPSIZE  2021    IR GASTROSTOMY (G) TUBE CHECK/CHANGE/REINSERTION/UPSIZE  2021    JOINT REPLACEMENT      knee replacement    LUNG LOBECTOMY      NM ESOPHAGOGASTRODUODENOSCOPY TRANSORAL DIAGNOSTIC N/A 3/15/2017    Procedure: ESOPHAGOGASTRODUODENOSCOPY (EGD); Surgeon: Barrie Avelar MD;  Location: [facility]; Service: Gastroenterology    SKIN BIOPSY      TRIGEMINAL NERVE DECOMPRESSION         Family History   Problem Relation Age of Onset    Cancer Mother     Lymphoma Father     Diabetes Maternal Grandmother      I have reviewed and agree with the history as documented  E-Cigarette/Vaping    E-Cigarette Use Never User      E-Cigarette/Vaping Substances    Nicotine No     THC No     CBD No     Flavoring No     Other No     Unknown No      Social History     Tobacco Use    Smoking status: Former Smoker     Packs/day: 1 00     Years: 22 00     Pack years: 22 00     Types: Cigarettes     Start date:      Quit date:      Years since quittin 1    Smokeless tobacco: Never Used   Vaping Use    Vaping Use: Never used   Substance Use Topics    Alcohol use: Not Currently    Drug use: Not Currently       Review of Systems   Unable to perform ROS: Acuity of condition   Respiratory: Positive for cough  Physical Exam  Physical Exam  Vitals reviewed  Constitutional:       General: He is in acute distress  Appearance: He is well-developed  He is ill-appearing and toxic-appearing  He is not diaphoretic  HENT:      Head: Normocephalic and atraumatic  Right Ear: External ear normal       Left Ear: External ear normal       Nose: Nose normal    Eyes:      General:         Right eye: No discharge  Left eye: No discharge  Pupils: Pupils are equal, round, and reactive to light  Neck:      Trachea: No tracheal deviation  Cardiovascular:      Rate and Rhythm: Normal rate and regular rhythm  Heart sounds: Normal heart sounds  No murmur heard  Pulmonary:      Effort: Tachypnea, accessory muscle usage and respiratory distress present        Breath sounds: No stridor  Rhonchi present  Comments: Pulse ox low 70s on non-rebreather, increased non-rebreather to 40+ L which increased to mid 80s  Abdominal:      General: There is no distension  Palpations: Abdomen is soft  Tenderness: There is no abdominal tenderness  There is no guarding or rebound  Musculoskeletal:         General: Normal range of motion  Cervical back: Normal range of motion and neck supple  Skin:     General: Skin is warm and dry  Coloration: Skin is not pale  Findings: No erythema  Neurological:      General: No focal deficit present  Mental Status: He is oriented to person, place, and time           Vital Signs  ED Triage Vitals   Temperature Pulse Respirations Blood Pressure SpO2   01/31/22 0739 01/31/22 0724 01/31/22 0724 01/31/22 0724 01/31/22 0724   97 9 °F (36 6 °C) 91 (!) 40 117/71 (!) 79 %      Temp Source Heart Rate Source Patient Position - Orthostatic VS BP Location FiO2 (%)   01/31/22 0739 01/31/22 0724 01/31/22 0724 01/31/22 0724 --   Axillary Monitor Sitting Right arm       Pain Score       --                  Vitals:    01/31/22 0724   BP: 117/71   Pulse: 91   Patient Position - Orthostatic VS: Sitting         Visual Acuity      ED Medications  Medications   scopolamine (TRANSDERM-SCOP) 1 mg/3 days TD 72 hr patch 1 patch (1 patch Transdermal Medication Applied 1/31/22 1023)   morphine (PF) 4 mg/mL injection 4 mg (has no administration in time range)   morphine (PF) 4 mg/mL injection 4 mg (4 mg Intravenous Given 1/31/22 0808)       Diagnostic Studies  Results Reviewed     Procedure Component Value Units Date/Time    Comprehensive metabolic panel [[id number]]  (Abnormal) Collected: 01/31/22 0808    Lab Status: Final result Specimen: Blood from Arm, Right Updated: 01/31/22 0955     Sodium 128 mmol/L      Potassium 4 5 mmol/L      Chloride 94 mmol/L      CO2 23 mmol/L      ANION GAP 11 mmol/L      BUN 32 mg/dL      Creatinine 1 45 mg/dL Glucose 129 mg/dL      Calcium 10 1 mg/dL      AST 16 U/L      ALT 24 U/L      Alkaline Phosphatase 68 U/L      Total Protein 7 3 g/dL      Albumin 3 6 g/dL      Total Bilirubin 0 89 mg/dL      eGFR 44 ml/min/1 73sq m     Narrative:      [place] guidelines for Chronic Kidney Disease (CKD):     Stage 1 with normal or high GFR (GFR > 90 mL/min/1 73 square meters)    Stage 2 Mild CKD (GFR = 60-89 mL/min/1 73 square meters)    Stage 3A Moderate CKD (GFR = 45-59 mL/min/1 73 square meters)    Stage 3B Moderate CKD (GFR = 30-44 mL/min/1 73 square meters)    Stage 4 Severe CKD (GFR = 15-29 mL/min/1 73 square meters)    Stage 5 End Stage CKD (GFR <15 mL/min/1 73 square meters)  Note: GFR calculation is accurate only with a steady state creatinine    CBC and differential [[id number]]  (Abnormal) Collected: 01/31/22 0808    Lab Status: Final result Specimen: Blood from Arm, Right Updated: 01/31/22 0821     WBC 17 46 Thousand/uL      RBC 4 82 Million/uL      Hemoglobin 14 1 g/dL      Hematocrit 42 2 %      MCV 88 fL      MCH 29 3 pg      MCHC 33 4 g/dL      RDW 15 6 %      MPV 11 1 fL      Platelets 890 Thousands/uL      nRBC 0 /100 WBCs      Neutrophils Relative 89 %      Immat GRANS % 1 %      Lymphocytes Relative 4 %      Monocytes Relative 6 %      Eosinophils Relative 0 %      Basophils Relative 0 %      Neutrophils Absolute 15 70 Thousands/µL      Immature Grans Absolute 0 10 Thousand/uL      Lymphocytes Absolute 0 62 Thousands/µL      Monocytes Absolute 0 98 Thousand/µL      Eosinophils Absolute 0 03 Thousand/µL      Basophils Absolute 0 03 Thousands/µL                  XR chest 1 view portable   ED Interpretation by Awa Beckham DO (01/31 9348)   Left-sided pneumonia      Final Result by Eli Valdivia MD (01/31 7986)      Pneumonia in the left mid lung  This could be due to aspiration                    Workstation performed: [id number] Procedures  CriticalCare Time  Performed by: Naye Lanier DO  Authorized by: Naye Lanier DO     Critical care provider statement:     Critical care time (minutes):  40    Critical care time was exclusive of:  Separately billable procedures and treating other patients    Critical care was necessary to treat or prevent imminent or life-threatening deterioration of the following conditions:  Respiratory failure    Critical care was time spent personally by me on the following activities:  Obtaining history from patient or surrogate, development of treatment plan with patient or surrogate, evaluation of patient's response to treatment, examination of patient, ordering and performing treatments and interventions, ordering and review of laboratory studies, ordering and review of radiographic studies, re-evaluation of patient's condition and review of old charts             ED Course  ED Course as of 01/31/22 1104   Mon Jan 31, 2022   8905 Multiple repeat evaluations, daughter still in complete agreement with plan  , comfort measures only, will consult hospice  Will try to arrange for home hospice   1010 Another long conversation with family, they arranged home hospice, hospice team to see them in 3 hours at home  , will discharge                                             MDM  Number of Diagnoses or Management Options  Aspiration pneumonia of left lower lobe, unspecified aspiration pneumonia type [facility]): new and requires workup  Encounter for home hospice care: new and requires workup  Hypoxia: new and requires workup  Diagnosis management comments:       Initial ED assessment:  79-year-old male, steady decline over the past 2 weeks, worsening cough possible/likely aspiration event, long conversation with family, daughter who is POA, and a nurse, wants to presume comfort measures as they were meeting with hospice    Will provide oxygen for comfort, morphine, will check blood work and chest x-ray to aid in prognosis  Will discussed the case management about arranging care with patient's hospice team    Patient with unstable vital sign of a low oxygen saturation  (patient hypoxic)  Because of this nasal cannula oxygen was applied  , this improved patient's oxygen saturation to noncritical level  Final ED summary/disposition:   After evaluation and workup in the emergency department,  ultimately transition to home hospice  , family had home hospice agency with a rope able to coordinate with, wrote for concentrated morphine and Ativan , family very thankful  , patient comfort measures only  Amount and/or Complexity of Data Reviewed  Clinical lab tests: ordered and reviewed  Tests in the radiology section of CPT®: ordered and reviewed  Decide to obtain previous medical records or to obtain history from someone other than the patient: yes  Obtain history from someone other than the patient: yes  Review and summarize past medical records: yes  Discuss the patient with other providers: yes  Independent visualization of images, tracings, or specimens: yes        Disposition  Final diagnoses:   Hypoxia   Aspiration pneumonia of left lower lobe, unspecified aspiration pneumonia type ([facility] )   Encounter for home hospice care     Time reflects when diagnosis was documented in both MDM as applicable and the Disposition within this note     Time User Action Codes Description Comment    1/31/2022 10:10 AM Rob Parker Add [R09 02] Hypoxia     1/31/2022 10:10 AM Kenny Espinosa Add [J69 0] Aspiration pneumonia of left lower lobe, unspecified aspiration pneumonia type ([facility] )     1/31/2022 10:10 AM Rob Parker Add [Z51 5] Encounter for home hospice care       ED Disposition     ED Disposition Condition Date/Time Comment    Discharge Stable Mon Jan 31, 2022 10:10 AM Louise Vásquez  discharge to home/self care              Follow-up Information    None         Patient's Medications   Discharge Prescriptions LORAZEPAM (ATIVAN) 2 MG/ML CONCENTRATED SOLUTION    Take 0 5 mL (1 mg total) by mouth every 6 (six) hours as needed for anxiety for up to 10 days       Start Date: 1/31/2022 End Date: 2/10/2022       Order Dose: 1 mg       Quantity: 30 mL    Refills: 0    MORPHINE SULFATE, CONCENTRATE, 20 MG/ML CONCENTRATED SOLUTION    Take 0 5 mL (10 mg total) by mouth every hour as needed for severe pain for up to 10 days Max Daily Amount: 240 mg       Start Date: 1/31/2022 End Date: 2/10/2022       Order Dose: 10 mg       Quantity: 30 mL    Refills: 0    SCOPOLAMINE (TRANSDERM-SCOP) 1 MG/3 DAYS TD 72 HR PATCH    Place 1 patch on the skin every third day       Start Date: 1/31/2022 End Date: --       Order Dose: 1 patch       Quantity: 20 patch    Refills: 0       No discharge procedures on file      PDMP Review       Value Time User    PDMP Reviewed  Yes 6/19/2021  9:09 AM Marianela Miranda MD          ED Provider  Electronically Signed by           Gato Melendez DO  01/31/22 1186

## 2022-10-12 PROBLEM — J18.9 BILATERAL PNEUMONIA: Status: RESOLVED | Noted: 2021-06-16 | Resolved: 2022-10-12

## 2024-10-08 NOTE — PHYSICIAN ADVISOR
[facility]  [address]  [facility] [address]  Dept: [phone]  Dept Fax: [phone]       Zia Portillo :2014 MRN:[id number]    10/7/2024 Time Session Began: 7:33pm  Time Session Ended: 7:55pm    This visit was performed via live interactive two-way Video visit with patient's verbal consent.   Clinician Location:Home  Patient Location: Home.  Verified patient identity:  [x] Yes    Session Type:Therapy 16-37 minutes (25953)    Others Present: dad    Intervention: Behavioral, Cognitive Behavioral    Suicide/Homicide/Violence Ideation: No    If Yes, explain:none reported    Current Outpatient Medications   Medication Sig    IBUPROFEN PO     acetaminophen (TYLENOL) 160 MG chewable tablet Chew 160 mg by mouth every 4 hours as needed for Pain.    Loratadine (CLARITIN ALLERGY CHILDRENS PO)  (Patient not taking: Reported on 3/4/2024)    vitamin - therapeutic multivitamins w/minerals (CENTRUM SILVER,THERA-M) Tab Take 1 tablet by mouth daily. (Patient not taking: Reported on 3/4/2024)     No current facility-administered medications for this visit.       Change in Medication(s) Reported: No  If Yes, explain: none reported    Patient/Family Education Provided: Yes  Patient/Family Displays Understanding: Yes    If No, explain: NA    Chief complaint in patient's own words:Client has still been working on being more social.    Progress Note containing chief complaint and symptoms/problems related to the complaint:    (Data/Action/Response/Plan)    D: :Client has still been working on being more social. He said he has been making new friends at school, he has been doing more activities with friends/open to doing more things with them vs just the only ones that he likes. He plans to do rec basketball when it starts. He said that school is going well. He denied issues with anger. Dad noted things are going well.    A: Discussed progress with anger, social interactions, worry, strategies to  Current patient class: Inpatient  The patient is currently on Hospital Day: 3      The patient was admitted to the hospital at 1526 on 2/13/18 for the following diagnosis:  Altered mental status [R41 82]  Weakness [R53 1]  COPD exacerbation ([address] ) [J44 1]  Recurrent falls [R29 6]       There is documentation in the medical record of an expected length of stay of at least 2 midnights  The patient is therefore expected to satisfy the 2 midnight benchmark and given the 2 midnight presumption is appropriate for INPATIENT ADMISSION  Given this expectation of a satisfying stay, CMS instructs us that the patient is most often appropriate for inpatient admission under part A provided medical necessity is documented in the chart  After review of the relevant documentation, labs, vital signs and test results, the patient is appropriate for INPATIENT ADMISSION  Admission to the hospital as an inpatient is a complex decision making process which requires the practitioner to consider the patients presenting complaint, history and physical examination and all relevant testing  With this in mind, in this case, the patient was deemed appropriate for INPATIENT ADMISSION  After review of the documentation and testing available at the time of the admission I concur with this clinical determination of medical necessity  Rationale is as follows: The patient is a 65 yrs old Male who presented to the ED at 2/13/2018 11:22 AM with a chief complaint of Fall (Patient d/c from ER at 0530 this am for previous fall  Patient attempted to walk to bathroom at [facility] and slid to floor  Patient denies any injury at this time   Per staff at [facility] patient appears altered  )    The patients vitals on arrival were ED Triage Vitals   Temperature Pulse Respirations Blood Pressure SpO2   02/13/18 1648 02/13/18 1132 02/13/18 1132 02/13/18 1132 02/13/18 1132   97 8 °F (36 6 °C) 68 19 128/61 96 %      Temp Source Heart Rate Source Patient Position - Orthostatic VS BP Location FiO2 (%)   02/13/18 1648 02/13/18 1132 02/13/18 1132 02/13/18 1132 --   Oral Monitor Sitting Right arm       Pain Score       02/13/18 1415       No Pain           Past Medical History:   Diagnosis Date    RONAL (acute kidney injury) ([facility] ) 5/31/2017    Aspiration into respiratory tract     Chest pain 5/31/2017    COPD (chronic obstructive pulmonary disease) (HCC)     Depression     Elevated troponin 5/31/2017    GERD (gastroesophageal reflux disease)     History of CVA (cerebrovascular accident) 4/13/2016    Hyperlipidemia     Hypertension     Lung cancer ([facility] ) 2005    Right, status post lobectomy    Pneumonia     Prostate cancer ([facility] )     Sleep apnea     awaiting sleep study results    Stroke ([place] [address] )     Weakness due to cerebrovascular accident (CVA) [facility])      Past Surgical History:   Procedure Laterality Date    COLONOSCOPY      ESOPHAGOGASTRODUODENOSCOPY N/A 4/13/2016    Procedure: ESOPHAGOGASTRODUODENOSCOPY (EGD); Surgeon: Juliette Menjivar MD;  [facility]; Service:     JOINT REPLACEMENT      knee replacement    LUNG LOBECTOMY      MO ESOPHAGOGASTRODUODENOSCOPY TRANSORAL DIAGNOSTIC N/A 3/15/2017    Procedure: ESOPHAGOGASTRODUODENOSCOPY (EGD); Surgeon: Juliette Menjivar MD;  [facility];   Service: Gastroenterology    TRIGEMINAL NERVE DECOMPRESSION             Consults have been placed to:   IP CONSULT TO CASE MANAGEMENT  IP CONSULT TO NEUROLOGY    Vitals:    02/14/18 0858 02/14/18 1110 02/14/18 1446 02/14/18 2352   BP:  109/60 110/65 121/56   BP Location:  Left arm Left arm Right arm   Pulse:  72 66 56   Resp:  18 18 22   Temp:  97 8 °F (36 6 °C) 97 5 °F (36 4 °C) 97 5 °F (36 4 °C)   TempSrc:  Oral Oral Oral   SpO2: 94% 95% 94% 91%   Weight:       Height:           Most recent labs:    Recent Labs      02/13/18   1138  02/14/18   0438   WBC  11 02*  8 30  8 30   HGB  13 9  13 7  13 7   HCT  40 9  40 0  40 0   PLT  121*  135*  133*  133* maintain gains. Encouraged client and dad to consider taking a break or spacing sessions out further if still doing well at next session. Told to call if necessary prior to next session.     R: Client was communicative, cooperative, wants to play rec basketball this year and then try his school's basketball next year.     P: 10/28/24 at 7:30pm  Focus on reviewing goals.     Need for Community Resources Assessed: Yes    Resources Needed: no    If Yes, what resources: NA    Primary Diagnosis: Depression, unspecified depression type    Other mixed anxiety disorders    Treatment Plan: unchanged    Discharge Plan: Continue with sessions until goals are met and adequately maintained.    Next Appointment:10/28/24 at 7:30pm  Focus on reviewing goals.     JASON Ward   K  3 7  3 7   NA  137  137   CALCIUM  8 9  8 6   BUN  18  26*   CREATININE  1 28  1 24   TROPONINI  <0 02   --    AST  29  22   ALT  26  24   ALKPHOS  87  87   BILITOT  1 17*  0 75       Scheduled Meds:  Current Facility-Administered Medications:  acetaminophen 650 mg Oral Q6H PRN Tia Klein MD    albuterol 2 puff Inhalation 4x Daily Roshan Stokes MD    albuterol 2 5 mg Nebulization Q4H PRN Tia Klein MD    amLODIPine 10 mg Oral Daily Tia Klein MD    aspirin 81 mg Oral Every Other Day Tia Klein MD    atorvastatin 10 mg Oral Daily With Pavithra Sierra MD    benzonatate 100 mg Oral TID PRN Tia Klein MD    bimatoprost 1 drop Both Eyes HS Tia Klein MD    carvedilol 12 5 mg Oral BID With Meals Tia Klein MD    clopidogrel 75 mg Oral Daily Tia Klein MD    enoxaparin 40 mg Subcutaneous Daily Tia Klein MD    fluorouracil  Topical BID Tia Klein MD    fluticasone-salmeterol 1 puff Inhalation Q12H [facility] Roshan Stokes MD    LORazepam 0 25 mg Oral Daily Tia Klein MD    LORazepam 0 5 mg Oral HS Tia Klein MD    meclizine 25 mg Oral TID PRN Tia Klein MD    ondansetron 4 mg Intravenous Q6H PRN Tia Klein MD    pantoprazole 20 mg Oral Early Morning Tia Klein MD    PARoxetine 20 mg Oral Daily Tia Klein MD    predniSONE 40 mg Oral Daily Ara Schaffer MD    sodium chloride 50 mL/hr Intravenous Continuous Ara Schaffer MD Last Rate: 50 mL/hr (02/14/18 1403)   spironolactone 25 mg Oral Daily Tia Klein MD    tamsulosin 0 4 mg Oral Daily With Pavithra Sierra MD    tiotropium 18 mcg Inhalation Daily Tia Klein MD    tolterodine 2 mg Oral Daily Tai Klein MD      Continuous Infusions:  sodium chloride 50 mL/hr Last Rate: 50 mL/hr (02/14/18 1403)     PRN Meds:   acetaminophen    albuterol    benzonatate    meclizine   ondansetron    Surgical procedures (if appropriate):